# Patient Record
Sex: FEMALE | Race: WHITE | NOT HISPANIC OR LATINO | Employment: OTHER | URBAN - METROPOLITAN AREA
[De-identification: names, ages, dates, MRNs, and addresses within clinical notes are randomized per-mention and may not be internally consistent; named-entity substitution may affect disease eponyms.]

---

## 2017-06-02 ENCOUNTER — HOSPITAL ENCOUNTER (OUTPATIENT)
Dept: RADIOLOGY | Facility: MEDICAL CENTER | Age: 62
End: 2017-06-02
Attending: ORTHOPAEDIC SURGERY
Payer: COMMERCIAL

## 2017-06-02 DIAGNOSIS — S93.421A SPRAIN OF DELTOID LIGAMENT OF RIGHT ANKLE, INITIAL ENCOUNTER: ICD-10-CM

## 2017-06-02 PROCEDURE — 73721 MRI JNT OF LWR EXTRE W/O DYE: CPT | Mod: RT

## 2017-06-28 PROCEDURE — 80048 BASIC METABOLIC PNL TOTAL CA: CPT

## 2017-06-28 PROCEDURE — 83036 HEMOGLOBIN GLYCOSYLATED A1C: CPT

## 2017-06-28 PROCEDURE — 36415 COLL VENOUS BLD VENIPUNCTURE: CPT

## 2017-06-28 RX ORDER — MULTIVITAMIN WITH IRON
1 TABLET ORAL DAILY
COMMUNITY
End: 2019-11-26

## 2017-06-28 RX ORDER — LEVOTHYROXINE SODIUM 0.12 MG/1
125 TABLET ORAL
Status: ON HOLD | COMMUNITY
End: 2017-07-17

## 2017-06-28 NOTE — OR NURSING
preadmit appointment: Pt. Instructed to continue regularly prescribed medication through the day before surgery, instructed to take the following medications, the day of surgery, with a sip of water per anesthesia protocol :PRAVASTATIN,  Pt. To follow up with Dr. Shaw re to insulin pump and dosing. Pt states had a problem with Blood sugar when she had last surg

## 2017-07-17 ENCOUNTER — APPOINTMENT (OUTPATIENT)
Dept: RADIOLOGY | Facility: MEDICAL CENTER | Age: 62
End: 2017-07-17
Attending: ORTHOPAEDIC SURGERY
Payer: COMMERCIAL

## 2017-07-17 ENCOUNTER — HOSPITAL ENCOUNTER (OUTPATIENT)
Facility: MEDICAL CENTER | Age: 62
End: 2017-07-17
Attending: ORTHOPAEDIC SURGERY | Admitting: ORTHOPAEDIC SURGERY
Payer: COMMERCIAL

## 2017-07-17 VITALS
RESPIRATION RATE: 16 BRPM | TEMPERATURE: 97.4 F | HEIGHT: 67 IN | OXYGEN SATURATION: 96 % | HEART RATE: 85 BPM | SYSTOLIC BLOOD PRESSURE: 153 MMHG | WEIGHT: 145.72 LBS | DIASTOLIC BLOOD PRESSURE: 81 MMHG | BODY MASS INDEX: 22.87 KG/M2

## 2017-07-17 PROBLEM — M20.11 ACQUIRED HALLUX VALGUS OF RIGHT FOOT: Status: ACTIVE | Noted: 2017-07-17

## 2017-07-17 LAB
GLUCOSE BLD-MCNC: 122 MG/DL (ref 65–99)
GLUCOSE BLD-MCNC: 237 MG/DL (ref 65–99)

## 2017-07-17 PROCEDURE — C1769 GUIDE WIRE: HCPCS | Performed by: ORTHOPAEDIC SURGERY

## 2017-07-17 PROCEDURE — C1713 ANCHOR/SCREW BN/BN,TIS/BN: HCPCS | Performed by: ORTHOPAEDIC SURGERY

## 2017-07-17 PROCEDURE — 700101 HCHG RX REV CODE 250

## 2017-07-17 PROCEDURE — A9270 NON-COVERED ITEM OR SERVICE: HCPCS

## 2017-07-17 PROCEDURE — 160048 HCHG OR STATISTICAL LEVEL 1-5: Performed by: ORTHOPAEDIC SURGERY

## 2017-07-17 PROCEDURE — 500053 HCHG BANDAGE, ELASTIC 4: Performed by: ORTHOPAEDIC SURGERY

## 2017-07-17 PROCEDURE — 160022 HCHG BLOCK: Performed by: ORTHOPAEDIC SURGERY

## 2017-07-17 PROCEDURE — 500088 HCHG BLADE, SAGITTAL: Performed by: ORTHOPAEDIC SURGERY

## 2017-07-17 PROCEDURE — 160035 HCHG PACU - 1ST 60 MINS PHASE I: Performed by: ORTHOPAEDIC SURGERY

## 2017-07-17 PROCEDURE — 700102 HCHG RX REV CODE 250 W/ 637 OVERRIDE(OP)

## 2017-07-17 PROCEDURE — 501480 HCHG STOCKINETTE: Performed by: ORTHOPAEDIC SURGERY

## 2017-07-17 PROCEDURE — 160029 HCHG SURGERY MINUTES - 1ST 30 MINS LEVEL 4: Performed by: ORTHOPAEDIC SURGERY

## 2017-07-17 PROCEDURE — 160036 HCHG PACU - EA ADDL 30 MINS PHASE I: Performed by: ORTHOPAEDIC SURGERY

## 2017-07-17 PROCEDURE — 500562 HCHG FIBERWIRE: Performed by: ORTHOPAEDIC SURGERY

## 2017-07-17 PROCEDURE — 82962 GLUCOSE BLOOD TEST: CPT

## 2017-07-17 PROCEDURE — 500881 HCHG PACK, EXTREMITY: Performed by: ORTHOPAEDIC SURGERY

## 2017-07-17 PROCEDURE — 160009 HCHG ANES TIME/MIN: Performed by: ORTHOPAEDIC SURGERY

## 2017-07-17 PROCEDURE — 502240 HCHG MISC OR SUPPLY RC 0272: Performed by: ORTHOPAEDIC SURGERY

## 2017-07-17 PROCEDURE — 160046 HCHG PACU - 1ST 60 MINS PHASE II: Performed by: ORTHOPAEDIC SURGERY

## 2017-07-17 PROCEDURE — 503036 HCHG GUIDE PIN,OIC: Performed by: ORTHOPAEDIC SURGERY

## 2017-07-17 PROCEDURE — A6223 GAUZE >16<=48 NO W/SAL W/O B: HCPCS | Performed by: ORTHOPAEDIC SURGERY

## 2017-07-17 PROCEDURE — 700111 HCHG RX REV CODE 636 W/ 250 OVERRIDE (IP)

## 2017-07-17 PROCEDURE — A6454 SELF-ADHER BAND W>=3" <5"/YD: HCPCS | Performed by: ORTHOPAEDIC SURGERY

## 2017-07-17 PROCEDURE — 501838 HCHG SUTURE GENERAL: Performed by: ORTHOPAEDIC SURGERY

## 2017-07-17 PROCEDURE — 160002 HCHG RECOVERY MINUTES (STAT): Performed by: ORTHOPAEDIC SURGERY

## 2017-07-17 PROCEDURE — 160025 RECOVERY II MINUTES (STATS): Performed by: ORTHOPAEDIC SURGERY

## 2017-07-17 PROCEDURE — 73620 X-RAY EXAM OF FOOT: CPT | Mod: RT

## 2017-07-17 PROCEDURE — 160047 HCHG PACU  - EA ADDL 30 MINS PHASE II: Performed by: ORTHOPAEDIC SURGERY

## 2017-07-17 PROCEDURE — 500440 HCHG DRESSING, STERILE ROLL (KERLIX): Performed by: ORTHOPAEDIC SURGERY

## 2017-07-17 PROCEDURE — 160041 HCHG SURGERY MINUTES - EA ADDL 1 MIN LEVEL 4: Performed by: ORTHOPAEDIC SURGERY

## 2017-07-17 PROCEDURE — 500423 HCHG DRESSING, ABD COMBINE: Performed by: ORTHOPAEDIC SURGERY

## 2017-07-17 DEVICE — IMPLANTABLE DEVICE: Type: IMPLANTABLE DEVICE | Status: FUNCTIONAL

## 2017-07-17 RX ORDER — LEVOTHYROXINE SODIUM 0.12 MG/1
62.5-125 TABLET ORAL SEE ADMIN INSTRUCTIONS
COMMUNITY
End: 2022-04-14

## 2017-07-17 RX ORDER — LIDOCAINE HYDROCHLORIDE 10 MG/ML
INJECTION, SOLUTION INFILTRATION; PERINEURAL
Status: COMPLETED
Start: 2017-07-17 | End: 2017-07-17

## 2017-07-17 RX ORDER — OXYCODONE HCL 5 MG/5 ML
SOLUTION, ORAL ORAL
Status: COMPLETED
Start: 2017-07-17 | End: 2017-07-17

## 2017-07-17 RX ORDER — LIDOCAINE HYDROCHLORIDE 10 MG/ML
0.5 INJECTION, SOLUTION INFILTRATION; PERINEURAL
Status: COMPLETED | OUTPATIENT
Start: 2017-07-17 | End: 2017-07-17

## 2017-07-17 RX ORDER — SODIUM CHLORIDE 9 MG/ML
INJECTION, SOLUTION INTRAVENOUS CONTINUOUS
Status: DISCONTINUED | OUTPATIENT
Start: 2017-07-17 | End: 2017-07-17 | Stop reason: HOSPADM

## 2017-07-17 RX ORDER — LIDOCAINE AND PRILOCAINE 25; 25 MG/G; MG/G
1 CREAM TOPICAL
Status: COMPLETED | OUTPATIENT
Start: 2017-07-17 | End: 2017-07-17

## 2017-07-17 RX ADMIN — LIDOCAINE HYDROCHLORIDE 0.5 ML: 10 INJECTION, SOLUTION INFILTRATION; PERINEURAL at 06:05

## 2017-07-17 RX ADMIN — OXYCODONE HYDROCHLORIDE 10 MG: 5 SOLUTION ORAL at 09:49

## 2017-07-17 RX ADMIN — FENTANYL CITRATE 50 MCG: 50 INJECTION, SOLUTION INTRAMUSCULAR; INTRAVENOUS at 10:00

## 2017-07-17 RX ADMIN — SODIUM CHLORIDE: 9 INJECTION, SOLUTION INTRAVENOUS at 06:05

## 2017-07-17 RX ADMIN — FENTANYL CITRATE 50 MCG: 50 INJECTION, SOLUTION INTRAMUSCULAR; INTRAVENOUS at 09:49

## 2017-07-17 ASSESSMENT — PAIN SCALES - GENERAL
PAINLEVEL_OUTOF10: 0
PAINLEVEL_OUTOF10: 0
PAINLEVEL_OUTOF10: 2
PAINLEVEL_OUTOF10: 6
PAINLEVEL_OUTOF10: 2
PAINLEVEL_OUTOF10: 8

## 2017-07-17 NOTE — IP AVS SNAPSHOT
" Home Care Instructions                                                                                                                Name:Ines Long  Medical Record Number:[id number]  CSN: [id number]    YOB: 1955   Age: 62 y.o.  Sex: female  HT:1.702 m (5' 7\") WT: 66.1 kg (145 lb 11.6 oz)          Admit Date: 7/17/2017     Discharge Date:   Today's Date: 7/17/2017  Attending Doctor:  Adebayo Layton M.D.                  Allergies:  Ceclor and Aleve              Follow-up Information     1. Follow up with Adebayo Layton M.D. In 2 weeks.    Specialty:  Orthopaedics    Contact information    [address]  [address]  [facility] [address]  [phone]          Discharge Instructions         ACTIVITY: Rest and take it easy for the first 24 hours.  A responsible adult is recommended to remain with you during that time.  It is normal to feel sleepy.  We encourage you to not do anything that requires balance, judgment or coordination.    MILD FLU-LIKE SYMPTOMS ARE NORMAL. YOU MAY EXPERIENCE GENERALIZED MUSCLE ACHES, THROAT IRRITATION, HEADACHE AND/OR SOME NAUSEA.    FOR 24 HOURS DO NOT:  Drive, operate machinery or run household appliances.  Drink beer or alcoholic beverages.   Make important decisions or sign legal documents.    SPECIAL INSTRUCTIONS:     Elevate/ice  Take pain medications scheduled until block wears off.  Hold for sedation.  rewrap ace looser 6-8 hours post op    DIET: To avoid nausea, slowly advance diet as tolerated, avoiding spicy or greasy foods for the first day.  Add more substantial food to your diet according to your physician's instructions.  Babies can be fed formula or breast milk as soon as they are hungry.  INCREASE FLUIDS AND FIBER TO AVOID CONSTIPATION.        FOLLOW-UP APPOINTMENT:  A follow-up appointment should be arranged with your doctor  call to schedule.    You should CALL YOUR PHYSICIAN if you develop:  Fever greater than 101 degrees F.  Pain not relieved by medication, " or persistent nausea or vomiting.  Excessive bleeding (blood soaking through dressing) or unexpected drainage from the wound.  Extreme redness or swelling around the incision site, drainage of pus or foul smelling drainage.  Inability to urinate or empty your bladder within 8 hours.  Problems with breathing or chest pain.    You should call 911 if you develop problems with breathing or chest pain.  If you are unable to contact your doctor or surgical center, you should go to the nearest emergency room or urgent care center.  Physician's telephone #: [phone]    If any questions arise, call your doctor.  If your doctor is not available, please feel free to call the Surgical Center at [phone].  The Center is open Monday through Friday from 7AM to 7PM.  You can also call the [facility] HOTLINE open 24 hours/day, 7 days/week and speak to a nurse at [phone], or toll free at [phone].    A registered nurse may call you a few days after your surgery to see how you are doing after your procedure.    MEDICATIONS: Resume taking daily medication.  Take prescribed pain medication with food.  If no medication is prescribed, you may take non-aspirin pain medication if needed.  PAIN MEDICATION CAN BE VERY CONSTIPATING.  Take a stool softener or laxative such as senokot, pericolace, or milk of magnesia if needed.    Prescription given for pain  Last pain medication given at 0949    If your physician has prescribed pain medication that includes Acetaminophen (Tylenol), do not take additional Acetaminophen (Tylenol) while taking the prescribed medication.    Depression / Suicide Risk    As you are discharged from this [facility] facility, it is important to learn how to keep safe from harming yourself.    Recognize the warning signs:  · Abrupt changes in personality, positive or negative- including increase in energy   · Giving away possessions  · Change in eating patterns- significant weight changes-  positive or  negative  · Change in sleeping patterns- unable to sleep or sleeping all the time   · Unwillingness or inability to communicate  · Depression  · Unusual sadness, discouragement and loneliness  · Talk of wanting to die  · Neglect of personal appearance   · Rebelliousness- reckless behavior  · Withdrawal from people/activities they love  · Confusion- inability to concentrate     If you or a loved one observes any of these behaviors or has concerns about self-harm, here's what you can do:  · Talk about it- your feelings and reasons for harming yourself  · Remove any means that you might use to hurt yourself (examples: pills, rope, extension cords, firearm)  · Get professional help from the community (Mental Health, Substance Abuse, psychological counseling)  · Do not be alone:Call your Safe Contact- someone whom you trust who will be there for you.  · Call your local CRISIS HOTLINE 699-5449 or 104-851-9673  · Call your local Children's Mobile Crisis Response Team [place] (923) 870-0809 or www.[employer]  · Call the toll free National Suicide Prevention Hotlines   · National Suicide Prevention Lifeline 763-881-PUMC (8311)  · National Hope Line Network 800-SUICIDE (772-0095)       Medication List      CONTINUE taking these medications        Instructions    Morning Afternoon Evening Bedtime    estradiol 1 MG Tabs   Commonly known as:  ESTRACE        Take 1 mg by mouth every day.   Dose:  1 mg                        insulin infusion pump Rosibel        Inject  as instructed Continuous. Basal Rate: 0000 .5 units 0330 .85 units 1100 .5 units Bolus 1 unit for every 12 carbs Change tube yesterday (7/16/2017)   Indications: Novolog                        LEVOXYL 125 MCG Tabs   Generic drug:  levothyroxine        Take 125 mcg by mouth Every morning on an empty stomach.   Dose:  125 mcg                        Magnesium 250 MG Tabs        Take 1 Tab by mouth every day.   Dose:  1 Tab                        metoclopramide  10 MG Tabs   Commonly known as:  REGLAN        Take 10 mg by mouth 2 Times a Day.   Dose:  10 mg                        MULTIVITAMIN WOMEN PO        Take 1 Tab by mouth every day.   Dose:  1 Tab                        pravastatin 40 MG tablet   Commonly known as:  PRAVACHOL        Take 80 mg by mouth every evening. 1 1/2 by mouth nightly   Dose:  80 mg                        PRO-BIOTIC BLEND PO        Take 1 Cap by mouth every day.   Dose:  1 Cap                        VITAMIN D-3 PO        Take 5,000 mg by mouth every 48 hours.   Dose:  5000 mg                                Medication Information     Above and/or attached are the medications your physician expects you to take upon discharge. Review all of your home medications and newly ordered medications with your doctor and/or pharmacist. Follow medication instructions as directed by your doctor and/or pharmacist. Please keep your medication list with you and share with your physician. Update the information when medications are discontinued, doses are changed, or new medications (including over-the-counter products) are added; and carry medication information at all times in the event of emergency situations.        Resources     Quit Smoking / Tobacco Use:    I understand the use of any tobacco products increases my chance of suffering from future heart disease or stroke and could cause other illnesses which may shorten my life. Quitting the use of tobacco products is the single most important thing I can do to improve my health. For further information on smoking / tobacco cessation call a Toll Free Quit Line at 1-145.369.3452 (*National Cancer [place]) or 1-206.161.4250 (American Lung Association) or you can access the web based program at www.lungusa.org.    [place] Tobacco Users Help Line:  (123) 400-8649       Toll Free: 1-242.296.5640  Quit Tobacco Program [facility] (910)671-2699    Crisis Hotline:    National Crisis Hotline:   8-327-XJIDZLI or 1-378.243.3043    [place] Crisis Hotline:    1-729.349.2382 or 773-571-5899    Discharge Survey:   Thank you for choosing [facility]. We hope we did everything we could to make your hospital stay a pleasant one. You may be receiving a survey and we would appreciate your time and participation in answering the questions. Your input is very valuable to us in our efforts to improve our service to our patients and their families.            Signatures     My signature on this form indicates that:    1. I acknowledge receipt and understanding of these Home Care Instruction.  2. My questions regarding this information have been answered to my satisfaction.  3. I have formulated a plan with my discharge nurse to obtain my prescribed medications for home.    __________________________________      __________________________________                   Patient Signature                                 Guardian/Responsible Adult Signature      __________________________________                 __________       ________                       Nurse Signature                                               Date                 Time

## 2017-07-17 NOTE — DISCHARGE INSTRUCTIONS
ACTIVITY: Rest and take it easy for the first 24 hours.  A responsible adult is recommended to remain with you during that time.  It is normal to feel sleepy.  We encourage you to not do anything that requires balance, judgment or coordination.    MILD FLU-LIKE SYMPTOMS ARE NORMAL. YOU MAY EXPERIENCE GENERALIZED MUSCLE ACHES, THROAT IRRITATION, HEADACHE AND/OR SOME NAUSEA.    FOR 24 HOURS DO NOT:  Drive, operate machinery or run household appliances.  Drink beer or alcoholic beverages.   Make important decisions or sign legal documents.    SPECIAL INSTRUCTIONS:     Elevate/ice  Take pain medications scheduled until block wears off.  Hold for sedation.  rewrap ace looser 6-8 hours post op    DIET: To avoid nausea, slowly advance diet as tolerated, avoiding spicy or greasy foods for the first day.  Add more substantial food to your diet according to your physician's instructions.  Babies can be fed formula or breast milk as soon as they are hungry.  INCREASE FLUIDS AND FIBER TO AVOID CONSTIPATION.        FOLLOW-UP APPOINTMENT:  A follow-up appointment should be arranged with your doctor  call to schedule.    You should CALL YOUR PHYSICIAN if you develop:  Fever greater than 101 degrees F.  Pain not relieved by medication, or persistent nausea or vomiting.  Excessive bleeding (blood soaking through dressing) or unexpected drainage from the wound.  Extreme redness or swelling around the incision site, drainage of pus or foul smelling drainage.  Inability to urinate or empty your bladder within 8 hours.  Problems with breathing or chest pain.    You should call 911 if you develop problems with breathing or chest pain.  If you are unable to contact your doctor or surgical center, you should go to the nearest emergency room or urgent care center.  Physician's telephone #: [phone]    If any questions arise, call your doctor.  If your doctor is not available, please feel free to call the Surgical Center at [phone].   The Center is open Monday through Friday from 7AM to 7PM.  You can also call the HEALTH HOTLINE open 24 hours/day, 7 days/week and speak to a nurse at [phone], or toll free at [phone].    A registered nurse may call you a few days after your surgery to see how you are doing after your procedure.    MEDICATIONS: Resume taking daily medication.  Take prescribed pain medication with food.  If no medication is prescribed, you may take non-aspirin pain medication if needed.  PAIN MEDICATION CAN BE VERY CONSTIPATING.  Take a stool softener or laxative such as senokot, pericolace, or milk of magnesia if needed.    Prescription given for pain  Last pain medication given at 0949    If your physician has prescribed pain medication that includes Acetaminophen (Tylenol), do not take additional Acetaminophen (Tylenol) while taking the prescribed medication.    Depression / Suicide Risk    As you are discharged from this [facility] facility, it is important to learn how to keep safe from harming yourself.    Recognize the warning signs:  · Abrupt changes in personality, positive or negative- including increase in energy   · Giving away possessions  · Change in eating patterns- significant weight changes-  positive or negative  · Change in sleeping patterns- unable to sleep or sleeping all the time   · Unwillingness or inability to communicate  · Depression  · Unusual sadness, discouragement and loneliness  · Talk of wanting to die  · Neglect of personal appearance   · Rebelliousness- reckless behavior  · Withdrawal from people/activities they love  · Confusion- inability to concentrate     If you or a loved one observes any of these behaviors or has concerns about self-harm, here's what you can do:  · Talk about it- your feelings and reasons for harming yourself  · Remove any means that you might use to hurt yourself (examples: pills, rope, extension cords, firearm)  · Get professional help from the community  (Mental Health, Substance Abuse, psychological counseling)  · Do not be alone:Call your Safe Contact- someone whom you trust who will be there for you.  · Call your local CRISIS HOTLINE 118-3590 or 559-886-5938  · Call your local Children's Mobile Crisis Response Team [place] (849) 107-6719 or www.[employer]  · Call the toll free National Suicide Prevention Hotlines   · National Suicide Prevention Lifeline 872-573-RRVC (6480)  · National Hope Line Network 800-SUICIDE (448-5550)

## 2017-07-17 NOTE — IP AVS SNAPSHOT
7/17/2017    Ines Long  [address]  [place] [address]    Dear Ines:    [facility] wants to ensure your discharge home is safe and you or your loved ones have had all of your questions answered regarding your care after you leave the hospital.    Below is a list of resources and contact information should you have any questions regarding your hospital stay, follow-up instructions, or active medical symptoms.    Questions or Concerns Regarding… Contact   Medical Questions Related to Your Discharge  (7 days a week, 8am-5pm) Contact a Nurse Care Coordinator   [phone]   Medical Questions Not Related to Your Discharge  (24 hours a day / 7 days a week)  Contact the Nurse Health Line   [phone]    Medications or Discharge Instructions Refer to your discharge packet   or contact your [facility] Primary Care Provider   [phone]   Follow-up Appointment(s) Schedule your appointment via [employer]   or contact Scheduling [phone]   Billing Review your statement via [employer]  or contact Billing [phone]   Medical Records Review your records via [employer]   or contact Medical Records [phone]     You may receive a telephone call within two days of discharge. This call is to make certain you understand your discharge instructions and have the opportunity to have any questions answered. You can also easily access your medical information, test results and upcoming appointments via the [employer] free online health management tool. You can learn more and sign up at [employer]. For assistance setting up your [employer] account, please call [phone].    Once again, we want to ensure your discharge home is safe and that you have a clear understanding of any next steps in your care. If you have any questions or concerns, please do not hesitate to contact us, we are here for you. Thank you for choosing [facility] for your healthcare needs.    Sincerely,    Your [facility] Healthcare Team

## 2017-11-25 ENCOUNTER — HOSPITAL ENCOUNTER (OUTPATIENT)
Dept: LAB | Facility: MEDICAL CENTER | Age: 62
End: 2017-11-25
Attending: NURSE PRACTITIONER
Payer: COMMERCIAL

## 2017-11-25 LAB
ALBUMIN SERPL BCP-MCNC: 4 G/DL (ref 3.2–4.9)
ALBUMIN/GLOB SERPL: 1.4 G/DL
ALP SERPL-CCNC: 69 U/L (ref 30–99)
ALT SERPL-CCNC: 18 U/L (ref 2–50)
ANION GAP SERPL CALC-SCNC: 10 MMOL/L (ref 0–11.9)
AST SERPL-CCNC: 19 U/L (ref 12–45)
BILIRUB SERPL-MCNC: 0.6 MG/DL (ref 0.1–1.5)
BUN SERPL-MCNC: 10 MG/DL (ref 8–22)
CALCIUM SERPL-MCNC: 9.6 MG/DL (ref 8.5–10.5)
CHLORIDE SERPL-SCNC: 95 MMOL/L (ref 96–112)
CHOLEST SERPL-MCNC: 206 MG/DL (ref 100–199)
CO2 SERPL-SCNC: 25 MMOL/L (ref 20–33)
CREAT SERPL-MCNC: 0.75 MG/DL (ref 0.5–1.4)
EST. AVERAGE GLUCOSE BLD GHB EST-MCNC: 163 MG/DL
GFR SERPL CREATININE-BSD FRML MDRD: >60 ML/MIN/1.73 M 2
GLOBULIN SER CALC-MCNC: 2.9 G/DL (ref 1.9–3.5)
GLUCOSE SERPL-MCNC: 131 MG/DL (ref 65–99)
HBA1C MFR BLD: 7.3 % (ref 0–5.6)
HDLC SERPL-MCNC: 94 MG/DL
LDLC SERPL CALC-MCNC: 98 MG/DL
POTASSIUM SERPL-SCNC: 4.4 MMOL/L (ref 3.6–5.5)
PROT SERPL-MCNC: 6.9 G/DL (ref 6–8.2)
SODIUM SERPL-SCNC: 130 MMOL/L (ref 135–145)
T4 FREE SERPL-MCNC: 1.1 NG/DL (ref 0.53–1.43)
TRIGL SERPL-MCNC: 68 MG/DL (ref 0–149)
TSH SERPL DL<=0.005 MIU/L-ACNC: 1.58 UIU/ML (ref 0.3–3.7)

## 2017-11-25 PROCEDURE — 80061 LIPID PANEL: CPT

## 2017-11-25 PROCEDURE — 84439 ASSAY OF FREE THYROXINE: CPT

## 2017-11-25 PROCEDURE — 84443 ASSAY THYROID STIM HORMONE: CPT

## 2017-11-25 PROCEDURE — 80053 COMPREHEN METABOLIC PANEL: CPT

## 2017-11-25 PROCEDURE — 36415 COLL VENOUS BLD VENIPUNCTURE: CPT

## 2017-11-25 PROCEDURE — 83036 HEMOGLOBIN GLYCOSYLATED A1C: CPT

## 2018-02-28 ENCOUNTER — HOSPITAL ENCOUNTER (OUTPATIENT)
Dept: LAB | Facility: MEDICAL CENTER | Age: 63
End: 2018-02-28
Attending: INTERNAL MEDICINE
Payer: COMMERCIAL

## 2018-02-28 LAB
ALBUMIN SERPL BCP-MCNC: 4.1 G/DL (ref 3.2–4.9)
ALBUMIN/GLOB SERPL: 1.4 G/DL
ALP SERPL-CCNC: 82 U/L (ref 30–99)
ALT SERPL-CCNC: 18 U/L (ref 2–50)
ANION GAP SERPL CALC-SCNC: 8 MMOL/L (ref 0–11.9)
AST SERPL-CCNC: 18 U/L (ref 12–45)
BILIRUB SERPL-MCNC: 0.5 MG/DL (ref 0.1–1.5)
BUN SERPL-MCNC: 14 MG/DL (ref 8–22)
CALCIUM SERPL-MCNC: 9.4 MG/DL (ref 8.5–10.5)
CHLORIDE SERPL-SCNC: 99 MMOL/L (ref 96–112)
CHOLEST SERPL-MCNC: 231 MG/DL (ref 100–199)
CO2 SERPL-SCNC: 26 MMOL/L (ref 20–33)
CREAT SERPL-MCNC: 0.74 MG/DL (ref 0.5–1.4)
CREAT UR-MCNC: 131.6 MG/DL
EST. AVERAGE GLUCOSE BLD GHB EST-MCNC: 166 MG/DL
GLOBULIN SER CALC-MCNC: 2.9 G/DL (ref 1.9–3.5)
GLUCOSE SERPL-MCNC: 147 MG/DL (ref 65–99)
HBA1C MFR BLD: 7.4 % (ref 0–5.6)
HDLC SERPL-MCNC: 97 MG/DL
LDLC SERPL CALC-MCNC: 108 MG/DL
MICROALBUMIN UR-MCNC: <0.7 MG/DL
MICROALBUMIN/CREAT UR: NORMAL MG/G (ref 0–30)
POTASSIUM SERPL-SCNC: 4.2 MMOL/L (ref 3.6–5.5)
PROT SERPL-MCNC: 7 G/DL (ref 6–8.2)
SODIUM SERPL-SCNC: 133 MMOL/L (ref 135–145)
T4 FREE SERPL-MCNC: 0.71 NG/DL (ref 0.53–1.43)
TRIGL SERPL-MCNC: 128 MG/DL (ref 0–149)
TSH SERPL DL<=0.005 MIU/L-ACNC: 2.62 UIU/ML (ref 0.38–5.33)

## 2018-02-28 PROCEDURE — 82043 UR ALBUMIN QUANTITATIVE: CPT

## 2018-02-28 PROCEDURE — 84439 ASSAY OF FREE THYROXINE: CPT

## 2018-02-28 PROCEDURE — 83036 HEMOGLOBIN GLYCOSYLATED A1C: CPT

## 2018-02-28 PROCEDURE — 82570 ASSAY OF URINE CREATININE: CPT

## 2018-02-28 PROCEDURE — 80061 LIPID PANEL: CPT

## 2018-02-28 PROCEDURE — 80053 COMPREHEN METABOLIC PANEL: CPT

## 2018-02-28 PROCEDURE — 36415 COLL VENOUS BLD VENIPUNCTURE: CPT

## 2018-02-28 PROCEDURE — 84443 ASSAY THYROID STIM HORMONE: CPT

## 2018-03-03 ENCOUNTER — HOSPITAL ENCOUNTER (OUTPATIENT)
Dept: RADIOLOGY | Facility: MEDICAL CENTER | Age: 63
End: 2018-03-03
Attending: ORTHOPAEDIC SURGERY
Payer: COMMERCIAL

## 2018-03-03 DIAGNOSIS — S92.354A CLOSED NONDISPLACED FRACTURE OF FIFTH METATARSAL BONE OF RIGHT FOOT, INITIAL ENCOUNTER: ICD-10-CM

## 2018-03-03 DIAGNOSIS — M20.11 ACQUIRED HALLUX VALGUS OF RIGHT FOOT: ICD-10-CM

## 2018-03-03 PROCEDURE — 73700 CT LOWER EXTREMITY W/O DYE: CPT | Mod: RT

## 2018-03-29 ENCOUNTER — HOSPITAL ENCOUNTER (OUTPATIENT)
Dept: RADIOLOGY | Facility: MEDICAL CENTER | Age: 63
End: 2018-03-29
Attending: INTERNAL MEDICINE
Payer: COMMERCIAL

## 2018-03-29 DIAGNOSIS — R09.89 BRUIT: ICD-10-CM

## 2018-03-29 PROCEDURE — 93880 EXTRACRANIAL BILAT STUDY: CPT

## 2018-06-01 ENCOUNTER — HOSPITAL ENCOUNTER (OUTPATIENT)
Dept: LAB | Facility: MEDICAL CENTER | Age: 63
End: 2018-06-01
Attending: INTERNAL MEDICINE
Payer: COMMERCIAL

## 2018-06-01 LAB
ALBUMIN SERPL BCP-MCNC: 4.2 G/DL (ref 3.2–4.9)
ALBUMIN/GLOB SERPL: 1.4 G/DL
ALP SERPL-CCNC: 63 U/L (ref 30–99)
ALT SERPL-CCNC: 28 U/L (ref 2–50)
ANION GAP SERPL CALC-SCNC: 6 MMOL/L (ref 0–11.9)
AST SERPL-CCNC: 25 U/L (ref 12–45)
BILIRUB SERPL-MCNC: 0.4 MG/DL (ref 0.1–1.5)
BUN SERPL-MCNC: 14 MG/DL (ref 8–22)
CALCIUM SERPL-MCNC: 9.3 MG/DL (ref 8.5–10.5)
CHLORIDE SERPL-SCNC: 95 MMOL/L (ref 96–112)
CHOLEST SERPL-MCNC: 198 MG/DL (ref 100–199)
CO2 SERPL-SCNC: 25 MMOL/L (ref 20–33)
CREAT SERPL-MCNC: 0.77 MG/DL (ref 0.5–1.4)
EST. AVERAGE GLUCOSE BLD GHB EST-MCNC: 169 MG/DL
GLOBULIN SER CALC-MCNC: 2.9 G/DL (ref 1.9–3.5)
GLUCOSE SERPL-MCNC: 204 MG/DL (ref 65–99)
HBA1C MFR BLD: 7.5 % (ref 0–5.6)
HDLC SERPL-MCNC: 79 MG/DL
LDLC SERPL CALC-MCNC: 98 MG/DL
POTASSIUM SERPL-SCNC: 5.8 MMOL/L (ref 3.6–5.5)
PROT SERPL-MCNC: 7.1 G/DL (ref 6–8.2)
SODIUM SERPL-SCNC: 126 MMOL/L (ref 135–145)
TRIGL SERPL-MCNC: 104 MG/DL (ref 0–149)

## 2018-06-01 PROCEDURE — 80061 LIPID PANEL: CPT

## 2018-06-01 PROCEDURE — 83036 HEMOGLOBIN GLYCOSYLATED A1C: CPT

## 2018-06-01 PROCEDURE — 80053 COMPREHEN METABOLIC PANEL: CPT

## 2018-06-01 PROCEDURE — 36415 COLL VENOUS BLD VENIPUNCTURE: CPT

## 2018-06-15 ENCOUNTER — HOSPITAL ENCOUNTER (OUTPATIENT)
Dept: LAB | Facility: MEDICAL CENTER | Age: 63
End: 2018-06-15
Attending: INTERNAL MEDICINE
Payer: COMMERCIAL

## 2018-06-15 LAB
ANION GAP SERPL CALC-SCNC: 10 MMOL/L (ref 0–11.9)
BUN SERPL-MCNC: 10 MG/DL (ref 8–22)
CALCIUM SERPL-MCNC: 9.3 MG/DL (ref 8.5–10.5)
CHLORIDE SERPL-SCNC: 100 MMOL/L (ref 96–112)
CO2 SERPL-SCNC: 22 MMOL/L (ref 20–33)
CORTIS SERPL-MCNC: 13.3 UG/DL (ref 0–23)
CREAT SERPL-MCNC: 0.75 MG/DL (ref 0.5–1.4)
CREAT UR-MCNC: 123.5 MG/DL
GLUCOSE SERPL-MCNC: 86 MG/DL (ref 65–99)
OSMOLALITY SERPL: 273 MOSM/KG H2O (ref 278–298)
OSMOLALITY UR: 492 MOSM/KG H2O (ref 300–900)
POTASSIUM SERPL-SCNC: 4.2 MMOL/L (ref 3.6–5.5)
SODIUM SERPL-SCNC: 132 MMOL/L (ref 135–145)
SODIUM UR-SCNC: 73 MMOL/L

## 2018-06-15 PROCEDURE — 82533 TOTAL CORTISOL: CPT

## 2018-06-15 PROCEDURE — 84244 ASSAY OF RENIN: CPT

## 2018-06-15 PROCEDURE — 82088 ASSAY OF ALDOSTERONE: CPT

## 2018-06-15 PROCEDURE — 82024 ASSAY OF ACTH: CPT

## 2018-06-15 PROCEDURE — 80048 BASIC METABOLIC PNL TOTAL CA: CPT

## 2018-06-15 PROCEDURE — 36415 COLL VENOUS BLD VENIPUNCTURE: CPT

## 2018-06-15 PROCEDURE — 84588 ASSAY OF VASOPRESSIN: CPT

## 2018-06-15 PROCEDURE — 84300 ASSAY OF URINE SODIUM: CPT

## 2018-06-15 PROCEDURE — 82570 ASSAY OF URINE CREATININE: CPT

## 2018-06-15 PROCEDURE — 83930 ASSAY OF BLOOD OSMOLALITY: CPT

## 2018-06-15 PROCEDURE — 83935 ASSAY OF URINE OSMOLALITY: CPT

## 2018-06-16 LAB
ACTH PLAS-MCNC: 17 PG/ML (ref 6–58)
ALDOST SERPL-MCNC: <3 NG/DL
RENIN PLAS-CCNC: 8.1 NG/ML/HR

## 2018-06-22 LAB — MISCELLANEOUS LAB RESULT MISCLAB: NORMAL

## 2018-10-23 ENCOUNTER — OFFICE VISIT (OUTPATIENT)
Dept: MEDICAL GROUP | Facility: PHYSICIAN GROUP | Age: 63
End: 2018-10-23
Payer: COMMERCIAL

## 2018-10-23 VITALS
HEIGHT: 67 IN | TEMPERATURE: 98.7 F | DIASTOLIC BLOOD PRESSURE: 72 MMHG | SYSTOLIC BLOOD PRESSURE: 126 MMHG | RESPIRATION RATE: 14 BRPM | HEART RATE: 76 BPM | WEIGHT: 148 LBS | OXYGEN SATURATION: 96 % | BODY MASS INDEX: 23.23 KG/M2

## 2018-10-23 DIAGNOSIS — Z79.890 HORMONE REPLACEMENT THERAPY (HRT): ICD-10-CM

## 2018-10-23 DIAGNOSIS — Z23 NEED FOR VACCINATION: ICD-10-CM

## 2018-10-23 DIAGNOSIS — J02.9 ACUTE PHARYNGITIS, UNSPECIFIED ETIOLOGY: ICD-10-CM

## 2018-10-23 DIAGNOSIS — Z96.41 INSULIN PUMP IN PLACE: ICD-10-CM

## 2018-10-23 DIAGNOSIS — J02.9 SORE THROAT: ICD-10-CM

## 2018-10-23 DIAGNOSIS — E10.69 CONTROLLED TYPE 1 DIABETES MELLITUS WITH OTHER COMPLICATION (HCC): ICD-10-CM

## 2018-10-23 DIAGNOSIS — E03.9 ACQUIRED HYPOTHYROIDISM: ICD-10-CM

## 2018-10-23 PROBLEM — M20.11 ACQUIRED HALLUX VALGUS OF RIGHT FOOT: Status: RESOLVED | Noted: 2017-07-17 | Resolved: 2018-10-23

## 2018-10-23 PROCEDURE — 90732 PPSV23 VACC 2 YRS+ SUBQ/IM: CPT | Performed by: NURSE PRACTITIONER

## 2018-10-23 PROCEDURE — 90471 IMMUNIZATION ADMIN: CPT | Performed by: NURSE PRACTITIONER

## 2018-10-23 PROCEDURE — 99214 OFFICE O/P EST MOD 30 MIN: CPT | Mod: 25 | Performed by: NURSE PRACTITIONER

## 2018-10-23 RX ORDER — PRAVASTATIN SODIUM 80 MG/1
80 TABLET ORAL DAILY
COMMUNITY
Start: 2018-10-03 | End: 2019-10-18

## 2018-10-23 ASSESSMENT — PATIENT HEALTH QUESTIONNAIRE - PHQ9: CLINICAL INTERPRETATION OF PHQ2 SCORE: 0

## 2018-10-23 NOTE — LETTER
[facility]  HAYDEN CandelarioP.RMARY  [address]  [place] [address]  Fax: [phone]   Authorization for Release/Disclosure of   Protected Health Information   Name: INES LONG : 1955 SSN: [id number]   Address: [address] [address] Phone:    [phone] (home)    I authorize the entity listed below to release/disclose the PHI below to:   [facility]/SCOTT Candelario.P.R.RAMON. and SCOTT Candelario.P.RMARY   Provider or Entity Name:  [place] [facility]    Address   City, State, Zip   Phone:      Fax:     Reason for request: continuity of care   Information to be released:    [  ] LAST COLONOSCOPY,  including any PATH REPORT and follow-up  [  ] LAST FIT/COLOGUARD RESULT [  ] LAST DEXA  [ x ] LAST MAMMOGRAM  [  ] LAST PAP  [  ] LAST LABS [  ] RETINA EXAM REPORT  [  ] IMMUNIZATION RECORDS  [  ] Release all info      [  ] Check here and initial the line next to each item to release ALL health information INCLUDING  _____ Care and treatment for drug and / or alcohol abuse  _____ HIV testing, infection status, or AIDS  _____ Genetic Testing    DATES OF SERVICE OR TIME PERIOD TO BE DISCLOSED: _____________  I understand and acknowledge that:  * This Authorization may be revoked at any time by you in writing, except if your health information has already been used or disclosed.  * Your health information that will be used or disclosed as a result of you signing this authorization could be re-disclosed by the recipient. If this occurs, your re-disclosed health information may no longer be protected by State or Federal laws.  * You may refuse to sign this Authorization. Your refusal will not affect your ability to obtain treatment.  * This Authorization becomes effective upon signing and will  on (date) __________.      If no date is indicated, this Authorization will  one (1) year from the signature date.    Name: Ines Long    Signature:   Date:     10/23/2018       PLEASE FAX REQUESTED RECORDS BACK TO: [phone]

## 2018-10-23 NOTE — PROGRESS NOTES
"Chief Complaint   Patient presents with   • Pharyngitis     L side comes and goes; 1 week        HISTORY OF THE PRESENT ILLNESS: This is a 63 y.o. female new patient to our practice. This pleasant patient is here today discuss sore throat.    Sore throat  This is a new problem. Started about 1.5 weeks ago. States that it is only painful on the left side. States that it is sometimes difficult to swallow. States it is worse in the morning and at night. Feels very dry. Is having upper respiratory congestion. States that \"nothing will come out\". No SOB. No cough. States she has never been diagnosed with allergies. Has noticed itchy eyes. No known sick contacts. States that lozenges did not help.     Diabetes type 1, controlled  Currently on insulin pump.  Reports compliance with medications.  Patient has insulin pump and continuous glucose monitoring.  Denies hypoglycemia.  Follows with endocrinology.  Positive for gastroparesis.  Most recent A1c 7.5.  Patient is due for A1c and states she will complete this as requested by Dr. Shaw.      Hypothyroidism  Chronic in nature.  Stable.  Follows with endocrinology. Taking medicine as directed. Denies palpitations, skin changes, temperature intolerance, changes in bowel habits.    Hormone replacement therapy (HRT)  Chronic in nature.  Managed by Dr. Almodovar.  Patient has been noticing decreased sexual desire.  Patient states that she has discomfort with sexual intercourse.  States she has not found anything that makes it better or worse.      Past Medical History:   Diagnosis Date   • Anesthesia     Post op N/V   • Diabetes type 1, controlled (HCC) 11/1/2010   • Dyslipidemia 11/1/2010   • High cholesterol    • Hypothyroidism 11/1/2010   • Insulin pump in place 5/18/2011   • Routine health maintenance 5/18/2011       Past Surgical History:   Procedure Laterality Date   • ORTHOPEDIC OSTEOTOMY Left 7/17/2017    Procedure: ORTHOPEDIC OSTEOTOMY CALCANEAL, KIDNER, EXCISION " NAVICULAR, GASTROC SOLEUS RECESSION;  Surgeon: Adebayo Layton M.D.;  Location: SURGERY [facility];  Service:    • LIGAMENT REPAIR  2017    Procedure: LIGAMENT REPAIR SPRING;  Surgeon: Adebayo Layton M.D.;  Location: SURGERY [facility];  Service:    • TOE FUSION  2017    Procedure: TOE FUSION 1ST METATARSAL JOINT, 2ND TOE RECONSTRUCTION ;  Surgeon: Adebayo Layton M.D.;  Location: SURGERY [facility];  Service:    • FLEXOR TENDON REPAIR  2017    Procedure: FLEXOR TENDON REPAIR- RELEASE/POSSIBLE FLEXOR DIGITORIUM LONGUS;  Surgeon: Adebayo Layton M.D.;  Location: SURGERY [facility];  Service:    • BUNIONECTOMY     • SHOULDER ARTHROSCOPY Left     frozen shoulder   • ABDOMINAL HYSTERECTOMY TOTAL     • KNEE ARTHROTOMY Right     cartidge    • CHOLECYSTECTOMY         Family Status   Relation Status   • Mo Alive   • Fa    • Sis Alive   • MGMo    • MGFa    • PGMo    • PGFa      Family History   Problem Relation Age of Onset   • Cancer Mother         rectal cancer   • Stroke Father    • Heart Disease Father    • Hypertension Father    • Lung Disease Father         tb   • Diabetes Sister    • Psychiatry Sister         bipolar   • Heart Disease Maternal Grandfather    • Stroke Paternal Grandmother    • Heart Disease Paternal Grandfather        Social History   Substance Use Topics   • Smoking status: Former Smoker     Years: 2.00     Types: Cigarettes   • Smokeless tobacco: Never Used   • Alcohol use 1.0 oz/week     2 Glasses of wine per week      Comment: 3-4 WEEK       Allergies: Ceclor [cefaclor] and Aleve [ ]    Current Outpatient Prescriptions Ordered in [facility]   Medication Sig Dispense Refill   • insulin infusion pump Device Inject  as instructed Continuous. Basal Rate:  0000 .5 units  0330 .85 units  1100 .5 units  Bolus 1 unit for every 12 carbs  Change tube yesterday (2017)   Indications: Novolog     • levothyroxine  "(LEVOXYL) 125 MCG Tab Take 125 mcg by mouth Every morning on an empty stomach.     • Probiotic Product (PRO-BIOTIC BLEND PO) Take 1 Cap by mouth every day.     • Cholecalciferol (VITAMIN D-3 PO) Take 5,000 mg by mouth every 48 hours.     • Multiple Vitamins-Minerals (MULTIVITAMIN WOMEN PO) Take 1 Tab by mouth every day.     • metoclopramide (REGLAN) 10 MG TABS Take 10 mg by mouth 2 Times a Day.     • estradiol (ESTRACE) 1 MG TABS Take 1 mg by mouth every day.     • NOVOLOG 100 UNIT/ML Solution 100 Units by Other route.     • pravastatin (PRAVACHOL) 80 MG tablet Take 80 mg by mouth every day.     • Magnesium 250 MG Tab Take 1 Tab by mouth every day.       No current [facility]-ordered facility-administered medications on file.        Review of Systems   Constitutional:  Negative for fever, chills, weight loss and malaise/fatigue.   HENT:  Negative for ear pain, nosebleeds, congestion, positive sore throat and negative neck pain.    Eyes:  Negative for blurred vision.   Respiratory:  Negative for cough, sputum production, shortness of breath and wheezing.    Cardiovascular:  Negative for chest pain, palpitations, orthopnea and leg swelling.   Gastrointestinal:  Negative for heartburn, nausea, vomiting and abdominal pain.   Genitourinary:  Negative for dysuria, urgency and frequency.   Musculoskeletal:  Negative for myalgias, back pain and joint pain.   Skin:  Negative for rash and itching.   Neurological:  Negative for dizziness, tingling, tremors, sensory change, focal weakness and headaches.   Endo/Heme/Allergies:  Does not bruise/bleed easily.   Psychiatric/Behavioral:  Negative for depression, anxiety, or memory loss.     All other systems reviewed and are negative except as in HPI.    Exam: Blood pressure 126/72, pulse 76, temperature 37.1 °C (98.7 °F), temperature source Temporal, resp. rate 14, height 1.702 m (5' 7\"), weight 67.1 kg (148 lb), last menstrual period 01/01/1983, SpO2 96 %, not currently " breastfeeding.  General:  Normal appearing. No distress.  HEENT:  Normocephalic. Eyes conjunctiva clear lids without ptosis, pupils equal and reactive to light accommodation, ears normal shape and contour, canals are clear bilaterally, tympanic membranes are with bilateral effusion, nasal mucosa erythematous, oropharynx is with erythema, positive cobblestoning, noted postnasal drainage..   Neck:  Supple without JVD or bruit. Thyroid is not enlarged.  Pulmonary:  Clear to ausculation.  Normal effort. No rales, ronchi, or wheezing.  Cardiovascular:  Regular rate and rhythm without murmur. Carotid and radial pulses are intact and equal bilaterally.  Neurologic:  Grossly nonfocal  Lymph:  No cervical, supraclavicular or axillary lymph nodes are palpable  Skin:  Warm and dry.  No obvious lesions.  Musculoskeletal:  Normal gait. No extremity cyanosis, clubbing, or edema.  Psych:  Normal mood and affect. Alert and oriented x3. Judgment and insight is normal.    PLAN:    1. Insulin pump in place  Continue follow-up with endocrinology    2. Hormone replacement therapy (HRT)  Continue follow-up with gynecology    4. Need for vaccination  - PneumoVax PPV23 =>5yo    5. Acute pharyngitis, unspecified etiology  3. Sore throat  Allergic versus viral  No noted exudates  Counseled patient regarding conservative measures including over-the-counter decongestants, saline nasal spray, humidifier, warm salt water gargles patient will follow-up if symptoms worsen, do not improve or if she develops a fever    6. Controlled type 1 diabetes mellitus with other complication (HCC)  7. Acquired hypothyroidism  Continue follow-up with endocrinology      Follow-up in 6 months or sooner as needed. Patient is encouraged to be seen in the emergency room for chest pain, palpitations, shortness of breath, dizziness, severe abdominal pain or other concerning symptoms.      Please note that this dictation was created using voice recognition software. I  have made every reasonable attempt to correct obvious errors, but I expect that there are errors of grammar and possibly content that I did not discover before finalizing the note.      Assessment/Plan  1. Insulin pump in place     2. Hormone replacement therapy (HRT)     3. Sore throat     4. Need for vaccination  PneumoVax PPV23 =>5yo   5. Acute pharyngitis, unspecified etiology     6. Controlled type 1 diabetes mellitus with other complication (HCC)     7. Acquired hypothyroidism           I have placed the below orders and discussed them with an approved delegating provider. The MA is performing the below orders under the direction of Dr. Traore.

## 2018-10-23 NOTE — ASSESSMENT & PLAN NOTE
Chronic in nature.  Managed by Dr. Almodovar.  Patient has been noticing decreased sexual desire.  Patient states that she has discomfort with sexual intercourse.  States she has not found anything that makes it better or worse.

## 2018-10-23 NOTE — ASSESSMENT & PLAN NOTE
Currently on insulin pump.  Reports compliance with medications.  Patient has insulin pump and continuous glucose monitoring.  Denies hypoglycemia.  Follows with endocrinology.  Positive for gastroparesis.  Most recent A1c 7.5.  Patient is due for A1c and states she will complete this as requested by Dr. Shaw.

## 2018-10-23 NOTE — ASSESSMENT & PLAN NOTE
"This is a new problem. Started about 1.5 weeks ago. States that it is only painful on the left side. States that it is sometimes difficult to swallow. States it is worse in the morning and at night. Feels very dry. Is having upper respiratory congestion. States that \"nothing will come out\". No SOB. No cough. States she has never been diagnosed with allergies. Has noticed itchy eyes. No known sick contacts. States that lozenges did not help.   "

## 2018-10-23 NOTE — ASSESSMENT & PLAN NOTE
Chronic in nature.  Stable.  Follows with endocrinology. Taking medicine as directed. Denies palpitations, skin changes, temperature intolerance, changes in bowel habits.

## 2018-10-23 NOTE — LETTER
Critical access hospital  [facility]ario[facility]  [address]  [place] [address]  Fax: [phone]   Authorization for Release/Disclosure of   Protected Health Information   Name: INES LONG : 1955 SSN: [id number]   Address: [address] [address] Phone:    [phone] (home)    I authorize the entity listed below to release/disclose the PHI below to:   Critical access hospital/[facility] and [facility]   Provider or Entity Name:      Address   [facility]   Phone:  [phone]     Fax:     Reason for request: continuity of care   Information to be released:    [  ] LAST COLONOSCOPY,  including any PATH REPORT and follow-up  [  ] LAST FIT/COLOGUARD RESULT [  ] LAST DEXA  [  ] LAST MAMMOGRAM  [x  ] LAST PAP  [  ] LAST LABS [  ] RETINA EXAM REPORT  [  ] IMMUNIZATION RECORDS  [  ] Release all info      [  ] Check here and initial the line next to each item to release ALL health information INCLUDING  _____ Care and treatment for drug and / or alcohol abuse  _____ HIV testing, infection status, or AIDS  _____ Genetic Testing    DATES OF SERVICE OR TIME PERIOD TO BE DISCLOSED: _____________  I understand and acknowledge that:  * This Authorization may be revoked at any time by you in writing, except if your health information has already been used or disclosed.  * Your health information that will be used or disclosed as a result of you signing this authorization could be re-disclosed by the recipient. If this occurs, your re-disclosed health information may no longer be protected by State or Federal laws.  * You may refuse to sign this Authorization. Your refusal will not affect your ability to obtain treatment.  * This Authorization becomes effective upon signing and will  on (date) __________.      If no date is indicated, this Authorization will  one (1) year from the signature date.    Name: Ines Long    Signature:   Date:     10/23/2018       PLEASE FAX REQUESTED RECORDS BACK TO: [phone]

## 2018-11-09 ENCOUNTER — HOSPITAL ENCOUNTER (OUTPATIENT)
Dept: LAB | Facility: MEDICAL CENTER | Age: 63
End: 2018-11-09
Attending: INTERNAL MEDICINE
Payer: COMMERCIAL

## 2018-11-09 LAB
25(OH)D3 SERPL-MCNC: 26 NG/ML (ref 30–100)
ALBUMIN SERPL BCP-MCNC: 4.3 G/DL (ref 3.2–4.9)
ALBUMIN/GLOB SERPL: 1.4 G/DL
ALP SERPL-CCNC: 66 U/L (ref 30–99)
ALT SERPL-CCNC: 26 U/L (ref 2–50)
ANION GAP SERPL CALC-SCNC: 5 MMOL/L (ref 0–11.9)
AST SERPL-CCNC: 23 U/L (ref 12–45)
BILIRUB SERPL-MCNC: 0.5 MG/DL (ref 0.1–1.5)
BUN SERPL-MCNC: 15 MG/DL (ref 8–22)
CALCIUM SERPL-MCNC: 9.6 MG/DL (ref 8.5–10.5)
CHLORIDE SERPL-SCNC: 100 MMOL/L (ref 96–112)
CHOLEST SERPL-MCNC: 225 MG/DL (ref 100–199)
CO2 SERPL-SCNC: 27 MMOL/L (ref 20–33)
CREAT SERPL-MCNC: 0.77 MG/DL (ref 0.5–1.4)
EST. AVERAGE GLUCOSE BLD GHB EST-MCNC: 163 MG/DL
FASTING STATUS PATIENT QL REPORTED: NORMAL
GLOBULIN SER CALC-MCNC: 3 G/DL (ref 1.9–3.5)
GLUCOSE SERPL-MCNC: 84 MG/DL (ref 65–99)
HBA1C MFR BLD: 7.3 % (ref 0–5.6)
HDLC SERPL-MCNC: 97 MG/DL
LDLC SERPL CALC-MCNC: 110 MG/DL
POTASSIUM SERPL-SCNC: 4.6 MMOL/L (ref 3.6–5.5)
PROT SERPL-MCNC: 7.3 G/DL (ref 6–8.2)
SODIUM SERPL-SCNC: 132 MMOL/L (ref 135–145)
T4 FREE SERPL-MCNC: 1.03 NG/DL (ref 0.53–1.43)
TRIGL SERPL-MCNC: 91 MG/DL (ref 0–149)
TSH SERPL DL<=0.005 MIU/L-ACNC: 1.59 UIU/ML (ref 0.38–5.33)

## 2018-11-09 PROCEDURE — 83036 HEMOGLOBIN GLYCOSYLATED A1C: CPT

## 2018-11-09 PROCEDURE — 80061 LIPID PANEL: CPT

## 2018-11-09 PROCEDURE — 82306 VITAMIN D 25 HYDROXY: CPT

## 2018-11-09 PROCEDURE — 36415 COLL VENOUS BLD VENIPUNCTURE: CPT

## 2018-11-09 PROCEDURE — 84439 ASSAY OF FREE THYROXINE: CPT

## 2018-11-09 PROCEDURE — 84443 ASSAY THYROID STIM HORMONE: CPT

## 2018-11-09 PROCEDURE — 80053 COMPREHEN METABOLIC PANEL: CPT

## 2019-02-20 ENCOUNTER — HOSPITAL ENCOUNTER (OUTPATIENT)
Dept: LAB | Facility: MEDICAL CENTER | Age: 64
End: 2019-02-20
Attending: INTERNAL MEDICINE
Payer: COMMERCIAL

## 2019-02-20 LAB
25(OH)D3 SERPL-MCNC: 28 NG/ML (ref 30–100)
ALBUMIN SERPL BCP-MCNC: 4.1 G/DL (ref 3.2–4.9)
ALBUMIN/GLOB SERPL: 1.6 G/DL
ALP SERPL-CCNC: 55 U/L (ref 30–99)
ALT SERPL-CCNC: 21 U/L (ref 2–50)
ANION GAP SERPL CALC-SCNC: 5 MMOL/L (ref 0–11.9)
AST SERPL-CCNC: 21 U/L (ref 12–45)
BASOPHILS # BLD AUTO: 0.6 % (ref 0–1.8)
BASOPHILS # BLD: 0.03 K/UL (ref 0–0.12)
BILIRUB SERPL-MCNC: 0.5 MG/DL (ref 0.1–1.5)
BUN SERPL-MCNC: 19 MG/DL (ref 8–22)
CALCIUM SERPL-MCNC: 9.6 MG/DL (ref 8.5–10.5)
CHLORIDE SERPL-SCNC: 101 MMOL/L (ref 96–112)
CHOLEST SERPL-MCNC: 206 MG/DL (ref 100–199)
CO2 SERPL-SCNC: 26 MMOL/L (ref 20–33)
CREAT SERPL-MCNC: 0.84 MG/DL (ref 0.5–1.4)
CREAT UR-MCNC: 147.6 MG/DL
EOSINOPHIL # BLD AUTO: 0.18 K/UL (ref 0–0.51)
EOSINOPHIL NFR BLD: 3.6 % (ref 0–6.9)
ERYTHROCYTE [DISTWIDTH] IN BLOOD BY AUTOMATED COUNT: 45.6 FL (ref 35.9–50)
EST. AVERAGE GLUCOSE BLD GHB EST-MCNC: 163 MG/DL
FASTING STATUS PATIENT QL REPORTED: NORMAL
GLOBULIN SER CALC-MCNC: 2.6 G/DL (ref 1.9–3.5)
GLUCOSE SERPL-MCNC: 181 MG/DL (ref 65–99)
HBA1C MFR BLD: 7.3 % (ref 0–5.6)
HCT VFR BLD AUTO: 42.4 % (ref 37–47)
HDLC SERPL-MCNC: 83 MG/DL
HGB BLD-MCNC: 14.3 G/DL (ref 12–16)
IMM GRANULOCYTES # BLD AUTO: 0.01 K/UL (ref 0–0.11)
IMM GRANULOCYTES NFR BLD AUTO: 0.2 % (ref 0–0.9)
LDLC SERPL CALC-MCNC: 96 MG/DL
LYMPHOCYTES # BLD AUTO: 2.16 K/UL (ref 1–4.8)
LYMPHOCYTES NFR BLD: 43.5 % (ref 22–41)
MCH RBC QN AUTO: 32.2 PG (ref 27–33)
MCHC RBC AUTO-ENTMCNC: 33.7 G/DL (ref 33.6–35)
MCV RBC AUTO: 95.5 FL (ref 81.4–97.8)
MICROALBUMIN UR-MCNC: 0.8 MG/DL
MICROALBUMIN/CREAT UR: 5 MG/G (ref 0–30)
MONOCYTES # BLD AUTO: 0.4 K/UL (ref 0–0.85)
MONOCYTES NFR BLD AUTO: 8 % (ref 0–13.4)
NEUTROPHILS # BLD AUTO: 2.19 K/UL (ref 2–7.15)
NEUTROPHILS NFR BLD: 44.1 % (ref 44–72)
NRBC # BLD AUTO: 0 K/UL
NRBC BLD-RTO: 0 /100 WBC
PLATELET # BLD AUTO: 273 K/UL (ref 164–446)
PMV BLD AUTO: 11.3 FL (ref 9–12.9)
POTASSIUM SERPL-SCNC: 4.1 MMOL/L (ref 3.6–5.5)
PROT SERPL-MCNC: 6.7 G/DL (ref 6–8.2)
RBC # BLD AUTO: 4.44 M/UL (ref 4.2–5.4)
SODIUM SERPL-SCNC: 132 MMOL/L (ref 135–145)
TRIGL SERPL-MCNC: 134 MG/DL (ref 0–149)
WBC # BLD AUTO: 5 K/UL (ref 4.8–10.8)

## 2019-02-20 PROCEDURE — 85025 COMPLETE CBC W/AUTO DIFF WBC: CPT

## 2019-02-20 PROCEDURE — 82306 VITAMIN D 25 HYDROXY: CPT

## 2019-02-20 PROCEDURE — 83036 HEMOGLOBIN GLYCOSYLATED A1C: CPT

## 2019-02-20 PROCEDURE — 82043 UR ALBUMIN QUANTITATIVE: CPT

## 2019-02-20 PROCEDURE — 82570 ASSAY OF URINE CREATININE: CPT

## 2019-02-20 PROCEDURE — 80061 LIPID PANEL: CPT

## 2019-02-20 PROCEDURE — 80053 COMPREHEN METABOLIC PANEL: CPT

## 2019-02-20 PROCEDURE — 36415 COLL VENOUS BLD VENIPUNCTURE: CPT

## 2019-02-26 ENCOUNTER — OFFICE VISIT (OUTPATIENT)
Dept: MEDICAL GROUP | Facility: PHYSICIAN GROUP | Age: 64
End: 2019-02-26
Payer: COMMERCIAL

## 2019-02-26 VITALS
BODY MASS INDEX: 23.54 KG/M2 | WEIGHT: 150 LBS | HEIGHT: 67 IN | OXYGEN SATURATION: 95 % | DIASTOLIC BLOOD PRESSURE: 76 MMHG | SYSTOLIC BLOOD PRESSURE: 152 MMHG | TEMPERATURE: 98.4 F | HEART RATE: 82 BPM | RESPIRATION RATE: 16 BRPM

## 2019-02-26 DIAGNOSIS — J01.00 ACUTE NON-RECURRENT MAXILLARY SINUSITIS: ICD-10-CM

## 2019-02-26 DIAGNOSIS — E10.69 CONTROLLED TYPE 1 DIABETES MELLITUS WITH OTHER COMPLICATION (HCC): ICD-10-CM

## 2019-02-26 DIAGNOSIS — L98.9 NON-HEALING SKIN LESION: ICD-10-CM

## 2019-02-26 PROCEDURE — 11102 TANGNTL BX SKIN SINGLE LES: CPT | Performed by: NURSE PRACTITIONER

## 2019-02-26 PROCEDURE — 99214 OFFICE O/P EST MOD 30 MIN: CPT | Mod: 25 | Performed by: NURSE PRACTITIONER

## 2019-02-26 RX ORDER — AMOXICILLIN AND CLAVULANATE POTASSIUM 875; 125 MG/1; MG/1
1 TABLET, FILM COATED ORAL 2 TIMES DAILY
Qty: 14 TAB | Refills: 0 | Status: SHIPPED | OUTPATIENT
Start: 2019-02-26 | End: 2019-03-04 | Stop reason: SDUPTHER

## 2019-02-26 ASSESSMENT — PATIENT HEALTH QUESTIONNAIRE - PHQ9: CLINICAL INTERPRETATION OF PHQ2 SCORE: 0

## 2019-02-27 ENCOUNTER — HOSPITAL ENCOUNTER (OUTPATIENT)
Facility: MEDICAL CENTER | Age: 64
End: 2019-02-27
Attending: NURSE PRACTITIONER
Payer: COMMERCIAL

## 2019-02-27 LAB — PATHOLOGY CONSULT NOTE: NORMAL

## 2019-02-27 PROCEDURE — 88305 TISSUE EXAM BY PATHOLOGIST: CPT

## 2019-02-27 NOTE — PROGRESS NOTES
Chief Complaint   Patient presents with   • Pharyngitis     x 1 week x 3 day; since Dec    • Cough   • Results     Labs        HISTORY OF THE PRESENT ILLNESS: This is a 63 y.o. female established patient who presents today for follow up on pharyngitis symptoms.    Patient states she has been having symptoms since December 2018. States at that time, she was having sore throat, dry cough, and cold symptoms. Since then, she has been having intermittent symptoms that do not completely go away. She states there have been times that her symptoms were a little less severe, however, her symptoms returned and was worsened today. She is having dry cough and sore throat. She reports having ear pain yesterday. States she had blood work done last night which showed elevated lymphocytes. She has been taking Immune Renew supplements. No shortness of breath.    She mentions having some non-healing lesions on her left forearm that have been there for 3 months. No other associated symptoms to this.    Recent labs showed A1C steady at 7.3. Cholesterol level was improved today. Microalbumin creatinine ratio was normal. Vitamin D was mildly low but improved. Patient states she is taking 5000 vitamin D per day.     Blood pressure was noted to be elevated on exam today. Patient states it is typically elevated in clinic. No reports of chest pain or palpitations.    Diabetes is chronic in nature. Stable. She follows with endocrinology. States sugars are improving. No Hyper or hypoglycemia.      Past Medical History:   Diagnosis Date   • Anesthesia     Post op N/V   • Diabetes type 1, controlled (HCC) 11/1/2010   • Dyslipidemia 11/1/2010   • High cholesterol    • Hypothyroidism 11/1/2010   • Insulin pump in place 5/18/2011   • Routine health maintenance 5/18/2011       Past Surgical History:   Procedure Laterality Date   • ORTHOPEDIC OSTEOTOMY Left 7/17/2017    Procedure: ORTHOPEDIC OSTEOTOMY CALCANEAL, KIDNER, EXCISION NAVICULAR, GASTROC  SOLEUS RECESSION;  Surgeon: Adebayo Layton M.D.;  Location: SURGERY [facility];  Service:    • LIGAMENT REPAIR  2017    Procedure: LIGAMENT REPAIR SPRING;  Surgeon: Adebayo Layton M.D.;  Location: SURGERY [facility];  Service:    • TOE FUSION  2017    Procedure: TOE FUSION 1ST METATARSAL JOINT, 2ND TOE RECONSTRUCTION ;  Surgeon: Adebayo Layton M.D.;  Location: SURGERY [facility];  Service:    • FLEXOR TENDON REPAIR  2017    Procedure: FLEXOR TENDON REPAIR- RELEASE/POSSIBLE FLEXOR DIGITORIUM LONGUS;  Surgeon: Adebayo Layton M.D.;  Location: SURGERY [facility];  Service:    • BUNIONECTOMY     • SHOULDER ARTHROSCOPY Left     frozen shoulder   • ABDOMINAL HYSTERECTOMY TOTAL     • KNEE ARTHROTOMY Right     cartidge    • CHOLECYSTECTOMY         Family Status   Relation Status   • Mo Alive   • Fa    • Sis Alive   • MGMo    • MGFa    • PGMo    • PGFa      Family History   Problem Relation Age of Onset   • Cancer Mother         rectal cancer   • Stroke Father    • Heart Disease Father    • Hypertension Father    • Lung Disease Father         tb   • Diabetes Sister    • Psychiatry Sister         bipolar   • Heart Disease Maternal Grandfather    • Stroke Paternal Grandmother    • Heart Disease Paternal Grandfather        Social History   Substance Use Topics   • Smoking status: Former Smoker     Years: 2.00     Types: Cigarettes   • Smokeless tobacco: Never Used   • Alcohol use 1.0 oz/week     2 Glasses of wine per week      Comment: 3-4 WEEK       Allergies: Ceclor [cefaclor] and Aleve [ ]    Current Outpatient Prescriptions Ordered in [facility]   Medication Sig Dispense Refill   • amoxicillin-clavulanate (AUGMENTIN) 875-125 MG Tab Take 1 Tab by mouth 2 times a day for 7 days. 14 Tab 0   • NOVOLOG 100 UNIT/ML Solution 100 Units by Other route.     • pravastatin (PRAVACHOL) 80 MG tablet Take 80 mg by mouth every day.     • insulin  "infusion pump Device Inject  as instructed Continuous. Basal Rate:  0000 .5 units  0330 .85 units  1100 .5 units  Bolus 1 unit for every 12 carbs  Change tube yesterday (7/16/2017)   Indications: Novolog     • levothyroxine (LEVOXYL) 125 MCG Tab Take 125 mcg by mouth Every morning on an empty stomach.     • Probiotic Product (PRO-BIOTIC BLEND PO) Take 1 Cap by mouth every day.     • Magnesium 250 MG Tab Take 1 Tab by mouth every day.     • Cholecalciferol (VITAMIN D-3 PO) Take 5,000 mg by mouth every 48 hours.     • Multiple Vitamins-Minerals (MULTIVITAMIN WOMEN PO) Take 1 Tab by mouth every day.     • metoclopramide (REGLAN) 10 MG TABS Take 10 mg by mouth 2 Times a Day.     • estradiol (ESTRACE) 1 MG TABS Take 1 mg by mouth every day.       No current [facility]-ordered facility-administered medications on file.        Review of Systems   Constitutional: Negative for fever, chills, weight loss and malaise/fatigue.   HENT: Sore throat, ear pain. Negative for nosebleeds, and neck pain.    Respiratory: Dry cough. Negative for shortness of breath and wheezing.    Cardiovascular: Negative for chest pain, palpitations, orthopnea and leg swelling.   Skin: Non-healing lesions on left forearm. Negative for rash  All other systems reviewed and are negative except as in HPI.    Exam: Blood pressure 152/76, pulse 82, temperature 36.9 °C (98.4 °F), temperature source Temporal, resp. rate 16, height 1.702 m (5' 7\"), weight 68 kg (150 lb), last menstrual period 01/01/1983, SpO2 95 %, not currently breastfeeding.  General:  Normal appearing. No distress.  HEENT: Normocephalic. Eyes conjunctiva clear lids without ptosis, pupils equal and reactive to light accommodation, ears normal shape and contour, canals are clear bilaterally, cloudy effusion behind left TM, nasal mucosa benign, sinus pain to palpation, posterior oropharynx with erythema. No edema or exudates.   Pulmonary:  Clear to ausculation.  Normal effort. No rales, ronchi, or " wheezing.  Cardiovascular:  Regular rate and rhythm without murmur. Carotid and radial pulses are intact and equal bilaterally.  Neurologic:  Grossly nonfocal  Skin:  Warm and dry.  0.5 cm red raised non-healing lesion on left forearm.  Musculoskeletal:  Normal gait. No extremity cyanosis, clubbing, or edema.  Psych:  Normal mood and affect. Alert and oriented x3. Judgment and insight is normal.    SHAVE BIOPSY PROCEDURE NOTE:  Discussed with the patient the risks, benefits and alternatives to excision of the lesion. Informed consent was obtained. The area was alcohol swabbed and 1 cc of 1% lidocaine with epinephrine was administered. The area was then prepped and draped in the usual sterile fashion. A shave biopsy was used to excise the lesion. The excision was approximately .5 cm by .5 cm.  The specimen was placed in a pathology jar and sent to the lab.  Hemostasis was obtained with gentle pressure.  Antibiotic ointment and bandage was applied. The patient was given wound care instructions and follow-up precautions.    PLAN:    1. Acute non-recurrent maxillary sinusitis  - Prescribing Augmentin  - Supportive care instructions and return precautions given.  - amoxicillin-clavulanate (AUGMENTIN) 875-125 MG Tab; Take 1 Tab by mouth 2 times a day for 7 days.  Dispense: 14 Tab; Refill: 0    2. Non-healing skin lesion  - Patient reprots non-healing lesions to her left forearm noticed ~3 months ago. Performing shave biopsy   - Consent for Amb Surgery/Procedure     3. Controlled type 1 diabetes mellitus with other complication (HCC)  Continue specialist follow-up.    Follow-up in 6 months or sooner as needed.  Patient is encouraged to be seen in the emergency room for chest pain, palpitations, shortness of breath, dizziness, severe abdominal pain or other concerning symptoms.     Please note that this dictation was created using voice recognition software. I have made every reasonable attempt to correct obvious errors,  but I expect that there are errors of grammar and possibly content that I did not discover before finalizing the note.      Assessment/Plan  1. Acute non-recurrent maxillary sinusitis  amoxicillin-clavulanate (AUGMENTIN) 875-125 MG Tab   2. Non-healing skin lesion  amoxicillin-clavulanate (AUGMENTIN) 875-125 MG Tab    Consent for Amb Surgery/Procedure   3. Controlled type 1 diabetes mellitus with other complication (HCC)           I have placed the below orders and discussed them with an approved delegating provider. The MA is performing the below orders under the direction of Dr. Traore.       Ghassan MOONEY (Scribe), am scribing for, and in the presence of, JOSE Lee    Electronically signed by: Ghassan Ritchie (Stella), 2/26/2019    Roel MOONEY APRN personally performed the services described in this documentation, as scribed by Ghassan Ritchie in my presence, and it is both accurate and complete.

## 2019-03-01 ENCOUNTER — TELEPHONE (OUTPATIENT)
Dept: MEDICAL GROUP | Facility: PHYSICIAN GROUP | Age: 64
End: 2019-03-01

## 2019-03-01 NOTE — TELEPHONE ENCOUNTER
----- Message from SOBIA Candelario sent at 2/28/2019  5:59 PM PST -----  Please call pt and give lab results: Biopsy is negative, showed a benign skin lesion.

## 2019-03-01 NOTE — TELEPHONE ENCOUNTER
Phone Number Called: [phone] (home)       Message: Pt informed by phone.    Left Message for patient to call back: N\A

## 2019-03-04 ENCOUNTER — TELEPHONE (OUTPATIENT)
Dept: MEDICAL GROUP | Facility: PHYSICIAN GROUP | Age: 64
End: 2019-03-04

## 2019-03-04 DIAGNOSIS — L98.9 NON-HEALING SKIN LESION: ICD-10-CM

## 2019-03-04 DIAGNOSIS — J01.00 ACUTE NON-RECURRENT MAXILLARY SINUSITIS: ICD-10-CM

## 2019-03-04 RX ORDER — AMOXICILLIN AND CLAVULANATE POTASSIUM 875; 125 MG/1; MG/1
1 TABLET, FILM COATED ORAL 2 TIMES DAILY
Qty: 6 TAB | Refills: 0 | Status: SHIPPED | OUTPATIENT
Start: 2019-03-04 | End: 2019-03-07

## 2019-03-04 NOTE — TELEPHONE ENCOUNTER
VOICEMAIL  1. Caller Name: Ines Long                        Call Back Number: [phone] (home)       2. Message: Patient called and left a . Patient said she is not feeling any better. Patient was seen last week and was given antibiotics. Patient takes her last pill tomorrow. Patient was requesting more antibiotics sent to pharmacy. Please Advise. Lm     3. Patient approves office to leave a detailed voicemail/MyChart message: N\A

## 2019-03-07 ENCOUNTER — TELEPHONE (OUTPATIENT)
Dept: MEDICAL GROUP | Facility: PHYSICIAN GROUP | Age: 64
End: 2019-03-07

## 2019-03-07 NOTE — TELEPHONE ENCOUNTER
Phone Number Called: [phone] (home)       Message: Patient called and said she is having diarrhea. Patient said it started 03/05/2019 all day yesterday and a little today. Patient said she has been taking it with food and probiotics. Patient has not started second dose of antibiotics given to her yesterday. Patient is afraid it will start the diarrhea again.She also said having stomach aches. No fever. Please Advise.     Left Message for patient to call back: no

## 2019-03-07 NOTE — TELEPHONE ENCOUNTER
Phone Number Called: [phone] (home)       Message: Called and let patient know. Patient had no other questions at this time. LM     Left Message for patient to call back: no

## 2019-03-07 NOTE — TELEPHONE ENCOUNTER
Diarrhea is a common side effect of antibiotics, I would recommend BRAT (bananas, rice, applesauce, toast) diet, continuing probiotics, staying hydrated.  If diarrhea continues I would recommend coming in for an appointment.

## 2019-04-01 ENCOUNTER — OFFICE VISIT (OUTPATIENT)
Dept: MEDICAL GROUP | Facility: PHYSICIAN GROUP | Age: 64
End: 2019-04-01
Payer: COMMERCIAL

## 2019-04-01 VITALS
WEIGHT: 154 LBS | DIASTOLIC BLOOD PRESSURE: 70 MMHG | HEART RATE: 82 BPM | OXYGEN SATURATION: 94 % | RESPIRATION RATE: 16 BRPM | TEMPERATURE: 98.1 F | SYSTOLIC BLOOD PRESSURE: 140 MMHG | BODY MASS INDEX: 24.17 KG/M2 | HEIGHT: 67 IN

## 2019-04-01 DIAGNOSIS — N30.01 ACUTE CYSTITIS WITH HEMATURIA: ICD-10-CM

## 2019-04-01 LAB
APPEARANCE UR: NORMAL
BILIRUB UR STRIP-MCNC: NORMAL MG/DL
COLOR UR AUTO: YELLOW
GLUCOSE UR STRIP.AUTO-MCNC: NORMAL MG/DL
KETONES UR STRIP.AUTO-MCNC: NORMAL MG/DL
LEUKOCYTE ESTERASE UR QL STRIP.AUTO: NORMAL
NITRITE UR QL STRIP.AUTO: NORMAL
PH UR STRIP.AUTO: 6 [PH] (ref 5–8)
PROT UR QL STRIP: NORMAL MG/DL
RBC UR QL AUTO: NORMAL
SP GR UR STRIP.AUTO: 1.01
UROBILINOGEN UR STRIP-MCNC: 0.2 MG/DL

## 2019-04-01 PROCEDURE — 99214 OFFICE O/P EST MOD 30 MIN: CPT | Performed by: NURSE PRACTITIONER

## 2019-04-01 PROCEDURE — 81002 URINALYSIS NONAUTO W/O SCOPE: CPT | Performed by: NURSE PRACTITIONER

## 2019-04-01 RX ORDER — NITROFURANTOIN 25; 75 MG/1; MG/1
100 CAPSULE ORAL EVERY 12 HOURS
Qty: 10 CAP | Refills: 0 | Status: SHIPPED | OUTPATIENT
Start: 2019-04-01 | End: 2019-04-06

## 2019-04-02 NOTE — PROGRESS NOTES
Chief Complaint   Patient presents with   • UTI     x 3 days        HISTORY OF THE PRESENT ILLNESS: This is a 63 y.o. female established patient who presents today for UTI symptoms.    Patient has had UTI symptoms over the last 3 days. She reports associated dysuria, urinary frequency and urgency, and decreased urinary output. She also reports cloudy and malodorous urine. She has been taking over the counter Azo which alleviated the pain and improved the urinary urgency and frequency. States she started to have more pain again today due to stopping Azo.  She denies any flank pain, fevers, or chills. Patient notes she that has gotten sick with augmentin in the past.       Past Medical History:   Diagnosis Date   • Anesthesia     Post op N/V   • Diabetes type 1, controlled (HCC) 11/1/2010   • Dyslipidemia 11/1/2010   • High cholesterol    • Hypothyroidism 11/1/2010   • Insulin pump in place 5/18/2011   • Routine health maintenance 5/18/2011       Past Surgical History:   Procedure Laterality Date   • ORTHOPEDIC OSTEOTOMY Left 7/17/2017    Procedure: ORTHOPEDIC OSTEOTOMY CALCANEAL, KIDNER, EXCISION NAVICULAR, GASTROC SOLEUS RECESSION;  Surgeon: Adebayo Layton M.D.;  Location: [facility];  Service:    • LIGAMENT REPAIR  7/17/2017    Procedure: LIGAMENT REPAIR SPRING;  Surgeon: Adebayo Layton M.D.;  Location: [facility];  Service:    • TOE FUSION  7/17/2017    Procedure: TOE FUSION 1ST METATARSAL JOINT, 2ND TOE RECONSTRUCTION ;  Surgeon: Adebayo Layton M.D.;  Location: [facility];  Service:    • FLEXOR TENDON REPAIR  7/17/2017    Procedure: FLEXOR TENDON REPAIR- RELEASE/POSSIBLE FLEXOR DIGITORIUM LONGUS;  Surgeon: Adebayo Layton M.D.;  Location: [facility];  Service:    • BUNIONECTOMY  2014   • SHOULDER ARTHROSCOPY Left 1997    frozen shoulder   • ABDOMINAL HYSTERECTOMY TOTAL  1987   • KNEE ARTHROTOMY Right 1978    [place]    • CHOLECYSTECTOMY         Family  Status   Relation Status   • Mo Alive   • Fa    • Sis Alive   • MGMo    • MGFa    • PGMo    • PGFa      Family History   Problem Relation Age of Onset   • Cancer Mother         rectal cancer   • Stroke Father    • Heart Disease Father    • Hypertension Father    • Lung Disease Father         tb   • Diabetes Sister    • Psychiatry Sister         bipolar   • Heart Disease Maternal Grandfather    • Stroke Paternal Grandmother    • Heart Disease Paternal Grandfather        Social History   Substance Use Topics   • Smoking status: Former Smoker     Years: 2.00     Types: Cigarettes   • Smokeless tobacco: Never Used   • Alcohol use 1.0 oz/week     2 Glasses of wine per week      Comment: 3-4 WEEK       Allergies: Ceclor [cefaclor]; Aleve [ ]; and Augmentin    Current Outpatient Prescriptions Ordered in [facility]   Medication Sig Dispense Refill   • nitrofurantoin monohyd macro (MACROBID) 100 MG Cap Take 1 Cap by mouth every 12 hours for 5 days. 10 Cap 0   • NOVOLOG 100 UNIT/ML Solution 100 Units by Other route.     • pravastatin (PRAVACHOL) 80 MG tablet Take 80 mg by mouth every day.     • insulin infusion pump Device Inject  as instructed Continuous. Basal Rate:  0000 .5 units  0330 .85 units  1100 .5 units  Bolus 1 unit for every 12 carbs  Change tube yesterday (2017)   Indications: Novolog     • levothyroxine (LEVOXYL) 125 MCG Tab Take 125 mcg by mouth Every morning on an empty stomach.     • Probiotic Product (PRO-BIOTIC BLEND PO) Take 1 Cap by mouth every day.     • Magnesium 250 MG Tab Take 1 Tab by mouth every day.     • Cholecalciferol (VITAMIN D-3 PO) Take 5,000 mg by mouth every 48 hours.     • Multiple Vitamins-Minerals (MULTIVITAMIN WOMEN PO) Take 1 Tab by mouth every day.     • metoclopramide (REGLAN) 10 MG TABS Take 10 mg by mouth 2 Times a Day.     • estradiol (ESTRACE) 1 MG TABS Take 1 mg by mouth every day.       No current [facility]-ordered facility-administered  "medications on file.        Review of Systems   Constitutional: Negative for fever, chills, weight loss and malaise/fatigue.   Gastrointestinal: Negative for heartburn, nausea, vomiting and abdominal pain.   Genitourinary: Dysuria, urinary urgency and frequency, cloudy and malodorous urine. Negative for flank pain, suprapubic pain  All other systems reviewed and are negative except as in HPI.    Exam: Blood pressure 140/70, pulse 82, temperature 36.7 °C (98.1 °F), temperature source Temporal, resp. rate 16, height 1.702 m (5' 7\"), weight 69.9 kg (154 lb), last menstrual period 01/01/1983, SpO2 94 %, not currently breastfeeding.  General:  Normal appearing. No distress.  Pulmonary:  Clear to ausculation.  Normal effort. No rales, ronchi, or wheezing.  Cardiovascular:  Regular rate and rhythm without murmur. Carotid and radial pulses are intact and equal bilaterally.  Abdomen: Soft, nontender, no suprapubic tenderness, nondistended. Normal bowel sounds. Liver and spleen are not palpable  Back: No flank tenderness.  Neurologic:  Grossly nonfocal  Skin:  Warm and dry.  No obvious lesions.  Musculoskeletal:  Normal gait. No extremity cyanosis, clubbing, or edema.  Psych:  Normal mood and affect. Alert and oriented x3. Judgment and insight is normal.    PLAN:    1. Acute cystitis with hematuria  - Patient's urine test in clinic today showed evidence for UTI. Patient states she has had issues with augmentin in the past. Prescribing macrobid. Supportive care instructions and return precautions given.   - nitrofurantoin monohyd macro (MACROBID) 100 MG Cap; Take 1 Cap by mouth every 12 hours for 5 days.  Dispense: 10 Cap; Refill: 0  - POCT Urinalysis     Patient is encouraged to be seen in the emergency room for chest pain, palpitations, shortness of breath, dizziness, severe abdominal pain or other concerning symptoms.     Please note that this dictation was created using voice recognition software. I have made every " reasonable attempt to correct obvious errors, but I expect that there are errors of grammar and possibly content that I did not discover before finalizing the note.      Assessment/Plan  1. Acute cystitis with hematuria  nitrofurantoin monohyd macro (MACROBID) 100 MG Cap    POCT Urinalysis         I have placed the below orders and discussed them with an approved delegating provider. The MA is performing the below orders under the direction of Dr. Traore.       IGhassan (Scribe), am scribing for, and in the presence of, JOSE Lee    Electronically signed by: Ghassan Ritchie (Juiceibe), 4/1/2019    Roel MOONEY APRN personally performed the services described in this documentation, as scribed by Ghassan Ritchie in my presence, and it is both accurate and complete.

## 2019-05-20 ENCOUNTER — TELEPHONE (OUTPATIENT)
Dept: MEDICAL GROUP | Facility: PHYSICIAN GROUP | Age: 64
End: 2019-05-20

## 2019-05-20 DIAGNOSIS — R39.9 UTI SYMPTOMS: ICD-10-CM

## 2019-05-20 RX ORDER — NITROFURANTOIN 25; 75 MG/1; MG/1
100 CAPSULE ORAL 2 TIMES DAILY
Qty: 10 CAP | Refills: 0 | Status: SHIPPED | OUTPATIENT
Start: 2019-05-20 | End: 2019-05-25

## 2019-05-20 NOTE — TELEPHONE ENCOUNTER
VOICEMAIL  1. Caller Name: Ines                  Call Back Number: [phone] (home)     2. Message: Patient LVM stating she has another UTI and would like PCP to send in an antibiotic.     3. Patient approves office to leave a detailed voicemail/MyChart message: yes

## 2019-05-20 NOTE — TELEPHONE ENCOUNTER
Phone Number Called: [phone] (home)     Call outcome: spoke to patient regarding message below    Message: Spoke with patient and let them know Roel Burch MD's message.

## 2019-05-20 NOTE — TELEPHONE ENCOUNTER
Patient was last seen on 4/1/19 for a UTI. No urine culture to review. I will send in a prescription for Macrobid for her. If her symptoms do not improve in 48 hours, she should be seen in the office.  -Dr. Burch covering for SOBIA Candelario

## 2019-05-20 NOTE — TELEPHONE ENCOUNTER
Phone Number Called: [phone] (home)     Call outcome: spoke to patient regarding message below    Message: I spoke with patient and she could not be seen anytime today.  I offered several appointments with PCP this week however she declined stating she works until 430 and was seen by Roel in the past around 5:30 PM - aware we are no longer open those hours.  She declined urgent care stating she has diabetes and does not want to get sick from going to urgent care.  Requesting an antibiotic from PCP without appointment.  Routing to PCP to advise. Thank you.

## 2019-06-01 ENCOUNTER — HOSPITAL ENCOUNTER (OUTPATIENT)
Dept: RADIOLOGY | Facility: MEDICAL CENTER | Age: 64
End: 2019-06-01
Attending: INTERNAL MEDICINE
Payer: COMMERCIAL

## 2019-06-01 DIAGNOSIS — R09.89 CAROTID BRUIT, UNSPECIFIED LATERALITY: ICD-10-CM

## 2019-06-01 PROCEDURE — 93880 EXTRACRANIAL BILAT STUDY: CPT

## 2019-06-03 ENCOUNTER — HOSPITAL ENCOUNTER (OUTPATIENT)
Dept: LAB | Facility: MEDICAL CENTER | Age: 64
End: 2019-06-03
Attending: INTERNAL MEDICINE
Payer: COMMERCIAL

## 2019-06-03 LAB
ALBUMIN SERPL BCP-MCNC: 3.8 G/DL (ref 3.2–4.9)
ALBUMIN/GLOB SERPL: 1.4 G/DL
ALP SERPL-CCNC: 53 U/L (ref 30–99)
ALT SERPL-CCNC: 18 U/L (ref 2–50)
ANION GAP SERPL CALC-SCNC: 9 MMOL/L (ref 0–11.9)
AST SERPL-CCNC: 19 U/L (ref 12–45)
BILIRUB SERPL-MCNC: 0.5 MG/DL (ref 0.1–1.5)
BUN SERPL-MCNC: 14 MG/DL (ref 8–22)
CALCIUM SERPL-MCNC: 9.3 MG/DL (ref 8.5–10.5)
CHLORIDE SERPL-SCNC: 99 MMOL/L (ref 96–112)
CO2 SERPL-SCNC: 25 MMOL/L (ref 20–33)
CREAT SERPL-MCNC: 0.75 MG/DL (ref 0.5–1.4)
EST. AVERAGE GLUCOSE BLD GHB EST-MCNC: 151 MG/DL
FASTING STATUS PATIENT QL REPORTED: NORMAL
GLOBULIN SER CALC-MCNC: 2.7 G/DL (ref 1.9–3.5)
GLUCOSE SERPL-MCNC: 146 MG/DL (ref 65–99)
HBA1C MFR BLD: 6.9 % (ref 0–5.6)
POTASSIUM SERPL-SCNC: 4 MMOL/L (ref 3.6–5.5)
PROT SERPL-MCNC: 6.5 G/DL (ref 6–8.2)
SODIUM SERPL-SCNC: 133 MMOL/L (ref 135–145)

## 2019-06-03 PROCEDURE — 80053 COMPREHEN METABOLIC PANEL: CPT

## 2019-06-03 PROCEDURE — 83036 HEMOGLOBIN GLYCOSYLATED A1C: CPT

## 2019-06-03 PROCEDURE — 36415 COLL VENOUS BLD VENIPUNCTURE: CPT

## 2019-07-23 ENCOUNTER — HOSPITAL ENCOUNTER (OUTPATIENT)
Dept: LAB | Facility: MEDICAL CENTER | Age: 64
End: 2019-07-23
Attending: OBSTETRICS & GYNECOLOGY
Payer: COMMERCIAL

## 2019-07-23 PROCEDURE — 87086 URINE CULTURE/COLONY COUNT: CPT

## 2019-07-25 LAB
BACTERIA UR CULT: NORMAL
SIGNIFICANT IND 70042: NORMAL
SITE SITE: NORMAL
SOURCE SOURCE: NORMAL

## 2019-08-12 ENCOUNTER — HOSPITAL ENCOUNTER (OUTPATIENT)
Dept: LAB | Facility: MEDICAL CENTER | Age: 64
End: 2019-08-12
Attending: OBSTETRICS & GYNECOLOGY
Payer: COMMERCIAL

## 2019-08-12 PROCEDURE — 87186 SC STD MICRODIL/AGAR DIL: CPT

## 2019-08-12 PROCEDURE — 87077 CULTURE AEROBIC IDENTIFY: CPT

## 2019-08-12 PROCEDURE — 87086 URINE CULTURE/COLONY COUNT: CPT

## 2019-08-14 LAB
BACTERIA UR CULT: ABNORMAL
BACTERIA UR CULT: ABNORMAL
SIGNIFICANT IND 70042: ABNORMAL
SITE SITE: ABNORMAL
SOURCE SOURCE: ABNORMAL

## 2019-09-09 ENCOUNTER — HOSPITAL ENCOUNTER (OUTPATIENT)
Dept: LAB | Facility: MEDICAL CENTER | Age: 64
End: 2019-09-09
Attending: INTERNAL MEDICINE
Payer: COMMERCIAL

## 2019-09-09 LAB
ALBUMIN SERPL BCP-MCNC: 4 G/DL (ref 3.2–4.9)
ALBUMIN/GLOB SERPL: 1.5 G/DL
ALP SERPL-CCNC: 57 U/L (ref 30–99)
ALT SERPL-CCNC: 17 U/L (ref 2–50)
ANION GAP SERPL CALC-SCNC: 8 MMOL/L (ref 0–11.9)
AST SERPL-CCNC: 20 U/L (ref 12–45)
BILIRUB SERPL-MCNC: 0.4 MG/DL (ref 0.1–1.5)
BUN SERPL-MCNC: 11 MG/DL (ref 8–22)
CALCIUM SERPL-MCNC: 9.4 MG/DL (ref 8.5–10.5)
CHLORIDE SERPL-SCNC: 97 MMOL/L (ref 96–112)
CO2 SERPL-SCNC: 27 MMOL/L (ref 20–33)
CREAT SERPL-MCNC: 0.76 MG/DL (ref 0.5–1.4)
EST. AVERAGE GLUCOSE BLD GHB EST-MCNC: 148 MG/DL
FASTING STATUS PATIENT QL REPORTED: NORMAL
GLOBULIN SER CALC-MCNC: 2.7 G/DL (ref 1.9–3.5)
GLUCOSE SERPL-MCNC: 161 MG/DL (ref 65–99)
HBA1C MFR BLD: 6.8 % (ref 0–5.6)
POTASSIUM SERPL-SCNC: 5.5 MMOL/L (ref 3.6–5.5)
PROT SERPL-MCNC: 6.7 G/DL (ref 6–8.2)
SODIUM SERPL-SCNC: 132 MMOL/L (ref 135–145)

## 2019-09-09 PROCEDURE — 83036 HEMOGLOBIN GLYCOSYLATED A1C: CPT

## 2019-09-09 PROCEDURE — 36415 COLL VENOUS BLD VENIPUNCTURE: CPT

## 2019-09-09 PROCEDURE — 80053 COMPREHEN METABOLIC PANEL: CPT

## 2019-10-18 ENCOUNTER — OFFICE VISIT (OUTPATIENT)
Dept: CARDIOLOGY | Facility: MEDICAL CENTER | Age: 64
End: 2019-10-18
Payer: COMMERCIAL

## 2019-10-18 VITALS
WEIGHT: 152 LBS | DIASTOLIC BLOOD PRESSURE: 78 MMHG | BODY MASS INDEX: 23.86 KG/M2 | HEIGHT: 67 IN | OXYGEN SATURATION: 98 % | HEART RATE: 76 BPM | SYSTOLIC BLOOD PRESSURE: 128 MMHG

## 2019-10-18 DIAGNOSIS — I65.22 LEFT CAROTID ARTERY STENOSIS: Chronic | ICD-10-CM

## 2019-10-18 DIAGNOSIS — E78.5 DYSLIPIDEMIA: ICD-10-CM

## 2019-10-18 DIAGNOSIS — I70.90 BLOCKED ARTERY: ICD-10-CM

## 2019-10-18 LAB — EKG IMPRESSION: NORMAL

## 2019-10-18 PROCEDURE — 93000 ELECTROCARDIOGRAM COMPLETE: CPT | Performed by: INTERNAL MEDICINE

## 2019-10-18 PROCEDURE — 99204 OFFICE O/P NEW MOD 45 MIN: CPT | Performed by: INTERNAL MEDICINE

## 2019-10-18 RX ORDER — ROSUVASTATIN CALCIUM 40 MG/1
40 TABLET, COATED ORAL DAILY
Qty: 90 TAB | Refills: 3 | Status: ON HOLD | OUTPATIENT
Start: 2019-10-18 | End: 2019-12-05

## 2019-10-18 ASSESSMENT — ENCOUNTER SYMPTOMS
DIZZINESS: 0
FOCAL WEAKNESS: 0
BLURRED VISION: 0
SHORTNESS OF BREATH: 0
FALLS: 0
CHILLS: 0
WEAKNESS: 0
SORE THROAT: 0
NAUSEA: 0
BRUISES/BLEEDS EASILY: 0
CLAUDICATION: 0
ABDOMINAL PAIN: 0
PALPITATIONS: 0
FEVER: 0
COUGH: 0
PND: 0

## 2019-10-18 NOTE — PATIENT INSTRUCTIONS
Please look into the following diets and incorporate them into your diet    LOW SALT DIET   KEEP YOUR SODIUM EQUAL TO CALORIES AND NO MORE THAN DOUBLE THE CALORIES FOR A LOW SALT DIET    FOR TREATMENT OR PREVENTION OF CORONARY ARTERY DISEASE    [facility] - [facility]    Dr. [facility] for Reversing Heart Disease - Abram Rodney's Cardiologist    [facility]    Dr Bales - [place] [facility] (book and documentary)      FOR TREATMENT OF BLOOD PRESSURE  DASH DIET - American Heart Association for treatment of HYPERTENSION    FOR TREATMENT OF BAD CHOLESTEROL/FATS  REDUCE PROCESSED SUGAR AS MUCH AS POSSIBLE  INCREASE WHOLE GRAINS/VEGETABLES    Lowering total cholesterol and LDL (bad) cholesterol:  - Eat leaner cuts of meat, or eliminate altogether if possible red meat, and frequently substitute fish or chicken.  - Limit saturated fat to no more than 7-10% of total calories no more than 10 g per day is recommended. Some sources of saturated fat include butter, animal fats, hydrogenated vegetable fats and oils, many desserts, whole milk dairy products.  - Replaced saturated fats with polyunsaturated fats and monounsaturated fats. Foods high in monounsaturated fat include nuts, although well, canola oil, avocados, and olives.  - Limit trans fat (processed foods) and replaced with fresh fruits and vegetables  - Recommend nonfat dairy products  - Increase substantially the amount of soluble fiber intake (legumes such as beans, fruit, whole grains).  - Consider nutritional supplements: plant sterile spreads such as Benecol, fish oil,  flaxseed oil, omega-3 acids capsules 1000 mg twice a day, or viscous fiber such as Metamucil  - Attain ideal weight and regular exercise (at least 30 minutes per day of walking)    Lowering triglycerides:  - Reduce intake of simple sugar: Desserts, candy, pastries, honey, sodas, sugared cereals, yogurt, Gatorade, sports bars, canned  fruit, smoothies, fruit juice, coffee drinks  - Reduced intake of refined starches: Refined Pasta  - Reduce or abstain from alcohol  - Increase omega-3 fatty acids: [place], Trout, Mackerel, Herring, Albacore tuna and supplements  - Attain ideal weight and regular exercise (at least 30 minutes per day of walking)      Elevating HDL (good) cholesterol:  - Increase physical activity  - Seasoned foods with garlic and onions  - Increase omega-3 fatty acids and supplements as listed above  - Incorporating appropriate amounts of monounsaturated fats such as nuts, olive oil, canola oil, avocados, olives  - Stop smoking  - Attain ideal weight and regular exercise (at least 30 minutes per day of walking)

## 2019-10-18 NOTE — LETTER
[facility]   [address]   [address]  [place] [address]  Phone: [phone]  Fax: [phone]              Ines Long  1955    Encounter Date: 10/18/2019    Shant Card M.D.          PROGRESS NOTE:  Chief Complaint   Patient presents with   • New Patient     Blocked Carotid Artery       Subjective:   Ines Long is a 64 y.o. female who presents today for evaluation of her history of left carotid artery stenosis now severe by most recent echocardiogram she has type 1 diabetes and followed long-term with endocrine and primary care.  She is found to have a bruit and had a duplex which showed moderate disease repeat duplex year later shows severe disease of the left minimal on the right she had met with Dr. Stokes of vascular surgery who recommended carotid endarterectomy as apparently her lesion would be amenable to stenting.    She has been on proper statin therapy for some time with adequate control of her lipids until she is found to have peripheral arterial disease perhaps goal of the lower    She has had long-term issues with her foot after multiple surgeries so her mobility is reduced    Past Medical History:   Diagnosis Date   • Anesthesia     Post op N/V   • Diabetes type 1, controlled (HCC) 11/1/2010   • Dyslipidemia 11/1/2010   • High cholesterol    • Hypothyroidism 11/1/2010   • Insulin pump in place 5/18/2011   • Left carotid artery stenosis - severe 2019    • Routine health maintenance 5/18/2011     Past Surgical History:   Procedure Laterality Date   • ORTHOPEDIC OSTEOTOMY Left 7/17/2017    Procedure: ORTHOPEDIC OSTEOTOMY CALCANEAL, KIDNER, EXCISION NAVICULAR, GASTROC SOLEUS RECESSION;  Surgeon: Adebayo Layton M.D.;  Location: [facility];  Service:    • LIGAMENT REPAIR  7/17/2017    Procedure: LIGAMENT REPAIR SPRING;  Surgeon: Adebayo Layton M.D.;  Location: [facility];  Service:     • TOE FUSION  7/17/2017    Procedure: TOE FUSION 1ST METATARSAL JOINT, 2ND TOE RECONSTRUCTION ;  Surgeon: Adebayo Layton M.D.;  Location: SURGERY [facility];  Service:    • FLEXOR TENDON REPAIR  7/17/2017    Procedure: FLEXOR TENDON REPAIR- RELEASE/POSSIBLE FLEXOR DIGITORIUM LONGUS;  Surgeon: Adebayo Layton M.D.;  Location: SURGERY [facility];  Service:    • BUNIONECTOMY  2014   • SHOULDER ARTHROSCOPY Left 1997    frozen shoulder   • ABDOMINAL HYSTERECTOMY TOTAL  1987   • KNEE ARTHROTOMY Right 1978    [place]    • CHOLECYSTECTOMY       Family History   Problem Relation Age of Onset   • Cancer Mother         rectal cancer   • Stroke Father    • Heart Disease Father    • Hypertension Father    • Lung Disease Father         tb   • Diabetes Sister    • Psychiatric Illness Sister         bipolar   • Heart Disease Maternal Grandfather    • Stroke Paternal Grandmother    • Heart Disease Paternal Grandfather      Social History     Socioeconomic History   • Marital status:      Spouse name: Not on file   • Number of children: Not on file   • Years of education: Not on file   • Highest education level: Not on file   Occupational History   • Not on file   Social Needs   • Financial resource strain: Not on file   • Food insecurity:     Worry: Not on file     Inability: Not on file   • Transportation needs:     Medical: Not on file     Non-medical: Not on file   Tobacco Use   • Smoking status: Former Smoker     Years: 2.00     Types: Cigarettes   • Smokeless tobacco: Never Used   Substance and Sexual Activity   • Alcohol use: Yes     Alcohol/week: 1.0 oz     Types: 2 Glasses of wine per week     Comment: 3-4 WEEK   • Drug use: No   • Sexual activity: Yes     Partners: Male     Birth control/protection: Surgical   Lifestyle   • Physical activity:     Days per week: Not on file     Minutes per session: Not on file   • Stress: Not on file   Relationships   • Social connections:     Talks on phone: Not on  file     Gets together: Not on file     Attends Zoroastrian service: Not on file     Active member of club or organization: Not on file     Attends meetings of clubs or organizations: Not on file     Relationship status: Not on file   • Intimate partner violence:     Fear of current or ex partner: Not on file     Emotionally abused: Not on file     Physically abused: Not on file     Forced sexual activity: Not on file   Other Topics Concern   • Not on file   Social History Narrative   • Not on file     Allergies   Allergen Reactions   • Ceclor [Cefaclor] Hives and Swelling   • Aleve [ ] Swelling     Face Swells    • Augmentin Hives, Diarrhea and Vomiting     Fever blisters      Outpatient Encounter Medications as of 10/18/2019   Medication Sig Dispense Refill   • rosuvastatin (CRESTOR) 40 MG tablet Take 1 Tab by mouth every day. 90 Tab 3   • NOVOLOG 100 UNIT/ML Solution 100 Units by Other route.     • insulin infusion pump Device Inject  as instructed Continuous. Basal Rate:  0000 .5 units  0330 .85 units  1100 .5 units  Bolus 1 unit for every 12 carbs  Change tube yesterday (7/16/2017)   Indications: Novolog     • levothyroxine (LEVOXYL) 125 MCG Tab Take 125 mcg by mouth Every morning on an empty stomach.     • Probiotic Product (PRO-BIOTIC BLEND PO) Take 1 Cap by mouth every day.     • Magnesium 250 MG Tab Take 1 Tab by mouth every day.     • Cholecalciferol (VITAMIN D-3 PO) Take 5,000 mg by mouth every 48 hours.     • Multiple Vitamins-Minerals (MULTIVITAMIN WOMEN PO) Take 1 Tab by mouth every day.     • metoclopramide (REGLAN) 10 MG TABS Take 10 mg by mouth 2 Times a Day.     • estradiol (ESTRACE) 1 MG TABS Take 1 mg by mouth every day.     • [DISCONTINUED] pravastatin (PRAVACHOL) 80 MG tablet Take 80 mg by mouth every day.       No facility-administered encounter medications on file as of 10/18/2019.      Review of Systems   Constitutional: Negative for chills and fever.   HENT: Negative for sore throat.    Eyes:  "Negative for blurred vision.   Respiratory: Negative for cough and shortness of breath.    Cardiovascular: Negative for chest pain, palpitations, claudication, leg swelling and PND.   Gastrointestinal: Negative for abdominal pain and nausea.   Musculoskeletal: Negative for falls and joint pain.   Skin: Negative for rash.   Neurological: Negative for dizziness, focal weakness and weakness.   Endo/Heme/Allergies: Does not bruise/bleed easily.        Objective:   /78 (BP Location: Left arm, Patient Position: Sitting, BP Cuff Size: Adult)   Pulse 76   Ht 1.702 m (5' 7\")   Wt 68.9 kg (152 lb)   LMP 01/01/1983 (LMP Unknown)   SpO2 98%   BMI 23.81 kg/m²      Physical Exam   Constitutional: No distress.   HENT:   Mouth/Throat: Oropharynx is clear and moist. No oropharyngeal exudate.   Eyes: No scleral icterus.   Neck: No JVD present.   Cardiovascular: Normal rate and normal heart sounds. Exam reveals no gallop and no friction rub.   No murmur heard.  Left bruit   Pulmonary/Chest: No respiratory distress. She has no wheezes. She has no rales.   Abdominal: Soft. Bowel sounds are normal.   Musculoskeletal: She exhibits no edema.   Neurological: She is alert.   Skin: No rash noted. She is not diaphoretic.   Psychiatric: She has a normal mood and affect.     We reviewed in person the most recent labs  Recent Results (from the past 4032 hour(s))   HEMOGLOBIN A1C    Collection Time: 06/03/19  6:05 AM   Result Value Ref Range    Glycohemoglobin 6.9 (H) 0.0 - 5.6 %    Est Avg Glucose 151 mg/dL   ESTIMATED GFR    Collection Time: 06/03/19  6:05 AM   Result Value Ref Range    GFR If African American >60 >60 mL/min/1.73 m 2    GFR If Non African American >60 >60 mL/min/1.73 m 2   Comp Metabolic Panel    Collection Time: 06/03/19  6:05 AM   Result Value Ref Range    Sodium 133 (L) 135 - 145 mmol/L    Potassium 4.0 3.6 - 5.5 mmol/L    Chloride 99 96 - 112 mmol/L    Co2 25 20 - 33 mmol/L    Anion Gap 9.0 0.0 - 11.9    Glucose " 146 (H) 65 - 99 mg/dL    Bun 14 8 - 22 mg/dL    Creatinine 0.75 0.50 - 1.40 mg/dL    Calcium 9.3 8.5 - 10.5 mg/dL    AST(SGOT) 19 12 - 45 U/L    ALT(SGPT) 18 2 - 50 U/L    Alkaline Phosphatase 53 30 - 99 U/L    Total Bilirubin 0.5 0.1 - 1.5 mg/dL    Albumin 3.8 3.2 - 4.9 g/dL    Total Protein 6.5 6.0 - 8.2 g/dL    Globulin 2.7 1.9 - 3.5 g/dL    A-G Ratio 1.4 g/dL   FASTING STATUS    Collection Time: 06/03/19  6:05 AM   Result Value Ref Range    Fasting Status Fasting    URINE CULTURE(NEW)    Collection Time: 07/23/19  6:05 AM   Result Value Ref Range    Significant Indicator NEG     Source UR     Site Clean Catch     Culture Result No growth at 48 hours.    URINE CULTURE(NEW)    Collection Time: 08/12/19  8:32 AM   Result Value Ref Range    Significant Indicator POS (POS)     Source UR     Site Clean Catch     Culture Result - (A)     Culture Result Escherichia coli  10-50,000 cfu/mL   (A)        Susceptibility    Escherichia coli - TRUPTI     Ceftriaxone <=8 Sensitive mcg/mL     Ceftazidime <=1 Sensitive mcg/mL     Cephalothin 16 Intermediate mcg/mL     Cefotaxime <=2 Sensitive mcg/mL     Ciprofloxacin <=1 Sensitive mcg/mL     Cefepime <=8 Sensitive mcg/mL     Cefuroxime <=4 Sensitive mcg/mL     Ampicillin <=8 Sensitive mcg/mL     Cefotetan <=16 Sensitive mcg/mL     Tobramycin <=4 Sensitive mcg/mL     Nitrofurantoin <=32 Sensitive mcg/mL     Gentamicin <=4 Sensitive mcg/mL     Levofloxacin <=2 Sensitive mcg/mL     Pip/Tazobactam <=16 Sensitive mcg/mL     Piperacillin <=16 Sensitive mcg/mL     Trimeth/Sulfa <=2/38 Sensitive mcg/mL     Tigecycline <=2 Sensitive mcg/mL   HEMOGLOBIN A1C    Collection Time: 09/09/19  6:06 AM   Result Value Ref Range    Glycohemoglobin 6.8 (H) 0.0 - 5.6 %    Est Avg Glucose 148 mg/dL   ESTIMATED GFR    Collection Time: 09/09/19  6:06 AM   Result Value Ref Range    GFR If African American >60 >60 mL/min/1.73 m 2    GFR If Non African American >60 >60 mL/min/1.73 m 2   Comp Metabolic Panel     Collection Time: 19  6:06 AM   Result Value Ref Range    Sodium 132 (L) 135 - 145 mmol/L    Potassium 5.5 3.6 - 5.5 mmol/L    Chloride 97 96 - 112 mmol/L    Co2 27 20 - 33 mmol/L    Anion Gap 8.0 0.0 - 11.9    Glucose 161 (H) 65 - 99 mg/dL    Bun 11 8 - 22 mg/dL    Creatinine 0.76 0.50 - 1.40 mg/dL    Calcium 9.4 8.5 - 10.5 mg/dL    AST(SGOT) 20 12 - 45 U/L    ALT(SGPT) 17 2 - 50 U/L    Alkaline Phosphatase 57 30 - 99 U/L    Total Bilirubin 0.4 0.1 - 1.5 mg/dL    Albumin 4.0 3.2 - 4.9 g/dL    Total Protein 6.7 6.0 - 8.2 g/dL    Globulin 2.7 1.9 - 3.5 g/dL    A-G Ratio 1.5 g/dL   FASTING STATUS    Collection Time: 19  6:31 AM   Result Value Ref Range    Fasting Status Fasting    EKG    Collection Time: 10/18/19  1:40 PM   Result Value Ref Range    Report       [facility] Cardiology [facility]    Test Date:  2019-10-18  Pt Name:    MARKELL LONG                 Department: [facility]  MRN:        [id number]                      Room:  Gender:     Female                       Technician: RUDY  :        1955                   Requested By:BRIDGET GREENBERG  Order #:    [id number]                    Reading MD: Bridget Greenberg MD    Measurements  Intervals                                Axis  Rate:       76                           P:          70  FL:         176                          QRS:        113  QRSD:       84                           T:          34  QT:         372  QTc:        419    Interpretive Statements  SINUS RHYTHM  Compared to ECG 2017 15:34:12  NO SIGNIFICANT CHANGES    Electronically Signed On 10- 17:01:36 PDT by Bridget Greenberg MD       We personally reviewed the images of her carotid duplex    Assessment:     1. Blocked artery  EKG   2. Dyslipidemia     3. Left carotid artery stenosis - severe 2019  REFERRAL TO VASCULAR SURGERY       Medical Decision Making:  Today's Assessment / Status / Plan:     It was my pleasure to meet with Ms. Long.    I reinforced as to  the vascular surgery that given her young age severity of her carotid stenosis she may want to pursue carotid endarterectomy or stenting if available for prevention of stroke.    She should maximize her statin therapy would switch from the pravastatin to rosuvastatin 40 mg    No cardiovascular symptoms so does not require further cardiac testing she will monitor closely given the evidence of peripheral vascular disease    Ms. Long does not require regular cardiology follow up, I have advised her to call our office or e-mail using my MyChart if needed.    It is my pleasure to participate in the care of Ms. Long.  Please do not hesitate to contact me with questions or concerns.    Shant Card MD PhD [facility]  Cardiologist [facility]    Please note that this dictation was created using voice recognition software. I have worked with consultants from the vendor as well as technical experts from [facility] to optimize the interface. I have made every reasonable attempt to correct obvious errors, but I expect that there are errors of grammar and possibly content I did not discover before finalizing the note.        Kashif Shaw M.D.  [address] [place] [id number]  VIA Facsimile: [phone]

## 2019-10-19 NOTE — PROGRESS NOTES
Chief Complaint   Patient presents with   • New Patient     Blocked Carotid Artery       Subjective:   Ines Long is a 64 y.o. female who presents today for evaluation of her history of left carotid artery stenosis now severe by most recent echocardiogram she has type 1 diabetes and followed long-term with endocrine and primary care.  She is found to have a bruit and had a duplex which showed moderate disease repeat duplex year later shows severe disease of the left minimal on the right she had met with Dr. Stokes of vascular surgery who recommended carotid endarterectomy as apparently her lesion would be amenable to stenting.    She has been on proper statin therapy for some time with adequate control of her lipids until she is found to have peripheral arterial disease perhaps goal of the lower    She has had long-term issues with her foot after multiple surgeries so her mobility is reduced    Past Medical History:   Diagnosis Date   • Anesthesia     Post op N/V   • Diabetes type 1, controlled ([facility]) 11/1/2010   • Dyslipidemia 11/1/2010   • High cholesterol    • Hypothyroidism 11/1/2010   • Insulin pump in place 5/18/2011   • Left carotid artery stenosis - severe 2019    • Routine health maintenance 5/18/2011     Past Surgical History:   Procedure Laterality Date   • ORTHOPEDIC OSTEOTOMY Left 7/17/2017    Procedure: ORTHOPEDIC OSTEOTOMY CALCANEAL, KIDNER, EXCISION NAVICULAR, GASTROC SOLEUS RECESSION;  Surgeon: Adebayo Layton M.D.;  Location: [facility];  Service:    • LIGAMENT REPAIR  7/17/2017    Procedure: LIGAMENT REPAIR SPRING;  Surgeon: Adebayo Layton M.D.;  Location: [facility];  Service:    • TOE FUSION  7/17/2017    Procedure: TOE FUSION 1ST METATARSAL JOINT, 2ND TOE RECONSTRUCTION ;  Surgeon: Adebayo Layton M.D.;  Location: [facility];  Service:    • FLEXOR TENDON REPAIR  7/17/2017    Procedure: FLEXOR TENDON REPAIR- RELEASE/POSSIBLE FLEXOR DIGITORIUM  CARLINE;  Surgeon: Adebayo Layton M.D.;  Location: SURGERY [facility];  Service:    • BUNIONECTOMY  2014   • SHOULDER ARTHROSCOPY Left 1997    frozen shoulder   • ABDOMINAL HYSTERECTOMY TOTAL  1987   • KNEE ARTHROTOMY Right 1978    [facility]    • CHOLECYSTECTOMY       Family History   Problem Relation Age of Onset   • Cancer Mother         rectal cancer   • Stroke Father    • Heart Disease Father    • Hypertension Father    • Lung Disease Father         tb   • Diabetes Sister    • Psychiatric Illness Sister         bipolar   • Heart Disease Maternal Grandfather    • Stroke Paternal Grandmother    • Heart Disease Paternal Grandfather      Social History     Socioeconomic History   • Marital status:      Spouse name: Not on file   • Number of children: Not on file   • Years of education: Not on file   • Highest education level: Not on file   Occupational History   • Not on file   Social Needs   • Financial resource strain: Not on file   • Food insecurity:     Worry: Not on file     Inability: Not on file   • Transportation needs:     Medical: Not on file     Non-medical: Not on file   Tobacco Use   • Smoking status: Former Smoker     Years: 2.00     Types: Cigarettes   • Smokeless tobacco: Never Used   Substance and Sexual Activity   • Alcohol use: Yes     Alcohol/week: 1.0 oz     Types: 2 Glasses of wine per week     Comment: 3-4 WEEK   • Drug use: No   • Sexual activity: Yes     Partners: Male     Birth control/protection: Surgical   Lifestyle   • Physical activity:     Days per week: Not on file     Minutes per session: Not on file   • Stress: Not on file   Relationships   • Social connections:     Talks on phone: Not on file     Gets together: Not on file     Attends Anglican service: Not on file     Active member of club or organization: Not on file     Attends meetings of clubs or organizations: Not on file     Relationship status: Not on file   • Intimate partner violence:     Fear of current or ex  partner: Not on file     Emotionally abused: Not on file     Physically abused: Not on file     Forced sexual activity: Not on file   Other Topics Concern   • Not on file   Social History Narrative   • Not on file     Allergies   Allergen Reactions   • Ceclor [Cefaclor] Hives and Swelling   • Aleve [ ] Swelling     Face Swells    • Augmentin Hives, Diarrhea and Vomiting     Fever blisters      Outpatient Encounter Medications as of 10/18/2019   Medication Sig Dispense Refill   • rosuvastatin (CRESTOR) 40 MG tablet Take 1 Tab by mouth every day. 90 Tab 3   • NOVOLOG 100 UNIT/ML Solution 100 Units by Other route.     • insulin infusion pump Device Inject  as instructed Continuous. Basal Rate:  0000 .5 units  0330 .85 units  1100 .5 units  Bolus 1 unit for every 12 carbs  Change tube yesterday (7/16/2017)   Indications: Novolog     • levothyroxine (LEVOXYL) 125 MCG Tab Take 125 mcg by mouth Every morning on an empty stomach.     • Probiotic Product (PRO-BIOTIC BLEND PO) Take 1 Cap by mouth every day.     • Magnesium 250 MG Tab Take 1 Tab by mouth every day.     • Cholecalciferol (VITAMIN D-3 PO) Take 5,000 mg by mouth every 48 hours.     • Multiple Vitamins-Minerals (MULTIVITAMIN WOMEN PO) Take 1 Tab by mouth every day.     • metoclopramide (REGLAN) 10 MG TABS Take 10 mg by mouth 2 Times a Day.     • estradiol (ESTRACE) 1 MG TABS Take 1 mg by mouth every day.     • [DISCONTINUED] pravastatin (PRAVACHOL) 80 MG tablet Take 80 mg by mouth every day.       No facility-administered encounter medications on file as of 10/18/2019.      Review of Systems   Constitutional: Negative for chills and fever.   HENT: Negative for sore throat.    Eyes: Negative for blurred vision.   Respiratory: Negative for cough and shortness of breath.    Cardiovascular: Negative for chest pain, palpitations, claudication, leg swelling and PND.   Gastrointestinal: Negative for abdominal pain and nausea.   Musculoskeletal: Negative for falls and  "joint pain.   Skin: Negative for rash.   Neurological: Negative for dizziness, focal weakness and weakness.   Endo/Heme/Allergies: Does not bruise/bleed easily.        Objective:   /78 (BP Location: Left arm, Patient Position: Sitting, BP Cuff Size: Adult)   Pulse 76   Ht 1.702 m (5' 7\")   Wt 68.9 kg (152 lb)   LMP 01/01/1983 (LMP Unknown)   SpO2 98%   BMI 23.81 kg/m²     Physical Exam   Constitutional: No distress.   HENT:   Mouth/Throat: Oropharynx is clear and moist. No oropharyngeal exudate.   Eyes: No scleral icterus.   Neck: No JVD present.   Cardiovascular: Normal rate and normal heart sounds. Exam reveals no gallop and no friction rub.   No murmur heard.  Left bruit   Pulmonary/Chest: No respiratory distress. She has no wheezes. She has no rales.   Abdominal: Soft. Bowel sounds are normal.   Musculoskeletal: She exhibits no edema.   Neurological: She is alert.   Skin: No rash noted. She is not diaphoretic.   Psychiatric: She has a normal mood and affect.     We reviewed in person the most recent labs  Recent Results (from the past 4032 hour(s))   HEMOGLOBIN A1C    Collection Time: 06/03/19  6:05 AM   Result Value Ref Range    Glycohemoglobin 6.9 (H) 0.0 - 5.6 %    Est Avg Glucose 151 mg/dL   ESTIMATED GFR    Collection Time: 06/03/19  6:05 AM   Result Value Ref Range    GFR If African American >60 >60 mL/min/1.73 m 2    GFR If Non African American >60 >60 mL/min/1.73 m 2   Comp Metabolic Panel    Collection Time: 06/03/19  6:05 AM   Result Value Ref Range    Sodium 133 (L) 135 - 145 mmol/L    Potassium 4.0 3.6 - 5.5 mmol/L    Chloride 99 96 - 112 mmol/L    Co2 25 20 - 33 mmol/L    Anion Gap 9.0 0.0 - 11.9    Glucose 146 (H) 65 - 99 mg/dL    Bun 14 8 - 22 mg/dL    Creatinine 0.75 0.50 - 1.40 mg/dL    Calcium 9.3 8.5 - 10.5 mg/dL    AST(SGOT) 19 12 - 45 U/L    ALT(SGPT) 18 2 - 50 U/L    Alkaline Phosphatase 53 30 - 99 U/L    Total Bilirubin 0.5 0.1 - 1.5 mg/dL    Albumin 3.8 3.2 - 4.9 g/dL    Total " Protein 6.5 6.0 - 8.2 g/dL    Globulin 2.7 1.9 - 3.5 g/dL    A-G Ratio 1.4 g/dL   FASTING STATUS    Collection Time: 06/03/19  6:05 AM   Result Value Ref Range    Fasting Status Fasting    URINE CULTURE(NEW)    Collection Time: 07/23/19  6:05 AM   Result Value Ref Range    Significant Indicator NEG     Source UR     Site Clean Catch     Culture Result No growth at 48 hours.    URINE CULTURE(NEW)    Collection Time: 08/12/19  8:32 AM   Result Value Ref Range    Significant Indicator POS (POS)     Source UR     Site Clean Catch     Culture Result - (A)     Culture Result Escherichia coli  10-50,000 cfu/mL   (A)        Susceptibility    Escherichia coli - TRUPTI     Ceftriaxone <=8 Sensitive mcg/mL     Ceftazidime <=1 Sensitive mcg/mL     Cephalothin 16 Intermediate mcg/mL     Cefotaxime <=2 Sensitive mcg/mL     Ciprofloxacin <=1 Sensitive mcg/mL     Cefepime <=8 Sensitive mcg/mL     Cefuroxime <=4 Sensitive mcg/mL     Ampicillin <=8 Sensitive mcg/mL     Cefotetan <=16 Sensitive mcg/mL     Tobramycin <=4 Sensitive mcg/mL     Nitrofurantoin <=32 Sensitive mcg/mL     Gentamicin <=4 Sensitive mcg/mL     Levofloxacin <=2 Sensitive mcg/mL     Pip/Tazobactam <=16 Sensitive mcg/mL     Piperacillin <=16 Sensitive mcg/mL     Trimeth/Sulfa <=2/38 Sensitive mcg/mL     Tigecycline <=2 Sensitive mcg/mL   HEMOGLOBIN A1C    Collection Time: 09/09/19  6:06 AM   Result Value Ref Range    Glycohemoglobin 6.8 (H) 0.0 - 5.6 %    Est Avg Glucose 148 mg/dL   ESTIMATED GFR    Collection Time: 09/09/19  6:06 AM   Result Value Ref Range    GFR If African American >60 >60 mL/min/1.73 m 2    GFR If Non African American >60 >60 mL/min/1.73 m 2   Comp Metabolic Panel    Collection Time: 09/09/19  6:06 AM   Result Value Ref Range    Sodium 132 (L) 135 - 145 mmol/L    Potassium 5.5 3.6 - 5.5 mmol/L    Chloride 97 96 - 112 mmol/L    Co2 27 20 - 33 mmol/L    Anion Gap 8.0 0.0 - 11.9    Glucose 161 (H) 65 - 99 mg/dL    Bun 11 8 - 22 mg/dL    Creatinine  0.76 0.50 - 1.40 mg/dL    Calcium 9.4 8.5 - 10.5 mg/dL    AST(SGOT) 20 12 - 45 U/L    ALT(SGPT) 17 2 - 50 U/L    Alkaline Phosphatase 57 30 - 99 U/L    Total Bilirubin 0.4 0.1 - 1.5 mg/dL    Albumin 4.0 3.2 - 4.9 g/dL    Total Protein 6.7 6.0 - 8.2 g/dL    Globulin 2.7 1.9 - 3.5 g/dL    A-G Ratio 1.5 g/dL   FASTING STATUS    Collection Time: 19  6:31 AM   Result Value Ref Range    Fasting Status Fasting    EKG    Collection Time: 10/18/19  1:40 PM   Result Value Ref Range    Report       [facility] Cardiology [facility]    Test Date:  2019-10-18  Pt Name:    MARKELL LONG                 Department: [facility]  MRN:        [id number]                      Room:  Gender:     Female                       Technician: RUDY PHANB:        1955                   Requested By:BRIDGET CARD  Order #:    [id number]                    Reading MD: Bridget Card MD    Measurements  Intervals                                Axis  Rate:       76                           P:          70  RI:         176                          QRS:        113  QRSD:       84                           T:          34  QT:         372  QTc:        419    Interpretive Statements  SINUS RHYTHM  Compared to ECG 2017 15:34:12  NO SIGNIFICANT CHANGES    Electronically Signed On 10- 17:01:36 PDT by Bridget Card MD       We personally reviewed the images of her carotid duplex    Assessment:     1. Blocked artery  EKG   2. Dyslipidemia     3. Left carotid artery stenosis - severe   REFERRAL TO VASCULAR SURGERY       Medical Decision Making:  Today's Assessment / Status / Plan:     It was my pleasure to meet with Ms. Long.    I reinforced as to the vascular surgery that given her young age severity of her carotid stenosis she may want to pursue carotid endarterectomy or stenting if available for prevention of stroke.    She should maximize her statin therapy would switch from the pravastatin to rosuvastatin 40 mg    No  cardiovascular symptoms so does not require further cardiac testing she will monitor closely given the evidence of peripheral vascular disease    Ms. Long does not require regular cardiology follow up, I have advised her to call our office or e-mail using my MyChart if needed.    It is my pleasure to participate in the care of Ms. Long.  Please do not hesitate to contact me with questions or concerns.    Shant Card MD PhD [facility]  Cardiologist [facility]    Please note that this dictation was created using voice recognition software. I have worked with consultants from the vendor as well as technical experts from [facility] to optimize the interface. I have made every reasonable attempt to correct obvious errors, but I expect that there are errors of grammar and possibly content I did not discover before finalizing the note.

## 2019-10-24 ENCOUNTER — OFFICE VISIT (OUTPATIENT)
Dept: MEDICAL GROUP | Facility: PHYSICIAN GROUP | Age: 64
End: 2019-10-24
Payer: COMMERCIAL

## 2019-10-24 VITALS
TEMPERATURE: 98.4 F | HEART RATE: 80 BPM | DIASTOLIC BLOOD PRESSURE: 78 MMHG | HEIGHT: 67 IN | RESPIRATION RATE: 16 BRPM | BODY MASS INDEX: 23.54 KG/M2 | SYSTOLIC BLOOD PRESSURE: 134 MMHG | OXYGEN SATURATION: 97 % | WEIGHT: 150 LBS

## 2019-10-24 DIAGNOSIS — Z23 NEED FOR VACCINATION: ICD-10-CM

## 2019-10-24 DIAGNOSIS — E10.69 CONTROLLED TYPE 1 DIABETES MELLITUS WITH OTHER COMPLICATION (HCC): ICD-10-CM

## 2019-10-24 DIAGNOSIS — Z11.59 NEED FOR HEPATITIS C SCREENING TEST: ICD-10-CM

## 2019-10-24 DIAGNOSIS — Z96.41 INSULIN PUMP IN PLACE: ICD-10-CM

## 2019-10-24 DIAGNOSIS — Z00.00 WELLNESS EXAMINATION: ICD-10-CM

## 2019-10-24 DIAGNOSIS — Z12.39 SCREENING FOR BREAST CANCER: ICD-10-CM

## 2019-10-24 DIAGNOSIS — I65.22 LEFT CAROTID ARTERY STENOSIS: Chronic | ICD-10-CM

## 2019-10-24 PROCEDURE — 90746 HEPB VACCINE 3 DOSE ADULT IM: CPT | Performed by: NURSE PRACTITIONER

## 2019-10-24 PROCEDURE — 90471 IMMUNIZATION ADMIN: CPT | Performed by: NURSE PRACTITIONER

## 2019-10-24 PROCEDURE — 99396 PREV VISIT EST AGE 40-64: CPT | Mod: 25 | Performed by: NURSE PRACTITIONER

## 2019-10-24 SDOH — HEALTH STABILITY: MENTAL HEALTH: HOW OFTEN DO YOU HAVE 6 OR MORE DRINKS ON ONE OCCASION?: NOT ASKED

## 2019-10-24 NOTE — PROGRESS NOTES
Subjective:     CC:   Chief Complaint   Patient presents with   • Annual Exam     Hep B vaccine        HPI:   Ines Long is a 64 y.o. female who presents for annual exam. She is feeling well and denies any complaints.    Patient has GYN provider: Yes  Last pap: 2/27/19  Last mammo: 7/28/18  Last colonoscopy: 2/01/16  Last bone density test: N/A  Last Tdap: 10/27/15  Gardiasil: No  Hx. STD's: No  Birth control: N/A    Status post menopause  No significant bloating/fluid retention, pelvic pain, or dyspareunia. No vaginal discharge  No breast tenderness, mass, nipple discharge, changes in size or contour, or abnormal cyclic discomfort.  She does perform regular self breast exams.  Regular exercise: yes   Diet: Balanced, accomodates for diabetes.    She  has a past medical history of Anesthesia, Diabetes type 1, controlled (HCC) (11/1/2010), Dyslipidemia (11/1/2010), High cholesterol, Hypothyroidism (11/1/2010), Insulin pump in place (5/18/2011), Left carotid artery stenosis - severe 2019, and Routine health maintenance (5/18/2011).  She  has a past surgical history that includes cholecystectomy; abdominal hysterectomy total (1987); bunionectomy (2014); knee arthrotomy (Right, 1978); shoulder arthroscopy (Left, 1997); orthopedic osteotomy (Left, 7/17/2017); ligament repair (7/17/2017); toe fusion (7/17/2017); and flexor tendon repair (7/17/2017).    Family History   Problem Relation Age of Onset   • Cancer Mother         rectal cancer   • Stroke Father    • Heart Disease Father    • Hypertension Father    • Lung Disease Father         tb   • Diabetes Sister    • Psychiatric Illness Sister         bipolar   • Heart Disease Maternal Grandfather    • Stroke Paternal Grandmother    • Heart Disease Paternal Grandfather        Social History     Socioeconomic History   • Marital status:      Spouse name: Not on file   • Number of children: Not on file   • Years of education: Not on file   • Highest education  level: Not on file   Occupational History   • Not on file   Social Needs   • Financial resource strain: Not on file   • Food insecurity:     Worry: Not on file     Inability: Not on file   • Transportation needs:     Medical: Not on file     Non-medical: Not on file   Tobacco Use   • Smoking status: Former Smoker     Years: 2.00     Types: Cigarettes     Last attempt to quit: 1977     Years since quittin.8   • Smokeless tobacco: Never Used   Substance and Sexual Activity   • Alcohol use: Yes     Alcohol/week: 1.0 oz     Types: 2 Glasses of wine per week   • Drug use: Never   • Sexual activity: Yes     Partners: Male     Birth control/protection: Surgical   Lifestyle   • Physical activity:     Days per week: Not on file     Minutes per session: Not on file   • Stress: Not on file   Relationships   • Social connections:     Talks on phone: Not on file     Gets together: Not on file     Attends Restorationism service: Not on file     Active member of club or organization: Not on file     Attends meetings of clubs or organizations: Not on file     Relationship status: Not on file   • Intimate partner violence:     Fear of current or ex partner: Not on file     Emotionally abused: Not on file     Physically abused: Not on file     Forced sexual activity: Not on file   Other Topics Concern   • Not on file   Social History Narrative   • Not on file       Patient Active Problem List    Diagnosis Date Noted   • Hyperglycemia 2011     Priority: Medium   • Left carotid artery stenosis - severe 2019    • Hormone replacement therapy (HRT) 10/23/2018   • Sore throat 10/23/2018   • Pharyngitis, acute 10/23/2018   • Insulin pump in place 2011   • Routine health maintenance 2011   • Diabetes type 1, controlled (HCC) 2010   • Hypothyroidism 2010   • Dyslipidemia 2010         Current Outpatient Medications   Medication Sig Dispense Refill   • rosuvastatin (CRESTOR) 40 MG tablet Take 1 Tab by  "mouth every day. 90 Tab 3   • NOVOLOG 100 UNIT/ML Solution 100 Units by Other route.     • insulin infusion pump Device Inject  as instructed Continuous. Basal Rate:  0000 .5 units  0330 .85 units  1100 .5 units  Bolus 1 unit for every 12 carbs  Change tube yesterday (7/16/2017)   Indications: Novolog     • levothyroxine (LEVOXYL) 125 MCG Tab Take 125 mcg by mouth Every morning on an empty stomach.     • Probiotic Product (PRO-BIOTIC BLEND PO) Take 1 Cap by mouth every day.     • Cholecalciferol (VITAMIN D-3 PO) Take 5,000 mg by mouth every 48 hours.     • Multiple Vitamins-Minerals (MULTIVITAMIN WOMEN PO) Take 1 Tab by mouth every day.     • metoclopramide (REGLAN) 10 MG TABS Take 10 mg by mouth 2 Times a Day.     • estradiol (ESTRACE) 1 MG TABS Take 1 mg by mouth every day.     • Magnesium 250 MG Tab Take 1 Tab by mouth every day.       No current facility-administered medications for this visit.      Allergies   Allergen Reactions   • Ceclor [Cefaclor] Hives and Swelling   • Aleve [ ] Swelling     Face Swells    • Augmentin Hives, Diarrhea and Vomiting     Fever blisters        Review of Systems  Constitutional: Negative for fever, chills and malaise/fatigue.   HENT: Negative for congestion.    Eyes: Negative for pain.   Respiratory: Negative for cough and shortness of breath.    Cardiovascular: Negative for leg swelling.   Gastrointestinal: Negative for nausea, vomiting, abdominal pain and diarrhea.   Genitourinary: Negative for dysuria and hematuria.   Skin: Negative for rash.   Neurological: Negative for dizziness, focal weakness and headaches.   Endo/Heme/Allergies: Does not bruise/bleed easily.   Psychiatric/Behavioral: Negative for depression.  The patient is not nervous/anxious.      Objective:     /78 (BP Location: Right arm, Patient Position: Sitting, BP Cuff Size: Adult)   Pulse 80   Temp 36.9 °C (98.4 °F) (Temporal)   Resp 16   Ht 1.702 m (5' 7\")   Wt 68 kg (150 lb)   LMP 01/01/1983 (LMP " Unknown)   SpO2 97%   BMI 23.49 kg/m²   Body mass index is 23.49 kg/m².  Wt Readings from Last 4 Encounters:   10/24/19 68 kg (150 lb)   10/18/19 68.9 kg (152 lb)   04/01/19 69.9 kg (154 lb)   02/26/19 68 kg (150 lb)       Physical Exam:  Constitutional: Well-developed and well-nourished. Not diaphoretic. No distress.   Skin: Skin is warm and dry. No rash noted.  Head: Atraumatic without lesions.  Eyes: Conjunctivae and extraocular motions are normal. Pupils are equal, round, and reactive to light. No scleral icterus.   Ears:  External ears unremarkable. Tympanic membranes clear and intact.  Nose: Nares patent. Septum midline. Turbinates without erythema nor edema. No discharge.   Mouth/Throat: Dentition is good. Tongue normal. Oropharynx is clear and moist. Posterior pharynx without erythema or exudates.  Neck: Supple, trachea midline. Normal range of motion. No thyromegaly present. No lymphadenopathy--cervical or supraclavicular.  Cardiovascular: Regular rate and rhythm, S1 and S2 without murmur, rubs, or gallops.    Breast: Breasts examined seated and supine. No skin changes, peau d'orange or nipple retraction. No discharge. Breast move freely and equally without restriction. No axillary or supraclavicular adenopathy. No masses or nodularity palpable.   Abdomen: Soft, non tender, and without distention. Active bowel sounds in all four quadrants. No rebound, guarding, masses or HSM.  Extremities: No cyanosis, clubbing, erythema, nor edema. Distal pulses intact and symmetric.   Musculoskeletal: All major joints AROM full in all directions without pain.  Neurological: Alert and oriented x 3. DTRs 2+/3 and symmetric. No cranial nerve deficit. 5/5 myotomes. Sensation intact. Negative Rhomberg.  Psychiatric:  Behavior, mood, and affect are appropriate.    Assessment and Plan:     1. Insulin pump in place     2. Left carotid artery stenosis - severe 2019     3. Controlled type 1 diabetes mellitus with other  complication (HCC)     4. Acquired hypothyroidism     5. Dyslipidemia     6. Need for hepatitis C screening test  HEP C VIRUS ANTIBODY   7. Need for vaccination  Hepatitis B Vaccine Adult IM   8. Screening for breast cancer  MA-SCREENING MAMMO BILAT W/TOMOSYNTHESIS W/CAD     - Patient will continue to follow with her diabetes .   - Plan to add Hepatitis C screening to next lab draws.   - She will receive another round of her hepatitis B vaccination today  - She will follow up and complete her annual mammogram.      Plans  Labs per orders  Immunizations per orders  Patient counseled about skin care, diet, supplements, prenatal vitamins, safe sex and exercise.    Follow-up: No follow-ups on file.     Dwight MOONEY ([employer]), am scribing for, and in the presence of, JOSE Lee    Electronically signed by: Dwight Ann ([employer]), 10/24/2019    Roel MOONEY APRN personally performed the services described in this documentation, as scribed by Dwight Ann in my presence, and it is both accurate and complete.

## 2019-11-23 ENCOUNTER — HOSPITAL ENCOUNTER (OUTPATIENT)
Dept: LAB | Facility: MEDICAL CENTER | Age: 64
End: 2019-11-23
Attending: NURSE PRACTITIONER
Payer: COMMERCIAL

## 2019-11-23 ENCOUNTER — HOSPITAL ENCOUNTER (OUTPATIENT)
Dept: LAB | Facility: MEDICAL CENTER | Age: 64
End: 2019-11-23
Attending: INTERNAL MEDICINE
Payer: COMMERCIAL

## 2019-11-23 DIAGNOSIS — Z11.59 NEED FOR HEPATITIS C SCREENING TEST: ICD-10-CM

## 2019-11-23 LAB
ALBUMIN SERPL BCP-MCNC: 4.1 G/DL (ref 3.2–4.9)
ALBUMIN/GLOB SERPL: 1.5 G/DL
ALP SERPL-CCNC: 57 U/L (ref 30–99)
ALT SERPL-CCNC: 21 U/L (ref 2–50)
ANION GAP SERPL CALC-SCNC: 6 MMOL/L (ref 0–11.9)
AST SERPL-CCNC: 21 U/L (ref 12–45)
BILIRUB SERPL-MCNC: 0.6 MG/DL (ref 0.1–1.5)
BUN SERPL-MCNC: 14 MG/DL (ref 8–22)
CALCIUM SERPL-MCNC: 9.2 MG/DL (ref 8.5–10.5)
CHLORIDE SERPL-SCNC: 99 MMOL/L (ref 96–112)
CO2 SERPL-SCNC: 26 MMOL/L (ref 20–33)
CREAT SERPL-MCNC: 0.81 MG/DL (ref 0.5–1.4)
EST. AVERAGE GLUCOSE BLD GHB EST-MCNC: 166 MG/DL
GLOBULIN SER CALC-MCNC: 2.8 G/DL (ref 1.9–3.5)
GLUCOSE SERPL-MCNC: 210 MG/DL (ref 65–99)
HBA1C MFR BLD: 7.4 % (ref 0–5.6)
HCV AB SER QL: NEGATIVE
POTASSIUM SERPL-SCNC: 5.3 MMOL/L (ref 3.6–5.5)
PROT SERPL-MCNC: 6.9 G/DL (ref 6–8.2)
SODIUM SERPL-SCNC: 131 MMOL/L (ref 135–145)

## 2019-11-23 PROCEDURE — 86803 HEPATITIS C AB TEST: CPT

## 2019-11-23 PROCEDURE — 83036 HEMOGLOBIN GLYCOSYLATED A1C: CPT

## 2019-11-23 PROCEDURE — 80053 COMPREHEN METABOLIC PANEL: CPT

## 2019-11-23 PROCEDURE — 36415 COLL VENOUS BLD VENIPUNCTURE: CPT

## 2019-11-26 DIAGNOSIS — Z01.812 PRE-PROCEDURAL LABORATORY EXAMINATION: ICD-10-CM

## 2019-11-26 LAB
ABO GROUP BLD: NORMAL
BLD GP AB SCN SERPL QL: NORMAL
ERYTHROCYTE [DISTWIDTH] IN BLOOD BY AUTOMATED COUNT: 41.1 FL (ref 35.9–50)
HCT VFR BLD AUTO: 42.1 % (ref 37–47)
HGB BLD-MCNC: 14.9 G/DL (ref 12–16)
MCH RBC QN AUTO: 32.3 PG (ref 27–33)
MCHC RBC AUTO-ENTMCNC: 35.4 G/DL (ref 33.6–35)
MCV RBC AUTO: 91.3 FL (ref 81.4–97.8)
PLATELET # BLD AUTO: 298 K/UL (ref 164–446)
PMV BLD AUTO: 10.2 FL (ref 9–12.9)
RBC # BLD AUTO: 4.61 M/UL (ref 4.2–5.4)
RH BLD: NORMAL
WBC # BLD AUTO: 7.3 K/UL (ref 4.8–10.8)

## 2019-11-26 PROCEDURE — 85027 COMPLETE CBC AUTOMATED: CPT

## 2019-11-26 PROCEDURE — 36415 COLL VENOUS BLD VENIPUNCTURE: CPT

## 2019-11-26 PROCEDURE — 86900 BLOOD TYPING SEROLOGIC ABO: CPT

## 2019-11-26 PROCEDURE — 86850 RBC ANTIBODY SCREEN: CPT

## 2019-11-26 PROCEDURE — 86901 BLOOD TYPING SEROLOGIC RH(D): CPT

## 2019-11-26 RX ORDER — IBUPROFEN 600 MG/1
600 TABLET ORAL
Status: ON HOLD | COMMUNITY
End: 2022-05-16

## 2019-11-26 SDOH — HEALTH STABILITY: MENTAL HEALTH: HOW OFTEN DO YOU HAVE A DRINK CONTAINING ALCOHOL?: 2-3 TIMES A WEEK

## 2019-12-05 ENCOUNTER — HOSPITAL ENCOUNTER (INPATIENT)
Facility: MEDICAL CENTER | Age: 64
LOS: 1 days | DRG: 039 | End: 2019-12-06
Attending: SURGERY | Admitting: SURGERY
Payer: COMMERCIAL

## 2019-12-05 ENCOUNTER — ANESTHESIA EVENT (OUTPATIENT)
Dept: SURGERY | Facility: MEDICAL CENTER | Age: 64
DRG: 039 | End: 2019-12-05
Payer: COMMERCIAL

## 2019-12-05 ENCOUNTER — ANESTHESIA (OUTPATIENT)
Dept: SURGERY | Facility: MEDICAL CENTER | Age: 64
DRG: 039 | End: 2019-12-05
Payer: COMMERCIAL

## 2019-12-05 DIAGNOSIS — I65.22 LEFT CAROTID ARTERY STENOSIS: Primary | Chronic | ICD-10-CM

## 2019-12-05 DIAGNOSIS — G89.18 POSTOPERATIVE PAIN: ICD-10-CM

## 2019-12-05 LAB
ABO GROUP BLD: NORMAL
BLD GP AB SCN SERPL QL: NORMAL
GLUCOSE BLD-MCNC: 224 MG/DL (ref 65–99)
PATHOLOGY CONSULT NOTE: NORMAL
RH BLD: NORMAL

## 2019-12-05 PROCEDURE — 160009 HCHG ANES TIME/MIN: Performed by: SURGERY

## 2019-12-05 PROCEDURE — 03CL0ZZ EXTIRPATION OF MATTER FROM LEFT INTERNAL CAROTID ARTERY, OPEN APPROACH: ICD-10-PCS | Performed by: SURGERY

## 2019-12-05 PROCEDURE — 700102 HCHG RX REV CODE 250 W/ 637 OVERRIDE(OP): Performed by: SURGERY

## 2019-12-05 PROCEDURE — 88300 SURGICAL PATH GROSS: CPT

## 2019-12-05 PROCEDURE — 95940 IONM IN OPERATNG ROOM 15 MIN: CPT | Performed by: SURGERY

## 2019-12-05 PROCEDURE — A9270 NON-COVERED ITEM OR SERVICE: HCPCS | Performed by: ANESTHESIOLOGY

## 2019-12-05 PROCEDURE — 700105 HCHG RX REV CODE 258: Performed by: SURGERY

## 2019-12-05 PROCEDURE — 86901 BLOOD TYPING SEROLOGIC RH(D): CPT

## 2019-12-05 PROCEDURE — 700102 HCHG RX REV CODE 250 W/ 637 OVERRIDE(OP): Performed by: ANESTHESIOLOGY

## 2019-12-05 PROCEDURE — 110454 HCHG SHELL REV 250: Performed by: SURGERY

## 2019-12-05 PROCEDURE — 501445 HCHG STAPLER, SKIN DISP: Performed by: SURGERY

## 2019-12-05 PROCEDURE — 95955 EEG DURING SURGERY: CPT | Performed by: SURGERY

## 2019-12-05 PROCEDURE — 94760 N-INVAS EAR/PLS OXIMETRY 1: CPT

## 2019-12-05 PROCEDURE — 160048 HCHG OR STATISTICAL LEVEL 1-5: Performed by: SURGERY

## 2019-12-05 PROCEDURE — 82962 GLUCOSE BLOOD TEST: CPT

## 2019-12-05 PROCEDURE — 03UL0KZ SUPPLEMENT LEFT INTERNAL CAROTID ARTERY WITH NONAUTOLOGOUS TISSUE SUBSTITUTE, OPEN APPROACH: ICD-10-PCS | Performed by: SURGERY

## 2019-12-05 PROCEDURE — 700101 HCHG RX REV CODE 250: Performed by: ANESTHESIOLOGY

## 2019-12-05 PROCEDURE — 160041 HCHG SURGERY MINUTES - EA ADDL 1 MIN LEVEL 4: Performed by: SURGERY

## 2019-12-05 PROCEDURE — 86900 BLOOD TYPING SEROLOGIC ABO: CPT

## 2019-12-05 PROCEDURE — A6402 STERILE GAUZE <= 16 SQ IN: HCPCS | Performed by: SURGERY

## 2019-12-05 PROCEDURE — 501837 HCHG SUTURE CV: Performed by: SURGERY

## 2019-12-05 PROCEDURE — 770006 HCHG ROOM/CARE - MED/SURG/GYN SEMI*

## 2019-12-05 PROCEDURE — 160035 HCHG PACU - 1ST 60 MINS PHASE I: Performed by: SURGERY

## 2019-12-05 PROCEDURE — 160002 HCHG RECOVERY MINUTES (STAT): Performed by: SURGERY

## 2019-12-05 PROCEDURE — 700111 HCHG RX REV CODE 636 W/ 250 OVERRIDE (IP): Performed by: SURGERY

## 2019-12-05 PROCEDURE — 500257: Performed by: SURGERY

## 2019-12-05 PROCEDURE — 160029 HCHG SURGERY MINUTES - 1ST 30 MINS LEVEL 4: Performed by: SURGERY

## 2019-12-05 PROCEDURE — 86850 RBC ANTIBODY SCREEN: CPT

## 2019-12-05 PROCEDURE — 700101 HCHG RX REV CODE 250

## 2019-12-05 PROCEDURE — A9270 NON-COVERED ITEM OR SERVICE: HCPCS | Performed by: SURGERY

## 2019-12-05 PROCEDURE — 700111 HCHG RX REV CODE 636 W/ 250 OVERRIDE (IP): Performed by: ANESTHESIOLOGY

## 2019-12-05 PROCEDURE — 160036 HCHG PACU - EA ADDL 30 MINS PHASE I: Performed by: SURGERY

## 2019-12-05 PROCEDURE — 501838 HCHG SUTURE GENERAL: Performed by: SURGERY

## 2019-12-05 PROCEDURE — 700101 HCHG RX REV CODE 250: Performed by: SURGERY

## 2019-12-05 PROCEDURE — C1768 GRAFT, VASCULAR: HCPCS | Performed by: SURGERY

## 2019-12-05 PROCEDURE — 95938 SOMATOSENSORY TESTING: CPT | Performed by: SURGERY

## 2019-12-05 PROCEDURE — 4A10X4G MONITORING OF CENTRAL NERVOUS ELECTRICAL ACTIVITY, INTRAOPERATIVE, EXTERNAL APPROACH: ICD-10-PCS | Performed by: SURGERY

## 2019-12-05 DEVICE — PATCH .8X8CM XENOSURE BIOLOGIC VASCULAR---ORDER IN MULTIPLES OF 5---: Type: IMPLANTABLE DEVICE | Site: CAROTID | Status: FUNCTIONAL

## 2019-12-05 RX ORDER — PROMETHAZINE HYDROCHLORIDE 25 MG/1
25 SUPPOSITORY RECTAL ONCE
Status: DISCONTINUED | OUTPATIENT
Start: 2019-12-05 | End: 2019-12-05

## 2019-12-05 RX ORDER — LABETALOL HYDROCHLORIDE 5 MG/ML
INJECTION, SOLUTION INTRAVENOUS
Status: COMPLETED
Start: 2019-12-05 | End: 2019-12-05

## 2019-12-05 RX ORDER — DIPHENHYDRAMINE HYDROCHLORIDE 50 MG/ML
12.5 INJECTION INTRAMUSCULAR; INTRAVENOUS
Status: DISCONTINUED | OUTPATIENT
Start: 2019-12-05 | End: 2019-12-05 | Stop reason: HOSPADM

## 2019-12-05 RX ORDER — SCOLOPAMINE TRANSDERMAL SYSTEM 1 MG/1
1 PATCH, EXTENDED RELEASE TRANSDERMAL
Status: DISCONTINUED | OUTPATIENT
Start: 2019-12-05 | End: 2019-12-06 | Stop reason: HOSPADM

## 2019-12-05 RX ORDER — IPRATROPIUM BROMIDE AND ALBUTEROL SULFATE 2.5; .5 MG/3ML; MG/3ML
3 SOLUTION RESPIRATORY (INHALATION)
Status: DISCONTINUED | OUTPATIENT
Start: 2019-12-05 | End: 2019-12-06 | Stop reason: HOSPADM

## 2019-12-05 RX ORDER — PROMETHAZINE HYDROCHLORIDE 25 MG/1
25 SUPPOSITORY RECTAL
Status: COMPLETED | OUTPATIENT
Start: 2019-12-05 | End: 2019-12-05

## 2019-12-05 RX ORDER — TRAZODONE HYDROCHLORIDE 50 MG/1
50 TABLET ORAL NIGHTLY PRN
Status: DISCONTINUED | OUTPATIENT
Start: 2019-12-05 | End: 2019-12-06 | Stop reason: HOSPADM

## 2019-12-05 RX ORDER — LABETALOL HYDROCHLORIDE 5 MG/ML
10 INJECTION, SOLUTION INTRAVENOUS
Status: DISCONTINUED | OUTPATIENT
Start: 2019-12-05 | End: 2019-12-06 | Stop reason: HOSPADM

## 2019-12-05 RX ORDER — KETOROLAC TROMETHAMINE 30 MG/ML
30 INJECTION, SOLUTION INTRAMUSCULAR; INTRAVENOUS EVERY 6 HOURS
Status: DISCONTINUED | OUTPATIENT
Start: 2019-12-05 | End: 2019-12-06 | Stop reason: HOSPADM

## 2019-12-05 RX ORDER — ROSUVASTATIN CALCIUM 20 MG/1
40 TABLET, COATED ORAL
Status: DISCONTINUED | OUTPATIENT
Start: 2019-12-05 | End: 2019-12-06 | Stop reason: HOSPADM

## 2019-12-05 RX ORDER — ONDANSETRON 2 MG/ML
4 INJECTION INTRAMUSCULAR; INTRAVENOUS
Status: COMPLETED | OUTPATIENT
Start: 2019-12-05 | End: 2019-12-05

## 2019-12-05 RX ORDER — SODIUM CHLORIDE AND POTASSIUM CHLORIDE 150; 450 MG/100ML; MG/100ML
INJECTION, SOLUTION INTRAVENOUS CONTINUOUS
Status: DISCONTINUED | OUTPATIENT
Start: 2019-12-05 | End: 2019-12-06 | Stop reason: HOSPADM

## 2019-12-05 RX ORDER — OXYCODONE HYDROCHLORIDE 10 MG/1
10 TABLET ORAL
Status: DISCONTINUED | OUTPATIENT
Start: 2019-12-05 | End: 2019-12-06 | Stop reason: HOSPADM

## 2019-12-05 RX ORDER — ESTRADIOL 1 MG/1
1 TABLET ORAL
Status: DISCONTINUED | OUTPATIENT
Start: 2019-12-05 | End: 2019-12-06 | Stop reason: HOSPADM

## 2019-12-05 RX ORDER — DIPHENHYDRAMINE HYDROCHLORIDE 50 MG/ML
25 INJECTION INTRAMUSCULAR; INTRAVENOUS EVERY 6 HOURS PRN
Status: DISCONTINUED | OUTPATIENT
Start: 2019-12-05 | End: 2019-12-06 | Stop reason: HOSPADM

## 2019-12-05 RX ORDER — HEPARIN SODIUM,PORCINE 1000/ML
VIAL (ML) INJECTION PRN
Status: DISCONTINUED | OUTPATIENT
Start: 2019-12-05 | End: 2019-12-05 | Stop reason: SURG

## 2019-12-05 RX ORDER — LEVOTHYROXINE SODIUM 0.12 MG/1
62.5 TABLET ORAL
Status: DISCONTINUED | OUTPATIENT
Start: 2019-12-06 | End: 2019-12-06 | Stop reason: HOSPADM

## 2019-12-05 RX ORDER — LABETALOL HYDROCHLORIDE 5 MG/ML
5 INJECTION, SOLUTION INTRAVENOUS
Status: DISCONTINUED | OUTPATIENT
Start: 2019-12-05 | End: 2019-12-05 | Stop reason: HOSPADM

## 2019-12-05 RX ORDER — OXYCODONE HCL 5 MG/5 ML
5 SOLUTION, ORAL ORAL
Status: COMPLETED | OUTPATIENT
Start: 2019-12-05 | End: 2019-12-05

## 2019-12-05 RX ORDER — ONDANSETRON 2 MG/ML
4 INJECTION INTRAMUSCULAR; INTRAVENOUS EVERY 4 HOURS PRN
Status: DISCONTINUED | OUTPATIENT
Start: 2019-12-05 | End: 2019-12-06 | Stop reason: HOSPADM

## 2019-12-05 RX ORDER — HEPARIN SODIUM,PORCINE 1000/ML
VIAL (ML) INJECTION
Status: DISCONTINUED | OUTPATIENT
Start: 2019-12-05 | End: 2019-12-05 | Stop reason: HOSPADM

## 2019-12-05 RX ORDER — LIDOCAINE HYDROCHLORIDE 20 MG/ML
INJECTION, SOLUTION EPIDURAL; INFILTRATION; INTRACAUDAL; PERINEURAL PRN
Status: DISCONTINUED | OUTPATIENT
Start: 2019-12-05 | End: 2019-12-05 | Stop reason: SURG

## 2019-12-05 RX ORDER — CALCIUM CARBONATE 500 MG/1
500 TABLET, CHEWABLE ORAL
Status: DISCONTINUED | OUTPATIENT
Start: 2019-12-05 | End: 2019-12-06 | Stop reason: HOSPADM

## 2019-12-05 RX ORDER — LEVOTHYROXINE SODIUM 0.12 MG/1
62.5-125 TABLET ORAL SEE ADMIN INSTRUCTIONS
Status: DISCONTINUED | OUTPATIENT
Start: 2019-12-05 | End: 2019-12-05

## 2019-12-05 RX ORDER — MIDAZOLAM HYDROCHLORIDE 1 MG/ML
INJECTION INTRAMUSCULAR; INTRAVENOUS PRN
Status: DISCONTINUED | OUTPATIENT
Start: 2019-12-05 | End: 2019-12-05 | Stop reason: SURG

## 2019-12-05 RX ORDER — PROTAMINE SULFATE 10 MG/ML
INJECTION, SOLUTION INTRAVENOUS PRN
Status: DISCONTINUED | OUTPATIENT
Start: 2019-12-05 | End: 2019-12-05 | Stop reason: SURG

## 2019-12-05 RX ORDER — ROCURONIUM BROMIDE 10 MG/ML
INJECTION, SOLUTION INTRAVENOUS PRN
Status: DISCONTINUED | OUTPATIENT
Start: 2019-12-05 | End: 2019-12-05 | Stop reason: SURG

## 2019-12-05 RX ORDER — HYDRALAZINE HYDROCHLORIDE 20 MG/ML
5 INJECTION INTRAMUSCULAR; INTRAVENOUS
Status: DISCONTINUED | OUTPATIENT
Start: 2019-12-05 | End: 2019-12-05 | Stop reason: HOSPADM

## 2019-12-05 RX ORDER — HALOPERIDOL 5 MG/ML
1 INJECTION INTRAMUSCULAR EVERY 6 HOURS PRN
Status: DISCONTINUED | OUTPATIENT
Start: 2019-12-05 | End: 2019-12-06 | Stop reason: HOSPADM

## 2019-12-05 RX ORDER — CEFAZOLIN SODIUM 1 G/3ML
INJECTION, POWDER, FOR SOLUTION INTRAMUSCULAR; INTRAVENOUS PRN
Status: DISCONTINUED | OUTPATIENT
Start: 2019-12-05 | End: 2019-12-05 | Stop reason: SURG

## 2019-12-05 RX ORDER — LIDOCAINE HYDROCHLORIDE 40 MG/ML
SOLUTION TOPICAL PRN
Status: DISCONTINUED | OUTPATIENT
Start: 2019-12-05 | End: 2019-12-05 | Stop reason: HOSPADM

## 2019-12-05 RX ORDER — SODIUM CHLORIDE, SODIUM LACTATE, POTASSIUM CHLORIDE, CALCIUM CHLORIDE 600; 310; 30; 20 MG/100ML; MG/100ML; MG/100ML; MG/100ML
INJECTION, SOLUTION INTRAVENOUS CONTINUOUS
Status: ACTIVE | OUTPATIENT
Start: 2019-12-05 | End: 2019-12-05

## 2019-12-05 RX ORDER — HYDROMORPHONE HYDROCHLORIDE 1 MG/ML
0.1 INJECTION, SOLUTION INTRAMUSCULAR; INTRAVENOUS; SUBCUTANEOUS
Status: DISCONTINUED | OUTPATIENT
Start: 2019-12-05 | End: 2019-12-05 | Stop reason: HOSPADM

## 2019-12-05 RX ORDER — ROSUVASTATIN CALCIUM 40 MG/1
40 TABLET, COATED ORAL
COMMUNITY
End: 2020-10-13 | Stop reason: SDUPTHER

## 2019-12-05 RX ORDER — DEXAMETHASONE SODIUM PHOSPHATE 4 MG/ML
4 INJECTION, SOLUTION INTRA-ARTICULAR; INTRALESIONAL; INTRAMUSCULAR; INTRAVENOUS; SOFT TISSUE
Status: DISCONTINUED | OUTPATIENT
Start: 2019-12-05 | End: 2019-12-06 | Stop reason: HOSPADM

## 2019-12-05 RX ORDER — OXYCODONE HCL 5 MG/5 ML
10 SOLUTION, ORAL ORAL
Status: COMPLETED | OUTPATIENT
Start: 2019-12-05 | End: 2019-12-05

## 2019-12-05 RX ORDER — MEPERIDINE HYDROCHLORIDE 25 MG/ML
12.5 INJECTION INTRAMUSCULAR; INTRAVENOUS; SUBCUTANEOUS
Status: DISCONTINUED | OUTPATIENT
Start: 2019-12-05 | End: 2019-12-05 | Stop reason: HOSPADM

## 2019-12-05 RX ORDER — ACETAMINOPHEN 500 MG
1000 TABLET ORAL EVERY 6 HOURS
Status: DISCONTINUED | OUTPATIENT
Start: 2019-12-05 | End: 2019-12-06 | Stop reason: HOSPADM

## 2019-12-05 RX ORDER — LEVOTHYROXINE SODIUM 0.12 MG/1
125 TABLET ORAL
Status: DISCONTINUED | OUTPATIENT
Start: 2019-12-07 | End: 2019-12-06 | Stop reason: HOSPADM

## 2019-12-05 RX ORDER — SODIUM CHLORIDE, SODIUM LACTATE, POTASSIUM CHLORIDE, CALCIUM CHLORIDE 600; 310; 30; 20 MG/100ML; MG/100ML; MG/100ML; MG/100ML
INJECTION, SOLUTION INTRAVENOUS CONTINUOUS
Status: DISCONTINUED | OUTPATIENT
Start: 2019-12-05 | End: 2019-12-05 | Stop reason: HOSPADM

## 2019-12-05 RX ORDER — OXYCODONE HYDROCHLORIDE 5 MG/1
5 TABLET ORAL
Status: DISCONTINUED | OUTPATIENT
Start: 2019-12-05 | End: 2019-12-06 | Stop reason: HOSPADM

## 2019-12-05 RX ORDER — HYDROMORPHONE HYDROCHLORIDE 1 MG/ML
0.2 INJECTION, SOLUTION INTRAMUSCULAR; INTRAVENOUS; SUBCUTANEOUS
Status: DISCONTINUED | OUTPATIENT
Start: 2019-12-05 | End: 2019-12-05 | Stop reason: HOSPADM

## 2019-12-05 RX ORDER — HYDRALAZINE HYDROCHLORIDE 20 MG/ML
10 INJECTION INTRAMUSCULAR; INTRAVENOUS
Status: DISCONTINUED | OUTPATIENT
Start: 2019-12-05 | End: 2019-12-06 | Stop reason: HOSPADM

## 2019-12-05 RX ORDER — BUPIVACAINE HYDROCHLORIDE AND EPINEPHRINE 5; 5 MG/ML; UG/ML
INJECTION, SOLUTION EPIDURAL; INTRACAUDAL; PERINEURAL
Status: DISCONTINUED | OUTPATIENT
Start: 2019-12-05 | End: 2019-12-05 | Stop reason: HOSPADM

## 2019-12-05 RX ORDER — DIPHENHYDRAMINE HCL 25 MG
25 TABLET ORAL EVERY 6 HOURS PRN
Status: DISCONTINUED | OUTPATIENT
Start: 2019-12-05 | End: 2019-12-06 | Stop reason: HOSPADM

## 2019-12-05 RX ORDER — METOCLOPRAMIDE 10 MG/1
10 TABLET ORAL 2 TIMES DAILY
Status: DISCONTINUED | OUTPATIENT
Start: 2019-12-05 | End: 2019-12-06 | Stop reason: HOSPADM

## 2019-12-05 RX ORDER — HYDROMORPHONE HYDROCHLORIDE 1 MG/ML
0.4 INJECTION, SOLUTION INTRAMUSCULAR; INTRAVENOUS; SUBCUTANEOUS
Status: DISCONTINUED | OUTPATIENT
Start: 2019-12-05 | End: 2019-12-05 | Stop reason: HOSPADM

## 2019-12-05 RX ORDER — HALOPERIDOL 5 MG/ML
1 INJECTION INTRAMUSCULAR
Status: COMPLETED | OUTPATIENT
Start: 2019-12-05 | End: 2019-12-05

## 2019-12-05 RX ADMIN — SODIUM CHLORIDE, POTASSIUM CHLORIDE, SODIUM LACTATE AND CALCIUM CHLORIDE: 600; 310; 30; 20 INJECTION, SOLUTION INTRAVENOUS at 11:27

## 2019-12-05 RX ADMIN — HALOPERIDOL LACTATE 1 MG: 5 INJECTION, SOLUTION INTRAMUSCULAR at 16:01

## 2019-12-05 RX ADMIN — LABETALOL HYDROCHLORIDE 5 MG: 5 INJECTION, SOLUTION INTRAVENOUS at 15:52

## 2019-12-05 RX ADMIN — HEPARIN SODIUM 7000 UNITS: 1000 INJECTION, SOLUTION INTRAVENOUS; SUBCUTANEOUS at 14:35

## 2019-12-05 RX ADMIN — FENTANYL CITRATE 50 MCG: 50 INJECTION, SOLUTION INTRAMUSCULAR; INTRAVENOUS at 14:06

## 2019-12-05 RX ADMIN — ROCURONIUM BROMIDE 35 MG: 10 INJECTION, SOLUTION INTRAVENOUS at 13:45

## 2019-12-05 RX ADMIN — ASPIRIN 81 MG: 81 TABLET, COATED ORAL at 20:11

## 2019-12-05 RX ADMIN — LIDOCAINE HYDROCHLORIDE 30 MG: 20 INJECTION, SOLUTION EPIDURAL; INFILTRATION; INTRACAUDAL at 13:45

## 2019-12-05 RX ADMIN — CEFAZOLIN 2 G: 330 INJECTION, POWDER, FOR SOLUTION INTRAMUSCULAR; INTRAVENOUS at 13:52

## 2019-12-05 RX ADMIN — ESTRADIOL 1 MG: 1 TABLET ORAL at 20:52

## 2019-12-05 RX ADMIN — PROTAMINE SULFATE 40 MG: 10 INJECTION, SOLUTION INTRAVENOUS at 15:00

## 2019-12-05 RX ADMIN — FENTANYL CITRATE 100 MCG: 50 INJECTION, SOLUTION INTRAMUSCULAR; INTRAVENOUS at 13:45

## 2019-12-05 RX ADMIN — LABETALOL HYDROCHLORIDE 10 MG: 5 INJECTION INTRAVENOUS at 15:37

## 2019-12-05 RX ADMIN — DIPHENHYDRAMINE HYDROCHLORIDE 12.5 MG: 50 INJECTION INTRAMUSCULAR; INTRAVENOUS at 17:26

## 2019-12-05 RX ADMIN — ONDANSETRON 4 MG: 2 INJECTION INTRAMUSCULAR; INTRAVENOUS at 15:49

## 2019-12-05 RX ADMIN — LABETALOL HYDROCHLORIDE 5 MG: 5 INJECTION, SOLUTION INTRAVENOUS at 15:44

## 2019-12-05 RX ADMIN — HALOPERIDOL LACTATE 1 MG: 5 INJECTION, SOLUTION INTRAMUSCULAR at 16:53

## 2019-12-05 RX ADMIN — LIDOCAINE HYDROCHLORIDE 4 ML: 40 SOLUTION TOPICAL at 13:45

## 2019-12-05 RX ADMIN — MIDAZOLAM 2 MG: 1 INJECTION INTRAMUSCULAR; INTRAVENOUS at 13:39

## 2019-12-05 RX ADMIN — HYDRALAZINE HYDROCHLORIDE 5 MG: 20 INJECTION INTRAMUSCULAR; INTRAVENOUS at 15:41

## 2019-12-05 RX ADMIN — PROPOFOL 130 MG: 10 INJECTION, EMULSION INTRAVENOUS at 13:45

## 2019-12-05 RX ADMIN — LABETALOL HYDROCHLORIDE 10 MG: 5 INJECTION, SOLUTION INTRAVENOUS at 15:37

## 2019-12-05 RX ADMIN — PROMETHAZINE HYDROCHLORIDE 25 MG: 25 SUPPOSITORY RECTAL at 18:29

## 2019-12-05 RX ADMIN — FENTANYL CITRATE 25 MCG: 0.05 INJECTION, SOLUTION INTRAMUSCULAR; INTRAVENOUS at 16:26

## 2019-12-05 RX ADMIN — KETOROLAC TROMETHAMINE 30 MG: 30 INJECTION, SOLUTION INTRAMUSCULAR at 20:12

## 2019-12-05 RX ADMIN — ACETAMINOPHEN 1000 MG: 500 TABLET ORAL at 20:11

## 2019-12-05 RX ADMIN — SODIUM CHLORIDE, POTASSIUM CHLORIDE, SODIUM LACTATE AND CALCIUM CHLORIDE: 600; 310; 30; 20 INJECTION, SOLUTION INTRAVENOUS at 14:53

## 2019-12-05 RX ADMIN — FENTANYL CITRATE 50 MCG: 50 INJECTION, SOLUTION INTRAMUSCULAR; INTRAVENOUS at 14:28

## 2019-12-05 RX ADMIN — FENTANYL CITRATE 50 MCG: 50 INJECTION, SOLUTION INTRAMUSCULAR; INTRAVENOUS at 15:14

## 2019-12-05 RX ADMIN — METOCLOPRAMIDE HYDROCHLORIDE 10 MG: 10 TABLET ORAL at 20:12

## 2019-12-05 RX ADMIN — POTASSIUM CHLORIDE AND SODIUM CHLORIDE: 450; 150 INJECTION, SOLUTION INTRAVENOUS at 20:52

## 2019-12-05 RX ADMIN — HYDRALAZINE HYDROCHLORIDE 5 MG: 20 INJECTION INTRAMUSCULAR; INTRAVENOUS at 15:46

## 2019-12-05 RX ADMIN — ROSUVASTATIN CALCIUM 40 MG: 20 TABLET, FILM COATED ORAL at 20:52

## 2019-12-05 ASSESSMENT — COPD QUESTIONNAIRES
DURING THE PAST 4 WEEKS HOW MUCH DID YOU FEEL SHORT OF BREATH: NONE/LITTLE OF THE TIME
HAVE YOU SMOKED AT LEAST 100 CIGARETTES IN YOUR ENTIRE LIFE: NO/DON'T KNOW
COPD SCREENING SCORE: 2
DO YOU EVER COUGH UP ANY MUCUS OR PHLEGM?: NO/ONLY WITH OCCASIONAL COLDS OR INFECTIONS

## 2019-12-05 ASSESSMENT — LIFESTYLE VARIABLES: EVER_SMOKED: NEVER

## 2019-12-05 ASSESSMENT — PAIN SCALES - GENERAL: PAIN_LEVEL: 0

## 2019-12-05 NOTE — ANESTHESIA TIME REPORT
Anesthesia Start and Stop Event Times     Date Time Event    12/5/2019 1256 Ready for Procedure     1339 Anesthesia Start     1531 Anesthesia Stop        Responsible Staff  12/05/19    Name Role Begin End    Kashif Nobles M.D. Anesth 1339 1531        Preop Diagnosis (Free Text):  Pre-op Diagnosis     LEFT CAROTID ARTERY STENOSIS        Preop Diagnosis (Codes):    Post op Diagnosis  Left carotid artery stenosis      Premium Reason  A. 3PM - 7AM    Comments:

## 2019-12-05 NOTE — ANESTHESIA PROCEDURE NOTES
Airway  Date/Time: 12/5/2019 1:49 PM  Performed by: Kashif Nobles M.D.  Authorized by: Kashif Nobles M.D.     Location:  OR  Urgency:  Elective  Difficult Airway: No    Indications for Airway Management:  Anesthesia  Spontaneous Ventilation: absent    Sedation Level:  Deep  Preoxygenated: Yes    Patient Position:  Sniffing  Mask Difficulty Assessment:  1 - vent by mask  Final Airway Type:  Endotracheal airway  Final Endotracheal Airway:  ETT  Cuffed: Yes    Technique Used for Successful ETT Placement:  Direct laryngoscopy  Devices/Methods Used in Placement:  Cricoid pressure  Insertion Site:  Oral  Blade Type:  Lucio  Laryngoscope Blade/Videolaryngoscope Blade Size:  3  ETT Size (mm):  7.0  Measured from:  Teeth  ETT to Teeth (cm):  24  Placement Verified by: auscultation and capnometry    Cormack-Lehane Classification:  Grade IIa - partial view of glottis  Number of Attempts at Approach:  1

## 2019-12-05 NOTE — ANESTHESIA PROCEDURE NOTES
Arterial Line  Performed by: Kashif Nobles M.D.  Authorized by: Kashif Nobles M.D.     Start Time:  12/5/2019 1:55 PM  End Time:  12/5/2019 1:57 PM  Localization: surface landmarks    Patient Location:  OR  Indication: continuous blood pressure monitoring    Catheter Size:  20 G  Seldinger Technique?: Yes    Laterality:  Left  Site:  Radial artery  Line Secured:  Antimicrobial disc, tape and transparent dressing  Events: patient tolerated procedure well with no complications     Abel's Test < 7sec

## 2019-12-05 NOTE — PROGRESS NOTES
Med rec complete per pt at bedside  Interviewed pt with family at bedside with permission from pt  Allergies reviewed and updated.    Pt on Insulin Pump

## 2019-12-05 NOTE — ANESTHESIA QCDR
2019 [facility] Clinical Data Registry (for Quality Improvement)     Postoperative nausea/vomiting risk protocol (Adult = 18 yrs and Pediatric 3-17 yrs)- (430 and 463)  General inhalation anesthetic (NOT TIVA) with PONV risk factors: Yes  Provision of anti-emetic therapy with at least 2 different classes of agents: Yes   Patient DID NOT receive anti-emetic therapy and reason is documented in Medical Record:  N/A    Multimodal Pain Management- (AQI59)  Patient undergoing Elective Surgery (i.e. Outpatient, or ASC, or Prescheduled Surgery prior to Hospital Admission): Yes  Use of Multimodal Pain Management, two or more drugs and/or interventions, NOT including systemic opioids:    Exception: Documented allergy to multiple classes of analgesics:      PACU assessment of acute postoperative pain prior to Anesthesia Care End- Applies to Patients Age = 18- (ABG7)  Initial PACU pain score is which of the following: < 7/10  Patient unable to report pain score: N/A    Post-anesthetic transfer of care checklist/protocol to PACU/ICU- (426 and 427)  Upon conclusion of case, patient transferred to which of the following locations: PACU/Non-ICU  Use of transfer checklist/protocol: Yes  Exclusion: Service Performed in Patient Hospital Room (and thus did not require transfer): N/A    PACU Reintubation- (AQI31)  General anesthesia requiring endotracheal intubation (ETT) along with subsequent extubation in OR or PACU: Yes  Required reintubation in the PACU: No   Extubation was a planned trial documented in the medical record prior to removal of the original airway device:  N/A    Unplanned admission to ICU related to anesthesia service up through end of PACU care- (MD51)  Unplanned admission to ICU (not initially anticipated at anesthesia start time): No

## 2019-12-05 NOTE — ANESTHESIA PREPROCEDURE EVALUATION
Relevant Problems   CARDIAC   (+) Left carotid artery stenosis - severe 2019      ENDO   (+) Diabetes type 1, controlled (HCC)   (+) Hypothyroidism       Physical Exam    Airway   Mallampati: II  TM distance: >3 FB  Neck ROM: full       Cardiovascular - normal exam  Rhythm: regular  Rate: normal  (-) murmur     Dental - normal exam           Pulmonary - normal exam  Breath sounds clear to auscultation     Abdominal    Neurological - normal exam                 Anesthesia Plan    ASA 2       Plan - general       Airway plan will be ETT        Induction: intravenous    Postoperative Plan: Postoperative administration of opioids is intended.    Pertinent diagnostic labs and testing reviewed    Informed Consent:    Anesthetic plan and risks discussed with patient.    Use of blood products discussed with: patient whom consented to blood products.

## 2019-12-06 VITALS
WEIGHT: 148.59 LBS | HEART RATE: 83 BPM | BODY MASS INDEX: 23.32 KG/M2 | TEMPERATURE: 97.9 F | HEIGHT: 67 IN | DIASTOLIC BLOOD PRESSURE: 52 MMHG | OXYGEN SATURATION: 95 % | RESPIRATION RATE: 18 BRPM | SYSTOLIC BLOOD PRESSURE: 110 MMHG

## 2019-12-06 PROCEDURE — 700102 HCHG RX REV CODE 250 W/ 637 OVERRIDE(OP): Performed by: ANESTHESIOLOGY

## 2019-12-06 PROCEDURE — 700102 HCHG RX REV CODE 250 W/ 637 OVERRIDE(OP): Performed by: SURGERY

## 2019-12-06 PROCEDURE — A9270 NON-COVERED ITEM OR SERVICE: HCPCS | Performed by: ANESTHESIOLOGY

## 2019-12-06 PROCEDURE — A9270 NON-COVERED ITEM OR SERVICE: HCPCS | Performed by: SURGERY

## 2019-12-06 PROCEDURE — 700111 HCHG RX REV CODE 636 W/ 250 OVERRIDE (IP): Performed by: SURGERY

## 2019-12-06 RX ORDER — TRAMADOL HYDROCHLORIDE 50 MG/1
50-100 TABLET ORAL EVERY 6 HOURS PRN
Qty: 20 TAB | Refills: 0 | Status: SHIPPED | OUTPATIENT
Start: 2019-12-06 | End: 2019-12-09

## 2019-12-06 RX ADMIN — ACETAMINOPHEN 1000 MG: 500 TABLET ORAL at 05:31

## 2019-12-06 RX ADMIN — LEVOTHYROXINE SODIUM 62.5 MCG: 125 TABLET ORAL at 05:31

## 2019-12-06 RX ADMIN — ACETAMINOPHEN 1000 MG: 500 TABLET ORAL at 01:05

## 2019-12-06 RX ADMIN — KETOROLAC TROMETHAMINE 30 MG: 30 INJECTION, SOLUTION INTRAMUSCULAR at 05:31

## 2019-12-06 RX ADMIN — KETOROLAC TROMETHAMINE 30 MG: 30 INJECTION, SOLUTION INTRAMUSCULAR at 01:05

## 2019-12-06 RX ADMIN — METOCLOPRAMIDE HYDROCHLORIDE 10 MG: 10 TABLET ORAL at 05:31

## 2019-12-06 ASSESSMENT — COGNITIVE AND FUNCTIONAL STATUS - GENERAL
SUGGESTED CMS G CODE MODIFIER MOBILITY: CH
DAILY ACTIVITIY SCORE: 24
MOBILITY SCORE: 24
SUGGESTED CMS G CODE MODIFIER DAILY ACTIVITY: CH

## 2019-12-06 ASSESSMENT — LIFESTYLE VARIABLES
TOTAL SCORE: 0
HAVE YOU EVER FELT YOU SHOULD CUT DOWN ON YOUR DRINKING: NO
CONSUMPTION TOTAL: NEGATIVE
TOTAL SCORE: 0
HAVE PEOPLE ANNOYED YOU BY CRITICIZING YOUR DRINKING: NO
ALCOHOL_USE: YES
EVER HAD A DRINK FIRST THING IN THE MORNING TO STEADY YOUR NERVES TO GET RID OF A HANGOVER: NO
TOTAL SCORE: 0
HOW MANY TIMES IN THE PAST YEAR HAVE YOU HAD 5 OR MORE DRINKS IN A DAY: 0
EVER_SMOKED: NEVER
AVERAGE NUMBER OF DAYS PER WEEK YOU HAVE A DRINK CONTAINING ALCOHOL: 2
DOES PATIENT WANT TO STOP DRINKING: NO
ON A TYPICAL DAY WHEN YOU DRINK ALCOHOL HOW MANY DRINKS DO YOU HAVE: 2
EVER FELT BAD OR GUILTY ABOUT YOUR DRINKING: NO

## 2019-12-06 ASSESSMENT — PATIENT HEALTH QUESTIONNAIRE - PHQ9
1. LITTLE INTEREST OR PLEASURE IN DOING THINGS: NOT AT ALL
2. FEELING DOWN, DEPRESSED, IRRITABLE, OR HOPELESS: NOT AT ALL
SUM OF ALL RESPONSES TO PHQ9 QUESTIONS 1 AND 2: 0

## 2019-12-06 NOTE — PROGRESS NOTES
Surgery    Minimal pain  Tolerating PO  Ambulating  AFVSS  Incision CDI  Neuro intact  Drain out  Home today    Surya Garcia MD  General and Vascular Surgery  [facility]  Cell: [phone]

## 2019-12-06 NOTE — CARE PLAN
Problem: Communication  Goal: The ability to communicate needs accurately and effectively will improve  Outcome: PROGRESSING AS EXPECTED  Pt communicates needs effectively      Problem: Venous Thromboembolism (VTW)/Deep Vein Thrombosis (DVT) Prevention:  Goal: Patient will participate in Venous Thrombosis (VTE)/Deep Vein Thrombosis (DVT)Prevention Measures  Outcome: PROGRESSING AS EXPECTED   SCD's on

## 2019-12-06 NOTE — PROGRESS NOTES
Pt A&O x's 4, VSS, denies any pain at this time. DIP to L anterior neck with gauze, Tegaderm and CLOVER drain with scant serosanguinous OP. Pt is on RA, denies any SOB or CP. Pt is tolerating diet, denies any N/V/D, +UO. Pt monitoring BG with insulin pump. POC discussed with pt, all questions and concerns have been addressed.

## 2019-12-06 NOTE — DISCHARGE INSTRUCTIONS
Carotid Endarterectomy, Care After  Refer to this sheet in the next few weeks. These instructions provide you with information on caring for yourself after your procedure. Your health care provider may also give you more specific instructions. Your treatment has been planned according to current medical practices, but problems sometimes occur. Call your health care provider if you have any problems or questions after your procedure.   WHAT TO EXPECT AFTER THE PROCEDURE  You may have some pain or an ache in your neck for up to 2 weeks. This is normal. Recovery time varies depending on your age, condition, general health, and other factors. You will likely be able to return to a normal lifestyle within a few weeks.   HOME CARE INSTRUCTIONS   · Take showers if your health care provider approves. Do not take baths, swim, or use a hot tub until your health care provider approves.    · Take medicines only as directed by your health care provider. If a blood thinner (anticoagulant) is prescribed after surgery, take this medicine exactly as directed.  · Change bandages (dressings) as directed by your health care provider.    · Avoid heavy lifting or strenuous activity until your health care provider says it is okay. Resume your normal activities as directed.    · Stop smoking if you smoke. This is a risk factor for poor wound healing.    · Stop taking the pill (oral contraceptives) unless your health care provider recommends otherwise.    · Maintain good control of your blood pressure.    · Exercise regularly or as instructed by your health care provider.    · Eat a heart-healthy diet. Talk to your health care provider about how to lower blood lipids (cholesterol and triglycerides).    · Keep all follow-up visits as directed by your health care provider. Make an appointment for the removal of stitches (sutures) or staples.  SEEK MEDICAL CARE IF:   · You have increased bleeding from the incision site.    · You notice  redness, swelling, or increasing pain at the incision site.    · You notice swelling in your neck or have difficulty breathing or talking.    · You notice a bad smell or pus coming from the incision site or dressing.    · You have a fever.   · You develop a rash.    · You develop any reaction or side effects to medicine given.    SEEK IMMEDIATE MEDICAL CARE IF:   · Your initial symptoms are getting worse rather than better.    · You develop any abnormal bruising or bleeding.    · You have difficulty breathing.    · You develop chest pain, shortness of breath, or pain or swelling in your legs.    · You have a return of symptoms or problems that caused you to have this surgery.    · You develop a temporary loss of vision.    · You develop temporary numbness on one side.    · You develop a temporary inability to speak (aphasia).    · You develop temporary weakness.    MAKE SURE YOU:   · Understand these instructions.  · Will watch your condition.  · Will get help right away if you are not doing well or get worse.     This information is not intended to replace advice given to you by your health care provider. Make sure you discuss any questions you have with your health care provider.     Document Released: 07/07/2006 Document Revised: 01/08/2016 Document Reviewed: 05/21/2014  [employer] Interactive Patient Education ©2016 [employer]    Discharge Instructions    Discharged to home by car with relative. Discharged via wheelchair, hospital escort: Yes.  Special equipment needed: Not Applicable    Be sure to schedule a follow-up appointment with your primary care doctor or any specialists as instructed.     Discharge Plan:   Diet Plan: Discussed  Activity Level: Discussed  Confirmed Follow up Appointment: Patient to Call and Schedule Appointment  Confirmed Symptoms Management: Discussed  Medication Reconciliation Updated: Yes  Influenza Vaccine Indication: Not indicated: Previously immunized this influenza season and > 8  years of age    I understand that a diet low in cholesterol, fat, and sodium is recommended for good health. Unless I have been given specific instructions below for another diet, I accept this instruction as my diet prescription.   Other diet: As tolerated     Special Instructions: None    · Is patient discharged on Warfarin / Coumadin?   No     Depression / Suicide Risk    As you are discharged from this [facility] facility, it is important to learn how to keep safe from harming yourself.    Recognize the warning signs:  · Abrupt changes in personality, positive or negative- including increase in energy   · Giving away possessions  · Change in eating patterns- significant weight changes-  positive or negative  · Change in sleeping patterns- unable to sleep or sleeping all the time   · Unwillingness or inability to communicate  · Depression  · Unusual sadness, discouragement and loneliness  · Talk of wanting to die  · Neglect of personal appearance   · Rebelliousness- reckless behavior  · Withdrawal from people/activities they love  · Confusion- inability to concentrate     If you or a loved one observes any of these behaviors or has concerns about self-harm, here's what you can do:  · Talk about it- your feelings and reasons for harming yourself  · Remove any means that you might use to hurt yourself (examples: pills, rope, extension cords, firearm)  · Get professional help from the community (Mental Health, Substance Abuse, psychological counseling)  · Do not be alone:Call your Safe Contact- someone whom you trust who will be there for you.  · Call your local CRISIS HOTLINE 708-9410 or 792-026-8894  · Call your local Children's Mobile Crisis Response Team [place] (031) 376-4030 or www.[employer]  · Call the toll free National Suicide Prevention Hotlines   · National Suicide Prevention Lifeline 313-024-EBBB (3464)  · National Hope Line Network 800-SUICIDE (644-1201)

## 2019-12-06 NOTE — OP REPORT
Vascular Surgery Operative Note  --------------------------------------------    Date of Service: 12/5/2019    Patient Name:  Ines Long    Patient MRN:  [id number]    --------------------------------------------------------------------------------------------------    Preoperative Diagnosis:  1) Asymptomatic left carotid stenosis    Postoperative Diagnosis:  1) Asymptomatic left carotid stenosis    Procedure:  1) Left carotid endarterectomy with neuromonitoring    -------------------------------------------------------------------------------------------------    Surgeon:   Surya Garcia MD    Assistant:   Lorenzo GARCIA    Anesthesia:   GETA      EBL:    20 cc    Blood Products:  none    Heparin:   Systemically heparinized, 7000 units    Specimen:   None sent    Complications:  None     Disposition:   PACU in stable condition     -----------------------------------------------------------------------------------------------------    History:  Ines Long is a 64 y.o. female with high-grade left ICA stenosis.    I had a very detailed, thorough discussion with the patient regarding the severity of her carotid disease and that she is at high risk of stroke.  I explained the primary purpose of this operation is to prevent stroke from the plaque in the carotid artery.  I explained the details of the operation including neuro monitoring and possible use of a shunt.  I discussed the alternatives of optimal medical management or stenting, although carotid endarterectomy is preferable in the setting of such severe plaque and in someone who is acceptable risk for surgery.  I discussed the potential risks, including but not limited to bleeding, infection, injury to vessels or nerves, and risks of anesthesia. I explained the risk that the operation itself can cause a stroke, although the risk of stroke from the operation is less than the risk of stroke if the plaque is not treated, and thus carotid  endarterectomy is recommended.  All of their questions were answered. Patient understands and agrees to proceed.    Procedure Summary:  Following informed consent, patient was brought to the OR where general anesthesia was administered. Patient was positioned, neuromointoring was put in place and the left neck was prepped and draped in the usual sterile fashion.  Timeout was called to identify the correct patient, team and equipment.  Everyone was in agreement.  Procedure began with injection of local anesthesia along the left SCM and then a longitudinal incision was made and carried down through each layer using cautery to assure hemostasis.  The common facial vein was identified and ligated.  The common, external and internal carotid were identified and dissected circumferentially proximally and distally and encircled with loops.    Patient was systemically heparinized and then the artery was clamped, with the ICA being applied first.  The carotid was opened with a longitudinal arteriotomy and a soft irregular plaque was seen.  There were no changes on EEG monitoring. Endarterectomy of the plaque was performed and the distal ICA endpoint was tacked with prolene sutures.  Once complete, a bovine pericardial patch was sutured with a running 5-0 prolene suture.  The artery was flushed and copiously irrigated, the suture line was completed.  The clamps were removed, with the ICA being removed last, and flow was restored.  There was a continuous flow signal in the ICA at this point.  The heparin was reversed with protamine. Topical hemostatic was applied.  A 10 flat CLOVER drain was placed and secured with a nylon suture.     At this point we had good hemostasis.  The platysma was reapproximated with interrupted vicryl sutures.  The skin was reapproximated with a running subcuticular suture.  A sterile dressing was placed. Patient was extubated and returned to PACU in stable condition.  All counts were correct at the  conclusion of the case.         Surya Garcia MD  General and Vascular Surgery  [facility]  Cell: [phone]

## 2019-12-06 NOTE — PROGRESS NOTES
Patient is AO x 4, RASS 0, and had mild pain treated with conservative IV fentanyl.  Surgical site has scant drainage on dressing - otherwise CDI.  CLOVER patent.  Patient able to swallow without difficulty.  VSS on RA / 10L ERAS.  Patient has experienced persistent n/v despite routine PACU anti-emetics.  Phenergan suppository ordered by Dr. Nobles.  Arterial line dc'd after BPs stabilized.  Belongings with  [facility] - updated hourly / aware of room assignment.  POC reviewed, PIV verified, and safety protocols in place.  Patient is a high fall risk at this time.    Glucose per insulin pump steadily increasing in PACU.  Last check 200 mg/dl.  Dr. Nobles aware of BG and OK with monitoring via pump in PACU.

## 2019-12-06 NOTE — ANESTHESIA POSTPROCEDURE EVALUATION
Patient: Ines Long    Procedure Summary     Date:  12/05/19 Room / Location:  [facility] 05 / SURGERY [facility]    Anesthesia Start:  1339 Anesthesia Stop:  1531    Procedure:  ENDARTERECTOMY, CAROTID- WITH NEUROMONITORIING (Left Neck) Diagnosis:  (LEFT CAROTID ARTERY STENOSIS)    Surgeon:  Surya Garcia M.D. Responsible Provider:  Kashif Nobles M.D.    Anesthesia Type:  general ASA Status:  2          Final Anesthesia Type: general  Last vitals  BP   Blood Pressure: 128/55, Arterial BP: (!) 163/68    Temp   35.9 °C (96.7 °F)    Pulse   Pulse: 75   Resp   14    SpO2   100 %      Anesthesia Post Evaluation    Patient location during evaluation: PACU  Patient participation: complete - patient participated  Level of consciousness: awake and alert  Pain score: 0    Airway patency: patent  Anesthetic complications: no  Cardiovascular status: hemodynamically stable  Respiratory status: acceptable  Hydration status: euvolemic    PONV: controlled           Nurse Pain Score: 0 (NPRS)

## 2019-12-06 NOTE — DISCHARGE PLANNING
Care Transition Team Assessment      Anticipated Discharge Disposition: Home    Action: RN CM assessed pt at bedside and verified facesheet demographics.  Pt reports she lives in a single story home with her spouse in [place].  She is independent with ADLs and IADLs and reports using no DME. Pt is self employed, has prescription drug coverage, uses [facility], and reports no financial barriers at this time. Pt's PCP is JOSE Hoang. Pt anticipates discharging home with no needs when medically cleared.     Barriers to Discharge: Medical clearance for discharge home.    Plan: Await medical clearance for discharge home with no anticipated needs.     Information Source  Orientation : Oriented x 4  Information Given By: Patient  Informant's Name: n/a  Who is responsible for making decisions for patient? : Patient    Readmission Evaluation  Is this a readmission?: No    Elopement Risk  Legal Hold: No  Ambulatory or Self Mobile in Wheelchair: Yes  Disoriented: No  Psychiatric Symptoms: None  History of Wandering: No  Elopement this Admit: No  Vocalizing Wanting to Leave: No  Displays Behaviors, Body Language Wanting to Leave: No-Not at Risk for Elopement  Elopement Risk: Not at Risk for Elopement    Interdisciplinary Discharge Planning  Primary Care Physician: JOSE Hoang  Lives with - Patient's Self Care Capacity: Spouse  Patient or legal guardian wants to designate a caregiver (see row info): No  Support Systems: Spouse / Significant Other, Family Member(s)  Housing / Facility: 1 Story House  Do You Take your Prescribed Medications Regularly: Yes  Able to Return to Previous ADL's: Yes  Mobility Issues: No  Prior Services: None, Home-Independent  Patient Expects to be Discharged to:: Home  Assistance Needed: No  Durable Medical Equipment: Not Applicable    Discharge Preparedness  What is your plan after discharge?: Home with help  What are your discharge supports?:  Spouse  Prior Functional Level: Ambulatory, Drives Self, Independent with Activities of Daily Living, Independent with Medication Management  Difficulity with ADLs: None  Difficulity with IADLs: None    Functional Assesment  Prior Functional Level: Ambulatory, Drives Self, Independent with Activities of Daily Living, Independent with Medication Management    Finances  Financial Barriers to Discharge: No  Prescription Coverage: Yes    Vision / Hearing Impairment  Vision Impairment : No  Hearing Impairment : No         Advance Directive  Advance Directive?: None    Domestic Abuse  Have you ever been the victim of abuse or violence?: No  Physical Abuse or Sexual Abuse: No  Verbal Abuse or Emotional Abuse: No  Possible Abuse Reported to:: Not Applicable         Discharge Risks or Barriers  Discharge risks or barriers?: No    Anticipated Discharge Information  Anticipated discharge disposition: Home  Discharge Address: [address] [place] [address] [place]  Discharge Contact Phone Number: [phone]

## 2019-12-06 NOTE — PROGRESS NOTES
Discharge Instructions  Procedure: carotid endarterectomy    1. ACTIVITIES: No strenuous activities or lifting greater than 20 pounds for 2 weeks after surgery.    2. DRIVING: You may drive whenever you are off pain medications and are able to perform the activities needed to drive, i.e. turning, bending, twisting, etc.     3. WOUND: It is not unusual for patients to experience swelling and even bruising, as well as a small amount of drainage from the incision. This is normal and will resolve over the next few days.     4. ICE: you may place ice on the wound to decrease the swelling for the first 24 hours and then discontinue. Apply ice for 20 minutes and then remove for 20 minutes, alternating while awake.    5. BATHING: Remove your white bandage 1-2 days after discharge, no new bandage is required. OK to shower with the bandage on and after the bandage is removed the incision can get wet directly.    6. PAIN MEDICATION: You will be given a prescription for pain medication at discharge. Please take these as directed. It is important to remember not to take medications on an empty stomach as this may cause nausea.     7. BOWEL FUNCTION: After surgery, it is not uncommon for patients to experience constipation. This is due to decreasing activity levels as well as pain medications. You may wish to use a stool softener beginning immediately after surgery, and you may or may not need to use a laxative (Milk of Magnesia, Ex-lax; Senokot, etc.) as well.     8 .CALL IF YOU HAVE: (1) Fevers to more than 101.0 F, (2) Unusual or excessive pain, (3) Drainage or fluid from incision that may be foul smelling, increased tenderness or soreness at the wound or the wound edges are no longer together, redness or swelling at the incision site. Please do not hesitate to call with any other questions.     9. APPOINTMENT: Contact our office at [phone] for a follow-up appointment 2 weeks following your procedure.     If you have any  additional questions, please do not hesitate to call the office and speak to either myself or the physician on call.     Office address:   Surya Garcia MD, [facility]  [address] [place] [address] [address], [facility] [address]  [phone]

## 2020-02-28 ENCOUNTER — HOSPITAL ENCOUNTER (OUTPATIENT)
Dept: LAB | Facility: MEDICAL CENTER | Age: 65
End: 2020-02-28
Attending: OBSTETRICS & GYNECOLOGY
Payer: COMMERCIAL

## 2020-02-28 PROCEDURE — 87186 SC STD MICRODIL/AGAR DIL: CPT

## 2020-02-28 PROCEDURE — 87077 CULTURE AEROBIC IDENTIFY: CPT

## 2020-02-28 PROCEDURE — 87086 URINE CULTURE/COLONY COUNT: CPT

## 2020-07-25 ENCOUNTER — HOSPITAL ENCOUNTER (OUTPATIENT)
Dept: LAB | Facility: MEDICAL CENTER | Age: 65
End: 2020-07-25
Attending: INTERNAL MEDICINE
Payer: COMMERCIAL

## 2020-07-25 LAB
ALBUMIN SERPL BCP-MCNC: 4.4 G/DL (ref 3.2–4.9)
ALBUMIN/GLOB SERPL: 1.8 G/DL
ALP SERPL-CCNC: 62 U/L (ref 30–99)
ALT SERPL-CCNC: 21 U/L (ref 2–50)
ANION GAP SERPL CALC-SCNC: 14 MMOL/L (ref 7–16)
AST SERPL-CCNC: 23 U/L (ref 12–45)
BILIRUB SERPL-MCNC: 0.4 MG/DL (ref 0.1–1.5)
BUN SERPL-MCNC: 11 MG/DL (ref 8–22)
CALCIUM SERPL-MCNC: 9.5 MG/DL (ref 8.5–10.5)
CHLORIDE SERPL-SCNC: 99 MMOL/L (ref 96–112)
CHOLEST SERPL-MCNC: 178 MG/DL (ref 100–199)
CO2 SERPL-SCNC: 21 MMOL/L (ref 20–33)
CREAT SERPL-MCNC: 0.74 MG/DL (ref 0.5–1.4)
CREAT UR-MCNC: 91.58 MG/DL
EST. AVERAGE GLUCOSE BLD GHB EST-MCNC: 169 MG/DL
FASTING STATUS PATIENT QL REPORTED: NORMAL
GLOBULIN SER CALC-MCNC: 2.5 G/DL (ref 1.9–3.5)
GLUCOSE SERPL-MCNC: 125 MG/DL (ref 65–99)
HBA1C MFR BLD: 7.5 % (ref 0–5.6)
HDLC SERPL-MCNC: 85 MG/DL
LDLC SERPL CALC-MCNC: 77 MG/DL
MICROALBUMIN UR-MCNC: <1.2 MG/DL
MICROALBUMIN/CREAT UR: NORMAL MG/G (ref 0–30)
POTASSIUM SERPL-SCNC: 4.5 MMOL/L (ref 3.6–5.5)
PROT SERPL-MCNC: 6.9 G/DL (ref 6–8.2)
SODIUM SERPL-SCNC: 134 MMOL/L (ref 135–145)
T4 FREE SERPL-MCNC: 1.31 NG/DL (ref 0.93–1.7)
TRIGL SERPL-MCNC: 78 MG/DL (ref 0–149)
TSH SERPL DL<=0.005 MIU/L-ACNC: 1.81 UIU/ML (ref 0.38–5.33)

## 2020-07-25 PROCEDURE — 80061 LIPID PANEL: CPT

## 2020-07-25 PROCEDURE — 80053 COMPREHEN METABOLIC PANEL: CPT

## 2020-07-25 PROCEDURE — 36415 COLL VENOUS BLD VENIPUNCTURE: CPT

## 2020-07-25 PROCEDURE — 84443 ASSAY THYROID STIM HORMONE: CPT

## 2020-07-25 PROCEDURE — 83036 HEMOGLOBIN GLYCOSYLATED A1C: CPT

## 2020-07-25 PROCEDURE — 84439 ASSAY OF FREE THYROXINE: CPT

## 2020-07-25 PROCEDURE — 82570 ASSAY OF URINE CREATININE: CPT

## 2020-07-25 PROCEDURE — 82043 UR ALBUMIN QUANTITATIVE: CPT

## 2020-10-13 DIAGNOSIS — E78.5 DYSLIPIDEMIA: ICD-10-CM

## 2020-10-13 RX ORDER — ROSUVASTATIN CALCIUM 40 MG/1
40 TABLET, COATED ORAL
Qty: 30 TAB | Refills: 1 | Status: SHIPPED | OUTPATIENT
Start: 2020-10-13 | End: 2023-06-14 | Stop reason: SDUPTHER

## 2020-10-15 ENCOUNTER — HOSPITAL ENCOUNTER (OUTPATIENT)
Dept: LAB | Facility: MEDICAL CENTER | Age: 65
End: 2020-10-15
Attending: INTERNAL MEDICINE
Payer: COMMERCIAL

## 2020-10-15 LAB
ALBUMIN SERPL BCP-MCNC: 4.2 G/DL (ref 3.2–4.9)
ALBUMIN/GLOB SERPL: 1.7 G/DL
ALP SERPL-CCNC: 62 U/L (ref 30–99)
ALT SERPL-CCNC: 18 U/L (ref 2–50)
ANION GAP SERPL CALC-SCNC: 8 MMOL/L (ref 7–16)
AST SERPL-CCNC: 17 U/L (ref 12–45)
BILIRUB SERPL-MCNC: 0.3 MG/DL (ref 0.1–1.5)
BUN SERPL-MCNC: 12 MG/DL (ref 8–22)
CALCIUM SERPL-MCNC: 9.3 MG/DL (ref 8.5–10.5)
CHLORIDE SERPL-SCNC: 99 MMOL/L (ref 96–112)
CO2 SERPL-SCNC: 25 MMOL/L (ref 20–33)
CREAT SERPL-MCNC: 0.74 MG/DL (ref 0.5–1.4)
FASTING STATUS PATIENT QL REPORTED: NORMAL
GLOBULIN SER CALC-MCNC: 2.5 G/DL (ref 1.9–3.5)
GLUCOSE SERPL-MCNC: 153 MG/DL (ref 65–99)
POTASSIUM SERPL-SCNC: 4.6 MMOL/L (ref 3.6–5.5)
PROT SERPL-MCNC: 6.7 G/DL (ref 6–8.2)
SODIUM SERPL-SCNC: 132 MMOL/L (ref 135–145)

## 2020-10-15 PROCEDURE — 83036 HEMOGLOBIN GLYCOSYLATED A1C: CPT

## 2020-10-15 PROCEDURE — 80053 COMPREHEN METABOLIC PANEL: CPT

## 2020-10-15 PROCEDURE — 36415 COLL VENOUS BLD VENIPUNCTURE: CPT

## 2020-10-16 LAB
EST. AVERAGE GLUCOSE BLD GHB EST-MCNC: 160 MG/DL
HBA1C MFR BLD: 7.2 % (ref 0–5.6)

## 2020-12-10 ENCOUNTER — OFFICE VISIT (OUTPATIENT)
Dept: MEDICAL GROUP | Facility: PHYSICIAN GROUP | Age: 65
End: 2020-12-10
Payer: COMMERCIAL

## 2020-12-10 VITALS
RESPIRATION RATE: 16 BRPM | DIASTOLIC BLOOD PRESSURE: 62 MMHG | OXYGEN SATURATION: 97 % | SYSTOLIC BLOOD PRESSURE: 110 MMHG | WEIGHT: 152.6 LBS | HEART RATE: 85 BPM | HEIGHT: 67 IN | BODY MASS INDEX: 23.95 KG/M2 | TEMPERATURE: 97.3 F

## 2020-12-10 DIAGNOSIS — E10.69 CONTROLLED TYPE 1 DIABETES MELLITUS WITH OTHER COMPLICATION (HCC): ICD-10-CM

## 2020-12-10 DIAGNOSIS — Z79.890 HORMONE REPLACEMENT THERAPY (HRT): ICD-10-CM

## 2020-12-10 DIAGNOSIS — M54.2 NECK PAIN ON LEFT SIDE: ICD-10-CM

## 2020-12-10 DIAGNOSIS — E78.5 DYSLIPIDEMIA: ICD-10-CM

## 2020-12-10 DIAGNOSIS — Z23 NEED FOR VACCINATION: ICD-10-CM

## 2020-12-10 DIAGNOSIS — Z96.41 INSULIN PUMP IN PLACE: ICD-10-CM

## 2020-12-10 DIAGNOSIS — Z12.31 ENCOUNTER FOR SCREENING MAMMOGRAM FOR MALIGNANT NEOPLASM OF BREAST: ICD-10-CM

## 2020-12-10 DIAGNOSIS — E03.9 ACQUIRED HYPOTHYROIDISM: ICD-10-CM

## 2020-12-10 PROBLEM — J02.9 PHARYNGITIS, ACUTE: Status: RESOLVED | Noted: 2018-10-23 | Resolved: 2020-12-10

## 2020-12-10 PROBLEM — J02.9 SORE THROAT: Status: RESOLVED | Noted: 2018-10-23 | Resolved: 2020-12-10

## 2020-12-10 PROCEDURE — 90471 IMMUNIZATION ADMIN: CPT | Performed by: NURSE PRACTITIONER

## 2020-12-10 PROCEDURE — 99396 PREV VISIT EST AGE 40-64: CPT | Mod: 25 | Performed by: NURSE PRACTITIONER

## 2020-12-10 PROCEDURE — 90746 HEPB VACCINE 3 DOSE ADULT IM: CPT | Performed by: NURSE PRACTITIONER

## 2020-12-10 RX ORDER — PENICILLIN V POTASSIUM 500 MG/1
TABLET ORAL
COMMUNITY
Start: 2020-09-15 | End: 2020-12-10

## 2020-12-10 RX ORDER — OXYCODONE HYDROCHLORIDE AND ACETAMINOPHEN 5; 325 MG/1; MG/1
TABLET ORAL
COMMUNITY
Start: 2020-09-15 | End: 2020-12-10

## 2020-12-10 ASSESSMENT — PATIENT HEALTH QUESTIONNAIRE - PHQ9: CLINICAL INTERPRETATION OF PHQ2 SCORE: 0

## 2020-12-10 ASSESSMENT — FIBROSIS 4 INDEX: FIB4 SCORE: 0.85

## 2020-12-10 NOTE — PROGRESS NOTES
Subjective:     CC:   Chief Complaint   Patient presents with   • Annual Exam       HPI:   Ines Long is a 65 y.o. female who presents for annual exam    Patient has GYN provider: Yes  Last Pap Smear: last year she believes   H/O Abnormal Pap: No  Last Mammogram: last year  Last Bone Density Test: n/a  Last Colorectal Cancer Screening: up to date  Last Tdap:   Received HPV series: No    Exercise: moderate regular exercise, aerobic < 3 days a week  Diet: Healthy       Patient's last menstrual period was 1983 (lmp unknown).  She has not utilized hormone replacement therapy.  Denies any menopausal symptoms.  No significant bloating/fluid retention, pelvic pain, or dyspareunia. No abnormal vaginal discharge.   No breast tenderness, mass, nipple discharge or changes in size or contour.     Neck Pain  This is a new problem started 4-5 months ago. Ibuprofen 2x/day did not fix it. Worse through the day worst on left, takes Ibuprofen 1000 mg to sleep. Not a spasm. States it hurts. Some nerve pain down her arm.     OB History    Para Term  AB Living   2 2 0 0 0 0   SAB TAB Ectopic Molar Multiple Live Births   0 0 0 0 0 0      She  reports being sexually active and has had partner(s) who are Male. She reports using the following method of birth control/protection: Surgical.    She  has a past medical history of Anesthesia, Diabetes type 1, controlled (HCC) (2010), Dyslipidemia (2010), High cholesterol, Hypothyroidism (2010), Insulin pump in place (2011), Left carotid artery stenosis - severe 2019, PONV (postoperative nausea and vomiting), and Routine health maintenance (2011).  She  has a past surgical history that includes cholecystectomy; abdominal hysterectomy total (); bunionectomy (); knee arthrotomy (Right, ); shoulder arthroscopy (Left, ); orthopedic osteotomy (Left, 2017); ligament repair (2017); toe fusion (2017); flexor tendon  repair (2017); and carotid endarterectomy (Left, 2019).    Family History   Problem Relation Age of Onset   • Cancer Mother         rectal cancer   • Stroke Father    • Heart Disease Father    • Hypertension Father    • Lung Disease Father         tb   • Diabetes Sister    • Psychiatric Illness Sister         bipolar   • Heart Disease Maternal Grandfather    • Stroke Paternal Grandmother    • Heart Disease Paternal Grandfather      Social History     Tobacco Use   • Smoking status: Former Smoker     Packs/day: 0.00     Years: 2.00     Pack years: 0.00     Types: Cigarettes     Quit date: 1977     Years since quittin.9   • Smokeless tobacco: Never Used   Substance Use Topics   • Alcohol use: Yes     Alcohol/week: 1.0 oz     Types: 2 Glasses of wine per week     Frequency: 2-3 times a week   • Drug use: Never       Patient Active Problem List    Diagnosis Date Noted   • Hyperglycemia 2011     Priority: Medium   • Neck pain on left side 12/10/2020   • Left carotid artery stenosis - severe     • Hormone replacement therapy (HRT) 10/23/2018   • Insulin pump in place 2011   • Routine health maintenance 2011   • Diabetes type 1, controlled (HCC) 2010   • Hypothyroidism 2010   • Dyslipidemia 2010     Current Outpatient Medications   Medication Sig Dispense Refill   • FIASP 100 UNIT/ML Solution      • rosuvastatin (CRESTOR) 40 MG tablet Take 1 Tab by mouth every bedtime. 30 Tab 1   • aspirin 81 MG tablet Take 81 mg by mouth every evening.     • ibuprofen (MOTRIN) 600 MG Tab Take 600 mg by mouth every bedtime.     • insulin infusion pump Device Inject  as instructed Continuous. Basal Rate:  11:30  0.5 units  14:30 0.45 units  16:00 0.5 units   Early morning = unknown units  Bolus:1 unit for every 12 of carbs        Change tube yesterday 2019  19:30 pm  Indications: Novolog     • levothyroxine (LEVOXYL) 125 MCG Tab Take 62.5-125 mcg by mouth See Admin  "Instructions. Takes 1/2 tab Mon, Wed, Fri and a full tab the rest of the week.     • Cholecalciferol (VITAMIN D-3 PO) Take 5,000 mg by mouth every day.     • Multiple Vitamins-Minerals (MULTIVITAMIN WOMEN PO) Take 1 Tab by mouth every day.     • metoclopramide (REGLAN) 10 MG TABS Take 10 mg by mouth 2 Times a Day.     • estradiol (ESTRACE) 1 MG TABS Take 1 mg by mouth every bedtime.       No current facility-administered medications for this visit.      Allergies   Allergen Reactions   • Ceclor [Cefaclor] Hives and Swelling   • Augmentin Hives, Diarrhea and Vomiting     Fever blisters   Sickness lasts forever   • Naproxen      Face Swells    • Tape Rash     \"Certain band aids\"  PAPER TAPE OKAY       Review of Systems   Constitutional: Negative for fever, chills and malaise/fatigue.   HENT: Negative for congestion.    Eyes: Negative for pain.   Respiratory: Negative for cough and shortness of breath.    Cardiovascular: Negative for chest pain and leg swelling.   Gastrointestinal: Negative for nausea, vomiting, abdominal pain and diarrhea.   Genitourinary: Negative for dysuria and hematuria.   Skin: Negative for rash.   Neurological: Negative for dizziness, focal weakness and headaches.   Endo/Heme/Allergies: Does not bruise/bleed easily.   Psychiatric/Behavioral: Negative for depression.  The patient is not nervous/anxious.      Objective:   /62 (BP Location: Left arm, Patient Position: Sitting, BP Cuff Size: Adult)   Pulse 85   Temp 36.3 °C (97.3 °F) (Temporal)   Resp 16   Ht 1.702 m (5' 7\")   Wt 69.2 kg (152 lb 9.6 oz)   LMP 01/01/1983 (LMP Unknown)   SpO2 97%   BMI 23.90 kg/m²     Wt Readings from Last 4 Encounters:   12/10/20 69.2 kg (152 lb 9.6 oz)   12/05/19 67.4 kg (148 lb 9.4 oz)   10/24/19 68 kg (150 lb)   10/18/19 68.9 kg (152 lb)       A chaperone was offered to the patient during today's exam. Patient declined chaperone.    Physical Exam:  Constitutional: Well-developed and well-nourished. " Not diaphoretic. No distress.   Skin: Skin is warm and dry. No rash noted.  Head: Atraumatic without lesions.  Eyes: Conjunctivae and extraocular motions are normal. Pupils are equal, round, and reactive to light. No scleral icterus.   Ears:  External ears unremarkable. Tympanic membranes clear and intact.  Nose: Nares patent. Septum midline. Turbinates without erythema nor edema. No discharge.   Mouth/Throat: Tongue normal. Oropharynx is clear and moist. Posterior pharynx without erythema or exudates.  Neck: Supple, trachea midline. Normal range of motion. No thyromegaly present. No lymphadenopathy--cervical or supraclavicular.  Cardiovascular: Regular rate and rhythm, S1 and S2 without murmur, rubs, or gallops.    Abdomen: Soft, non tender, and without distention. Active bowel sounds in all four quadrants. No rebound, guarding, masses or HSM.  Extremities: No cyanosis, clubbing, erythema, nor edema. Distal pulses intact and symmetric.   Musculoskeletal: All major joints AROM full in all directions without pain.  Neurological: Alert and oriented x 3. Grossly non-focal. Strength and sensation grossly intact. DTRs 2+/3 and symmetric.   Psychiatric:  Behavior, mood, and affect are appropriate.    Monofilament testing with a 10 gram force: sensation intact: decreased bilaterally  Visual Inspection: Feet without maceration, ulcers, fissures.  Pedal pulses: intact bilaterally      Assessment and Plan:     1. Controlled type 1 diabetes mellitus with other complication (HCC)  Continue all of with endocrinology.  - Diabetic Monofilament Lower Extremity Exam    2. Acquired hypothyroidism  Continue follow-up with endocrinology.    3. Dyslipidemia  Continue current follow-up    4. Insulin pump in place  Continue follow-up with endocrinology    5. Hormone replacement therapy (HRT)  Continue follow-up with Dr. Winston who will complete her Pap smear.    6. Encounter for screening mammogram for malignant neoplasm of breast  -  "MA-SCREENING MAMMO BILAT W/TOMOSYNTHESIS W/CAD; Future    7. Need for vaccination  Complete vaccine series.  - Hep B Adult 20+    8. Neck pain on left side  This is a new issue and worsening patient states it has been present for approximately 4 months and has not improved.  Patient describes it as \"pain\".  States that she does have some zinging down the neck and into the shoulder some concern for pinched nerve.  She is agreeable to physical therapy at this time.  - REFERRAL TO PHYSICAL THERAPY      Health maintenance:     Labs per orders  Immunizations per orders  Patient counseled about skin care, diet, supplements, and exercise.  Discussed  breast self exam, mammography screening, use and side effects of HRT, menopause     Follow-up: Return in about 1 year (around 12/10/2021), or if symptoms worsen or fail to improve.  "

## 2020-12-15 ENCOUNTER — TELEPHONE (OUTPATIENT)
Dept: CARDIOLOGY | Facility: MEDICAL CENTER | Age: 65
End: 2020-12-15

## 2020-12-15 NOTE — TELEPHONE ENCOUNTER
CW    Patient has an appt 1/15 w/ . Pt needs their rosuvastatin (CRESTOR) 40 MG tablet. Pt is all out.     Thank you,  Jessica HINES

## 2020-12-15 NOTE — TELEPHONE ENCOUNTER
"Upon chart review, per MD recommendations, \"She should maximize her statin therapy would switch from the pravastatin to rosuvastatin 40 mg.  Ms. Long does not require regular cardiology follow up, I have advised her to call our office or e-mail using my [employer] if needed.\"    Returned pt call and reviewed findings.  She verbalizes understanding and states she will follow up with her Endocrinologist as he was the initial provider to place her on a statin.  Reassurance given if there is any problems with refills to return this call and we will provide a short script until her PCP refilled.  She is appreciative of information given and states no other concerns or questions at this time.  "

## 2021-01-08 ENCOUNTER — APPOINTMENT (OUTPATIENT)
Dept: PHYSICAL THERAPY | Facility: REHABILITATION | Age: 66
End: 2021-01-08
Attending: NURSE PRACTITIONER
Payer: COMMERCIAL

## 2021-01-18 ENCOUNTER — HOSPITAL ENCOUNTER (OUTPATIENT)
Dept: LAB | Facility: MEDICAL CENTER | Age: 66
End: 2021-01-18
Attending: INTERNAL MEDICINE
Payer: COMMERCIAL

## 2021-01-18 LAB
ALBUMIN SERPL BCP-MCNC: 4.2 G/DL (ref 3.2–4.9)
ALBUMIN/GLOB SERPL: 1.5 G/DL
ALP SERPL-CCNC: 71 U/L (ref 30–99)
ALT SERPL-CCNC: 16 U/L (ref 2–50)
ANION GAP SERPL CALC-SCNC: 12 MMOL/L (ref 7–16)
AST SERPL-CCNC: 16 U/L (ref 12–45)
BILIRUB SERPL-MCNC: 0.4 MG/DL (ref 0.1–1.5)
BUN SERPL-MCNC: 10 MG/DL (ref 8–22)
CALCIUM SERPL-MCNC: 9.3 MG/DL (ref 8.5–10.5)
CHLORIDE SERPL-SCNC: 97 MMOL/L (ref 96–112)
CO2 SERPL-SCNC: 22 MMOL/L (ref 20–33)
CREAT SERPL-MCNC: 0.71 MG/DL (ref 0.5–1.4)
EST. AVERAGE GLUCOSE BLD GHB EST-MCNC: 148 MG/DL
GLOBULIN SER CALC-MCNC: 2.8 G/DL (ref 1.9–3.5)
GLUCOSE SERPL-MCNC: 237 MG/DL (ref 65–99)
HBA1C MFR BLD: 6.8 % (ref 0–5.6)
POTASSIUM SERPL-SCNC: 4.2 MMOL/L (ref 3.6–5.5)
PROT SERPL-MCNC: 7 G/DL (ref 6–8.2)
SODIUM SERPL-SCNC: 131 MMOL/L (ref 135–145)

## 2021-01-18 PROCEDURE — 80053 COMPREHEN METABOLIC PANEL: CPT

## 2021-01-18 PROCEDURE — 83036 HEMOGLOBIN GLYCOSYLATED A1C: CPT

## 2021-01-18 PROCEDURE — 36415 COLL VENOUS BLD VENIPUNCTURE: CPT

## 2021-02-12 ENCOUNTER — OFFICE VISIT (OUTPATIENT)
Dept: MEDICAL GROUP | Facility: PHYSICIAN GROUP | Age: 66
End: 2021-02-12
Payer: COMMERCIAL

## 2021-02-12 ENCOUNTER — TELEPHONE (OUTPATIENT)
Dept: MEDICAL GROUP | Facility: PHYSICIAN GROUP | Age: 66
End: 2021-02-12

## 2021-02-12 VITALS
SYSTOLIC BLOOD PRESSURE: 150 MMHG | WEIGHT: 157.8 LBS | RESPIRATION RATE: 12 BRPM | OXYGEN SATURATION: 97 % | HEART RATE: 80 BPM | DIASTOLIC BLOOD PRESSURE: 74 MMHG | BODY MASS INDEX: 24.77 KG/M2 | TEMPERATURE: 98.4 F | HEIGHT: 67 IN

## 2021-02-12 DIAGNOSIS — G89.29 CHRONIC HIP PAIN, BILATERAL: ICD-10-CM

## 2021-02-12 DIAGNOSIS — M25.552 CHRONIC HIP PAIN, BILATERAL: ICD-10-CM

## 2021-02-12 DIAGNOSIS — M25.551 CHRONIC HIP PAIN, BILATERAL: ICD-10-CM

## 2021-02-12 DIAGNOSIS — T78.40XA ALLERGIC REACTION, INITIAL ENCOUNTER: ICD-10-CM

## 2021-02-12 PROCEDURE — 99214 OFFICE O/P EST MOD 30 MIN: CPT | Performed by: PHYSICIAN ASSISTANT

## 2021-02-12 RX ORDER — TRIAMCINOLONE ACETONIDE 1 MG/G
1 CREAM TOPICAL 2 TIMES DAILY
Qty: 15 G | Refills: 0 | Status: SHIPPED
Start: 2021-02-12 | End: 2021-03-23

## 2021-02-12 ASSESSMENT — FIBROSIS 4 INDEX: FIB4 SCORE: 0.85

## 2021-02-12 ASSESSMENT — PATIENT HEALTH QUESTIONNAIRE - PHQ9: CLINICAL INTERPRETATION OF PHQ2 SCORE: 0

## 2021-02-12 NOTE — TELEPHONE ENCOUNTER
VOICEMAIL  1. Caller Name: [facility]                 Call Back Number: [phone]    2. Message: [facility] lvm stating that they need to know where she will be applying triamcinolone cream so they can bill insurance.    3. Patient approves office to leave a detailed voicemail/MyChart message: yes

## 2021-02-12 NOTE — PROGRESS NOTES
Chief Complaint   Patient presents with   • Allergic Reaction     left wrist in September itching and swollen for last 2 months    • Hip Pain     bilateral - right hip worse - requesting xrays        HISTORY OF PRESENT ILLNESS: Ines Long is an established 65 y.o. female here to discuss the evaluation and management of:    Allergic reaction, initial encounter  Patient states in September 2020 she tattoo on her left anterior wrist.  She tells me 2-3 months ago where the red dye was used she developed small raised bumps underneath the tattoo skin and itchiness.  She went back to the tattoo parlor and was told that she was having a severe reaction from the red ink on that her body should absorb it within a year.  Continue over-the-counter lotions with no improvement of itchiness.  Patient is inquiring what treatment options.  She denies erythema/warmth/discharge.    Chronic hip pain, bilateral  Chronic but worsening problem.  Patient states for several years she has been experiencing bilateral hip pain but in the last couple weeks pain symptoms have become more severe.  States pain is an intermittent deep aching pain in both hips that is nonradiating.  Hips are nontender to palpation.  States due to pain since then since causing sleep deprivation.  She is using over-the-counter ibuprofen as needed and usually takes 600 mg but when she is experiencing severe symptoms she can take 600 mg up to 3 times a day.  Patient is a she has an antalgic gait.  States she does have gait abnormalities due to several foot surgeries on right lower foot.  Surgery was 2-3 years ago.  She mentions during today's appointment that her mom had to have both of her hips replaced.  Patient is inquiring about treatment options.  Denies muscle atrophy, muscle weakness, back pain.         Patient Active Problem List    Diagnosis Date Noted   • Hyperglycemia 08/16/2011   • Neck pain on left side 12/10/2020   • Left carotid artery stenosis  - severe 2019    • Hormone replacement therapy (HRT) 10/23/2018   • Insulin pump in place 2011   • Routine health maintenance 2011   • Diabetes type 1, controlled (HCC) 2010   • Hypothyroidism 2010   • Dyslipidemia 2010       Allergies:Ceclor [cefaclor], Augmentin, Naproxen, and Tape    Current Outpatient Medications   Medication Sig Dispense Refill   • triamcinolone acetonide (KENALOG) 0.1 % Cream Apply 1 Application topically 2 times a day. 15 g 0   • rosuvastatin (CRESTOR) 40 MG tablet Take 1 Tab by mouth every bedtime. 30 Tab 1   • aspirin 81 MG tablet Take 81 mg by mouth every evening.     • ibuprofen (MOTRIN) 600 MG Tab Take 600 mg by mouth every bedtime.     • insulin infusion pump Device Inject  under the skin continuous. FIASP INSULIN BRAND   Basal Rate:  11:30  0.5 units  14:30 0.45 units  16:00 0.5 units   Early morning = unknown units  Bolus:1 unit for every 12 of carbs        Change tube yesterday 2019  19:30 pm  Indications: Novolog     • levothyroxine (LEVOXYL) 125 MCG Tab Take 62.5-125 mcg by mouth See Admin Instructions. Takes 1/2 tab Mon, Wed, Fri and a full tab the rest of the week.     • Cholecalciferol (VITAMIN D-3 PO) Take 5,000 mg by mouth every day.     • Multiple Vitamins-Minerals (MULTIVITAMIN WOMEN PO) Take 1 Tab by mouth every day.     • metoclopramide (REGLAN) 10 MG TABS Take 10 mg by mouth 2 Times a Day.     • FIASP 100 UNIT/ML Solution        No current facility-administered medications for this visit.       Social History     Tobacco Use   • Smoking status: Former Smoker     Packs/day: 0.00     Years: 2.00     Pack years: 0.00     Types: Cigarettes     Quit date: 1977     Years since quittin.1   • Smokeless tobacco: Never Used   Substance Use Topics   • Alcohol use: Yes     Alcohol/week: 1.0 oz     Types: 2 Glasses of wine per week   • Drug use: Never       Family Status   Relation Name Status   • Mo  Alive   • Fa     • Sis  Alive  "  • MGMo     • MGFa     • PGMo     • PGFa       Family History   Problem Relation Age of Onset   • Cancer Mother         rectal cancer   • Stroke Father    • Heart Disease Father    • Hypertension Father    • Lung Disease Father         tb   • Diabetes Sister    • Psychiatric Illness Sister         bipolar   • Heart Disease Maternal Grandfather    • Stroke Paternal Grandmother    • Heart Disease Paternal Grandfather        ROS:  Review of Systems   Constitutional: Negative for fever, chills, weight loss and malaise/fatigue.   HENT: Negative for ear pain, nosebleeds, congestion, sore throat and neck pain.    Eyes: Negative for blurred vision.   Respiratory: Negative for cough, sputum production, shortness of breath and wheezing.    Cardiovascular: Negative for chest pain, palpitations, orthopnea and leg swelling.   Gastrointestinal: Negative for heartburn, nausea, vomiting and abdominal pain.   Genitourinary: Negative for dysuria, urgency and frequency.   Musculoskeletal: Negative for myalgias, back pain. + for joint pain.   Skin: + for rash and itching.   Neurological: Negative for dizziness, tingling, tremors, sensory change, focal weakness and headaches.   Endo/Heme/Allergies: Does not bruise/bleed easily.   Psychiatric/Behavioral: Negative for depression, suicidal ideas and memory loss.  The patient is not nervous/anxious and does not have insomnia.    All other systems reviewed and are negative except as in HPI.    Exam: /74 (BP Location: Right arm, Patient Position: Sitting, BP Cuff Size: Adult)   Pulse 80   Temp 36.9 °C (98.4 °F) (Temporal)   Resp 12   Ht 1.702 m (5' 7\")   Wt 71.6 kg (157 lb 12.8 oz)   SpO2 97%  Body mass index is 24.71 kg/m².  General: Normal appearing. No distress.  HEENT: Normocephalic. Eyes conjunctiva clear lids without ptosis, ears normal shape and contour.  Neck: Supple without JVD. Thyroid is not enlarged.  Pulmonary: Clear to ausculation.  " Normal effort. No rales, ronchi, or wheezing.  Cardiovascular: Regular rate and rhythm without murmur.   Abdomen: Nondistended.   Neurologic: Grossly nonfocal.  Cranial nerves are normal.  Lymph: No cervical or supraclavicular  lymph nodes are palpable  Skin: Warm and dry.  No suspicious skin lesions.  Positive for bumpy raised skin of the areas of tattoo that has red dye of left wrist.  Negative for erythema/warmth/discharge.  No signs of excoriation.  Musculoskeletal: No extremity cyanosis, clubbing, or edema.  Right hip positive for pain with internal rotation.  Left and right hip negative for pain with external rotation.  Left hip negative for pain with internal rotation.  Right posterior hip region is tender to deep palpation.  Patient does have an antalgic gait.  Psych: Normal mood and affect. Alert and oriented x3. Judgment and insight is normal.    Medical decision-making and discussion:  1. Allergic reaction, initial encounter  Patient has been prescribed Kenalog cream and advised to apply to affected area twice a day for 2 weeks.  Advised patient to not use Kenalog cream more than 2 weeks consecutively.  Patient she can restart medication after not using medication for a week.  Avoid scratching area of concern.    Follow-up for worsening symptoms,lack of expected recovery, or should new symptoms or problems arise.      - triamcinolone acetonide (KENALOG) 0.1 % Cream; Apply 1 Application topically 2 times a day.  Dispense: 15 g; Refill: 0    2. Chronic hip pain, bilateral  Bilateral with pelvis x-ray has been ordered.  Patient will be contacted with results.  Advised patient she can use up to 3000 mg of Tylenol in a 24-hour span.  Discussed importance of weightbearing exercises.  Suggested heating pad as needed.  Patient may benefit from therapy.  She will follow up with her PCP in 3 weeks for evaluation.    - DX-HIP-BILATERAL-WITH PELVIS-2 VIEWS; Future      Please note that this dictation was created  using voice recognition software. I have made every reasonable attempt to correct obvious errors, but I expect that there are errors of grammar and possibly content that I did not discover before finalizing the note.    Assessment/Plan:  1. Allergic reaction, initial encounter  triamcinolone acetonide (KENALOG) 0.1 % Cream   2. Chronic hip pain, bilateral  DX-HIP-BILATERAL-WITH PELVIS-2 VIEWS       Return in about 3 weeks (around 3/5/2021).

## 2021-02-15 ENCOUNTER — HOSPITAL ENCOUNTER (OUTPATIENT)
Dept: RADIOLOGY | Facility: MEDICAL CENTER | Age: 66
End: 2021-02-15
Attending: PHYSICIAN ASSISTANT
Payer: COMMERCIAL

## 2021-02-15 DIAGNOSIS — M25.551 CHRONIC HIP PAIN, BILATERAL: ICD-10-CM

## 2021-02-15 DIAGNOSIS — G89.29 CHRONIC HIP PAIN, BILATERAL: ICD-10-CM

## 2021-02-15 DIAGNOSIS — M25.552 CHRONIC HIP PAIN, BILATERAL: ICD-10-CM

## 2021-02-15 PROCEDURE — 73521 X-RAY EXAM HIPS BI 2 VIEWS: CPT

## 2021-02-16 ENCOUNTER — TELEPHONE (OUTPATIENT)
Dept: MEDICAL GROUP | Facility: PHYSICIAN GROUP | Age: 66
End: 2021-02-16

## 2021-02-16 NOTE — TELEPHONE ENCOUNTER
VOICEMAIL  1. Caller Name: Ines Long                        Call Back Number: There are no phone numbers on file.         2. Message: patient lvm stating she got her x ray done wondering if the results are in?     3. Patient approves office to leave a detailed voicemail/MyChart message: yes

## 2021-03-01 ENCOUNTER — PATIENT MESSAGE (OUTPATIENT)
Dept: MEDICAL GROUP | Facility: PHYSICIAN GROUP | Age: 66
End: 2021-03-01

## 2021-03-15 DIAGNOSIS — Z23 NEED FOR VACCINATION: ICD-10-CM

## 2021-03-23 ENCOUNTER — OFFICE VISIT (OUTPATIENT)
Dept: URGENT CARE | Facility: CLINIC | Age: 66
End: 2021-03-23
Payer: COMMERCIAL

## 2021-03-23 ENCOUNTER — HOSPITAL ENCOUNTER (OUTPATIENT)
Facility: MEDICAL CENTER | Age: 66
End: 2021-03-23
Attending: PHYSICIAN ASSISTANT
Payer: COMMERCIAL

## 2021-03-23 VITALS
RESPIRATION RATE: 16 BRPM | BODY MASS INDEX: 24.33 KG/M2 | HEIGHT: 67 IN | DIASTOLIC BLOOD PRESSURE: 82 MMHG | SYSTOLIC BLOOD PRESSURE: 130 MMHG | HEART RATE: 84 BPM | WEIGHT: 155 LBS | OXYGEN SATURATION: 98 % | TEMPERATURE: 97.1 F

## 2021-03-23 DIAGNOSIS — N30.01 ACUTE CYSTITIS WITH HEMATURIA: ICD-10-CM

## 2021-03-23 DIAGNOSIS — N30.01 ACUTE CYSTITIS WITH HEMATURIA: Primary | ICD-10-CM

## 2021-03-23 PROCEDURE — 87086 URINE CULTURE/COLONY COUNT: CPT

## 2021-03-23 PROCEDURE — 87077 CULTURE AEROBIC IDENTIFY: CPT

## 2021-03-23 PROCEDURE — 81002 URINALYSIS NONAUTO W/O SCOPE: CPT | Performed by: PHYSICIAN ASSISTANT

## 2021-03-23 PROCEDURE — 99214 OFFICE O/P EST MOD 30 MIN: CPT | Performed by: PHYSICIAN ASSISTANT

## 2021-03-23 PROCEDURE — 87186 SC STD MICRODIL/AGAR DIL: CPT

## 2021-03-23 RX ORDER — SULFAMETHOXAZOLE AND TRIMETHOPRIM 800; 160 MG/1; MG/1
1 TABLET ORAL 2 TIMES DAILY
Qty: 10 TABLET | Refills: 0 | Status: SHIPPED | OUTPATIENT
Start: 2021-03-23 | End: 2021-03-28

## 2021-03-23 ASSESSMENT — ENCOUNTER SYMPTOMS
COUGH: 0
CONSTIPATION: 0
DIARRHEA: 0
FLANK PAIN: 0
ABDOMINAL PAIN: 0
CHILLS: 0
VOMITING: 0
NAUSEA: 1
SHORTNESS OF BREATH: 0
FEVER: 0

## 2021-03-23 ASSESSMENT — FIBROSIS 4 INDEX: FIB4 SCORE: 0.85

## 2021-03-24 NOTE — PROGRESS NOTES
Subjective:   Ines Long is a 65 y.o. female who presents for UTI (1x day, urgency, frequent urination)        Dysuria   This is a new problem. Episode onset: 2 days  The problem has been unchanged. The quality of the pain is described as burning. There has been no fever. There is no history of pyelonephritis. Associated symptoms include frequency, hesitancy, nausea and urgency. Pertinent negatives include no chills, discharge, flank pain or vomiting. She has tried nothing for the symptoms. Her past medical history is significant for recurrent UTIs. There is no history of kidney stones.     Review of Systems   Constitutional: Negative for chills, fever and malaise/fatigue.   Respiratory: Negative for cough and shortness of breath.    Gastrointestinal: Positive for nausea. Negative for abdominal pain, constipation, diarrhea and vomiting.   Genitourinary: Positive for dysuria, frequency, hesitancy and urgency. Negative for flank pain.   All other systems reviewed and are negative.      PMH:  has a past medical history of Anesthesia, Diabetes type 1, controlled ([facility]) (11/1/2010), Dyslipidemia (11/1/2010), High cholesterol, Hypothyroidism (11/1/2010), Insulin pump in place (5/18/2011), Left carotid artery stenosis - severe 2019, PONV (postoperative nausea and vomiting), and Routine health maintenance (5/18/2011).  MEDS:   Current Outpatient Medications:   •  sulfamethoxazole-trimethoprim (BACTRIM DS) 800-160 MG tablet, Take 1 tablet by mouth 2 times a day for 5 days., Disp: 10 tablet, Rfl: 0  •  rosuvastatin (CRESTOR) 40 MG tablet, Take 1 Tab by mouth every bedtime., Disp: 30 Tab, Rfl: 1  •  aspirin 81 MG tablet, Take 81 mg by mouth every evening., Disp: , Rfl:   •  ibuprofen (MOTRIN) 600 MG Tab, Take 600 mg by mouth every bedtime., Disp: , Rfl:   •  insulin infusion pump Device, Inject  under the skin continuous. FIASP INSULIN BRAND  Basal Rate: 11:30  0.5 units 14:30 0.45 units 16:00 0.5 units  Early  "morning = unknown units Bolus:1 unit for every 12 of carbs    Change tube yesterday 12/05/2019  19:30 pm  Indications: Novolog, Disp: , Rfl:   •  levothyroxine (LEVOXYL) 125 MCG Tab, Take 62.5-125 mcg by mouth See Admin Instructions. Takes 1/2 tab Mon, Wed, Fri and a full tab the rest of the week., Disp: , Rfl:   •  Cholecalciferol (VITAMIN D-3 PO), Take 5,000 mg by mouth every day., Disp: , Rfl:   •  metoclopramide (REGLAN) 10 MG TABS, Take 10 mg by mouth 2 Times a Day., Disp: , Rfl:   •  FIASP 100 UNIT/ML Solution, , Disp: , Rfl:   •  Multiple Vitamins-Minerals (MULTIVITAMIN WOMEN PO), Take 1 Tab by mouth every day., Disp: , Rfl:   ALLERGIES:   Allergies   Allergen Reactions   • Ceclor [Cefaclor] Hives and Swelling   • Augmentin Hives, Diarrhea and Vomiting     Fever blisters   Sickness lasts forever   • Naproxen      Face Swells    • Tape Rash     \"Certain band aids\"  PAPER TAPE OKAY     SURGHX:   Past Surgical History:   Procedure Laterality Date   • CAROTID ENDARTERECTOMY Left 12/5/2019    Procedure: ENDARTERECTOMY, CAROTID- WITH NEUROMONITORIING;  Surgeon: Surya Garcia M.D.;  Location: [facility];  Service: Vascular   • ORTHOPEDIC OSTEOTOMY Left 7/17/2017    Procedure: ORTHOPEDIC OSTEOTOMY CALCANEAL, KIDNER, EXCISION NAVICULAR, GASTROC SOLEUS RECESSION;  Surgeon: Adebayo Layton M.D.;  Location: [facility];  Service:    • LIGAMENT REPAIR  7/17/2017    Procedure: LIGAMENT REPAIR SPRING;  Surgeon: Adebayo Layton M.D.;  Location: [facility];  Service:    • TOE FUSION  7/17/2017    Procedure: TOE FUSION 1ST METATARSAL JOINT, 2ND TOE RECONSTRUCTION ;  Surgeon: Adebayo Layton M.D.;  Location: [facility];  Service:    • FLEXOR TENDON REPAIR  7/17/2017    Procedure: FLEXOR TENDON REPAIR- RELEASE/POSSIBLE FLEXOR DIGITORIUM LONGUS;  Surgeon: Adebayo Layton M.D.;  Location: [facility];  Service:    • BUNIONECTOMY  2014   • SHOULDER ARTHROSCOPY " "Left 1997    frozen shoulder   • ABDOMINAL HYSTERECTOMY TOTAL  1987   • KNEE ARTHROTOMY Right 1978    [facility]    • CHOLECYSTECTOMY       SOCHX:  reports that she quit smoking about 44 years ago. Her smoking use included cigarettes. She smoked 0.00 packs per day for 2.00 years. She has never used smokeless tobacco. She reports current alcohol use of about 1.0 oz of alcohol per week. She reports that she does not use drugs.  Family History   Problem Relation Age of Onset   • Cancer Mother         rectal cancer   • Stroke Father    • Heart Disease Father    • Hypertension Father    • Lung Disease Father         tb   • Diabetes Sister    • Psychiatric Illness Sister         bipolar   • Heart Disease Maternal Grandfather    • Stroke Paternal Grandmother    • Heart Disease Paternal Grandfather         Objective:   /82 (BP Location: Left arm, Patient Position: Sitting, BP Cuff Size: Adult)   Pulse 84   Temp 36.2 °C (97.1 °F) (Temporal)   Resp 16   Ht 1.702 m (5' 7\")   Wt 70.3 kg (155 lb)   LMP 01/01/1983 (LMP Unknown)   SpO2 98%   BMI 24.28 kg/m²     Physical Exam  Vitals reviewed.   Constitutional:       General: She is not in acute distress.     Appearance: She is well-developed.   HENT:      Head: Normocephalic and atraumatic.      Right Ear: External ear normal.      Left Ear: External ear normal.      Nose: Nose normal.      Mouth/Throat:      Mouth: Mucous membranes are moist.   Eyes:      Conjunctiva/sclera: Conjunctivae normal.      Pupils: Pupils are equal, round, and reactive to light.   Neck:      Trachea: No tracheal deviation.   Cardiovascular:      Rate and Rhythm: Normal rate and regular rhythm.   Pulmonary:      Effort: Pulmonary effort is normal.      Breath sounds: Normal breath sounds.   Abdominal:      General: There is no distension.      Tenderness: There is no abdominal tenderness. There is no right CVA tenderness or left CVA tenderness.   Musculoskeletal:      Cervical back: Normal " range of motion and neck supple.   Skin:     General: Skin is warm and dry.      Capillary Refill: Capillary refill takes less than 2 seconds.   Neurological:      General: No focal deficit present.      Mental Status: She is alert and oriented to person, place, and time.   Psychiatric:         Mood and Affect: Mood normal.         Behavior: Behavior normal.           Assessment/Plan:     1. Acute cystitis with hematuria  POCT Urinalysis    Urine Culture    sulfamethoxazole-trimethoprim (BACTRIM DS) 800-160 MG tablet    REFERRAL TO FOLLOW-UP WITH PRIMARY CARE       Last urine culture 2/28/20 showed positive E.Coli growth, pansensitive.     Last GFR done on 1/18/21 wnl.     UA: blood, leuks     Supportive care reviewed. She will be notified if final urine culture results show resistance to bactrim prescribed today. UTI handout provided.     Follow-up with primary care provider, referral placed.  If symptoms worsen or persist patient can return to clinic for reevaluation. Side effects of medication discussed. Patient confirmed understanding of information.    Please note that this dictation was created using voice recognition software. I have made every reasonable attempt to correct obvious errors, but I expect that there are errors of grammar and possibly content that I did not discover before finalizing the note.

## 2021-04-12 ENCOUNTER — HOSPITAL ENCOUNTER (OUTPATIENT)
Facility: MEDICAL CENTER | Age: 66
End: 2021-04-12
Attending: FAMILY MEDICINE
Payer: COMMERCIAL

## 2021-04-12 ENCOUNTER — OFFICE VISIT (OUTPATIENT)
Dept: URGENT CARE | Facility: CLINIC | Age: 66
End: 2021-04-12
Payer: COMMERCIAL

## 2021-04-12 VITALS
RESPIRATION RATE: 14 BRPM | BODY MASS INDEX: 24.64 KG/M2 | HEART RATE: 86 BPM | SYSTOLIC BLOOD PRESSURE: 122 MMHG | HEIGHT: 67 IN | WEIGHT: 157 LBS | TEMPERATURE: 97.9 F | OXYGEN SATURATION: 97 % | DIASTOLIC BLOOD PRESSURE: 60 MMHG

## 2021-04-12 DIAGNOSIS — N39.0 RECURRENT UTI: ICD-10-CM

## 2021-04-12 DIAGNOSIS — N30.01 ACUTE CYSTITIS WITH HEMATURIA: ICD-10-CM

## 2021-04-12 LAB
APPEARANCE UR: NORMAL
BILIRUB UR STRIP-MCNC: NEGATIVE MG/DL
COLOR UR AUTO: NORMAL
GLUCOSE UR STRIP.AUTO-MCNC: NEGATIVE MG/DL
KETONES UR STRIP.AUTO-MCNC: NEGATIVE MG/DL
LEUKOCYTE ESTERASE UR QL STRIP.AUTO: NORMAL
NITRITE UR QL STRIP.AUTO: NEGATIVE
PH UR STRIP.AUTO: 6 [PH] (ref 5–8)
PROT UR QL STRIP: 300 MG/DL
RBC UR QL AUTO: NORMAL
SP GR UR STRIP.AUTO: 1.02
UROBILINOGEN UR STRIP-MCNC: 0.2 MG/DL

## 2021-04-12 PROCEDURE — 99214 OFFICE O/P EST MOD 30 MIN: CPT | Performed by: FAMILY MEDICINE

## 2021-04-12 PROCEDURE — 87086 URINE CULTURE/COLONY COUNT: CPT

## 2021-04-12 PROCEDURE — 81002 URINALYSIS NONAUTO W/O SCOPE: CPT | Performed by: FAMILY MEDICINE

## 2021-04-12 RX ORDER — CIPROFLOXACIN 500 MG/1
500 TABLET, FILM COATED ORAL 2 TIMES DAILY
Qty: 10 TABLET | Refills: 0 | Status: SHIPPED | OUTPATIENT
Start: 2021-04-12 | End: 2021-04-17

## 2021-04-12 ASSESSMENT — ENCOUNTER SYMPTOMS
FEVER: 0
FLANK PAIN: 0
CHILLS: 0
MYALGIAS: 0
VOMITING: 0
COUGH: 0
SORE THROAT: 0
NAUSEA: 0
DIZZINESS: 0
SHORTNESS OF BREATH: 0

## 2021-04-12 ASSESSMENT — FIBROSIS 4 INDEX: FIB4 SCORE: 0.85

## 2021-04-13 NOTE — PROGRESS NOTES
Subjective:   Ines Long is a 65 y.o. female who presents for UTI (x2days, pain and urgency, tint of blood)        UTI  This is a recurrent problem. Episode onset: 2 days. The problem occurs constantly. Associated symptoms include urinary symptoms. Pertinent negatives include no chills, coughing, fever, myalgias, nausea, rash, sore throat or vomiting. Exacerbated by: Similar symptoms 3 weeks prior. Treatments tried: Antibiotics of Bactrim for which the patient completed the prescribed course. The treatment provided moderate relief.     PMH:  has a past medical history of Anesthesia, Diabetes type 1, controlled ([facility]) (11/1/2010), Dyslipidemia (11/1/2010), High cholesterol, Hypothyroidism (11/1/2010), Insulin pump in place (5/18/2011), Left carotid artery stenosis - severe 2019, PONV (postoperative nausea and vomiting), and Routine health maintenance (5/18/2011).  MEDS:   Current Outpatient Medications:   •  ciprofloxacin (CIPRO) 500 MG Tab, Take 1 tablet by mouth 2 times a day for 5 days., Disp: 10 tablet, Rfl: 0  •  FIASP 100 UNIT/ML Solution, , Disp: , Rfl:   •  rosuvastatin (CRESTOR) 40 MG tablet, Take 1 Tab by mouth every bedtime., Disp: 30 Tab, Rfl: 1  •  aspirin 81 MG tablet, Take 81 mg by mouth every evening., Disp: , Rfl:   •  ibuprofen (MOTRIN) 600 MG Tab, Take 600 mg by mouth every bedtime., Disp: , Rfl:   •  insulin infusion pump Device, Inject  under the skin continuous. FIASP INSULIN BRAND  Basal Rate: 11:30  0.5 units 14:30 0.45 units 16:00 0.5 units  Early morning = unknown units Bolus:1 unit for every 12 of carbs    Change tube yesterday 12/05/2019  19:30 pm  Indications: Novolog, Disp: , Rfl:   •  levothyroxine (LEVOXYL) 125 MCG Tab, Take 62.5-125 mcg by mouth See Admin Instructions. Takes 1/2 tab Mon, Wed, Fri and a full tab the rest of the week., Disp: , Rfl:   •  Cholecalciferol (VITAMIN D-3 PO), Take 5,000 mg by mouth every day., Disp: , Rfl:   •  Multiple Vitamins-Minerals  "(MULTIVITAMIN WOMEN PO), Take 1 Tab by mouth every day., Disp: , Rfl:   •  metoclopramide (REGLAN) 10 MG TABS, Take 10 mg by mouth 2 Times a Day., Disp: , Rfl:   ALLERGIES:   Allergies   Allergen Reactions   • Ceclor [Cefaclor] Hives and Swelling   • Augmentin Hives, Diarrhea and Vomiting     Fever blisters   Sickness lasts forever   • Naproxen      Face Swells    • Tape Rash     \"Certain band aids\"  PAPER TAPE OKAY     SURGHX:   Past Surgical History:   Procedure Laterality Date   • CAROTID ENDARTERECTOMY Left 12/5/2019    Procedure: ENDARTERECTOMY, CAROTID- WITH NEUROMONITORIING;  Surgeon: Surya Garcia M.D.;  Location: [facility];  Service: Vascular   • ORTHOPEDIC OSTEOTOMY Left 7/17/2017    Procedure: ORTHOPEDIC OSTEOTOMY CALCANEAL, KIDNER, EXCISION NAVICULAR, GASTROC SOLEUS RECESSION;  Surgeon: Adebayo Layton M.D.;  Location: [facility];  Service:    • LIGAMENT REPAIR  7/17/2017    Procedure: LIGAMENT REPAIR SPRING;  Surgeon: Adebayo Layton M.D.;  Location: [facility];  Service:    • TOE FUSION  7/17/2017    Procedure: TOE FUSION 1ST METATARSAL JOINT, 2ND TOE RECONSTRUCTION ;  Surgeon: Adebayo Layton M.D.;  Location: [facility];  Service:    • FLEXOR TENDON REPAIR  7/17/2017    Procedure: FLEXOR TENDON REPAIR- RELEASE/POSSIBLE FLEXOR DIGITORIUM LONGUS;  Surgeon: Adebayo Layton M.D.;  Location: [facility];  Service:    • BUNIONECTOMY  2014   • SHOULDER ARTHROSCOPY Left 1997    frozen shoulder   • ABDOMINAL HYSTERECTOMY TOTAL  1987   • KNEE ARTHROTOMY Right 1978    [facility]    • CHOLECYSTECTOMY       SOCHX:  reports that she quit smoking about 44 years ago. Her smoking use included cigarettes. She smoked 0.00 packs per day for 2.00 years. She has never used smokeless tobacco. She reports current alcohol use of about 1.0 oz of alcohol per week. She reports that she does not use drugs.  FH:   Family History   Problem Relation Age of " "Onset   • Cancer Mother         rectal cancer   • Stroke Father    • Heart Disease Father    • Hypertension Father    • Lung Disease Father         tb   • Diabetes Sister    • Psychiatric Illness Sister         bipolar   • Heart Disease Maternal Grandfather    • Stroke Paternal Grandmother    • Heart Disease Paternal Grandfather      Review of Systems   Constitutional: Negative for chills and fever.   HENT: Negative for sore throat.    Respiratory: Negative for cough and shortness of breath.    Gastrointestinal: Negative for nausea and vomiting.   Genitourinary: Positive for frequency and hematuria. Negative for flank pain.   Musculoskeletal: Negative for myalgias.   Skin: Negative for rash.   Neurological: Negative for dizziness.   All other systems reviewed and are negative.       Objective:   /60 (BP Location: Left arm, Patient Position: Sitting, BP Cuff Size: Adult)   Pulse 86   Temp 36.6 °C (97.9 °F) (Temporal)   Resp 14   Ht 1.702 m (5' 7\")   Wt 71.2 kg (157 lb)   LMP 01/01/1983 (LMP Unknown)   SpO2 97%   BMI 24.59 kg/m²   Physical Exam  Vitals and nursing note reviewed.   Constitutional:       General: She is not in acute distress.     Appearance: She is well-developed.   HENT:      Head: Normocephalic and atraumatic.      Right Ear: External ear normal.      Left Ear: External ear normal.      Nose: Nose normal.      Mouth/Throat:      Mouth: Mucous membranes are moist.   Eyes:      Conjunctiva/sclera: Conjunctivae normal.   Cardiovascular:      Rate and Rhythm: Normal rate.   Pulmonary:      Effort: Pulmonary effort is normal. No respiratory distress.      Breath sounds: Normal breath sounds.   Abdominal:      General: There is no distension.      Tenderness: There is no abdominal tenderness. There is no right CVA tenderness or left CVA tenderness.   Musculoskeletal:         General: Normal range of motion.   Skin:     General: Skin is warm and dry.   Neurological:      General: No focal " deficit present.      Mental Status: She is alert and oriented to person, place, and time. Mental status is at baseline.      Gait: Gait (gait at baseline) normal.   Psychiatric:         Judgment: Judgment normal.       Contains abnormal data Urine Culture  Order: [id number]  Status:  Final result   Visible to patient:  Yes (Jaxson) Next appt:  04/22/2021 at 07:00 AM in Lab ([facility]) Dx:  Acute cystitis with hematuria  Specimen Information: Urine        Component 2 wk ago   Significant Indicator POSPositive (POS)    Source UR    Site -    Culture Result Abnormal   Growth noted after further incubation, see below for   organism identification.     Culture Result Abnormal   Escherichia coli   10-50,000 cfu/mL     Resulting Agency M   Susceptibility     Escherichia coli     TRUPTI     Ampicillin <=8 mcg/mL Sensitive     Ampicillin/sulbactam <=4/2 mcg/mL Sensitive     Cefazolin <=2 mcg/mL Sensitive     Cefepime <=2 mcg/mL Sensitive     Cefotaxime <=2 mcg/mL Sensitive     Ceftazidime <=1 mcg/mL Sensitive     Ceftriaxone <=1 mcg/mL Sensitive     Cefuroxime <=4 mcg/mL Sensitive     Ciprofloxacin <=0.25 mcg/mL Sensitive     Gentamicin <=2 mcg/mL Sensitive     Levofloxacin <=0.5 mcg/mL Sensitive     Nitrofurantoin <=32 mcg/mL Sensitive     Pip/Tazobactam <=8 mcg/mL Sensitive     Tigecycline <=2 mcg/mL Sensitive     Tobramycin <=2 mcg/mL Sensitive     Trimeth/Sulfa <=0.5/9.5 m... Sensitive              Narrative  Performed by: M  Indication for culture:->Outpatient   Release to patient->Immediate   Total urine volume (in mL)?->4   Total urine volume units?->ML   Indication for culture:->Outpatient      Specimen Collected: 03/23/21  5:32 PM Last Resulted: 03/26/21  8:53 AM                 Assessment/Plan:   1. Acute cystitis with hematuria  - ciprofloxacin (CIPRO) 500 MG Tab; Take 1 tablet by mouth 2 times a day for 5 days.  Dispense: 10 tablet; Refill: 0  - POCT Urinalysis  - Urine Culture; Future    2. Recurrent UTI  -  ciprofloxacin (CIPRO) 500 MG Tab; Take 1 tablet by mouth 2 times a day for 5 days.  Dispense: 10 tablet; Refill: 0  - POCT Urinalysis  - Urine Culture; Future        Medical Decision Making/Course:  In the course of preparing for this visit with review of the pertinent past medical history, recent and past clinic visits including review of recent urine culture and sensitivity on 3/23/2021 with noted E. coli and sensitivity to ciprofloxacin, current medications, and in the further course of obtaining the current history pertinent to the clinic visit today, performing an exam and evaluation, ordering and independently evaluating labs, tests, and/or procedures, prescribing any recommended new medications as noted above and in the context of the above evaluation of the patient's allergies to antibiotics as noted, providing any pertinent counseling and education and recommending further coordination of care including recommendations to follow-up with primary care provider for consideration of urology consultation, at least 25 minutes of total time were spent during this encounter.      Discussed close monitoring, return precautions, and supportive measures of maintaining adequate fluid hydration and caloric intake, relative rest and symptom management as needed for pain and/or fever.    Differential diagnosis, natural history, supportive care, and indications for immediate follow-up discussed.     Advised the patient to follow-up with the primary care physician for recheck, reevaluation, and consideration of further management.    Please note that this dictation was created using voice recognition software. I have worked with consultants from the vendor as well as technical experts from [facility] to optimize the interface. I have made every reasonable attempt to correct obvious errors, but I expect that there are errors of grammar and possibly content that I did not discover before finalizing the note.

## 2021-04-15 LAB
BACTERIA UR CULT: NORMAL
SIGNIFICANT IND 70042: NORMAL
SITE SITE: NORMAL
SOURCE SOURCE: NORMAL

## 2021-04-22 ENCOUNTER — HOSPITAL ENCOUNTER (OUTPATIENT)
Dept: LAB | Facility: MEDICAL CENTER | Age: 66
End: 2021-04-22
Attending: INTERNAL MEDICINE
Payer: COMMERCIAL

## 2021-04-22 LAB
ALBUMIN SERPL BCP-MCNC: 4.2 G/DL (ref 3.2–4.9)
ALBUMIN/GLOB SERPL: 1.6 G/DL
ALP SERPL-CCNC: 63 U/L (ref 30–99)
ALT SERPL-CCNC: 22 U/L (ref 2–50)
ANION GAP SERPL CALC-SCNC: 9 MMOL/L (ref 7–16)
AST SERPL-CCNC: 24 U/L (ref 12–45)
BILIRUB SERPL-MCNC: 0.3 MG/DL (ref 0.1–1.5)
BUN SERPL-MCNC: 12 MG/DL (ref 8–22)
CALCIUM SERPL-MCNC: 9.4 MG/DL (ref 8.5–10.5)
CHLORIDE SERPL-SCNC: 97 MMOL/L (ref 96–112)
CO2 SERPL-SCNC: 26 MMOL/L (ref 20–33)
CREAT SERPL-MCNC: 0.68 MG/DL (ref 0.5–1.4)
EST. AVERAGE GLUCOSE BLD GHB EST-MCNC: 163 MG/DL
GLOBULIN SER CALC-MCNC: 2.6 G/DL (ref 1.9–3.5)
GLUCOSE SERPL-MCNC: 144 MG/DL (ref 65–99)
HBA1C MFR BLD: 7.3 % (ref 4–5.6)
POTASSIUM SERPL-SCNC: 4.3 MMOL/L (ref 3.6–5.5)
PROT SERPL-MCNC: 6.8 G/DL (ref 6–8.2)
SODIUM SERPL-SCNC: 132 MMOL/L (ref 135–145)

## 2021-04-22 PROCEDURE — 80053 COMPREHEN METABOLIC PANEL: CPT

## 2021-04-22 PROCEDURE — 36415 COLL VENOUS BLD VENIPUNCTURE: CPT

## 2021-04-22 PROCEDURE — 83036 HEMOGLOBIN GLYCOSYLATED A1C: CPT

## 2021-05-10 ENCOUNTER — HOSPITAL ENCOUNTER (OUTPATIENT)
Dept: LAB | Facility: MEDICAL CENTER | Age: 66
End: 2021-05-10
Attending: ORTHOPAEDIC SURGERY
Payer: COMMERCIAL

## 2021-05-10 LAB
BASOPHILS # BLD AUTO: 0.5 % (ref 0–1.8)
BASOPHILS # BLD: 0.03 K/UL (ref 0–0.12)
EOSINOPHIL # BLD AUTO: 0.11 K/UL (ref 0–0.51)
EOSINOPHIL NFR BLD: 1.9 % (ref 0–6.9)
ERYTHROCYTE [DISTWIDTH] IN BLOOD BY AUTOMATED COUNT: 42 FL (ref 35.9–50)
HCT VFR BLD AUTO: 40 % (ref 37–47)
HGB BLD-MCNC: 13.8 G/DL (ref 12–16)
IMM GRANULOCYTES # BLD AUTO: 0.01 K/UL (ref 0–0.11)
IMM GRANULOCYTES NFR BLD AUTO: 0.2 % (ref 0–0.9)
INR PPP: 0.92 (ref 0.87–1.13)
LYMPHOCYTES # BLD AUTO: 2.12 K/UL (ref 1–4.8)
LYMPHOCYTES NFR BLD: 37.5 % (ref 22–41)
MCH RBC QN AUTO: 31.6 PG (ref 27–33)
MCHC RBC AUTO-ENTMCNC: 34.5 G/DL (ref 33.6–35)
MCV RBC AUTO: 91.5 FL (ref 81.4–97.8)
MONOCYTES # BLD AUTO: 0.5 K/UL (ref 0–0.85)
MONOCYTES NFR BLD AUTO: 8.8 % (ref 0–13.4)
NEUTROPHILS # BLD AUTO: 2.88 K/UL (ref 2–7.15)
NEUTROPHILS NFR BLD: 51.1 % (ref 44–72)
NRBC # BLD AUTO: 0 K/UL
NRBC BLD-RTO: 0 /100 WBC
PLATELET # BLD AUTO: 295 K/UL (ref 164–446)
PMV BLD AUTO: 11.1 FL (ref 9–12.9)
PROTHROMBIN TIME: 12.7 SEC (ref 12–14.6)
RBC # BLD AUTO: 4.37 M/UL (ref 4.2–5.4)
WBC # BLD AUTO: 5.7 K/UL (ref 4.8–10.8)

## 2021-05-10 PROCEDURE — 85610 PROTHROMBIN TIME: CPT

## 2021-05-10 PROCEDURE — 36415 COLL VENOUS BLD VENIPUNCTURE: CPT

## 2021-05-10 PROCEDURE — 85025 COMPLETE CBC W/AUTO DIFF WBC: CPT

## 2021-06-02 ENCOUNTER — OFFICE VISIT (OUTPATIENT)
Dept: MEDICAL GROUP | Facility: PHYSICIAN GROUP | Age: 66
End: 2021-06-02
Payer: COMMERCIAL

## 2021-06-02 VITALS
TEMPERATURE: 98.7 F | HEART RATE: 83 BPM | RESPIRATION RATE: 20 BRPM | OXYGEN SATURATION: 96 % | DIASTOLIC BLOOD PRESSURE: 70 MMHG | WEIGHT: 153 LBS | SYSTOLIC BLOOD PRESSURE: 156 MMHG | BODY MASS INDEX: 24.01 KG/M2 | HEIGHT: 67 IN

## 2021-06-02 DIAGNOSIS — R53.83 OTHER FATIGUE: ICD-10-CM

## 2021-06-02 DIAGNOSIS — R21 RASH OF FACE: ICD-10-CM

## 2021-06-02 DIAGNOSIS — L90.5 SCARRING: ICD-10-CM

## 2021-06-02 DIAGNOSIS — Z00.00 WELLNESS EXAMINATION: ICD-10-CM

## 2021-06-02 DIAGNOSIS — E03.9 ACQUIRED HYPOTHYROIDISM: ICD-10-CM

## 2021-06-02 DIAGNOSIS — E10.69 CONTROLLED TYPE 1 DIABETES MELLITUS WITH OTHER COMPLICATION (HCC): ICD-10-CM

## 2021-06-02 DIAGNOSIS — T78.40XD ALLERGIC REACTION, SUBSEQUENT ENCOUNTER: ICD-10-CM

## 2021-06-02 PROCEDURE — 99214 OFFICE O/P EST MOD 30 MIN: CPT | Performed by: NURSE PRACTITIONER

## 2021-06-02 ASSESSMENT — FIBROSIS 4 INDEX: FIB4 SCORE: 1.11

## 2021-06-03 NOTE — ASSESSMENT & PLAN NOTE
Worsening since January. Severe, states she is having difficulty staying awake. States things have been better since stress calmed down. States the fatigue started in January. States she was putting a lot into her kids. She feels like things are a lot harder.  States she is having difficulty sleeping, difficulty staying awake throughout the day.  She states she always feels absolutely exhausted.  Tearful in office. States she has had a lot of scary things with a biopsy in her hip. Poor sleep. Has used ambien through Dr. Shaw, states crazy dream with melatonin.

## 2021-06-03 NOTE — ASSESSMENT & PLAN NOTE
This is a new issue.  Patient has a rash across her bilateral cheeks and upper nose onto her forehead this is scaly, itchy.  States she tried over-the-counter hydrocortisone which was not helpful for this rash. She states that she has tried aveeno lotion. States that it is itchy intermittently. Denies pustules, or open areas.

## 2021-06-03 NOTE — ASSESSMENT & PLAN NOTE
Chronic in nature.  Stable.  Continues to follow with endocrinology.  States she has been taking medication as directed and does have follow-up lab work scheduled for August.  She is experiencing some severe increase in fatigue, as such I do recommended having her levels rechecked.

## 2021-06-03 NOTE — ASSESSMENT & PLAN NOTE
Chronic in nature.  Stable.  Patient follows with Dr. Shaw and her next appointment is in August.  Diabetes has been well controlled.

## 2021-06-03 NOTE — PROGRESS NOTES
Chief Complaint   Patient presents with   • Rash     x 1 yr upper face, and under chin   • Weakness     extremly lethargic    • Requesting Labs     check for auto immune disease        HISTORY OF PRESENT ILLNESS: Patient is a 66 y.o. female established patient who presents today to discuss fatigue and rash    Other fatigue  Worsening since January. Severe, states she is having difficulty staying awake. States things have been better since stress calmed down. States the fatigue started in January. States she was putting a lot into her kids. She feels like things are a lot harder.  States she is having difficulty sleeping, difficulty staying awake throughout the day.  She states she always feels absolutely exhausted.  Tearful in office. States she has had a lot of scary things with a biopsy in her hip. Poor sleep. Has used ambien through Dr. Shaw, states crazy dream with melatonin.     Diabetes type 1, controlled  Chronic in nature.  Stable.  Patient follows with Dr. Shaw and her next appointment is in August.  Diabetes has been well controlled.    Hypothyroidism  Chronic in nature.  Stable.  Continues to follow with endocrinology.  States she has been taking medication as directed and does have follow-up lab work scheduled for August.  She is experiencing some severe increase in fatigue, as such I do recommended having her levels rechecked.    Rash of face  This is a new issue.  Patient has a rash across her bilateral cheeks and upper nose onto her forehead this is scaly, itchy.  States she tried over-the-counter hydrocortisone which was not helpful for this rash. She states that she has tried aveeno lotion. States that it is itchy intermittently. Denies pustules, or open areas.       Patient Active Problem List    Diagnosis Date Noted   • Rash of face 06/02/2021   • Other fatigue 06/02/2021   • Neck pain on left side 12/10/2020   • Left carotid artery stenosis - severe 2019    • Hormone replacement  therapy (HRT) 10/23/2018   • Hyperglycemia 2011   • Insulin pump in place 2011   • Routine health maintenance 2011   • Diabetes type 1, controlled (HCC) 2010   • Hypothyroidism 2010   • Dyslipidemia 2010       Allergies:Ceclor [cefaclor], Augmentin, Naproxen, and Tape    Current Outpatient Medications   Medication Sig Dispense Refill   • FIASP 100 UNIT/ML Solution      • rosuvastatin (CRESTOR) 40 MG tablet Take 1 Tab by mouth every bedtime. 30 Tab 1   • aspirin 81 MG tablet Take 81 mg by mouth every evening.     • ibuprofen (MOTRIN) 600 MG Tab Take 600 mg by mouth every bedtime.     • insulin infusion pump Device Inject  under the skin continuous. FIASP INSULIN BRAND   Basal Rate:  11:30  0.5 units  14:30 0.45 units  16:00 0.5 units   Early morning = unknown units  Bolus:1 unit for every 12 of carbs        Change tube yesterday 2019  19:30 pm  Indications: Novolog     • levothyroxine (LEVOXYL) 125 MCG Tab Take 62.5-125 mcg by mouth See Admin Instructions. Takes 1/2 tab Mon, Wed, Fri and a full tab the rest of the week.     • Cholecalciferol (VITAMIN D-3 PO) Take 5,000 mg by mouth every day.     • Multiple Vitamins-Minerals (MULTIVITAMIN WOMEN PO) Take 1 Tab by mouth every day.     • metoclopramide (REGLAN) 10 MG TABS Take 10 mg by mouth 2 Times a Day.       No current facility-administered medications for this visit.       Social History     Tobacco Use   • Smoking status: Former Smoker     Packs/day: 0.00     Years: 2.00     Pack years: 0.00     Types: Cigarettes     Quit date: 1977     Years since quittin.4   • Smokeless tobacco: Never Used   Vaping Use   • Vaping Use: Never used   Substance Use Topics   • Alcohol use: Yes     Alcohol/week: 1.0 oz     Types: 2 Glasses of wine per week   • Drug use: Never       Family Status   Relation Name Status   • Mo  Alive   • Fa     • Sis  Alive   • MGMo     • MGFa     • PGMo     • PGFa   "     Family History   Problem Relation Age of Onset   • Cancer Mother         rectal cancer   • Stroke Father    • Heart Disease Father    • Hypertension Father    • Lung Disease Father         tb   • Diabetes Sister    • Psychiatric Illness Sister         bipolar   • Heart Disease Maternal Grandfather    • Stroke Paternal Grandmother    • Heart Disease Paternal Grandfather        ROS:   Patient denies headache, blurry vision, dizziness, chest pain, shortness of breath, abdominal pain, nausea, vomiting, change in level of consciousness.  All other systems reviewed and are negative except as in HPI.    Exam:  /70 (BP Location: Left arm, Patient Position: Sitting, BP Cuff Size: Adult)   Pulse 83   Temp 37.1 °C (98.7 °F) (Temporal)   Resp 20   Ht 1.702 m (5' 7\")   Wt 69.4 kg (153 lb)   SpO2 96%   General:  Normal appearing. No distress.  Neck:  Supple without JVD or bruit. Thyroid is not enlarged.  Pulmonary:  Clear to ausculation.  Normal effort. No rales, ronchi, or wheezing.  Cardiovascular:  Regular rate and rhythm without murmur. Carotid and radial pulses are intact and equal bilaterally.  Abdomen:  Soft, nontender, nondistended. Normal bowel sounds. Liver and spleen are not palpable  Neurologic:  Grossly nonfocal  Lymph:  No cervical, supraclavicular or axillary lymph nodes are palpable  Skin:  Warm and dry.  No obvious lesions.  Patient has purpleish raised chelated areas on the hearts and other red parts of the tattoo these are tender to touch there is no red bumpiness or scaling on the tattoo area.  Concerned that this is related to scarring.  Redness across the bridge of her nose extending onto her forehead this is scaly on her bilateral cheeks there are red raised areas consistent with possible rosacea versus butterfly rash of lupus.  Musculoskeletal:  Normal gait. No extremity cyanosis, clubbing, or edema.  Psych:  Normal mood and affect. Alert and oriented x3. Judgment and insight is " normal.      PLAN:    1. Rash of face  Some concern that this is consistent with a butterfly rash of lupus though it does appear to be more consistent with rosacea.  Patient does have autoimmune diabetes and as such is concerned about other autoimmune processes.  She is requesting labs.  - MASOUD TITER; Future  - CCP ANTIBODY; Future  - RHEUMATOID ARTHRITIS FACTOR; Future  - Sed Rate; Future  - CRP QUANTITIVE (NON-CARDIAC); Future  - REFERRAL TO DERMATOLOGY    2. Controlled type 1 diabetes mellitus with other complication (HCC)  To new follow-up with Dr. Shaw.    3. Other fatigue  On discussion with patient there does seem to be a component of anxiety or depression.  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  Update labs as ordered.  - VITAMIN D,25 HYDROXY; Future  - VITAMIN B12; Future    4. Wellness examination  - Comp Metabolic Panel; Future  - CBC WITH DIFFERENTIAL; Future  - Lipid Profile; Future    5. Allergic reaction, subsequent encounter    - REFERRAL TO DERMATOLOGY    6. Scarring    - REFERRAL TO DERMATOLOGY    7. Acquired hypothyroidism  Continue monitoring.      Return in about 1 month (around 7/2/2021), or if symptoms worsen or fail to improve.    Please note that this dictation was created using voice recognition software. I have made every reasonable attempt to correct obvious errors, but I expect that there are errors of grammar and possibly content that I did not discover before finalizing the note.

## 2021-06-17 ENCOUNTER — HOSPITAL ENCOUNTER (OUTPATIENT)
Dept: LAB | Facility: MEDICAL CENTER | Age: 66
End: 2021-06-17
Attending: NURSE PRACTITIONER
Payer: COMMERCIAL

## 2021-06-17 DIAGNOSIS — R53.83 OTHER FATIGUE: ICD-10-CM

## 2021-06-17 DIAGNOSIS — R21 RASH OF FACE: ICD-10-CM

## 2021-06-17 DIAGNOSIS — Z00.00 WELLNESS EXAMINATION: ICD-10-CM

## 2021-06-17 LAB
25(OH)D3 SERPL-MCNC: 58 NG/ML (ref 30–100)
ALBUMIN SERPL BCP-MCNC: 4.3 G/DL (ref 3.2–4.9)
ALBUMIN/GLOB SERPL: 1.3 G/DL
ALP SERPL-CCNC: 83 U/L (ref 30–99)
ALT SERPL-CCNC: 25 U/L (ref 2–50)
ANION GAP SERPL CALC-SCNC: 12 MMOL/L (ref 7–16)
AST SERPL-CCNC: 23 U/L (ref 12–45)
BASOPHILS # BLD AUTO: 0.4 % (ref 0–1.8)
BASOPHILS # BLD: 0.02 K/UL (ref 0–0.12)
BILIRUB SERPL-MCNC: 0.6 MG/DL (ref 0.1–1.5)
BUN SERPL-MCNC: 14 MG/DL (ref 8–22)
CALCIUM SERPL-MCNC: 9.8 MG/DL (ref 8.5–10.5)
CHLORIDE SERPL-SCNC: 98 MMOL/L (ref 96–112)
CHOLEST SERPL-MCNC: 208 MG/DL (ref 100–199)
CO2 SERPL-SCNC: 23 MMOL/L (ref 20–33)
CREAT SERPL-MCNC: 0.81 MG/DL (ref 0.5–1.4)
CRP SERPL HS-MCNC: <0.3 MG/DL (ref 0–0.75)
EOSINOPHIL # BLD AUTO: 0.12 K/UL (ref 0–0.51)
EOSINOPHIL NFR BLD: 2.5 % (ref 0–6.9)
ERYTHROCYTE [DISTWIDTH] IN BLOOD BY AUTOMATED COUNT: 43.5 FL (ref 35.9–50)
ERYTHROCYTE [SEDIMENTATION RATE] IN BLOOD BY WESTERGREN METHOD: 9 MM/HOUR (ref 0–25)
GLOBULIN SER CALC-MCNC: 3.2 G/DL (ref 1.9–3.5)
GLUCOSE SERPL-MCNC: 141 MG/DL (ref 65–99)
HCT VFR BLD AUTO: 44.2 % (ref 37–47)
HCV AB SER QL: NORMAL
HDLC SERPL-MCNC: 93 MG/DL
HGB BLD-MCNC: 15 G/DL (ref 12–16)
HIV 1+2 AB+HIV1 P24 AG SERPL QL IA: NORMAL
IMM GRANULOCYTES # BLD AUTO: 0.02 K/UL (ref 0–0.11)
IMM GRANULOCYTES NFR BLD AUTO: 0.4 % (ref 0–0.9)
LDLC SERPL CALC-MCNC: 98 MG/DL
LYMPHOCYTES # BLD AUTO: 1.71 K/UL (ref 1–4.8)
LYMPHOCYTES NFR BLD: 35.4 % (ref 22–41)
MCH RBC QN AUTO: 30.9 PG (ref 27–33)
MCHC RBC AUTO-ENTMCNC: 33.9 G/DL (ref 33.6–35)
MCV RBC AUTO: 91.1 FL (ref 81.4–97.8)
MONOCYTES # BLD AUTO: 0.47 K/UL (ref 0–0.85)
MONOCYTES NFR BLD AUTO: 9.7 % (ref 0–13.4)
NEUTROPHILS # BLD AUTO: 2.49 K/UL (ref 2–7.15)
NEUTROPHILS NFR BLD: 51.6 % (ref 44–72)
NRBC # BLD AUTO: 0 K/UL
NRBC BLD-RTO: 0 /100 WBC
PLATELET # BLD AUTO: 298 K/UL (ref 164–446)
PMV BLD AUTO: 10.7 FL (ref 9–12.9)
POTASSIUM SERPL-SCNC: 4.6 MMOL/L (ref 3.6–5.5)
PROT SERPL-MCNC: 7.5 G/DL (ref 6–8.2)
RBC # BLD AUTO: 4.85 M/UL (ref 4.2–5.4)
RHEUMATOID FACT SER IA-ACNC: 10 IU/ML (ref 0–14)
SODIUM SERPL-SCNC: 133 MMOL/L (ref 135–145)
TRIGL SERPL-MCNC: 85 MG/DL (ref 0–149)
VIT B12 SERPL-MCNC: 541 PG/ML (ref 211–911)
WBC # BLD AUTO: 4.8 K/UL (ref 4.8–10.8)

## 2021-06-17 PROCEDURE — 86431 RHEUMATOID FACTOR QUANT: CPT

## 2021-06-17 PROCEDURE — 85025 COMPLETE CBC W/AUTO DIFF WBC: CPT

## 2021-06-17 PROCEDURE — 86803 HEPATITIS C AB TEST: CPT

## 2021-06-17 PROCEDURE — 86038 ANTINUCLEAR ANTIBODIES: CPT

## 2021-06-17 PROCEDURE — 80053 COMPREHEN METABOLIC PANEL: CPT

## 2021-06-17 PROCEDURE — 36415 COLL VENOUS BLD VENIPUNCTURE: CPT

## 2021-06-17 PROCEDURE — 86200 CCP ANTIBODY: CPT

## 2021-06-17 PROCEDURE — 82607 VITAMIN B-12: CPT

## 2021-06-17 PROCEDURE — 82306 VITAMIN D 25 HYDROXY: CPT

## 2021-06-17 PROCEDURE — 80061 LIPID PANEL: CPT

## 2021-06-17 PROCEDURE — 87389 HIV-1 AG W/HIV-1&-2 AB AG IA: CPT

## 2021-06-17 PROCEDURE — 86140 C-REACTIVE PROTEIN: CPT

## 2021-06-17 PROCEDURE — 85652 RBC SED RATE AUTOMATED: CPT

## 2021-06-19 LAB
CCP IGG SERPL-ACNC: 4 UNITS (ref 0–19)
NUCLEAR IGG SER QL IA: NORMAL

## 2021-07-15 ENCOUNTER — OFFICE VISIT (OUTPATIENT)
Dept: MEDICAL GROUP | Facility: PHYSICIAN GROUP | Age: 66
End: 2021-07-15
Payer: COMMERCIAL

## 2021-07-15 VITALS
HEIGHT: 67 IN | DIASTOLIC BLOOD PRESSURE: 60 MMHG | WEIGHT: 156.8 LBS | HEART RATE: 84 BPM | OXYGEN SATURATION: 99 % | BODY MASS INDEX: 24.61 KG/M2 | TEMPERATURE: 98.3 F | SYSTOLIC BLOOD PRESSURE: 122 MMHG | RESPIRATION RATE: 16 BRPM

## 2021-07-15 DIAGNOSIS — M25.561 ACUTE PAIN OF RIGHT KNEE: ICD-10-CM

## 2021-07-15 DIAGNOSIS — L92.3 TATTOO REACTION: ICD-10-CM

## 2021-07-15 DIAGNOSIS — E10.69 CONTROLLED TYPE 1 DIABETES MELLITUS WITH OTHER COMPLICATION (HCC): ICD-10-CM

## 2021-07-15 DIAGNOSIS — R21 RASH OF FACE: ICD-10-CM

## 2021-07-15 DIAGNOSIS — E03.9 ACQUIRED HYPOTHYROIDISM: ICD-10-CM

## 2021-07-15 PROCEDURE — 99214 OFFICE O/P EST MOD 30 MIN: CPT | Performed by: NURSE PRACTITIONER

## 2021-07-15 RX ORDER — CLINDAMYCIN PHOSPHATE 10 UG/ML
LOTION TOPICAL 2 TIMES DAILY
COMMUNITY
End: 2022-04-14

## 2021-07-15 RX ORDER — CLOBETASOL PROPIONATE 0.5 MG/G
CREAM TOPICAL 2 TIMES DAILY
COMMUNITY
End: 2022-04-14

## 2021-07-15 ASSESSMENT — FIBROSIS 4 INDEX: FIB4 SCORE: 0.99

## 2021-07-15 NOTE — ASSESSMENT & PLAN NOTE
Chronic in nature. Stable. She is working with derm regarding this and may need to do steroid injections to alleviate the inflammation.

## 2021-07-15 NOTE — ASSESSMENT & PLAN NOTE
"This is a new issue.  No specific injury. States over the last 3 months she has been noticing pain in anterior medial knee just below the joint.  States that the pain has been becoming more severe over time.  Patient states she has not found anything specific that makes it better or worse.  Patient states she mostly notices when she is going from sitting to standing or trying to get up and initially thought it was maybe just an age-related pain.  Patient endorses \"weakness\".  She does also mention that she has pain out pressure on the joint specifically when moving around in bed. Difficulty getting up to stand related to pain. No popping or clicking, no instability.   "

## 2021-07-15 NOTE — ASSESSMENT & PLAN NOTE
Chronic in nature.  Patient saw dermatology and is using creams to treat this she states she just started creams yesterday but she does feel that they are healthy helping.  She states on further thought she thinks that some of the reaction may be related to a sensitivity to hand  she states that he noted the other day while she was putting on hand  that after putting on the hand  she reached up to adjust the bottom of her mask and states that this is where the rash started.  Discussed options for changing him  or looking for him sanitizers with less aggravating ingredients.  Patient will continue to follow-up with dermatology regarding this.

## 2021-07-15 NOTE — ASSESSMENT & PLAN NOTE
Chronic in nature.  Stable.  Patient continues to follow-up with endocrinology.  Patient states that she has been taking her medications as directed.  She has noticed some increases in fatigue, discussed contacting Dr. Shaw for further follow-up as lab work completed through our office is within normal limits.

## 2021-07-15 NOTE — PROGRESS NOTES
"Chief Complaint   Patient presents with   • Rash     getting a little better   • Lab Results       HISTORY OF PRESENT ILLNESS: Patient is a 66 y.o. female established patient who presents today to discuss rash, knee pain    Acute pain of right knee  This is a new issue.  No specific injury. States over the last 3 months she has been noticing pain in anterior medial knee just below the joint.  States that the pain has been becoming more severe over time.  Patient states she has not found anything specific that makes it better or worse.  Patient states she mostly notices when she is going from sitting to standing or trying to get up and initially thought it was maybe just an age-related pain.  Patient endorses \"weakness\".  She does also mention that she has pain out pressure on the joint specifically when moving around in bed. Difficulty getting up to stand related to pain. No popping or clicking, no instability.     Diabetes type 1, controlled  Chronic in nature.  Stable.  Continues following with Dr. Shaw regarding this issue.  Next appointment in August.  Diabetes has been well controlled.    Hypothyroidism  Chronic in nature.  Stable.  Patient continues to follow-up with endocrinology.  Patient states that she has been taking her medications as directed.  She has noticed some increases in fatigue, discussed contacting Dr. Shaw for further follow-up as lab work completed through our office is within normal limits.    Rash of face  Chronic in nature.  Patient saw dermatology and is using creams to treat this she states she just started creams yesterday but she does feel that they are healthy helping.  She states on further thought she thinks that some of the reaction may be related to a sensitivity to hand  she states that he noted the other day while she was putting on hand  that after putting on the hand  she reached up to adjust the bottom of her mask and states that this is " where the rash started.  Discussed options for changing him  or looking for him sanitizers with less aggravating ingredients.  Patient will continue to follow-up with dermatology regarding this.    Tattoo reaction  Chronic in nature. Stable. She is working with derm regarding this and may need to do steroid injections to alleviate the inflammation.      Patient Active Problem List    Diagnosis Date Noted   • Tattoo reaction 07/15/2021   • Acute pain of right knee 07/15/2021   • Rash of face 06/02/2021   • Other fatigue 06/02/2021   • Neck pain on left side 12/10/2020   • Left carotid artery stenosis - severe 2019    • Hormone replacement therapy (HRT) 10/23/2018   • Hyperglycemia 08/16/2011   • Insulin pump in place 05/18/2011   • Routine health maintenance 05/18/2011   • Diabetes type 1, controlled (HCC) 11/01/2010   • Hypothyroidism 11/01/2010   • Dyslipidemia 11/01/2010       Allergies:Ceclor [cefaclor], Augmentin, Naproxen, and Tape    Current Outpatient Medications   Medication Sig Dispense Refill   • CLINDAMYCIN PHOSPHATE,TOPICAL, 1 % Lotion Apply  topically 2 times a day.     • clobetasol (TEMOVATE) 0.05 % Cream Apply  topically 2 times a day.     • FIASP 100 UNIT/ML Solution      • rosuvastatin (CRESTOR) 40 MG tablet Take 1 Tab by mouth every bedtime. 30 Tab 1   • aspirin 81 MG tablet Take 81 mg by mouth every evening.     • ibuprofen (MOTRIN) 600 MG Tab Take 600 mg by mouth every bedtime.     • insulin infusion pump Device Inject  under the skin continuous. FIASP INSULIN BRAND   Basal Rate:  11:30  0.5 units  14:30 0.45 units  16:00 0.5 units   Early morning = unknown units  Bolus:1 unit for every 12 of carbs        Change tube yesterday 12/05/2019  19:30 pm  Indications: Novolog     • levothyroxine (LEVOXYL) 125 MCG Tab Take 62.5-125 mcg by mouth See Admin Instructions. Takes 1/2 tab Mon, Wed, Fri and a full tab the rest of the week.     • Cholecalciferol (VITAMIN D-3 PO) Take 5,000 mg by mouth  "every day.     • Multiple Vitamins-Minerals (MULTIVITAMIN WOMEN PO) Take 1 Tab by mouth every day.     • metoclopramide (REGLAN) 10 MG TABS Take 10 mg by mouth 2 Times a Day.       No current facility-administered medications for this visit.       Social History     Tobacco Use   • Smoking status: Former Smoker     Packs/day: 0.00     Years: 2.00     Pack years: 0.00     Types: Cigarettes     Quit date: 1977     Years since quittin.5   • Smokeless tobacco: Never Used   Vaping Use   • Vaping Use: Never used   Substance Use Topics   • Alcohol use: Yes     Alcohol/week: 1.0 oz     Types: 2 Glasses of wine per week   • Drug use: Never       Family Status   Relation Name Status   • Mo  Alive   • Fa     • Sis  Alive   • MGMo     • MGFa     • PGMo     • PGFa       Family History   Problem Relation Age of Onset   • Cancer Mother         rectal cancer   • Stroke Father    • Heart Disease Father    • Hypertension Father    • Lung Disease Father         tb   • Diabetes Sister    • Psychiatric Illness Sister         bipolar   • Heart Disease Maternal Grandfather    • Stroke Paternal Grandmother    • Heart Disease Paternal Grandfather        ROS:   Patient denies headache, blurry vision, dizziness, chest pain, shortness of breath, abdominal pain, nausea, vomiting, change in level of consciousness.  All other systems reviewed and are negative except as in HPI.    Exam:  /60 (BP Location: Right arm, Patient Position: Sitting, BP Cuff Size: Adult)   Pulse 84   Temp 36.8 °C (98.3 °F) (Temporal)   Resp 16   Ht 1.702 m (5' 7\")   Wt 71.1 kg (156 lb 12.8 oz)   SpO2 99%   Constitutional: Alert, no distress, well-groomed.  Skin: Warm, dry, good turgor, no rashes in visible areas.  Redness is still noted along patient's eyebrow area, raised red areas still noted at location of heart tattoos  Eye: Equal, round and reactive, conjunctiva clear, lids normal.  ENMT: Lips without " lesions, good dentition, moist mucous membranes.  Neck: Trachea midline, no masses, no thyromegaly.  Respiratory: Unlabored respiratory effort, no cough.  MSK: Normal gait, moves all extremities. Knees: No erythema/edema/ecchymosis/effusion. Tenderness to palpation not noted. Joint line tenderness negative. Patellar grind test negative. Lachman's negative. Billy's negative. ROM intact. 5/5 strength bilaterally throughout thigh, knee, feet. 2+   Neuro: Grossly non-focal.   Psych: Alert and oriented x3, normal affect and mood.      PLAN:    1. Rash of face  2. Tattoo reaction  We will continue to follow-up with dermatology.  Did discuss hand sanitizers, and available fragrance free, less allergenic/irritating options.  I did address her questions regarding side effects and reactions to steroids.    3. Controlled type 1 diabetes mellitus with other complication (HCC)  Continue follow-up with endocrinology.    4. Acquired hypothyroidism  New follow-up with endocrinology.    5. Acute pain of right knee  At this time is for patient to use conservative measures including over-the-counter Tylenol or ibuprofen as well as topical anti-inflammatories to improve pain.  There is no swelling or red flag signs so I will refer her to physical therapy for further evaluation and treatment.  - REFERRAL TO PHYSICAL THERAPY      Return in about 6 months (around 1/15/2022), or if symptoms worsen or fail to improve, for DM.    Please note that this dictation was created using voice recognition software. I have made every reasonable attempt to correct obvious errors, but I expect that there are errors of grammar and possibly content that I did not discover before finalizing the note.

## 2021-07-15 NOTE — ASSESSMENT & PLAN NOTE
Chronic in nature.  Stable.  Continues following with Dr. Shaw regarding this issue.  Next appointment in August.  Diabetes has been well controlled.

## 2021-07-29 ENCOUNTER — HOSPITAL ENCOUNTER (OUTPATIENT)
Dept: LAB | Facility: MEDICAL CENTER | Age: 66
End: 2021-07-29
Attending: INTERNAL MEDICINE
Payer: COMMERCIAL

## 2021-07-29 LAB
ALBUMIN SERPL BCP-MCNC: 4.5 G/DL (ref 3.2–4.9)
ALBUMIN/GLOB SERPL: 1.6 G/DL
ALP SERPL-CCNC: 72 U/L (ref 30–99)
ALT SERPL-CCNC: 33 U/L (ref 2–50)
ANION GAP SERPL CALC-SCNC: 13 MMOL/L (ref 7–16)
AST SERPL-CCNC: 32 U/L (ref 12–45)
BASOPHILS # BLD AUTO: 0.6 % (ref 0–1.8)
BASOPHILS # BLD: 0.03 K/UL (ref 0–0.12)
BILIRUB SERPL-MCNC: 0.5 MG/DL (ref 0.1–1.5)
BUN SERPL-MCNC: 15 MG/DL (ref 8–22)
CALCIUM SERPL-MCNC: 9.9 MG/DL (ref 8.5–10.5)
CHLORIDE SERPL-SCNC: 100 MMOL/L (ref 96–112)
CHOLEST SERPL-MCNC: 208 MG/DL (ref 100–199)
CO2 SERPL-SCNC: 23 MMOL/L (ref 20–33)
CREAT SERPL-MCNC: 0.78 MG/DL (ref 0.5–1.4)
CREAT UR-MCNC: 73.7 MG/DL
EOSINOPHIL # BLD AUTO: 0.18 K/UL (ref 0–0.51)
EOSINOPHIL NFR BLD: 3.5 % (ref 0–6.9)
ERYTHROCYTE [DISTWIDTH] IN BLOOD BY AUTOMATED COUNT: 45.2 FL (ref 35.9–50)
EST. AVERAGE GLUCOSE BLD GHB EST-MCNC: 160 MG/DL
FASTING STATUS PATIENT QL REPORTED: NORMAL
GLOBULIN SER CALC-MCNC: 2.8 G/DL (ref 1.9–3.5)
GLUCOSE SERPL-MCNC: 153 MG/DL (ref 65–99)
HBA1C MFR BLD: 7.2 % (ref 4–5.6)
HCT VFR BLD AUTO: 42.3 % (ref 37–47)
HDLC SERPL-MCNC: 79 MG/DL
HGB BLD-MCNC: 14.2 G/DL (ref 12–16)
IMM GRANULOCYTES # BLD AUTO: 0.01 K/UL (ref 0–0.11)
IMM GRANULOCYTES NFR BLD AUTO: 0.2 % (ref 0–0.9)
LDLC SERPL CALC-MCNC: 112 MG/DL
LYMPHOCYTES # BLD AUTO: 1.78 K/UL (ref 1–4.8)
LYMPHOCYTES NFR BLD: 34.7 % (ref 22–41)
MCH RBC QN AUTO: 31.2 PG (ref 27–33)
MCHC RBC AUTO-ENTMCNC: 33.6 G/DL (ref 33.6–35)
MCV RBC AUTO: 93 FL (ref 81.4–97.8)
MICROALBUMIN UR-MCNC: <1.2 MG/DL
MICROALBUMIN/CREAT UR: NORMAL MG/G (ref 0–30)
MONOCYTES # BLD AUTO: 0.46 K/UL (ref 0–0.85)
MONOCYTES NFR BLD AUTO: 9 % (ref 0–13.4)
NEUTROPHILS # BLD AUTO: 2.67 K/UL (ref 2–7.15)
NEUTROPHILS NFR BLD: 52 % (ref 44–72)
NRBC # BLD AUTO: 0 K/UL
NRBC BLD-RTO: 0 /100 WBC
PLATELET # BLD AUTO: 304 K/UL (ref 164–446)
PMV BLD AUTO: 11.1 FL (ref 9–12.9)
POTASSIUM SERPL-SCNC: 5.5 MMOL/L (ref 3.6–5.5)
PROT SERPL-MCNC: 7.3 G/DL (ref 6–8.2)
RBC # BLD AUTO: 4.55 M/UL (ref 4.2–5.4)
SODIUM SERPL-SCNC: 136 MMOL/L (ref 135–145)
T4 FREE SERPL-MCNC: 1.22 NG/DL (ref 0.93–1.7)
TRIGL SERPL-MCNC: 87 MG/DL (ref 0–149)
TSH SERPL DL<=0.005 MIU/L-ACNC: 0.64 UIU/ML (ref 0.38–5.33)
WBC # BLD AUTO: 5.1 K/UL (ref 4.8–10.8)

## 2021-07-29 PROCEDURE — 82043 UR ALBUMIN QUANTITATIVE: CPT

## 2021-07-29 PROCEDURE — 84443 ASSAY THYROID STIM HORMONE: CPT

## 2021-07-29 PROCEDURE — 85025 COMPLETE CBC W/AUTO DIFF WBC: CPT

## 2021-07-29 PROCEDURE — 36415 COLL VENOUS BLD VENIPUNCTURE: CPT

## 2021-07-29 PROCEDURE — 80061 LIPID PANEL: CPT

## 2021-07-29 PROCEDURE — 82570 ASSAY OF URINE CREATININE: CPT

## 2021-07-29 PROCEDURE — 80053 COMPREHEN METABOLIC PANEL: CPT

## 2021-07-29 PROCEDURE — 83036 HEMOGLOBIN GLYCOSYLATED A1C: CPT

## 2021-07-29 PROCEDURE — 84439 ASSAY OF FREE THYROXINE: CPT

## 2021-08-19 NOTE — OP THERAPY EVALUATION
Outpatient Physical Therapy  INITIAL EVALUATION    [facility] Physical Therapy [place] [address] [facility], [address]  [place] [address]  Phone:  [phone]    Date of Evaluation: 2021    Patient: Ines Long  YOB: 1955  MRN: [id number]     Referring Provider: SOBIA Candelario  [address]  [place] [address]   Referring Diagnosis Acute pain of right knee [M25.561]     Time Calculation  Start time: 930  Stop time:  Time Calculation (min): 45 minutes         Chief Complaint: Knee Problem    Visit Diagnoses     ICD-10-CM   1. Acute pain of right knee  M25.561       Date of onset of impairment: 7/15/2021    Subjective:   History of Present Illness:     Date of onset:  7/15/2021    Mechanism of injury:  The patient is a 66 year old male who reports chronic (R) knee pain over the last 4-5 years. She reports increased pain over this time period. She has hx of bunions to the (R) foot. She has hx of previous surgery to the (R) foot which may have affected her (R) LE. She has hx of arthritis to her (R) knee. She has pain while she lies on either side of her hip. She uses a cane occasionally with activity walking and weightbearing. She has difficulty sleeping at night on her sides. She takes 600 mg of Ibuprofen for her pain which takes the edge off the pain  Quality of life:  Good  Headaches:  no headaches  Ear problems: none  Sleep disturbance:  Interrupted sleep  # Times/Night awakened:  2  Pain:     Current pain ratin    At best pain rating:  3    At worst pain ratin    Quality:  Sharp and burning    Pain timing:  In the morning    Relieving factors:  Cane, cold/ice, heating pad and pain medication    Aggravating factors:  Prolonged standing, sleeping, bending and walking    Pain Comments::  Sleeping and rolling in bed   Social Support:     Lives in:  One-story house    Lives with:  Spouse  Hand dominance:  Right  Treatments:     Current treatment:   Ice  Patient Goals:     Patient goals for therapy:  Decreased pain    Other patient goals:  Decrease pain and increase mobility and learn what causes it      Past Medical History:   Diagnosis Date   • Anesthesia     Post op N/V   • Diabetes type 1, controlled (HCC) 2010   • Dyslipidemia 2010   • High cholesterol    • Hypothyroidism 2010   • Insulin pump in place 2011   • Left carotid artery stenosis - severe 2019    • PONV (postoperative nausea and vomiting)    • Routine health maintenance 2011     Past Surgical History:   Procedure Laterality Date   • CAROTID ENDARTERECTOMY Left 2019    Procedure: ENDARTERECTOMY, CAROTID- WITH NEUROMONITORIING;  Surgeon: Surya Garcia M.D.;  Location: [facility];  Service: Vascular   • ORTHOPEDIC OSTEOTOMY Left 2017    Procedure: ORTHOPEDIC OSTEOTOMY CALCANEAL, KIDNER, EXCISION NAVICULAR, GASTROC SOLEUS RECESSION;  Surgeon: Adebayo Layton M.D.;  Location: [facility];  Service:    • LIGAMENT REPAIR  2017    Procedure: LIGAMENT REPAIR SPRING;  Surgeon: Adebayo Layton M.D.;  Location: [facility];  Service:    • TOE FUSION  2017    Procedure: TOE FUSION 1ST METATARSAL JOINT, 2ND TOE RECONSTRUCTION ;  Surgeon: Adebayo Layton M.D.;  Location: [facility];  Service:    • FLEXOR TENDON REPAIR  2017    Procedure: FLEXOR TENDON REPAIR- RELEASE/POSSIBLE FLEXOR DIGITORIUM LONGUS;  Surgeon: Adebayo Layton M.D.;  Location: [facility];  Service:    • BUNIONECTOMY     • SHOULDER ARTHROSCOPY Left     frozen shoulder   • ABDOMINAL HYSTERECTOMY TOTAL     • KNEE ARTHROTOMY Right     cartidge    • CHOLECYSTECTOMY       Social History     Tobacco Use   • Smoking status: Former Smoker     Packs/day: 0.00     Years: 2.00     Pack years: 0.00     Types: Cigarettes     Quit date: 1977     Years since quittin.6   • Smokeless tobacco: Never Used   Substance  Use Topics   • Alcohol use: Yes     Alcohol/week: 1.0 oz     Types: 2 Glasses of wine per week     Family and Occupational History     Socioeconomic History   • Marital status:      Spouse name: Not on file   • Number of children: Not on file   • Years of education: Not on file   • Highest education level: Not on file   Occupational History   • Not on file       Objective     Postural Observations  Seated posture: fair  Standing posture: fair    Additional Postural Observation Details  (R) innominate bone rotated with anterior pelvic tilt low ASIS high PSIS compared to the (L) side     Active Range of Motion   Left Hip   Flexion: 115 degrees     Right Hip   Flexion: 110 (110) degrees   Left Knee   Flexion: WFL  Extension: WFL    Right Knee   Flexion: WFL  Extension: WFL  Left Ankle/Foot   Dorsiflexion (ke): 90 degrees   Plantar flexion: 60 degrees   Inversion: 35 degrees   Eversion: 30 degrees     Right Ankle/Foot   Dorsiflexion (ke): 90 degrees   Plantar flexion: 48 degrees   Inversion: 15 degrees   Eversion: 30 degrees     Strength:      Left Hip   Planes of Motion   Flexion: 5  Extension: 5  Abduction: 5  Adduction: 5  External rotation: 5  Internal rotation: 5    Right Hip   Planes of Motion   Flexion: 5  Extension: 5  Abduction: 5  Adduction: 5  External rotation: 5  Internal rotation: 5    Left Knee   Flexion: 5  Prone flexion: 5  Extension: 5    Right Knee   Flexion: 5  Prone flexion: 4+  Extension: 5    Left Ankle/Foot   Dorsiflexion: 5  Plantar flexion: 5  Inversion: 4+  Eversion: 5  Great toe flexion: 5  Great toe extension: 5    Right Ankle/Foot   Dorsiflexion: 4+  Plantar flexion: 5  Inversion: 4+  Eversion: 4+  Great toe flexion: 5  Great toe extension: 5  Ambulation     Observational Gait   Gait: antalgic   Increased left stance time. Decreased walking speed, stride length, right stance time, left swing time, right swing time, left step length and right step length. Stride width: narrow.    Left  foot contact pattern: heel to toe  Right foot contact pattern: foot flat  Left arm swing: decreased  Right arm swing: decreased    Functional Assessment   Squat   Pain and sitting toward left side.     Single Leg Squat   Left Leg  Right trunk side bending.     Right Leg  Left trunk side bending and right trunk side bending.     Single Leg Stance   Left: 5 seconds  Right: 6 seconds        Therapeutic Exercises (CPT 25218):     1. Nu step bike     2. Posterior pelvic tilts     3. Step ups     4. 3 wa hip     5. Heel raises     6. Bridges, 1 x10 reps      Time-based treatments/modalities:    Physical Therapy Timed Treatment Charges  Therapeutic exercise minutes (CPT 27151): 10 minutes      Assessment, Response and Plan:   Impairments: activity intolerance, impaired balance, impaired physical strength, lacks appropriate home exercise program and limited mobility    Assessment details:  The patient is a 66 year old female who reports having chronic (R) knee LE pain following surgery several years ago. She has difficulty sleeping and rolling on /off her sides due to hip pain. She is limited with weightbearing and walking distances. The patient would benefit from skilled physical therapy to address (R) LE pain working on PROM/AROM, manual therapy techniques, strength and conditioning, functional training, balance and gait training and activity tolerance.  Barriers to therapy:  None  Prognosis: good    Goals:   Short Term Goals:   1) Indep with HEP   2) 25% improvement with rolling in bed with less pain   3) Decrease hamstring connective tissue tension with increased 90/90 SLR by 5-10 deg  4) Increase (R) ankle foot dorsiflexion by 5-10 deg  Short term goal time span:  2-4 weeks      Long Term Goals:    1) Progression/regression advancing HEP   2) Increased weightbearing and 50% less LE pain   3) Able to retreive objects off the floor with less low back pain  4) Improved gait pattern with less deviation; increased heel toe  frequency during gait pattern by 25%  Long term goal time span:  4-6 weeks    Plan:   Therapy options:  Physical therapy treatment to continue  Planned therapy interventions:  Functional Training, Self Care (CPT 63840), Neuromuscular Re-education (CPT 54958), Therapeutic Activities (CPT 46173), Therapeutic Exercise (CPT 71693), Gait Training (CPT 84197) and Manual Therapy (CPT 48259)  Frequency:  2x week  Duration in weeks:  6  Duration in visits:  12  Discussed with:  Patient      Functional Assessment Used        Referring provider co-signature:  I have reviewed this plan of care and my co-signature certifies the need for services.    Certification Period: 08/20/2021 to  10/01/21    Physician Signature: ________________________________ Date: ______________

## 2021-08-20 ENCOUNTER — PHYSICAL THERAPY (OUTPATIENT)
Dept: PHYSICAL THERAPY | Facility: REHABILITATION | Age: 66
End: 2021-08-20
Attending: NURSE PRACTITIONER
Payer: COMMERCIAL

## 2021-08-20 DIAGNOSIS — M25.561 ACUTE PAIN OF RIGHT KNEE: ICD-10-CM

## 2021-08-20 PROCEDURE — 97110 THERAPEUTIC EXERCISES: CPT

## 2021-08-20 PROCEDURE — 97162 PT EVAL MOD COMPLEX 30 MIN: CPT

## 2021-08-20 SDOH — ECONOMIC STABILITY: GENERAL: QUALITY OF LIFE: GOOD

## 2021-08-20 ASSESSMENT — ENCOUNTER SYMPTOMS
ALLEVIATING FACTORS: PAIN MEDICATION
EXACERBATED BY: PROLONGED STANDING
PAIN SCALE AT LOWEST: 3
EXACERBATED BY: BENDING
PAIN SCALE: 4
ALLEVIATING FACTORS: COLD/ICE
ALLEVIATING FACTORS: HEATING PAD
PAIN TIMING: IN THE MORNING
EXACERBATED BY: WALKING
PAIN SCALE AT HIGHEST: 5
QUALITY: BURNING
AWAKENINGS PER NIGHT: 2
QUALITY: SHARP
EXACERBATED BY: SLEEPING

## 2021-08-20 ASSESSMENT — ACTIVITIES OF DAILY LIVING (ADL): POOR_BALANCE: 1

## 2021-08-27 ENCOUNTER — PHYSICAL THERAPY (OUTPATIENT)
Dept: PHYSICAL THERAPY | Facility: REHABILITATION | Age: 66
End: 2021-08-27
Attending: NURSE PRACTITIONER
Payer: COMMERCIAL

## 2021-08-27 DIAGNOSIS — M25.561 ACUTE PAIN OF RIGHT KNEE: ICD-10-CM

## 2021-08-27 PROCEDURE — 97140 MANUAL THERAPY 1/> REGIONS: CPT

## 2021-08-27 PROCEDURE — 97110 THERAPEUTIC EXERCISES: CPT

## 2021-08-27 PROCEDURE — 97014 ELECTRIC STIMULATION THERAPY: CPT

## 2021-08-27 NOTE — OP THERAPY DAILY TREATMENT
Outpatient Physical Therapy  DAILY TREATMENT     [facility] Outpatient Physical Therapy [address] [facility], [address]  [place] [address]  Phone:  [phone]    Date: 08/27/2021    Patient: Ines Long  YOB: 1955  MRN: [id number]     Time Calculation    Start time: 0930  Stop time: 1015 Time Calculation (min): 45 minutes         Chief Complaint: Knee Problem    Visit #: 2    SUBJECTIVE:  The patient reports having pain last week to her low back and weakness to her legs when she got up from a seated position.    OBJECTIVE:  Current objective measures:       Decrease lumbar hypotonicity to paraspinals L1-L5    Therapeutic Exercises (CPT 70026):     1. Nu step bike (seat 18) L2, 7 minutes     2. Posterior pelvic tilts , 2 x10 reps    3. Knees to chest, 3x 30 sec    4. 3 way hip     5. Heel raises     6. Bridges, 1 x10 reps    7. Leg extensions    8. 30 deg wedge calf stretch    9. Foam    Therapeutic Treatments and Modalities:     1. Manual Therapy (CPT 40530), lumbar , STM to paraspinals; PA lumbar joint mobilizations L1-L5    2. E Stim Unattended (CPT 25700), lumbar , IFC to the lumbar and (L) hip     Time-based treatments/modalities:    Physical Therapy Timed Treatment Charges  Manual therapy minutes (CPT 54290): 15 minutes  Therapeutic exercise minutes (CPT 87908): 30 minutes  ASSESSMENT:   Response to treatment:   STM to the lumbar paraspinals; tenderness at L3-L4-L5 with hypotonicity present; performed PA's at L3-4-5 grade 2; increased mobility following treatment. Gait pattern observed decreased heel toe pattern. Next visit: manual techniques bilateral ankle foot; calcaneal/subtalar/talocrural joint mobs    PLAN/RECOMMENDATIONS:   Plan for treatment: therapy treatment to continue next visit.  Planned interventions for next visit: continue with current treatment.

## 2021-09-03 ENCOUNTER — PHYSICAL THERAPY (OUTPATIENT)
Dept: PHYSICAL THERAPY | Facility: REHABILITATION | Age: 66
End: 2021-09-03
Attending: NURSE PRACTITIONER
Payer: COMMERCIAL

## 2021-09-03 DIAGNOSIS — M25.561 ACUTE PAIN OF RIGHT KNEE: ICD-10-CM

## 2021-09-03 PROCEDURE — 97014 ELECTRIC STIMULATION THERAPY: CPT

## 2021-09-03 PROCEDURE — 97140 MANUAL THERAPY 1/> REGIONS: CPT

## 2021-09-03 PROCEDURE — 97112 NEUROMUSCULAR REEDUCATION: CPT

## 2021-09-03 PROCEDURE — 97533 SENSORY INTEGRATION: CPT

## 2021-09-03 NOTE — OP THERAPY DAILY TREATMENT
"  Outpatient Physical Therapy  DAILY TREATMENT     [facility] Physical Therapy [address] [facility], [address]  [place] [address]  Phone:  [phone]    Date: 09/03/2021    Patient: Ines Long  YOB: 1955  MRN: [id number]     Time Calculation                   Chief Complaint: Knee Problem    Visit #: 3    SUBJECTIVE:  The patient reports (R) knee pain this morning; she used Lidocaine patches today. She feels that she can move more easily.     OBJECTIVE:  Current objective measures:       increase (R) hip flexion; (R) knee flexion and decrease pain to the (R) leg    Therapeutic Exercises (CPT 27233):     1. Nu step bike (seat 18) L2, 8 minutes     2. Posterior pelvic tilts , 2 x10 reps, added abdominal crunches     3. Knees to chest, 3x 30 sec    4. 3 way hip     5. Heel raises     6. Bridges, 1 x10 reps    7. Leg extensions    8. 30 deg wedge calf stretch, 3 x30 sec    9. Wall squats    10. Leg press shuttle , 4B at 1 x25 reps    11. Steps 4\", 1 x10 reps , (R) 1st phalanx fused; difficult to step onto and off 7\" step    Therapeutic Treatments and Modalities:     1. Manual Therapy (CPT 12279), lumbar , STM to paraspinals; PA lumbar joint mobilizations L1-L5    2. E Stim Unattended (CPT 17163), lumbar , IFC to the lumbar and (L) hip with moist heat pack 15 minutes     Time-based treatments/modalities:         ASSESSMENT:   Response to treatment:   STM to the lumbar paraspinals; hypotonicity to the QL's, paraspinals at L1-L5; patient experienced less tightness following treatment with soft tissue mobs. Patient demonstrated increased trunk flexion with more mobility and less pain reaching toward ground level. Recommended patient use electrical stimulation and moist heat to alleviate remaining pain to (L) hip and sacral-iliac joint.  Performed stepping drill on 4\" step; unabl eto step onto 7\" step due to 1st phalanx fusion.      PLAN/RECOMMENDATIONS:   Plan for treatment: therapy treatment " to continue next visit.  Planned interventions for next visit: continue with current treatment.

## 2021-09-09 NOTE — OP THERAPY DAILY TREATMENT
"  Outpatient Physical Therapy  DAILY TREATMENT     [facility] Outpatient Physical Therapy [address], [address]  [place] [address]  Phone:  [phone]    Date: 09/10/2021    Patient: Ines Long  YOB: 1955  MRN: [id number]     Time Calculation                   Chief Complaint: No chief complaint on file.    Visit #: 4    SUBJECTIVE:  The patient reports     OBJECTIVE:  Current objective measures:       Increase AROM in (R) hip and knee iflexion; decreased pain     Therapeutic Exercises (CPT 28821):     1. Nu step bike (seat 18) L2, 8 minutes     2. Posterior pelvic tilts , 2 x10 reps, added abdominal crunches     3. Knees to chest, 3x 30 sec    4. 3 way hip     5. Heel raises     6. Bridges, 1 x10 reps    7. Leg extensions    8. 30 deg wedge calf stretch, 3 x30 sec    9. Wall squats    10. Leg press shuttle , 4B at 1 x25 reps    11. Steps 4\", 1 x10 reps , (R) 1st phalanx fused; difficult to step onto and off 7\" step    Therapeutic Treatments and Modalities:     1. Manual Therapy (CPT 77976), lumbar , STM to paraspinals; PA lumbar joint mobilizations L1-L5    2. E Stim Unattended (CPT 42960), lumbar , IFC to the lumbar and (L) hip with moist heat pack 15 minutes     Time-based treatments/modalities:           ASSESSMENT:   Response to treatment:     PLAN/RECOMMENDATIONS:   Plan for treatment: therapy treatment to continue next visit.  Planned interventions for next visit: continue with current treatment.       "

## 2021-09-10 ENCOUNTER — APPOINTMENT (OUTPATIENT)
Dept: PHYSICAL THERAPY | Facility: REHABILITATION | Age: 66
End: 2021-09-10
Attending: NURSE PRACTITIONER
Payer: COMMERCIAL

## 2021-09-16 NOTE — OP THERAPY DAILY TREATMENT
"  Outpatient Physical Therapy  DAILY TREATMENT     [facility] Physical Therapy [address] [facility], [address]  [place] [address]  Phone:  [phone]    Date: 09/17/2021    Patient: Ines Long  YOB: 1955  MRN: [id number]     Time Calculation    Start time: 0930  Stop time: 1015 Time Calculation (min): 45 minutes         Chief Complaint: Knee Problem and Difficulty Walking    Visit #: 4    SUBJECTIVE:  The patient reports getting up with stiffness to her low back and (R) knee pain lately on and off. Scheduled to have an MRI next Thursday.    OBJECTIVE:  Current objective measures:       Increase (R) ankle foot dorsiflexion; increase hamstring length decrease connective tissue tension     Therapeutic Exercises (CPT 42432):     1. Nu step bike (seat 18) L2, 8 minutes     2. Posterior pelvic tilts , 2 x10 reps, added abdominal crunches     3. Knees to chest, 3x 30 sec    4. 3 way hip     5. Heel raises , 20x    6. Bridges, 1 x10 reps    7. Leg extensions    8. 30 deg wedge calf stretch, 3 x30 sec    9. Wall squats    10. Leg press shuttle , 4B at 1 x25 reps    11. Steps 4\", 1 x10 reps , (R) 1st phalanx fused; difficult to step onto and off 7\" step    12. Treadmill (1.4mph), 6 minutes at 0%    13. Child's pose, 2-3\"    Therapeutic Treatments and Modalities:     1. Manual Therapy (CPT 74062), lumbar , STM to paraspinals; PA lumbar joint mobilizations L1-L5    2. E Stim Unattended (CPT 47434), lumbar , IFC to the lumbar and (L) hip with moist heat pack 15 minutes     Time-based treatments/modalities:    Physical Therapy Timed Treatment Charges  Manual therapy minutes (CPT 25977): 10 minutes  Neuromusc re-ed, balance, coor, post minutes (CPT 34795): 10 minutes  Therapeutic exercise minutes (CPT 24877): 25 minutes     ASSESSMENT:   Response to treatment:   Patient walked on treadmill for 6 minutes at 1.0 - 1.4 mph with some deviation in step length and stance time over the (R) LE. Performed " manual techniques to (R) LE; talocrural joint mobs AP's grade 2 with increased hypomobility. Patient described tightness during inversion/eversion at talocrural joint with pain in inversion to medial joint line and inferior. Patient has increased hypomobility to sacral-lumbar region; increased lumbago; recommended electrical stimulation and MHP following treatment today.      PLAN/RECOMMENDATIONS:   Plan for treatment: therapy treatment to continue next visit.  Planned interventions for next visit: continue with current treatment.

## 2021-09-17 ENCOUNTER — PHYSICAL THERAPY (OUTPATIENT)
Dept: PHYSICAL THERAPY | Facility: REHABILITATION | Age: 66
End: 2021-09-17
Attending: NURSE PRACTITIONER
Payer: COMMERCIAL

## 2021-09-17 DIAGNOSIS — M25.561 ACUTE PAIN OF RIGHT KNEE: ICD-10-CM

## 2021-09-17 PROCEDURE — 97112 NEUROMUSCULAR REEDUCATION: CPT

## 2021-09-17 PROCEDURE — 97110 THERAPEUTIC EXERCISES: CPT

## 2021-09-17 PROCEDURE — 97014 ELECTRIC STIMULATION THERAPY: CPT

## 2021-09-23 ENCOUNTER — HOSPITAL ENCOUNTER (OUTPATIENT)
Dept: RADIOLOGY | Facility: MEDICAL CENTER | Age: 66
End: 2021-09-23
Attending: PHYSICIAN ASSISTANT
Payer: COMMERCIAL

## 2021-09-23 DIAGNOSIS — M25.561 ACUTE PAIN OF RIGHT KNEE: ICD-10-CM

## 2021-09-23 PROCEDURE — 73721 MRI JNT OF LWR EXTRE W/O DYE: CPT | Mod: RT

## 2021-09-24 ENCOUNTER — APPOINTMENT (OUTPATIENT)
Dept: PHYSICAL THERAPY | Facility: REHABILITATION | Age: 66
End: 2021-09-24
Attending: NURSE PRACTITIONER
Payer: COMMERCIAL

## 2021-10-20 ENCOUNTER — TELEPHONE (OUTPATIENT)
Dept: PHYSICAL THERAPY | Facility: REHABILITATION | Age: 66
End: 2021-10-20

## 2021-10-20 NOTE — OP THERAPY DISCHARGE SUMMARY
Outpatient Physical Therapy  DISCHARGE SUMMARY NOTE      [facility] Physical Therapy [address], [address]  [place] [address]  Phone:  [phone]    Date of Visit: 10/20/2021    Patient: Ines Long  YOB: 1955  MRN: [id number]     Referring Provider: No referring provider defined for this encounter.   Referring Diagnosis No admission diagnoses are documented for this encounter.         Functional Assessment Used        Your patient is being discharged from Physical Therapy with the following comments:   · Patient has failed to schedule or reschedule follow-up visits    Mrs Long was seen in physical therapy for evaluation and treatment of (R) knee pain.  Patient attended a total of 4 treatment sessions over 2 weeks time period. Treatment included Ther Ex, Ther Act, Manual Therapy, Neuro Re-ed and Modalities as needed. Following last treatment session patient did not schedule any additional follow up visits, and at this time, he/she has been discharged from skilled therapy services.       At time of last visit, patient was showing minimal progress towards established goals and minimal improvement in impairments seen at evaluation. Barriers to therapy, limiting progress, included:      1) deviated gait pattern due to (R) knee pain; decreased stance time; weightbearing and step length   2) (R) knee strength prone flexion 4+/5; (R) ankle dorsi-flexion and inversion /eversion 4+/5    Thank you for the opportunity to work with Mrs. Long and participate in their care. If I may be of any further assistance please feel free to contact me.     Lavon Valenzuela, PT    Date: 10/20/2021

## 2021-10-28 ENCOUNTER — HOSPITAL ENCOUNTER (OUTPATIENT)
Dept: LAB | Facility: MEDICAL CENTER | Age: 66
End: 2021-10-28
Attending: INTERNAL MEDICINE
Payer: COMMERCIAL

## 2021-10-28 LAB
EST. AVERAGE GLUCOSE BLD GHB EST-MCNC: 154 MG/DL
HBA1C MFR BLD: 7 % (ref 4–5.6)

## 2021-10-28 PROCEDURE — 36415 COLL VENOUS BLD VENIPUNCTURE: CPT

## 2021-10-28 PROCEDURE — 83036 HEMOGLOBIN GLYCOSYLATED A1C: CPT

## 2021-11-18 ENCOUNTER — HOSPITAL ENCOUNTER (OUTPATIENT)
Dept: RADIOLOGY | Facility: MEDICAL CENTER | Age: 66
End: 2021-11-18
Attending: SURGERY
Payer: COMMERCIAL

## 2021-11-18 DIAGNOSIS — I65.29 OCCLUSION AND STENOSIS OF UNSPECIFIED CAROTID ARTERY: ICD-10-CM

## 2021-11-18 PROCEDURE — 93880 EXTRACRANIAL BILAT STUDY: CPT

## 2022-01-07 ENCOUNTER — TELEPHONE (OUTPATIENT)
Dept: MEDICAL GROUP | Facility: PHYSICIAN GROUP | Age: 67
End: 2022-01-07

## 2022-01-07 NOTE — TELEPHONE ENCOUNTER
VOICEMAIL  1. Caller Name: Ines Long                      Call Back Number: [phone]    2. Message: Ines left  stating she was positive for COVID, she received infusion, and would like an Rx for COVID pill. Pt would like to have at [address]     3. Patient approves office to leave a detailed voicemail/MyChart message: N\A

## 2022-01-07 NOTE — TELEPHONE ENCOUNTER
Patient called me when she was unable to reach her primary care provider, Ms. Dawson.  She had not seen the [employer] message sent to her from our office wherein Dr. Sierra noted she was not a candidate for further covid drug treatment because she has already received monoclonal antibody treatment.      Patient's current symptoms include sneezing, cough, stuffy nose, a little diarrhea and she has to work harder to take a deep breath.  Her current oxygen saturation is 97%.  She is better now than she was previously.      She is covid +, symptoms started on 12/31/21 which would exclude her from taking Paxlovid (must be given w/in 5 days of symptom onset).      Her retired endocrinologist suggested she reach out to her PCP for received an additional treatment for covid.  She was trying to determine if she was indeed a candidate for same.      She received her antibody infusion at [facility] in [place] on 1/5/22.

## 2022-01-07 NOTE — TELEPHONE ENCOUNTER
She has already received the monoclonal antibody infusion, so there is no further treatment unless she ends up hospitalized.

## 2022-02-25 ENCOUNTER — APPOINTMENT (OUTPATIENT)
Dept: RADIOLOGY | Facility: MEDICAL CENTER | Age: 67
End: 2022-02-25
Attending: SURGERY
Payer: COMMERCIAL

## 2022-03-07 ENCOUNTER — HOSPITAL ENCOUNTER (OUTPATIENT)
Dept: LAB | Facility: MEDICAL CENTER | Age: 67
End: 2022-03-07
Attending: INTERNAL MEDICINE
Payer: COMMERCIAL

## 2022-03-07 ENCOUNTER — OFFICE VISIT (OUTPATIENT)
Dept: MEDICAL GROUP | Facility: PHYSICIAN GROUP | Age: 67
End: 2022-03-07
Payer: COMMERCIAL

## 2022-03-07 VITALS
BODY MASS INDEX: 24.99 KG/M2 | SYSTOLIC BLOOD PRESSURE: 128 MMHG | HEIGHT: 67 IN | HEART RATE: 73 BPM | RESPIRATION RATE: 16 BRPM | TEMPERATURE: 97.3 F | WEIGHT: 159.2 LBS | DIASTOLIC BLOOD PRESSURE: 68 MMHG | OXYGEN SATURATION: 99 %

## 2022-03-07 DIAGNOSIS — Z12.31 ENCOUNTER FOR SCREENING MAMMOGRAM FOR MALIGNANT NEOPLASM OF BREAST: ICD-10-CM

## 2022-03-07 DIAGNOSIS — E10.69 CONTROLLED TYPE 1 DIABETES MELLITUS WITH OTHER COMPLICATION (HCC): ICD-10-CM

## 2022-03-07 PROBLEM — I65.29 CAROTID ARTERY STENOSIS: Status: ACTIVE | Noted: 2021-12-14

## 2022-03-07 LAB
ALBUMIN SERPL BCP-MCNC: 4.8 G/DL (ref 3.2–4.9)
ALBUMIN/GLOB SERPL: 1.8 G/DL
ALP SERPL-CCNC: 85 U/L (ref 30–99)
ALT SERPL-CCNC: 24 U/L (ref 2–50)
ANION GAP SERPL CALC-SCNC: 13 MMOL/L (ref 7–16)
AST SERPL-CCNC: 19 U/L (ref 12–45)
BILIRUB SERPL-MCNC: 0.6 MG/DL (ref 0.1–1.5)
BUN SERPL-MCNC: 13 MG/DL (ref 8–22)
CALCIUM SERPL-MCNC: 9.9 MG/DL (ref 8.5–10.5)
CHLORIDE SERPL-SCNC: 93 MMOL/L (ref 96–112)
CHOLEST SERPL-MCNC: 185 MG/DL (ref 100–199)
CO2 SERPL-SCNC: 22 MMOL/L (ref 20–33)
CREAT SERPL-MCNC: 0.84 MG/DL (ref 0.5–1.4)
CREAT UR-MCNC: 183.05 MG/DL
EST. AVERAGE GLUCOSE BLD GHB EST-MCNC: 174 MG/DL
FASTING STATUS PATIENT QL REPORTED: NORMAL
GLOBULIN SER CALC-MCNC: 2.6 G/DL (ref 1.9–3.5)
GLUCOSE SERPL-MCNC: 126 MG/DL (ref 65–99)
HBA1C MFR BLD: 7.7 % (ref 4–5.6)
HDLC SERPL-MCNC: 80 MG/DL
LDLC SERPL CALC-MCNC: 86 MG/DL
MICROALBUMIN UR-MCNC: <1.2 MG/DL
MICROALBUMIN/CREAT UR: NORMAL MG/G (ref 0–30)
POTASSIUM SERPL-SCNC: 4.9 MMOL/L (ref 3.6–5.5)
PROT SERPL-MCNC: 7.4 G/DL (ref 6–8.2)
SODIUM SERPL-SCNC: 128 MMOL/L (ref 135–145)
TRIGL SERPL-MCNC: 96 MG/DL (ref 0–149)
TSH SERPL DL<=0.005 MIU/L-ACNC: 0.7 UIU/ML (ref 0.38–5.33)

## 2022-03-07 PROCEDURE — 80053 COMPREHEN METABOLIC PANEL: CPT

## 2022-03-07 PROCEDURE — 80061 LIPID PANEL: CPT

## 2022-03-07 PROCEDURE — 99396 PREV VISIT EST AGE 40-64: CPT | Performed by: NURSE PRACTITIONER

## 2022-03-07 PROCEDURE — 84443 ASSAY THYROID STIM HORMONE: CPT

## 2022-03-07 PROCEDURE — 36415 COLL VENOUS BLD VENIPUNCTURE: CPT

## 2022-03-07 PROCEDURE — 83036 HEMOGLOBIN GLYCOSYLATED A1C: CPT

## 2022-03-07 PROCEDURE — 82043 UR ALBUMIN QUANTITATIVE: CPT

## 2022-03-07 PROCEDURE — 82570 ASSAY OF URINE CREATININE: CPT

## 2022-03-07 RX ORDER — HYDROCODONE BITARTRATE AND ACETAMINOPHEN 5; 325 MG/1; MG/1
TABLET ORAL
COMMUNITY
Start: 2022-02-18 | End: 2022-04-14

## 2022-03-07 RX ORDER — SULFAMETHOXAZOLE AND TRIMETHOPRIM 800; 160 MG/1; MG/1
TABLET ORAL
COMMUNITY
End: 2022-03-24

## 2022-03-07 RX ORDER — PANTOPRAZOLE SODIUM 40 MG/1
40 TABLET, DELAYED RELEASE ORAL DAILY
COMMUNITY
Start: 2022-02-14

## 2022-03-07 RX ORDER — METHYLPREDNISOLONE 4 MG/1
TABLET ORAL
COMMUNITY
Start: 2022-02-18 | End: 2022-03-24

## 2022-03-07 ASSESSMENT — FIBROSIS 4 INDEX: FIB4 SCORE: 1.17

## 2022-03-07 ASSESSMENT — PATIENT HEALTH QUESTIONNAIRE - PHQ9: CLINICAL INTERPRETATION OF PHQ2 SCORE: 0

## 2022-03-07 NOTE — PROGRESS NOTES
Subjective:     CC:   Chief Complaint   Patient presents with   • Annual Exam       HPI:   Ines Long is a 66 y.o. female who presents for annual exam. She is feeling well and denies any complaints.    Following up with Dr. Carson regarding gait MRI spine/EMG/EEG upcoming, following with Dr. Díaz    Ob-Gyn/ History:    Patient has GYN provider: yes, retired  /Para:  2/2  Last Pap Smear:  Pap due in . no history of abnormal pap smears.  Gyn Surgery:  negative.  Last menstrual period:  Post-menopausal.  No significant bloating/fluid retention, pelvic pain, or dyspareunia. No vaginal discharge  Post-menopausal bleeding: none    Health Maintenance  Advanced directive: no    Osteoporosis Screen/ DEXA: utd   PT/vit D for falls prevention: is seeing vitamin D   Cholesterol Screening: utd, checked today   Diabetes Screening: utd, follows with Dr. Dewey   Diet: healthy diet overall  Exercise: states hasn't been exercising related to pain.    Substance Abuse: no concern   Safe in relationship.   Seat belts, bike helmet, gun safety discussed.  Sun protection used.    Cancer screening  Colorectal Cancer Screening: scheduled    Cervical Cancer Screening: consider tomorrow.    Breast Cancer Screening: ordered     Infectious disease screening/Immunizations  --STI Screening: no need   --Practices safe sex.  --HIV Screening: completed and negative   --Hepatitis C Screening: completed and negative   --Immunizations:    Influenza: declines    HPV:  utd    Tetanus:     Shingles: n/a    Pneumococcal : utd                Other immunizations: utd         She  has a past medical history of Anesthesia, Diabetes type 1, controlled (HCC) (2010), Dyslipidemia (2010), High cholesterol, Hypothyroidism (2010), Insulin pump in place (2011), Left carotid artery stenosis - severe 2019, PONV (postoperative nausea and vomiting), and Routine health maintenance (2011).  She  has a past surgical  history that includes cholecystectomy; abdominal hysterectomy total (); bunionectomy (); knee arthrotomy (Right, ); shoulder arthroscopy (Left, ); orthopedic osteotomy (Left, 2017); ligament repair (2017); toe fusion (2017); repair, tendon, lower extremity, using tendon graft (2017); and carotid endarterectomy (Left, 2019).    Family History   Problem Relation Age of Onset   • Cancer Mother         rectal cancer   • Stroke Father    • Heart Disease Father    • Hypertension Father    • Lung Disease Father         tb   • Diabetes Sister    • Psychiatric Illness Sister         bipolar   • Heart Disease Maternal Grandfather    • Stroke Paternal Grandmother    • Heart Disease Paternal Grandfather        Social History     Socioeconomic History   • Marital status:      Spouse name: Not on file   • Number of children: Not on file   • Years of education: Not on file   • Highest education level: Not on file   Occupational History   • Not on file   Tobacco Use   • Smoking status: Former Smoker     Packs/day: 0.00     Years: 2.00     Pack years: 0.00     Types: Cigarettes     Quit date: 1977     Years since quittin.2   • Smokeless tobacco: Never Used   Vaping Use   • Vaping Use: Never used   Substance and Sexual Activity   • Alcohol use: Yes     Alcohol/week: 1.0 oz     Types: 2 Glasses of wine per week   • Drug use: Never   • Sexual activity: Yes     Partners: Male     Birth control/protection: Surgical   Other Topics Concern   • Not on file   Social History Narrative   • Not on file     Social Determinants of Health     Financial Resource Strain: Not on file   Food Insecurity: Not on file   Transportation Needs: Not on file   Physical Activity: Not on file   Stress: Not on file   Social Connections: Not on file   Intimate Partner Violence: Not on file   Housing Stability: Not on file       Patient Active Problem List    Diagnosis Date Noted   • Tattoo reaction  07/15/2021   • Acute pain of right knee 07/15/2021   • Rash of face 06/02/2021   • Other fatigue 06/02/2021   • Neck pain on left side 12/10/2020   • Left carotid artery stenosis - severe 2019    • Hormone replacement therapy (HRT) 10/23/2018   • Hyperglycemia 08/16/2011   • Insulin pump in place 05/18/2011   • Routine health maintenance 05/18/2011   • Diabetes type 1, controlled (HCC) 11/01/2010   • Hypothyroidism 11/01/2010   • Dyslipidemia 11/01/2010         Current Outpatient Medications   Medication Sig Dispense Refill   • HYDROcodone-acetaminophen (NORCO) 5-325 MG Tab per tablet      • methylPREDNISolone (MEDROL DOSEPAK) 4 MG Tablet Therapy Pack      • pantoprazole (PROTONIX) 40 MG Tablet Delayed Response      • sulfamethoxazole-trimethoprim (BACTRIM DS) 800-160 MG tablet sulfamethoxazole 800 mg-trimethoprim 160 mg tablet     • CLINDAMYCIN PHOSPHATE,TOPICAL, 1 % Lotion Apply  topically 2 times a day.     • clobetasol (TEMOVATE) 0.05 % Cream Apply  topically 2 times a day.     • FIASP 100 UNIT/ML Solution      • rosuvastatin (CRESTOR) 40 MG tablet Take 1 Tab by mouth every bedtime. 30 Tab 1   • aspirin 81 MG tablet Take 81 mg by mouth every evening.     • ibuprofen (MOTRIN) 600 MG Tab Take 600 mg by mouth every bedtime.     • insulin infusion pump Device Inject  under the skin continuous. FIASP INSULIN BRAND   Basal Rate:  11:30  0.5 units  14:30 0.45 units  16:00 0.5 units   Early morning = unknown units  Bolus:1 unit for every 12 of carbs        Change tube yesterday 12/05/2019  19:30 pm  Indications: Novolog     • levothyroxine (SYNTHROID) 125 MCG Tab Take 62.5-125 mcg by mouth See Admin Instructions. Takes 1/2 tab Mon, Wed, Fri and a full tab the rest of the week.     • Cholecalciferol (VITAMIN D-3 PO) Take 5,000 mg by mouth every day.     • Multiple Vitamins-Minerals (MULTIVITAMIN WOMEN PO) Take 1 Tab by mouth every day.     • metoclopramide (REGLAN) 10 MG TABS Take 10 mg by mouth 2 Times a Day.       No  "current facility-administered medications for this visit.     Allergies   Allergen Reactions   • Ceclor [Cefaclor] Hives and Swelling   • Augmentin Hives, Diarrhea and Vomiting     Fever blisters   Sickness lasts forever   • Naproxen      Face Swells    • Tape Rash     \"Certain band aids\"  PAPER TAPE OKAY       Review of Systems   Constitutional: Negative for fever, chills and malaise/fatigue.   HENT: Negative for congestion.    Eyes: Negative for pain.   Respiratory: Negative for cough and shortness of breath.    Cardiovascular: Negative for leg swelling.   Gastrointestinal: Negative for nausea, vomiting, abdominal pain and diarrhea.   Genitourinary: Negative for dysuria and hematuria.   Skin: Negative for rash.   Neurological: Negative for dizziness, focal weakness and headaches.   Endo/Heme/Allergies: Does not bruise/bleed easily.   Psychiatric/Behavioral: Negative for depression.  The patient is not nervous/anxious.      Objective:     /68 (BP Location: Left arm, Patient Position: Sitting, BP Cuff Size: Adult)   Pulse 73   Temp 36.3 °C (97.3 °F) (Temporal)   Resp 16   Ht 1.702 m (5' 7\")   Wt 72.2 kg (159 lb 3.2 oz)   LMP 01/01/1983 (LMP Unknown)   SpO2 99%   BMI 24.93 kg/m²   Body mass index is 24.93 kg/m².  Wt Readings from Last 4 Encounters:   03/07/22 72.2 kg (159 lb 3.2 oz)   07/29/21 68 kg (150 lb)   07/15/21 71.1 kg (156 lb 12.8 oz)   06/02/21 69.4 kg (153 lb)       Physical Exam:  Constitutional: Well-developed and well-nourished. Not diaphoretic. No distress.   Skin: Skin is warm and dry. No rash noted.  Head: Atraumatic without lesions.  Eyes: Conjunctivae and extraocular motions are normal. Pupils are equal, round, and reactive to light. No scleral icterus.   Ears:  External ears unremarkable. Tympanic membranes clear and intact.  Nose: Nares patent. Septum midline. Turbinates without erythema nor edema. No discharge.   Mouth/Throat: Dentition is WNL. Tongue normal. Oropharynx is clear " and moist. Posterior pharynx without erythema or exudates.  Neck: Supple, trachea midline. Normal range of motion. No thyromegaly present. No lymphadenopathy--cervical or supraclavicular.  Cardiovascular: Regular rate and rhythm, S1 and S2 without murmur, rubs, or gallops.  Lungs: Normal inspiratory effort, CTA bilaterally, no wheezes/rhonchi/rales  Abdomen: Soft, non tender, and without distention. Active bowel sounds in all four quadrants. No rebound, guarding, masses or HSM.  Extremities: No cyanosis, clubbing, erythema, nor edema. Distal pulses intact and symmetric.   Musculoskeletal: All major joints AROM full in all directions without pain.  Neurological: Alert and oriented x 3.  No cranial nerve deficit. 5/5 myotomes. Sensation intact. Negative Rhomberg.  Psychiatric:  Behavior, mood, and affect are appropriate.    Monofilament testing with a 10 gram force: sensation intact: intact bilaterally  Visual Inspection: Feet without maceration, ulcers, fissures.  Pedal pulses: intact bilaterally      Assessment and Plan:     1. Controlled type 1 diabetes mellitus with other complication (HCC)  Diabetic Monofilament Lower Extremity Exam   2. Encounter for screening mammogram for malignant neoplasm of breast  MA-SCREENING MAMMO BILAT W/TOMOSYNTHESIS W/CAD       HCM:     Labs per orders  Immunizations per orders  Patient counseled about skin care, diet, supplements, prenatal vitamins, safe sex and exercise.    Follow-up: Return in about 6 months (around 9/7/2022), or if symptoms worsen or fail to improve.

## 2022-03-24 ENCOUNTER — NURSE TRIAGE (OUTPATIENT)
Dept: MEDICAL GROUP | Facility: PHYSICIAN GROUP | Age: 67
End: 2022-03-24

## 2022-03-24 ENCOUNTER — OFFICE VISIT (OUTPATIENT)
Dept: MEDICAL GROUP | Facility: PHYSICIAN GROUP | Age: 67
End: 2022-03-24
Payer: COMMERCIAL

## 2022-03-24 VITALS
BODY MASS INDEX: 24.67 KG/M2 | HEIGHT: 67 IN | OXYGEN SATURATION: 99 % | WEIGHT: 157.2 LBS | RESPIRATION RATE: 16 BRPM | SYSTOLIC BLOOD PRESSURE: 170 MMHG | DIASTOLIC BLOOD PRESSURE: 81 MMHG | HEART RATE: 91 BPM | TEMPERATURE: 97.9 F

## 2022-03-24 DIAGNOSIS — R03.0 ELEVATED BLOOD PRESSURE READING: ICD-10-CM

## 2022-03-24 DIAGNOSIS — R21 RASH OF FACE: ICD-10-CM

## 2022-03-24 PROCEDURE — 99213 OFFICE O/P EST LOW 20 MIN: CPT | Performed by: FAMILY MEDICINE

## 2022-03-24 RX ORDER — METHOCARBAMOL 500 MG/1
TABLET, FILM COATED ORAL
COMMUNITY
Start: 2022-03-19 | End: 2022-04-14

## 2022-03-24 RX ORDER — LOSARTAN POTASSIUM 25 MG/1
25 TABLET ORAL DAILY
Qty: 30 TABLET | Refills: 0 | Status: SHIPPED
Start: 2022-03-24 | End: 2022-04-06

## 2022-03-24 ASSESSMENT — ENCOUNTER SYMPTOMS
DIZZINESS: 0
SORE THROAT: 0
DOUBLE VISION: 0
SHORTNESS OF BREATH: 0
MYALGIAS: 0
HEADACHES: 0
FEVER: 0
COUGH: 0
CHILLS: 0
BLURRED VISION: 0
NAUSEA: 0
VOMITING: 0
PALPITATIONS: 0
NERVOUS/ANXIOUS: 1

## 2022-03-24 ASSESSMENT — FIBROSIS 4 INDEX: FIB4 SCORE: 0.82

## 2022-03-24 NOTE — ASSESSMENT & PLAN NOTE
New  Losartan 25mg sent for trial while bp elevated  Pt will monitor bid with logs  If bp >150/80 take 25mg  If bp <120/70 no bp med  Discussed red flags for ER eval if needed  rtc with logs 1 wk for review  Pt agrees

## 2022-03-24 NOTE — TELEPHONE ENCOUNTER
She called PCP office this morning because of concerns related to her elevated BP readings.  Is not having any symptoms associated with her elevated BP.  HTN not on her problem list and not medicated for same.      Urgent pain clinic on 3/19/22, in pain & could barely walk, lower back to foot, BP was 130/60 something, given shot of Toradol and antiinflammatory, lower legs swollen (L >R), swelling has gradually improved each day, last night before epidural BP was 160/102, underwent epidural, BP was 187/84 after she got home, and before bedtime BP was 160/74.  This morning BP was 174/76.  Pain was bad last night.  BP reading below is incorrect, it should be 174/76.    Epidural performed to try & relieve pain of pinched nerve in back.  Performed @ [facility].    Today, has pain in left buttock if she sits too long, pain level @ 2 to 3/10, can walk without pain & walker (could not do this before epidural). Pain level has improved since epidural.       Blood sugar is 334 @ 0840 this morning, this is high for her.  Gave herself more insulin using pump, going to call endocrinologist as soon as she gets off phone with me.         Reason for Disposition  • Systolic BP >= 180 OR Diastolic >= 110    Additional Information  • Negative: Sounds like a life-threatening emergency to the triager  • Negative: Pregnant > 20 weeks or postpartum (< 6 weeks after delivery) and new hand or face swelling  • Negative: Pregnant > 20 weeks and BP > 140/90  • Negative: Systolic BP >= 160 OR Diastolic >= 100, and any cardiac or neurologic symptoms (e.g., chest pain, difficulty breathing, unsteady gait, blurred vision)  • Negative: Patient sounds very sick or weak to the triager  • Negative: BP Systolic BP >= 140 OR Diastolic >= 90 and postpartum (from 0 to 6 weeks after delivery)  • Negative: Systolic BP >= 180 OR Diastolic >= 110, and missed most recent dose of blood pressure medication    Answer Assessment - Initial Assessment  "Questions  1. BLOOD PRESSURE: \"What is the blood pressure?\" \"Did you take at least two measurements 5 minutes apart?\"      184/78 @ 0630 3/24/22  2. ONSET: \"When did you take your blood pressure?\"      See above  3. HOW: \"How did you obtain the blood pressure?\" (e.g., visiting nurse, automatic home BP monitor)      Arm BP machine  4. HISTORY: \"Do you have a history of high blood pressure?\"      no  5. MEDICATIONS: \"Are you taking any medications for blood pressure?\" \"Have you missed any doses recently?\"      no  6. OTHER SYMPTOMS: \"Do you have any symptoms?\" (e.g., headache, chest pain, blurred vision, difficulty breathing, weakness)      no  7. PREGNANCY: \"Is there any chance you are pregnant?\" \"When was your last menstrual period?\"      NA    Protocols used: HIGH BLOOD PRESSURE-A-OH      "

## 2022-03-24 NOTE — PROGRESS NOTES
Subjective:     CC: elevated BP after epidural injection    HPI:   Ines presents today with her home BP cuff  (Bicep)  Had epidural yesterday 3/23  bp last night 160    bp ours 140/78  145/79 150/80  With three separate providers    Her cuff 170/81    Asymptomatic  Nervous    Type 1 dm, on insulin pump  Glucose 300s after inj.    Likely related to steroid?        Problem   Elevated Blood Pressure Reading   Rash of Face       Current Outpatient Medications Ordered in Epic   Medication Sig Dispense Refill   • methocarbamol (ROBAXIN) 500 MG Tab      • losartan (COZAAR) 25 MG Tab Take 1 Tablet by mouth every day. 30 Tablet 0   • HYDROcodone-acetaminophen (NORCO) 5-325 MG Tab per tablet      • pantoprazole (PROTONIX) 40 MG Tablet Delayed Response      • CLINDAMYCIN PHOSPHATE,TOPICAL, 1 % Lotion Apply  topically 2 times a day.     • clobetasol (TEMOVATE) 0.05 % Cream Apply  topically 2 times a day.     • FIASP 100 UNIT/ML Solution      • rosuvastatin (CRESTOR) 40 MG tablet Take 1 Tab by mouth every bedtime. 30 Tab 1   • aspirin 81 MG tablet Take 81 mg by mouth every evening.     • ibuprofen (MOTRIN) 600 MG Tab Take 600 mg by mouth every bedtime.     • insulin infusion pump Device Inject  under the skin continuous. FIASP INSULIN BRAND   Basal Rate:  11:30  0.5 units  14:30 0.45 units  16:00 0.5 units   Early morning = unknown units  Bolus:1 unit for every 12 of carbs        Change tube yesterday 12/05/2019  19:30 pm  Indications: Novolog     • levothyroxine (SYNTHROID) 125 MCG Tab Take 62.5-125 mcg by mouth See Admin Instructions. Takes 1/2 tab Mon, Wed, Fri and a full tab the rest of the week.     • Cholecalciferol (VITAMIN D-3 PO) Take 5,000 mg by mouth every day.     • Multiple Vitamins-Minerals (MULTIVITAMIN WOMEN PO) Take 1 Tab by mouth every day.     • metoclopramide (REGLAN) 10 MG TABS Take 10 mg by mouth 2 Times a Day.     • sulfamethoxazole-trimethoprim (BACTRIM DS) 800-160 MG tablet sulfamethoxazole 800  "mg-trimethoprim 160 mg tablet (Patient not taking: Reported on 3/24/2022)       No current [facility]-ordered facility-administered medications on file.     ROS:  Review of Systems   Constitutional: Negative for chills and fever.   HENT: Negative for ear pain and sore throat.    Eyes: Negative for blurred vision and double vision.   Respiratory: Negative for cough and shortness of breath.    Cardiovascular: Negative for chest pain and palpitations.   Gastrointestinal: Negative for nausea and vomiting.   Genitourinary: Negative for dysuria and hematuria.   Musculoskeletal: Negative for myalgias.   Neurological: Negative for dizziness and headaches.   Psychiatric/Behavioral: The patient is nervous/anxious.        Objective:     Exam:  BP (!) 170/81 (BP Location: Left arm, Patient Position: Sitting, BP Cuff Size: Adult) Comment: Home BP cuff  Pulse 91   Temp 36.6 °C (97.9 °F) (Temporal)   Resp 16   Ht 1.702 m (5' 7\")   Wt 71.3 kg (157 lb 3.2 oz)   LMP 01/01/1983 (LMP Unknown)   SpO2 99%   BMI 24.62 kg/m²  Body mass index is 24.62 kg/m².    Physical Exam  Constitutional:       General: She is not in acute distress.     Appearance: Normal appearance. She is not ill-appearing, toxic-appearing or diaphoretic.   Eyes:      General: No scleral icterus.        Right eye: No discharge.         Left eye: No discharge.      Extraocular Movements: Extraocular movements intact.      Conjunctiva/sclera: Conjunctivae normal.      Pupils: Pupils are equal, round, and reactive to light.   Cardiovascular:      Rate and Rhythm: Normal rate and regular rhythm.      Pulses: Normal pulses.      Heart sounds: Normal heart sounds. No murmur heard.  Pulmonary:      Effort: Pulmonary effort is normal. No respiratory distress.      Breath sounds: Normal breath sounds.   Skin:     Findings: Rash present.   Neurological:      Mental Status: She is alert.      Coordination: Coordination abnormal.      Gait: Gait abnormal.         Assessment & " Plan:     66 y.o. female with the following -     Problem List Items Addressed This Visit     Rash of face     Has appt with derm  F/u recommendations         Elevated blood pressure reading     New  Losartan 25mg sent for trial while bp elevated  Pt will monitor bid with logs  If bp >150/80 take 25mg  If bp <120/70 no bp med  Discussed red flags for ER eval if needed  rtc with logs 1 wk for review  Pt agrees         Relevant Medications    losartan (COZAAR) 25 MG Tab        Return in about 1 week (around 3/31/2022), or if symptoms worsen or fail to improve, for f/u bp.    Please note that this dictation was created using voice recognition software. I have made every reasonable attempt to correct obvious errors, but I expect that there are errors of grammar and possibly content that I did not discover before finalizing the note.

## 2022-03-31 ENCOUNTER — HOSPITAL ENCOUNTER (OUTPATIENT)
Dept: LAB | Facility: MEDICAL CENTER | Age: 67
End: 2022-03-31
Attending: PSYCHIATRY & NEUROLOGY
Payer: COMMERCIAL

## 2022-03-31 ENCOUNTER — TELEPHONE (OUTPATIENT)
Dept: MEDICAL GROUP | Facility: PHYSICIAN GROUP | Age: 67
End: 2022-03-31

## 2022-03-31 LAB
HCYS SERPL-SCNC: 13.23 UMOL/L
RHEUMATOID FACT SER IA-ACNC: 10 IU/ML (ref 0–14)
THYROPEROXIDASE AB SERPL-ACNC: <9 IU/ML (ref 0–9)
VIT B12 SERPL-MCNC: 464 PG/ML (ref 211–911)

## 2022-03-31 PROCEDURE — 86376 MICROSOMAL ANTIBODY EACH: CPT

## 2022-03-31 PROCEDURE — 82607 VITAMIN B-12: CPT

## 2022-03-31 PROCEDURE — 82595 ASSAY OF CRYOGLOBULIN: CPT

## 2022-03-31 PROCEDURE — 86235 NUCLEAR ANTIGEN ANTIBODY: CPT | Mod: 91

## 2022-03-31 PROCEDURE — 86800 THYROGLOBULIN ANTIBODY: CPT

## 2022-03-31 PROCEDURE — 83090 ASSAY OF HOMOCYSTEINE: CPT

## 2022-03-31 PROCEDURE — 36415 COLL VENOUS BLD VENIPUNCTURE: CPT

## 2022-03-31 PROCEDURE — 86038 ANTINUCLEAR ANTIBODIES: CPT

## 2022-03-31 PROCEDURE — 82525 ASSAY OF COPPER: CPT

## 2022-03-31 PROCEDURE — 86431 RHEUMATOID FACTOR QUANT: CPT

## 2022-03-31 PROCEDURE — 83921 ORGANIC ACID SINGLE QUANT: CPT

## 2022-03-31 PROCEDURE — 86341 ISLET CELL ANTIBODY: CPT

## 2022-04-02 LAB — NUCLEAR IGG SER QL IA: NORMAL

## 2022-04-03 LAB
COPPER SERPL-MCNC: 110.2 UG/DL (ref 80–155)
ENA SS-B IGG SER IA-ACNC: 0 AU/ML (ref 0–40)
SSA52 R0ENA AB IGG Q0420: 0 AU/ML (ref 0–40)
SSA60 R0ENA AB IGG Q0419: 1 AU/ML (ref 0–40)

## 2022-04-04 LAB — THYROGLOB AB SERPL-ACNC: <0.9 IU/ML (ref 0–4)

## 2022-04-05 LAB — CRYOGLOB SER QL 3D COLD INC: NORMAL

## 2022-04-05 NOTE — TELEPHONE ENCOUNTER
VOICEMAIL  1. Caller Name: Ines Long                      Call Back Number: [phone] (home)     2. Message: Pt called asking about what to do for HTN.  Saw Dr Espinosa a couple of weeks ago, and was told to keep a log of her BP's.  She brought the log in but has not heard anything from anyone.  Pt wants to know is it normal for BP to be higher after epidurals, or should she be doing something else.  Pt said her readings have been 140's -150's (systolic) 68's -80's (dyastolic).  Pt is asking for advice on what she should do.  Please advise, does Pt need another appt?

## 2022-04-06 ENCOUNTER — TELEPHONE (OUTPATIENT)
Dept: MEDICAL GROUP | Facility: PHYSICIAN GROUP | Age: 67
End: 2022-04-06
Payer: MEDICARE

## 2022-04-06 DIAGNOSIS — R03.0 ELEVATED BLOOD PRESSURE READING: ICD-10-CM

## 2022-04-06 LAB
GAD65 AB SER IA-ACNC: 7.7 IU/ML (ref 0–5)
METHYLMALONATE SERPL-SCNC: 0.21 UMOL/L (ref 0–0.4)

## 2022-04-06 RX ORDER — LOSARTAN POTASSIUM 50 MG/1
50 TABLET ORAL DAILY
Qty: 90 TABLET | Refills: 3 | Status: ON HOLD
Start: 2022-04-06 | End: 2022-05-16

## 2022-04-06 NOTE — TELEPHONE ENCOUNTER
BP logs reviewed, still elevated  Inc from 25g to 50mg losartan and drop off logs for review or RTC for OV  Discussed OV for highs or lows  Pt agrees

## 2022-04-06 NOTE — TELEPHONE ENCOUNTER
I never received a log. Did Dr. Espinosa see it? It is not scanned into her chart.     Dr. Espinosa do you have her BP log?

## 2022-04-13 ENCOUNTER — TELEPHONE (OUTPATIENT)
Dept: MEDICAL GROUP | Facility: PHYSICIAN GROUP | Age: 67
End: 2022-04-13
Payer: MEDICARE

## 2022-04-13 NOTE — TELEPHONE ENCOUNTER
Phone Number Called: [phone] (home)     Call outcome: Spoke to patient regarding message below.    Message: Informed the Pt she would need to be seen by the provider to follow up with him about this.  Scheduled the Pt for an appt with Dr Espinosa at 9:30 Am on 04/14/22.  No further questions at this time.

## 2022-04-13 NOTE — TELEPHONE ENCOUNTER
VOICEMAIL  1. Caller Name: Ines Long                      Call Back Number: [phone] (home)     2. Message: Pt called to report her BP systolic numbers have been >180, and dystolic numbers >80.  Was told by Dr Espinosa if her systolic was >170, and dystolic >80 to be reevaluated by him.  Pt asked if she could just come in for a BP check or would she need to be seen by the Provider.  Asked for a cb.

## 2022-04-14 ENCOUNTER — OFFICE VISIT (OUTPATIENT)
Dept: MEDICAL GROUP | Facility: PHYSICIAN GROUP | Age: 67
End: 2022-04-14
Payer: COMMERCIAL

## 2022-04-14 VITALS
DIASTOLIC BLOOD PRESSURE: 82 MMHG | SYSTOLIC BLOOD PRESSURE: 158 MMHG | OXYGEN SATURATION: 98 % | HEIGHT: 67 IN | TEMPERATURE: 98.6 F | HEART RATE: 84 BPM | WEIGHT: 155.2 LBS | RESPIRATION RATE: 16 BRPM | BODY MASS INDEX: 24.36 KG/M2

## 2022-04-14 DIAGNOSIS — I10 PRIMARY HYPERTENSION: ICD-10-CM

## 2022-04-14 DIAGNOSIS — M54.50 CHRONIC LOW BACK PAIN, UNSPECIFIED BACK PAIN LATERALITY, UNSPECIFIED WHETHER SCIATICA PRESENT: ICD-10-CM

## 2022-04-14 DIAGNOSIS — G89.29 CHRONIC LOW BACK PAIN, UNSPECIFIED BACK PAIN LATERALITY, UNSPECIFIED WHETHER SCIATICA PRESENT: ICD-10-CM

## 2022-04-14 DIAGNOSIS — E10.69 TYPE 1 DIABETES MELLITUS WITH OTHER SPECIFIED COMPLICATION (HCC): ICD-10-CM

## 2022-04-14 PROBLEM — R03.0 ELEVATED BLOOD PRESSURE READING: Status: RESOLVED | Noted: 2022-03-24 | Resolved: 2022-04-14

## 2022-04-14 PROCEDURE — 99213 OFFICE O/P EST LOW 20 MIN: CPT | Performed by: FAMILY MEDICINE

## 2022-04-14 RX ORDER — TRIAMCINOLONE ACETONIDE 0.25 MG/G
CREAM TOPICAL
Status: ON HOLD | COMMUNITY
Start: 2022-04-07 | End: 2022-05-16

## 2022-04-14 RX ORDER — ACETAMINOPHEN 500 MG
500-1000 TABLET ORAL EVERY 6 HOURS PRN
Status: ON HOLD | COMMUNITY
End: 2022-05-16

## 2022-04-14 RX ORDER — LEVOTHYROXINE SODIUM 100 UG/1
50-100 TABLET ORAL
COMMUNITY
Start: 2022-04-07

## 2022-04-14 ASSESSMENT — ENCOUNTER SYMPTOMS
BACK PAIN: 1
MYALGIAS: 0
DIZZINESS: 0
CHILLS: 0
PALPITATIONS: 0
NAUSEA: 0
HEADACHES: 0
FEVER: 0
SHORTNESS OF BREATH: 0
COUGH: 0
DOUBLE VISION: 0
SORE THROAT: 0
VOMITING: 0
BLURRED VISION: 0

## 2022-04-14 ASSESSMENT — FIBROSIS 4 INDEX: FIB4 SCORE: 0.82

## 2022-04-14 NOTE — PROGRESS NOTES
Subjective:     CC: BP check    HPI:   Ines presents today with bp logs  Still not at goal  Pt has increased 25mg to 50mg x1 wk  Need to inc to 100mg losartan  Reports x1 episode in AM of 190 with a headache  Improved to 150 with rest    Has appt for ortho/back    Preventatives, holding off until low back is improved    Problem   Primary Hypertension    BP logs not at goal, inc to 100mg losartan, message or rtc 1 wk for review  Will add 5mg amlodpine  Discussed red flags for eval    Need to monitor NA and K     Possibly secondary to steroid injection or back pain?     Low Back Pain    Chronic  Cont f/u with ortho     Type 1 Diabetes Mellitus (Hcc)    Dr Shaw manages Insulin pump     Elevated Blood Pressure Reading (Resolved)       Current Outpatient Medications Ordered in Epic   Medication Sig Dispense Refill   • LEVOXYL 100 MCG Tab      • triamcinolone acetonide (KENALOG) 0.025 % Cream      • acetaminophen (TYLENOL) 500 MG Tab Take 500-1,000 mg by mouth every 6 hours as needed.     • losartan (COZAAR) 50 MG Tab Take 1 Tablet by mouth every day. 90 Tablet 3   • methocarbamol (ROBAXIN-750) 750 MG Tab One tablet (750 mg) every 6-8 hours as needed for muscle spasms 90 Tablet 5   • pantoprazole (PROTONIX) 40 MG Tablet Delayed Response      • rosuvastatin (CRESTOR) 40 MG tablet Take 1 Tab by mouth every bedtime. 30 Tab 1   • aspirin 81 MG tablet Take 81 mg by mouth every evening.     • ibuprofen (MOTRIN) 600 MG Tab Take 600 mg by mouth every bedtime.     • Cholecalciferol (VITAMIN D-3 PO) Take 5,000 mg by mouth every day.     • Multiple Vitamins-Minerals (MULTIVITAMIN WOMEN PO) Take 1 Tab by mouth every day.     • metoclopramide (REGLAN) 10 MG TABS Take 10 mg by mouth 2 Times a Day.       No current [facility]-ordered facility-administered medications on file.     ROS:  Review of Systems   Constitutional: Negative for chills and fever.   HENT: Negative for ear pain and sore throat.    Eyes: Negative for blurred vision  "and double vision.   Respiratory: Negative for cough and shortness of breath.    Cardiovascular: Negative for chest pain and palpitations.   Gastrointestinal: Negative for nausea and vomiting.   Genitourinary: Negative for dysuria and hematuria.   Musculoskeletal: Positive for back pain and joint pain. Negative for myalgias.   Neurological: Negative for dizziness and headaches.       Objective:     Exam:  /82   Pulse 84   Temp 37 °C (98.6 °F) (Temporal)   Resp 16   Ht 1.702 m (5' 7\")   Wt 70.4 kg (155 lb 3.2 oz)   LMP 01/01/1983 (LMP Unknown)   SpO2 98%   BMI 24.31 kg/m²  Body mass index is 24.31 kg/m².    Physical Exam  Constitutional:       General: She is not in acute distress.     Appearance: Normal appearance. She is not ill-appearing, toxic-appearing or diaphoretic.   HENT:      Head: Normocephalic and atraumatic.   Eyes:      General: No scleral icterus.        Right eye: No discharge.         Left eye: No discharge.      Extraocular Movements: Extraocular movements intact.      Conjunctiva/sclera: Conjunctivae normal.      Pupils: Pupils are equal, round, and reactive to light.   Cardiovascular:      Pulses: Normal pulses.      Heart sounds: Normal heart sounds. No murmur heard.  Pulmonary:      Effort: Pulmonary effort is normal. No respiratory distress.      Breath sounds: Normal breath sounds.   Neurological:      Mental Status: She is alert.      Gait: Gait abnormal.         Assessment & Plan:     66 y.o. female with the following -     Problem List Items Addressed This Visit     Type 1 diabetes mellitus (HCC)     Chronic, stable  Cont f/u with Endo         Primary hypertension    Low back pain    Relevant Medications    acetaminophen (TYLENOL) 500 MG Tab        Return in about 4 weeks (around 5/12/2022), or if symptoms worsen or fail to improve, for f/u bp, med check.    Please note that this dictation was created using voice recognition software. I have made every reasonable attempt to " correct obvious errors, but I expect that there are errors of grammar and possibly content that I did not discover before finalizing the note.

## 2022-04-22 PROBLEM — M48.062 NEUROGENIC CLAUDICATION DUE TO LUMBAR SPINAL STENOSIS: Status: ACTIVE | Noted: 2022-04-22

## 2022-04-29 ENCOUNTER — PRE-ADMISSION TESTING (OUTPATIENT)
Dept: ADMISSIONS | Facility: MEDICAL CENTER | Age: 67
End: 2022-04-29
Attending: NEUROLOGICAL SURGERY
Payer: MEDICARE

## 2022-04-29 RX ORDER — CLINDAMYCIN PHOSPHATE 10 UG/ML
1 LOTION TOPICAL DAILY
COMMUNITY
End: 2023-02-07

## 2022-04-29 RX ORDER — CHLORAL HYDRATE 500 MG
1000 CAPSULE ORAL DAILY
Status: ON HOLD | COMMUNITY
End: 2022-05-16

## 2022-04-29 NOTE — OR NURSING
"During tele PAT appointment, patient reported she is \"At the tail end of a cold\" .  Symptoms started 4/26/22 -included exhaustion, stuffy nose, and cough.  Patient reports symptoms are \"better\" but pt. reports she still feels \"A little congested.\"  Patient was instructed to notify Dr. Díaz's office.  Patient stated verbal understanding.  Patient was scheduled for a walk-in for testing only for 5/6/22 at 2:30.  Patient was instructed to notify [facility] if she is still having any symptoms prior to this appointment.  Patient stated verbal understanding.  This RN called Dr. Díaz's surgery scheduler to notify of the aforementioned, spoke with Vickie.    "

## 2022-05-03 ENCOUNTER — TELEPHONE (OUTPATIENT)
Dept: MEDICAL GROUP | Facility: PHYSICIAN GROUP | Age: 67
End: 2022-05-03
Payer: MEDICARE

## 2022-05-03 NOTE — TELEPHONE ENCOUNTER
Pt. Called and left a voicemail last night. She states she dropped off a BP log for  and was wondering if he had received it yet. She also stated that she was wondering if she should cut in half her BP med's because on Sunday it had dropped 98/62 and she felt tired. She stated she did not take her BP med's the next day and it went up to 140/70. She would like advice on what to do next. Please advise....          Called patient  BP today wnl  No symptoms  Sunday bp with 100mg losartan was 98/62 (states I just didn't feel myself)  Next day did not take and bp went up to the 160s     Restarted med 1/2 tab 50mg and 140/70    Pt to use 1/2 tab or 50mg  If bp less than 140/80  Pt to use 1 tab if bp 160/90s  rtc if concerns  If bp less than 100/60 will discuss having 25mg tabs on hand for only a total of 75mg and not 100mg   Pt agrees

## 2022-05-05 ENCOUNTER — HOSPITAL ENCOUNTER (OUTPATIENT)
Dept: RADIOLOGY | Facility: MEDICAL CENTER | Age: 67
End: 2022-05-05
Attending: NEUROLOGICAL SURGERY
Payer: MEDICARE

## 2022-05-05 ENCOUNTER — PRE-ADMISSION TESTING (OUTPATIENT)
Dept: ADMISSIONS | Facility: MEDICAL CENTER | Age: 67
End: 2022-05-05
Attending: NEUROLOGICAL SURGERY
Payer: MEDICARE

## 2022-05-05 DIAGNOSIS — M48.062 SPINAL STENOSIS OF LUMBAR REGION WITH NEUROGENIC CLAUDICATION: ICD-10-CM

## 2022-05-05 LAB
25(OH)D3 SERPL-MCNC: 57 NG/ML (ref 30–100)
ANION GAP SERPL CALC-SCNC: 13 MMOL/L (ref 7–16)
APPEARANCE UR: CLEAR
APTT PPP: 27.7 SEC (ref 24.7–36)
BACTERIA #/AREA URNS HPF: NEGATIVE /HPF
BASOPHILS # BLD AUTO: 0.4 % (ref 0–1.8)
BASOPHILS # BLD: 0.03 K/UL (ref 0–0.12)
BILIRUB UR QL STRIP.AUTO: NEGATIVE
BUN SERPL-MCNC: 14 MG/DL (ref 8–22)
CALCIUM SERPL-MCNC: 9.8 MG/DL (ref 8.5–10.5)
CHLORIDE SERPL-SCNC: 96 MMOL/L (ref 96–112)
CO2 SERPL-SCNC: 23 MMOL/L (ref 20–33)
COLOR UR: YELLOW
CREAT SERPL-MCNC: 0.83 MG/DL (ref 0.5–1.4)
EOSINOPHIL # BLD AUTO: 0.13 K/UL (ref 0–0.51)
EOSINOPHIL NFR BLD: 1.9 % (ref 0–6.9)
EPI CELLS #/AREA URNS HPF: ABNORMAL /HPF
ERYTHROCYTE [DISTWIDTH] IN BLOOD BY AUTOMATED COUNT: 44.8 FL (ref 35.9–50)
EST. AVERAGE GLUCOSE BLD GHB EST-MCNC: 169 MG/DL
GFR SERPLBLD CREATININE-BSD FMLA CKD-EPI: 78 ML/MIN/1.73 M 2
GLUCOSE SERPL-MCNC: 180 MG/DL (ref 65–99)
GLUCOSE UR STRIP.AUTO-MCNC: NEGATIVE MG/DL
HBA1C MFR BLD: 7.5 % (ref 4–5.6)
HCT VFR BLD AUTO: 43 % (ref 37–47)
HGB BLD-MCNC: 14.4 G/DL (ref 12–16)
HYALINE CASTS #/AREA URNS LPF: ABNORMAL /LPF
IMM GRANULOCYTES # BLD AUTO: 0.03 K/UL (ref 0–0.11)
IMM GRANULOCYTES NFR BLD AUTO: 0.4 % (ref 0–0.9)
INR PPP: 0.93 (ref 0.87–1.13)
KETONES UR STRIP.AUTO-MCNC: NEGATIVE MG/DL
LEUKOCYTE ESTERASE UR QL STRIP.AUTO: ABNORMAL
LYMPHOCYTES # BLD AUTO: 1.7 K/UL (ref 1–4.8)
LYMPHOCYTES NFR BLD: 24.9 % (ref 22–41)
MCH RBC QN AUTO: 31.3 PG (ref 27–33)
MCHC RBC AUTO-ENTMCNC: 33.5 G/DL (ref 33.6–35)
MCV RBC AUTO: 93.5 FL (ref 81.4–97.8)
MICRO URNS: ABNORMAL
MONOCYTES # BLD AUTO: 0.52 K/UL (ref 0–0.85)
MONOCYTES NFR BLD AUTO: 7.6 % (ref 0–13.4)
NEUTROPHILS # BLD AUTO: 4.43 K/UL (ref 2–7.15)
NEUTROPHILS NFR BLD: 64.8 % (ref 44–72)
NITRITE UR QL STRIP.AUTO: NEGATIVE
NRBC # BLD AUTO: 0 K/UL
NRBC BLD-RTO: 0 /100 WBC
PH UR STRIP.AUTO: 5.5 [PH] (ref 5–8)
PLATELET # BLD AUTO: 381 K/UL (ref 164–446)
PMV BLD AUTO: 10.3 FL (ref 9–12.9)
POTASSIUM SERPL-SCNC: 4.7 MMOL/L (ref 3.6–5.5)
PROT UR QL STRIP: 30 MG/DL
PROTHROMBIN TIME: 12.2 SEC (ref 12–14.6)
RBC # BLD AUTO: 4.6 M/UL (ref 4.2–5.4)
RBC # URNS HPF: ABNORMAL /HPF
RBC UR QL AUTO: NEGATIVE
SODIUM SERPL-SCNC: 132 MMOL/L (ref 135–145)
SP GR UR STRIP.AUTO: 1.03
UROBILINOGEN UR STRIP.AUTO-MCNC: 0.2 MG/DL
WBC # BLD AUTO: 6.8 K/UL (ref 4.8–10.8)
WBC #/AREA URNS HPF: ABNORMAL /HPF

## 2022-05-05 PROCEDURE — 80048 BASIC METABOLIC PNL TOTAL CA: CPT

## 2022-05-05 PROCEDURE — 81001 URINALYSIS AUTO W/SCOPE: CPT

## 2022-05-05 PROCEDURE — 82306 VITAMIN D 25 HYDROXY: CPT

## 2022-05-05 PROCEDURE — 85730 THROMBOPLASTIN TIME PARTIAL: CPT

## 2022-05-05 PROCEDURE — 87086 URINE CULTURE/COLONY COUNT: CPT

## 2022-05-05 PROCEDURE — 71046 X-RAY EXAM CHEST 2 VIEWS: CPT

## 2022-05-05 PROCEDURE — 93005 ELECTROCARDIOGRAM TRACING: CPT

## 2022-05-05 PROCEDURE — 83036 HEMOGLOBIN GLYCOSYLATED A1C: CPT

## 2022-05-05 PROCEDURE — 85025 COMPLETE CBC W/AUTO DIFF WBC: CPT

## 2022-05-05 PROCEDURE — 36415 COLL VENOUS BLD VENIPUNCTURE: CPT

## 2022-05-05 PROCEDURE — 85610 PROTHROMBIN TIME: CPT

## 2022-05-06 LAB — EKG IMPRESSION: NORMAL

## 2022-05-06 PROCEDURE — 93010 ELECTROCARDIOGRAM REPORT: CPT | Performed by: INTERNAL MEDICINE

## 2022-05-08 LAB
BACTERIA UR CULT: NORMAL
SIGNIFICANT IND 70042: NORMAL
SITE SITE: NORMAL
SOURCE SOURCE: NORMAL

## 2022-05-10 ENCOUNTER — TELEPHONE (OUTPATIENT)
Dept: MEDICAL GROUP | Facility: PHYSICIAN GROUP | Age: 67
End: 2022-05-10
Payer: MEDICARE

## 2022-05-12 ENCOUNTER — OFFICE VISIT (OUTPATIENT)
Dept: MEDICAL GROUP | Facility: PHYSICIAN GROUP | Age: 67
End: 2022-05-12
Payer: MEDICARE

## 2022-05-12 VITALS
TEMPERATURE: 97.8 F | DIASTOLIC BLOOD PRESSURE: 72 MMHG | SYSTOLIC BLOOD PRESSURE: 132 MMHG | BODY MASS INDEX: 24.3 KG/M2 | HEART RATE: 97 BPM | HEIGHT: 67 IN | OXYGEN SATURATION: 98 % | RESPIRATION RATE: 16 BRPM | WEIGHT: 154.8 LBS

## 2022-05-12 DIAGNOSIS — M48.062 NEUROGENIC CLAUDICATION DUE TO LUMBAR SPINAL STENOSIS: ICD-10-CM

## 2022-05-12 DIAGNOSIS — I65.22 STENOSIS OF LEFT CAROTID ARTERY: ICD-10-CM

## 2022-05-12 DIAGNOSIS — I10 PRIMARY HYPERTENSION: ICD-10-CM

## 2022-05-12 DIAGNOSIS — G89.29 CHRONIC LOW BACK PAIN, UNSPECIFIED BACK PAIN LATERALITY, UNSPECIFIED WHETHER SCIATICA PRESENT: ICD-10-CM

## 2022-05-12 DIAGNOSIS — E03.9 ACQUIRED HYPOTHYROIDISM: ICD-10-CM

## 2022-05-12 DIAGNOSIS — E10.69 TYPE 1 DIABETES MELLITUS WITH OTHER SPECIFIED COMPLICATION (HCC): ICD-10-CM

## 2022-05-12 DIAGNOSIS — M54.50 CHRONIC LOW BACK PAIN, UNSPECIFIED BACK PAIN LATERALITY, UNSPECIFIED WHETHER SCIATICA PRESENT: ICD-10-CM

## 2022-05-12 DIAGNOSIS — Z01.818 PRE-OP EVALUATION: ICD-10-CM

## 2022-05-12 DIAGNOSIS — R21 RASH OF FACE: ICD-10-CM

## 2022-05-12 DIAGNOSIS — Z79.890 HORMONE REPLACEMENT THERAPY (HRT): ICD-10-CM

## 2022-05-12 PROCEDURE — 99213 OFFICE O/P EST LOW 20 MIN: CPT | Performed by: FAMILY MEDICINE

## 2022-05-12 ASSESSMENT — FIBROSIS 4 INDEX: FIB4 SCORE: 0.66

## 2022-05-12 NOTE — PROGRESS NOTES
REASON FOR VISIT: Pre-Op Consultation  Consultation Requested by: CHRISTOS Rolle  Procedure date and type: Spinal   Inherent cardiac risk of procedure:   Intermediate = carotid endarectomy, head & neck surgery, intraperitoneal & intrathoracic surgery, ortho, prostate surgery    History of condition for which surgery is planned:  Spinal stenosis      Current chronic conditions:     Type 1 DM with pump A1c 7.7 3/2022  Hypothyroidism  Hormone replacement  Carotid Artery Stenosis  S/p endarterectomy   HTN   Facial Rash  Spinal stenosis L4/L5 with L5/S1 disc protrusion [id number]    Past medical history:  has a past medical history of Acute nasopharyngitis (2022), Anesthesia, Blocked artery (Around ), Diabetes type 1, controlled ([facility]) (2010), Dyslipidemia (2010), High cholesterol, Hypertension, Hypothyroidism (2010), Indigestion, Insulin pump in place (2011), Left carotid artery stenosis - severe , PONV (postoperative nausea and vomiting), Routine health maintenance (2011), and Urinary incontinence..    Surgical and anesthetic history:  has a past surgical history that includes cholecystectomy; abdominal hysterectomy total (); bunionectomy (); knee arthrotomy (Right, ); shoulder arthroscopy (Left, ); orthopedic osteotomy (Left, 2017); ligament repair (2017); toe fusion (2017); repair, tendon, lower extremity, using tendon graft (2017); and carotid endarterectomy (Left, 2019).    Habits:   Social History     Tobacco Use   • Smoking status: Former Smoker     Packs/day: 0.00     Years: 2.00     Pack years: 0.00     Types: Cigarettes     Quit date: 1977     Years since quittin.3   • Smokeless tobacco: Never Used   Vaping Use   • Vaping Use: Never used   Substance Use Topics   • Alcohol use: Yes     Alcohol/week: 1.0 oz     Types: 2 Glasses of wine per week     Comment: about 3-4 drinks per week.    • Drug use: Never       Allergies: June  [cefaclor], Augmentin, Naproxen, Tape, and Cefaclor No known allergy to Anesthetic, or Latex.       Current medicines:   Current Outpatient Medications   Medication Sig Dispense Refill   • sodium chloride (SALT) 1 GM Tab Take 1 Tablet by mouth 3 times a day. 90 Tablet 3   • CLINDAMYCIN PHOSPHATE,TOPICAL, 1 % Lotion Apply  topically every day.     • CRANBERRY PO Take  by mouth every day.     • B Complex Vitamins (VITAMIN B COMPLEX PO) Take  by mouth every day.     • Omega-3 Fatty Acids (FISH OIL) 1000 MG Cap capsule Take 1,000 mg by mouth every day.     • Insulin Aspart, w/Niacinamide, (FIASP SC) Inject  under the skin. Per insulin pump     • LEVOXYL 100 MCG Tab every day.     • triamcinolone acetonide (KENALOG) 0.025 % Cream Every other day     • acetaminophen (TYLENOL) 500 MG Tab Take 500-1,000 mg by mouth every 6 hours as needed. Tylenol Arthritis and Tylenol PM     • losartan (COZAAR) 50 MG Tab Take 1 Tablet by mouth every day. (Patient taking differently: Take 100 mg by mouth every day.) 90 Tablet 3   • methocarbamol (ROBAXIN-750) 750 MG Tab One tablet (750 mg) every 6-8 hours as needed for muscle spasms 90 Tablet 5   • pantoprazole (PROTONIX) 40 MG Tablet Delayed Response      • rosuvastatin (CRESTOR) 40 MG tablet Take 1 Tab by mouth every bedtime. 30 Tab 1   • aspirin 81 MG tablet Take 81 mg by mouth every evening.     • ibuprofen (MOTRIN) 600 MG Tab Take 600 mg by mouth every bedtime.     • Cholecalciferol (VITAMIN D-3 PO) Take 5,000 mg by mouth every day.     • Multiple Vitamins-Minerals (MULTIVITAMIN WOMEN PO) Take 1 Tab by mouth every day.     • metoclopramide (REGLAN) 10 MG TABS Take 10 mg by mouth 2 Times a Day.       No current facility-administered medications for this visit.       Anticoagulant: ASA 81mg  Herbals: omega 3             Duke Activity Status Index: https://www.mdcalc.com/duke-activity-status-index-dasi  5-6.6 METs  METS:   4-10 = moderate functional capacity  ASA Class:   2-3?    ROS:  "negative for: CP, SOB, JONES, Orthopnea, wheezing, leg edema, polydipsia, polyuria, fevers, chills, sweats, cough, cold, congestion, abd pain, reflux, black or bloody stools, weight loss/gain.    PHYSICAL EXAMINATION:  VITAL SIGNS: /72 (BP Location: Left arm, Patient Position: Sitting, BP Cuff Size: Adult)   Pulse 97   Temp 36.6 °C (97.8 °F) (Temporal)   Resp 16   Ht 1.702 m (5' 7\")   Wt 70.2 kg (154 lb 12.8 oz)   SpO2 98%  Body mass index is 24.25 kg/m².  HEENT: EOMI, PERRL. Oropharynx pink, moist. Normal airway. Neck supple, no cervical lymphadenopathy.  LUNGS: CTAB good excursion.   HEART: RRR no murmur.  ABDOMEN: soft, nondistended, nontender normal BS. No HSM.  LOWER EXTREMITIES: warm and well perfused with no edema.    Labs: See attached/per surgeon  GFR 78  Sodium 132  Glucose 180    EKG  L ant. Fascicular  No symptoms    IMPRESSION:  Diagnoses of Rash of face, Chronic low back pain, unspecified back pain laterality, unspecified whether sciatica present, Neurogenic claudication due to lumbar spinal stenosis, Hormone replacement therapy (HRT), Stenosis of left carotid artery, Type 1 diabetes mellitus with other specified complication (HCC), Acquired hypothyroidism, Primary hypertension, and Pre-op evaluation were pertinent to this visit.   Planned surgery  5/16  For Spinal Stenosis    PLAN:  1. Chronic medical conditions: Stable and controlled. Continue current medicines.   2. Avoid drugs that potentiate bleeding as advised by surgeon  3. Discontinue all herbal supplements 2 weeks prior to surgery.  4. Need for SBE prophylaxis:  No    Medically optimized   Patient would like to proceed  "

## 2022-05-14 ENCOUNTER — TELEPHONE (OUTPATIENT)
Dept: NEUROSURGERY | Facility: MEDICAL CENTER | Age: 67
End: 2022-05-14
Payer: MEDICARE

## 2022-05-14 RX ORDER — NITROFURANTOIN 25; 75 MG/1; MG/1
100 CAPSULE ORAL 2 TIMES DAILY
Qty: 10 CAPSULE | Refills: 0 | Status: SHIPPED | OUTPATIENT
Start: 2022-05-14 | End: 2022-05-19

## 2022-05-16 ENCOUNTER — HOSPITAL ENCOUNTER (OUTPATIENT)
Facility: MEDICAL CENTER | Age: 67
End: 2022-05-16
Attending: NEUROLOGICAL SURGERY | Admitting: NEUROLOGICAL SURGERY
Payer: MEDICARE

## 2022-05-16 ENCOUNTER — APPOINTMENT (OUTPATIENT)
Dept: RADIOLOGY | Facility: MEDICAL CENTER | Age: 67
End: 2022-05-16
Attending: NEUROLOGICAL SURGERY
Payer: MEDICARE

## 2022-05-16 ENCOUNTER — ANESTHESIA (OUTPATIENT)
Dept: SURGERY | Facility: MEDICAL CENTER | Age: 67
End: 2022-05-16
Payer: MEDICARE

## 2022-05-16 ENCOUNTER — ANESTHESIA EVENT (OUTPATIENT)
Dept: SURGERY | Facility: MEDICAL CENTER | Age: 67
End: 2022-05-16
Payer: MEDICARE

## 2022-05-16 VITALS
SYSTOLIC BLOOD PRESSURE: 138 MMHG | TEMPERATURE: 97.2 F | HEIGHT: 67 IN | HEART RATE: 90 BPM | WEIGHT: 152.34 LBS | OXYGEN SATURATION: 96 % | RESPIRATION RATE: 16 BRPM | DIASTOLIC BLOOD PRESSURE: 68 MMHG | BODY MASS INDEX: 23.91 KG/M2

## 2022-05-16 DIAGNOSIS — M48.062 NEUROGENIC CLAUDICATION DUE TO LUMBAR SPINAL STENOSIS: ICD-10-CM

## 2022-05-16 DIAGNOSIS — G89.18 POSTOPERATIVE PAIN AFTER SPINAL SURGERY: ICD-10-CM

## 2022-05-16 LAB — GLUCOSE BLD STRIP.AUTO-MCNC: 235 MG/DL (ref 65–99)

## 2022-05-16 PROCEDURE — 160036 HCHG PACU - EA ADDL 30 MINS PHASE I: Performed by: NEUROLOGICAL SURGERY

## 2022-05-16 PROCEDURE — 160029 HCHG SURGERY MINUTES - 1ST 30 MINS LEVEL 4: Performed by: NEUROLOGICAL SURGERY

## 2022-05-16 PROCEDURE — 160025 RECOVERY II MINUTES (STATS): Performed by: NEUROLOGICAL SURGERY

## 2022-05-16 PROCEDURE — 160002 HCHG RECOVERY MINUTES (STAT): Performed by: NEUROLOGICAL SURGERY

## 2022-05-16 PROCEDURE — 160048 HCHG OR STATISTICAL LEVEL 1-5: Performed by: NEUROLOGICAL SURGERY

## 2022-05-16 PROCEDURE — 700106 HCHG RX REV CODE 271: Performed by: NEUROLOGICAL SURGERY

## 2022-05-16 PROCEDURE — 82962 GLUCOSE BLOOD TEST: CPT

## 2022-05-16 PROCEDURE — 500002 HCHG ADHESIVE, DERMABOND: Performed by: NEUROLOGICAL SURGERY

## 2022-05-16 PROCEDURE — 00630 ANES PX LUMBAR REGION NOS: CPT | Performed by: ANESTHESIOLOGY

## 2022-05-16 PROCEDURE — 63030 LAMOT DCMPRN NRV RT 1 LMBR: CPT | Mod: ASROC,50ROC | Performed by: PHYSICIAN ASSISTANT

## 2022-05-16 PROCEDURE — 160009 HCHG ANES TIME/MIN: Performed by: NEUROLOGICAL SURGERY

## 2022-05-16 PROCEDURE — 63030 LAMOT DCMPRN NRV RT 1 LMBR: CPT | Mod: 50ROC | Performed by: NEUROLOGICAL SURGERY

## 2022-05-16 PROCEDURE — 160046 HCHG PACU - 1ST 60 MINS PHASE II: Performed by: NEUROLOGICAL SURGERY

## 2022-05-16 PROCEDURE — 502708 HCHG CATHETER, ON-Q SILVER SKR: Performed by: NEUROLOGICAL SURGERY

## 2022-05-16 PROCEDURE — 700111 HCHG RX REV CODE 636 W/ 250 OVERRIDE (IP): Performed by: NEUROLOGICAL SURGERY

## 2022-05-16 PROCEDURE — 63035 LAMOT DCMPRN NRV RT EA ADDL: CPT | Mod: ASROC,50ROC | Performed by: PHYSICIAN ASSISTANT

## 2022-05-16 PROCEDURE — 700105 HCHG RX REV CODE 258: Performed by: ANESTHESIOLOGY

## 2022-05-16 PROCEDURE — 160035 HCHG PACU - 1ST 60 MINS PHASE I: Performed by: NEUROLOGICAL SURGERY

## 2022-05-16 PROCEDURE — 700111 HCHG RX REV CODE 636 W/ 250 OVERRIDE (IP): Performed by: ANESTHESIOLOGY

## 2022-05-16 PROCEDURE — 160047 HCHG PACU  - EA ADDL 30 MINS PHASE II: Performed by: NEUROLOGICAL SURGERY

## 2022-05-16 PROCEDURE — 700101 HCHG RX REV CODE 250: Performed by: ANESTHESIOLOGY

## 2022-05-16 PROCEDURE — 110454 HCHG SHELL REV 250: Performed by: NEUROLOGICAL SURGERY

## 2022-05-16 PROCEDURE — 501838 HCHG SUTURE GENERAL: Performed by: NEUROLOGICAL SURGERY

## 2022-05-16 PROCEDURE — 700105 HCHG RX REV CODE 258: Performed by: NEUROLOGICAL SURGERY

## 2022-05-16 PROCEDURE — 160041 HCHG SURGERY MINUTES - EA ADDL 1 MIN LEVEL 4: Performed by: NEUROLOGICAL SURGERY

## 2022-05-16 PROCEDURE — 72020 X-RAY EXAM OF SPINE 1 VIEW: CPT

## 2022-05-16 PROCEDURE — 700101 HCHG RX REV CODE 250: Performed by: NEUROLOGICAL SURGERY

## 2022-05-16 PROCEDURE — 63035 LAMOT DCMPRN NRV RT EA ADDL: CPT | Mod: 50ROC | Performed by: NEUROLOGICAL SURGERY

## 2022-05-16 RX ORDER — DIPHENHYDRAMINE HCL 25 MG
25 TABLET ORAL EVERY 6 HOURS PRN
Status: CANCELLED | OUTPATIENT
Start: 2022-05-16

## 2022-05-16 RX ORDER — LABETALOL HYDROCHLORIDE 5 MG/ML
5 INJECTION, SOLUTION INTRAVENOUS
Status: DISCONTINUED | OUTPATIENT
Start: 2022-05-16 | End: 2022-05-16 | Stop reason: HOSPADM

## 2022-05-16 RX ORDER — CLINDAMYCIN PHOSPHATE 900 MG/50ML
900 INJECTION, SOLUTION INTRAVENOUS ONCE
Status: DISCONTINUED | OUTPATIENT
Start: 2022-05-16 | End: 2022-05-16 | Stop reason: HOSPADM

## 2022-05-16 RX ORDER — HYDROMORPHONE HYDROCHLORIDE 1 MG/ML
0.4 INJECTION, SOLUTION INTRAMUSCULAR; INTRAVENOUS; SUBCUTANEOUS
Status: DISCONTINUED | OUTPATIENT
Start: 2022-05-16 | End: 2022-05-16 | Stop reason: HOSPADM

## 2022-05-16 RX ORDER — HYDRALAZINE HYDROCHLORIDE 20 MG/ML
5 INJECTION INTRAMUSCULAR; INTRAVENOUS
Status: DISCONTINUED | OUTPATIENT
Start: 2022-05-16 | End: 2022-05-16 | Stop reason: HOSPADM

## 2022-05-16 RX ORDER — LOSARTAN POTASSIUM 100 MG/1
100 TABLET ORAL EVERY EVENING
COMMUNITY
End: 2022-07-28

## 2022-05-16 RX ORDER — HYDRALAZINE HYDROCHLORIDE 20 MG/ML
20 INJECTION INTRAMUSCULAR; INTRAVENOUS EVERY 6 HOURS PRN
Status: CANCELLED | OUTPATIENT
Start: 2022-05-16

## 2022-05-16 RX ORDER — MAGNESIUM HYDROXIDE 1200 MG/15ML
LIQUID ORAL
Status: COMPLETED | OUTPATIENT
Start: 2022-05-16 | End: 2022-05-16

## 2022-05-16 RX ORDER — METHOCARBAMOL 750 MG/1
TABLET, FILM COATED ORAL
Qty: 90 TABLET | Refills: 5 | Status: SHIPPED
Start: 2022-05-16 | End: 2022-09-12

## 2022-05-16 RX ORDER — ROSUVASTATIN CALCIUM 20 MG/1
40 TABLET, COATED ORAL
Status: CANCELLED | OUTPATIENT
Start: 2022-05-16

## 2022-05-16 RX ORDER — CEFAZOLIN SODIUM 1 G/3ML
INJECTION, POWDER, FOR SOLUTION INTRAMUSCULAR; INTRAVENOUS
Status: DISCONTINUED | OUTPATIENT
Start: 2022-05-16 | End: 2022-05-16 | Stop reason: HOSPADM

## 2022-05-16 RX ORDER — BISACODYL 10 MG
10 SUPPOSITORY, RECTAL RECTAL
Status: CANCELLED | OUTPATIENT
Start: 2022-05-16

## 2022-05-16 RX ORDER — HALOPERIDOL 5 MG/ML
1 INJECTION INTRAMUSCULAR
Status: DISCONTINUED | OUTPATIENT
Start: 2022-05-16 | End: 2022-05-16 | Stop reason: HOSPADM

## 2022-05-16 RX ORDER — ENEMA 19; 7 G/133ML; G/133ML
1 ENEMA RECTAL
Status: CANCELLED | OUTPATIENT
Start: 2022-05-16

## 2022-05-16 RX ORDER — CLINDAMYCIN PHOSPHATE 150 MG/ML
INJECTION, SOLUTION INTRAVENOUS PRN
Status: DISCONTINUED | OUTPATIENT
Start: 2022-05-16 | End: 2022-05-16 | Stop reason: SURG

## 2022-05-16 RX ORDER — AMOXICILLIN 250 MG
1 CAPSULE ORAL
Status: CANCELLED | OUTPATIENT
Start: 2022-05-16

## 2022-05-16 RX ORDER — DIPHENHYDRAMINE HYDROCHLORIDE 50 MG/ML
25 INJECTION INTRAMUSCULAR; INTRAVENOUS EVERY 6 HOURS PRN
Status: CANCELLED | OUTPATIENT
Start: 2022-05-16

## 2022-05-16 RX ORDER — CEPHALEXIN 500 MG/1
500 CAPSULE ORAL 4 TIMES DAILY
Qty: 20 CAPSULE | Refills: 0 | Status: SHIPPED | OUTPATIENT
Start: 2022-05-16 | End: 2022-05-21

## 2022-05-16 RX ORDER — METOPROLOL TARTRATE 1 MG/ML
1 INJECTION, SOLUTION INTRAVENOUS
Status: DISCONTINUED | OUTPATIENT
Start: 2022-05-16 | End: 2022-05-16 | Stop reason: HOSPADM

## 2022-05-16 RX ORDER — DOCUSATE SODIUM 100 MG/1
100 CAPSULE, LIQUID FILLED ORAL 2 TIMES DAILY
Status: CANCELLED | OUTPATIENT
Start: 2022-05-16

## 2022-05-16 RX ORDER — SODIUM CHLORIDE AND POTASSIUM CHLORIDE 150; 900 MG/100ML; MG/100ML
INJECTION, SOLUTION INTRAVENOUS CONTINUOUS
Status: CANCELLED | OUTPATIENT
Start: 2022-05-16

## 2022-05-16 RX ORDER — SENNOSIDES 8.6 MG
1300 CAPSULE ORAL EVERY 6 HOURS PRN
Status: ON HOLD | COMMUNITY
End: 2022-05-16

## 2022-05-16 RX ORDER — ALPRAZOLAM 0.25 MG/1
0.25 TABLET ORAL 2 TIMES DAILY PRN
Status: CANCELLED | OUTPATIENT
Start: 2022-05-16

## 2022-05-16 RX ORDER — DIPHENHYDRAMINE HYDROCHLORIDE 50 MG/ML
12.5 INJECTION INTRAMUSCULAR; INTRAVENOUS
Status: DISCONTINUED | OUTPATIENT
Start: 2022-05-16 | End: 2022-05-16 | Stop reason: HOSPADM

## 2022-05-16 RX ORDER — POLYETHYLENE GLYCOL 3350 17 G/17G
1 POWDER, FOR SOLUTION ORAL 2 TIMES DAILY PRN
Status: CANCELLED | OUTPATIENT
Start: 2022-05-16

## 2022-05-16 RX ORDER — PHENYLEPHRINE HCL IN 0.9% NACL 0.5 MG/5ML
SYRINGE (ML) INTRAVENOUS PRN
Status: DISCONTINUED | OUTPATIENT
Start: 2022-05-16 | End: 2022-05-16 | Stop reason: SURG

## 2022-05-16 RX ORDER — HYDROMORPHONE HYDROCHLORIDE 1 MG/ML
0.2 INJECTION, SOLUTION INTRAMUSCULAR; INTRAVENOUS; SUBCUTANEOUS
Status: DISCONTINUED | OUTPATIENT
Start: 2022-05-16 | End: 2022-05-16 | Stop reason: HOSPADM

## 2022-05-16 RX ORDER — AMOXICILLIN 250 MG
1 CAPSULE ORAL NIGHTLY
Status: CANCELLED | OUTPATIENT
Start: 2022-05-16

## 2022-05-16 RX ORDER — PANTOPRAZOLE SODIUM 40 MG/1
40 TABLET, DELAYED RELEASE ORAL DAILY
Status: CANCELLED | OUTPATIENT
Start: 2022-05-16

## 2022-05-16 RX ORDER — HYDROMORPHONE HYDROCHLORIDE 1 MG/ML
0.1 INJECTION, SOLUTION INTRAMUSCULAR; INTRAVENOUS; SUBCUTANEOUS
Status: DISCONTINUED | OUTPATIENT
Start: 2022-05-16 | End: 2022-05-16 | Stop reason: HOSPADM

## 2022-05-16 RX ORDER — METHOCARBAMOL 750 MG/1
750 TABLET, FILM COATED ORAL EVERY 8 HOURS PRN
Status: DISCONTINUED | OUTPATIENT
Start: 2022-05-16 | End: 2022-05-16 | Stop reason: HOSPADM

## 2022-05-16 RX ORDER — BUPIVACAINE HYDROCHLORIDE AND EPINEPHRINE 5; 5 MG/ML; UG/ML
INJECTION, SOLUTION EPIDURAL; INTRACAUDAL; PERINEURAL
Status: DISCONTINUED | OUTPATIENT
Start: 2022-05-16 | End: 2022-05-16 | Stop reason: HOSPADM

## 2022-05-16 RX ORDER — SODIUM CHLORIDE, SODIUM LACTATE, POTASSIUM CHLORIDE, AND CALCIUM CHLORIDE .6; .31; .03; .02 G/100ML; G/100ML; G/100ML; G/100ML
IRRIGANT IRRIGATION
Status: DISCONTINUED | OUTPATIENT
Start: 2022-05-16 | End: 2022-05-16 | Stop reason: HOSPADM

## 2022-05-16 RX ORDER — LOSARTAN POTASSIUM 50 MG/1
100 TABLET ORAL EVERY EVENING
Status: CANCELLED | OUTPATIENT
Start: 2022-05-16

## 2022-05-16 RX ORDER — SODIUM CHLORIDE, SODIUM LACTATE, POTASSIUM CHLORIDE, CALCIUM CHLORIDE 600; 310; 30; 20 MG/100ML; MG/100ML; MG/100ML; MG/100ML
INJECTION, SOLUTION INTRAVENOUS CONTINUOUS
Status: ACTIVE | OUTPATIENT
Start: 2022-05-16 | End: 2022-05-16

## 2022-05-16 RX ORDER — MEPERIDINE HYDROCHLORIDE 25 MG/ML
6.25 INJECTION INTRAMUSCULAR; INTRAVENOUS; SUBCUTANEOUS
Status: DISCONTINUED | OUTPATIENT
Start: 2022-05-16 | End: 2022-05-16 | Stop reason: HOSPADM

## 2022-05-16 RX ORDER — ONDANSETRON 2 MG/ML
INJECTION INTRAMUSCULAR; INTRAVENOUS PRN
Status: DISCONTINUED | OUTPATIENT
Start: 2022-05-16 | End: 2022-05-16 | Stop reason: SURG

## 2022-05-16 RX ORDER — VANCOMYCIN HYDROCHLORIDE 1 G/20ML
INJECTION, POWDER, LYOPHILIZED, FOR SOLUTION INTRAVENOUS
Status: COMPLETED | OUTPATIENT
Start: 2022-05-16 | End: 2022-05-16

## 2022-05-16 RX ORDER — ACETAMINOPHEN 500 MG
1000 TABLET ORAL EVERY 6 HOURS PRN
Status: CANCELLED | OUTPATIENT
Start: 2022-05-21

## 2022-05-16 RX ORDER — DEXAMETHASONE SODIUM PHOSPHATE 4 MG/ML
INJECTION, SOLUTION INTRA-ARTICULAR; INTRALESIONAL; INTRAMUSCULAR; INTRAVENOUS; SOFT TISSUE PRN
Status: DISCONTINUED | OUTPATIENT
Start: 2022-05-16 | End: 2022-05-16 | Stop reason: SURG

## 2022-05-16 RX ORDER — ALBUTEROL SULFATE 2.5 MG/3ML
2.5 SOLUTION RESPIRATORY (INHALATION)
Status: DISCONTINUED | OUTPATIENT
Start: 2022-05-16 | End: 2022-05-16 | Stop reason: HOSPADM

## 2022-05-16 RX ORDER — CEFAZOLIN SODIUM 2 G/100ML
2 INJECTION, SOLUTION INTRAVENOUS EVERY 8 HOURS
Status: CANCELLED | OUTPATIENT
Start: 2022-05-16 | End: 2022-05-17

## 2022-05-16 RX ORDER — SODIUM CHLORIDE, SODIUM LACTATE, POTASSIUM CHLORIDE, CALCIUM CHLORIDE 600; 310; 30; 20 MG/100ML; MG/100ML; MG/100ML; MG/100ML
INJECTION, SOLUTION INTRAVENOUS CONTINUOUS
Status: DISCONTINUED | OUTPATIENT
Start: 2022-05-16 | End: 2022-05-16 | Stop reason: HOSPADM

## 2022-05-16 RX ORDER — ROCURONIUM BROMIDE 10 MG/ML
INJECTION, SOLUTION INTRAVENOUS PRN
Status: DISCONTINUED | OUTPATIENT
Start: 2022-05-16 | End: 2022-05-16 | Stop reason: HOSPADM

## 2022-05-16 RX ORDER — MIDAZOLAM HYDROCHLORIDE 1 MG/ML
INJECTION INTRAMUSCULAR; INTRAVENOUS PRN
Status: DISCONTINUED | OUTPATIENT
Start: 2022-05-16 | End: 2022-05-16 | Stop reason: SURG

## 2022-05-16 RX ORDER — METOCLOPRAMIDE 10 MG/1
10 TABLET ORAL 2 TIMES DAILY
Status: CANCELLED | OUTPATIENT
Start: 2022-05-16

## 2022-05-16 RX ORDER — SODIUM CHLORIDE 1 G/1
1 TABLET ORAL 3 TIMES DAILY
Status: CANCELLED | OUTPATIENT
Start: 2022-05-16

## 2022-05-16 RX ORDER — ACETAMINOPHEN 325 MG/1
650 TABLET ORAL EVERY 6 HOURS PRN
Status: CANCELLED | OUTPATIENT
Start: 2022-05-16 | End: 2022-05-21

## 2022-05-16 RX ORDER — OXYCODONE HYDROCHLORIDE AND ACETAMINOPHEN 5; 325 MG/1; MG/1
2 TABLET ORAL
Status: DISCONTINUED | OUTPATIENT
Start: 2022-05-16 | End: 2022-05-16 | Stop reason: HOSPADM

## 2022-05-16 RX ORDER — SODIUM CHLORIDE 9 MG/ML
INJECTION, SOLUTION INTRAVENOUS
Status: DISCONTINUED | OUTPATIENT
Start: 2022-05-16 | End: 2022-05-16 | Stop reason: HOSPADM

## 2022-05-16 RX ORDER — ONDANSETRON 4 MG/1
4 TABLET, ORALLY DISINTEGRATING ORAL EVERY 4 HOURS PRN
Status: CANCELLED | OUTPATIENT
Start: 2022-05-16

## 2022-05-16 RX ORDER — OXYCODONE HYDROCHLORIDE AND ACETAMINOPHEN 5; 325 MG/1; MG/1
1 TABLET ORAL
Status: DISCONTINUED | OUTPATIENT
Start: 2022-05-16 | End: 2022-05-16 | Stop reason: HOSPADM

## 2022-05-16 RX ORDER — ONDANSETRON 2 MG/ML
4 INJECTION INTRAMUSCULAR; INTRAVENOUS EVERY 4 HOURS PRN
Status: CANCELLED | OUTPATIENT
Start: 2022-05-16

## 2022-05-16 RX ORDER — SUCCINYLCHOLINE CHLORIDE 20 MG/ML
INJECTION INTRAMUSCULAR; INTRAVENOUS PRN
Status: DISCONTINUED | OUTPATIENT
Start: 2022-05-16 | End: 2022-05-16 | Stop reason: SURG

## 2022-05-16 RX ORDER — LEVOTHYROXINE SODIUM 0.1 MG/1
100 TABLET ORAL
Status: CANCELLED | OUTPATIENT
Start: 2022-05-16

## 2022-05-16 RX ORDER — HYDROCODONE BITARTRATE AND ACETAMINOPHEN 5; 325 MG/1; MG/1
1 TABLET ORAL EVERY 6 HOURS PRN
Qty: 28 TABLET | Refills: 0 | Status: SHIPPED | OUTPATIENT
Start: 2022-05-16 | End: 2022-05-23

## 2022-05-16 RX ORDER — NITROFURANTOIN 25; 75 MG/1; MG/1
100 CAPSULE ORAL 2 TIMES DAILY
Status: CANCELLED | OUTPATIENT
Start: 2022-05-16 | End: 2022-05-18

## 2022-05-16 RX ORDER — METOCLOPRAMIDE HYDROCHLORIDE 5 MG/ML
INJECTION INTRAMUSCULAR; INTRAVENOUS PRN
Status: DISCONTINUED | OUTPATIENT
Start: 2022-05-16 | End: 2022-05-16 | Stop reason: SURG

## 2022-05-16 RX ADMIN — HALOPERIDOL LACTATE 1 MG: 5 INJECTION, SOLUTION INTRAMUSCULAR at 11:04

## 2022-05-16 RX ADMIN — SUCCINYLCHOLINE CHLORIDE 120 MG: 20 INJECTION, SOLUTION INTRAMUSCULAR; INTRAVENOUS; PARENTERAL at 09:12

## 2022-05-16 RX ADMIN — FENTANYL CITRATE 25 MCG: 50 INJECTION, SOLUTION INTRAMUSCULAR; INTRAVENOUS at 10:00

## 2022-05-16 RX ADMIN — METOCLOPRAMIDE 10 MG: 5 INJECTION, SOLUTION INTRAMUSCULAR; INTRAVENOUS at 09:03

## 2022-05-16 RX ADMIN — ROCURONIUM BROMIDE 5 MG: 10 INJECTION, SOLUTION INTRAVENOUS at 09:12

## 2022-05-16 RX ADMIN — SUGAMMADEX 200 MG: 100 INJECTION, SOLUTION INTRAVENOUS at 10:09

## 2022-05-16 RX ADMIN — DEXAMETHASONE SODIUM PHOSPHATE 4 MG: 4 INJECTION, SOLUTION INTRA-ARTICULAR; INTRALESIONAL; INTRAMUSCULAR; INTRAVENOUS; SOFT TISSUE at 09:01

## 2022-05-16 RX ADMIN — ONDANSETRON 2 MG: 2 INJECTION INTRAMUSCULAR; INTRAVENOUS at 10:09

## 2022-05-16 RX ADMIN — Medication 100 MCG: at 09:46

## 2022-05-16 RX ADMIN — ROCURONIUM BROMIDE 45 MG: 10 INJECTION, SOLUTION INTRAVENOUS at 09:16

## 2022-05-16 RX ADMIN — FENTANYL CITRATE 25 MCG: 50 INJECTION, SOLUTION INTRAMUSCULAR; INTRAVENOUS at 09:55

## 2022-05-16 RX ADMIN — FENTANYL CITRATE 100 MCG: 50 INJECTION, SOLUTION INTRAMUSCULAR; INTRAVENOUS at 09:12

## 2022-05-16 RX ADMIN — MIDAZOLAM HYDROCHLORIDE 2 MG: 1 INJECTION, SOLUTION INTRAMUSCULAR; INTRAVENOUS at 09:06

## 2022-05-16 RX ADMIN — SODIUM CHLORIDE, POTASSIUM CHLORIDE, SODIUM LACTATE AND CALCIUM CHLORIDE: 600; 310; 30; 20 INJECTION, SOLUTION INTRAVENOUS at 09:11

## 2022-05-16 RX ADMIN — PROPOFOL 150 MG: 10 INJECTION, EMULSION INTRAVENOUS at 09:12

## 2022-05-16 RX ADMIN — DEXAMETHASONE SODIUM PHOSPHATE 4 MG: 4 INJECTION, SOLUTION INTRA-ARTICULAR; INTRALESIONAL; INTRAMUSCULAR; INTRAVENOUS; SOFT TISSUE at 09:23

## 2022-05-16 RX ADMIN — CLINDAMYCIN PHOSPHATE 900 MG: 150 INJECTION, SOLUTION INTRAMUSCULAR; INTRAVENOUS at 09:20

## 2022-05-16 RX ADMIN — ONDANSETRON 4 MG: 2 INJECTION INTRAMUSCULAR; INTRAVENOUS at 09:02

## 2022-05-16 RX ADMIN — SODIUM CHLORIDE: 9 INJECTION, SOLUTION INTRAVENOUS at 10:01

## 2022-05-16 ASSESSMENT — PAIN DESCRIPTION - PAIN TYPE
TYPE: SURGICAL PAIN
TYPE: ACUTE PAIN;SURGICAL PAIN
TYPE: SURGICAL PAIN
TYPE: ACUTE PAIN;SURGICAL PAIN
TYPE: SURGICAL PAIN
TYPE: ACUTE PAIN;SURGICAL PAIN
TYPE: SURGICAL PAIN
TYPE: ACUTE PAIN;SURGICAL PAIN
TYPE: SURGICAL PAIN

## 2022-05-16 ASSESSMENT — PAIN SCALES - GENERAL: PAIN_LEVEL: 0

## 2022-05-16 ASSESSMENT — FIBROSIS 4 INDEX: FIB4 SCORE: 0.66

## 2022-05-16 NOTE — ANESTHESIA POSTPROCEDURE EVALUATION
Patient: Ines Long    Procedure Summary     Date: 05/16/22 Room / Location: [facility] 07 / SURGERY [facility]    Anesthesia Start: 0906 Anesthesia Stop: 1030    Procedures:       L4-5 and L5-S1 decompressive laminectomy, bilateral foraminotomies and left L5-S1 (Spine Lumbar)      DECOMPRESSION, SPINE, LUMBAR (Spine Lumbar)      FORAMINOTOMY, SPINE (Spine Lumbar) Diagnosis:       Neurogenic claudication due to lumbar spinal stenosis      (Neurogenic claudication due to lumbar spinal stenosis )    Surgeons: Evon Díaz M.D. Responsible Provider: Brad Chopra D.O.    Anesthesia Type: general ASA Status: 2          Final Anesthesia Type: general  Last vitals  BP   Blood Pressure : (!) 141/64    Temp   36.2 °C (97.2 °F)    Pulse   (!) 102   Resp   12    SpO2   100 %      Anesthesia Post Evaluation    Patient location during evaluation: PACU  Patient participation: complete - patient participated  Level of consciousness: awake and alert  Pain score: 0    Airway patency: patent  Anesthetic complications: no  Cardiovascular status: hemodynamically stable  Respiratory status: acceptable  Hydration status: euvolemic    PONV: none          No complications documented.     Nurse Pain Score: 0 (NPRS)

## 2022-05-16 NOTE — OR SURGEON
Immediate Post OP Note    PreOp Diagnosis:     1.  Moderate L4-5 stenosis with a acute left L5-S1 disc protrusion causing severe left-sided lateral recess and neuroforaminal stenosis  2.  8 weeks of severe low back and left leg symptomatology  3.  Spastic gait   4.  Type I diabetic, previous cervical endarterectomy with left-sided anterior cervical wound        PostOp Diagnosis:     1.  Moderate L4-5 stenosis with a acute left L5-S1 disc protrusion causing severe left-sided lateral recess and neuroforaminal stenosis  2.  8 weeks of severe low back and left leg symptomatology  3.  Spastic gait   4.  Type I diabetic, previous cervical endarterectomy with left-sided anterior cervical wound        Procedure(s):  L4-5 and L5-S1 decompressive laminectomy, bilateral foraminotomies and left L5-S1 - Wound Class: Clean with Drain  DECOMPRESSION, SPINE, LUMBAR - Wound Class: Clean with Drain  FORAMINOTOMY, SPINE - Wound Class: Clean with Drain    Surgeon(s):  Evon Díaz M.D.    Anesthesiologist/Type of Anesthesia:  Anesthesiologist: Brad Chopra D.O./General    Surgical Staff:  Assistant: Brigido Richter P.A.-C.  Circulator: Lukas D. Gansert, R.N.  Relief Circulator: Marily Vilchis R.N.  Scrub Person: Ya Swan  Radiology Technologist: Bela Womack    Specimens removed if any:  * No specimens in log *    Assistants: Brigido Richter PA-C  ,  Specimen: nil    Estimated Blood Loss: 50 cc    Findings: n/a    Complications: nil      5/16/2022 10:49 AM Evon Díaz M.D.

## 2022-05-16 NOTE — OR NURSING
Patient recovered well in post-op. AAOx4. VSS, on RA. Surgical sites CDI. Per patient, pain 2/10 thoughout recovery, tolerable and declined offered interventions. Antiemetics used to manage nausea, patient then able to tolerable oral fluids and crackers. Spouse updated and discussed POC. No belongings in pacu. Clarified discharge orders with Brigido Richter PA-C. OK for discharge after meeting discharge criteria in order. Report called to MYNOR Solitario. Patient transported to phase II.

## 2022-05-16 NOTE — OR NURSING
1257: Patient arrived to unit. Patient is AOx4. Transferred to chair appropriately. Patient's pain is 4/10. Declines pain medication at this time. Patient's dressings to lower back are CDI. Tolerating liquids at this time.     1335: Dr. Díaz at bedside. Per brittney CARRASCO for patient to discharge at 1400 with current output. Nursing communication placed.     1400: Drainage from hemovac is 30cc. Patient's drain removed at this time. Discharge instructions discussed with the patient. Patient denies any further questions at this time.     1412: Patient discharged home to responsible adult.

## 2022-05-16 NOTE — PROGRESS NOTES
Reviewed postop.  Left leg pain is gone.  Full strength.  Minimal drainage.  She will go home at 2 PM.  She has been given discharge instructions.

## 2022-05-16 NOTE — PROGRESS NOTES
No change to my last H and P (that is labelled a progress note). The note was made by either myself, or my PAs Evelio Jones or Brigido Richter but is signed by me.If it was done by my physician assistant, I verify it is correct and has my co-signature.    Evon Díaz MD PhD CRISTIANO

## 2022-05-16 NOTE — PROGRESS NOTES
Med rec updated and complete, per pt   Allergies reviewed, per pt   Interviewed pt with family at bedside with permission from pt.  Will have pharmacists look at insulin pump

## 2022-05-16 NOTE — DISCHARGE INSTRUCTIONS
ACTIVITY: Rest and take it easy for the first 24 hours.  A responsible adult is recommended to remain with you during that time.  It is normal to feel sleepy.  We encourage you to not do anything that requires balance, judgment or coordination.    MILD FLU-LIKE SYMPTOMS ARE NORMAL. YOU MAY EXPERIENCE GENERALIZED MUSCLE ACHES, THROAT IRRITATION, HEADACHE AND/OR SOME NAUSEA.    FOR 24 HOURS DO NOT:  Drive, operate machinery or run household appliances.  Drink beer or alcoholic beverages.   Make important decisions or sign legal documents.    SPECIAL INSTRUCTIONS:     Laminectomy, Care After  This sheet gives you information about how to care for yourself after your procedure. Your health care provider may also give you more specific instructions. If you have problems or questions, contact your health care provider.  What can I expect after the procedure?  After the procedure, it is common to have:  Some pain around your incision area.  Muscle tightening (spasms) across the back.  Follow these instructions at home:  Incision care  Follow instructions from your health care provider about how to take care of your incision area. Make sure you:  Wash your hands with soap and water before and after you apply medicine to the area or change your bandage (dressing). If soap and water are not available, use hand .  Change your dressing as told by your health care provider.  Leave stitches (sutures), skin glue, or adhesive strips in place. These skin closures may need to stay in place for 2 weeks or longer. If adhesive strip edges start to loosen and curl up, you may trim the loose edges. Do not remove adhesive strips completely unless your health care provider tells you to do that.  Check your incision area every day for signs of infection. Check for:  More redness, swelling, or pain.  More fluid or blood.  Warmth.  Pus or a bad smell.  Medicines  Take over-the-counter and prescription medicines only as told by your  health care provider.  If you were prescribed an antibiotic medicine, use it as told by your health care provider. Do not stop using the antibiotic even if you start to feel better.  Bathing  Do not take baths, swim, or use a hot tub for 2 weeks, or until your incision has healed completely.  If your health care provider approves, you may take showers after your dressing has been removed.  Activity  Return to your normal activities as told by your health care provider. Ask your health care provider what activities are safe for you.  Avoid bending or twisting at your waist. Always bend at your knees.  Do not sit for more than 20-30 minutes at a time. Lie down or walk between periods of sitting.  Do not lift anything that is heavier than 10 lb (4.5 kg) or the limit that your health care provider tells you, until he or she says that it is safe.  Do not drive for 2 weeks after your procedure or for as long as your health care provider tells you.  Do not drive or use heavy machinery while taking prescription pain medicine.  General instructions  To prevent or treat constipation while you are taking prescription pain medicine, your health care provider may recommend that you:  Drink enough fluid to keep your urine clear or pale yellow.  Take over-the-counter or prescription medicines.  Eat foods that are high in fiber, such as fresh fruits and vegetables, whole grains, and beans.  Limit foods that are high in fat and processed sugars, such as fried and sweet foods.  Do breathing exercises as told.  Keep all follow-up visits as told by your health care provider. This is important.  Contact a health care provider if:  You have more redness, swelling, or pain around your incision area.  Your incision feels warm to the touch.  You are not able to return to activities or do exercises as told by your health care provider.  Get help right away if:  You have:  More fluid or blood coming from your incision area.  Pus or a bad  smell coming from your incision area.  Chills or a fever.  Episodes of dizziness or fainting while standing.  You develop a rash.  You develop shortness of breath or you have difficulty breathing.  You cannot control when you urinate or have a bowel movement.  You become weak.  You are not able to use your legs.  Summary  After the procedure, it is common to have some pain around your incision area. You may also have muscle tightening (spasms) across the back.  Follow instructions from your health care provider about how to care for your incision.  Do not lift anything that is heavier than 10 lb (4.5 kg) or the limit that your health care provider tells you, until he or she says that it is safe.  Contact your health care provider if you have more redness, swelling, or pain around your incision area or if your incision feels warm to the touch. These can be signs of infection.  This information is not intended to replace advice given to you by your health care provider. Make sure you discuss any questions you have with your health care provider.    DIET: To avoid nausea, slowly advance diet as tolerated, avoiding spicy or greasy foods for the first day.  Add more substantial food to your diet according to your physician's instructions.  Babies can be fed formula or breast milk as soon as they are hungry.  INCREASE FLUIDS AND FIBER TO AVOID CONSTIPATION.    SURGICAL DRESSING/BATHING: Leave dressing in place until follow up with MD. Sponge baths until follow up. Keep dressing clean and dry and intact.     FOLLOW-UP APPOINTMENT:  A follow-up appointment should be arranged with your doctor; call to schedule.    You should CALL YOUR PHYSICIAN if you develop:  Fever greater than 101 degrees F.  Pain not relieved by medication, or persistent nausea or vomiting.  Excessive bleeding (blood soaking through dressing) or unexpected drainage from the wound.  Extreme redness or swelling around the incision site, drainage of pus or  foul smelling drainage.  Inability to urinate or empty your bladder within 8 hours.  Problems with breathing or chest pain.    You should call 911 if you develop problems with breathing or chest pain.  If you are unable to contact your doctor or surgical center, you should go to the nearest emergency room or urgent care center.  Physician's telephone #: Dr. Díaz [phone].    If any questions arise, call your doctor.  If your doctor is not available, please feel free to call the Surgical Center at [phone].     A registered nurse may call you a few days after your surgery to see how you are doing after your procedure.    MEDICATIONS: Resume taking daily medication.  Take prescribed pain medication with food.  If no medication is prescribed, you may take non-aspirin pain medication if needed.  PAIN MEDICATION CAN BE VERY CONSTIPATING.  Take a stool softener or laxative such as senokot, pericolace, or milk of magnesia if needed.    Prescription given for keflex and norco. You may start this pain medication at any time.    If your physician has prescribed pain medication that includes Acetaminophen (Tylenol), do not take additional Acetaminophen (Tylenol) while taking the prescribed medication.    Depression / Suicide Risk    As you are discharged from this [facility] facility, it is important to learn how to keep safe from harming yourself.    Recognize the warning signs:  Abrupt changes in personality, positive or negative- including increase in energy   Giving away possessions  Change in eating patterns- significant weight changes-  positive or negative  Change in sleeping patterns- unable to sleep or sleeping all the time   Unwillingness or inability to communicate  Depression  Unusual sadness, discouragement and loneliness  Talk of wanting to die  Neglect of personal appearance   Rebelliousness- reckless behavior  Withdrawal from people/activities they love  Confusion- inability to  concentrate     If you or a loved one observes any of these behaviors or has concerns about self-harm, here's what you can do:  Talk about it- your feelings and reasons for harming yourself  Remove any means that you might use to hurt yourself (examples: pills, rope, extension cords, firearm)  Get professional help from the community (Mental Health, Substance Abuse, psychological counseling)  Do not be alone:Call your Safe Contact- someone whom you trust who will be there for you.  Call your local CRISIS HOTLINE 631-5460 or 985-950-3939  Call your local Children's Mobile Crisis Response Team [place] (252) 512-5481 or www.[employer]  Call the toll free National Suicide Prevention Hotlines   National Suicide Prevention Lifeline 990-341-UTLU (2173)  National Hope Line Network 800-SUICIDE (001-0249)

## 2022-05-16 NOTE — ANESTHESIA TIME REPORT
Anesthesia Start and Stop Event Times     Date Time Event    5/16/2022 0728 Ready for Procedure     0906 Anesthesia Start     1030 Anesthesia Stop        Responsible Staff  05/16/22    Name Role Begin End    Brad Chopra D.O. Anesth 0906 1030        Overtime Reason:  no overtime (within assigned shift)    Comments:

## 2022-05-16 NOTE — LETTER
April 25, 2022        Patient Name: Ines Long  Surgeon Name: Evon Díza M.D.  Surgery Facility: [facility], [place] ([address] [facility])  Surgery Date: 5/16/2022    The time of your surgery is not final and may change up to and until the day of your surgery. You will be contacted 1-2 business days prior to your surgery date with your check-in and surgery time.    BEFORE YOUR SURGERY    Pre Registration and/or Lab Work/Tests must be done within 7 to 28 days before your surgery date. These will be completed at [facility] with an appointment.    On April 27th - if you have not already heard from [facility] Pre Admission/Registration Department, please call them at [phone] option 2, then option 1, for an appointment.      Please inform Vickie the date of this appointment at, [phone] or by email, [email or web address].    Pre op Appointment:   Date: 5.4.22   Time: 2:30pm   Provider: Brigido Richter PA-C   Location: [address] [place] [address]    Bring a list of all medications you are currently taking including the dosing and frequency.    Please read the MEDICATION INSTRUCTIONS below completely.    DAY OF YOUR SURGERY    Nothing to eat or drink and refrain from smoking any substance after midnight the day of your surgery. Smoking may interfere with the anesthetic and frequently produces nausea during the recovery period.    Continue taking all lifesaving medications, including the morning of your surgery with small sip of water.    Please arrive at the hospital/surgery center at the check-in time provided.     You will need a responsible adult to drive you to and from your surgery.    AFTER YOUR SURGERY    2 Week Post op Appointment:   Date: 5.31.22   Time: 11:00am   Provider: Brigido Richter PA-C   Location: [address] [place], [place] [address]    6 Week Post op Appointment:   Date: 6.30.22   Time: 11:00am   Provider: Brigido Richter PA-C   Location: [address] [place], [place] [address]    MEDICATION  INSTRUCTIONS Do not take these medications prior to your procedure:  • Anti-inflammatories: stop 7 days prior, restart when advised. For fusions avoid for 12 weeks after surgery  o Naproxen (Naprosyn or Alleve)  o Motrin  o Ibuprofen  o Nabumetone (Relafen)  o Meloxicam (Mobic)  o Celebrex  o Salsalate  o Diclofenac (Arthrotec, Voltaren, Flector)  o Sulindac (Clinoril  o Etodolac (Lodine)  o Indomethacin (Indocin)  o Ketoprofen  o Ketorolac  o Oxaprozin (Daypro)  o Piroxicam (Feldene  o Blood thinners (stop after approval from the prescribing physician)  • Aspirin (any dosage): 10 days prior, restart 7 days after procedure  o Any medications that contain aspirin in combination (ie: Excedrin migraine, Fiorinal, and Norgesic)  • Warfarin (Coumadin): Stop 7 days prior, INR day of procedure, restart 2-3 weeks after procedure  • Antiplatelet: restart 7 days after procedure  o Ticlid (ticlopidine): Stop 14 days prior  o  Plavix (clopidogrel): Stop 7 days prior  o Aggrenox or Dipyridamole: Stop 14 days prior  o ReoPro (abciximab): Stop 14 days prior  o Integrilin (eptifibatide): Stop 14 days prior  • Aggrastat (tirofiban): Stop 14 days prior  • Lovenox (Enoxaparin): Stop 24hrs before and restart 24hrs after procedure  • Heparin: Stop 24hrs before   • Dalteparin (Fragmin): Stop 24hrs before  • Fondaparinux (Arixtra): Stop 24hrs before  • Xeralto, Dabigatran (Pradaxa) Stop 5 days prior  • Eliquis (apibaxan) Stop 7 days prior    Okay to take these medications as prescribed:  • Muscle relaxers  • Acetaminophen and pain medications that have it in addition to oxycodone and hydrocodone  • Blood pressure, cholesterol and diabetes medications are ok    TIME OFF WORK    FMLA or Disability forms can be faxed directly to [phone] or you may drop them off at any [facility]. Our office charges a $35.00 fee. Forms will be completed within 5-10 business days after payment is received. For the status of your forms you  may contact our disability office directly at [phone].    DENTAL PROCDURES/CLEANINGS Avoid 3 weeks before surgery and for 3 months after surgery.    QUESTIONS ABOUT COSTS  Contact our Patient Financial Services department at [phone] for more information.    If you have any questions, please contact our office.    [facility]  [address] [place] [address] [phone]      Vickie Alcantara   Surgery Scheduler  ? [phone]   Fax: [phone]  [address]  [address]  [place] [address] [phone]

## 2022-05-16 NOTE — ANESTHESIA PREPROCEDURE EVALUATION
Case: [id number] Date/Time: 05/16/22 0915    Procedures:       L4-5 and L5-S1 decompressive laminectomy, bilateral foraminotomies and left L5-S1      DECOMPRESSION, SPINE, LUMBAR      FORAMINOTOMY, SPINE    Diagnosis: Neurogenic claudication due to lumbar spinal stenosis [M48.062]    Pre-op diagnosis: Neurogenic claudication due to lumbar spinal stenosis [M48.062]    Location: [place] [address] / SURGERY [facility]    Surgeons: Evon Díaz M.D.          Relevant Problems   CARDIAC   (positive) Carotid artery stenosis   (positive) Primary hypertension      ENDO   (positive) Hypothyroidism   (positive) Type 1 diabetes mellitus (HCC)       Physical Exam    Airway   Mallampati: II  TM distance: >3 FB  Neck ROM: full       Cardiovascular - normal exam  Rhythm: regular  Rate: normal  (-) murmur     Dental - normal exam           Pulmonary - normal exam  Breath sounds clear to auscultation     Abdominal    Neurological - normal exam                 Anesthesia Plan    ASA 2       Plan - general       Airway plan will be ETT          Induction: intravenous    Postoperative Plan: Postoperative administration of opioids is intended.    Pertinent diagnostic labs and testing reviewed    Informed Consent:    Anesthetic plan and risks discussed with patient.    Use of blood products discussed with: patient whom consented to blood products.

## 2022-05-16 NOTE — ANESTHESIA PROCEDURE NOTES
Airway    Date/Time: 5/16/2022 9:13 AM  Performed by: Brad Chopra D.O.  Authorized by: Brad Chopra D.O.     Location:  OR  Urgency:  Elective  Indications for Airway Management:  Anesthesia      Spontaneous Ventilation: absent    Sedation Level:  Deep  Preoxygenated: Yes    Patient Position:  Sniffing  Mask Difficulty Assessment:  2 - vent by mask + OA or adjuvant +/- NMBA  Final Airway Type:  Endotracheal airway  Final Endotracheal Airway:  ETT  Cuffed: Yes    Technique Used for Successful ETT Placement:  Direct laryngoscopy    Insertion Site:  Oral  Blade Type:  Lucio  Laryngoscope Blade/Videolaryngoscope Blade Size:  3  ETT Size (mm):  7.0  Measured from:  Lips  ETT to Lips (cm):  21  Placement Verified by: auscultation and capnometry    Cormack-Lehane Classification:  Grade I - full view of glottis  Number of Attempts at Approach:  1

## 2022-05-16 NOTE — OP REPORT
1.  DATE OF SURGERY: 5/16/2022    2. SURGEON:  Evon Díaz MD, PhD, CRISTIANO, Neurosurgeon    3. ASSISTANT:  Brigido Richter PA-C  An assistant was crucial to have during the case as the assistant helped with positioning, keeping the field clear of blood and retraction. This case could not be done easily without a qualified assistant. It was medically necessary      4. TYPE OF ANESTHESIA:  General anesthesia with endotracheal intubation    5. PREOPERATIVE DIAGNOSIS:  Lumbar disk protrusion with failed conservative therapy.    1.  Moderate L4-5 stenosis with a acute left L5-S1 disc protrusion causing severe left-sided lateral recess and neuroforaminal stenosis  2.  8 weeks of severe low back and left leg symptomatology  3.  Spastic gait   4.  Type I diabetic, previous cervical endarterectomy with left-sided anterior cervical wound        6. POSTOPERATIVE DIAGNOSIS:  Lumbar disk protrusion with failed conservative therapy.    1.  Moderate L4-5 stenosis with a acute left L5-S1 disc protrusion causing severe left-sided lateral recess and neuroforaminal stenosis  2.  8 weeks of severe low back and left leg symptomatology  3.  Spastic gait   4.  Type I diabetic, previous cervical endarterectomy with left-sided anterior cervical wound        7. HISTORY: See formal admission H and P    4.5.22:  Ines Long is a 66 y.o. female seen today in a neurosurgery/spine consultation.  Her  was in attendance at today's visit.  She reports a somewhat complicated history.  She developed unsteady gait approximately 5 years ago that she attributes to a previous foot surgery.  She was recently seen by pain management for her worsening back symptomatology who felt she had up unsteady gait and did a thorough work-up for possible myelopathy.  She has recently started seeing neurology which did a nerve study and has ordered some labs as well as a thoracic MRI for her.        Today her biggest complaint is low back and left leg  symptomatology.  She had been suffering from this over the years which was generally self-limiting.  This is been significantly worse over the last 8 weeks.  She does get pain on her low back which extends down her left leg into her knee and all the way into her foot.  She presently rates her symptoms as 6-7/10.  She did get some trigger point injections, she did have what sounds like a selective nerve root block which did give her significant relief.  She has been using Voltaren, Tylenol and ibuprofen.  She has done physical therapy in the past none recently.        In essence, this is a 66-year-old female with 8 weeks of progressive low back and left leg symptomatology.  She does have associated symptoms of intermittent neck pain without radicular complaints.  As well as a unsteady, spastic type gait.            8. PREOPERATIVE PHYSICAL EXAMINATION: See formal admission H and P     Mild reduced range of motion of the cervical and lumbar spine.  Strength, sensation and reflexes were intact and within normal limits.  She does demonstrate a spastic gait upon ambulation.  She does have positive straight leg raising sign on the left at 80 degrees while seated.  Negative Brooke's.  Fine motor dexterity is intact.  There is a surgical incision anteriorly on the left-hand side.  Is well-healed.              Imaging Result(s):   I independently reviewed and assessed the imaging. I also reviewed all imaging reports.      She had multiple imaging to review at [facility].  Brain MRI shows age-related microvascular changes.  Cervical MRI shows advanced degenerative changes which result in mild stenosis at C4-5, mild to moderate at C5-6.  There are varying degrees of neural foraminal stenosis.  Lumbar MRI shows varying degrees of degenerative changes.  She does have mild to moderate central stenosis at L4-5.  There is a left disc protrusion at L5-S1 that does cause severe left-sided lateral recess and neural  foraminal stenosis.  2 view lumbar x-rays were reviewed these show good alignment of lumbar spine with degenerative changes noted.        She had an EMG from Dr. Chavarria this was essentially normal of the upper extremities.  She had an MRI scan at [facility].  This was on 4/18/2022 of the thoracic spine and it was normal.           9.  TITLE OF THE OPERATION:  • L4 decompressive laminectomy bilateral foraminotomies  • L5 decompressive laminectomy bilateral foraminotomies  • S1 decompressive laminectomy bilateral foraminotomies  • Right  L4-5 discectomy  • Left L5-S1 discectomy  • Microscopic microdissection.    10. OPERATIVE FINDINGS:     The patient had a combination of pathologies.  There was spinal stenosis L4-5 and L5-S1.  There is an acute disc cannulation on the left at L5-S1 which is within a piecemeal fashion.  There is disc osteophyte on the right at L4-5.  All the levels were decompressed.  11. OPERATED SIDE/LEVEL: L5-S1 L4-5        12. IMPLANTS USED: Nil    13. COMPLICATIONS:  Nil.    14. ESTIMATED BLOOD LOSS:     20   mL        17. OPERATIVE DETAILS:  After fully informed consent, the patient was brought to the operating room.  General anesthesia was administered.  The patient was given intravenous antibiotic.  The patient was carefully turned prone on the OSI operating table on the Kyaw frame.  All pressure points were padded.      The posterior lumbar region was prepped and draped in a standard fashion.  After fluoroscopic localization, a 3 cm skin incision was marked over the appropriate disk space level.  The wound was prepped and draped in a standard fashion.  A midline incision was affected.  A bilateral subperiosteal exposure was affected at the L4-5 level..  The self retaining  retractors were placed.  The microscope was then brought in the field of view.      Under the microscope using an AM-8 drill bit, a laminectomy was affected involving the inferior two thirds of L4, all of L5  and half of S1.  I used a Midas Bob AM 8 drill bit after using Leksell rongeurs.  I used the drill to thin down the lamina and medial facets.  I used 4, 3 and 2 mm medial Kerrison punches to remove the remaining bone, disc, osteophyte and ligament.  This exposed the common thecal sac as well as the traversing nerve root on either side.  I was also able to palpate the foramen on either side. The nerve root was completely identified.      Under the microscopic with gentle medial retraction of left S1 nerve root, the disk bulge was identified.  It was incised and removed in a piecemeal fashion.  Free fragments were removed.  The disk space was explored to ensure that there were no loose fragments.  Hemostasis was obtained.        I then turned to the right L4-5 level.  Again, under the microscope, with gentle retraction of the right L5 nerve root the disc bulge was  Identified, incised and removed in a  piecemeal fashion.  This was more of a  contained fragment.      Using  2 mm Kerrison punches, a foraminotomy was affected bilaterally at L4-5 and L5-S1.  Using a blunt hook, I palpated under  the nerve root and out the foramen to ensure there were no other loose fragments.      Once I was happy with the discectomy and the decompression, the wound was thoroughly irrigated with saline solution.  Vancomycin powder was placed into the wound. Local anesthesia was placed throughout the paraspinal musculature prior to closing. Closure was then affected over a suction subfascial Hemovac.  The wound was closed with interrupted 0 Vicryl sutures for the fascia, 2-0 Vicryl for the subdermal tissues and staples for the skin. All counts were correct and all instruments accounted for.      15. PROGNOSIS:  The surgery went well.  The patient has been decompressed.  At the end of the case, the patient awoke moving his/her upper and lower extremities well. I would anticipate the patient will be discharged later today..  When the patient  goes home, he/she will be discharged home on narcotic analgesia,  a muscle relaxant and oral antibiotic, usually Keflex.  The plan will be to follow up in 2 weeks in the office.  We will call the patient next week to ensure there are not any problems.  He/she can shower in 72 hours but is instructed to keep the wound dry.  The patient has also been instructed to abstain from smoking or any anti-inflammatories.     Evon Díaz MD, PhD, CRISTIANO

## 2022-05-18 ENCOUNTER — TELEPHONE (OUTPATIENT)
Dept: NEUROSURGERY | Facility: MEDICAL CENTER | Age: 67
End: 2022-05-18
Payer: MEDICARE

## 2022-05-18 RX ORDER — ONDANSETRON 8 MG/1
8 TABLET, ORALLY DISINTEGRATING ORAL EVERY 8 HOURS PRN
Qty: 30 TABLET | Refills: 0 | Status: SHIPPED
Start: 2022-05-18 | End: 2022-08-19

## 2022-05-30 ENCOUNTER — HOSPITAL ENCOUNTER (OUTPATIENT)
Facility: MEDICAL CENTER | Age: 67
End: 2022-05-30
Attending: PHYSICIAN ASSISTANT
Payer: MEDICARE

## 2022-05-30 ENCOUNTER — OFFICE VISIT (OUTPATIENT)
Dept: URGENT CARE | Facility: CLINIC | Age: 67
End: 2022-05-30
Payer: MEDICARE

## 2022-05-30 VITALS
OXYGEN SATURATION: 98 % | TEMPERATURE: 98.5 F | RESPIRATION RATE: 16 BRPM | SYSTOLIC BLOOD PRESSURE: 122 MMHG | WEIGHT: 154 LBS | HEIGHT: 67 IN | BODY MASS INDEX: 24.17 KG/M2 | DIASTOLIC BLOOD PRESSURE: 72 MMHG | HEART RATE: 88 BPM

## 2022-05-30 DIAGNOSIS — R39.9 LOWER URINARY TRACT SYMPTOMS: ICD-10-CM

## 2022-05-30 DIAGNOSIS — E10.9 TYPE 1 DIABETES MELLITUS WITHOUT COMPLICATION (HCC): ICD-10-CM

## 2022-05-30 LAB
APPEARANCE UR: CLEAR
BILIRUB UR STRIP-MCNC: NEGATIVE MG/DL
COLOR UR AUTO: NORMAL
GLUCOSE UR STRIP.AUTO-MCNC: 100 MG/DL
KETONES UR STRIP.AUTO-MCNC: NEGATIVE MG/DL
LEUKOCYTE ESTERASE UR QL STRIP.AUTO: NEGATIVE
NITRITE UR QL STRIP.AUTO: NEGATIVE
PH UR STRIP.AUTO: 6.5 [PH] (ref 5–8)
PROT UR QL STRIP: NEGATIVE MG/DL
RBC UR QL AUTO: NORMAL
SP GR UR STRIP.AUTO: 1.01
UROBILINOGEN UR STRIP-MCNC: 0.2 MG/DL

## 2022-05-30 PROCEDURE — 81002 URINALYSIS NONAUTO W/O SCOPE: CPT | Performed by: PHYSICIAN ASSISTANT

## 2022-05-30 PROCEDURE — 99214 OFFICE O/P EST MOD 30 MIN: CPT | Performed by: PHYSICIAN ASSISTANT

## 2022-05-30 PROCEDURE — 87086 URINE CULTURE/COLONY COUNT: CPT

## 2022-05-30 RX ORDER — CLOBETASOL PROPIONATE 0.5 MG/G
1 CREAM TOPICAL PRN
COMMUNITY
Start: 2022-05-25 | End: 2022-08-19

## 2022-05-30 ASSESSMENT — ENCOUNTER SYMPTOMS
FEVER: 0
CHILLS: 0
MYALGIAS: 1
NAUSEA: 0
VOMITING: 0
BACK PAIN: 1

## 2022-05-30 ASSESSMENT — FIBROSIS 4 INDEX: FIB4 SCORE: 0.66

## 2022-05-30 NOTE — PROGRESS NOTES
Subjective     Ines Long is a 67 y.o. female who presents with UTI (Had back surgery 2 weeks ago, could not take it the day of surgery on 5/15. The patient started the Macrobid on 5/14. Has pain with urinating, achy feeling, no low pelvic pain, no fever. Onset 4-5 days. Takes a daily cranberry. )    HPI:  Ines Long is a 67 y.o. female who presents today for evaluation of possible urinary tract infection. Patient was seen for preop labs on 5/5/2022.  At that time her urine sample showed some bacteria in the urine but she was not having any symptoms.  On 5/14/2022, 2 days prior to her L4/L5 laminectomy, she started to develop symptoms of burning with urination.  She was started on Macrobid but notes that she had to miss a dose the day of the surgery.  Since then she has had intermittent urinary tract symptoms.  She says that 1 day she will have some burning when she urinates and lower abdominal pressure but the next day she may have no symptoms at all.  She has not had any fever/chills, nausea/vomiting, or flank pain.  She is no longer on antibiotics but does take daily cranberry pills.  Of note, she is type I diabetic and notes that her sugars have been running a little bit higher than normal as she cannot do her long daily walks that she normally does secondary to recovering from back surgery.    Review of Systems   Constitutional: Negative for chills, fever and malaise/fatigue.   Gastrointestinal: Negative for nausea and vomiting.   Genitourinary: Positive for dysuria. Negative for hematuria.   Musculoskeletal: Positive for back pain (Status post 2 L4/L5 laminectomy) and myalgias.           PMH:  has a past medical history of Acute nasopharyngitis (04/29/2022), Anesthesia, Blocked artery (Around 2019), Diabetes type 1, controlled (HCC) (11/1/2010), Dyslipidemia (11/1/2010), High cholesterol, Hypertension, Hypothyroidism (11/1/2010), Indigestion, Insulin pump in place (5/18/2011), Left  carotid artery stenosis - severe 2019, PONV (postoperative nausea and vomiting), Routine health maintenance (2011), and Urinary incontinence.  MEDS:   Current Outpatient Medications:   •  clobetasol (TEMOVATE) 0.05 % Cream, Apply 1 Each topically as needed., Disp: , Rfl:   •  FIASP 100 UNIT/ML Solution, 4-50 units with pump daily, Disp: , Rfl:   •  ondansetron (ZOFRAN ODT) 8 MG TABLET DISPERSIBLE, Take 1 Tablet by mouth every 8 hours as needed for Nausea., Disp: 30 Tablet, Rfl: 0  •  insulin infusion pump Device, Inject  under the skin continuous. Patient's own SQ insulin pump  Type of Insulin: Fiasp Last change of tubin/15/2022 - Change tubing and site every 72 hours  Dosing: Basal rate:  0000 - 0329 = 0.5 units/hr  0330 - 1129 = 0.85 units/hr  1130 - 1359 = 0.5 units/hr             1400 - 1759 = 0.45 units/hr             1800 - 2359 = 0.5 units/hr  Bolus ratio:  1 unit : 12 g carbohydrate at  (breakfast, lunch, dinner, snacks)    Correction ratio:   1 units for every 50 over 150 mg/dL  Disconnect pump if patient becomes hypoglycemic and altered., Disp: , Rfl:   •  losartan (COZAAR) 100 MG Tab, Take 100 mg by mouth every evening., Disp: , Rfl:   •  methocarbamol (ROBAXIN-750) 750 MG Tab, One tablet (750 mg) every 6-8 hours as needed for muscle spasms, Disp: 90 Tablet, Rfl: 5  •  CLINDAMYCIN PHOSPHATE,TOPICAL, 1 % Lotion, Apply 1 Application topically every day. Apply's on face, Disp: , Rfl:   •  CRANBERRY PO, Take 1 Capsule by mouth every evening., Disp: , Rfl:   •  B Complex Vitamins (VITAMIN B COMPLEX PO), Take 1 Tablet by mouth every evening., Disp: , Rfl:   •  LEVOXYL 100 MCG Tab, Take 100 mcg by mouth every morning on an empty stomach., Disp: , Rfl:   •  pantoprazole (PROTONIX) 40 MG Tablet Delayed Response, Take 40 mg by mouth every day., Disp: , Rfl:   •  rosuvastatin (CRESTOR) 40 MG tablet, Take 1 Tab by mouth every bedtime., Disp: 30 Tab, Rfl: 1  •  Cholecalciferol (VITAMIN D-3 PO), Take 5,000 mg  "by mouth every evening., Disp: , Rfl:   •  Multiple Vitamins-Minerals (MULTIVITAMIN WOMEN PO), Take 1 Tab by mouth every day., Disp: , Rfl:   •  metoclopramide (REGLAN) 10 MG TABS, Take 10 mg by mouth 2 Times a Day., Disp: , Rfl:   •  sodium chloride (SALT) 1 GM Tab, Take 1 Tablet by mouth 3 times a day. (Patient not taking: Reported on 5/30/2022), Disp: 90 Tablet, Rfl: 3  ALLERGIES:   Allergies   Allergen Reactions   • Ceclor [Cefaclor] Hives and Swelling   • Augmentin Hives, Diarrhea and Vomiting     Fever blisters   Sickness lasts forever   • Naproxen      Face Swells    • Tape Rash     \"Certain band aids\"  PAPER TAPE OKAY   • Cefaclor Hives and Swelling     SURGHX:   Past Surgical History:   Procedure Laterality Date   • LUMBAR LAMINECTOMY DISKECTOMY  5/16/2022    Procedure: L4-5 and L5-S1 decompressive laminectomy, bilateral foraminotomies and left L5-S1;  Surgeon: Evon Díaz M.D.;  Location: [facility];  Service: Orthopedics   • LUMBAR DECOMPRESSION  5/16/2022    Procedure: DECOMPRESSION, SPINE, LUMBAR;  Surgeon: Evon Díaz M.D.;  Location: [facility];  Service: Orthopedics   • FORAMINOTOMY  5/16/2022    Procedure: FORAMINOTOMY, SPINE;  Surgeon: Evon Díaz M.D.;  Location: [facility];  Service: Orthopedics   • CAROTID ENDARTERECTOMY Left 12/5/2019    Procedure: ENDARTERECTOMY, CAROTID- WITH NEUROMONITORIING;  Surgeon: Surya Garcia M.D.;  Location: [facility];  Service: Vascular   • ORTHOPEDIC OSTEOTOMY Left 7/17/2017    Procedure: ORTHOPEDIC OSTEOTOMY CALCANEAL, KIDNER, EXCISION NAVICULAR, GASTROC SOLEUS RECESSION;  Surgeon: Adebayo Layton M.D.;  Location: [facility];  Service:    • LIGAMENT REPAIR  7/17/2017    Procedure: LIGAMENT REPAIR SPRING;  Surgeon: Adebayo Layton M.D.;  Location: [facility];  Service:    • TOE FUSION  7/17/2017    Procedure: TOE FUSION 1ST METATARSAL JOINT, 2ND TOE RECONSTRUCTION ;  Surgeon: " "Adebayo Layton M.D.;  Location: [facility];  Service:    • REPAIR, TENDON, LOWER EXTREMITY, USING TENDON GRAFT  7/17/2017    Procedure: FLEXOR TENDON REPAIR- RELEASE/POSSIBLE FLEXOR DIGITORIUM LONGUS;  Surgeon: Adebayo Layton M.D.;  Location: SURGERY [facility];  Service:    • BUNIONECTOMY  2014   • SHOULDER ARTHROSCOPY Left 1997    frozen shoulder   • ABDOMINAL HYSTERECTOMY TOTAL  1987   • KNEE ARTHROTOMY Right 1978    [place]    • CHOLECYSTECTOMY       SOCHX:  reports that she quit smoking about 45 years ago. Her smoking use included cigarettes. She smoked 0.00 packs per day for 2.00 years. She has never used smokeless tobacco. She reports current alcohol use of about 1.0 oz of alcohol per week. She reports that she does not use drugs.  FH: Family history was reviewed, no pertinent findings to report        Objective     /72 (BP Location: Left arm, Patient Position: Sitting, BP Cuff Size: Adult)   Pulse 88   Temp 36.9 °C (98.5 °F) (Temporal)   Resp 16   Ht 1.702 m (5' 7\")   Wt 69.9 kg (154 lb)   LMP 01/01/1983 (LMP Unknown)   SpO2 98%   BMI 24.12 kg/m²      Physical Exam  Constitutional:       General: She is not in acute distress.     Appearance: She is not diaphoretic.   HENT:      Head: Normocephalic and atraumatic.      Right Ear: External ear normal.      Left Ear: External ear normal.   Eyes:      Conjunctiva/sclera: Conjunctivae normal.      Pupils: Pupils are equal, round, and reactive to light.   Pulmonary:      Effort: Pulmonary effort is normal. No respiratory distress.   Musculoskeletal:      Cervical back: Normal range of motion.   Skin:     Findings: No rash.   Neurological:      Mental Status: She is alert and oriented to person, place, and time.   Psychiatric:         Mood and Affect: Mood and affect normal.         Cognition and Memory: Memory normal.         Judgment: Judgment normal.             POCT Urinalysis  Lab Results   Component Value Date/Time    " POCCOLOR light yellow 05/30/2022 11:32 AM    POCAPPEAR clear 05/30/2022 11:32 AM    POCLEUKEST negative 05/30/2022 11:32 AM    POCNITRITE negative 05/30/2022 11:32 AM    POCUROBILIGE 0.2 05/30/2022 11:32 AM    POCPROTEIN negative 05/30/2022 11:32 AM    POCURPH 6.5 05/30/2022 11:32 AM    POCBLOOD trace 05/30/2022 11:32 AM    POCSPGRV 1.015 05/30/2022 11:32 AM    POCKETONES negative 05/30/2022 11:32 AM    POCBILIRUBIN negative 05/30/2022 11:32 AM    POCGLUCUA 100 05/30/2022 11:32 AM        Assessment & Plan     1. Lower urinary tract symptoms  - POCT Urinalysis  - URINE CULTURE(NEW); Future    2. Type 1 diabetes mellitus without complication (HCC)      No evidence of urinary tract infection based on urinalysis and even her symptoms have been intermittent.  Discussed that she could be having lower urinary tract symptoms secondary to spilling some sugar in her urine from her diabetes.  We will obtain a urine culture to further evaluate but will hold off on treating with any antibiotics pending positive culture result.          Differential Diagnosis, natural history, and supportive care discussed. Return to the Urgent Care or follow up with your PCP if symptoms fail to resolve, or for any new or worsening symptoms. Emergency room precautions discussed. Patient and/or family appears understanding of information.

## 2022-06-02 LAB
BACTERIA UR CULT: NORMAL
SIGNIFICANT IND 70042: NORMAL
SITE SITE: NORMAL
SOURCE SOURCE: NORMAL

## 2022-07-07 ENCOUNTER — HOSPITAL ENCOUNTER (OUTPATIENT)
Dept: LAB | Facility: MEDICAL CENTER | Age: 67
End: 2022-07-07
Attending: INTERNAL MEDICINE
Payer: MEDICARE

## 2022-07-07 LAB
EST. AVERAGE GLUCOSE BLD GHB EST-MCNC: 154 MG/DL
HBA1C MFR BLD: 7 % (ref 4–5.6)
T4 FREE SERPL-MCNC: 1.67 NG/DL (ref 0.93–1.7)
TSH SERPL DL<=0.005 MIU/L-ACNC: 0.12 UIU/ML (ref 0.38–5.33)

## 2022-07-07 PROCEDURE — 83036 HEMOGLOBIN GLYCOSYLATED A1C: CPT | Mod: GA

## 2022-07-07 PROCEDURE — 84443 ASSAY THYROID STIM HORMONE: CPT

## 2022-07-07 PROCEDURE — 36415 COLL VENOUS BLD VENIPUNCTURE: CPT

## 2022-07-07 PROCEDURE — 84439 ASSAY OF FREE THYROXINE: CPT

## 2022-07-12 ENCOUNTER — OFFICE VISIT (OUTPATIENT)
Dept: MEDICAL GROUP | Facility: PHYSICIAN GROUP | Age: 67
End: 2022-07-12
Payer: MEDICARE

## 2022-07-12 ENCOUNTER — HOSPITAL ENCOUNTER (OUTPATIENT)
Dept: LAB | Facility: MEDICAL CENTER | Age: 67
End: 2022-07-12
Attending: FAMILY MEDICINE
Payer: MEDICARE

## 2022-07-12 VITALS
WEIGHT: 160 LBS | BODY MASS INDEX: 25.11 KG/M2 | HEIGHT: 67 IN | HEART RATE: 81 BPM | TEMPERATURE: 97.1 F | DIASTOLIC BLOOD PRESSURE: 64 MMHG | OXYGEN SATURATION: 98 % | SYSTOLIC BLOOD PRESSURE: 140 MMHG

## 2022-07-12 DIAGNOSIS — E87.1 HYPONATREMIA: ICD-10-CM

## 2022-07-12 DIAGNOSIS — R60.9 EDEMA, UNSPECIFIED TYPE: ICD-10-CM

## 2022-07-12 LAB
ANION GAP SERPL CALC-SCNC: 11 MMOL/L (ref 7–16)
BUN SERPL-MCNC: 17 MG/DL (ref 8–22)
CALCIUM SERPL-MCNC: 9.4 MG/DL (ref 8.5–10.5)
CHLORIDE SERPL-SCNC: 98 MMOL/L (ref 96–112)
CO2 SERPL-SCNC: 22 MMOL/L (ref 20–33)
CREAT SERPL-MCNC: 0.69 MG/DL (ref 0.5–1.4)
GFR SERPLBLD CREATININE-BSD FMLA CKD-EPI: 96 ML/MIN/1.73 M 2
GLUCOSE SERPL-MCNC: 193 MG/DL (ref 65–99)
POTASSIUM SERPL-SCNC: 5.1 MMOL/L (ref 3.6–5.5)
SODIUM SERPL-SCNC: 131 MMOL/L (ref 135–145)

## 2022-07-12 PROCEDURE — 36415 COLL VENOUS BLD VENIPUNCTURE: CPT

## 2022-07-12 PROCEDURE — 80048 BASIC METABOLIC PNL TOTAL CA: CPT

## 2022-07-12 PROCEDURE — 99213 OFFICE O/P EST LOW 20 MIN: CPT | Performed by: FAMILY MEDICINE

## 2022-07-12 RX ORDER — FUROSEMIDE 20 MG/1
20 TABLET ORAL 2 TIMES DAILY
Qty: 2 TABLET | Refills: 0 | Status: SHIPPED
Start: 2022-07-12 | End: 2022-07-28

## 2022-07-12 RX ORDER — ALPHA LIPOIC ACID
600 POWDER (GRAM) MISCELLANEOUS DAILY
COMMUNITY
End: 2022-08-19

## 2022-07-12 ASSESSMENT — ENCOUNTER SYMPTOMS
SHORTNESS OF BREATH: 0
PALPITATIONS: 0
DIZZINESS: 0
BLURRED VISION: 0
NAUSEA: 0
HEADACHES: 0
VOMITING: 0
CHILLS: 0
DOUBLE VISION: 0
MYALGIAS: 0
FEVER: 0
SORE THROAT: 0
COUGH: 0

## 2022-07-12 ASSESSMENT — FIBROSIS 4 INDEX: FIB4 SCORE: 0.66

## 2022-07-12 NOTE — PROGRESS NOTES
Subjective:     CC: bilat LE swelling    HPI:   Ines presents today with     1) 1 wk LE swelling  No CAD/CHF  No SOB  Not using more pillows    S/p surgery R hip   Has been less mobile and trouble elevating legs due to Pain r hip  Gravity dependent  Not using stockings or elevating  No pain or redness/swelling    BP  Using 50mg  And 100mg if >140/90      No problems updated.    Current Outpatient Medications Ordered in Epic   Medication Sig Dispense Refill   • lipoic acid Powder Take 600 mg by mouth every day.     • furosemide (LASIX) 20 MG Tab Take 1 Tablet by mouth 2 times a day. 2 Tablet 0   • tizanidine (ZANAFLEX) 2 MG tablet Take 1-2 Tablets by mouth 3 times a day as needed (spasm). 60 Tablet 1   • clobetasol (TEMOVATE) 0.05 % Cream Apply 1 Each topically as needed.     • FIASP 100 UNIT/ML Solution 4-50 units with pump daily     • ondansetron (ZOFRAN ODT) 8 MG TABLET DISPERSIBLE Take 1 Tablet by mouth every 8 hours as needed for Nausea. 30 Tablet 0   • insulin infusion pump Device Inject  under the skin continuous. Patient's own SQ insulin pump    Type of Insulin: Fiasp  Last change of tubin/15/2022 - Change tubing and site every 72 hours    Dosing:  Basal rate:   0000 - 0329 = 0.5 units/hr   0330 - 1129 = 0.85 units/hr   1130 - 1359 = 0.5 units/hr              1400 - 1759 = 0.45 units/hr              1800 - 2359 = 0.5 units/hr    Bolus ratio:   1 unit : 12 g carbohydrate at  (breakfast, lunch, dinner, snacks)      Correction ratio:    1 units for every 50 over 150 mg/dL    Disconnect pump if patient becomes hypoglycemic and altered.     • losartan (COZAAR) 100 MG Tab Take 100 mg by mouth every evening.     • methocarbamol (ROBAXIN-750) 750 MG Tab One tablet (750 mg) every 6-8 hours as needed for muscle spasms 90 Tablet 5   • CLINDAMYCIN PHOSPHATE,TOPICAL, 1 % Lotion Apply 1 Application topically every day. Apply's on face     • CRANBERRY PO Take 1 Capsule by mouth every evening.     • B Complex Vitamins  "(VITAMIN B COMPLEX PO) Take 1 Tablet by mouth every evening.     • LEVOXYL 100 MCG Tab Take 100 mcg by mouth every morning on an empty stomach.     • pantoprazole (PROTONIX) 40 MG Tablet Delayed Response Take 40 mg by mouth every day.     • rosuvastatin (CRESTOR) 40 MG tablet Take 1 Tab by mouth every bedtime. 30 Tab 1   • Cholecalciferol (VITAMIN D-3 PO) Take 5,000 mg by mouth every evening.     • Multiple Vitamins-Minerals (MULTIVITAMIN WOMEN PO) Take 1 Tab by mouth every day.     • metoclopramide (REGLAN) 10 MG TABS Take 10 mg by mouth 2 Times a Day.     • sodium chloride (SALT) 1 GM Tab Take 1 Tablet by mouth 3 times a day. 90 Tablet 3     No current Epic-ordered facility-administered medications on file.     ROS:  Review of Systems   Constitutional: Negative for chills and fever.   HENT: Negative for ear pain and sore throat.    Eyes: Negative for blurred vision and double vision.   Respiratory: Negative for cough and shortness of breath.    Cardiovascular: Positive for leg swelling. Negative for chest pain and palpitations.   Gastrointestinal: Negative for nausea and vomiting.   Genitourinary: Negative for dysuria and hematuria.   Musculoskeletal: Negative for myalgias.   Neurological: Negative for dizziness and headaches.       Objective:     Exam:  BP (!) 140/64 (BP Location: Left arm, Patient Position: Sitting, BP Cuff Size: Adult)   Pulse 81   Temp 36.2 °C (97.1 °F) (Temporal)   Ht 1.702 m (5' 7\")   Wt 72.6 kg (160 lb)   LMP 01/01/1983 (LMP Unknown)   SpO2 98%   BMI 25.06 kg/m²  Body mass index is 25.06 kg/m².    Physical Exam  Constitutional:       Appearance: Normal appearance.   Eyes:      General: No scleral icterus.        Right eye: No discharge.         Left eye: No discharge.      Extraocular Movements: Extraocular movements intact.      Conjunctiva/sclera: Conjunctivae normal.      Pupils: Pupils are equal, round, and reactive to light.   Cardiovascular:      Rate and Rhythm: Normal rate " and regular rhythm.      Pulses: Normal pulses.      Heart sounds: Normal heart sounds. No murmur heard.  Pulmonary:      Effort: Pulmonary effort is normal. No respiratory distress.      Breath sounds: Normal breath sounds. No wheezing or rales.   Abdominal:      General: There is no distension.   Musculoskeletal:      Right lower leg: Edema present.      Left lower leg: Edema present.   Neurological:      Mental Status: She is alert. Mental status is at baseline.      Coordination: Coordination abnormal.      Gait: Gait abnormal.     trace to mid calf    Assessment & Plan:     67 y.o. female with the following -     Problem List Items Addressed This Visit    None     Visit Diagnoses     Hyponatremia        Relevant Orders    Basic Metabolic Panel    Edema, unspecified type        Relevant Medications    furosemide (LASIX) 20 MG Tab        No follow-ups on file.    Please note that this dictation was created using voice recognition software. I have made every reasonable attempt to correct obvious errors, but I expect that there are errors of grammar and possibly content that I did not discover before finalizing the note.

## 2022-07-12 NOTE — ASSESSMENT & PLAN NOTE
Likely gravity dependent  2 days lasix  Stockings  Elevated  Write ABC with feet  rtc if not resolved  F/u with ortho

## 2022-07-19 ENCOUNTER — HOSPITAL ENCOUNTER (OUTPATIENT)
Dept: LAB | Facility: MEDICAL CENTER | Age: 67
End: 2022-07-19
Attending: PHYSICIAN ASSISTANT
Payer: MEDICARE

## 2022-07-19 LAB
25(OH)D3 SERPL-MCNC: 51 NG/ML (ref 30–100)
ALBUMIN SERPL BCP-MCNC: 4.6 G/DL (ref 3.2–4.9)
ALBUMIN/GLOB SERPL: 2 G/DL
ALP SERPL-CCNC: 90 U/L (ref 30–99)
ALT SERPL-CCNC: 23 U/L (ref 2–50)
ANION GAP SERPL CALC-SCNC: 9 MMOL/L (ref 7–16)
AST SERPL-CCNC: 21 U/L (ref 12–45)
BILIRUB SERPL-MCNC: 0.4 MG/DL (ref 0.1–1.5)
BUN SERPL-MCNC: 15 MG/DL (ref 8–22)
CALCIUM SERPL-MCNC: 9.5 MG/DL (ref 8.5–10.5)
CHLORIDE SERPL-SCNC: 95 MMOL/L (ref 96–112)
CHOLEST SERPL-MCNC: 175 MG/DL (ref 100–199)
CO2 SERPL-SCNC: 23 MMOL/L (ref 20–33)
CREAT SERPL-MCNC: 0.81 MG/DL (ref 0.5–1.4)
CREAT UR-MCNC: 73.42 MG/DL
FASTING STATUS PATIENT QL REPORTED: NORMAL
GFR SERPLBLD CREATININE-BSD FMLA CKD-EPI: 80 ML/MIN/1.73 M 2
GLOBULIN SER CALC-MCNC: 2.3 G/DL (ref 1.9–3.5)
GLUCOSE SERPL-MCNC: 131 MG/DL (ref 65–99)
HDLC SERPL-MCNC: 70 MG/DL
LDLC SERPL CALC-MCNC: 89 MG/DL
MICROALBUMIN UR-MCNC: <1.2 MG/DL
MICROALBUMIN/CREAT UR: NORMAL MG/G (ref 0–30)
POTASSIUM SERPL-SCNC: 4.9 MMOL/L (ref 3.6–5.5)
PROT SERPL-MCNC: 6.9 G/DL (ref 6–8.2)
SODIUM SERPL-SCNC: 127 MMOL/L (ref 135–145)
TRIGL SERPL-MCNC: 80 MG/DL (ref 0–149)

## 2022-07-19 PROCEDURE — 80061 LIPID PANEL: CPT

## 2022-07-19 PROCEDURE — 82306 VITAMIN D 25 HYDROXY: CPT

## 2022-07-19 PROCEDURE — 84681 ASSAY OF C-PEPTIDE: CPT

## 2022-07-19 PROCEDURE — 82043 UR ALBUMIN QUANTITATIVE: CPT

## 2022-07-19 PROCEDURE — 82570 ASSAY OF URINE CREATININE: CPT

## 2022-07-19 PROCEDURE — 36415 COLL VENOUS BLD VENIPUNCTURE: CPT

## 2022-07-19 PROCEDURE — 80053 COMPREHEN METABOLIC PANEL: CPT

## 2022-07-21 LAB — C PEPTIDE SERPL-MCNC: <0.1 NG/ML (ref 0.5–3.3)

## 2022-07-26 ENCOUNTER — TELEPHONE (OUTPATIENT)
Dept: MEDICAL GROUP | Facility: PHYSICIAN GROUP | Age: 67
End: 2022-07-26
Payer: MEDICARE

## 2022-07-26 DIAGNOSIS — E87.1 HYPONATREMIA: ICD-10-CM

## 2022-07-26 NOTE — TELEPHONE ENCOUNTER
Spoke with pt and pt is scheduled for:    Future Appointments         Provider Department Center    7/28/2022 10:40 AM (Arrive by 10:25 AM) Raf Espinosa M.D. [facility] - [address]

## 2022-07-26 NOTE — TELEPHONE ENCOUNTER
Caller Name:Ines Long     Call Back Number: [phone] (home) [phone] (work)      How would the patient prefer to be contacted with a response: Phone call OK to leave a detailed message     Pt called stated her Sodium is 127.Taking Sodium chloride TID 10 mg. Pt would like to know if that's ok per Dr Boyd so she can get sodium up?

## 2022-07-28 ENCOUNTER — OFFICE VISIT (OUTPATIENT)
Dept: MEDICAL GROUP | Facility: PHYSICIAN GROUP | Age: 67
End: 2022-07-28
Payer: MEDICARE

## 2022-07-28 ENCOUNTER — HOSPITAL ENCOUNTER (OUTPATIENT)
Dept: LAB | Facility: MEDICAL CENTER | Age: 67
End: 2022-07-28
Attending: FAMILY MEDICINE
Payer: MEDICARE

## 2022-07-28 VITALS
TEMPERATURE: 97.2 F | RESPIRATION RATE: 16 BRPM | WEIGHT: 154 LBS | OXYGEN SATURATION: 97 % | SYSTOLIC BLOOD PRESSURE: 130 MMHG | HEIGHT: 67 IN | DIASTOLIC BLOOD PRESSURE: 68 MMHG | HEART RATE: 92 BPM | BODY MASS INDEX: 24.17 KG/M2

## 2022-07-28 DIAGNOSIS — E87.1 HYPONATREMIA: ICD-10-CM

## 2022-07-28 DIAGNOSIS — I10 PRIMARY HYPERTENSION: ICD-10-CM

## 2022-07-28 LAB — SODIUM UR-SCNC: 113 MMOL/L

## 2022-07-28 PROCEDURE — 36415 COLL VENOUS BLD VENIPUNCTURE: CPT

## 2022-07-28 PROCEDURE — 83935 ASSAY OF URINE OSMOLALITY: CPT

## 2022-07-28 PROCEDURE — 99213 OFFICE O/P EST LOW 20 MIN: CPT | Performed by: FAMILY MEDICINE

## 2022-07-28 PROCEDURE — 84300 ASSAY OF URINE SODIUM: CPT

## 2022-07-28 PROCEDURE — 83930 ASSAY OF BLOOD OSMOLALITY: CPT

## 2022-07-28 RX ORDER — LOSARTAN POTASSIUM 50 MG/1
50 TABLET ORAL DAILY
COMMUNITY
Start: 2022-07-25 | End: 2023-05-03 | Stop reason: SDUPTHER

## 2022-07-28 ASSESSMENT — ENCOUNTER SYMPTOMS
SHORTNESS OF BREATH: 0
NAUSEA: 0
VOMITING: 0
HEADACHES: 0
DIZZINESS: 0
SORE THROAT: 0
COUGH: 0
CHILLS: 0
FEVER: 0
MYALGIAS: 0
BLURRED VISION: 0
DOUBLE VISION: 0
PALPITATIONS: 0

## 2022-07-28 ASSESSMENT — FIBROSIS 4 INDEX: FIB4 SCORE: 0.75

## 2022-07-28 NOTE — PROGRESS NOTES
Subjective:     CC: lab discussion    HPI:   Ines presents today with     Hyponatremia   Reports legs spasm more when low possibly  No dizziness  Has ref to nephro from her endo    Did not take lasix, as she was able to move legs and exercise a bit    Pt is taking two sodium tabs    Reports that her levothyroxine was dec to 88mcg    Losartan is a recent med, had hyponatremia prior to starting      No problems updated.    Current Outpatient Medications Ordered in Epic   Medication Sig Dispense Refill   • losartan (COZAAR) 50 MG Tab Take 50 mg by mouth every day.     • lipoic acid Powder Take 600 mg by mouth every day.     • furosemide (LASIX) 20 MG Tab Take 1 Tablet by mouth 2 times a day. 2 Tablet 0   • tizanidine (ZANAFLEX) 2 MG tablet Take 1-2 Tablets by mouth 3 times a day as needed (spasm). 60 Tablet 1   • clobetasol (TEMOVATE) 0.05 % Cream Apply 1 Each topically as needed.     • FIASP 100 UNIT/ML Solution 4-50 units with pump daily     • ondansetron (ZOFRAN ODT) 8 MG TABLET DISPERSIBLE Take 1 Tablet by mouth every 8 hours as needed for Nausea. 30 Tablet 0   • insulin infusion pump Device Inject  under the skin continuous. Patient's own SQ insulin pump    Type of Insulin: Fiasp  Last change of tubin/15/2022 - Change tubing and site every 72 hours    Dosing:  Basal rate:   0000 - 0329 = 0.5 units/hr   0330 - 1129 = 0.85 units/hr   1130 - 1359 = 0.5 units/hr              1400 - 1759 = 0.45 units/hr              1800 - 2359 = 0.5 units/hr    Bolus ratio:   1 unit : 12 g carbohydrate at  (breakfast, lunch, dinner, snacks)      Correction ratio:    1 units for every 50 over 150 mg/dL    Disconnect pump if patient becomes hypoglycemic and altered.     • losartan (COZAAR) 100 MG Tab Take 100 mg by mouth every evening.     • methocarbamol (ROBAXIN-750) 750 MG Tab One tablet (750 mg) every 6-8 hours as needed for muscle spasms 90 Tablet 5   • CLINDAMYCIN PHOSPHATE,TOPICAL, 1 % Lotion Apply 1 Application topically  "every day. Apply's on face     • CRANBERRY PO Take 1 Capsule by mouth every evening.     • B Complex Vitamins (VITAMIN B COMPLEX PO) Take 1 Tablet by mouth every evening.     • LEVOXYL 100 MCG Tab Take 100 mcg by mouth every morning on an empty stomach.     • pantoprazole (PROTONIX) 40 MG Tablet Delayed Response Take 40 mg by mouth every day.     • rosuvastatin (CRESTOR) 40 MG tablet Take 1 Tab by mouth every bedtime. 30 Tab 1   • Cholecalciferol (VITAMIN D-3 PO) Take 5,000 mg by mouth every evening.     • Multiple Vitamins-Minerals (MULTIVITAMIN WOMEN PO) Take 1 Tab by mouth every day.     • metoclopramide (REGLAN) 10 MG TABS Take 10 mg by mouth 2 Times a Day.       No current [facility]-ordered facility-administered medications on file.     ROS:  Review of Systems   Constitutional: Negative for chills and fever.   HENT: Negative for ear pain and sore throat.    Eyes: Negative for blurred vision and double vision.   Respiratory: Negative for cough and shortness of breath.    Cardiovascular: Negative for chest pain and palpitations.   Gastrointestinal: Negative for nausea and vomiting.   Genitourinary: Negative for dysuria and hematuria.   Musculoskeletal: Negative for myalgias.   Neurological: Negative for dizziness and headaches.       Objective:     Exam:  /68 (BP Location: Left arm, Patient Position: Sitting, BP Cuff Size: Adult)   Pulse 92   Temp 36.2 °C (97.2 °F) (Temporal)   Resp 16   Ht 1.702 m (5' 7\")   Wt 69.9 kg (154 lb)   LMP 01/01/1983 (LMP Unknown)   SpO2 97%   BMI 24.12 kg/m²  Body mass index is 24.12 kg/m².    Physical Exam  Constitutional:       General: She is not in acute distress.     Appearance: Normal appearance. She is not ill-appearing, toxic-appearing or diaphoretic.   Eyes:      General: No scleral icterus.        Right eye: No discharge.         Left eye: No discharge.      Extraocular Movements: Extraocular movements intact.      Conjunctiva/sclera: Conjunctivae normal.      " Pupils: Pupils are equal, round, and reactive to light.   Pulmonary:      Effort: No respiratory distress.   Abdominal:      General: There is no distension.   Neurological:      General: No focal deficit present.      Mental Status: She is alert and oriented to person, place, and time. Mental status is at baseline.      Coordination: Coordination normal.      Gait: Gait abnormal.         Assessment & Plan:     67 y.o. female with the following -     Problem List Items Addressed This Visit     Hyponatremia    Relevant Orders    OSMOLALITY SERUM    OSMOLALITY URINE    URINE SODIUM RANDOM        No follow-ups on file.    Please note that this dictation was created using voice recognition software. I have made every reasonable attempt to correct obvious errors, but I expect that there are errors of grammar and possibly content that I did not discover before finalizing the note.

## 2022-07-29 LAB
OSMOLALITY SERPL: 285 MOSM/KG H2O (ref 278–298)
OSMOLALITY UR: 525 MOSM/KG H2O (ref 300–900)

## 2022-08-02 ENCOUNTER — TELEPHONE (OUTPATIENT)
Dept: MEDICAL GROUP | Facility: PHYSICIAN GROUP | Age: 67
End: 2022-08-02
Payer: MEDICARE

## 2022-08-02 DIAGNOSIS — E87.1 HYPONATREMIA: ICD-10-CM

## 2022-08-02 NOTE — TELEPHONE ENCOUNTER
Caller Name: Ines Long    Call Back Number: [phone] (home) [phone] (work)      How would the patient prefer to be contacted with a response: Phone call OK to leave a detailed message    Pt would like to re check sodium, Can you place lab order

## 2022-08-04 ENCOUNTER — HOSPITAL ENCOUNTER (OUTPATIENT)
Dept: LAB | Facility: MEDICAL CENTER | Age: 67
End: 2022-08-04
Attending: FAMILY MEDICINE
Payer: MEDICARE

## 2022-08-04 DIAGNOSIS — E87.1 HYPONATREMIA: ICD-10-CM

## 2022-08-04 LAB
ALBUMIN SERPL BCP-MCNC: 4.7 G/DL (ref 3.2–4.9)
ALBUMIN/GLOB SERPL: 2 G/DL
ALP SERPL-CCNC: 84 U/L (ref 30–99)
ALT SERPL-CCNC: 23 U/L (ref 2–50)
ANION GAP SERPL CALC-SCNC: 11 MMOL/L (ref 7–16)
AST SERPL-CCNC: 22 U/L (ref 12–45)
BILIRUB SERPL-MCNC: 0.5 MG/DL (ref 0.1–1.5)
BUN SERPL-MCNC: 12 MG/DL (ref 8–22)
CALCIUM SERPL-MCNC: 9.7 MG/DL (ref 8.5–10.5)
CHLORIDE SERPL-SCNC: 97 MMOL/L (ref 96–112)
CO2 SERPL-SCNC: 23 MMOL/L (ref 20–33)
CREAT SERPL-MCNC: 0.68 MG/DL (ref 0.5–1.4)
GFR SERPLBLD CREATININE-BSD FMLA CKD-EPI: 96 ML/MIN/1.73 M 2
GLOBULIN SER CALC-MCNC: 2.3 G/DL (ref 1.9–3.5)
GLUCOSE SERPL-MCNC: 174 MG/DL (ref 65–99)
POTASSIUM SERPL-SCNC: 5.1 MMOL/L (ref 3.6–5.5)
PROT SERPL-MCNC: 7 G/DL (ref 6–8.2)
SODIUM SERPL-SCNC: 131 MMOL/L (ref 135–145)

## 2022-08-04 PROCEDURE — 36415 COLL VENOUS BLD VENIPUNCTURE: CPT

## 2022-08-04 PROCEDURE — 80053 COMPREHEN METABOLIC PANEL: CPT

## 2022-08-19 ENCOUNTER — PRE-ADMISSION TESTING (OUTPATIENT)
Dept: ADMISSIONS | Facility: MEDICAL CENTER | Age: 67
End: 2022-08-19
Attending: ORTHOPAEDIC SURGERY
Payer: MEDICARE

## 2022-08-19 ASSESSMENT — FIBROSIS 4 INDEX: FIB4 SCORE: 0.78

## 2022-08-19 NOTE — PREPROCEDURE INSTRUCTIONS
Pre admit apt: Pt. Instructed to continue regularly prescribed medications through day before surgery.  Instructed to take the following medications, the day of surgery, with a sip of water per anesthesia protocol: levoxyl, reglan, protonix    METS-4  Pt states has PONV with surgery, otherwise no issues with anesthesia  Pt has insulin pump, was given instructions by Dr. Gillis for fasting.  Instructed pt to call Dr. Gillis's office if she develops any sxs of covid/illness.

## 2022-08-22 ENCOUNTER — ANESTHESIA (OUTPATIENT)
Dept: SURGERY | Facility: MEDICAL CENTER | Age: 67
End: 2022-08-22
Payer: MEDICARE

## 2022-08-22 ENCOUNTER — HOSPITAL ENCOUNTER (OUTPATIENT)
Facility: MEDICAL CENTER | Age: 67
End: 2022-08-22
Attending: ORTHOPAEDIC SURGERY | Admitting: ORTHOPAEDIC SURGERY
Payer: MEDICARE

## 2022-08-22 ENCOUNTER — ANESTHESIA EVENT (OUTPATIENT)
Dept: SURGERY | Facility: MEDICAL CENTER | Age: 67
End: 2022-08-22
Payer: MEDICARE

## 2022-08-22 VITALS
WEIGHT: 150.35 LBS | DIASTOLIC BLOOD PRESSURE: 98 MMHG | HEART RATE: 82 BPM | BODY MASS INDEX: 23.6 KG/M2 | OXYGEN SATURATION: 98 % | RESPIRATION RATE: 16 BRPM | SYSTOLIC BLOOD PRESSURE: 177 MMHG | TEMPERATURE: 98.2 F | HEIGHT: 67 IN

## 2022-08-22 LAB — GLUCOSE BLD STRIP.AUTO-MCNC: 81 MG/DL (ref 65–99)

## 2022-08-22 PROCEDURE — 700101 HCHG RX REV CODE 250: Performed by: ANESTHESIOLOGY

## 2022-08-22 PROCEDURE — 160009 HCHG ANES TIME/MIN: Performed by: ORTHOPAEDIC SURGERY

## 2022-08-22 PROCEDURE — 160002 HCHG RECOVERY MINUTES (STAT): Performed by: ORTHOPAEDIC SURGERY

## 2022-08-22 PROCEDURE — 160029 HCHG SURGERY MINUTES - 1ST 30 MINS LEVEL 4: Performed by: ORTHOPAEDIC SURGERY

## 2022-08-22 PROCEDURE — 01400 ANES OPN/ARTHRS KNEE JT NOS: CPT | Performed by: ANESTHESIOLOGY

## 2022-08-22 PROCEDURE — 160035 HCHG PACU - 1ST 60 MINS PHASE I: Performed by: ORTHOPAEDIC SURGERY

## 2022-08-22 PROCEDURE — 160025 RECOVERY II MINUTES (STATS): Performed by: ORTHOPAEDIC SURGERY

## 2022-08-22 PROCEDURE — 82962 GLUCOSE BLOOD TEST: CPT

## 2022-08-22 PROCEDURE — 700111 HCHG RX REV CODE 636 W/ 250 OVERRIDE (IP): Performed by: ANESTHESIOLOGY

## 2022-08-22 PROCEDURE — 160048 HCHG OR STATISTICAL LEVEL 1-5: Performed by: ORTHOPAEDIC SURGERY

## 2022-08-22 PROCEDURE — 700105 HCHG RX REV CODE 258: Performed by: ANESTHESIOLOGY

## 2022-08-22 PROCEDURE — 160046 HCHG PACU - 1ST 60 MINS PHASE II: Performed by: ORTHOPAEDIC SURGERY

## 2022-08-22 PROCEDURE — 160041 HCHG SURGERY MINUTES - EA ADDL 1 MIN LEVEL 4: Performed by: ORTHOPAEDIC SURGERY

## 2022-08-22 PROCEDURE — 700111 HCHG RX REV CODE 636 W/ 250 OVERRIDE (IP): Performed by: ORTHOPAEDIC SURGERY

## 2022-08-22 RX ORDER — ONDANSETRON 2 MG/ML
4 INJECTION INTRAMUSCULAR; INTRAVENOUS
Status: DISCONTINUED | OUTPATIENT
Start: 2022-08-22 | End: 2022-08-25 | Stop reason: HOSPADM

## 2022-08-22 RX ORDER — ROPIVACAINE HYDROCHLORIDE 5 MG/ML
INJECTION, SOLUTION EPIDURAL; INFILTRATION; PERINEURAL
Status: DISCONTINUED | OUTPATIENT
Start: 2022-08-22 | End: 2022-08-22 | Stop reason: HOSPADM

## 2022-08-22 RX ORDER — OXYCODONE HYDROCHLORIDE AND ACETAMINOPHEN 5; 325 MG/1; MG/1
2 TABLET ORAL
Status: DISCONTINUED | OUTPATIENT
Start: 2022-08-22 | End: 2022-08-25 | Stop reason: HOSPADM

## 2022-08-22 RX ORDER — HALOPERIDOL 5 MG/ML
1 INJECTION INTRAMUSCULAR
Status: DISCONTINUED | OUTPATIENT
Start: 2022-08-22 | End: 2022-08-25 | Stop reason: HOSPADM

## 2022-08-22 RX ORDER — DEXMEDETOMIDINE HYDROCHLORIDE 100 UG/ML
INJECTION, SOLUTION INTRAVENOUS PRN
Status: DISCONTINUED | OUTPATIENT
Start: 2022-08-22 | End: 2022-08-22 | Stop reason: SURG

## 2022-08-22 RX ORDER — OXYCODONE HYDROCHLORIDE AND ACETAMINOPHEN 5; 325 MG/1; MG/1
1 TABLET ORAL
Status: DISCONTINUED | OUTPATIENT
Start: 2022-08-22 | End: 2022-08-25 | Stop reason: HOSPADM

## 2022-08-22 RX ORDER — DIPHENHYDRAMINE HYDROCHLORIDE 50 MG/ML
12.5 INJECTION INTRAMUSCULAR; INTRAVENOUS
Status: DISCONTINUED | OUTPATIENT
Start: 2022-08-22 | End: 2022-08-25 | Stop reason: HOSPADM

## 2022-08-22 RX ORDER — CEFAZOLIN SODIUM 1 G/3ML
INJECTION, POWDER, FOR SOLUTION INTRAMUSCULAR; INTRAVENOUS PRN
Status: DISCONTINUED | OUTPATIENT
Start: 2022-08-22 | End: 2022-08-22 | Stop reason: SURG

## 2022-08-22 RX ORDER — SODIUM CHLORIDE, SODIUM LACTATE, POTASSIUM CHLORIDE, CALCIUM CHLORIDE 600; 310; 30; 20 MG/100ML; MG/100ML; MG/100ML; MG/100ML
INJECTION, SOLUTION INTRAVENOUS
Status: DISCONTINUED | OUTPATIENT
Start: 2022-08-22 | End: 2022-08-22 | Stop reason: SURG

## 2022-08-22 RX ORDER — ONDANSETRON 2 MG/ML
INJECTION INTRAMUSCULAR; INTRAVENOUS PRN
Status: DISCONTINUED | OUTPATIENT
Start: 2022-08-22 | End: 2022-08-22 | Stop reason: SURG

## 2022-08-22 RX ORDER — SODIUM CHLORIDE, SODIUM LACTATE, POTASSIUM CHLORIDE, CALCIUM CHLORIDE 600; 310; 30; 20 MG/100ML; MG/100ML; MG/100ML; MG/100ML
INJECTION, SOLUTION INTRAVENOUS CONTINUOUS
Status: DISCONTINUED | OUTPATIENT
Start: 2022-08-22 | End: 2022-08-25 | Stop reason: HOSPADM

## 2022-08-22 RX ORDER — MIDAZOLAM HYDROCHLORIDE 1 MG/ML
INJECTION INTRAMUSCULAR; INTRAVENOUS PRN
Status: DISCONTINUED | OUTPATIENT
Start: 2022-08-22 | End: 2022-08-22 | Stop reason: SURG

## 2022-08-22 RX ORDER — SODIUM CHLORIDE, SODIUM LACTATE, POTASSIUM CHLORIDE, CALCIUM CHLORIDE 600; 310; 30; 20 MG/100ML; MG/100ML; MG/100ML; MG/100ML
INJECTION, SOLUTION INTRAVENOUS CONTINUOUS
Status: ACTIVE | OUTPATIENT
Start: 2022-08-22 | End: 2022-08-22

## 2022-08-22 RX ADMIN — DEXMEDETOMIDINE 15 MCG: 200 INJECTION, SOLUTION INTRAVENOUS at 19:57

## 2022-08-22 RX ADMIN — ONDANSETRON 4 MG: 2 INJECTION INTRAMUSCULAR; INTRAVENOUS at 20:22

## 2022-08-22 RX ADMIN — PROPOFOL 100 MG: 10 INJECTION, EMULSION INTRAVENOUS at 19:50

## 2022-08-22 RX ADMIN — FENTANYL CITRATE 100 MCG: 50 INJECTION, SOLUTION INTRAMUSCULAR; INTRAVENOUS at 19:43

## 2022-08-22 RX ADMIN — PROPOFOL 200 MG: 10 INJECTION, EMULSION INTRAVENOUS at 19:43

## 2022-08-22 RX ADMIN — MIDAZOLAM HYDROCHLORIDE 2 MG: 1 INJECTION, SOLUTION INTRAMUSCULAR; INTRAVENOUS at 19:43

## 2022-08-22 RX ADMIN — SODIUM CHLORIDE, POTASSIUM CHLORIDE, SODIUM LACTATE AND CALCIUM CHLORIDE: 600; 310; 30; 20 INJECTION, SOLUTION INTRAVENOUS at 19:40

## 2022-08-22 RX ADMIN — CEFAZOLIN 2 G: 330 INJECTION, POWDER, FOR SOLUTION INTRAMUSCULAR; INTRAVENOUS at 19:43

## 2022-08-22 ASSESSMENT — PAIN DESCRIPTION - PAIN TYPE
TYPE: SURGICAL PAIN
TYPE: SURGICAL PAIN

## 2022-08-22 ASSESSMENT — PAIN SCALES - GENERAL: PAIN_LEVEL: 2

## 2022-08-22 ASSESSMENT — FIBROSIS 4 INDEX: FIB4 SCORE: 0.78

## 2022-08-23 NOTE — ANESTHESIA TIME REPORT
Anesthesia Start and Stop Event Times     Date Time Event    8/22/2022 1932 Ready for Procedure     1940 Anesthesia Start     2033 Anesthesia Stop        Responsible Staff  08/22/22    Name Role Begin End    Aristides Apodaca M.D. Anesth 1940 2033        Overtime Reason:  no overtime (within assigned shift)    Comments:

## 2022-08-23 NOTE — OR NURSING
2030: Patient arrived to PACU from OR via rney. Report received from anesthesia and RN. Respirations are spontaneous and unlabored. OPA present. Dressing is CDI. Right leg elevated and cold pack applied. VSS on 6L.     2045: OPA removed.     2100: Pt more awake. No c/o nausea or pain. Blood sugar is 109 per pt's insulin pump.  updated.     2115: Pt meets criteria for phase II.

## 2022-08-23 NOTE — DISCHARGE INSTRUCTIONS
If any questions arise, call your provider.  If your provider is not available, please feel free to call the [facility] at [phone].    MEDICATIONS: Resume taking daily medication.  Take prescribed pain medication with food.  If no medication is prescribed, you may take non-aspirin pain medication if needed.  PAIN MEDICATION CAN BE VERY CONSTIPATING.  Take a stool softener or laxative such as senokot, pericolace, or milk of magnesia if needed.    No pain medications were given in recovery.     What to Expect Post Anesthesia    Rest and take it easy for the first 24 hours.  A responsible adult is recommended to remain with you during that time.  It is normal to feel sleepy.  We encourage you to not do anything that requires balance, judgment or coordination.    FOR 24 HOURS DO NOT:  Drive, operate machinery or run household appliances.  Drink beer or alcoholic beverages.  Make important decisions or sign legal documents.    To avoid nausea, slowly advance diet as tolerated, avoiding spicy or greasy foods for the first day.  Add more substantial food to your diet according to your provider's instructions.  Babies can be fed formula or breast milk as soon as they are hungry.  INCREASE FLUIDS AND FIBER TO AVOID CONSTIPATION.    MILD FLU-LIKE SYMPTOMS ARE NORMAL.  YOU MAY EXPERIENCE GENERALIZED MUSCLE ACHES, THROAT IRRITATION, HEADACHE AND/OR SOME NAUSEA.    Surgeon Specific Instructions    -Outpatient physical therapy to begin in 2-4 days.   -Follow up [facility] 10-14 days   -Call for Fevers, drainage, redness, shortness of breath, or increasing extremity swelling particularly in the calf.   -Start Aspirin 325mg per day. Use for thirty days.   -Remove bandage in 5 days.  Replace sooner for drainage and notify office.   -Wear CARLITOS (compressive stocking) for 2 weeks on both lower extremities.   -Full Weightbearing on surgical leg with crutches prn     Meniscus Tear Surgery, Care After  This sheet gives you  information about how to care for yourself after your procedure. Your health care provider may also give you more specific instructions. If you have problems or questions, contact your health care provider.  What can I expect after the procedure?  After your procedure, it is common to have:  Pain.  Swelling.  Stiffness.  Follow these instructions at home:  Medicines  Take over-the-counter and prescription medicines only as told by your health care provider.  Ask your health care provider if the medicine prescribed to you:  Requires you to avoid driving or using heavy machinery.  Can cause constipation. You may need to take actions to prevent or treat constipation, such as:  Drink enough fluid to keep your urine pale yellow.  Take over-the-counter or prescription medicines.  Eat foods that are high in fiber, such as beans, whole grains, and fresh fruits and vegetables.  Limit foods that are high in fat and processed sugars, such as fried or sweet foods.  If you were prescribed an antibiotic medicine, take it as told by your health care provider. Do not stop taking the antibiotic even if you start to feel better.  If you have a brace:  Wear the brace as told by your health care provider. Remove it only as told by your health care provider.  Loosen the brace if your toes tingle, become numb, or turn cold and blue.  Keep the brace clean.  If the brace is not waterproof:  Do not let it get wet.  Cover it with a watertight covering when you take a bath or a shower.  Incision care    Follow instructions from your health care provider about how to take care of your incisions. Make sure you:  Wash your hands with soap and water before and after you change your bandage (dressing). If soap and water are not available, use hand .  Change your dressing as told by your health care provider.  Leave stitches (sutures), skin glue, or adhesive strips in place. These skin closures may need to stay in place for 2 weeks or  longer. If adhesive strip edges start to loosen and curl up, you may trim the loose edges. Do not remove adhesive strips completely unless your health care provider tells you to do that.  Check your incision areas every day for signs of infection. Check for:  More redness, swelling, or pain.  Fluid or blood.  Warmth.  Pus or a bad smell.  Do not take baths, swim, or use a hot tub until your health care provider approves. Ask your health care provider if you may take showers. You may only be allowed to take sponge baths.  Managing pain, stiffness, and swelling    If directed, put ice on the affected area.  If you have a removable brace, remove it as told by your health care provider.  Put ice in a plastic bag.  Place a towel between your skin and the bag.  Leave the ice on for 20 minutes, 2-3 times a day.  Move your foot and toes often to reduce stiffness and swelling.  Raise (elevate) your knee above the level of your heart while you are sitting or lying down.  Driving  Do not drive for 24 hours if you were given a sedative during your procedure.  Ask your health care provider when it is safe to drive if you have a brace.  Activity  Return to your normal activities as told by your health care provider. Ask your health care provider what activities are safe for you.  Do exercises as told by your health care provider.  Safety  Do not use the injured limb to support your body weight until your health care provider says that you can. Use crutches or a walker as told by your health care provider. This is important.  Do not lift anything that is heavier than 10 lb (4.5 kg), or the limit that you are told, until your health care provider says that it is safe.  General instructions  Do not use any products that contain nicotine or tobacco, such as cigarettes, e-cigarettes, and chewing tobacco. These can delay healing after surgery. If you need help quitting, ask your health care provider.  Keep all follow-up visits as told  by your health care provider. This is important.  Contact a health care provider if:  You have chills or a fever.  You have more redness, swelling, or pain around your incision.  You have fluid or blood coming from your incision.  Your incision feels warm to the touch.  You have pus or a bad smell coming from your incision.  Get help right away if:  You have severe pain that is not relieved with medicine.  Summary  After your procedure, it is common to have pain, swelling, and stiffness.  Take over-the-counter and prescription medicines, including antibiotic medicine, only as told by your health care provider.  Do not use the injured limb to support your body weight until your health care provider says that you can. Use crutches or a walker as told by your health care provider. This is important.  Contact a health care provider if you have chills or a fever, or you have more redness, swelling, or pain around your incision.  Get help right away if you have severe pain that is not relieved with medicine.  This information is not intended to replace advice given to you by your health care provider. Make sure you discuss any questions you have with your health care provider.  Document Released: 07/19/2006 Document Revised: 04/10/2020 Document Reviewed: 09/16/2019  Elsevier Patient Education © 2020 [employer]

## 2022-08-23 NOTE — OR NURSING
2115:RN report, Pt is post R knee arthroscopy/meniscectomy, Pain tolerable/no nausea, Dressing intact to RLE, Pt will be WBAT  2125: Pt dressed,  in PACU, Pt has prescriptions at home, Elis has updated  already, Pt has cane and walker at home if needed, DC instructions completed with pt and , Verbalized understanding, Pt's insulin pump states blood sugar 130 currently  2145: IV removed, Pain tolerable/drinking water  2149: Pt discharged via WC/RN to private vehicle

## 2022-08-23 NOTE — ANESTHESIA PROCEDURE NOTES
Airway    Date/Time: 8/22/2022 7:45 PM  Performed by: Aristides Apodaca M.D.  Authorized by: Aristides Apodaca M.D.     Location:  OR  Urgency:  Elective  Indications for Airway Management:  Anesthesia      Spontaneous Ventilation: absent    Sedation Level:  Deep  Preoxygenated: Yes    Final Airway Type:  Supraglottic airway  Final Supraglottic Airway:  Standard LMA    SGA Size:  3  Number of Attempts at Approach:  1

## 2022-08-23 NOTE — ANESTHESIA POSTPROCEDURE EVALUATION
Patient: Ines Long    Procedure Summary     Date: 08/22/22 Room / Location: [place] [address] / SURGERY [facility]    Anesthesia Start: 1940 Anesthesia Stop: 2033    Procedures:       RIGHT KNEE ARTHROSCOPY, PARTIAL MEDIAL MENISCECTOMY (Right: Knee)      SYNOVECTOMY (Right: Knee)      CHONDROPLASTY (Right: Knee) Diagnosis: (OTHER TEAR OF MEDIAL MENISCUS; CURRENT INJURY; RIGHT KNEE; INITIAL ENCOUNTER)    Surgeons: Ravi Gillis M.D. Responsible Provider: Aristides Apodaca M.D.    Anesthesia Type: general ASA Status: 2          Final Anesthesia Type: general  Last vitals  BP   Blood Pressure : 137/68    Temp   37 °C (98.6 °F)    Pulse   89   Resp   16    SpO2   97 %      Anesthesia Post Evaluation    Patient location during evaluation: PACU  Patient participation: complete - patient participated  Level of consciousness: awake and alert  Pain score: 2    Airway patency: patent  Anesthetic complications: no  Cardiovascular status: hemodynamically stable  Respiratory status: acceptable  Hydration status: euvolemic    PONV: none          No notable events documented.     Nurse Pain Score: 2 (NPRS)

## 2022-08-23 NOTE — ANESTHESIA PREPROCEDURE EVALUATION
Case: [id number] Date/Time: 08/22/22 0934    Procedures:       RIGHT KNEE ARTHROSCOPY, PARTIAL MEDIAL MENISCECTOMY, LOOSE BODY EXCISION AND REPAIRS AS INDICATED (Right: Knee)      REMOVAL, HARDWARE (Right: Knee)    Anesthesia type: General    Pre-op diagnosis: OTHER TEAR OF MEDIAL MENISCUS; CURRENT INJURY; RIGHT KNEE; INITIAL ENCOUNTER    Location: [place] [address] / SURGERY [facility]    Surgeons: Ravi Gillis M.D.          Relevant Problems   CARDIAC   (positive) Carotid artery stenosis   (positive) Primary hypertension      ENDO   (positive) Hypothyroidism   (positive) Type 1 diabetes mellitus (HCC)       Physical Exam    Airway   Mallampati: II  TM distance: >3 FB  Neck ROM: full       Cardiovascular - normal exam  Rhythm: regular  Rate: normal  (-) murmur     Dental - normal exam           Pulmonary - normal exam  Breath sounds clear to auscultation     Abdominal    Neurological - normal exam                 Anesthesia Plan    ASA 2       Plan - general       Airway plan will be LMA        Plan Factors:   Patient was previously instructed to abstain from smoking on day of procedure.  Patient did not smoke on day of procedure.      Induction: intravenous    Postoperative Plan: Postoperative administration of opioids is intended.    Pertinent diagnostic labs and testing reviewed    Informed Consent:    Anesthetic plan and risks discussed with patient.    Use of blood products discussed with: patient whom consented to blood products.

## 2022-08-23 NOTE — OP REPORT
DATE OF SERVICE: 8/22/2022    PREOPERATIVE DIAGNOSIS:  Right knee anterior loose body versus locked meniscus    POSTOPERATIVE DIAGNOSES:  1.  Right knee medial meniscus tear.  2.  Right knee significant anterior synovitis  3.  Right knee lateral tibial plateau grade 3 chondromalacic changes    PROCEDURES:  1.  Right knee arthroscopy, partial medial meniscectomy.  2.  Right knee arthroscopic lateral tibial plateau chondroplasty  3.  Extensive anterior synovitic debridement and decompression    SURGEON: Ravi Gillis MD  ASSISTANT: Prabhu guerra MS 4     ANESTHESIOLOGIST: Dr. Aristides Perez  COMPLICATIONS:  None.     FINDINGS:  1.  Stable partial medial meniscectomy.  2.  Stable lateral tibial plateau chondroplasty  3.  Stable anterior extensive synovitic decompression  PROCEDURE IN DETAIL:  The patient was taken to the operating room where he   underwent general anesthetic in supine position.  All bony prominences padded.  Left leg placed into an Abel stirrup, left leg placed into a thigh cuff.    The Hibiclens, alcohol, and ChloraPrep were undertaken after general   anesthesia.  Following this, the right knee was addressed after an appropriate   time out and prep and drape.  The surgery was undertaken with a standard   3-portal arthroscopy.  Diagnostic arthroscopy showed intact ACL, PCL, MCL and   LCL.  Lateral meniscus was intact.  Medial and lateral gutter and posteromedial and posterolateral joint were assessed through the notch.  No evidence of loose bodies was encountered.  The partial medial meniscectomy had been completed using punches and a motorized shaver.  The diagnostic arthroscopy completed with thorough irrigation of the joint.    No evidence of loose bodies were identified posteriorly or in the gutters or suprapatellar pouch.  Extensive synovitic changes were identified in the anterior aspect of the knee and decompressed using the motorized shaver and the ArthroCare wand for  stabilization.    Then removal of the instruments was undertaken. Periarticular anesthetization with ropivacaine and closure of the incisions with nylon.  Sterile bandage were then applied.  The patient awoken from his anesthetic and taken to the recovery room, tolerated the procedure very well with no signs of complications.

## 2022-09-06 ENCOUNTER — HOSPITAL ENCOUNTER (OUTPATIENT)
Dept: LAB | Facility: MEDICAL CENTER | Age: 67
End: 2022-09-06
Attending: PHYSICIAN ASSISTANT
Payer: MEDICARE

## 2022-09-06 LAB
T4 FREE SERPL-MCNC: 1.58 NG/DL (ref 0.93–1.7)
TSH SERPL DL<=0.005 MIU/L-ACNC: 0.37 UIU/ML (ref 0.38–5.33)

## 2022-09-06 PROCEDURE — 84443 ASSAY THYROID STIM HORMONE: CPT

## 2022-09-06 PROCEDURE — 84439 ASSAY OF FREE THYROXINE: CPT

## 2022-09-06 PROCEDURE — 36415 COLL VENOUS BLD VENIPUNCTURE: CPT

## 2022-09-07 RX ORDER — METOCLOPRAMIDE 10 MG/1
10 TABLET ORAL 2 TIMES DAILY
Qty: 120 TABLET | Refills: 0 | OUTPATIENT
Start: 2022-09-07

## 2022-09-12 ENCOUNTER — OFFICE VISIT (OUTPATIENT)
Dept: MEDICAL GROUP | Facility: PHYSICIAN GROUP | Age: 67
End: 2022-09-12
Payer: MEDICARE

## 2022-09-12 VITALS
HEART RATE: 86 BPM | DIASTOLIC BLOOD PRESSURE: 58 MMHG | BODY MASS INDEX: 24.01 KG/M2 | WEIGHT: 153 LBS | HEIGHT: 67 IN | TEMPERATURE: 97.9 F | OXYGEN SATURATION: 95 % | SYSTOLIC BLOOD PRESSURE: 108 MMHG

## 2022-09-12 DIAGNOSIS — M25.561 ACUTE PAIN OF RIGHT KNEE: ICD-10-CM

## 2022-09-12 DIAGNOSIS — Z23 NEED FOR VACCINATION: ICD-10-CM

## 2022-09-12 DIAGNOSIS — Z12.31 ENCOUNTER FOR SCREENING MAMMOGRAM FOR MALIGNANT NEOPLASM OF BREAST: ICD-10-CM

## 2022-09-12 DIAGNOSIS — E11.43 GASTROPARESIS DUE TO DM (HCC): ICD-10-CM

## 2022-09-12 DIAGNOSIS — K31.84 GASTROPARESIS DUE TO DM (HCC): ICD-10-CM

## 2022-09-12 DIAGNOSIS — E10.69 TYPE 1 DIABETES MELLITUS WITH OTHER SPECIFIED COMPLICATION (HCC): ICD-10-CM

## 2022-09-12 DIAGNOSIS — E13.43 GASTROPARESIS DUE TO SECONDARY DIABETES (HCC): ICD-10-CM

## 2022-09-12 DIAGNOSIS — I10 PRIMARY HYPERTENSION: ICD-10-CM

## 2022-09-12 PROBLEM — Z01.818 PRE-OP EVALUATION: Status: RESOLVED | Noted: 2022-05-12 | Resolved: 2022-09-12

## 2022-09-12 PROBLEM — L92.3 TATTOO REACTION: Status: RESOLVED | Noted: 2021-07-15 | Resolved: 2022-09-12

## 2022-09-12 PROCEDURE — 99214 OFFICE O/P EST MOD 30 MIN: CPT | Mod: 25 | Performed by: NURSE PRACTITIONER

## 2022-09-12 PROCEDURE — G0010 ADMIN HEPATITIS B VACCINE: HCPCS | Performed by: NURSE PRACTITIONER

## 2022-09-12 PROCEDURE — 90746 HEPB VACCINE 3 DOSE ADULT IM: CPT | Performed by: NURSE PRACTITIONER

## 2022-09-12 RX ORDER — METOCLOPRAMIDE 10 MG/1
10 TABLET ORAL 2 TIMES DAILY
Qty: 120 TABLET | Refills: 0 | Status: SHIPPED | OUTPATIENT
Start: 2022-09-12 | End: 2023-04-25

## 2022-09-12 RX ORDER — CYCLOBENZAPRINE HCL 5 MG
5-10 TABLET ORAL 3 TIMES DAILY PRN
Qty: 90 TABLET | Refills: 0 | Status: SHIPPED | OUTPATIENT
Start: 2022-09-12 | End: 2022-10-20

## 2022-09-12 ASSESSMENT — ENCOUNTER SYMPTOMS
DEPRESSION: 0
HEARTBURN: 0
SHORTNESS OF BREATH: 0
HEADACHES: 0
PALPITATIONS: 0
NAUSEA: 0
LOSS OF CONSCIOUSNESS: 0
COUGH: 0
FEVER: 0
WHEEZING: 0
CHILLS: 0
DIZZINESS: 0

## 2022-09-12 ASSESSMENT — FIBROSIS 4 INDEX: FIB4 SCORE: 0.78

## 2022-09-12 NOTE — PROGRESS NOTES
Subjective:     Chief Complaint   Patient presents with    Follow-Up     6 month f/up    Pt had right knee surgery for two tears-one on each side (8/22/22)  Had procedure to clean out arthritis and a couple of bulges were removed : L4, L5, S1 (May 16, 2022)    Sleep Problem     Pt states she is in so much pain    Medication Refill       HPI:   Ines presents today with     Problem   Primary Hypertension    Chronic in nature.  BP looks good, 108/58. Currently taking losartan 50 mg.       Acute Pain of Right Knee    Patient recently had arthroscopy and laminectomy in her back.  This is a worsening issue states has severe pain over the last 2 weeks after doing squats with PT. that she felt like her knee was injured when she was working with the physical therapist.  States that the pain has been getting significantly worse that she continues to do activities of daily living.  States that she saw the PA through the orthopedics office and states that they did not address her pain concern.  States it has worsened significantly. States she is having severe muscle spasms every hour starting in the knee pulling into the back. States difficulty walking, states poor sleep. States takes 15 minutes to get up. State is still taking magnesium. States has tried CBD gummies which have not helped. Is taking hydrocodone every 6 hours, but it isn't improving symptoms much.  States the Robaxin is ineffective.     Type 1 Diabetes Mellitus (Hcc)    This is chronic in nature.  Patient has overall been well managed.  Was seeing Dr. Shaw.  States that she has been frustrated with Kaley Sotomayor, working on switching to Dr. Evans. States she has been taking Reglan for gastroparesis. States currently taking 2 times per day.  Dates that multiple years ago previous to starting the medication she was having states vomiting with every time she eats.  When she does not take the medication she gets significant stomach pain. states  non-digested food.  Recent A1c was 7.0.     Pre-Op Evaluation (Resolved)    Chronic conditions stable  No longer using HRT  Patient would like to proceed due to x3m low back pain  Affecting daily life     Tattoo Reaction (Resolved)   Routine Health Maintenance (Resolved)    Pap smear and mammogram with Dr. Winston         Current Outpatient Medications Ordered in Epic   Medication Sig Dispense Refill    Multiple Vitamin (MULTIVITAMIN PO) Take  by mouth every day.      ELDERBERRY PO Take  by mouth every day.      metoclopramide (REGLAN) 10 MG Tab Take 1 Tablet by mouth 2 times a day. 120 Tablet 0    cyclobenzaprine (FLEXERIL) 5 mg tablet Take 1-2 Tablets by mouth 3 times a day as needed for Muscle Spasms. 90 Tablet 0    diclofenac sodium (VOLTAREN) 1 % Gel Apply 4 g topically 4 times a day as needed (knee pain). 50 g 0    losartan (COZAAR) 50 MG Tab Take 50 mg by mouth every day.      FIASP 100 UNIT/ML Solution 4-50 units with pump daily      insulin infusion pump Device Inject  under the skin continuous. Patient's own SQ insulin pump    Type of Insulin: Fiasp  Last change of tubin/15/2022 - Change tubing and site every 72 hours    Dosing:  Basal rate:   0000 - 0329 = 0.5 units/hr   0330 - 1129 = 0.85 units/hr   1130 - 1359 = 0.5 units/hr              1400 - 1759 = 0.45 units/hr              1800 - 2359 = 0.5 units/hr    Bolus ratio:   1 unit : 12 g carbohydrate at  (breakfast, lunch, dinner, snacks)      Correction ratio:    1 units for every 50 over 150 mg/dL    Disconnect pump if patient becomes hypoglycemic and altered.      CLINDAMYCIN PHOSPHATE,TOPICAL, 1 % Lotion Apply 1 Application topically every day. Apply's on face      CRANBERRY PO Take 500 mg by mouth every evening.      B Complex Vitamins (VITAMIN B COMPLEX PO) Take 1 Tablet by mouth every evening.      LEVOXYL 100 MCG Tab Take 100 mcg by mouth every morning on an empty stomach.      pantoprazole (PROTONIX) 40 MG Tablet Delayed Response Take 40 mg  "by mouth every day.      rosuvastatin (CRESTOR) 40 MG tablet Take 1 Tab by mouth every bedtime. 30 Tab 1    Cholecalciferol (VITAMIN D-3 PO) Take 5,000 mg by mouth every evening.       No current Epic-ordered facility-administered medications on file.       Health Maintenance: We will continue to work on it, patient is concerned regarding colonoscopy considering she is having difficulty with walking.    Review of Systems   Constitutional:  Negative for chills, fever and malaise/fatigue.   Respiratory:  Negative for cough, shortness of breath and wheezing.    Cardiovascular:  Negative for chest pain, palpitations and leg swelling.   Gastrointestinal:  Negative for heartburn and nausea.   Genitourinary:  Negative for dysuria and urgency.   Neurological:  Negative for dizziness, loss of consciousness and headaches.   Psychiatric/Behavioral:  Negative for depression and suicidal ideas.        Objective:     Exam:  /58 (BP Location: Left arm, Patient Position: Sitting, BP Cuff Size: Adult)   Pulse 86   Temp 36.6 °C (97.9 °F) (Temporal)   Ht 1.702 m (5' 7\") Comment: pt reported  Wt 69.4 kg (153 lb)   LMP 01/01/1983 (LMP Unknown)   SpO2 95%   BMI 23.96 kg/m²  Body mass index is 23.96 kg/m².    Physical Exam  Constitutional:       Appearance: Normal appearance.   HENT:      Head: Normocephalic and atraumatic.   Eyes:      Pupils: Pupils are equal, round, and reactive to light.   Cardiovascular:      Rate and Rhythm: Normal rate and regular rhythm.   Pulmonary:      Effort: Pulmonary effort is normal.      Breath sounds: Normal breath sounds.   Musculoskeletal:      Right knee: Swelling present. No deformity, effusion or erythema. Normal range of motion. Tenderness present.   Skin:     General: Skin is warm and dry.      Capillary Refill: Capillary refill takes less than 2 seconds.   Neurological:      General: No focal deficit present.      Mental Status: She is alert and oriented to person, place, and time. "     Assessment & Plan:     67 y.o. female with the following -     Problem List Items Addressed This Visit       Acute pain of right knee     - Recommendations given for topical anti-inflammatories  -Continue ice and rest  -Continue gentle stretching  -Try Flexeril 5 to 10 mg up to 3 times a day as needed for the muscle spasms.         Relevant Medications    cyclobenzaprine (FLEXERIL) 5 mg tablet    diclofenac sodium (VOLTAREN) 1 % Gel    Primary hypertension     -continue Losartan 50 mg PO         Type 1 diabetes mellitus (HCC)     - Continue follow-up with endocrinology.         Relevant Medications    metoclopramide (REGLAN) 10 MG Tab     Other Visit Diagnoses       Gastroparesis due to secondary diabetes (HCC)        Gastroparesis due to DM (HCC)        Relevant Medications    metoclopramide (REGLAN) 10 MG Tab    cyclobenzaprine (FLEXERIL) 5 mg tablet    Need for vaccination        Relevant Orders    Hep B Adult 20+ (Completed)    Encounter for screening mammogram for malignant neoplasm of breast        Relevant Orders    MA-SCREENING MAMMO BILAT W/TOMOSYNTHESIS W/CAD            Return in about 1 month (around 10/12/2022), or if symptoms worsen or fail to improve.    I have placed the below orders and discussed them with an approved delegating provider. The MA is performing the below orders under the direction of Dr. Sierra.     Please note that this dictation was created using voice recognition software. I have made every reasonable attempt to correct obvious errors, but I expect that there are errors of grammar and possibly content that I did not discover before finalizing the note.

## 2022-09-12 NOTE — ASSESSMENT & PLAN NOTE
- Recommendations given for topical anti-inflammatories  -Continue ice and rest  -Continue gentle stretching  -Try Flexeril 5 to 10 mg up to 3 times a day as needed for the muscle spasms.

## 2022-09-14 ENCOUNTER — PATIENT MESSAGE (OUTPATIENT)
Dept: MEDICAL GROUP | Facility: PHYSICIAN GROUP | Age: 67
End: 2022-09-14
Payer: MEDICARE

## 2022-09-15 ENCOUNTER — PATIENT MESSAGE (OUTPATIENT)
Dept: MEDICAL GROUP | Facility: PHYSICIAN GROUP | Age: 67
End: 2022-09-15
Payer: MEDICARE

## 2022-09-19 ENCOUNTER — PATIENT MESSAGE (OUTPATIENT)
Dept: MEDICAL GROUP | Facility: PHYSICIAN GROUP | Age: 67
End: 2022-09-19
Payer: MEDICARE

## 2022-09-19 DIAGNOSIS — M25.561 ACUTE PAIN OF RIGHT KNEE: ICD-10-CM

## 2022-10-05 ENCOUNTER — HOSPITAL ENCOUNTER (OUTPATIENT)
Dept: LAB | Facility: MEDICAL CENTER | Age: 67
End: 2022-10-05
Attending: PHYSICIAN ASSISTANT
Payer: MEDICARE

## 2022-10-05 LAB
ALBUMIN SERPL BCP-MCNC: 4.2 G/DL (ref 3.2–4.9)
APPEARANCE UR: CLEAR
BASOPHILS # BLD AUTO: 0.4 % (ref 0–1.8)
BASOPHILS # BLD: 0.02 K/UL (ref 0–0.12)
BILIRUB UR QL STRIP.AUTO: NEGATIVE
BUN SERPL-MCNC: 14 MG/DL (ref 8–22)
CALCIUM SERPL-MCNC: 10.2 MG/DL (ref 8.5–10.5)
CHLORIDE SERPL-SCNC: 93 MMOL/L (ref 96–112)
CO2 SERPL-SCNC: 24 MMOL/L (ref 20–33)
COLOR UR: YELLOW
CREAT SERPL-MCNC: 0.72 MG/DL (ref 0.5–1.4)
CREAT UR-MCNC: 47.25 MG/DL
CREAT UR-MCNC: 47.45 MG/DL
EOSINOPHIL # BLD AUTO: 0.11 K/UL (ref 0–0.51)
EOSINOPHIL NFR BLD: 2 % (ref 0–6.9)
ERYTHROCYTE [DISTWIDTH] IN BLOOD BY AUTOMATED COUNT: 43.6 FL (ref 35.9–50)
EST. AVERAGE GLUCOSE BLD GHB EST-MCNC: 157 MG/DL
GFR SERPLBLD CREATININE-BSD FMLA CKD-EPI: 92 ML/MIN/1.73 M 2
GLUCOSE SERPL-MCNC: 168 MG/DL (ref 65–99)
GLUCOSE UR STRIP.AUTO-MCNC: NEGATIVE MG/DL
HBA1C MFR BLD: 7.1 % (ref 4–5.6)
HCT VFR BLD AUTO: 41.1 % (ref 37–47)
HGB BLD-MCNC: 13.9 G/DL (ref 12–16)
IMM GRANULOCYTES # BLD AUTO: 0.01 K/UL (ref 0–0.11)
IMM GRANULOCYTES NFR BLD AUTO: 0.2 % (ref 0–0.9)
KETONES UR STRIP.AUTO-MCNC: NEGATIVE MG/DL
LEUKOCYTE ESTERASE UR QL STRIP.AUTO: NEGATIVE
LYMPHOCYTES # BLD AUTO: 1.44 K/UL (ref 1–4.8)
LYMPHOCYTES NFR BLD: 26 % (ref 22–41)
MCH RBC QN AUTO: 31.1 PG (ref 27–33)
MCHC RBC AUTO-ENTMCNC: 33.8 G/DL (ref 33.6–35)
MCV RBC AUTO: 91.9 FL (ref 81.4–97.8)
MICRO URNS: NORMAL
MICROALBUMIN UR-MCNC: <1.2 MG/DL
MICROALBUMIN/CREAT UR: NORMAL MG/G (ref 0–30)
MONOCYTES # BLD AUTO: 0.36 K/UL (ref 0–0.85)
MONOCYTES NFR BLD AUTO: 6.5 % (ref 0–13.4)
NEUTROPHILS # BLD AUTO: 3.59 K/UL (ref 2–7.15)
NEUTROPHILS NFR BLD: 64.9 % (ref 44–72)
NITRITE UR QL STRIP.AUTO: NEGATIVE
NRBC # BLD AUTO: 0 K/UL
NRBC BLD-RTO: 0 /100 WBC
OSMOLALITY SERPL: 275 MOSM/KG H2O (ref 278–298)
OSMOLALITY UR: 412 MOSM/KG H2O (ref 300–900)
PH UR STRIP.AUTO: 7.5 [PH] (ref 5–8)
PHOSPHATE SERPL-MCNC: 3.4 MG/DL (ref 2.5–4.5)
PLATELET # BLD AUTO: 340 K/UL (ref 164–446)
PMV BLD AUTO: 10.1 FL (ref 9–12.9)
POTASSIUM SERPL-SCNC: 4.8 MMOL/L (ref 3.6–5.5)
PROT UR QL STRIP: NEGATIVE MG/DL
PROT UR-MCNC: 5 MG/DL (ref 0–15)
PROT/CREAT UR: 106 MG/G (ref 10–107)
RBC # BLD AUTO: 4.47 M/UL (ref 4.2–5.4)
RBC UR QL AUTO: NEGATIVE
SODIUM SERPL-SCNC: 128 MMOL/L (ref 135–145)
SODIUM UR-SCNC: 98 MMOL/L
SP GR UR STRIP.AUTO: 1.01
T4 FREE SERPL-MCNC: 1.36 NG/DL (ref 0.93–1.7)
TSH SERPL DL<=0.005 MIU/L-ACNC: 1.65 UIU/ML (ref 0.38–5.33)
UROBILINOGEN UR STRIP.AUTO-MCNC: 0.2 MG/DL
WBC # BLD AUTO: 5.5 K/UL (ref 4.8–10.8)

## 2022-10-05 PROCEDURE — 36415 COLL VENOUS BLD VENIPUNCTURE: CPT | Mod: GA

## 2022-10-05 PROCEDURE — 81003 URINALYSIS AUTO W/O SCOPE: CPT

## 2022-10-05 PROCEDURE — 83036 HEMOGLOBIN GLYCOSYLATED A1C: CPT | Mod: GA

## 2022-10-05 PROCEDURE — 80069 RENAL FUNCTION PANEL: CPT

## 2022-10-05 PROCEDURE — 84439 ASSAY OF FREE THYROXINE: CPT

## 2022-10-05 PROCEDURE — 84443 ASSAY THYROID STIM HORMONE: CPT

## 2022-10-05 PROCEDURE — 85025 COMPLETE CBC W/AUTO DIFF WBC: CPT

## 2022-10-05 PROCEDURE — 83930 ASSAY OF BLOOD OSMOLALITY: CPT

## 2022-10-05 PROCEDURE — 83935 ASSAY OF URINE OSMOLALITY: CPT

## 2022-10-05 PROCEDURE — 84300 ASSAY OF URINE SODIUM: CPT

## 2022-10-05 PROCEDURE — 82043 UR ALBUMIN QUANTITATIVE: CPT

## 2022-10-05 PROCEDURE — 82570 ASSAY OF URINE CREATININE: CPT

## 2022-10-05 PROCEDURE — 84156 ASSAY OF PROTEIN URINE: CPT

## 2022-10-20 ENCOUNTER — OFFICE VISIT (OUTPATIENT)
Dept: MEDICAL GROUP | Facility: PHYSICIAN GROUP | Age: 67
End: 2022-10-20
Payer: MEDICARE

## 2022-10-20 VITALS
BODY MASS INDEX: 24.64 KG/M2 | TEMPERATURE: 98.7 F | HEART RATE: 83 BPM | SYSTOLIC BLOOD PRESSURE: 138 MMHG | WEIGHT: 157 LBS | OXYGEN SATURATION: 99 % | DIASTOLIC BLOOD PRESSURE: 66 MMHG | HEIGHT: 67 IN

## 2022-10-20 DIAGNOSIS — Z12.11 SCREENING FOR COLON CANCER: ICD-10-CM

## 2022-10-20 DIAGNOSIS — Z23 NEED FOR VACCINATION: ICD-10-CM

## 2022-10-20 DIAGNOSIS — E10.69 TYPE 1 DIABETES MELLITUS WITH OTHER SPECIFIED COMPLICATION (HCC): ICD-10-CM

## 2022-10-20 DIAGNOSIS — M25.561 ACUTE PAIN OF RIGHT KNEE: ICD-10-CM

## 2022-10-20 DIAGNOSIS — E03.9 ACQUIRED HYPOTHYROIDISM: ICD-10-CM

## 2022-10-20 PROBLEM — R21 RASH OF FACE: Status: RESOLVED | Noted: 2021-06-02 | Resolved: 2022-10-20

## 2022-10-20 PROBLEM — R53.83 OTHER FATIGUE: Status: RESOLVED | Noted: 2021-06-02 | Resolved: 2022-10-20

## 2022-10-20 PROCEDURE — 90662 IIV NO PRSV INCREASED AG IM: CPT | Performed by: NURSE PRACTITIONER

## 2022-10-20 PROCEDURE — 99214 OFFICE O/P EST MOD 30 MIN: CPT | Mod: 25 | Performed by: NURSE PRACTITIONER

## 2022-10-20 PROCEDURE — G0008 ADMIN INFLUENZA VIRUS VAC: HCPCS | Performed by: NURSE PRACTITIONER

## 2022-10-20 RX ORDER — INSULIN ASPART 100 [IU]/ML
INJECTION, SOLUTION INTRAVENOUS; SUBCUTANEOUS
Status: ON HOLD | COMMUNITY
Start: 2022-10-14 | End: 2023-09-27

## 2022-10-20 RX ORDER — CYCLOBENZAPRINE HCL 5 MG
5-10 TABLET ORAL 3 TIMES DAILY PRN
Qty: 90 TABLET | Refills: 0 | Status: SHIPPED | OUTPATIENT
Start: 2022-10-20 | End: 2022-12-01

## 2022-10-20 ASSESSMENT — ENCOUNTER SYMPTOMS
FEVER: 0
PALPITATIONS: 0
HEADACHES: 0
DIZZINESS: 0
CHILLS: 0
SHORTNESS OF BREATH: 0
COUGH: 0

## 2022-10-20 ASSESSMENT — FIBROSIS 4 INDEX: FIB4 SCORE: 0.88

## 2022-10-21 NOTE — PROGRESS NOTES
"Subjective:     Chief Complaint   Patient presents with    Follow-Up     1 month f/up    Lab Results    Medication Follow-up     Pt states the medication prescribed at visit \"sometimes works and other times it's like taking a sugar-pill\"       HPI:   Ines presents today with     Problem   Acute Pain of Right Knee    Patient recently had arthroscopy and laminectomy in her back.  The pain has overall improved but she continues to have severe muscle spasms a couple of times a week.  Patient states that she has not reached out to Dr. Gillis to discuss this with him but does have an appointment coming up with Dr. Díaz to discuss this in regards to her back pain.  Patient states that Flexeril has worked okay for the pain states that if she takes it a couple of times a day she has less spasming.  She is requesting a refill we did discuss that this medication is not recommended to be taken consistently and I do recommend her following up with a specialist regarding the pain.  I did discuss that we could potentially send her a referral to another PT or to a pain management specialist if this is not improving.       Type 1 Diabetes Mellitus (Hcc)    This is chronic in nature.  Patient has overall been well managed.  A1c was 7.1. States she has been taking Reglan for gastroparesis. States currently taking 2 times per day.  States she is doing well on Reglan.     Hypothyroidism    Chronic in nature.  Stable.  Patient is on daily symptoms currently.     Rash of Face (Resolved)   Other Fatigue (Resolved)       Current Outpatient Medications Ordered in Epic   Medication Sig Dispense Refill    NOVOLOG FLEXPEN 100 UNIT/ML solution for injection       cyclobenzaprine (FLEXERIL) 5 mg tablet Take 1-2 Tablets by mouth 3 times a day as needed for Muscle Spasms. 90 Tablet 0    Multiple Vitamin (MULTIVITAMIN PO) Take  by mouth every day.      ELDERBERRY PO Take  by mouth every day.      metoclopramide (REGLAN) 10 MG Tab Take 1 Tablet " "by mouth 2 times a day. 120 Tablet 0    losartan (COZAAR) 50 MG Tab Take 50 mg by mouth every day.      FIASP 100 UNIT/ML Solution 4-50 units with pump daily      insulin infusion pump Device Inject  under the skin continuous. Patient's own SQ insulin pump    Type of Insulin: Fiasp  Last change of tubin/15/2022 - Change tubing and site every 72 hours    Dosing:  Basal rate:   0000 - 0329 = 0.5 units/hr   0330 - 1129 = 0.85 units/hr   1130 - 1359 = 0.5 units/hr              1400 - 1759 = 0.45 units/hr              1800 - 2359 = 0.5 units/hr    Bolus ratio:   1 unit : 12 g carbohydrate at  (breakfast, lunch, dinner, snacks)      Correction ratio:    1 units for every 50 over 150 mg/dL    Disconnect pump if patient becomes hypoglycemic and altered.      CLINDAMYCIN PHOSPHATE,TOPICAL, 1 % Lotion Apply 1 Application topically every day. Apply's on face      CRANBERRY PO Take 500 mg by mouth every evening.      B Complex Vitamins (VITAMIN B COMPLEX PO) Take 1 Tablet by mouth every evening.      LEVOXYL 100 MCG Tab Take 100 mcg by mouth every morning on an empty stomach.      pantoprazole (PROTONIX) 40 MG Tablet Delayed Response Take 40 mg by mouth every day.      rosuvastatin (CRESTOR) 40 MG tablet Take 1 Tab by mouth every bedtime. 30 Tab 1    Cholecalciferol (VITAMIN D-3 PO) Take 5,000 mg by mouth every evening.       No current Epic-ordered facility-administered medications on file.       Health Maintenance: discuss at follow-up    Review of Systems   Constitutional:  Negative for chills, fever and malaise/fatigue.   Respiratory:  Negative for cough and shortness of breath.    Cardiovascular:  Negative for chest pain, palpitations and leg swelling.   Neurological:  Negative for dizziness and headaches.       Objective:     Exam:  /66 (BP Location: Left arm, Patient Position: Sitting, BP Cuff Size: Adult)   Pulse 83   Temp 37.1 °C (98.7 °F) (Temporal)   Ht 1.702 m (5' 7\") Comment: pt reported  Wt 71.2 kg " (157 lb)   LMP 01/01/1983 (LMP Unknown)   SpO2 99%   BMI 24.59 kg/m²  Body mass index is 24.59 kg/m².    Physical Exam  Constitutional:       Appearance: Normal appearance.   Eyes:      Pupils: Pupils are equal, round, and reactive to light.   Cardiovascular:      Rate and Rhythm: Normal rate and regular rhythm.      Pulses: Normal pulses.      Heart sounds: Normal heart sounds.   Pulmonary:      Effort: Pulmonary effort is normal.      Breath sounds: Normal breath sounds.   Musculoskeletal:      Right knee: No swelling, deformity, effusion, erythema, ecchymosis or bony tenderness. Normal range of motion. No tenderness.      Left knee: Normal.        Legs:       Comments: Muscle pain and tenderness here   Skin:     General: Skin is warm and dry.      Capillary Refill: Capillary refill takes less than 2 seconds.   Neurological:      General: No focal deficit present.      Mental Status: She is alert and oriented to person, place, and time.     Assessment & Plan:     67 y.o. female with the following -     Problem List Items Addressed This Visit       Acute pain of right knee     - Continue Flexeril 5 to 10 mg as needed discussed that I do not want her taking this daily          Relevant Medications    cyclobenzaprine (FLEXERIL) 5 mg tablet    Hypothyroidism     - Levoxyl 100 mcg by mouth daily except she is taking 50 mcg on Sunday.         Type 1 diabetes mellitus (HCC)     - Continue endocrinology follow-up.         Relevant Medications    NOVOLOG FLEXPEN 100 UNIT/ML solution for injection     Other Visit Diagnoses       Need for vaccination        Relevant Orders    INFLUENZA VACCINE, HIGH DOSE (65+ ONLY) (Completed)    Screening for colon cancer        Relevant Orders    Referral to GI for Colonoscopy            Return in about 1 month (around 11/20/2022), or if symptoms worsen or fail to improve, for knee pain.    I have placed the below orders and discussed them with an approved delegating provider. The MA is  performing the below orders under the direction of Dr. Sierra.     Please note that this dictation was created using voice recognition software. I have made every reasonable attempt to correct obvious errors, but I expect that there are errors of grammar and possibly content that I did not discover before finalizing the note.

## 2022-10-31 ENCOUNTER — TELEPHONE (OUTPATIENT)
Dept: MEDICAL GROUP | Facility: PHYSICIAN GROUP | Age: 67
End: 2022-10-31

## 2022-10-31 NOTE — TELEPHONE ENCOUNTER
Please scan results into chart.  Please let patient know that if she would like to discuss this it would require an appointment since I did not order it.

## 2022-11-01 NOTE — TELEPHONE ENCOUNTER
Roel stated Patty BARR scanned the results in- called patient and informed her she needs an appointment to go over results. Pt has appointment 12/1/2022

## 2022-11-17 ENCOUNTER — OFFICE VISIT (OUTPATIENT)
Dept: CARDIOLOGY | Facility: MEDICAL CENTER | Age: 67
End: 2022-11-17
Payer: MEDICARE

## 2022-11-17 ENCOUNTER — NON-PROVIDER VISIT (OUTPATIENT)
Dept: CARDIOLOGY | Facility: MEDICAL CENTER | Age: 67
End: 2022-11-17
Attending: INTERNAL MEDICINE
Payer: MEDICARE

## 2022-11-17 VITALS
OXYGEN SATURATION: 99 % | BODY MASS INDEX: 24.48 KG/M2 | HEIGHT: 67 IN | SYSTOLIC BLOOD PRESSURE: 122 MMHG | HEART RATE: 89 BPM | DIASTOLIC BLOOD PRESSURE: 64 MMHG | RESPIRATION RATE: 14 BRPM | WEIGHT: 156 LBS

## 2022-11-17 DIAGNOSIS — I49.3 PVC'S (PREMATURE VENTRICULAR CONTRACTIONS): ICD-10-CM

## 2022-11-17 DIAGNOSIS — I10 PRIMARY HYPERTENSION: ICD-10-CM

## 2022-11-17 DIAGNOSIS — E78.9 DISORDER OF LIPID METABOLISM: ICD-10-CM

## 2022-11-17 DIAGNOSIS — R94.31 ABNORMAL ELECTROCARDIOGRAM (ECG) (EKG): ICD-10-CM

## 2022-11-17 DIAGNOSIS — R00.2 PALPITATIONS: ICD-10-CM

## 2022-11-17 DIAGNOSIS — I65.22 STENOSIS OF LEFT CAROTID ARTERY: ICD-10-CM

## 2022-11-17 DIAGNOSIS — I47.10 PSVT (PAROXYSMAL SUPRAVENTRICULAR TACHYCARDIA) (HCC): ICD-10-CM

## 2022-11-17 PROCEDURE — 99204 OFFICE O/P NEW MOD 45 MIN: CPT | Performed by: INTERNAL MEDICINE

## 2022-11-17 RX ORDER — SODIUM CHLORIDE 1 G/1
2 TABLET ORAL DAILY
COMMUNITY

## 2022-11-17 ASSESSMENT — FIBROSIS 4 INDEX: FIB4 SCORE: 0.88

## 2022-11-17 NOTE — PROGRESS NOTES
"Chief Complaint   Patient presents with    Follow-Up     F/v dx: Blocked artery       Subjective     Ines Long is a 67 y.o. female who presents today to reestablish care for her history of type 1 diabetes with a history of peripheral arterial disease status post carotid endarterectomy    She has been doing well she had a surgery in was noted to have a left anterior fascicular block based on automated read this is similar to EKG from 2017 and 19    She does describe some palpitation    Past Medical History:   Diagnosis Date    Acute nasopharyngitis 04/29/2022    \"I'm at the tailend of a cold\" .  Symptoms started 4/26/22 included exhaustion, and stuffy nose, cough.  Symptoms are better but pt. reports she still feels \"A little congested\"    Anesthesia     Post op N/V    Blocked artery Around 2019    Carotid artery- had surgery.  Pt. follows up with Dr. Surya Garcia every year.    Diabetes type 1, controlled (HCC) 11/1/2010    Insulin pump in place.  Endocrinologist is Dr. Dewey.     Dyslipidemia 11/1/2010    High cholesterol     Hypertension     Hypothyroidism 11/1/2010    Indigestion     Insulin pump in place 5/18/2011    Left carotid artery stenosis - severe 2019     PONV (postoperative nausea and vomiting)     Routine health maintenance 5/18/2011    Urinary incontinence     \"If I sneeze, I leak\"     Past Surgical History:   Procedure Laterality Date    MENISCECTOMY, KNEE, ARTHROSCOPIC Right 8/22/2022    Procedure: RIGHT KNEE ARTHROSCOPY, PARTIAL MEDIAL MENISCECTOMY;  Surgeon: Ravi Gillis M.D.;  Location: [facility];  Service: Orthopedics    SYNOVECTOMY Right 8/22/2022    Procedure: SYNOVECTOMY;  Surgeon: Ravi Gillis M.D.;  Location: [facility];  Service: Orthopedics    CHONDROPLASTY Right 8/22/2022    Procedure: CHONDROPLASTY;  Surgeon: Ravi Gillis M.D.;  Location: [facility];  Service: Orthopedics    LUMBAR LAMINECTOMY DISKECTOMY  5/16/2022    " Procedure: L4-5 and L5-S1 decompressive laminectomy, bilateral foraminotomies and left L5-S1;  Surgeon: Evon Díaz M.D.;  Location: [facility];  Service: Orthopedics    LUMBAR DECOMPRESSION  5/16/2022    Procedure: DECOMPRESSION, SPINE, LUMBAR;  Surgeon: Evon Díaz M.D.;  Location: [facility];  Service: Orthopedics    FORAMINOTOMY  5/16/2022    Procedure: FORAMINOTOMY, SPINE;  Surgeon: Evon Díaz M.D.;  Location: [facility];  Service: Orthopedics    CAROTID ENDARTERECTOMY Left 12/5/2019    Procedure: ENDARTERECTOMY, CAROTID- WITH NEUROMONITORIING;  Surgeon: Surya Garcia M.D.;  Location: [facility];  Service: Vascular    ORTHOPEDIC OSTEOTOMY Left 7/17/2017    Procedure: ORTHOPEDIC OSTEOTOMY CALCANEAL, KIDNER, EXCISION NAVICULAR, GASTROC SOLEUS RECESSION;  Surgeon: Adebayo Layton M.D.;  Location: [facility];  Service:     LIGAMENT REPAIR  7/17/2017    Procedure: LIGAMENT REPAIR SPRING;  Surgeon: Adebayo Layton M.D.;  Location: [facility];  Service:     TOE FUSION  7/17/2017    Procedure: TOE FUSION 1ST METATARSAL JOINT, 2ND TOE RECONSTRUCTION ;  Surgeon: Adebayo Layton M.D.;  Location: [facility];  Service:     REPAIR, TENDON, LOWER EXTREMITY, USING TENDON GRAFT  7/17/2017    Procedure: FLEXOR TENDON REPAIR- RELEASE/POSSIBLE FLEXOR DIGITORIUM LONGUS;  Surgeon: Adebayo Layton M.D.;  Location: [facility];  Service:     BUNIONECTOMY  2014    SHOULDER ARTHROSCOPY Left 1997    frozen shoulder    ABDOMINAL HYSTERECTOMY TOTAL  1987    KNEE ARTHROTOMY Right 1978    [place]     CHOLECYSTECTOMY       Family History   Problem Relation Age of Onset    Cancer Mother         rectal cancer    Stroke Father     Heart Disease Father     Hypertension Father     Lung Disease Father         tb    Diabetes Sister     Psychiatric Illness Sister         bipolar    Heart Disease Maternal Grandfather     Stroke  "Paternal Grandmother     Heart Disease Paternal Grandfather      Social History     Socioeconomic History    Marital status:      Spouse name: Not on file    Number of children: Not on file    Years of education: Not on file    Highest education level: Not on file   Occupational History    Not on file   Tobacco Use    Smoking status: Former     Packs/day: 0.00     Years: 2.00     Pack years: 0.00     Types: Cigarettes     Quit date: 1977     Years since quittin.9    Smokeless tobacco: Never   Vaping Use    Vaping Use: Never used   Substance and Sexual Activity    Alcohol use: Yes     Alcohol/week: 2.4 oz     Types: 4 Glasses of wine per week     Comment: about 3-4 drinks per week.     Drug use: Never    Sexual activity: Yes     Partners: Male     Birth control/protection: Surgical     Comment: Pt states had a hysterectomy   Other Topics Concern    Not on file   Social History Narrative    Not on file     Social Determinants of Health     Financial Resource Strain: Not on file   Food Insecurity: Not on file   Transportation Needs: Not on file   Physical Activity: Not on file   Stress: Not on file   Social Connections: Not on file   Intimate Partner Violence: Not on file   Housing Stability: Not on file     Allergies   Allergen Reactions    Ceclor [Cefaclor] Hives and Swelling    Augmentin Hives, Diarrhea and Vomiting     Fever blisters   Sickness lasts forever    Naproxen      Face Swells     Tape Rash     \"Certain band aids\"  PAPER TAPE OKAY    Cefaclor Hives and Swelling     Outpatient Encounter Medications as of 2022   Medication Sig Dispense Refill    sodium chloride (SALT) 1 GM Tab Take 1 Tablet by mouth 2 times a day.      Calcium Carbonate (CALCIUM 600 PO) Take 600 mg by mouth every day.      NOVOLOG FLEXPEN 100 UNIT/ML solution for injection       cyclobenzaprine (FLEXERIL) 5 mg tablet Take 1-2 Tablets by mouth 3 times a day as needed for Muscle Spasms. 90 Tablet 0    Multiple Vitamin " "(MULTIVITAMIN PO) Take  by mouth every day.      ELDERBERRY PO Take  by mouth every day.      metoclopramide (REGLAN) 10 MG Tab Take 1 Tablet by mouth 2 times a day. 120 Tablet 0    losartan (COZAAR) 50 MG Tab Take 1 Tablet by mouth every day.      FIASP 100 UNIT/ML Solution 4-50 units with pump daily      insulin infusion pump Device Inject  under the skin continuous. Patient's own SQ insulin pump    Type of Insulin: Fiasp  Last change of tubin/15/2022 - Change tubing and site every 72 hours    Dosing:  Basal rate:   0000 - 0329 = 0.5 units/hr   0330 - 1129 = 0.85 units/hr   1130 - 1359 = 0.5 units/hr              1400 - 1759 = 0.45 units/hr              1800 - 2359 = 0.5 units/hr    Bolus ratio:   1 unit : 12 g carbohydrate at  (breakfast, lunch, dinner, snacks)      Correction ratio:    1 units for every 50 over 150 mg/dL    Disconnect pump if patient becomes hypoglycemic and altered.      CLINDAMYCIN PHOSPHATE,TOPICAL, 1 % Lotion Apply 1 Application topically every day. Apply's on face      CRANBERRY PO Take 500 mg by mouth every evening.      B Complex Vitamins (VITAMIN B COMPLEX PO) Take 1 Tablet by mouth every evening.      LEVOXYL 100 MCG Tab Take 1 Tablet by mouth every morning on an empty stomach.      pantoprazole (PROTONIX) 40 MG Tablet Delayed Response Take 1 Tablet by mouth every day.      rosuvastatin (CRESTOR) 40 MG tablet Take 1 Tab by mouth every bedtime. 30 Tab 1    Cholecalciferol (VITAMIN D-3 PO) Take 5,000 mg by mouth every evening.       No facility-administered encounter medications on file as of 2022.     ROS           Objective     /64 (BP Location: Left arm, Patient Position: Sitting, BP Cuff Size: Adult)   Pulse 89   Resp 14   Ht 1.702 m (5' 7\")   Wt 70.8 kg (156 lb)   LMP 1983 (LMP Unknown)   SpO2 99%   BMI 24.43 kg/m²     Physical Exam  Constitutional:       General: She is not in acute distress.     Appearance: She is not diaphoretic.   HENT:      " Mouth/Throat:      Comments: Wearing a mask for COVID protocol  Eyes:      General: No scleral icterus.  Neck:      Vascular: No JVD.   Cardiovascular:      Rate and Rhythm: Normal rate.      Heart sounds: Normal heart sounds. No murmur heard.    No friction rub. No gallop.   Pulmonary:      Effort: No respiratory distress.      Breath sounds: No wheezing or rales.   Abdominal:      General: Bowel sounds are normal.      Palpations: Abdomen is soft.   Musculoskeletal:      Right lower leg: No edema.      Left lower leg: No edema.   Skin:     Findings: No rash.   Neurological:      Mental Status: She is alert. Mental status is at baseline.   Psychiatric:         Mood and Affect: Mood normal.                  Assessment & Plan     1. Stenosis of left carotid artery        2. Primary hypertension        3. Disorder of lipid metabolism        4. Palpitations  Cardiac Event Monitor      5. Abnormal electrocardiogram (ECG) (EKG)  CT-CARDIAC SCORING    EC-ECHOCARDIOGRAM COMPLETE W/O CONT          Medical Decision Making: Today's Assessment/Status/Plan:        It was my pleasure to meet with Ms. Long.  We addressed the management of hypertension at today's visit. Blood pressure is well controlled.  We specifically assessed the labs on hypertension treatment    We addressed the management of dyslipidemia and atherosclerosis at today's visit. She is on appropriate statin.  May need PCSK9 would like to see her LDL below 70    For palpitations we will do a Zio and for her EKG we will do a echo and a coronary calcium score.  If this were excessively high we can do a pharmacologic stress test she ambulates with a cane.    We will follow up with Ms. Long on the results of the testing over the phone. We will determine further follow-up from there.    I will see Ms. Long back in 1 year time and encouraged her to follow up with us over the phone or electronically using my MyChart as issues arise.    It is my pleasure to  participate in the care of Ms. Long.  Please do not hesitate to contact me with questions or concerns.    Shant Card MD PhD [facility]  Cardiologist [facility]    Please note that this dictation was created using voice recognition software. There may be errors I did not discover before finalizing the note.

## 2022-11-17 NOTE — PROGRESS NOTES
Patient enrolled in the 14 day ePatch Holter monitoring programs, per Shant Card M.D.  >In clinic hook up, monitor S/N [id number].  >Pending EOS.

## 2022-11-17 NOTE — PATIENT INSTRUCTIONS
Look into Repatha or Praulent which are injections for cholesterol (PCSK9 inhibitors) that are administered every other week or monthly  Inclisiran (Leqvio) is an injection of siRNA which is taken every 6 months after the initial doses.  Nexlizet (bempedoic acid and ezetimibe) is a pill that is not a statin to reduce the LDL.    Work on at least 1.5 - 5 hours a week of moderate exercise (typical brisk walking or similar activity)    If you have had a heart attack, stent, bypass or reduced heart strength (EF <35%): cardiac rehab may reduce your risk of dying by 13-24% and need to go to the hospital by 30% within the first year (1)    Please look into the following diets and incorporate them into your diet    LOW SALT DIET   KEEP YOUR SODIUM EQUAL TO CALORIES AND NO MORE THAN DOUBLE THE CALORIES FOR A LOW SALT DIET    Cardiosmart.org - great resource for American College of Cardiology on heart disease prevention and treatment    FOR TREATMENT OR PREVENTION OF CORONARY ARTERY DISEASE  These three programs are approved by Medicare/Insurers for those with heart disease  [facility]  Dr. Friedman's Program for Reversing Heart Disease - Abram Moraless Cardiologist vegetarian-based  [facility] - [place]-based mind-body Program    This is a commonly referenced Program  Dr Bales - [place] over [employer] (book and documentary) - vegetarian-based    FOR TREATMENT OF BLOOD PRESSURE  DASH DIET - American Heart Association for treatment of HYPERTENSION    FOR TREATMENT OF BAD CHOLESTEROL/FATS  REDUCE PROCESSED SUGAR AS MUCH AS POSSIBLE  INCREASE WHOLE GRAINS/VEGETABLES  INCREASE FIBER    Lowering total cholesterol and LDL (bad) cholesterol:  - Eat leaner cuts of meat, or eliminate altogether if possible red meat, and frequently substitute fish or chicken.  - Limit saturated fat to no more than 7-10% of total calories no more than 10 g per day is recommended. Some  sources of saturated fat include butter, animal fats, hydrogenated vegetable fats and oils, many desserts, whole milk dairy products.  - Replaced saturated fats with polyunsaturated fats and monounsaturated fats. Foods high in monounsaturated fat include nuts, canola oil, avocados, and olives.  - Limit trans fat (processed foods) and replaced with fresh fruits and vegetables  - Recommend nonfat dairy products  - Increase substantially the amount of soluble fiber intake (legumes such as beans, fruit, whole grains).  - Consider nutritional supplements: plant sterile spreads such as Benecol, fish oil,  flaxseed oil, omega-3 acids capsules 1000 mg twice a day, or viscous fiber such as Metamucil  - Attain ideal weight and regular exercise (at least 30 minutes per day of moderate exercise)  ASK ABOUT STATIN OR NON STATIN MEDICATION TO REDUCE YOUR LDL AND HEART RISK    Lowering triglycerides:  - Reduce intake of simple sugar: Desserts, candy, pastries, honey, sodas, sugared cereals, yogurt, Gatorade, sports bars, canned fruit, smoothies, fruit juice, coffee drinks  - Reduced intake of refined starches: Refined Pasta, most bread  - Reduce or abstain from alcohol  - Increase omega-3 fatty acids: [place], Trout, Mackerel, [place]ring, [place] tuna and supplements  - Attain ideal weight and regular exercise (at least 30 minutes per day of moderate exercise)  ASK ABOUT PURIFIED OMEGA 3 EPA or FISH OIL TO REDUCE YOUR TG AND HEART RISK    Elevating HDL (good) cholesterol:  - Increase physical activity  - Increase omega-3 fatty acids and supplements as listed above  - Incorporating appropriate amounts of monounsaturated fats such as nuts, olive oil, canola oil, avocados, olives  - Stop smoking  - Attain ideal weight and regular exercise (at least 30 minutes per day of moderate exercise)

## 2022-11-21 ENCOUNTER — PATIENT MESSAGE (OUTPATIENT)
Dept: CARDIOLOGY | Facility: MEDICAL CENTER | Age: 67
End: 2022-11-21
Payer: MEDICARE

## 2022-11-21 DIAGNOSIS — I25.10 CORONARY ARTERY DISEASE DUE TO CALCIFIED CORONARY LESION: ICD-10-CM

## 2022-11-21 DIAGNOSIS — I25.10 CORONARY ARTERY CALCIFICATION SEEN ON CAT SCAN: ICD-10-CM

## 2022-11-21 DIAGNOSIS — R94.31 ABNORMAL ELECTROCARDIOGRAM (ECG) (EKG): ICD-10-CM

## 2022-11-21 DIAGNOSIS — I25.84 CORONARY ARTERY DISEASE DUE TO CALCIFIED CORONARY LESION: ICD-10-CM

## 2022-11-21 DIAGNOSIS — E78.9 DISORDER OF LIPID METABOLISM: ICD-10-CM

## 2022-11-21 DIAGNOSIS — I65.22 STENOSIS OF LEFT CAROTID ARTERY: ICD-10-CM

## 2022-11-26 ENCOUNTER — HOSPITAL ENCOUNTER (OUTPATIENT)
Dept: RADIOLOGY | Facility: MEDICAL CENTER | Age: 67
End: 2022-11-26
Attending: INTERNAL MEDICINE
Payer: COMMERCIAL

## 2022-11-26 DIAGNOSIS — R94.31 ABNORMAL ELECTROCARDIOGRAM (ECG) (EKG): ICD-10-CM

## 2022-11-26 PROCEDURE — 4410556 CT-CARDIAC SCORING (SELF PAY ONLY)

## 2022-11-28 ENCOUNTER — TELEPHONE (OUTPATIENT)
Dept: CARDIOLOGY | Facility: MEDICAL CENTER | Age: 67
End: 2022-11-28
Payer: MEDICARE

## 2022-11-28 NOTE — TELEPHONE ENCOUNTER
MARLENE    Caller: Dre Chacon    Topic/issue:  Ines would like the results from her CT Scoring Test    Callback Number: [phone]    Thank you,   Elizabeth NAVARRO

## 2022-11-29 ENCOUNTER — PATIENT MESSAGE (OUTPATIENT)
Dept: CARDIOLOGY | Facility: MEDICAL CENTER | Age: 67
End: 2022-11-29
Payer: MEDICARE

## 2022-11-29 NOTE — TELEPHONE ENCOUNTER
----- Message from Shant Card M.D. sent at 11/28/2022  7:50 PM PST -----  The calcium score is high enough that we should do a pharmacologic stress test, please let her know and order indication is coronary artery disease due to calcified plaque

## 2022-12-01 DIAGNOSIS — M25.561 ACUTE PAIN OF RIGHT KNEE: ICD-10-CM

## 2022-12-01 RX ORDER — CYCLOBENZAPRINE HCL 5 MG
TABLET ORAL
Qty: 90 TABLET | Refills: 0 | Status: SHIPPED | OUTPATIENT
Start: 2022-12-01 | End: 2023-01-09

## 2022-12-02 ENCOUNTER — PATIENT MESSAGE (OUTPATIENT)
Dept: CARDIOLOGY | Facility: MEDICAL CENTER | Age: 67
End: 2022-12-02

## 2022-12-02 ENCOUNTER — HOSPITAL ENCOUNTER (OUTPATIENT)
Dept: RADIOLOGY | Facility: MEDICAL CENTER | Age: 67
End: 2022-12-02
Attending: INTERNAL MEDICINE
Payer: MEDICARE

## 2022-12-02 DIAGNOSIS — I25.84 CORONARY ARTERY DISEASE DUE TO CALCIFIED CORONARY LESION: ICD-10-CM

## 2022-12-02 DIAGNOSIS — R94.31 ABNORMAL ELECTROCARDIOGRAM (ECG) (EKG): ICD-10-CM

## 2022-12-02 DIAGNOSIS — I25.10 CORONARY ARTERY DISEASE DUE TO CALCIFIED CORONARY LESION: ICD-10-CM

## 2022-12-02 PROCEDURE — 93018 CV STRESS TEST I&R ONLY: CPT | Performed by: INTERNAL MEDICINE

## 2022-12-02 PROCEDURE — 78431 MYOCRD IMG PET RST&STRS CT: CPT

## 2022-12-02 PROCEDURE — 78431 MYOCRD IMG PET RST&STRS CT: CPT | Mod: 26 | Performed by: INTERNAL MEDICINE

## 2022-12-02 PROCEDURE — 78434 AQMBF PET REST & RX STRESS: CPT | Mod: 26 | Performed by: INTERNAL MEDICINE

## 2022-12-13 ENCOUNTER — TELEPHONE (OUTPATIENT)
Dept: CARDIOLOGY | Facility: MEDICAL CENTER | Age: 67
End: 2022-12-13
Payer: MEDICARE

## 2022-12-14 PROCEDURE — 93228 REMOTE 30 DAY ECG REV/REPORT: CPT | Performed by: INTERNAL MEDICINE

## 2022-12-20 ENCOUNTER — HOSPITAL ENCOUNTER (OUTPATIENT)
Dept: RADIOLOGY | Facility: MEDICAL CENTER | Age: 67
End: 2022-12-20
Attending: NEUROLOGICAL SURGERY
Payer: MEDICARE

## 2022-12-20 ENCOUNTER — OFFICE VISIT (OUTPATIENT)
Dept: MEDICAL GROUP | Facility: PHYSICIAN GROUP | Age: 67
End: 2022-12-20
Payer: MEDICARE

## 2022-12-20 VITALS
DIASTOLIC BLOOD PRESSURE: 60 MMHG | TEMPERATURE: 96.6 F | OXYGEN SATURATION: 98 % | WEIGHT: 158 LBS | BODY MASS INDEX: 24.8 KG/M2 | HEART RATE: 85 BPM | HEIGHT: 67 IN | SYSTOLIC BLOOD PRESSURE: 120 MMHG

## 2022-12-20 DIAGNOSIS — M25.561 ACUTE PAIN OF RIGHT KNEE: ICD-10-CM

## 2022-12-20 DIAGNOSIS — Z12.31 ENCOUNTER FOR SCREENING MAMMOGRAM FOR MALIGNANT NEOPLASM OF BREAST: ICD-10-CM

## 2022-12-20 DIAGNOSIS — E10.69 TYPE 1 DIABETES MELLITUS WITH OTHER SPECIFIED COMPLICATION (HCC): ICD-10-CM

## 2022-12-20 DIAGNOSIS — I10 PRIMARY HYPERTENSION: ICD-10-CM

## 2022-12-20 DIAGNOSIS — I77.9 BILATERAL CAROTID ARTERY DISEASE, UNSPECIFIED TYPE (HCC): ICD-10-CM

## 2022-12-20 PROCEDURE — 93880 EXTRACRANIAL BILAT STUDY: CPT

## 2022-12-20 PROCEDURE — 99214 OFFICE O/P EST MOD 30 MIN: CPT | Performed by: NURSE PRACTITIONER

## 2022-12-20 RX ORDER — INSULIN ASPART INJECTION 100 [IU]/ML
INJECTION, SOLUTION SUBCUTANEOUS
Status: ON HOLD | COMMUNITY
Start: 2022-12-02 | End: 2023-09-27

## 2022-12-20 ASSESSMENT — ENCOUNTER SYMPTOMS
HEADACHES: 0
WHEEZING: 0
HEARTBURN: 0
CHILLS: 0
NECK PAIN: 0
DIZZINESS: 0
NAUSEA: 0
FEVER: 0
PALPITATIONS: 0
DEPRESSION: 0
COUGH: 0
MYALGIAS: 0
SHORTNESS OF BREATH: 0

## 2022-12-20 ASSESSMENT — FIBROSIS 4 INDEX: FIB4 SCORE: 0.88

## 2022-12-20 NOTE — ASSESSMENT & PLAN NOTE
- Continue follow-up with endocrinology and nephrology.  -Patient will continue insulin  -Continue Reglan 10 mg twice daily as needed

## 2022-12-20 NOTE — PROGRESS NOTES
Subjective:     Chief Complaint   Patient presents with    Knee Pain    Results     Cardiac imaging    Lab Results       HPI:   Ines presents today with     Problem   Primary Hypertension    Chronic in nature.  Pressure is 120/60 today. Currently taking losartan 50 mg.  Recently had cardiac CT which indicated elevated cardiac calcium score, we discussed that this does represent heart disease, risk for heart attack or stroke [place] the stress testing that she did indicated that there is no change in perfusion to the heart muscle at this time.     Acute Pain of Right Knee    She states that she has not talked to Dr. Gillis to discuss the knee pain but that it continues to be present.  States she continues to have to take muscle relaxers prior to bed and in the middle of the night because the spasming of her leg muscles will wake her up.  Patient states that she has not found anything specific that makes it better or worse.  Does have an appointment coming up to talk with Dr. Díaz who does want to do another surgery but did not say specifically that he thought the neck issues were causing the knee pain.  Did recommend that she follow-up with the knee surgeon.  She did try physical therapy which was not helpful.     Type 1 Diabetes Mellitus (Hcc)    This is chronic in nature.  Patient has overall been well managed.  Continues to be 7.1.  States she has been taking Reglan for gastroparesis. States currently taking 2 times per day.  States she is doing well on Reglan.  Patient continues to follow closely with endocrinology and nephrology.         Current Outpatient Medications Ordered in Epic   Medication Sig Dispense Refill    FIASP 100 UNIT/ML Solution       OMEGA-3 FATTY ACIDS PO Take  by mouth.      cyclobenzaprine (FLEXERIL) 5 mg tablet TAKE ONE TO TWO TABLET BY MOUTH THREE TIMES A DAY AS NEEDED 90 Tablet 0    sodium chloride (SALT) 1 GM Tab Take 1 Tablet by mouth 2 times a day.      Calcium Carbonate (CALCIUM  600 PO) Take 600 mg by mouth every day.      NOVOLOG FLEXPEN 100 UNIT/ML solution for injection       Multiple Vitamin (MULTIVITAMIN PO) Take  by mouth every day.      ELDERBERRY PO Take  by mouth every day.      metoclopramide (REGLAN) 10 MG Tab Take 1 Tablet by mouth 2 times a day. 120 Tablet 0    losartan (COZAAR) 50 MG Tab Take 1 Tablet by mouth every day.      FIASP 100 UNIT/ML Solution 4-50 units with pump daily      insulin infusion pump Device Inject  under the skin continuous. Patient's own SQ insulin pump    Type of Insulin: Fiasp  Last change of tubin/15/2022 - Change tubing and site every 72 hours    Dosing:  Basal rate:   0000 - 0329 = 0.5 units/hr   0330 - 1129 = 0.85 units/hr   1130 - 1359 = 0.5 units/hr              1400 - 1759 = 0.45 units/hr              1800 - 2359 = 0.5 units/hr    Bolus ratio:   1 unit : 12 g carbohydrate at  (breakfast, lunch, dinner, snacks)      Correction ratio:    1 units for every 50 over 150 mg/dL    Disconnect pump if patient becomes hypoglycemic and altered.      CLINDAMYCIN PHOSPHATE,TOPICAL, 1 % Lotion Apply 1 Application topically every day. Apply's on face      CRANBERRY PO Take 500 mg by mouth every evening.      B Complex Vitamins (VITAMIN B COMPLEX PO) Take 1 Tablet by mouth every evening.      LEVOXYL 100 MCG Tab Take 1 Tablet by mouth every morning on an empty stomach.      pantoprazole (PROTONIX) 40 MG Tablet Delayed Response Take 1 Tablet by mouth every day.      rosuvastatin (CRESTOR) 40 MG tablet Take 1 Tab by mouth every bedtime. 30 Tab 1    Cholecalciferol (VITAMIN D-3 PO) Take 5,000 mg by mouth every evening.       No current Epic-ordered facility-administered medications on file.       Health Maintenance: Patient states she has an appointment for her retinal screening on Friday    Review of Systems   Constitutional:  Negative for chills, fever and malaise/fatigue.   Respiratory:  Negative for cough, shortness of breath and wheezing.   "  Cardiovascular:  Negative for chest pain, palpitations and leg swelling.   Gastrointestinal:  Negative for heartburn and nausea.   Musculoskeletal:  Negative for myalgias and neck pain.   Neurological:  Negative for dizziness and headaches.   Psychiatric/Behavioral:  Negative for depression and suicidal ideas.        Objective:     Exam:  /60 (BP Location: Left arm, Patient Position: Sitting, BP Cuff Size: Small adult)   Pulse 85   Temp 35.9 °C (96.6 °F) (Temporal)   Ht 1.702 m (5' 7\")   Wt 71.7 kg (158 lb)   LMP 01/01/1983 (LMP Unknown)   SpO2 98%   BMI 24.75 kg/m²  Body mass index is 24.75 kg/m².    Physical Exam  Constitutional:       Appearance: Normal appearance.   HENT:      Head: Normocephalic and atraumatic.   Eyes:      Pupils: Pupils are equal, round, and reactive to light.   Cardiovascular:      Rate and Rhythm: Normal rate and regular rhythm.      Pulses: Normal pulses.      Heart sounds: Normal heart sounds.   Pulmonary:      Effort: Pulmonary effort is normal.      Breath sounds: Normal breath sounds.   Skin:     General: Skin is warm and dry.      Capillary Refill: Capillary refill takes less than 2 seconds.   Neurological:      General: No focal deficit present.      Mental Status: She is alert and oriented to person, place, and time.     Assessment & Plan:     67 y.o. female with the following -     Problem List Items Addressed This Visit       Acute pain of right knee     - recommend follow-up with knee surgeon, Dr. Glilis         Primary hypertension     - Continue losartan 50 mg daily         Type 1 diabetes mellitus (HCC)     - Continue follow-up with endocrinology and nephrology.  -Patient will continue insulin  -Continue Reglan 10 mg twice daily as needed         Relevant Medications    FIASP 100 UNIT/ML Solution     Other Visit Diagnoses       Encounter for screening mammogram for malignant neoplasm of breast        Relevant Orders    MA-SCREENING MAMMO BILAT W/TOMOSYNTHESIS " W/CAD            Return in about 6 months (around 6/20/2023), or if symptoms worsen or fail to improve.    I have placed the below orders and discussed them with an approved delegating provider. The MA is performing the below orders under the direction of Dr. Sierra.     Please note that this dictation was created using voice recognition software. I have made every reasonable attempt to correct obvious errors, but I expect that there are errors of grammar and possibly content that I did not discover before finalizing the note.

## 2022-12-20 NOTE — LETTER
December 20, 2022        Ines Long   [address]  [facility] [address]        Dear Ines:    ***    If you have any questions or concerns, please don't hesitate to call.        Sincerely,        MAHIN Candelario.    Electronically Signed

## 2023-01-03 ENCOUNTER — OFFICE VISIT (OUTPATIENT)
Dept: MEDICAL GROUP | Facility: PHYSICIAN GROUP | Age: 68
End: 2023-01-03
Payer: MEDICARE

## 2023-01-03 VITALS
SYSTOLIC BLOOD PRESSURE: 120 MMHG | OXYGEN SATURATION: 98 % | BODY MASS INDEX: 24.96 KG/M2 | HEIGHT: 67 IN | WEIGHT: 159 LBS | DIASTOLIC BLOOD PRESSURE: 70 MMHG | HEART RATE: 82 BPM | TEMPERATURE: 96.2 F

## 2023-01-03 DIAGNOSIS — J06.9 VIRAL URI WITH COUGH: ICD-10-CM

## 2023-01-03 LAB
EXTERNAL QUALITY CONTROL: NORMAL
FLUAV+FLUBV AG SPEC QL IA: NORMAL
INT CON NEG: NEGATIVE
INT CON POS: POSITIVE
S PYO AG THROAT QL: NORMAL
SARS-COV+SARS-COV-2 AG RESP QL IA.RAPID: NEGATIVE

## 2023-01-03 PROCEDURE — 99214 OFFICE O/P EST MOD 30 MIN: CPT | Mod: CS | Performed by: STUDENT IN AN ORGANIZED HEALTH CARE EDUCATION/TRAINING PROGRAM

## 2023-01-03 PROCEDURE — 87426 SARSCOV CORONAVIRUS AG IA: CPT | Performed by: STUDENT IN AN ORGANIZED HEALTH CARE EDUCATION/TRAINING PROGRAM

## 2023-01-03 PROCEDURE — 87804 INFLUENZA ASSAY W/OPTIC: CPT | Performed by: STUDENT IN AN ORGANIZED HEALTH CARE EDUCATION/TRAINING PROGRAM

## 2023-01-03 PROCEDURE — 87880 STREP A ASSAY W/OPTIC: CPT | Performed by: STUDENT IN AN ORGANIZED HEALTH CARE EDUCATION/TRAINING PROGRAM

## 2023-01-03 RX ORDER — BENZONATATE 100 MG/1
100 CAPSULE ORAL 3 TIMES DAILY PRN
Qty: 60 CAPSULE | Refills: 0 | Status: SHIPPED
Start: 2023-01-03 | End: 2023-02-07

## 2023-01-03 ASSESSMENT — PATIENT HEALTH QUESTIONNAIRE - PHQ9: CLINICAL INTERPRETATION OF PHQ2 SCORE: 0

## 2023-01-03 ASSESSMENT — FIBROSIS 4 INDEX: FIB4 SCORE: 0.88

## 2023-01-03 NOTE — PROGRESS NOTES
Subjective:     CC:  The encounter diagnosis was Viral URI with cough.    HISTORY OF THE PRESENT ILLNESS: Patient is a 67 y.o. female.  This is a patient of Roel GARCIA.This pleasant patient is here today to discuss:    1. Viral URI with cough  Onset illness about 10 days ago.    Patient endorses sick contact with her  who had a viral URI and a grandchild positive for strep.  She has symptoms of sore throat with difficulty on swallowing, nausea.  Some myalgias.  Cough although this is a minor portion of her symptoms.    She denies vomiting or diarrhea, rash loss of sense of taste or smell, nasal or ear congestion.    She has attempted no treatment    Active Diagnosis:    Patient Active Problem List   Diagnosis    Type 1 diabetes mellitus (HCC)    Hypothyroidism    Disorder of lipid metabolism    Insulin pump in place    Hyperglycemia    Hormone replacement therapy (HRT)    Carotid artery stenosis    Neck pain on left side    Acute pain of right knee    Primary hypertension    Low back pain    Neurogenic claudication due to lumbar spinal stenosis    Lumbar stenosis with neurogenic claudication    Edema    Hyponatremia      Current Outpatient Medications Ordered in Epic   Medication Sig Dispense Refill    benzonatate (TESSALON) 100 MG Cap Take 1 Capsule by mouth 3 times a day as needed for Cough. 60 Capsule 0    FIASP 100 UNIT/ML Solution       OMEGA-3 FATTY ACIDS PO Take  by mouth.      cyclobenzaprine (FLEXERIL) 5 mg tablet TAKE ONE TO TWO TABLET BY MOUTH THREE TIMES A DAY AS NEEDED 90 Tablet 0    sodium chloride (SALT) 1 GM Tab Take 1 Tablet by mouth 2 times a day.      Calcium Carbonate (CALCIUM 600 PO) Take 600 mg by mouth every day.      NOVOLOG FLEXPEN 100 UNIT/ML solution for injection       Multiple Vitamin (MULTIVITAMIN PO) Take  by mouth every day.      ELDERBERRY PO Take  by mouth every day.      metoclopramide (REGLAN) 10 MG Tab Take 1 Tablet by mouth 2 times a day. 120 Tablet 0     "losartan (COZAAR) 50 MG Tab Take 1 Tablet by mouth every day.      CLINDAMYCIN PHOSPHATE,TOPICAL, 1 % Lotion Apply 1 Application topically every day. Apply's on face      CRANBERRY PO Take 500 mg by mouth every evening.      B Complex Vitamins (VITAMIN B COMPLEX PO) Take 1 Tablet by mouth every evening.      LEVOXYL 100 MCG Tab Take 1 Tablet by mouth every morning on an empty stomach.      pantoprazole (PROTONIX) 40 MG Tablet Delayed Response Take 1 Tablet by mouth every day.      rosuvastatin (CRESTOR) 40 MG tablet Take 1 Tab by mouth every bedtime. 30 Tab 1    Cholecalciferol (VITAMIN D-3 PO) Take 5,000 mg by mouth every evening.      FIASP 100 UNIT/ML Solution 4-50 units with pump daily      insulin infusion pump Device Inject  under the skin continuous. Patient's own SQ insulin pump    Type of Insulin: Fiasp  Last change of tubin/15/2022 - Change tubing and site every 72 hours    Dosing:  Basal rate:   0000 - 0329 = 0.5 units/hr   0330 - 1129 = 0.85 units/hr   1130 - 1359 = 0.5 units/hr              1400 - 1759 = 0.45 units/hr              1800 - 2359 = 0.5 units/hr    Bolus ratio:   1 unit : 12 g carbohydrate at  (breakfast, lunch, dinner, snacks)      Correction ratio:    1 units for every 50 over 150 mg/dL    Disconnect pump if patient becomes hypoglycemic and altered.       No current [facility]-ordered facility-administered medications on file.     ROS:   See HPI.    Objective:     Exam: /70 (BP Location: Left arm, Patient Position: Sitting, BP Cuff Size: Adult)   Pulse 82   Temp (!) 35.7 °C (96.2 °F) (Temporal)   Ht 1.702 m (5' 7\")   Wt 72.1 kg (159 lb)   SpO2 98%  Body mass index is 24.9 kg/m².    General: Normal appearing. No distress.  HEENT: Normocephalic. Eyes conjunctiva clear lids without ptosis. Pupils equal and reactive to light accommodation. Ears normal shape and contour. Canals are clear bilaterally, tympanic membranes are benign. Nasal mucosa benign, oropharynx is without erythema, " edema or exudates.   Neck: Supple without JVD. Thyroid is not enlarged.  Pulmonary: Clear to ausculation.  Normal effort. No rales, ronchi, or wheezing.  Lymph: No cervical or supraclavicular lymph nodes are palpable  Skin: Warm and dry.  No obvious lesions.    A chaperone was offered to the patient during today's exam. Patient declined chaperone.    Labs:   1/3/2023:  -POCT rapid strep A, negative.  -POCT COVID-19, negative.  -POCT influenza A/B, negative.    10/5/2022:  -CMP, no hepatorenal dysfunction.    Assessment & Plan:   67 y.o. female with the following -    1. Viral URI with cough  - Acute uncomplicated illness.  -Benzonatate 100 mg 3 times daily as needed cough.  Dispense 60.  Refill 0.  -Ibuprofen 600 mg 3 times daily as needed for myalgias, fever, pharyngitis.  OTC.  - POCT Rapid Strep A  - POCT Influenza A/B  - POCT SARS-COV Antigen GEENA (Symptomatic only)    No follow-ups on file.    Please note that this dictation was created using voice recognition software. I have made every reasonable attempt to correct obvious errors, but I expect that there are errors of grammar and possibly content that I did not discover before finalizing the note.      George Sparks PA-C 1/3/2023

## 2023-01-06 DIAGNOSIS — M25.561 ACUTE PAIN OF RIGHT KNEE: ICD-10-CM

## 2023-01-09 RX ORDER — CYCLOBENZAPRINE HCL 5 MG
TABLET ORAL
Qty: 90 TABLET | Refills: 0 | Status: SHIPPED | OUTPATIENT
Start: 2023-01-09 | End: 2023-02-09

## 2023-01-13 ENCOUNTER — OFFICE VISIT (OUTPATIENT)
Dept: VASCULAR LAB | Facility: MEDICAL CENTER | Age: 68
End: 2023-01-13
Attending: FAMILY MEDICINE
Payer: MEDICARE

## 2023-01-13 VITALS
WEIGHT: 158 LBS | BODY MASS INDEX: 24.8 KG/M2 | HEIGHT: 67 IN | RESPIRATION RATE: 16 BRPM | SYSTOLIC BLOOD PRESSURE: 132 MMHG | DIASTOLIC BLOOD PRESSURE: 71 MMHG | HEART RATE: 80 BPM

## 2023-01-13 DIAGNOSIS — I10 PRIMARY HYPERTENSION: ICD-10-CM

## 2023-01-13 DIAGNOSIS — E78.49 OTHER HYPERLIPIDEMIA: ICD-10-CM

## 2023-01-13 DIAGNOSIS — Z79.02 LONG TERM (CURRENT) USE OF ANTITHROMBOTICS/ANTIPLATELETS: ICD-10-CM

## 2023-01-13 DIAGNOSIS — I67.9 SMALL VESSEL DISEASE, CEREBROVASCULAR: Chronic | ICD-10-CM

## 2023-01-13 DIAGNOSIS — Z82.3 FAMILY HISTORY OF STROKE: ICD-10-CM

## 2023-01-13 DIAGNOSIS — E03.9 ACQUIRED HYPOTHYROIDISM: ICD-10-CM

## 2023-01-13 DIAGNOSIS — I44.4 LAFB (LEFT ANTERIOR FASCICULAR BLOCK): ICD-10-CM

## 2023-01-13 DIAGNOSIS — R93.1 AGATSTON CORONARY ARTERY CALCIUM SCORE BETWEEN 100 AND 199: ICD-10-CM

## 2023-01-13 DIAGNOSIS — Z96.41 INSULIN PUMP IN PLACE: ICD-10-CM

## 2023-01-13 DIAGNOSIS — I65.22 STENOSIS OF LEFT CAROTID ARTERY: ICD-10-CM

## 2023-01-13 DIAGNOSIS — Z98.890 HISTORY OF LEFT-SIDED CAROTID ENDARTERECTOMY: ICD-10-CM

## 2023-01-13 DIAGNOSIS — E10.69 TYPE 1 DIABETES MELLITUS WITH OTHER SPECIFIED COMPLICATION (HCC): ICD-10-CM

## 2023-01-13 PROCEDURE — 99212 OFFICE O/P EST SF 10 MIN: CPT

## 2023-01-13 PROCEDURE — 99204 OFFICE O/P NEW MOD 45 MIN: CPT | Performed by: FAMILY MEDICINE

## 2023-01-13 RX ORDER — ASPIRIN 81 MG/1
81 TABLET, CHEWABLE ORAL DAILY
Status: ON HOLD | COMMUNITY
End: 2023-03-01

## 2023-01-13 RX ORDER — EZETIMIBE 10 MG/1
10 TABLET ORAL DAILY
Qty: 90 TABLET | Refills: 3 | Status: SHIPPED | OUTPATIENT
Start: 2023-01-13 | End: 2023-11-16

## 2023-01-13 RX ORDER — SODIUM, POTASSIUM,MAG SULFATES 17.5-3.13G
SOLUTION, RECONSTITUTED, ORAL ORAL
COMMUNITY
Start: 2023-01-07 | End: 2023-02-07

## 2023-01-13 ASSESSMENT — FIBROSIS 4 INDEX: FIB4 SCORE: 0.88

## 2023-01-13 ASSESSMENT — ENCOUNTER SYMPTOMS
PND: 0
WHEEZING: 0
PALPITATIONS: 0
ORTHOPNEA: 0
COUGH: 0
SPUTUM PRODUCTION: 0
CLAUDICATION: 0
CHILLS: 0
HEMOPTYSIS: 0
SHORTNESS OF BREATH: 0
FEVER: 0

## 2023-01-13 NOTE — PROGRESS NOTES
[facility] - Initial Visit  Date of Service: 01/13/23     Ines Long has been referred for evaluation and management of dyslipidemia    Referral Source: Dr. Card (cardiology)     Subjective   Current/interval symptoms or concerns:  concern regarding ongoing elevated LDL despite max statin.  No other new sx.  Had CACS   Change in weight: Stable  BMI Readings from Last 5 Encounters:   01/13/23 24.75 kg/m²   01/03/23 24.90 kg/m²   12/20/22 24.75 kg/m²   11/17/22 24.43 kg/m²   10/20/22 24.59 kg/m²     Exercise habits:  limited   Dietary patterns: low fat  Etoh: No  Reported barriers to care: limited walking due to imbalance and ongoing eval with neurology   History of ASCVD: Documented clinical evidence of ASCVD: and Carotid plaque, >50% stenosis,     # s/p L CEA 2020 with Dr. Garcia.  No prior CVA/TIA.  Feeling well     # Cerebral small vessel dz -per MR, no prior CVA/TIA.      # elevated CACS = 180.  No hx of ascvd, no sx.    Secondary causes of dyslipidemia:  Endocrine/Hypothyroidism:  stable hypothyroidism   Liver disease: none reported   Renal disease/nephrotic syndrome:  none reported  Medications: None  Other Established (non-atherosclerotic) Vascular Disease, if Present: None  Age at Initial Diagnosis of Dyslipidemia: >5yrs     Current Prescription Lipid Lowering Medications - including dose:   Statin: rosuva 40mg  Non-Statin: None  Current Lipid Lowering and Related Supplements: omega 3 FAs   Any Current Side Effects Potentially Related to Lipid Lowering therapy? No  Current Adherence to Lipid Lowering Therapies: Complete  Previously Attempted Interventions for Lipids - including outcome  Statin: None   Outcome: N/A  Non-Statin: None  Outcome: N/A  Any Previous History of Statin Intolerance? No  Baseline Lipids (no off-tx lipid panel available in our system):   Latest Reference Range & Units 02/23/15 07:48   Cholesterol,Tot 100 - 199 mg/dL 245 (H)   Triglycerides 0 - 149 mg/dL 78   HDL  >=40 mg/dL 99   LDL <100 mg/dL 130 (H)   (H): Data is abnormally high    Anticoagulation/antiplats: Yes, Details: ASA 81mg , no bleeding noted     HTN:  Started after MAYNOR last year   Home BP los/70s   Adherence to current HTN meds: compliant all of the time      T2D: No     PAST MEDICAL HISTORY:  has a past medical history of Acute nasopharyngitis (2022), Anesthesia, Blocked artery (Around ), Diabetes type 1, controlled (HCC) (2010), Dyslipidemia (2010), High cholesterol, Hypertension, Hypothyroidism (2010), Indigestion, Insulin pump in place (2011), Left carotid artery stenosis - severe , PONV (postoperative nausea and vomiting), Routine health maintenance (2011), and Urinary incontinence.    PAST SURGICAL HISTORY:  has a past surgical history that includes cholecystectomy; abdominal hysterectomy total (); bunionectomy (); knee arthrotomy (Right, ); shoulder arthroscopy (Left, ); orthopedic osteotomy (Left, 2017); ligament repair (2017); toe fusion (2017); repair, tendon, lower extremity, using tendon graft (2017); carotid endarterectomy (Left, 2019); lumbar laminectomy diskectomy (2022); lumbar decompression (2022); foraminotomy (2022); meniscectomy, knee, arthroscopic (Right, 2022); synovectomy (Right, 2022); and chondroplasty (Right, 2022).      Current Outpatient Medications:     Suprep Bowel Prep Kit, , Taking    aspirin, 81 mg, Oral, DAILY, Taking    ezetimibe, 10 mg, Oral, DAILY    cyclobenzaprine, TAKE 1 TO 2 TABLETS BY MOUTH THREE TIMES A DAY AS NEEDED, Taking    benzonatate, 100 mg, Oral, TID PRN, PRN    Fiasp, , Taking    OMEGA-3 FATTY ACIDS PO, Take  by mouth., Taking    sodium chloride, 1 g, Oral, BID, Taking    Calcium Carbonate (CALCIUM 600 PO), 600 mg, Oral, DAILY, Taking    NovoLOG FlexPen, , Taking    Multiple Vitamin (MULTIVITAMIN PO), Take  by mouth every day., Taking    ELDERBERRY  PO, Take  by mouth every day., Taking    metoclopramide, 10 mg, Oral, BID, Taking    losartan, 50 mg, Oral, DAILY, Taking    Fiasp, 4-50 units with pump daily, Taking    insulin infusion pump, Inject  under the skin continuous. Patient's own SQ insulin pump  Type of Insulin: Fiasp Last change of tubin/15/2022 - Change tubing and site every 72 hours  Dosing: Basal rate:  0000 - 0329 = 0.5 units/hr  0330 - 1129 = 0.85 units/hr  1130 - 1359 = 0.5 units/hr             1400 - 1759 = 0.45 units/hr             1800 - 2359 = 0.5 units/hr  Bolus ratio:  1 unit : 12 g carbohydrate at  (breakfast, lunch, dinner, snacks)    Correction ratio:   1 units for every 50 over 150 mg/dL  Disconnect pump if patient becomes hypoglycemic and altered., Taking    CLINDAMYCIN PHOSPHATE(TOPICAL), 1 Application, Topical, DAILY, PRN    CRANBERRY PO, 500 mg, Oral, Q EVENING, Taking    B Complex Vitamins (VITAMIN B COMPLEX PO), 1 Tablet, Oral, Q EVENING, Taking    Levoxyl, 100 mcg, Oral, AM ES, Taking    pantoprazole, 40 mg, Oral, DAILY, Taking    rosuvastatin, 40 mg, Oral, QHS, Taking    Cholecalciferol (VITAMIN D-3 PO), 5,000 mg, Oral, Q EVENING, Taking    ALLERGIES: Ceclor [cefaclor], Augmentin, Naproxen, Tape, and Cefaclor    Family History   Problem Relation Age of Onset    Cancer Mother         rectal cancer    Stroke Father          59    Heart Disease Father     Hypertension Father     Lung Disease Father         tb    Hyperlipidemia Father     Diabetes Sister     Psychiatric Illness Sister         bipolar    Heart Disease Maternal Grandfather     Stroke Paternal Grandmother     Heart Disease Paternal Grandfather        Social History     Tobacco Use    Smoking status: Former     Packs/day: 0.00     Years: 2.00     Pack years: 0.00     Types: Cigarettes     Quit date: 1977     Years since quittin.0    Smokeless tobacco: Never   Vaping Use    Vaping Use: Never used   Substance Use Topics    Alcohol use: Yes      "Alcohol/week: 2.4 oz     Types: 4 Glasses of wine per week     Comment: about 3-4 drinks per week.     Drug use: Never       Review of Systems   Constitutional:  Negative for chills, fever and malaise/fatigue.   Respiratory:  Negative for cough, hemoptysis, sputum production, shortness of breath and wheezing.    Cardiovascular:  Negative for chest pain, palpitations, orthopnea, claudication, leg swelling and PND.       Objective    Vitals:    01/13/23 1036 01/13/23 1040   BP: (!) 160/74 132/71   BP Location: Right arm Right arm   Patient Position: Sitting Sitting   BP Cuff Size: Adult Adult   Pulse: 89 80   Resp: 16    Weight: 71.7 kg (158 lb)    Height: 1.702 m (5' 7\")       BP Readings from Last 5 Encounters:   01/13/23 132/71   01/03/23 120/70   12/20/22 120/60   11/17/22 122/64   10/20/22 138/66     Physical Exam  Constitutional:       General: She is not in acute distress.     Appearance: Normal appearance. She is well-developed. She is not diaphoretic.   HENT:      Head: Normocephalic and atraumatic.   Cardiovascular:      Rate and Rhythm: Normal rate and regular rhythm.      Pulses: Normal pulses.      Heart sounds: Normal heart sounds. No murmur heard.    No friction rub. No gallop.   Pulmonary:      Effort: Pulmonary effort is normal.      Breath sounds: Normal breath sounds.   Musculoskeletal:         General: No tenderness. Normal range of motion.      Cervical back: Normal range of motion and neck supple.   Skin:     General: Skin is warm and dry.   Neurological:      Mental Status: She is alert and oriented to person, place, and time.   Psychiatric:         Mood and Affect: Mood and affect normal.         Speech: Speech normal.       DATA REVIEW:  Most Recent Lipid Panel:   Lab Results   Component Value Date    CHOLSTRLTOT 175 07/19/2022    TRIGLYCERIDE 80 07/19/2022    HDL 70 07/19/2022    LDL 89 07/19/2022     No results found for: LIPOPROTA   No results found for: APOB      Other Pertinent Blood " Work:   Lab Results   Component Value Date    SODIUM 128 (L) 10/05/2022    POTASSIUM 4.8 10/05/2022    CHLORIDE 93 (L) 10/05/2022    CO2 24 10/05/2022    ANION 11.0 2022    GLUCOSE 168 (H) 10/05/2022    BUN 14 10/05/2022    CREATININE 0.72 10/05/2022    CALCIUM 10.2 10/05/2022    ASTSGOT 22 2022    ALTSGPT 23 2022    ALKPHOSPHAT 84 2022    TBILIRUBIN 0.5 2022    ALBUMIN 4.2 10/05/2022    AGRATIO 2.0 2022    CREACTPROT <0.30 2021    TSHULTRASEN 1.650 10/05/2022     VASCULAR IMAGING:     Results for orders placed or performed in visit on 22   EKG   Result Value Ref Range    Report       [facility]    Test Date:  2022  Pt Name:    MARKELL KRAMER                 Department: [facility]  MRN:        [id number]                      Room:  Gender:     Female                       Technician: [facility]  :        [id number]                   Requested By:YAW PASCAL  Order #:    [id number]                    Reading MD: Paulie Walters MD    Measurements  Intervals                                Axis  Rate:       84                           P:          67  OH:         168                          QRS:        250  QRSD:       78                           T:          36  QT:         356  QTc:        421    Interpretive Statements  SINUS RHYTHM  LEFT ANTERIOR FASCICULAR BLOCK  Compared to ECG 10/18/2019 13:40:22  Left anterior fascicular block now present  Electronically Signed On 2022 14:56:43 PDT by Paulie Walters MD       MR brain        CACS 2022   Coronary calcification:  LMA - 0.0  LCX - 0.0  LAD - 180.1  RCA - 0.0  PDA - 0.0   Total Calcium Score: 180.1   Percentile: Calcium score is above the 75th percentile for the patient's age and sex.   Other findings:  Heart: Normal size.  Lungs: Clear.  Mediastinum: Normal.  Upper abdomen: Normal.    Carotid 2022   Normal right carotid exam.    Post left internal carotid endarterectomy.    Mild stenosis  of the left internal carotid artery (<50%).           ASSESSMENT AND PLAN  1. Other hyperlipidemia  ezetimibe (ZETIA) 10 MG Tab    Lipid Profile    Lipoprotein (a)      2. Stenosis of left carotid artery        3. History of left-sided carotid endarterectomy        4. Primary hypertension        5. Acquired hypothyroidism        6. Type 1 diabetes mellitus with other specified complication (HCC)        7. Insulin pump in place        8. LAFB (left anterior fascicular block)        9. Small vessel disease, cerebrovascular      per MR brain        10. Long term (current) use of antithrombotics/antiplatelets        11. Agatston coronary artery calcium score between 100 and 199        12. Family history of stroke          Patient Type, check all that apply: Secondary Prevention, T1D, polyvascular     Established Atherosclerotic Cardiovascular Disease (ASCVD): yes    1) L carotid stenosis, s/p L CEA  (Dr. Garcia)- stable  No prior CVA/TIA   - most recent duplex stable 2022   - repeat duplex 3-5yrs     2) coronary ave per CT, CACS = 180 (88%ile for age/gender)  - indicates higher short/longer-term ASCVD risk, so aggressive tx goals   - continue secondary med mgmt   - defer functional studies to cardiology decision or if new s/s develop     3) small vessel dz, cerebral per MR - stable  No dementia or CVA/TIA  - continue strict BP and lipid mgmt     Other Established (non-atherosclerotic) Vascular Disease, if Present: None    Evidence of Heterozygous Familial Hypercholesterolemia (FH): No   Though father  age 59 from CVA and apparently had major HLD disorder   FH genotyping:  not recommended    ACC/AHA Indication for Statin Therapy, bryan all that apply:  Secondary Prevention - Very high risk ASCVD - 2 major events or 1 event with other high-risk conditions    Calculated Risk for ASCVD, if applicable    The ASCVD Risk score (Alma Delia MORATAYA, et al., 2019) failed to calculate., N/A    Other Significant Risk Markers,  if any, bryan all that apply   elevated coronary calcium score    Risk-enhancers: N/A    Goal LDL-C and nonHDL-C based on Clinic Protocol  LDL-C <70 (consider non-HDL-C <100, apoB <80), though <55 reasonable due to polyvasc  At goal? No, 7/2022     LIFESTYLE INTERVENTIONS:    SMOKING:    reports that she quit smoking about 46 years ago. Her smoking use included cigarettes. She has never used smokeless tobacco.   - continued complete avoidance of all tobacco products     PHYSICAL ACTIVITY: continue healthy activity to improve CV fitness.  In general, targeting >150min/week of moderate-level activity or as much as tolerated in light of functional status and co-morbidities     WEIGHT MANAGEMENT AND NUTRITION: Mediterranean style dietary approach  and Provide specific dietary approach handouts      LIPID LOWERING MEDICATION MANAGEMENT:     Statin Therapy Recommendations from Today’s Visit:   - continue rosuva 40mg daily     Non-Statin Medications Recommendations from Today’s Visit:   - start zetia 10mg daily - update labs in 2mo   - consider nexletol if <20% to target LDL-C   - consider PCSK9i strategy if >20% from target     Indication for PCSK9 Inhibitor, if applicable: Not currently indicated    Supplements Recommended at this visit: None     RECOMMENDATIONS FOR OTHER CV RISK FACTORS:    1) BLOOD PRESSURE MANAGEMENT:  ACC/AHA (2017) goal <130/80  Home BP at goal:  yes  Office BP at goal:  yes  24h ABPM: not ordered to date  Plan:   Monitoring:   - start/continue home BP monitoring, reviewed correct technique, provide BP log and instructions  - order 24h ABPM:  NO  - monitor lytes/gfr routinely   - contact office if BP consistently >140/>90 for discussion of tx adjustments   Medications:  - continue losartan 50mg daily     2) Glycemic Status: Diabetic, T1  Goal A1c < 7.5 reasonable but defer to endocrine MD   Lab Results   Component Value Date    HBA1C 7.1 (H) 10/05/2022      Lab Results   Component Value Date/Time     MALBCRT see below 10/05/2022 09:28 AM    MICROALBUR <1.2 10/05/2022 09:28 AM     Plan:  - continue current medication plan   - recommmend for routine care with PCP (or endocrine) to include regular A1c monitoring, annual albumin/creatinine ratio (ACR), annual diabetic retinopathy screening, foot exams, annual flu vaccine, and updates to pneumonia vaccines as appropriate      ANTITHROMBOTIC THERAPY continue ASA 81mg daily     OTHER ISSUES:    # gait instability - ongoing care with PM&R and neurology for further assessment     # hypothyroidism, stable  Tolerating meds, TSH normal   - continue current treatment plan, defer mgmt to PCP      Studies Ordered at Todays Visit: carotid duplex 2026-28   Blood Work Ordered At Today’s visit: as noted above   Follow-Up: 2 months    Ghassan Gray M.D.  RONDA, Board-certified clinical lipidologist   [facility]   [facility]   [phone]

## 2023-01-16 NOTE — TELEPHONE ENCOUNTER
CERTIFICATE OF RETURN TO WORK        Re: Celeste Amato        This is to certify that Celeste Amato has been under my care for injury/illness.      RESTRICTIONS:  Seen today. Off work until reevaluated in the next 10-14 days.       REMARKS:   Return sooner if worse or no improvement.         SIGNATURE:___________________________________________,   1/16/2023                                               MACHELLE Alaniz.         [facility]  [address]  [place] [address]  Phone [phone]       Phone Number Called: [phone] (home)       Message: Called and spoke to patient. Let her know rx has been sent. LM     Left Message for patient to call back: no

## 2023-01-17 ENCOUNTER — HOSPITAL ENCOUNTER (OUTPATIENT)
Dept: LAB | Facility: MEDICAL CENTER | Age: 68
End: 2023-01-17
Attending: STUDENT IN AN ORGANIZED HEALTH CARE EDUCATION/TRAINING PROGRAM
Payer: MEDICARE

## 2023-01-17 LAB
APPEARANCE UR: CLEAR
BACTERIA #/AREA URNS HPF: NEGATIVE /HPF
BILIRUB UR QL STRIP.AUTO: NEGATIVE
COLOR UR: YELLOW
CREAT UR-MCNC: 29.49 MG/DL
CREAT UR-MCNC: 30.38 MG/DL
EPI CELLS #/AREA URNS HPF: NEGATIVE /HPF
GLUCOSE UR STRIP.AUTO-MCNC: NEGATIVE MG/DL
HYALINE CASTS #/AREA URNS LPF: NORMAL /LPF
KETONES UR STRIP.AUTO-MCNC: NEGATIVE MG/DL
LEUKOCYTE ESTERASE UR QL STRIP.AUTO: ABNORMAL
MICRO URNS: ABNORMAL
MICROALBUMIN UR-MCNC: <1.2 MG/DL
MICROALBUMIN/CREAT UR: NORMAL MG/G (ref 0–30)
NITRITE UR QL STRIP.AUTO: NEGATIVE
OSMOLALITY UR: 283 MOSM/KG H2O (ref 300–900)
PH UR STRIP.AUTO: 7 [PH] (ref 5–8)
PROT UR QL STRIP: NEGATIVE MG/DL
PROT UR-MCNC: <4 MG/DL (ref 0–15)
PROT/CREAT UR: NORMAL MG/G (ref 10–107)
RBC # URNS HPF: NORMAL /HPF
RBC UR QL AUTO: NEGATIVE
SODIUM UR-SCNC: 78 MMOL/L
SP GR UR STRIP.AUTO: 1.01
UROBILINOGEN UR STRIP.AUTO-MCNC: 0.2 MG/DL
WBC #/AREA URNS HPF: NORMAL /HPF

## 2023-01-17 PROCEDURE — 80069 RENAL FUNCTION PANEL: CPT

## 2023-01-17 PROCEDURE — 36415 COLL VENOUS BLD VENIPUNCTURE: CPT

## 2023-01-17 PROCEDURE — 84156 ASSAY OF PROTEIN URINE: CPT

## 2023-01-17 PROCEDURE — 83935 ASSAY OF URINE OSMOLALITY: CPT

## 2023-01-17 PROCEDURE — 84300 ASSAY OF URINE SODIUM: CPT

## 2023-01-17 PROCEDURE — 82043 UR ALBUMIN QUANTITATIVE: CPT

## 2023-01-17 PROCEDURE — 82570 ASSAY OF URINE CREATININE: CPT | Mod: 91

## 2023-01-17 PROCEDURE — 81001 URINALYSIS AUTO W/SCOPE: CPT

## 2023-01-18 LAB
ALBUMIN SERPL BCP-MCNC: 4.9 G/DL (ref 3.2–4.9)
BUN SERPL-MCNC: 14 MG/DL (ref 8–22)
CALCIUM ALBUM COR SERPL-MCNC: 9.4 MG/DL (ref 8.5–10.5)
CALCIUM SERPL-MCNC: 10.1 MG/DL (ref 8.5–10.5)
CHLORIDE SERPL-SCNC: 91 MMOL/L (ref 96–112)
CO2 SERPL-SCNC: 27 MMOL/L (ref 20–33)
CREAT SERPL-MCNC: 0.71 MG/DL (ref 0.5–1.4)
GFR SERPLBLD CREATININE-BSD FMLA CKD-EPI: 94 ML/MIN/1.73 M 2
GLUCOSE SERPL-MCNC: 124 MG/DL (ref 65–99)
PHOSPHATE SERPL-MCNC: 3.6 MG/DL (ref 2.5–4.5)
POTASSIUM SERPL-SCNC: 6.1 MMOL/L (ref 3.6–5.5)
SODIUM SERPL-SCNC: 127 MMOL/L (ref 135–145)

## 2023-01-20 PROBLEM — G95.9 CERVICAL MYELOPATHY (HCC): Status: ACTIVE | Noted: 2023-01-20

## 2023-01-20 PROBLEM — M48.02 CERVICAL STENOSIS OF SPINE: Status: ACTIVE | Noted: 2023-01-20

## 2023-02-07 ENCOUNTER — HOSPITAL ENCOUNTER (OUTPATIENT)
Dept: RADIOLOGY | Facility: MEDICAL CENTER | Age: 68
DRG: 516 | End: 2023-02-07
Attending: NEUROLOGICAL SURGERY | Admitting: NEUROLOGICAL SURGERY
Payer: MEDICARE

## 2023-02-07 ENCOUNTER — PRE-ADMISSION TESTING (OUTPATIENT)
Dept: ADMISSIONS | Facility: MEDICAL CENTER | Age: 68
DRG: 516 | End: 2023-02-07
Attending: NEUROLOGICAL SURGERY
Payer: MEDICARE

## 2023-02-07 DIAGNOSIS — G99.2 STENOSIS OF CERVICAL SPINE WITH MYELOPATHY (HCC): ICD-10-CM

## 2023-02-07 DIAGNOSIS — M48.02 STENOSIS OF CERVICAL SPINE WITH MYELOPATHY (HCC): ICD-10-CM

## 2023-02-07 DIAGNOSIS — M48.02 CERVICAL STENOSIS OF SPINE: ICD-10-CM

## 2023-02-07 LAB
25(OH)D3 SERPL-MCNC: 59 NG/ML (ref 30–100)
ABO GROUP BLD: NORMAL
ANION GAP SERPL CALC-SCNC: 14 MMOL/L (ref 7–16)
APPEARANCE UR: CLEAR
APTT PPP: 25.7 SEC (ref 24.7–36)
BACTERIA #/AREA URNS HPF: NEGATIVE /HPF
BASOPHILS # BLD AUTO: 0.5 % (ref 0–1.8)
BASOPHILS # BLD: 0.03 K/UL (ref 0–0.12)
BILIRUB UR QL STRIP.AUTO: NEGATIVE
BLD GP AB SCN SERPL QL: NORMAL
BUN SERPL-MCNC: 12 MG/DL (ref 8–22)
CALCIUM SERPL-MCNC: 10.2 MG/DL (ref 8.5–10.5)
CHLORIDE SERPL-SCNC: 100 MMOL/L (ref 96–112)
CO2 SERPL-SCNC: 24 MMOL/L (ref 20–33)
COLOR UR: YELLOW
CREAT SERPL-MCNC: 0.76 MG/DL (ref 0.5–1.4)
EOSINOPHIL # BLD AUTO: 0.16 K/UL (ref 0–0.51)
EOSINOPHIL NFR BLD: 2.7 % (ref 0–6.9)
EPI CELLS #/AREA URNS HPF: NEGATIVE /HPF
ERYTHROCYTE [DISTWIDTH] IN BLOOD BY AUTOMATED COUNT: 44.1 FL (ref 35.9–50)
EST. AVERAGE GLUCOSE BLD GHB EST-MCNC: 154 MG/DL
GFR SERPLBLD CREATININE-BSD FMLA CKD-EPI: 86 ML/MIN/1.73 M 2
GLUCOSE SERPL-MCNC: 156 MG/DL (ref 65–99)
GLUCOSE UR STRIP.AUTO-MCNC: NEGATIVE MG/DL
HBA1C MFR BLD: 7 % (ref 4–5.6)
HCT VFR BLD AUTO: 39.8 % (ref 37–47)
HGB BLD-MCNC: 13.6 G/DL (ref 12–16)
HYALINE CASTS #/AREA URNS LPF: NORMAL /LPF
IMM GRANULOCYTES # BLD AUTO: 0.02 K/UL (ref 0–0.11)
IMM GRANULOCYTES NFR BLD AUTO: 0.3 % (ref 0–0.9)
INR PPP: 1.02 (ref 0.87–1.13)
KETONES UR STRIP.AUTO-MCNC: ABNORMAL MG/DL
LEUKOCYTE ESTERASE UR QL STRIP.AUTO: ABNORMAL
LYMPHOCYTES # BLD AUTO: 1.76 K/UL (ref 1–4.8)
LYMPHOCYTES NFR BLD: 29.2 % (ref 22–41)
MCH RBC QN AUTO: 31.6 PG (ref 27–33)
MCHC RBC AUTO-ENTMCNC: 34.2 G/DL (ref 33.6–35)
MCV RBC AUTO: 92.6 FL (ref 81.4–97.8)
MICRO URNS: ABNORMAL
MONOCYTES # BLD AUTO: 0.49 K/UL (ref 0–0.85)
MONOCYTES NFR BLD AUTO: 8.1 % (ref 0–13.4)
NEUTROPHILS # BLD AUTO: 3.57 K/UL (ref 2–7.15)
NEUTROPHILS NFR BLD: 59.2 % (ref 44–72)
NITRITE UR QL STRIP.AUTO: NEGATIVE
NRBC # BLD AUTO: 0 K/UL
NRBC BLD-RTO: 0 /100 WBC
PH UR STRIP.AUTO: 6.5 [PH] (ref 5–8)
PLATELET # BLD AUTO: 330 K/UL (ref 164–446)
PMV BLD AUTO: 10.5 FL (ref 9–12.9)
POTASSIUM SERPL-SCNC: 4.8 MMOL/L (ref 3.6–5.5)
PROT UR QL STRIP: NEGATIVE MG/DL
PROTHROMBIN TIME: 13.3 SEC (ref 12–14.6)
RBC # BLD AUTO: 4.3 M/UL (ref 4.2–5.4)
RBC # URNS HPF: NORMAL /HPF
RBC UR QL AUTO: NEGATIVE
RH BLD: NORMAL
SODIUM SERPL-SCNC: 138 MMOL/L (ref 135–145)
SP GR UR STRIP.AUTO: 1.02
UROBILINOGEN UR STRIP.AUTO-MCNC: 0.2 MG/DL
WBC # BLD AUTO: 6 K/UL (ref 4.8–10.8)
WBC #/AREA URNS HPF: NORMAL /HPF

## 2023-02-07 PROCEDURE — 81001 URINALYSIS AUTO W/SCOPE: CPT

## 2023-02-07 PROCEDURE — 36415 COLL VENOUS BLD VENIPUNCTURE: CPT

## 2023-02-07 PROCEDURE — 83036 HEMOGLOBIN GLYCOSYLATED A1C: CPT

## 2023-02-07 PROCEDURE — 85025 COMPLETE CBC W/AUTO DIFF WBC: CPT

## 2023-02-07 PROCEDURE — 80048 BASIC METABOLIC PNL TOTAL CA: CPT

## 2023-02-07 PROCEDURE — 71046 X-RAY EXAM CHEST 2 VIEWS: CPT

## 2023-02-07 PROCEDURE — 86900 BLOOD TYPING SEROLOGIC ABO: CPT

## 2023-02-07 PROCEDURE — 86901 BLOOD TYPING SEROLOGIC RH(D): CPT

## 2023-02-07 PROCEDURE — 86850 RBC ANTIBODY SCREEN: CPT

## 2023-02-07 PROCEDURE — 85610 PROTHROMBIN TIME: CPT

## 2023-02-07 PROCEDURE — 93005 ELECTROCARDIOGRAM TRACING: CPT

## 2023-02-07 PROCEDURE — 82306 VITAMIN D 25 HYDROXY: CPT

## 2023-02-07 PROCEDURE — 85730 THROMBOPLASTIN TIME PARTIAL: CPT

## 2023-02-07 ASSESSMENT — FIBROSIS 4 INDEX: FIB4 SCORE: 0.88

## 2023-02-08 LAB — EKG IMPRESSION: NORMAL

## 2023-02-08 PROCEDURE — 93010 ELECTROCARDIOGRAM REPORT: CPT | Performed by: INTERNAL MEDICINE

## 2023-02-09 DIAGNOSIS — M25.561 ACUTE PAIN OF RIGHT KNEE: ICD-10-CM

## 2023-02-09 RX ORDER — CYCLOBENZAPRINE HCL 5 MG
TABLET ORAL
Qty: 90 TABLET | Refills: 0 | Status: ON HOLD
Start: 2023-02-09 | End: 2023-03-22

## 2023-02-10 ENCOUNTER — HOSPITAL ENCOUNTER (OUTPATIENT)
Dept: LAB | Facility: MEDICAL CENTER | Age: 68
End: 2023-02-10
Attending: STUDENT IN AN ORGANIZED HEALTH CARE EDUCATION/TRAINING PROGRAM
Payer: MEDICARE

## 2023-02-10 LAB
ALBUMIN SERPL BCP-MCNC: 4.6 G/DL (ref 3.2–4.9)
APPEARANCE UR: ABNORMAL
BASOPHILS # BLD AUTO: 0.4 % (ref 0–1.8)
BASOPHILS # BLD: 0.04 K/UL (ref 0–0.12)
BILIRUB UR QL STRIP.AUTO: NEGATIVE
BUN SERPL-MCNC: 10 MG/DL (ref 8–22)
CALCIUM ALBUM COR SERPL-MCNC: 9.5 MG/DL (ref 8.5–10.5)
CALCIUM SERPL-MCNC: 10 MG/DL (ref 8.5–10.5)
CHLORIDE SERPL-SCNC: 99 MMOL/L (ref 96–112)
CO2 SERPL-SCNC: 25 MMOL/L (ref 20–33)
COLOR UR: YELLOW
CREAT SERPL-MCNC: 0.72 MG/DL (ref 0.5–1.4)
CREAT UR-MCNC: 142.02 MG/DL
EOSINOPHIL # BLD AUTO: 0.16 K/UL (ref 0–0.51)
EOSINOPHIL NFR BLD: 1.7 % (ref 0–6.9)
EPI CELLS #/AREA URNS HPF: NORMAL /HPF
ERYTHROCYTE [DISTWIDTH] IN BLOOD BY AUTOMATED COUNT: 46.8 FL (ref 35.9–50)
GFR SERPLBLD CREATININE-BSD FMLA CKD-EPI: 92 ML/MIN/1.73 M 2
GLUCOSE SERPL-MCNC: 245 MG/DL (ref 65–99)
GLUCOSE UR STRIP.AUTO-MCNC: NEGATIVE MG/DL
HCT VFR BLD AUTO: 41.3 % (ref 37–47)
HGB BLD-MCNC: 13.6 G/DL (ref 12–16)
HYALINE CASTS #/AREA URNS LPF: NORMAL /LPF
IMM GRANULOCYTES # BLD AUTO: 0.03 K/UL (ref 0–0.11)
IMM GRANULOCYTES NFR BLD AUTO: 0.3 % (ref 0–0.9)
KETONES UR STRIP.AUTO-MCNC: NEGATIVE MG/DL
LEUKOCYTE ESTERASE UR QL STRIP.AUTO: ABNORMAL
LYMPHOCYTES # BLD AUTO: 1.56 K/UL (ref 1–4.8)
LYMPHOCYTES NFR BLD: 16.4 % (ref 22–41)
MCH RBC QN AUTO: 31.1 PG (ref 27–33)
MCHC RBC AUTO-ENTMCNC: 32.9 G/DL (ref 33.6–35)
MCV RBC AUTO: 94.5 FL (ref 81.4–97.8)
MICRO URNS: ABNORMAL
MICROALBUMIN UR-MCNC: <1.2 MG/DL
MICROALBUMIN/CREAT UR: NORMAL MG/G (ref 0–30)
MONOCYTES # BLD AUTO: 0.57 K/UL (ref 0–0.85)
MONOCYTES NFR BLD AUTO: 6 % (ref 0–13.4)
NEUTROPHILS # BLD AUTO: 7.17 K/UL (ref 2–7.15)
NEUTROPHILS NFR BLD: 75.2 % (ref 44–72)
NITRITE UR QL STRIP.AUTO: NEGATIVE
NRBC # BLD AUTO: 0 K/UL
NRBC BLD-RTO: 0 /100 WBC
PH UR STRIP.AUTO: 7 [PH] (ref 5–8)
PHOSPHATE SERPL-MCNC: 3.3 MG/DL (ref 2.5–4.5)
PLATELET # BLD AUTO: 341 K/UL (ref 164–446)
PMV BLD AUTO: 10.8 FL (ref 9–12.9)
POTASSIUM SERPL-SCNC: 5.7 MMOL/L (ref 3.6–5.5)
PROT UR QL STRIP: NEGATIVE MG/DL
RBC # BLD AUTO: 4.37 M/UL (ref 4.2–5.4)
RBC UR QL AUTO: NEGATIVE
SODIUM SERPL-SCNC: 134 MMOL/L (ref 135–145)
SP GR UR STRIP.AUTO: 1.02
UROBILINOGEN UR STRIP.AUTO-MCNC: 0.2 MG/DL
WBC # BLD AUTO: 9.5 K/UL (ref 4.8–10.8)
WBC #/AREA URNS HPF: NORMAL /HPF

## 2023-02-10 PROCEDURE — 85025 COMPLETE CBC W/AUTO DIFF WBC: CPT

## 2023-02-10 PROCEDURE — 82570 ASSAY OF URINE CREATININE: CPT | Mod: 91

## 2023-02-10 PROCEDURE — 80069 RENAL FUNCTION PANEL: CPT

## 2023-02-10 PROCEDURE — 84156 ASSAY OF PROTEIN URINE: CPT

## 2023-02-10 PROCEDURE — 36415 COLL VENOUS BLD VENIPUNCTURE: CPT

## 2023-02-10 PROCEDURE — 82043 UR ALBUMIN QUANTITATIVE: CPT

## 2023-02-10 PROCEDURE — 81001 URINALYSIS AUTO W/SCOPE: CPT

## 2023-02-11 LAB
CREAT UR-MCNC: 141.53 MG/DL
PROT UR-MCNC: 17 MG/DL (ref 0–15)
PROT/CREAT UR: 120 MG/G (ref 10–107)

## 2023-02-23 ENCOUNTER — HOSPITAL ENCOUNTER (OUTPATIENT)
Dept: LAB | Facility: MEDICAL CENTER | Age: 68
End: 2023-02-23
Attending: PHYSICIAN ASSISTANT
Payer: MEDICARE

## 2023-02-23 ENCOUNTER — HOSPITAL ENCOUNTER (OUTPATIENT)
Dept: LAB | Facility: MEDICAL CENTER | Age: 68
End: 2023-02-23
Attending: STUDENT IN AN ORGANIZED HEALTH CARE EDUCATION/TRAINING PROGRAM
Payer: MEDICARE

## 2023-02-23 LAB — CA-I SERPL-SCNC: 1.2 MMOL/L (ref 1.1–1.3)

## 2023-02-23 PROCEDURE — 36415 COLL VENOUS BLD VENIPUNCTURE: CPT

## 2023-02-23 PROCEDURE — 80069 RENAL FUNCTION PANEL: CPT

## 2023-02-23 PROCEDURE — 80053 COMPREHEN METABOLIC PANEL: CPT

## 2023-02-23 PROCEDURE — 82306 VITAMIN D 25 HYDROXY: CPT

## 2023-02-23 PROCEDURE — 82330 ASSAY OF CALCIUM: CPT

## 2023-02-23 PROCEDURE — 84443 ASSAY THYROID STIM HORMONE: CPT

## 2023-02-23 PROCEDURE — 84439 ASSAY OF FREE THYROXINE: CPT

## 2023-02-23 PROCEDURE — 83970 ASSAY OF PARATHORMONE: CPT

## 2023-02-24 LAB
25(OH)D3 SERPL-MCNC: 63 NG/ML (ref 30–100)
ALBUMIN SERPL BCP-MCNC: 4.7 G/DL (ref 3.2–4.9)
ALBUMIN SERPL BCP-MCNC: 4.9 G/DL (ref 3.2–4.9)
ALBUMIN/GLOB SERPL: 1.8 G/DL
ALP SERPL-CCNC: 96 U/L (ref 30–99)
ALT SERPL-CCNC: 42 U/L (ref 2–50)
ANION GAP SERPL CALC-SCNC: 13 MMOL/L (ref 7–16)
AST SERPL-CCNC: 38 U/L (ref 12–45)
BILIRUB SERPL-MCNC: 0.4 MG/DL (ref 0.1–1.5)
BUN SERPL-MCNC: 18 MG/DL (ref 8–22)
BUN SERPL-MCNC: 18 MG/DL (ref 8–22)
CALCIUM ALBUM COR SERPL-MCNC: 9 MG/DL (ref 8.5–10.5)
CALCIUM ALBUM COR SERPL-MCNC: 9.2 MG/DL (ref 8.5–10.5)
CALCIUM SERPL-MCNC: 9.7 MG/DL (ref 8.5–10.5)
CALCIUM SERPL-MCNC: 9.8 MG/DL (ref 8.5–10.5)
CHLORIDE SERPL-SCNC: 99 MMOL/L (ref 96–112)
CHLORIDE SERPL-SCNC: 99 MMOL/L (ref 96–112)
CO2 SERPL-SCNC: 24 MMOL/L (ref 20–33)
CO2 SERPL-SCNC: 25 MMOL/L (ref 20–33)
CREAT SERPL-MCNC: 0.81 MG/DL (ref 0.5–1.4)
CREAT SERPL-MCNC: 0.91 MG/DL (ref 0.5–1.4)
GFR SERPLBLD CREATININE-BSD FMLA CKD-EPI: 70 ML/MIN/1.73 M 2
GFR SERPLBLD CREATININE-BSD FMLA CKD-EPI: 80 ML/MIN/1.73 M 2
GLOBULIN SER CALC-MCNC: 2.8 G/DL (ref 1.9–3.5)
GLUCOSE SERPL-MCNC: 171 MG/DL (ref 65–99)
GLUCOSE SERPL-MCNC: 172 MG/DL (ref 65–99)
PHOSPHATE SERPL-MCNC: 3.5 MG/DL (ref 2.5–4.5)
POTASSIUM SERPL-SCNC: 4.3 MMOL/L (ref 3.6–5.5)
POTASSIUM SERPL-SCNC: 4.3 MMOL/L (ref 3.6–5.5)
PROT SERPL-MCNC: 7.7 G/DL (ref 6–8.2)
PTH-INTACT SERPL-MCNC: 50.3 PG/ML (ref 14–72)
SODIUM SERPL-SCNC: 136 MMOL/L (ref 135–145)
SODIUM SERPL-SCNC: 137 MMOL/L (ref 135–145)
T4 FREE SERPL-MCNC: 1.42 NG/DL (ref 0.93–1.7)
TSH SERPL DL<=0.005 MIU/L-ACNC: 3.14 UIU/ML (ref 0.38–5.33)

## 2023-02-27 ENCOUNTER — TELEPHONE (OUTPATIENT)
Dept: CARDIOLOGY | Facility: MEDICAL CENTER | Age: 68
End: 2023-02-27
Payer: MEDICARE

## 2023-02-27 ENCOUNTER — DOCUMENTATION (OUTPATIENT)
Dept: VASCULAR LAB | Facility: MEDICAL CENTER | Age: 68
End: 2023-02-27
Payer: MEDICARE

## 2023-02-27 NOTE — PROGRESS NOTES
Left a message for Patty at the [facility].  ([phone])    MOOKIE Gray directed that the patient needs to get surgery clearance from the pt's PCP. (We only manage the patient's cholesterol)

## 2023-02-27 NOTE — TELEPHONE ENCOUNTER
MARLENE    Caller: Patty SHER    Office Name, phone number, fax number: [facility]. [phone] / FAX: [phone]    Fax clearance to [phone] in chart as of 02-    Procedure Name: Cervical myelopathy with cervical radiculopathy     Procedure Scheduled Date: 03-    Callback Number: [phone]

## 2023-02-28 ENCOUNTER — ANESTHESIA EVENT (OUTPATIENT)
Dept: SURGERY | Facility: MEDICAL CENTER | Age: 68
DRG: 516 | End: 2023-02-28
Payer: MEDICARE

## 2023-02-28 NOTE — TELEPHONE ENCOUNTER
To CW, Please advise on upcoming orthopedic procedure, thank you!    Last OV: 11/17/2022  Antiplatelet: ASA listed on MAR, but per MAR pt not taking  No Hx stent or heart valve replacement    =================    Located request via letters tab.

## 2023-02-28 NOTE — TELEPHONE ENCOUNTER
She can proceed with the proposed procedure or surgery from a cardiac standpoint, no modifiable cardiovascular risk, no further cardiac testing required, hold antiplatelet as necessary.    It is my pleasure to participate in the care of Ms. Long.  Please do not hesitate to contact me with questions or concerns. [facility] is available 24/7 for consultative services at [phone] in the perioperative period.    Electronically Signed    Shant Card MD PhD [facility]  Cardiologist [facility]    Please note that this dictation was created using voice recognition software. There may be errors I did not discover before finalizing the note.

## 2023-03-01 ENCOUNTER — ANESTHESIA (OUTPATIENT)
Dept: SURGERY | Facility: MEDICAL CENTER | Age: 68
DRG: 516 | End: 2023-03-01
Payer: MEDICARE

## 2023-03-01 ENCOUNTER — APPOINTMENT (OUTPATIENT)
Dept: RADIOLOGY | Facility: MEDICAL CENTER | Age: 68
DRG: 516 | End: 2023-03-01
Attending: NEUROLOGICAL SURGERY
Payer: MEDICARE

## 2023-03-01 ENCOUNTER — APPOINTMENT (OUTPATIENT)
Dept: CARDIOLOGY | Facility: MEDICAL CENTER | Age: 68
DRG: 516 | End: 2023-03-01
Attending: INTERNAL MEDICINE
Payer: MEDICARE

## 2023-03-01 ENCOUNTER — HOSPITAL ENCOUNTER (INPATIENT)
Facility: MEDICAL CENTER | Age: 68
LOS: 1 days | DRG: 516 | End: 2023-03-02
Attending: NEUROLOGICAL SURGERY | Admitting: INTERNAL MEDICINE
Payer: MEDICARE

## 2023-03-01 DIAGNOSIS — M48.02 CERVICAL STENOSIS OF SPINE: ICD-10-CM

## 2023-03-01 DIAGNOSIS — G95.9 CERVICAL MYELOPATHY (HCC): ICD-10-CM

## 2023-03-01 DIAGNOSIS — G89.18 POSTOPERATIVE PAIN AFTER SPINAL SURGERY: ICD-10-CM

## 2023-03-01 PROBLEM — I95.9 HYPOTENSION: Status: ACTIVE | Noted: 2023-03-01

## 2023-03-01 LAB
ABO GROUP BLD: NORMAL
ANION GAP SERPL CALC-SCNC: 13 MMOL/L (ref 7–16)
BASOPHILS # BLD AUTO: 0.1 % (ref 0–1.8)
BASOPHILS # BLD: 0.01 K/UL (ref 0–0.12)
BLD GP AB SCN SERPL QL: NORMAL
BUN SERPL-MCNC: 13 MG/DL (ref 8–22)
CALCIUM SERPL-MCNC: 8.9 MG/DL (ref 8.5–10.5)
CHLORIDE SERPL-SCNC: 99 MMOL/L (ref 96–112)
CO2 SERPL-SCNC: 23 MMOL/L (ref 20–33)
CORTIS SERPL-MCNC: 19.1 UG/DL (ref 0–23)
CREAT SERPL-MCNC: 0.68 MG/DL (ref 0.5–1.4)
EOSINOPHIL # BLD AUTO: 0.01 K/UL (ref 0–0.51)
EOSINOPHIL NFR BLD: 0.1 % (ref 0–6.9)
ERYTHROCYTE [DISTWIDTH] IN BLOOD BY AUTOMATED COUNT: 41.9 FL (ref 35.9–50)
GFR SERPLBLD CREATININE-BSD FMLA CKD-EPI: 96 ML/MIN/1.73 M 2
GLUCOSE BLD STRIP.AUTO-MCNC: 118 MG/DL (ref 65–99)
GLUCOSE BLD STRIP.AUTO-MCNC: 139 MG/DL (ref 65–99)
GLUCOSE BLD STRIP.AUTO-MCNC: 201 MG/DL (ref 65–99)
GLUCOSE BLD STRIP.AUTO-MCNC: 213 MG/DL (ref 65–99)
GLUCOSE SERPL-MCNC: 233 MG/DL (ref 65–99)
HCT VFR BLD AUTO: 34.2 % (ref 37–47)
HGB BLD-MCNC: 12.1 G/DL (ref 12–16)
IMM GRANULOCYTES # BLD AUTO: 0.02 K/UL (ref 0–0.11)
IMM GRANULOCYTES NFR BLD AUTO: 0.2 % (ref 0–0.9)
LV EJECT FRACT  99904: 65
LYMPHOCYTES # BLD AUTO: 0.76 K/UL (ref 1–4.8)
LYMPHOCYTES NFR BLD: 9.4 % (ref 22–41)
MAGNESIUM SERPL-MCNC: 1.4 MG/DL (ref 1.5–2.5)
MCH RBC QN AUTO: 31.9 PG (ref 27–33)
MCHC RBC AUTO-ENTMCNC: 35.4 G/DL (ref 33.6–35)
MCV RBC AUTO: 90.2 FL (ref 81.4–97.8)
MONOCYTES # BLD AUTO: 0.48 K/UL (ref 0–0.85)
MONOCYTES NFR BLD AUTO: 5.9 % (ref 0–13.4)
NEUTROPHILS # BLD AUTO: 6.79 K/UL (ref 2–7.15)
NEUTROPHILS NFR BLD: 84.3 % (ref 44–72)
NRBC # BLD AUTO: 0 K/UL
NRBC BLD-RTO: 0 /100 WBC
PLATELET # BLD AUTO: 234 K/UL (ref 164–446)
PMV BLD AUTO: 10.6 FL (ref 9–12.9)
POTASSIUM SERPL-SCNC: 3.6 MMOL/L (ref 3.6–5.5)
RBC # BLD AUTO: 3.79 M/UL (ref 4.2–5.4)
RH BLD: NORMAL
SODIUM SERPL-SCNC: 135 MMOL/L (ref 135–145)
TROPONIN T SERPL-MCNC: 19 NG/L (ref 6–19)
TROPONIN T SERPL-MCNC: 19 NG/L (ref 6–19)
TROPONIN T SERPL-MCNC: 49 NG/L (ref 6–19)
WBC # BLD AUTO: 8.1 K/UL (ref 4.8–10.8)

## 2023-03-01 PROCEDURE — 700101 HCHG RX REV CODE 250: Performed by: NEUROLOGICAL SURGERY

## 2023-03-01 PROCEDURE — 700111 HCHG RX REV CODE 636 W/ 250 OVERRIDE (IP): Performed by: HOSPITALIST

## 2023-03-01 PROCEDURE — 0RJ10ZZ INSPECTION OF CERVICAL VERTEBRAL JOINT, OPEN APPROACH: ICD-10-PCS | Performed by: NEUROLOGICAL SURGERY

## 2023-03-01 PROCEDURE — 95937 NEUROMUSCULAR JUNCTION TEST: CPT | Performed by: NEUROLOGICAL SURGERY

## 2023-03-01 PROCEDURE — 700102 HCHG RX REV CODE 250 W/ 637 OVERRIDE(OP): Performed by: HOSPITALIST

## 2023-03-01 PROCEDURE — 99222 1ST HOSP IP/OBS MODERATE 55: CPT | Mod: AI | Performed by: HOSPITALIST

## 2023-03-01 PROCEDURE — 83735 ASSAY OF MAGNESIUM: CPT

## 2023-03-01 PROCEDURE — 86900 BLOOD TYPING SEROLOGIC ABO: CPT

## 2023-03-01 PROCEDURE — 700101 HCHG RX REV CODE 250: Performed by: PHYSICIAN ASSISTANT

## 2023-03-01 PROCEDURE — 700111 HCHG RX REV CODE 636 W/ 250 OVERRIDE (IP)

## 2023-03-01 PROCEDURE — 160035 HCHG PACU - 1ST 60 MINS PHASE I: Performed by: NEUROLOGICAL SURGERY

## 2023-03-01 PROCEDURE — 110371 HCHG SHELL REV 272: Performed by: NEUROLOGICAL SURGERY

## 2023-03-01 PROCEDURE — 85025 COMPLETE CBC W/AUTO DIFF WBC: CPT

## 2023-03-01 PROCEDURE — A9270 NON-COVERED ITEM OR SERVICE: HCPCS | Performed by: HOSPITALIST

## 2023-03-01 PROCEDURE — 82533 TOTAL CORTISOL: CPT

## 2023-03-01 PROCEDURE — 700101 HCHG RX REV CODE 250: Performed by: ANESTHESIOLOGY

## 2023-03-01 PROCEDURE — 700111 HCHG RX REV CODE 636 W/ 250 OVERRIDE (IP): Performed by: ANESTHESIOLOGY

## 2023-03-01 PROCEDURE — 86901 BLOOD TYPING SEROLOGIC RH(D): CPT

## 2023-03-01 PROCEDURE — 700102 HCHG RX REV CODE 250 W/ 637 OVERRIDE(OP)

## 2023-03-01 PROCEDURE — 700102 HCHG RX REV CODE 250 W/ 637 OVERRIDE(OP): Performed by: ANESTHESIOLOGY

## 2023-03-01 PROCEDURE — 93306 TTE W/DOPPLER COMPLETE: CPT | Mod: 26 | Performed by: INTERNAL MEDICINE

## 2023-03-01 PROCEDURE — 95861 NEEDLE EMG 2 EXTREMITIES: CPT | Performed by: NEUROLOGICAL SURGERY

## 2023-03-01 PROCEDURE — 160009 HCHG ANES TIME/MIN: Performed by: NEUROLOGICAL SURGERY

## 2023-03-01 PROCEDURE — 95940 IONM IN OPERATNG ROOM 15 MIN: CPT | Performed by: NEUROLOGICAL SURGERY

## 2023-03-01 PROCEDURE — 84484 ASSAY OF TROPONIN QUANT: CPT

## 2023-03-01 PROCEDURE — 95938 SOMATOSENSORY TESTING: CPT | Performed by: NEUROLOGICAL SURGERY

## 2023-03-01 PROCEDURE — 72040 X-RAY EXAM NECK SPINE 2-3 VW: CPT

## 2023-03-01 PROCEDURE — 160036 HCHG PACU - EA ADDL 30 MINS PHASE I: Performed by: NEUROLOGICAL SURGERY

## 2023-03-01 PROCEDURE — 63045 LAM FACETEC & FORAMOT CRV: CPT | Mod: ASROC,53 | Performed by: PHYSICIAN ASSISTANT

## 2023-03-01 PROCEDURE — 63045 LAM FACETEC & FORAMOT CRV: CPT | Mod: 53 | Performed by: NEUROLOGICAL SURGERY

## 2023-03-01 PROCEDURE — 700102 HCHG RX REV CODE 250 W/ 637 OVERRIDE(OP): Performed by: PHYSICIAN ASSISTANT

## 2023-03-01 PROCEDURE — A9270 NON-COVERED ITEM OR SERVICE: HCPCS | Performed by: ANESTHESIOLOGY

## 2023-03-01 PROCEDURE — 160031 HCHG SURGERY MINUTES - 1ST 30 MINS LEVEL 5: Performed by: NEUROLOGICAL SURGERY

## 2023-03-01 PROCEDURE — 36620 INSERTION CATHETER ARTERY: CPT | Performed by: ANESTHESIOLOGY

## 2023-03-01 PROCEDURE — 82962 GLUCOSE BLOOD TEST: CPT

## 2023-03-01 PROCEDURE — 00670 ANES XTNSV SP&SPI CORD PX: CPT | Performed by: ANESTHESIOLOGY

## 2023-03-01 PROCEDURE — 99024 POSTOP FOLLOW-UP VISIT: CPT | Performed by: PHYSICIAN ASSISTANT

## 2023-03-01 PROCEDURE — 160002 HCHG RECOVERY MINUTES (STAT): Performed by: NEUROLOGICAL SURGERY

## 2023-03-01 PROCEDURE — 110454 HCHG SHELL REV 250: Performed by: NEUROLOGICAL SURGERY

## 2023-03-01 PROCEDURE — 160042 HCHG SURGERY MINUTES - EA ADDL 1 MIN LEVEL 5: Performed by: NEUROLOGICAL SURGERY

## 2023-03-01 PROCEDURE — 700105 HCHG RX REV CODE 258: Performed by: ANESTHESIOLOGY

## 2023-03-01 PROCEDURE — 93306 TTE W/DOPPLER COMPLETE: CPT

## 2023-03-01 PROCEDURE — 700105 HCHG RX REV CODE 258: Performed by: HOSPITALIST

## 2023-03-01 PROCEDURE — A9270 NON-COVERED ITEM OR SERVICE: HCPCS | Performed by: PHYSICIAN ASSISTANT

## 2023-03-01 PROCEDURE — 700102 HCHG RX REV CODE 250 W/ 637 OVERRIDE(OP): Performed by: INTERNAL MEDICINE

## 2023-03-01 PROCEDURE — 95939 C MOTOR EVOKED UPR&LWR LIMBS: CPT | Performed by: NEUROLOGICAL SURGERY

## 2023-03-01 PROCEDURE — 86850 RBC ANTIBODY SCREEN: CPT

## 2023-03-01 PROCEDURE — 36415 COLL VENOUS BLD VENIPUNCTURE: CPT

## 2023-03-01 PROCEDURE — A9270 NON-COVERED ITEM OR SERVICE: HCPCS | Performed by: INTERNAL MEDICINE

## 2023-03-01 PROCEDURE — 700106 HCHG RX REV CODE 271: Performed by: NEUROLOGICAL SURGERY

## 2023-03-01 PROCEDURE — 80048 BASIC METABOLIC PNL TOTAL CA: CPT

## 2023-03-01 PROCEDURE — 160048 HCHG OR STATISTICAL LEVEL 1-5: Performed by: NEUROLOGICAL SURGERY

## 2023-03-01 PROCEDURE — 99222 1ST HOSP IP/OBS MODERATE 55: CPT | Performed by: INTERNAL MEDICINE

## 2023-03-01 PROCEDURE — 700105 HCHG RX REV CODE 258: Performed by: NEUROLOGICAL SURGERY

## 2023-03-01 PROCEDURE — 770020 HCHG ROOM/CARE - TELE (206)

## 2023-03-01 PROCEDURE — A4306 DRUG DELIVERY SYSTEM <=50 ML: HCPCS | Performed by: NEUROLOGICAL SURGERY

## 2023-03-01 PROCEDURE — 700111 HCHG RX REV CODE 636 W/ 250 OVERRIDE (IP): Performed by: NEUROLOGICAL SURGERY

## 2023-03-01 RX ORDER — OXYCODONE HCL 10 MG/1
10 TABLET, FILM COATED, EXTENDED RELEASE ORAL ONCE
Status: COMPLETED | OUTPATIENT
Start: 2023-03-01 | End: 2023-03-01

## 2023-03-01 RX ORDER — AMOXICILLIN 250 MG
1 CAPSULE ORAL
Status: DISCONTINUED | OUTPATIENT
Start: 2023-03-01 | End: 2023-03-01

## 2023-03-01 RX ORDER — HYDRALAZINE HYDROCHLORIDE 20 MG/ML
5 INJECTION INTRAMUSCULAR; INTRAVENOUS
Status: DISCONTINUED | OUTPATIENT
Start: 2023-03-01 | End: 2023-03-01 | Stop reason: HOSPADM

## 2023-03-01 RX ORDER — ONDANSETRON 2 MG/ML
INJECTION INTRAMUSCULAR; INTRAVENOUS PRN
Status: DISCONTINUED | OUTPATIENT
Start: 2023-03-01 | End: 2023-03-01 | Stop reason: SURG

## 2023-03-01 RX ORDER — INSULIN LISPRO 100 [IU]/ML
2-9 INJECTION, SOLUTION INTRAVENOUS; SUBCUTANEOUS
Status: DISCONTINUED | OUTPATIENT
Start: 2023-03-01 | End: 2023-03-01

## 2023-03-01 RX ORDER — AMOXICILLIN 250 MG
2 CAPSULE ORAL 2 TIMES DAILY
Status: DISCONTINUED | OUTPATIENT
Start: 2023-03-01 | End: 2023-03-02 | Stop reason: HOSPADM

## 2023-03-01 RX ORDER — MORPHINE SULFATE 4 MG/ML
2 INJECTION INTRAVENOUS ONCE
Status: DISCONTINUED | OUTPATIENT
Start: 2023-03-01 | End: 2023-03-02

## 2023-03-01 RX ORDER — LIDOCAINE HYDROCHLORIDE 40 MG/ML
SOLUTION TOPICAL PRN
Status: DISCONTINUED | OUTPATIENT
Start: 2023-03-01 | End: 2023-03-01 | Stop reason: SURG

## 2023-03-01 RX ORDER — POLYETHYLENE GLYCOL 3350 17 G/17G
1 POWDER, FOR SOLUTION ORAL 2 TIMES DAILY PRN
Status: DISCONTINUED | OUTPATIENT
Start: 2023-03-01 | End: 2023-03-01

## 2023-03-01 RX ORDER — HALOPERIDOL 5 MG/ML
1 INJECTION INTRAMUSCULAR
Status: DISCONTINUED | OUTPATIENT
Start: 2023-03-01 | End: 2023-03-01 | Stop reason: HOSPADM

## 2023-03-01 RX ORDER — ONDANSETRON 2 MG/ML
4 INJECTION INTRAMUSCULAR; INTRAVENOUS EVERY 4 HOURS PRN
Status: DISCONTINUED | OUTPATIENT
Start: 2023-03-01 | End: 2023-03-01

## 2023-03-01 RX ORDER — METOCLOPRAMIDE 10 MG/1
10 TABLET ORAL 2 TIMES DAILY
Status: DISCONTINUED | OUTPATIENT
Start: 2023-03-01 | End: 2023-03-02 | Stop reason: HOSPADM

## 2023-03-01 RX ORDER — METHOCARBAMOL 500 MG/1
750 TABLET, FILM COATED ORAL EVERY 8 HOURS PRN
Status: DISCONTINUED | OUTPATIENT
Start: 2023-03-01 | End: 2023-03-02 | Stop reason: HOSPADM

## 2023-03-01 RX ORDER — ESMOLOL HYDROCHLORIDE 10 MG/ML
INJECTION INTRAVENOUS PRN
Status: DISCONTINUED | OUTPATIENT
Start: 2023-03-01 | End: 2023-03-01 | Stop reason: SURG

## 2023-03-01 RX ORDER — ROSUVASTATIN CALCIUM 20 MG/1
40 TABLET, COATED ORAL
Status: DISCONTINUED | OUTPATIENT
Start: 2023-03-01 | End: 2023-03-02 | Stop reason: HOSPADM

## 2023-03-01 RX ORDER — HYDROMORPHONE HYDROCHLORIDE 1 MG/ML
0.2 INJECTION, SOLUTION INTRAMUSCULAR; INTRAVENOUS; SUBCUTANEOUS
Status: DISCONTINUED | OUTPATIENT
Start: 2023-03-01 | End: 2023-03-01 | Stop reason: HOSPADM

## 2023-03-01 RX ORDER — ROCURONIUM BROMIDE 10 MG/ML
INJECTION, SOLUTION INTRAVENOUS PRN
Status: DISCONTINUED | OUTPATIENT
Start: 2023-03-01 | End: 2023-03-01 | Stop reason: SURG

## 2023-03-01 RX ORDER — ENEMA 19; 7 G/133ML; G/133ML
1 ENEMA RECTAL
Status: DISCONTINUED | OUTPATIENT
Start: 2023-03-01 | End: 2023-03-02 | Stop reason: HOSPADM

## 2023-03-01 RX ORDER — ACETAMINOPHEN 500 MG
1000 TABLET ORAL EVERY 6 HOURS
Status: DISCONTINUED | OUTPATIENT
Start: 2023-03-01 | End: 2023-03-02

## 2023-03-01 RX ORDER — OXYCODONE HYDROCHLORIDE 5 MG/1
10 TABLET ORAL
Status: DISCONTINUED | OUTPATIENT
Start: 2023-03-01 | End: 2023-03-02 | Stop reason: HOSPADM

## 2023-03-01 RX ORDER — CLINDAMYCIN PHOSPHATE 900 MG/50ML
900 INJECTION, SOLUTION INTRAVENOUS ONCE
Status: COMPLETED | OUTPATIENT
Start: 2023-03-01 | End: 2023-03-01

## 2023-03-01 RX ORDER — ACETAMINOPHEN 500 MG
1000 TABLET ORAL EVERY 6 HOURS PRN
Status: DISCONTINUED | OUTPATIENT
Start: 2023-03-06 | End: 2023-03-01

## 2023-03-01 RX ORDER — ONDANSETRON 4 MG/1
4 TABLET, ORALLY DISINTEGRATING ORAL EVERY 4 HOURS PRN
Status: DISCONTINUED | OUTPATIENT
Start: 2023-03-01 | End: 2023-03-01

## 2023-03-01 RX ORDER — OXYCODONE HYDROCHLORIDE 5 MG/1
5 TABLET ORAL
Status: DISCONTINUED | OUTPATIENT
Start: 2023-03-01 | End: 2023-03-02 | Stop reason: HOSPADM

## 2023-03-01 RX ORDER — HYDROMORPHONE HYDROCHLORIDE 1 MG/ML
0.5 INJECTION, SOLUTION INTRAMUSCULAR; INTRAVENOUS; SUBCUTANEOUS
Status: DISCONTINUED | OUTPATIENT
Start: 2023-03-01 | End: 2023-03-02 | Stop reason: HOSPADM

## 2023-03-01 RX ORDER — SODIUM CHLORIDE, SODIUM LACTATE, POTASSIUM CHLORIDE, AND CALCIUM CHLORIDE .6; .31; .03; .02 G/100ML; G/100ML; G/100ML; G/100ML
500 INJECTION, SOLUTION INTRAVENOUS ONCE
Status: COMPLETED | OUTPATIENT
Start: 2023-03-01 | End: 2023-03-01

## 2023-03-01 RX ORDER — ACETAMINOPHEN 500 MG
1000 TABLET ORAL ONCE
Status: COMPLETED | OUTPATIENT
Start: 2023-03-01 | End: 2023-03-01

## 2023-03-01 RX ORDER — SODIUM CHLORIDE, SODIUM LACTATE, POTASSIUM CHLORIDE, CALCIUM CHLORIDE 600; 310; 30; 20 MG/100ML; MG/100ML; MG/100ML; MG/100ML
INJECTION, SOLUTION INTRAVENOUS CONTINUOUS
Status: DISCONTINUED | OUTPATIENT
Start: 2023-03-01 | End: 2023-03-01

## 2023-03-01 RX ORDER — BISACODYL 10 MG
10 SUPPOSITORY, RECTAL RECTAL
Status: DISCONTINUED | OUTPATIENT
Start: 2023-03-01 | End: 2023-03-01

## 2023-03-01 RX ORDER — MEPERIDINE HYDROCHLORIDE 25 MG/ML
6.25 INJECTION INTRAMUSCULAR; INTRAVENOUS; SUBCUTANEOUS
Status: DISCONTINUED | OUTPATIENT
Start: 2023-03-01 | End: 2023-03-01 | Stop reason: HOSPADM

## 2023-03-01 RX ORDER — ACETAMINOPHEN 500 MG
1000 TABLET ORAL EVERY 6 HOURS PRN
Status: DISCONTINUED | OUTPATIENT
Start: 2023-03-06 | End: 2023-03-02

## 2023-03-01 RX ORDER — SODIUM CHLORIDE 1 G/1
1 TABLET ORAL 2 TIMES DAILY
Status: DISCONTINUED | OUTPATIENT
Start: 2023-03-01 | End: 2023-03-02

## 2023-03-01 RX ORDER — DIPHENHYDRAMINE HYDROCHLORIDE 50 MG/ML
12.5 INJECTION INTRAMUSCULAR; INTRAVENOUS
Status: DISCONTINUED | OUTPATIENT
Start: 2023-03-01 | End: 2023-03-01 | Stop reason: HOSPADM

## 2023-03-01 RX ORDER — SODIUM CHLORIDE AND POTASSIUM CHLORIDE 150; 900 MG/100ML; MG/100ML
INJECTION, SOLUTION INTRAVENOUS CONTINUOUS
Status: DISCONTINUED | OUTPATIENT
Start: 2023-03-01 | End: 2023-03-02

## 2023-03-01 RX ORDER — LIDOCAINE HYDROCHLORIDE 20 MG/ML
INJECTION, SOLUTION EPIDURAL; INFILTRATION; INTRACAUDAL; PERINEURAL PRN
Status: DISCONTINUED | OUTPATIENT
Start: 2023-03-01 | End: 2023-03-01 | Stop reason: SURG

## 2023-03-01 RX ORDER — LABETALOL HYDROCHLORIDE 5 MG/ML
10 INJECTION, SOLUTION INTRAVENOUS
Status: DISCONTINUED | OUTPATIENT
Start: 2023-03-01 | End: 2023-03-02 | Stop reason: HOSPADM

## 2023-03-01 RX ORDER — TRANEXAMIC ACID 100 MG/ML
INJECTION, SOLUTION INTRAVENOUS PRN
Status: DISCONTINUED | OUTPATIENT
Start: 2023-03-01 | End: 2023-03-01 | Stop reason: SURG

## 2023-03-01 RX ORDER — PANTOPRAZOLE SODIUM 40 MG/1
40 TABLET, DELAYED RELEASE ORAL DAILY
Status: DISCONTINUED | OUTPATIENT
Start: 2023-03-01 | End: 2023-03-01

## 2023-03-01 RX ORDER — OXYCODONE HCL 5 MG/5 ML
10 SOLUTION, ORAL ORAL
Status: COMPLETED | OUTPATIENT
Start: 2023-03-01 | End: 2023-03-01

## 2023-03-01 RX ORDER — POLYETHYLENE GLYCOL 3350 17 G/17G
1 POWDER, FOR SOLUTION ORAL
Status: DISCONTINUED | OUTPATIENT
Start: 2023-03-01 | End: 2023-03-02 | Stop reason: HOSPADM

## 2023-03-01 RX ORDER — ALPRAZOLAM 0.25 MG/1
0.25 TABLET ORAL 2 TIMES DAILY PRN
Status: DISCONTINUED | OUTPATIENT
Start: 2023-03-01 | End: 2023-03-02 | Stop reason: HOSPADM

## 2023-03-01 RX ORDER — EZETIMIBE 10 MG/1
10 TABLET ORAL DAILY
Status: DISCONTINUED | OUTPATIENT
Start: 2023-03-01 | End: 2023-03-02 | Stop reason: HOSPADM

## 2023-03-01 RX ORDER — LOSARTAN POTASSIUM 50 MG/1
50 TABLET ORAL DAILY
Status: CANCELLED | OUTPATIENT
Start: 2023-03-01

## 2023-03-01 RX ORDER — PHENYLEPHRINE HCL IN 0.9% NACL 0.5 MG/5ML
SYRINGE (ML) INTRAVENOUS PRN
Status: DISCONTINUED | OUTPATIENT
Start: 2023-03-01 | End: 2023-03-01 | Stop reason: SURG

## 2023-03-01 RX ORDER — SODIUM CHLORIDE, SODIUM LACTATE, POTASSIUM CHLORIDE, CALCIUM CHLORIDE 600; 310; 30; 20 MG/100ML; MG/100ML; MG/100ML; MG/100ML
INJECTION, SOLUTION INTRAVENOUS CONTINUOUS
Status: ACTIVE | OUTPATIENT
Start: 2023-03-01 | End: 2023-03-01

## 2023-03-01 RX ORDER — LABETALOL HYDROCHLORIDE 5 MG/ML
5 INJECTION, SOLUTION INTRAVENOUS
Status: DISCONTINUED | OUTPATIENT
Start: 2023-03-01 | End: 2023-03-01 | Stop reason: HOSPADM

## 2023-03-01 RX ORDER — EPHEDRINE SULFATE 50 MG/ML
INJECTION, SOLUTION INTRAVENOUS PRN
Status: DISCONTINUED | OUTPATIENT
Start: 2023-03-01 | End: 2023-03-01 | Stop reason: SURG

## 2023-03-01 RX ORDER — ACETAMINOPHEN 325 MG/1
650 TABLET ORAL EVERY 6 HOURS PRN
Status: DISCONTINUED | OUTPATIENT
Start: 2023-03-01 | End: 2023-03-01

## 2023-03-01 RX ORDER — MIDAZOLAM HYDROCHLORIDE 1 MG/ML
INJECTION INTRAMUSCULAR; INTRAVENOUS PRN
Status: DISCONTINUED | OUTPATIENT
Start: 2023-03-01 | End: 2023-03-01 | Stop reason: SURG

## 2023-03-01 RX ORDER — ONDANSETRON 2 MG/ML
4 INJECTION INTRAMUSCULAR; INTRAVENOUS EVERY 4 HOURS PRN
Status: DISCONTINUED | OUTPATIENT
Start: 2023-03-01 | End: 2023-03-02 | Stop reason: HOSPADM

## 2023-03-01 RX ORDER — DOCUSATE SODIUM 100 MG/1
100 CAPSULE, LIQUID FILLED ORAL 2 TIMES DAILY
Status: DISCONTINUED | OUTPATIENT
Start: 2023-03-01 | End: 2023-03-02 | Stop reason: HOSPADM

## 2023-03-01 RX ORDER — INSULIN LISPRO 100 [IU]/ML
0.2 INJECTION, SOLUTION INTRAVENOUS; SUBCUTANEOUS
Status: DISCONTINUED | OUTPATIENT
Start: 2023-03-01 | End: 2023-03-01

## 2023-03-01 RX ORDER — DIPHENHYDRAMINE HCL 25 MG
25 TABLET ORAL EVERY 6 HOURS PRN
Status: DISCONTINUED | OUTPATIENT
Start: 2023-03-01 | End: 2023-03-02 | Stop reason: HOSPADM

## 2023-03-01 RX ORDER — CEFAZOLIN SODIUM 1 G/3ML
INJECTION, POWDER, FOR SOLUTION INTRAMUSCULAR; INTRAVENOUS
Status: DISCONTINUED | OUTPATIENT
Start: 2023-03-01 | End: 2023-03-01 | Stop reason: HOSPADM

## 2023-03-01 RX ORDER — VANCOMYCIN HYDROCHLORIDE 1 G/20ML
INJECTION, POWDER, LYOPHILIZED, FOR SOLUTION INTRAVENOUS
Status: COMPLETED | OUTPATIENT
Start: 2023-03-01 | End: 2023-03-01

## 2023-03-01 RX ORDER — EPINEPHRINE 1 MG/ML(1)
AMPUL (ML) INJECTION PRN
Status: DISCONTINUED | OUTPATIENT
Start: 2023-03-01 | End: 2023-03-01 | Stop reason: SURG

## 2023-03-01 RX ORDER — OXYCODONE HCL 5 MG/5 ML
5 SOLUTION, ORAL ORAL
Status: COMPLETED | OUTPATIENT
Start: 2023-03-01 | End: 2023-03-01

## 2023-03-01 RX ORDER — MAGNESIUM SULFATE HEPTAHYDRATE 40 MG/ML
2 INJECTION, SOLUTION INTRAVENOUS ONCE
Status: COMPLETED | OUTPATIENT
Start: 2023-03-01 | End: 2023-03-01

## 2023-03-01 RX ORDER — ACETAMINOPHEN 500 MG
1000 TABLET ORAL EVERY 6 HOURS
Status: DISCONTINUED | OUTPATIENT
Start: 2023-03-01 | End: 2023-03-01

## 2023-03-01 RX ORDER — ONDANSETRON 4 MG/1
4 TABLET, ORALLY DISINTEGRATING ORAL EVERY 4 HOURS PRN
Status: DISCONTINUED | OUTPATIENT
Start: 2023-03-01 | End: 2023-03-02 | Stop reason: HOSPADM

## 2023-03-01 RX ORDER — ONDANSETRON 2 MG/ML
4 INJECTION INTRAMUSCULAR; INTRAVENOUS
Status: DISCONTINUED | OUTPATIENT
Start: 2023-03-01 | End: 2023-03-01 | Stop reason: HOSPADM

## 2023-03-01 RX ORDER — HYDROMORPHONE HYDROCHLORIDE 1 MG/ML
0.4 INJECTION, SOLUTION INTRAMUSCULAR; INTRAVENOUS; SUBCUTANEOUS
Status: DISCONTINUED | OUTPATIENT
Start: 2023-03-01 | End: 2023-03-01 | Stop reason: HOSPADM

## 2023-03-01 RX ORDER — HYDROMORPHONE HYDROCHLORIDE 1 MG/ML
0.1 INJECTION, SOLUTION INTRAMUSCULAR; INTRAVENOUS; SUBCUTANEOUS
Status: DISCONTINUED | OUTPATIENT
Start: 2023-03-01 | End: 2023-03-01 | Stop reason: HOSPADM

## 2023-03-01 RX ORDER — BISACODYL 10 MG
10 SUPPOSITORY, RECTAL RECTAL
Status: DISCONTINUED | OUTPATIENT
Start: 2023-03-01 | End: 2023-03-02 | Stop reason: HOSPADM

## 2023-03-01 RX ORDER — REMIFENTANIL HYDROCHLORIDE 1 MG/ML
INJECTION, POWDER, LYOPHILIZED, FOR SOLUTION INTRAVENOUS
Status: DISCONTINUED | OUTPATIENT
Start: 2023-03-01 | End: 2023-03-01 | Stop reason: SURG

## 2023-03-01 RX ORDER — SODIUM CHLORIDE, SODIUM LACTATE, POTASSIUM CHLORIDE, AND CALCIUM CHLORIDE .6; .31; .03; .02 G/100ML; G/100ML; G/100ML; G/100ML
IRRIGANT IRRIGATION
Status: DISCONTINUED | OUTPATIENT
Start: 2023-03-01 | End: 2023-03-01 | Stop reason: HOSPADM

## 2023-03-01 RX ORDER — AMOXICILLIN 250 MG
1 CAPSULE ORAL NIGHTLY
Status: DISCONTINUED | OUTPATIENT
Start: 2023-03-01 | End: 2023-03-01

## 2023-03-01 RX ORDER — DIPHENHYDRAMINE HYDROCHLORIDE 50 MG/ML
25 INJECTION INTRAMUSCULAR; INTRAVENOUS EVERY 6 HOURS PRN
Status: DISCONTINUED | OUTPATIENT
Start: 2023-03-01 | End: 2023-03-02 | Stop reason: HOSPADM

## 2023-03-01 RX ORDER — ONDANSETRON 2 MG/ML
4 INJECTION INTRAMUSCULAR; INTRAVENOUS
Status: COMPLETED | OUTPATIENT
Start: 2023-03-01 | End: 2023-03-01

## 2023-03-01 RX ORDER — OMEPRAZOLE 20 MG/1
20 CAPSULE, DELAYED RELEASE ORAL DAILY
Status: DISCONTINUED | OUTPATIENT
Start: 2023-03-01 | End: 2023-03-02 | Stop reason: HOSPADM

## 2023-03-01 RX ORDER — MAGNESIUM HYDROXIDE 1200 MG/15ML
LIQUID ORAL
Status: COMPLETED | OUTPATIENT
Start: 2023-03-01 | End: 2023-03-01

## 2023-03-01 RX ORDER — MORPHINE SULFATE 4 MG/ML
2 INJECTION INTRAVENOUS
Status: DISCONTINUED | OUTPATIENT
Start: 2023-03-01 | End: 2023-03-01

## 2023-03-01 RX ORDER — METOCLOPRAMIDE HYDROCHLORIDE 5 MG/ML
INJECTION INTRAMUSCULAR; INTRAVENOUS PRN
Status: DISCONTINUED | OUTPATIENT
Start: 2023-03-01 | End: 2023-03-01 | Stop reason: SURG

## 2023-03-01 RX ORDER — BUPIVACAINE HYDROCHLORIDE AND EPINEPHRINE 5; 5 MG/ML; UG/ML
INJECTION, SOLUTION EPIDURAL; INTRACAUDAL; PERINEURAL
Status: DISCONTINUED | OUTPATIENT
Start: 2023-03-01 | End: 2023-03-01 | Stop reason: HOSPADM

## 2023-03-01 RX ORDER — LEVOTHYROXINE SODIUM 0.1 MG/1
100 TABLET ORAL
Status: DISCONTINUED | OUTPATIENT
Start: 2023-03-01 | End: 2023-03-02 | Stop reason: HOSPADM

## 2023-03-01 RX ADMIN — INSULIN HUMAN 5 UNITS: 100 INJECTION, SOLUTION PARENTERAL at 07:51

## 2023-03-01 RX ADMIN — Medication 200 MCG: at 07:50

## 2023-03-01 RX ADMIN — SODIUM CHLORIDE, POTASSIUM CHLORIDE, SODIUM LACTATE AND CALCIUM CHLORIDE: 600; 310; 30; 20 INJECTION, SOLUTION INTRAVENOUS at 09:47

## 2023-03-01 RX ADMIN — LEVOTHYROXINE SODIUM 100 MCG: 100 TABLET ORAL at 16:17

## 2023-03-01 RX ADMIN — ACETAMINOPHEN 1000 MG: 500 TABLET, FILM COATED ORAL at 18:19

## 2023-03-01 RX ADMIN — ONDANSETRON 4 MG: 2 INJECTION INTRAMUSCULAR; INTRAVENOUS at 10:50

## 2023-03-01 RX ADMIN — FENTANYL CITRATE 50 MCG: 50 INJECTION, SOLUTION INTRAMUSCULAR; INTRAVENOUS at 07:17

## 2023-03-01 RX ADMIN — ESMOLOL HYDROCHLORIDE 20 MG: 100 INJECTION, SOLUTION INTRAVENOUS at 08:32

## 2023-03-01 RX ADMIN — VANCOMYCIN HYDROCHLORIDE 1000 MG: 500 INJECTION, POWDER, LYOPHILIZED, FOR SOLUTION INTRAVENOUS at 21:04

## 2023-03-01 RX ADMIN — EZETIMIBE 10 MG: 10 TABLET ORAL at 16:17

## 2023-03-01 RX ADMIN — EPINEPHRINE 50 MCG: 1 INJECTION INTRAMUSCULAR; INTRAVENOUS; SUBCUTANEOUS at 07:56

## 2023-03-01 RX ADMIN — INSULIN HUMAN 3 UNITS: 100 INJECTION, SOLUTION PARENTERAL at 12:57

## 2023-03-01 RX ADMIN — SENNOSIDES AND DOCUSATE SODIUM 2 TABLET: 50; 8.6 TABLET ORAL at 18:19

## 2023-03-01 RX ADMIN — ROSUVASTATIN CALCIUM 40 MG: 20 TABLET, FILM COATED ORAL at 20:58

## 2023-03-01 RX ADMIN — ONDANSETRON 4 MG: 2 INJECTION INTRAMUSCULAR; INTRAVENOUS at 08:01

## 2023-03-01 RX ADMIN — SODIUM CHLORIDE, POTASSIUM CHLORIDE, SODIUM LACTATE AND CALCIUM CHLORIDE: 600; 310; 30; 20 INJECTION, SOLUTION INTRAVENOUS at 07:04

## 2023-03-01 RX ADMIN — OXYCODONE HYDROCHLORIDE 10 MG: 10 TABLET, FILM COATED, EXTENDED RELEASE ORAL at 06:25

## 2023-03-01 RX ADMIN — Medication 200 MCG: at 07:40

## 2023-03-01 RX ADMIN — CLINDAMYCIN PHOSPHATE 900 MG: 900 INJECTION, SOLUTION INTRAVENOUS at 07:24

## 2023-03-01 RX ADMIN — Medication 100 MCG: at 07:33

## 2023-03-01 RX ADMIN — Medication 200 MCG: at 07:48

## 2023-03-01 RX ADMIN — EPINEPHRINE 100 MCG: 1 INJECTION INTRAMUSCULAR; INTRAVENOUS; SUBCUTANEOUS at 07:58

## 2023-03-01 RX ADMIN — FENTANYL CITRATE 25 MCG: 50 INJECTION, SOLUTION INTRAMUSCULAR; INTRAVENOUS at 10:02

## 2023-03-01 RX ADMIN — OMEPRAZOLE 20 MG: 20 CAPSULE, DELAYED RELEASE ORAL at 16:17

## 2023-03-01 RX ADMIN — OXYCODONE 5 MG: 5 TABLET ORAL at 16:28

## 2023-03-01 RX ADMIN — Medication 200 MCG: at 07:38

## 2023-03-01 RX ADMIN — EPINEPHRINE 20 MCG: 1 INJECTION INTRAMUSCULAR; INTRAVENOUS; SUBCUTANEOUS at 08:10

## 2023-03-01 RX ADMIN — TRANEXAMIC ACID 1000 MG: 100 INJECTION, SOLUTION INTRAVENOUS at 07:12

## 2023-03-01 RX ADMIN — ROCURONIUM BROMIDE 30 MG: 10 INJECTION, SOLUTION INTRAVENOUS at 07:08

## 2023-03-01 RX ADMIN — REMIFENTANIL HYDROCHLORIDE 0.2 MCG/KG/MIN: 1 INJECTION, POWDER, LYOPHILIZED, FOR SOLUTION INTRAVENOUS at 07:32

## 2023-03-01 RX ADMIN — FENTANYL CITRATE 50 MCG: 50 INJECTION, SOLUTION INTRAMUSCULAR; INTRAVENOUS at 07:04

## 2023-03-01 RX ADMIN — OXYCODONE HYDROCHLORIDE 10 MG: 5 SOLUTION ORAL at 09:38

## 2023-03-01 RX ADMIN — MIDAZOLAM HYDROCHLORIDE 2 MG: 1 INJECTION, SOLUTION INTRAMUSCULAR; INTRAVENOUS at 07:04

## 2023-03-01 RX ADMIN — OXYCODONE 5 MG: 5 TABLET ORAL at 20:58

## 2023-03-01 RX ADMIN — FENTANYL CITRATE 25 MCG: 50 INJECTION, SOLUTION INTRAMUSCULAR; INTRAVENOUS at 09:37

## 2023-03-01 RX ADMIN — ACETAMINOPHEN 1000 MG: 500 TABLET ORAL at 06:25

## 2023-03-01 RX ADMIN — LABETALOL HYDROCHLORIDE 5 MG: 5 INJECTION, SOLUTION INTRAVENOUS at 13:17

## 2023-03-01 RX ADMIN — Medication 200 MCG: at 07:41

## 2023-03-01 RX ADMIN — EPINEPHRINE 10 MCG: 1 INJECTION INTRAMUSCULAR; INTRAVENOUS; SUBCUTANEOUS at 07:48

## 2023-03-01 RX ADMIN — EPINEPHRINE 20 MCG: 1 INJECTION INTRAMUSCULAR; INTRAVENOUS; SUBCUTANEOUS at 07:50

## 2023-03-01 RX ADMIN — POTASSIUM CHLORIDE AND SODIUM CHLORIDE: 900; 150 INJECTION, SOLUTION INTRAVENOUS at 16:16

## 2023-03-01 RX ADMIN — Medication 200 MCG: at 07:43

## 2023-03-01 RX ADMIN — ACETAMINOPHEN 1000 MG: 500 TABLET, FILM COATED ORAL at 23:21

## 2023-03-01 RX ADMIN — Medication 200 MCG: at 07:34

## 2023-03-01 RX ADMIN — MAGNESIUM SULFATE HEPTAHYDRATE 2 G: 40 INJECTION, SOLUTION INTRAVENOUS at 21:10

## 2023-03-01 RX ADMIN — LIDOCAINE HYDROCHLORIDE 4 ML: 40 SOLUTION TOPICAL at 07:10

## 2023-03-01 RX ADMIN — PROPOFOL 100 MCG/KG/MIN: 10 INJECTION, EMULSION INTRAVENOUS at 07:32

## 2023-03-01 RX ADMIN — EPINEPHRINE 30 MCG: 1 INJECTION INTRAMUSCULAR; INTRAVENOUS; SUBCUTANEOUS at 08:13

## 2023-03-01 RX ADMIN — LIDOCAINE HYDROCHLORIDE 40 MG: 20 INJECTION, SOLUTION EPIDURAL; INFILTRATION; INTRACAUDAL at 07:08

## 2023-03-01 RX ADMIN — EPINEPHRINE 20 MCG: 1 INJECTION INTRAMUSCULAR; INTRAVENOUS; SUBCUTANEOUS at 08:09

## 2023-03-01 RX ADMIN — FENTANYL CITRATE 50 MCG: 50 INJECTION, SOLUTION INTRAMUSCULAR; INTRAVENOUS at 09:46

## 2023-03-01 RX ADMIN — METOCLOPRAMIDE 10 MG: 5 INJECTION, SOLUTION INTRAMUSCULAR; INTRAVENOUS at 07:15

## 2023-03-01 RX ADMIN — EPHEDRINE SULFATE 10 MG: 50 INJECTION, SOLUTION INTRAVENOUS at 07:40

## 2023-03-01 RX ADMIN — METOCLOPRAMIDE 10 MG: 10 TABLET ORAL at 18:19

## 2023-03-01 RX ADMIN — EPINEPHRINE 20 MCG: 1 INJECTION INTRAMUSCULAR; INTRAVENOUS; SUBCUTANEOUS at 07:52

## 2023-03-01 RX ADMIN — SODIUM CHLORIDE, POTASSIUM CHLORIDE, SODIUM LACTATE AND CALCIUM CHLORIDE 500 ML: 600; 310; 30; 20 INJECTION, SOLUTION INTRAVENOUS at 08:50

## 2023-03-01 RX ADMIN — EPINEPHRINE 30 MCG: 1 INJECTION INTRAMUSCULAR; INTRAVENOUS; SUBCUTANEOUS at 08:08

## 2023-03-01 ASSESSMENT — PAIN DESCRIPTION - PAIN TYPE
TYPE: SURGICAL PAIN
TYPE: ACUTE PAIN;SURGICAL PAIN
TYPE: SURGICAL PAIN

## 2023-03-01 ASSESSMENT — COGNITIVE AND FUNCTIONAL STATUS - GENERAL
MOBILITY SCORE: 19
SUGGESTED CMS G CODE MODIFIER MOBILITY: CK
CLIMB 3 TO 5 STEPS WITH RAILING: A LITTLE
DAILY ACTIVITIY SCORE: 24
SUGGESTED CMS G CODE MODIFIER DAILY ACTIVITY: CH
TURNING FROM BACK TO SIDE WHILE IN FLAT BAD: A LITTLE
STANDING UP FROM CHAIR USING ARMS: A LITTLE
WALKING IN HOSPITAL ROOM: A LITTLE
MOVING FROM LYING ON BACK TO SITTING ON SIDE OF FLAT BED: A LITTLE

## 2023-03-01 ASSESSMENT — PATIENT HEALTH QUESTIONNAIRE - PHQ9
2. FEELING DOWN, DEPRESSED, IRRITABLE, OR HOPELESS: NOT AT ALL
SUM OF ALL RESPONSES TO PHQ9 QUESTIONS 1 AND 2: 0
1. LITTLE INTEREST OR PLEASURE IN DOING THINGS: NOT AT ALL

## 2023-03-01 ASSESSMENT — LIFESTYLE VARIABLES
EVER HAD A DRINK FIRST THING IN THE MORNING TO STEADY YOUR NERVES TO GET RID OF A HANGOVER: NO
TOTAL SCORE: 0
EVER FELT BAD OR GUILTY ABOUT YOUR DRINKING: NO
ALCOHOL_USE: YES
ON A TYPICAL DAY WHEN YOU DRINK ALCOHOL HOW MANY DRINKS DO YOU HAVE: 1
HOW MANY TIMES IN THE PAST YEAR HAVE YOU HAD 5 OR MORE DRINKS IN A DAY: 0
TOTAL SCORE: 0
CONSUMPTION TOTAL: NEGATIVE
HAVE YOU EVER FELT YOU SHOULD CUT DOWN ON YOUR DRINKING: NO
DOES PATIENT WANT TO STOP DRINKING: NO
TOTAL SCORE: 0
AVERAGE NUMBER OF DAYS PER WEEK YOU HAVE A DRINK CONTAINING ALCOHOL: 3
HAVE PEOPLE ANNOYED YOU BY CRITICIZING YOUR DRINKING: NO

## 2023-03-01 ASSESSMENT — ENCOUNTER SYMPTOMS
HEARTBURN: 0
BLURRED VISION: 0
COUGH: 0
HEADACHES: 0
SHORTNESS OF BREATH: 0
LOSS OF CONSCIOUSNESS: 0
CHILLS: 0
SPUTUM PRODUCTION: 0
DOUBLE VISION: 0
DIZZINESS: 0
ABDOMINAL PAIN: 0
FEVER: 0
FALLS: 0
NAUSEA: 0
SORE THROAT: 0
PALPITATIONS: 0
BACK PAIN: 0

## 2023-03-01 ASSESSMENT — FIBROSIS 4 INDEX: FIB4 SCORE: 1.12

## 2023-03-01 NOTE — ANESTHESIA POSTPROCEDURE EVALUATION
Patient: Ines Long    Procedure Summary     Date: 03/01/23 Room / Location: [place] [address] / SURGERY [facility]    Anesthesia Start: 0704 Anesthesia Stop: 0846    Procedures:       O5-1-1-6-7-T1 posterior decompressive laminectomy and S5-0-5-6-7-T1 posterior instrumented fusion (Spine Cervical)      LAMINECTOMY, SPINE, CERVICAL, POSTERIOR APPROACH (Spine Cervical) Diagnosis:       Cervical stenosis of spine      Cervical myelopathy (HCC)      (Cervical stenosis of spine.Cervical myelopathy (HCC) )    Surgeons: Evon Díaz M.D. Responsible Provider: Marie Mabry M.D.    Anesthesia Type: general ASA Status: 2          Final Anesthesia Type: general  Last vitals  BP   Blood Pressure : (!) 154/70, Arterial BP: 135/47    Temp   36.7 °C (98 °F)    Pulse   100   Resp   16    SpO2   100 %      Anesthesia Post Evaluation    Patient location during evaluation: PACU  Patient participation: complete - patient participated  Level of consciousness: awake and alert    Airway patency: patent  Anesthetic complications: no  Cardiovascular status: hemodynamically stable  Respiratory status: acceptable  Hydration status: euvolemic    PONV: none          Encounter Notable Events   Notable Event Outcome Phase Comment   Hypotension  Intraprocedure Profound hypotension when placed in prone position requiring repeated doses of epi.  ST depression noted. Resolved once supine. Case cancelled and cardiology consulted.        Nurse Pain Score: 2 (NPRS)

## 2023-03-01 NOTE — ASSESSMENT & PLAN NOTE
Procedure abandoned due to intraoperative hypotension  She will be rescheduled as an outpatient for cervical laminectomy and fusion

## 2023-03-01 NOTE — LETTER
January 20, 2023    Patient Name: Ines Long  Surgeon Name: Evon Díaz M.D.  Surgery Facility: [facility] ([address] [facility])  Surgery Date: 3/1/2023    The time of your surgery is not final and may change up to and until the day of your surgery. You will be contacted 1-2 business days prior to your surgery date with your check-in and surgery time.    BEFORE YOUR SURGERY - WITH THE FACILITY  Pre Registration and/or Lab Work/Tests must be done within 28 days of your surgery date. These will be done at [facility], with an appointment. This appointment should be completed before your pre op appointment in our office.    On 1.26.23 - if you have not already heard from [facility] Pre Admission/Registration Department, please call them at [phone] option 2, then option 1, for an appointment.      BEFORE YOUR SURGERY - WITH YOUR SURGEONS OFFICE  Pre op Appointment:   Date: 2.15.23   Time: 11:30am   Provider: Brigido Richter PA-C   Location: [address] [place], [place] [address]    Bring a list of all medications you are currently taking including the dosing and frequency.    Please read the MEDICATION INSTRUCTIONS below completely.    DAY OF YOUR SURGERY  Nothing to eat or drink and refrain from smoking any substance after midnight the day of your surgery. Smoking may interfere with the anesthetic and frequently produces nausea during the recovery period.    Continue taking all lifesaving medications, including the morning of your surgery with small sip of water.    You will need a responsible adult to drive you to and from your surgery.    AFTER YOUR SURGERY  2 Week Post op Appointment:   Date: 3.15.23   Time: 11:30am  With: Brigido Richter PA-C  Location: [facility]    6 Week Post op Appointment:   Date: 4.18.23   Time: 11:30am   With: Brigido iRchter PA-C  Location: [facility]    MEDICATION INSTRUCTIONS   Do not take these medications prior to your  procedure:  • Anti-inflammatories: stop 7 days prior, restart when advised. For fusions avoid for 12 weeks after surgery  • Naproxen (Naprosyn or Alleve)  • Motrin  • Ibuprofen  • Nabumetone (Relafen)  • Meloxicam (Mobic)  • Celebrex  • Salsalate  • Diclofenac (Arthrotec, Voltaren, Flector)  • Sulindac (Clinoril  • Etodolac (Lodine)  • Indomethacin (Indocin)  • Ketoprofen  • Ketorolac  • Oxaprozin (Daypro)  • Piroxicam (Feldene)  • Blood thinners (stop after approval from the prescribing physician)  • Aspirin (any dosage): 10 days prior, restart 7 days after procedure  • Any medications that contain aspirin in combination (ie: Excedrin migraine, Fiorinal, and Norgesic)  • Warfarin (Coumadin): Stop 7 days prior, INR day of procedure, restart 2-3 weeks after procedure  • Antiplatelet: restart 7 days after procedure  • Ticlid (ticlopidine): Stop 14 days prior  • Plavix (clopidogrel): Stop 7 days prior  • Aggrenox or Dipyridamole: Stop 14 days prior  • ReoPro (abciximab): Stop 14 days prior  • Integrilin (eptifibatide): Stop 14 days prior  • Aggrastat (tirofiban): Stop 14 days prior  • Lovenox (Enoxaparin): Stop 24hrs before and restart 24hrs after procedure  • Heparin: Stop 24hrs before   • Dalteparin (Fragmin): Stop 24hrs before  • Fondaparinux (Arixtra): Stop 24hrs before  • Xeralto, Dabigatran (Pradaxa) Stop 5 days prior  • Eliquis (apibaxan) Stop 7 days prior    Okay to take these medications as prescribed:  • Muscle relaxers  • Acetaminophen and pain medications that have it in addition to oxycodone and hydrocodone  • Blood pressure, cholesterol and diabetes medications are ok    TIME OFF WORK  FMLA or Disability forms can be faxed directly to [phone] or you may drop them off at any [facility]. Our office charges a $35.00 fee. Forms will be completed within 5-10 business days after payment is received. For the status of your forms you may contact our disability office directly at [phone].    DENTAL PROCDURES/CLEANINGS Avoid 3 weeks before surgery and for 3 months after surgery.    QUESTIONS ABOUT COSTS  Contact our Patient Financial Services department at [phone] for more information.    If you have any questions, please contact our office.    Vickie    Surgery Scheduler  [email or web address]  ? [phone]   Fax: [phone]  [address]  [address]  [place] [address]  [phone]

## 2023-03-01 NOTE — ANESTHESIA PREPROCEDURE EVALUATION
Case: [id number] Date/Time: 03/01/23 0645    Procedures:       S5-4-8-6-7-T1 posterior decompressive laminectomy and V2-9-7-6-7-T1 posterior instrumented fusion      LAMINECTOMY, SPINE, CERVICAL, POSTERIOR APPROACH    Diagnosis:       Cervical stenosis of spine [M48.02]      Cervical myelopathy (HCC) [G95.9]    Pre-op diagnosis:       Cervical stenosis of spine [M48.02]      Cervical myelopathy (HCC) [G95.9]    Location: [place] [address] / SURGERY [facility]    Surgeons: Evon Díaz M.D.        68 yo F with history of DM1 on insulin pump, HTN, hypothyroidism and carotid artery stenosis s/p CEA. Reports PONV in the past but no other problems with anesthesia in the past.  No current CP/SOB/N/V symptoms.    NPO  Relevant Problems   CARDIAC   (positive) Agatston coronary artery calcium score between 100 and 199   (positive) Carotid artery stenosis   (positive) LAFB (left anterior fascicular block)   (positive) Primary hypertension      ENDO   (positive) Hypothyroidism   (positive) Type 1 diabetes mellitus (HCC)      Other   (positive) Cervical myelopathy (HCC)   (positive) Cervical stenosis of spine   (positive) History of left-sided carotid endarterectomy   (positive) Insulin pump in place   (positive) Small vessel disease, cerebrovascular       Physical Exam    Airway   Mallampati: I  TM distance: >3 FB  Neck ROM: full       Cardiovascular - normal exam  Rhythm: regular  Rate: normal  (-) murmur     Dental - normal exam           Pulmonary - normal exam  Breath sounds clear to auscultation     Abdominal    Neurological - normal exam               Anesthesia Plan    ASA 2       Plan - general       Airway plan will be ETT    (Glidescope.  Neuromonitoring. Prone positioning.  )      Induction: intravenous    Postoperative Plan: Postoperative administration of opioids is intended.    Pertinent diagnostic labs and testing reviewed    Informed Consent:    Anesthetic plan and risks discussed with patient.    Use of blood  products discussed with: patient whom consented to blood products.

## 2023-03-01 NOTE — OR NURSING
Recheck .  No coverage needed.  Awaiting intensivist to see patient to determine placement.  VSS. Meds for pain as ordered.  Patient A+Ox4. No distress.

## 2023-03-01 NOTE — OP REPORT
1.DATE OF SURGERY:3/1/2023    2. SURGEON:  Evon Díaz MD, PhD, CRISTIANO, Neurosurgeon    3. ASSISTANT:  Evelio Jones PA-C/Brigido Richter PA-C    An assistant was crucial to have during the case as the assistant helped with positioning, keeping the field clear of blood and retraction. This case could not be done easily without a qualified assistant. It was medically necessary    4. TYPE OF ANESTHESIA:  General anesthesia with endotracheal intubation    5. PREOPERATIVE DIAGNOSIS:      post left-sided L5-S1 discectomy  2022 with Doctor Díaz, gross resolution of her preoperative back and leg symptomatology  Intermittent neck pain and stiffness, subjective weakness in her hands with unsteady/spastic gait present for 5 years  Mild stenosis C3-4, moderate to severe at C4-5 and C5-6 with effacement of the cord, no clear myelomalacia  Underlying right knee meniscal tear with surgery proposed next week  Labile blood sugars with implanted insulin pump  6.  History of coronary artery disease    6. POSTOPERATIVE DIAGNOSIS:      post left-sided L5-S1 discectomy  2022 with Doctor Díaz, gross resolution of her preoperative back and leg symptomatology  Intermittent neck pain and stiffness, subjective weakness in her hands with unsteady/spastic gait present for 5 years  Mild stenosis C3-4, moderate to severe at C4-5 and C5-6 with effacement of the cord, no clear myelomalacia  Underlying right knee meniscal tear with surgery proposed next week  Labile blood sugars with implanted insulin pump  6.  History of coronary artery disease    7. HISTORY: See formal admission H and P    8/19/2022  Ines Long is a 67 y.o. female seen today in a neurosurgery/spine consultation.  Her  was in attendance today.  She was seen in conjunction with Doctor Díaz today.  When we originally saw her back in April she was having low back and left leg symptomatology with a spastic gait.  She reports her gait has been like that for  approximately fear 5 years after a foot surgery.  She is found to have a L5-S1 left-sided disc protrusion and subsequently underwent a discectomy surgery.  Left leg symptoms have largely settled.  She continues with unsteady gait as well as intermittent neck pain and stiffness this is usually every 3 to 4 weeks.  She denies any pain, numbness or tingling into her arms.  She does feel some weakness in her bilateral .  She does have upcoming right knee surgery next week.      In essence, this is a 67-year-old female with off-and-on neck pain and stiffness without any clear radicular complaints with subjective weakness in her hands and a spastic gait.      Pertinent past medical history is remarkable for diabetes, she does have insulin pump, her blood sugars have been quite labile in the 500s.  She has had a difficult time getting a follow up with her endocrinologist who manages this.  Previous left carotid surgery for carotid artery stenosis.        12/2/2022  Ines comes back.  She still has unsteady gait.  On questioning she does have clumsiness in the hands.  Neck pain is a little bit of an issue.  She had hip surgery did well with it.  She had unsteady regular and tandem gait today.  MRI scan shows moderate stenosis.  I have suggested we get her carotids looked at again.  She is seeing Dr. Shant Card for heart.  We will regroup back in January.Surgery on the neck is on the table.  She is seeing Dr. Gao on December 16.  He is trying exclude a brain cause for her unsteadiness.  If nothing else turns up.  It is worthwhile fixing the neck to try to stop the symptoms progressing.        1/20/2023  Ines returns with her daughter.  She feels she is getting worse in terms of arm and leg function.  She seen her neurology team they feel it cervical.  She had a carotid ultrasound and nuclear.  She wants to proceed with surgery.  I went over the surgery and the recovery again using sheets and models.  She  received an email form from me with extensive paperwork.  We will get a cardiology clearance.       8. PREOPERATIVE PHYSICAL EXAMINATION: See formal admission H and P    post left-sided L5-S1 discectomy  2022 with Doctor Arjun, gross resolution of her preoperative back and leg symptomatology  Intermittent neck pain and stiffness, subjective weakness in her hands with unsteady/spastic gait present for 5 years  Mild stenosis C3-4, moderate to severe at C4-5 and C5-6 with effacement of the cord, no clear myelomalacia  Underlying right knee meniscal tear with surgery proposed next week  Labile blood sugars with implanted insulin pump  6.  History of coronary artery disease    9. TITLE OF PROCEDURE:    V4-C9-U9-C6-C7-T1 posterior decompressive laminectomy and instrumented fusion- case abandoned due to intraoperative  hypotension that could not be corrected      10. OPERATIVE FINDINGS: Case could not be continued once exposure was performed due to intraoperative hypotension.  Consequently the wound was closed.  No decompression or instrumentation was effected.    11. OPERATED LEVELS:    12. IMPLANTS USED:    13. COMPLICATIONS:  see bel    14. ESTIMATED BLOOD LOSS:        mL    15. OPERATIVE DETAILS:    After fully informed consent, the patient was brought to the operating room.  General anesthesia was administered.  The patient was given intravenous antibiotic.  A Dunlap catheter was placed.  The head was held in 3-pin Ramos fixation.  For the case, we used AP and lateral fluoroscopy as well as neurophysiological monitoring.  The patient was carefully turned prone.  We used used the OSI with hip/chest/thigh padding.  The arms were by the sides and these were tucked in with the sheet acting as a sling.  Ulnar pads were in place.  The head was kept in a relatively neutral position and fixed to the table using the Ramos adapter.  All pressure points were padded and the posterior cervical region and lower occipital  region were shaved in preparation for surgery.      I took a lateral x-ray to ensure satisfactory position.  The face and eyes were checked.  The Ramos was tightened up.  The shoulders were taped down to allow lateral visualization.  The posterior cervical region was prepped and draped in standard fashion.    After fluoroscopic localization, a midline incision was affected over the spinous processes from C3 to T1.  A bilateral subperiosteal exposure was affected.        At this point anesthesia encountered profound hypotension that the anesthesiologist was unable to correct pharmacologically.  Neuro monitoring was stable.  She developed some ST depression.  At the advice of anesthesia we did not continue the case.  Her wound washout was affected.      Hemostasis obtained.  Two suction subfascial Hemovacs were placed and stitched into place with 2-0 vicryl suture.  Two paraspinal On-Q pain catheters were also placed.  T    The wound had vancomycin powder placed.  Thorough irrigation was effected prior to this.  The wound was then closed in multiple interrupted layers using 1-0 Vicryl sutures for the fascia, 2-0 Vicryl for the subdermis, and then staples for skin.      All counts were correct and instrumented accounted for ,      At the end of the case, a dressing was applied. Dermabond had been placed over the On-Q pain catheters, which were placed in the paraspinal musculature under direct visualization.  All counts were correct and all instruments were accounted for.  The patient was carefully turned supine.  The Ramos head clamp was taken off.        16. PROGNOSIS:        Surgery was at abandoned due to intraoperative hypotension that anesthesia could not correct.  This did not appear to be allergic or volume related.  She has a history of coronary artery disease.  I spoken to the  and updated him.  We will get a cardiology consult and go from there.    Evon Díaz MD, PhD, CRISTIANO

## 2023-03-01 NOTE — ANESTHESIA PROCEDURE NOTES
Airway    Date/Time: 3/1/2023 7:10 AM  Performed by: Marie Mabry M.D.  Authorized by: Marie Mabry M.D.     Location:  OR  Urgency:  Elective  Difficult Airway: No    Indications for Airway Management:  Anesthesia      Spontaneous Ventilation: absent    Sedation Level:  Deep  Preoxygenated: Yes    Patient Position:  Sniffing  Mask Difficulty Assessment:  1 - vent by mask  Final Airway Type:  Endotracheal airway  Final Endotracheal Airway:  ETT  Cuffed: Yes    Technique Used for Successful ETT Placement:  Direct laryngoscopy  Devices/Methods Used in Placement:  Intubating stylet    Insertion Site:  Oral  Blade Type:  Lucio  Laryngoscope Blade/Videolaryngoscope Blade Size:  3  ETT Size (mm):  7.0  Measured from:  Teeth  ETT to Teeth (cm):  21  Placement Verified by: auscultation and capnometry    Cormack-Lehane Classification:  Grade IIa - partial view of glottis  Number of Attempts at Approach:  1

## 2023-03-01 NOTE — ASSESSMENT & PLAN NOTE
Intraoperative.  DDx includes anesthetic/drug effect versus acute coronary syndrome vs PE vs neurologic.  Doubt infection vs adrenal crisis.  Cardiology consulted  EKG, echo, troponin  Continue IV fluids with norepinephrine drip to maintain mean arterial pressure greater than 65 if needed  Monitor urine output, lactic acid, and vital signs closely for adequacy of resuscitation

## 2023-03-01 NOTE — ASSESSMENT & PLAN NOTE
Insulin sliding scale until able to resume insulin pump  Diabetic diet while monitoring glucose closely

## 2023-03-01 NOTE — ANESTHESIA PROCEDURE NOTES
Arterial Line  Performed by: Marie Mabry M.D.  Authorized by: Marie Mabry M.D.     Start Time:  3/1/2023 7:21 AM  End Time:  3/1/2023 7:23 AM  Localization: surface landmarks    Patient Location:  OR  Indication: continuous blood pressure monitoring        Catheter Size:  20 G  Seldinger Technique?: Yes    Laterality:  Right  Site:  Radial artery  Line Secured:  Antimicrobial disc, tape and transparent dressing  Events: patient tolerated procedure well with no complications

## 2023-03-01 NOTE — ASSESSMENT & PLAN NOTE
With associated chronic neck pain/stiffness and weakness s/p attempted C3-T1 posterior decompressive laminectomy aborted due to hypotension  As needed analgesics  Neurosurgery following  PT/OT  Reevaluation for operative clearance

## 2023-03-01 NOTE — OR SURGEON
Immediate Post OP Note    PreOp Diagnosis:       post left-sided L5-S1 discectomy  2022 with Doctor Arjun, gross resolution of her preoperative back and leg symptomatology  Intermittent neck pain and stiffness, subjective weakness in her hands with unsteady/spastic gait present for 5 years  Mild stenosis C3-4, moderate to severe at C4-5 and C5-6 with effacement of the cord, no clear myelomalacia  Underlying right knee meniscal tear with surgery proposed next week  Labile blood sugars with implanted insulin pump  6.  History of coronary artery disease      PostOp Diagnosis:     post left-sided L5-S1 discectomy  2022 with Doctor Arjun, gross resolution of her preoperative back and leg symptomatology  Intermittent neck pain and stiffness, subjective weakness in her hands with unsteady/spastic gait present for 5 years  Mild stenosis C3-4, moderate to severe at C4-5 and C5-6 with effacement of the cord, no clear myelomalacia  Underlying right knee meniscal tear with surgery proposed next week  Labile blood sugars with implanted insulin pump  6.  History of coronary artery disease      Procedure(s):  H1-5-7-6-7-T1 posterior decompressive laminectomy and O1-1-4-6-7-T1 posterior instrumented fusion - Wound Class: Clean with Drain--abandoned due to intraoperative hypotension  LAMINECTOMY, SPINE, CERVICAL, POSTERIOR APPROACH - Wound Class: Clean with Drain    Surgeon(s):  Evon Díaz M.D.    Anesthesiologist/Type of Anesthesia:  Anesthesiologist: Marie Mabry M.D./General    Surgical Staff:  Assistant: Brigido Richter P.A.-C.  Circulator: Lukas D. Gansert, R.N.  Scrub Person: Ya Swan  Radiology Technologist: Tisha Webster    Specimens removed if any:  * No specimens in log *      Assistants: Brigido Richter PA-C  ,  Specimen: nil    Estimated Blood Loss: 10 cc    Findings: n/a    Complications: Case abandoned due to intraoperative hypotension        3/1/2023 8:38 AM Evon Díaz M.D.

## 2023-03-01 NOTE — ANESTHESIA TIME REPORT
Anesthesia Start and Stop Event Times     Date Time Event    3/1/2023 0651 Ready for Procedure     0704 Anesthesia Start     0846 Anesthesia Stop        Responsible Staff  03/01/23    Name Role Begin End    Marie Mabry M.D. Anesth 0704 0846        Overtime Reason:  no overtime (within assigned shift)    Comments:

## 2023-03-01 NOTE — PROGRESS NOTES
No change to my last H and P (it may be labelled a progress note or a H and P in EPIC). The note was made by either myself, or my PAs Evelio Jones or Brigido Richter but is signed by me.If it was done by my physician assistant, I verify it is correct and has my co-signature.    Evon Díaz MD PhD CRISTIANO

## 2023-03-01 NOTE — ASSESSMENT & PLAN NOTE
As needed labetalol  Hold losartan given intraoperative hypotension, will restart if blood pressure trends up

## 2023-03-01 NOTE — OR NURSING
Patient to recovery with Dr Mabry at bedside.  VSS CHAVARRIA well bilat. Incision to posterior neck with hemovac x2. Scant bloody drianage. Procedure aborted due to patient hypotension.  Stat EKG done and Dr Mabry assessed at bedside.  Patient given 500ml iv bolus of LR.as ordered.  .  Dr Díaz at bedside to discuss patient plan and answer patient questions.  Plan to admit.  Dr Womack intensivist to see patient in PACU.

## 2023-03-01 NOTE — H&P
Hospital Medicine History & Physical Note    Date of Service  3/1/2023    Primary Care Physician  MAHIN Candelario.    Consultants  neurosurgery    Specialist Names: Dr. Díaz    Code Status  Full Code    Chief Complaint  No chief complaint on file.      History of Presenting Illness  Ines Long is a 67 y.o. female with past medical history of diabetes mellitus, CAD who presented 3/1/2023 for elective cervical spinal decompressive laminectomy and posterior fusion however procedure was abandoned due to intraoperative hypotension.  During my evaluation patient is resting comfortably and states her pain is currently well controlled.  She was told by neurosurgery that she will be rescheduled as an outpatient in several weeks.  She denies any lightheadedness, dizziness, shortness of breath, chest pain.    I discussed the plan of care with patient and bedside RN.    Review of Systems  Review of Systems   Constitutional:  Negative for chills and fever.   HENT:  Negative for congestion, hearing loss and sore throat.    Eyes:  Negative for blurred vision and double vision.   Respiratory:  Negative for cough, sputum production and shortness of breath.    Cardiovascular:  Negative for chest pain and palpitations.   Gastrointestinal:  Negative for abdominal pain, heartburn and nausea.   Genitourinary:  Negative for dysuria and urgency.   Musculoskeletal:  Negative for back pain and falls.   Skin:  Negative for itching and rash.   Neurological:  Negative for dizziness, loss of consciousness and headaches.   All other systems reviewed and are negative.    Past Medical History   has a past medical history of Acute nasopharyngitis (04/29/2022), Anesthesia, Blocked artery (Around 2019), Diabetes type 1, controlled (HCC) (11/01/2010), Dyslipidemia (11/01/2010), High cholesterol, Hypertension, Hypothyroidism (11/01/2010), Indigestion, Insulin pump in place (05/18/2011), Left carotid artery stenosis -  "severe 2019, PONV (postoperative nausea and vomiting), Routine health maintenance (05/18/2011), and Urinary incontinence.    Surgical History   has a past surgical history that includes cholecystectomy; abdominal hysterectomy total (01/01/1987); bunionectomy (01/01/2014); knee arthrotomy (Right, 01/01/1978); shoulder arthroscopy (Left, 01/01/1997); orthopedic osteotomy (Left, 07/17/2017); ligament repair (07/17/2017); toe fusion (07/17/2017); repair, tendon, lower extremity, using tendon graft (07/17/2017); carotid endarterectomy (Left, 12/05/2019); lumbar laminectomy diskectomy (05/16/2022); lumbar decompression (05/16/2022); foraminotomy (05/16/2022); meniscectomy, knee, arthroscopic (Right, 08/22/2022); synovectomy (Right, 08/22/2022); chondroplasty (Right, 08/22/2022); shoulder surgery; orif, knee; and foot surgery (2016 and 2018).     Family History  family history includes Cancer in her mother; Diabetes in her sister; Heart Disease in her father, maternal grandfather, and paternal grandfather; Hyperlipidemia in her father; Hypertension in her father; Lung Disease in her father; Psychiatric Illness in her sister; Stroke in her father and paternal grandmother.   Family history reviewed with patient. There is no family history that is pertinent to the chief complaint.     Social History   reports that she quit smoking about 46 years ago. Her smoking use included cigarettes. She has never used smokeless tobacco. She reports current alcohol use of about 1.2 oz per week. She reports that she does not use drugs.    Allergies  Allergies   Allergen Reactions    Ceclor [Cefaclor] Hives and Swelling    Augmentin Hives, Diarrhea and Vomiting     Fever blisters   Sickness lasts forever    Naproxen      Face Swells     Tape Rash     \"Certain band aids\"  PAPER TAPE OKAY/ Tegaderm ok    Cefaclor Hives and Swelling       Medications  Prior to Admission Medications   Prescriptions Last Dose Informant Patient Reported? Taking? "   B Complex Vitamins (VITAMIN B COMPLEX PO) 2023 Patient Yes No   Sig: Take 1 Tablet by mouth every evening.   CRANBERRY PO 2023 Patient Yes No   Sig: Take 500 mg by mouth every evening.   Calcium Carbonate (CALCIUM 600 PO) 2023 Patient Yes No   Sig: Take 600 mg by mouth every day.   Cholecalciferol (VITAMIN D-3 PO) 2023 Patient Yes No   Sig: Take 5,000 mg by mouth every evening.   ELDERBERRY PO 2023 Patient Yes No   Sig: Take  by mouth every day.   FIASP 100 UNIT/ML Solution 3/1/2023 Patient Yes No   Si-50 units with pump daily   FIASP 100 UNIT/ML Solution 3/1/2023 Patient Yes No   LEVOXYL 100 MCG Tab 2023 at 0800 Patient Yes No   Sig: Take 1 Tablet by mouth every morning on an empty stomach.   Multiple Vitamin (MULTIVITAMIN PO) 2023 Patient Yes No   Sig: Take  by mouth every day.   NOVOLOG FLEXPEN 100 UNIT/ML solution for injection not taking Patient Yes No   OMEGA-3 FATTY ACIDS PO 2023 Patient Yes No   Sig: Take  by mouth.   cyclobenzaprine (FLEXERIL) 5 mg tablet 2023 at 2100  No No   Sig: TAKE ONE-THREE TABLET BY MOUTH THREE TIMES A DAY AS NEEDED   ezetimibe (ZETIA) 10 MG Tab 2023 at 2100 Patient No No   Sig: Take 1 Tablet by mouth every day.   insulin infusion pump Device 3/1/2023 Patient Yes No   Sig: Inject  under the skin continuous. Patient's own SQ insulin pump    Type of Insulin: Fiasp  Last change of tubin/15/2022 - Change tubing and site every 72 hours    Dosing:  Basal rate:   0000 - 0329 = 0.5 units/hr   0330 - 1129 = 0.85 units/hr   1130 - 1359 = 0.5 units/hr              1400 - 1759 = 0.45 units/hr              1800 - 2359 = 0.5 units/hr    Bolus ratio:   1 unit : 12 g carbohydrate at  (breakfast, lunch, dinner, snacks)      Correction ratio:    1 units for every 50 over 150 mg/dL    Disconnect pump if patient becomes hypoglycemic and altered.   losartan (COZAAR) 50 MG Tab 2023 at 2100 Patient Yes No   Sig: Take 1 Tablet by mouth every  day.   metoclopramide (REGLAN) 10 MG Tab 2/28/2023 at 2100 Patient No No   Sig: Take 1 Tablet by mouth 2 times a day.   pantoprazole (PROTONIX) 40 MG Tablet Delayed Response 2/28/2023 at 2100 Patient Yes No   Sig: Take 1 Tablet by mouth every day.   rosuvastatin (CRESTOR) 40 MG tablet 2/28/2023 at 2100 Patient No No   Sig: Take 1 Tab by mouth every bedtime.   sodium chloride (SALT) 1 GM Tab 2/28/2023 Patient Yes No   Sig: Take 1 Tablet by mouth 2 times a day.      Facility-Administered Medications: None       Physical Exam  Temp:  [35.9 °C (96.6 °F)-36.8 °C (98.2 °F)] 36.8 °C (98.2 °F)  Pulse:  [] 96  Resp:  [13-20] 13  BP: ()/(38-80) 138/63  SpO2:  [96 %-100 %] 100 %  Blood Pressure : (!) 158/71   Temperature: 36.7 °C (98.1 °F)   Pulse: 100   Respiration: 15   Pulse Oximetry: 100 %       Physical Exam  Vitals and nursing note reviewed.   Constitutional:       Appearance: Normal appearance.   HENT:      Head: Normocephalic and atraumatic.      Right Ear: External ear normal.      Left Ear: External ear normal.      Nose: Nose normal.      Mouth/Throat:      Mouth: Mucous membranes are moist.      Pharynx: Oropharynx is clear.   Eyes:      Extraocular Movements: Extraocular movements intact.      Pupils: Pupils are equal, round, and reactive to light.   Cardiovascular:      Rate and Rhythm: Normal rate and regular rhythm.   Pulmonary:      Effort: Pulmonary effort is normal.      Breath sounds: Normal breath sounds.   Abdominal:      General: Abdomen is flat. Bowel sounds are normal. There is no distension.      Palpations: Abdomen is soft.      Tenderness: There is no abdominal tenderness.   Musculoskeletal:      Cervical back: Normal range of motion and neck supple.      Right lower leg: No edema.      Left lower leg: No edema.   Skin:     General: Skin is warm and dry.   Neurological:      General: No focal deficit present.      Mental Status: She is alert and oriented to person, place, and time.    Psychiatric:         Mood and Affect: Mood normal.         Behavior: Behavior normal.       Laboratory:  Recent Labs     03/01/23  1159   WBC 8.1   RBC 3.79*   HEMOGLOBIN 12.1   HEMATOCRIT 34.2*   MCV 90.2   MCH 31.9   MCHC 35.4*   RDW 41.9   PLATELETCT 234   MPV 10.6     Recent Labs     03/01/23  1159   SODIUM 135   POTASSIUM 3.6   CHLORIDE 99   CO2 23   GLUCOSE 233*   BUN 13   CREATININE 0.68   CALCIUM 8.9     Recent Labs     03/01/23  1159   GLUCOSE 233*         No results for input(s): NTPROBNP in the last 72 hours.      Recent Labs     03/01/23  1159   TROPONINT 49*       Imaging:  DX-PORTABLE FLUORO > 1 HOUR   Final Result      Portable fluoroscopy utilized for 2 seconds.         INTERPRETING LOCATION: [address] [place], [place] [address], [id number]      DX-CERVICAL SPINE-2 OR 3 VIEWS   Final Result      1.  Intraoperative localization of C2-3 disc space and T1 vertebral body.      EC-ECHOCARDIOGRAM COMPLETE W/O CONT    (Results Pending)       no X-Ray or EKG requiring interpretation    Assessment/Plan:  Justification for Admission Status  I anticipate this patient will require at least two midnights for appropriate medical management, necessitating inpatient admission because of intraoperative hypotension requiring close monitoring of blood pressure    Patient will need a Telemetry bed on CARDIOLOGY service .  The need is secondary to intraoperative hypotension.    * Cervical stenosis of spine  Assessment & Plan  Procedure abandoned due to intraoperative hypotension  She will be rescheduled as an outpatient for cervical laminectomy and fusion    Hypotension- (present on admission)  Assessment & Plan      Profound hypotension when placed in prone position requiring repeated doses of epi.  ST depression noted. Resolved once supine. Case cancelled     Primary hypertension- (present on admission)  Assessment & Plan  As needed labetalol  Hold losartan given intraoperative hypotension, will restart if blood pressure trends  up    Hypothyroidism- (present on admission)  Assessment & Plan  Continue Synthroid    Type 1 diabetes mellitus (HCC)- (present on admission)  Assessment & Plan  Continue ISS, diabetic diet, hypoglycemic protocol        VTE prophylaxis: SCDs/TEDs

## 2023-03-02 ENCOUNTER — PHARMACY VISIT (OUTPATIENT)
Dept: PHARMACY | Facility: MEDICAL CENTER | Age: 68
End: 2023-03-02
Payer: COMMERCIAL

## 2023-03-02 VITALS
DIASTOLIC BLOOD PRESSURE: 81 MMHG | TEMPERATURE: 97.9 F | HEIGHT: 67 IN | BODY MASS INDEX: 24.6 KG/M2 | WEIGHT: 156.75 LBS | HEART RATE: 88 BPM | SYSTOLIC BLOOD PRESSURE: 160 MMHG | OXYGEN SATURATION: 96 % | RESPIRATION RATE: 16 BRPM

## 2023-03-02 LAB
ANION GAP SERPL CALC-SCNC: 9 MMOL/L (ref 7–16)
APTT PPP: 23.1 SEC (ref 24.7–36)
BASOPHILS # BLD AUTO: 0.4 % (ref 0–1.8)
BASOPHILS # BLD: 0.03 K/UL (ref 0–0.12)
BUN SERPL-MCNC: 8 MG/DL (ref 8–22)
CALCIUM SERPL-MCNC: 9 MG/DL (ref 8.5–10.5)
CHLORIDE SERPL-SCNC: 104 MMOL/L (ref 96–112)
CO2 SERPL-SCNC: 24 MMOL/L (ref 20–33)
CORTIS SERPL-MCNC: 8.2 UG/DL (ref 0–23)
CREAT SERPL-MCNC: 0.67 MG/DL (ref 0.5–1.4)
EKG IMPRESSION: NORMAL
EOSINOPHIL # BLD AUTO: 0.09 K/UL (ref 0–0.51)
EOSINOPHIL NFR BLD: 1.2 % (ref 0–6.9)
ERYTHROCYTE [DISTWIDTH] IN BLOOD BY AUTOMATED COUNT: 43.2 FL (ref 35.9–50)
GFR SERPLBLD CREATININE-BSD FMLA CKD-EPI: 96 ML/MIN/1.73 M 2
GLUCOSE BLD STRIP.AUTO-MCNC: 152 MG/DL (ref 65–99)
GLUCOSE SERPL-MCNC: 129 MG/DL (ref 65–99)
HCT VFR BLD AUTO: 34.8 % (ref 37–47)
HGB BLD-MCNC: 12 G/DL (ref 12–16)
IMM GRANULOCYTES # BLD AUTO: 0.03 K/UL (ref 0–0.11)
IMM GRANULOCYTES NFR BLD AUTO: 0.4 % (ref 0–0.9)
INR PPP: 0.99 (ref 0.87–1.13)
LYMPHOCYTES # BLD AUTO: 1.59 K/UL (ref 1–4.8)
LYMPHOCYTES NFR BLD: 21.4 % (ref 22–41)
MAGNESIUM SERPL-MCNC: 2 MG/DL (ref 1.5–2.5)
MCH RBC QN AUTO: 31.7 PG (ref 27–33)
MCHC RBC AUTO-ENTMCNC: 34.5 G/DL (ref 33.6–35)
MCV RBC AUTO: 91.8 FL (ref 81.4–97.8)
MONOCYTES # BLD AUTO: 0.58 K/UL (ref 0–0.85)
MONOCYTES NFR BLD AUTO: 7.8 % (ref 0–13.4)
NEUTROPHILS # BLD AUTO: 5.12 K/UL (ref 2–7.15)
NEUTROPHILS NFR BLD: 68.8 % (ref 44–72)
NRBC # BLD AUTO: 0 K/UL
NRBC BLD-RTO: 0 /100 WBC
PLATELET # BLD AUTO: 256 K/UL (ref 164–446)
PMV BLD AUTO: 10.3 FL (ref 9–12.9)
POTASSIUM SERPL-SCNC: 3.8 MMOL/L (ref 3.6–5.5)
PROTHROMBIN TIME: 13 SEC (ref 12–14.6)
RBC # BLD AUTO: 3.79 M/UL (ref 4.2–5.4)
SODIUM SERPL-SCNC: 137 MMOL/L (ref 135–145)
WBC # BLD AUTO: 7.4 K/UL (ref 4.8–10.8)

## 2023-03-02 PROCEDURE — 700102 HCHG RX REV CODE 250 W/ 637 OVERRIDE(OP): Performed by: PHYSICIAN ASSISTANT

## 2023-03-02 PROCEDURE — A9270 NON-COVERED ITEM OR SERVICE: HCPCS | Performed by: INTERNAL MEDICINE

## 2023-03-02 PROCEDURE — 97162 PT EVAL MOD COMPLEX 30 MIN: CPT

## 2023-03-02 PROCEDURE — 85610 PROTHROMBIN TIME: CPT

## 2023-03-02 PROCEDURE — 700102 HCHG RX REV CODE 250 W/ 637 OVERRIDE(OP): Performed by: HOSPITALIST

## 2023-03-02 PROCEDURE — 82962 GLUCOSE BLOOD TEST: CPT

## 2023-03-02 PROCEDURE — 93010 ELECTROCARDIOGRAM REPORT: CPT | Performed by: INTERNAL MEDICINE

## 2023-03-02 PROCEDURE — 700102 HCHG RX REV CODE 250 W/ 637 OVERRIDE(OP): Performed by: INTERNAL MEDICINE

## 2023-03-02 PROCEDURE — 36415 COLL VENOUS BLD VENIPUNCTURE: CPT

## 2023-03-02 PROCEDURE — 83735 ASSAY OF MAGNESIUM: CPT

## 2023-03-02 PROCEDURE — 700101 HCHG RX REV CODE 250: Performed by: PHYSICIAN ASSISTANT

## 2023-03-02 PROCEDURE — 93005 ELECTROCARDIOGRAM TRACING: CPT | Performed by: STUDENT IN AN ORGANIZED HEALTH CARE EDUCATION/TRAINING PROGRAM

## 2023-03-02 PROCEDURE — 700102 HCHG RX REV CODE 250 W/ 637 OVERRIDE(OP): Performed by: STUDENT IN AN ORGANIZED HEALTH CARE EDUCATION/TRAINING PROGRAM

## 2023-03-02 PROCEDURE — A9270 NON-COVERED ITEM OR SERVICE: HCPCS | Performed by: STUDENT IN AN ORGANIZED HEALTH CARE EDUCATION/TRAINING PROGRAM

## 2023-03-02 PROCEDURE — 99232 SBSQ HOSP IP/OBS MODERATE 35: CPT | Performed by: INTERNAL MEDICINE

## 2023-03-02 PROCEDURE — RXMED WILLOW AMBULATORY MEDICATION CHARGE: Performed by: PHYSICIAN ASSISTANT

## 2023-03-02 PROCEDURE — 97166 OT EVAL MOD COMPLEX 45 MIN: CPT

## 2023-03-02 PROCEDURE — 99024 POSTOP FOLLOW-UP VISIT: CPT | Performed by: PHYSICIAN ASSISTANT

## 2023-03-02 PROCEDURE — 82533 TOTAL CORTISOL: CPT

## 2023-03-02 PROCEDURE — A9270 NON-COVERED ITEM OR SERVICE: HCPCS | Performed by: PHYSICIAN ASSISTANT

## 2023-03-02 PROCEDURE — A9270 NON-COVERED ITEM OR SERVICE: HCPCS | Performed by: HOSPITALIST

## 2023-03-02 PROCEDURE — 80048 BASIC METABOLIC PNL TOTAL CA: CPT

## 2023-03-02 PROCEDURE — 85025 COMPLETE CBC W/AUTO DIFF WBC: CPT

## 2023-03-02 PROCEDURE — 85730 THROMBOPLASTIN TIME PARTIAL: CPT

## 2023-03-02 RX ORDER — LOSARTAN POTASSIUM 50 MG/1
50 TABLET ORAL DAILY
Status: DISCONTINUED | OUTPATIENT
Start: 2023-03-02 | End: 2023-03-02 | Stop reason: HOSPADM

## 2023-03-02 RX ORDER — METHOCARBAMOL 750 MG/1
750 TABLET, FILM COATED ORAL EVERY 8 HOURS PRN
Qty: 120 TABLET | Refills: 1 | Status: ON HOLD | OUTPATIENT
Start: 2023-03-02 | End: 2023-03-22 | Stop reason: SDUPTHER

## 2023-03-02 RX ORDER — ACETAMINOPHEN 500 MG
1000 TABLET ORAL EVERY 6 HOURS
Qty: 100 TABLET | Refills: 0 | Status: ON HOLD | OUTPATIENT
Start: 2023-03-02 | End: 2023-03-22

## 2023-03-02 RX ORDER — DOXYCYCLINE HYCLATE 100 MG
100 TABLET ORAL 2 TIMES DAILY
Qty: 10 TABLET | Refills: 0 | Status: ACTIVE | OUTPATIENT
Start: 2023-03-02 | End: 2023-03-07

## 2023-03-02 RX ORDER — SODIUM CHLORIDE 1 G/1
1 TABLET ORAL 2 TIMES DAILY
Status: DISCONTINUED | OUTPATIENT
Start: 2023-03-02 | End: 2023-03-02 | Stop reason: HOSPADM

## 2023-03-02 RX ORDER — OXYCODONE HYDROCHLORIDE 5 MG/1
5 TABLET ORAL EVERY 6 HOURS PRN
Qty: 28 TABLET | Refills: 0 | Status: SHIPPED | OUTPATIENT
Start: 2023-03-02 | End: 2023-03-09

## 2023-03-02 RX ADMIN — EZETIMIBE 10 MG: 10 TABLET ORAL at 05:31

## 2023-03-02 RX ADMIN — METHOCARBAMOL 750 MG: 500 TABLET ORAL at 08:23

## 2023-03-02 RX ADMIN — OXYCODONE 5 MG: 5 TABLET ORAL at 09:56

## 2023-03-02 RX ADMIN — LEVOTHYROXINE SODIUM 100 MCG: 100 TABLET ORAL at 05:32

## 2023-03-02 RX ADMIN — POTASSIUM CHLORIDE AND SODIUM CHLORIDE: 900; 150 INJECTION, SOLUTION INTRAVENOUS at 02:32

## 2023-03-02 RX ADMIN — METOCLOPRAMIDE 10 MG: 10 TABLET ORAL at 05:32

## 2023-03-02 RX ADMIN — LOSARTAN POTASSIUM 50 MG: 50 TABLET, FILM COATED ORAL at 08:23

## 2023-03-02 RX ADMIN — OMEPRAZOLE 20 MG: 20 CAPSULE, DELAYED RELEASE ORAL at 05:32

## 2023-03-02 RX ADMIN — SODIUM CHLORIDE 1 G: 1 TABLET ORAL at 05:32

## 2023-03-02 RX ADMIN — DOCUSATE SODIUM 100 MG: 100 CAPSULE, LIQUID FILLED ORAL at 05:32

## 2023-03-02 RX ADMIN — SENNOSIDES AND DOCUSATE SODIUM 2 TABLET: 50; 8.6 TABLET ORAL at 05:32

## 2023-03-02 RX ADMIN — ACETAMINOPHEN 1000 MG: 500 TABLET, FILM COATED ORAL at 05:32

## 2023-03-02 ASSESSMENT — COGNITIVE AND FUNCTIONAL STATUS - GENERAL
DRESSING REGULAR LOWER BODY CLOTHING: A LITTLE
MOBILITY SCORE: 18
DAILY ACTIVITIY SCORE: 20
MOVING FROM LYING ON BACK TO SITTING ON SIDE OF FLAT BED: A LITTLE
CLIMB 3 TO 5 STEPS WITH RAILING: A LITTLE
WALKING IN HOSPITAL ROOM: A LITTLE
STANDING UP FROM CHAIR USING ARMS: A LITTLE
SUGGESTED CMS G CODE MODIFIER MOBILITY: CK
TURNING FROM BACK TO SIDE WHILE IN FLAT BAD: A LITTLE
DRESSING REGULAR UPPER BODY CLOTHING: A LITTLE
SUGGESTED CMS G CODE MODIFIER DAILY ACTIVITY: CJ
HELP NEEDED FOR BATHING: A LITTLE
TOILETING: A LITTLE
MOVING TO AND FROM BED TO CHAIR: A LITTLE

## 2023-03-02 ASSESSMENT — ENCOUNTER SYMPTOMS
CONSTITUTIONAL NEGATIVE: 1
EYES NEGATIVE: 1
NUMBNESS: 0
DIZZINESS: 0
GASTROINTESTINAL NEGATIVE: 1
SHORTNESS OF BREATH: 0
ARTHRALGIAS: 0
PSYCHIATRIC NEGATIVE: 1
NAUSEA: 0
HEMATOLOGIC/LYMPHATIC NEGATIVE: 1
LIGHT-HEADEDNESS: 0
ENDOCRINE NEGATIVE: 1
ABDOMINAL DISTENTION: 0
NEUROLOGICAL NEGATIVE: 1
RESPIRATORY NEGATIVE: 1
ALLERGIC/IMMUNOLOGIC NEGATIVE: 1
PALPITATIONS: 0
CARDIOVASCULAR NEGATIVE: 1

## 2023-03-02 ASSESSMENT — GAIT ASSESSMENTS
GAIT LEVEL OF ASSIST: STANDBY ASSIST
ASSISTIVE DEVICE: 4 WHEEL WALKER
DEVIATION: ATAXIC;DECREASED BASE OF SUPPORT;SHUFFLED GAIT
DISTANCE (FEET): 40

## 2023-03-02 ASSESSMENT — PAIN DESCRIPTION - PAIN TYPE
TYPE: ACUTE PAIN;CHRONIC PAIN
TYPE: ACUTE PAIN;SURGICAL PAIN
TYPE: ACUTE PAIN;SURGICAL PAIN

## 2023-03-02 ASSESSMENT — ACTIVITIES OF DAILY LIVING (ADL): TOILETING: REQUIRES ASSIST

## 2023-03-02 NOTE — PROGRESS NOTES
POD#0. Neck pain. Cards consult appreciated. Wound is dry. Strength is preserved.  Will appreciate cardiac assessment and plan.

## 2023-03-02 NOTE — PROGRESS NOTES
4 Eyes Skin Assessment Completed by MYNOR Lorenzo and MYNOR Reynolds.    Head WDL  Ears WDL  Nose WDL  Mouth WDL  Neck Incision with drains and On-Q pump  Breast/Chest WDL  Shoulder Blades WDL  Spine WDL  (R) Arm/Elbow/Hand WDL  (L) Arm/Elbow/Hand WDL  Abdomen WDL  Groin WDL  Scrotum/Coccyx/Buttocks WDL  (R) Leg WDL  (L) Leg WDL  (R) Heel/Foot/Toe WDL  (L) Heel/Foot/Toe WDL          Devices In Places Tele Box, Blood Pressure Cuff, Pulse Ox, and SCD's      Interventions In Place N/A    Possible Skin Injury No    Pictures Uploaded Into Epic N/A  Wound Consult Placed N/A  RN Wound Prevention Protocol Ordered No

## 2023-03-02 NOTE — PROGRESS NOTES
"Neurosurgery  POD# 1  Seen with Dr. Díaz  Ambulating  Voiding  Tolerating oral medications  Denies nausea, vomiting  Pain controlled on current medication regimen  Cardiology consult appreciated. Troponins trending downward. ECHO with EF 65. Serum cortisol levels are WNL. Not considered high risk for further surgical interventions. Cardiac recommendations for further prone surgery are again appreciated.   Patient reports that she was on fluid restrictions (30mL/day) from her nephrologist, may have been fluid depleted contributing to her hypotensive episode      Objective:  BP (!) 171/64   Pulse 97   Temp 36.8 °C (98.2 °F) (Temporal)   Resp 18   Ht 1.702 m (5' 7\")   Wt 71.1 kg (156 lb 12 oz)   SpO2 94%     Intake/Output Summary (Last 24 hours) at 3/2/2023 0751  Last data filed at 3/1/2023 0845  Gross per 24 hour   Intake 810 ml   Output 150 ml   Net 660 ml       Recent Labs     03/01/23  1159 03/02/23  0301   WBC 8.1 7.4   RBC 3.79* 3.79*   HEMOGLOBIN 12.1 12.0   HEMATOCRIT 34.2* 34.8*   MCV 90.2 91.8   MCH 31.9 31.7   MCHC 35.4* 34.5   RDW 41.9 43.2   PLATELETCT 234 256   MPV 10.6 10.3     Recent Labs     03/01/23  1159 03/02/23  0301   SODIUM 135 137   POTASSIUM 3.6 3.8   CHLORIDE 99 104   CO2 23 24   GLUCOSE 233* 129*   BUN 13 8     Recent Labs     03/02/23  0301   APTT 23.1*   INR 0.99     Some hypertension, otherwise LS  VSS  Drains: Not measured  Surgical incision clean, dry, intact, no evidence of infection  Strength:  Upper extremities are 5/5 grossly  Otherwise neurologically intact    Assessment:  Active Hospital Problems    Diagnosis     Hypotension [I95.9]     Cervical stenosis of spine [M48.02]      Added automatically from request for surgery [id number]      Cervical myelopathy (HCC) [G95.9]      Added automatically from request for surgery [id number]      Primary hypertension [I10]      Chronic in nature.  Pressure is 120/60 today. Currently taking losartan 50 mg.  Recently had cardiac CT which " indicated elevated cardiac calcium score, we discussed that this does represent heart disease, risk for heart attack or stroke [place] the stress testing that she did indicated that there is no change in perfusion to the heart muscle at this time.      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  Stable.  Patient is on daily symptoms currently.      Type 1 diabetes mellitus (HCC) [E10.9]      This is chronic in nature.  Patient has overall been well managed.  Continues to be 7.1.  States she has been taking Reglan for gastroparesis. States currently taking 2 times per day.  States she is doing well on Reglan.  Patient continues to follow closely with endocrinology and nephrology.       POD#1 S/p Posterior cervical surgery that was aborted d/t profound hypotension and ST depression changes on ECG  Chemoprophylaxis: Yes    Plan:  1. Ambulate with PT/OT as tolerated  2. Advance diet as tolerated  3. Home when medically cleared. We will follow up as outpatient, anticipate re attempting surgery in a few weeks with cardiology recommendations and clearance  4.  I will set up discharge medications and d/c summary, cleared from our perspective when medicine and cardiology agree  5.  Pending drain outputs, will determine when to remove hemovacs and on-q catheters

## 2023-03-02 NOTE — PROGRESS NOTES
Assumed care of patient, received bedside report from Bj LOWE. Patient is A&O X 4. Pain 5/10, medicated per MAR. On RA. On tele monitor, SR 88. POC discussed with patient. Patient verbalized understanding. All questions answered. Call light within reach and fall precautions in place. Bed alarm on, bed locked and in lowest position.

## 2023-03-02 NOTE — CONSULTS
Cardiology Consult Note:    Shant Her M.D.  Date & Time note created:    3/1/2023   4:50 PM     Referring MD:  Dr. Payne    Patient ID:   Name:             Ines Long   YOB: 1955  Age:                 67 y.o.  female   MRN:               [id number]                                                             Reason for Consult:      hypotension    History of Present Illness:    67-year-old female was being taken to the OR today for a supine posterior neurosurgery.  He received standard induction including midazolam fentanyl lidocaine propofol and rocuronium.  The patient was then prone and propofol and remifentanil infusions were started.  She was noted to have profound hypotension with blood pressures into the 60s not responding to IV fluids or to pressors.  Her surgery was aborted.  She was transferred back to the PACU where she was in normal sinus rhythm with a normal blood pressure.  Preoperatively she had no risk factors had a normal nuclear stress test and a moderate risk calcium score.    Review of Systems:      Constitutional: Denies fevers, Denies weight changes  Eyes: Denies changes in vision, no eye pain  Ears/Nose/Throat/Mouth: Denies nasal congestion or sore throat   Cardiovascular: no chest pain, no palpitations   Respiratory: no shortness of breath , Denies cough  Gastrointestinal/Hepatic: Denies abdominal pain, nausea, vomiting, diarrhea, constipation or GI bleeding   Genitourinary: Denies dysuria or frequency  Musculoskeletal/Rheum: Denies  joint pain and swelling, no edema  Skin: Denies rash  Neurological: Denies headache, confusion, memory loss or focal weakness/parasthesias  Psychiatric: denies mood disorder   Endocrine: Erika thyroid problems  Heme/Oncology/Lymph Nodes: Denies enlarged lymph nodes, denies brusing or known bleeding disorder  All other systems were reviewed and are negative (AMA/CMS criteria)                Past Medical History:   Past Medical  "History:   Diagnosis Date    Acute nasopharyngitis 04/29/2022    \"I'm at the tailend of a cold\" .  Symptoms started 4/26/22 included exhaustion, and stuffy nose, cough.  Symptoms are better but pt. reports she still feels \"A little congested\"    Anesthesia     Post op N/V    Blocked artery Around 2019    Carotid artery- had surgery.  Pt. follows up with Dr. Surya Garcia every year.    Diabetes type 1, controlled (HCC) 11/01/2010    Insulin pump in place.  Endocrinologist is Dr. Dewey.     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]     Hypothyroidism [E03.9]     Type 1 diabetes mellitus (HCC) [E10.9]        Past Surgical History:  Past Surgical History:   Procedure Laterality Date    MENISCECTOMY, KNEE, ARTHROSCOPIC Right 08/22/2022    Procedure: RIGHT KNEE ARTHROSCOPY, PARTIAL MEDIAL MENISCECTOMY;  Surgeon: Ravi Gillis M.D.;  Location: [facility];  Service: Orthopedics    SYNOVECTOMY Right 08/22/2022    Procedure: SYNOVECTOMY;  Surgeon: Ravi Gillis M.D.;  Location: [facility];  Service: Orthopedics    CHONDROPLASTY Right 08/22/2022    Procedure: CHONDROPLASTY;  Surgeon: Ravi Gillis M.D.;  Location: [facility];  Service: Orthopedics    LUMBAR LAMINECTOMY DISKECTOMY  05/16/2022    Procedure: L4-5 and L5-S1 decompressive laminectomy, bilateral foraminotomies and left L5-S1;  Surgeon: Evon Díaz M.D.;  " Location: [facility];  Service: Orthopedics    LUMBAR DECOMPRESSION  05/16/2022    Procedure: DECOMPRESSION, SPINE, LUMBAR;  Surgeon: Evon Díaz M.D.;  Location: [facility];  Service: Orthopedics    FORAMINOTOMY  05/16/2022    Procedure: FORAMINOTOMY, SPINE;  Surgeon: Evon Díaz M.D.;  Location: SUR[facility]ERY [facility];  Service: Orthopedics    CAROTID ENDARTERECTOMY Left 12/05/2019    Procedure: ENDARTERECTOMY, CAROTID- WITH NEUROMONITORIING;  Surgeon: Surya Garcia M.D.;  Location: [facility];  Service: Vascular    ORTHOPEDIC OSTEOTOMY Left 07/17/2017    Procedure: ORTHOPEDIC OSTEOTOMY CALCANEAL, KIDNER, EXCISION NAVICULAR, GASTROC SOLEUS RECESSION;  Surgeon: Adebayo Layton M.D.;  Location: [facility];  Service:     LIGAMENT REPAIR  07/17/2017    Procedure: LIGAMENT REPAIR SPRING;  Surgeon: Adebayo Layton M.D.;  Location: SURGERY [facility];  Service:     TOE FUSION  07/17/2017    Procedure: TOE FUSION 1ST METATARSAL JOINT, 2ND TOE RECONSTRUCTION ;  Surgeon: Adebayo Layton M.D.;  Location: SURGERY [facility];  Service:     REPAIR, TENDON, LOWER EXTREMITY, USING TENDON GRAFT  07/17/2017    Procedure: FLEXOR TENDON REPAIR- RELEASE/POSSIBLE FLEXOR DIGITORIUM LONGUS;  Surgeon: Adebayo Layton M.D.;  Location: [facility];  Service:     BUNIONECTOMY  01/01/2014    SHOULDER ARTHROSCOPY Left 01/01/1997    frozen shoulder    ABDOMINAL HYSTERECTOMY TOTAL  01/01/1987    [facility]     CHOLECYSTECTOMY      FOOT SURGERY  2016 and 2018    ORIF, KNEE      [facility]    SHOULDER SURGERY      [facility] shoulder       [facility] Medications:  Current Facility-Administered Medications   Medication Dose    ezetimibe (ZETIA) tablet 10 mg  10 mg    levothyroxine (SYNTHROID) tablet 100 mcg  100 mcg    metoclopramide (REGLAN) tablet 10 mg  10 mg    rosuvastatin (CRESTOR) tablet 40 mg  40 mg    0.9 % NaCl with KCl 20  mEq infusion      Pharmacy Consult Request ...Pain Management Review 1 Each  1 Each    morphine 4 MG/ML injection 2 mg  2 mg    MD ALERT...DO NOT ADMINISTER NSAIDS or ASPIRIN unless ORDERED By Neurosurgery 1 Each  1 Each    diphenhydrAMINE (BENADRYL) tablet/capsule 25 mg  25 mg    Or    diphenhydrAMINE (BENADRYL) injection 25 mg  25 mg    ondansetron (ZOFRAN) syringe/vial injection 4 mg  4 mg    ondansetron (ZOFRAN ODT) dispertab 4 mg  4 mg    ALPRAZolam (XANAX) tablet 0.25 mg  0.25 mg    labetalol (NORMODYNE/TRANDATE) injection 10 mg  10 mg    benzocaine-menthol (Cepacol) lozenge 1 Lozenge  1 Lozenge    docusate sodium (COLACE) capsule 100 mg  100 mg    sodium phosphate (Fleet) enema 133 mL  1 Each    dextrose 10 % BOLUS 25 g  25 g    acetaminophen (TYLENOL) tablet 1,000 mg  1,000 mg    Followed by    [START ON 3/6/2023] acetaminophen (TYLENOL) tablet 1,000 mg  1,000 mg    MD Alert...Vancomycin per Pharmacy      methocarbamol (ROBAXIN) tablet 750 mg  750 mg    senna-docusate (PERICOLACE or SENOKOT S) 8.6-50 MG per tablet 2 Tablet  2 Tablet    And    polyethylene glycol/lytes (MIRALAX) PACKET 1 Packet  1 Packet    And    magnesium hydroxide (MILK OF MAGNESIA) suspension 30 mL  30 mL    And    bisacodyl (DULCOLAX) suppository 10 mg  10 mg    sodium chloride (SALT) tablet 1 g  1 g    oxyCODONE immediate-release (ROXICODONE) tablet 5 mg  5 mg    Or    oxyCODONE immediate-release (ROXICODONE) tablet 10 mg  10 mg    Or    HYDROmorphone (Dilaudid) injection 0.5 mg  0.5 mg    omeprazole (PRILOSEC) capsule 20 mg  20 mg    vancomycin (VANCOCIN) 1,000 mg in  mL IVPB  1,000 mg    insulin infusion pump (patient's own)      ropivacaine 0.2 % (NAROPIN) 2 mg/mL 270 mL in On-Q Pump infusion           Current Outpatient Medications:  Medications Prior to Admission   Medication Sig Dispense Refill Last Dose    cyclobenzaprine (FLEXERIL) 5 mg tablet TAKE ONE-THREE TABLET BY MOUTH THREE TIMES A DAY AS NEEDED 90 Tablet 0 2/28/2023  at     ezetimibe (ZETIA) 10 MG Tab Take 1 Tablet by mouth every day. 90 Tablet 3 2023 at     FIASP 100 UNIT/ML Solution    3/1/2023    OMEGA-3 FATTY ACIDS PO Take  by mouth.   2023    sodium chloride (SALT) 1 GM Tab Take 1 Tablet by mouth 2 times a day.   2023    Calcium Carbonate (CALCIUM 600 PO) Take 600 mg by mouth every day.   2023    NOVOLOG FLEXPEN 100 UNIT/ML solution for injection    not taking    Multiple Vitamin (MULTIVITAMIN PO) Take  by mouth every day.   2023    ELDERBERRY PO Take  by mouth every day.   2023    metoclopramide (REGLAN) 10 MG Tab Take 1 Tablet by mouth 2 times a day. 120 Tablet 0 2023 at     losartan (COZAAR) 50 MG Tab Take 1 Tablet by mouth every day.   2023 at     FIASP 100 UNIT/ML Solution 4-50 units with pump daily   3/1/2023    insulin infusion pump Device Inject  under the skin continuous. Patient's own SQ insulin pump    Type of Insulin: Fiasp  Last change of tubin2023 - Change tubing and site every 72 hours    Dosing:  Basal rate:   0000 - 0329 = 0.5 units/hr   0330 - 1129 = 0.85 units/hr   1130 - 1359 = 0.5 units/hr              1400 - 1759 = 0.45 units/hr              1800 - 2359 = 0.5 units/hr    Bolus ratio:   1 unit : 12 g carbohydrate at  (breakfast, lunch, dinner, snacks)      Correction ratio:    1 units for every 50 over 150 mg/dL    Disconnect pump if patient becomes hypoglycemic and altered.   3/1/2023    CRANBERRY PO Take 500 mg by mouth every evening.   2023    B Complex Vitamins (VITAMIN B COMPLEX PO) Take 1 Tablet by mouth every evening.   2023    LEVOXYL 100 MCG Tab Take 1 Tablet by mouth every morning on an empty stomach.   2023 at 0800    pantoprazole (PROTONIX) 40 MG Tablet Delayed Response Take 1 Tablet by mouth every day.   2023 at 2100    rosuvastatin (CRESTOR) 40 MG tablet Take 1 Tab by mouth every bedtime. 30 Tab 1 2023 at 2100    Cholecalciferol (VITAMIN D-3 PO) Take  "5,000 mg by mouth every evening.   2023       Medication Allergy:  Allergies   Allergen Reactions    Ceclor [Cefaclor] Hives and Swelling    Augmentin Hives, Diarrhea and Vomiting     Fever blisters   Sickness lasts forever    Naproxen      Face Swells     Tape Rash     \"Certain band aids\"  PAPER TAPE OKAY/ Tegaderm ok    Cefaclor Hives and Swelling       Family History:  Family History   Problem Relation Age of Onset    Cancer Mother         rectal cancer    Stroke Father          59    Heart Disease Father     Hypertension Father     Lung Disease Father         tb    Hyperlipidemia Father     Diabetes Sister     Psychiatric Illness Sister         bipolar    Heart Disease Maternal Grandfather     Stroke Paternal Grandmother     Heart Disease Paternal Grandfather        Social History:  Social History     Socioeconomic History    Marital status:      Spouse name: Not on file    Number of children: Not on file    Years of education: Not on file    Highest education level: Not on file   Occupational History    Not on file   Tobacco Use    Smoking status: Former     Packs/day: 0.00     Years: 2.00     Pack years: 0.00     Types: Cigarettes     Quit date: 1977     Years since quittin.1    Smokeless tobacco: Never   Vaping Use    Vaping Use: Never used   Substance and Sexual Activity    Alcohol use: Yes     Alcohol/week: 1.2 oz     Types: 2 Glasses of wine per week     Comment: about 3-4 drinks per week.     Drug use: Never    Sexual activity: Yes     Partners: Male     Birth control/protection: Surgical     Comment: Pt states had a hysterectomy   Other Topics Concern    Not on file   Social History Narrative    Not on file     Social Determinants of Health     Financial Resource Strain: Not on file   Food Insecurity: Not on file   Transportation Needs: Not on file   Physical Activity: Not on file   Stress: Not on file   Social Connections: Not on file   Intimate Partner Violence: Not on file " "  Housing Stability: Not on file         Physical Exam:  Vitals/ General Appearance:   Weight/BMI: Body mass index is 24.55 kg/m².  BP (!) 145/79   Pulse 100   Temp 36.5 °C (97.7 °F) (Temporal)   Resp 16   Ht 1.702 m (5' 7\")   Wt 71.1 kg (156 lb 12 oz)   SpO2 96%   Vitals:    03/01/23 1300 03/01/23 1330 03/01/23 1400 03/01/23 1553   BP: (!) 151/60 139/64 138/63 (!) 145/79   Pulse: (!) 104 93 96 100   Resp: 20 20 13 16   Temp: 36.8 °C (98.2 °F)   36.5 °C (97.7 °F)   TempSrc: Temporal   Temporal   SpO2: 100% 99% 100% 96%   Weight:       Height:         Oxygen Therapy:  Pulse Oximetry: 96 %, O2 (LPM): 0, O2 Delivery Device: None - Room Air    Constitutional:   Well developed, Well nourished, No acute distress  HENMT:  Normocephalic, Atraumatic, Oropharynx moist mucous membranes, No oral exudates, Nose normal.  No thyromegaly.  Eyes:  EOMI, Conjunctiva normal, No discharge.  Neck:  Normal range of motion, No cervical tenderness,  no JVD.  Cardiovascular:  Normal heart rate, Normal rhythm, No murmurs, No rubs, No gallops.   Extremitites with intact distal pulses, no cyanosis, or edema.  Lungs:  Normal breath sounds, breath sounds clear to auscultation bilaterally,  no crackles, no wheezing.   Abdomen: Bowel sounds normal, Soft, No tenderness, No guarding, No rebound, No masses, No hepatosplenomegaly.  Skin: Warm, Dry, No erythema, No rash, no induration.  Neurologic: Alert & oriented x 3, No focal deficits noted, cranial nerves II through X are grossly intact.  Psychiatric: Affect normal, Judgment normal, Mood normal.      MDM (Data Review):     Records reviewed and summarized in current documentation    Lab Data Review:  Recent Results (from the past 24 hour(s))   COD (Adult)    Collection Time: 03/01/23  6:19 AM   Result Value Ref Range    ABO Grouping Only O     Rh Grouping Only POS     Antibody Screen-Cod NEG    POCT glucose device results    Collection Time: 03/01/23  6:22 AM   Result Value Ref Range    POC " Glucose, Blood 201 (H) 65 - 99 mg/dL   POCT glucose device results    Collection Time: 03/01/23  8:42 AM   Result Value Ref Range    POC Glucose, Blood 139 (H) 65 - 99 mg/dL   POCT glucose device results    Collection Time: 03/01/23  9:26 AM   Result Value Ref Range    POC Glucose, Blood 118 (H) 65 - 99 mg/dL   CBC with Differential    Collection Time: 03/01/23 11:59 AM   Result Value Ref Range    WBC 8.1 4.8 - 10.8 K/uL    RBC 3.79 (L) 4.20 - 5.40 M/uL    Hemoglobin 12.1 12.0 - 16.0 g/dL    Hematocrit 34.2 (L) 37.0 - 47.0 %    MCV 90.2 81.4 - 97.8 fL    MCH 31.9 27.0 - 33.0 pg    MCHC 35.4 (H) 33.6 - 35.0 g/dL    RDW 41.9 35.9 - 50.0 fL    Platelet Count 234 164 - 446 K/uL    MPV 10.6 9.0 - 12.9 fL    Neutrophils-Polys 84.30 (H) 44.00 - 72.00 %    Lymphocytes 9.40 (L) 22.00 - 41.00 %    Monocytes 5.90 0.00 - 13.40 %    Eosinophils 0.10 0.00 - 6.90 %    Basophils 0.10 0.00 - 1.80 %    Immature Granulocytes 0.20 0.00 - 0.90 %    Nucleated RBC 0.00 /100 WBC    Neutrophils (Absolute) 6.79 2.00 - 7.15 K/uL    Lymphs (Absolute) 0.76 (L) 1.00 - 4.80 K/uL    Monos (Absolute) 0.48 0.00 - 0.85 K/uL    Eos (Absolute) 0.01 0.00 - 0.51 K/uL    Baso (Absolute) 0.01 0.00 - 0.12 K/uL    Immature Granulocytes (abs) 0.02 0.00 - 0.11 K/uL    NRBC (Absolute) 0.00 K/uL   Basic Metabolic Panel (BMP)    Collection Time: 03/01/23 11:59 AM   Result Value Ref Range    Sodium 135 135 - 145 mmol/L    Potassium 3.6 3.6 - 5.5 mmol/L    Chloride 99 96 - 112 mmol/L    Co2 23 20 - 33 mmol/L    Glucose 233 (H) 65 - 99 mg/dL    Bun 13 8 - 22 mg/dL    Creatinine 0.68 0.50 - 1.40 mg/dL    Calcium 8.9 8.5 - 10.5 mg/dL    Anion Gap 13.0 7.0 - 16.0   Magnesium    Collection Time: 03/01/23 11:59 AM   Result Value Ref Range    Magnesium 1.4 (L) 1.5 - 2.5 mg/dL   TROPONIN    Collection Time: 03/01/23 11:59 AM   Result Value Ref Range    Troponin T 49 (H) 6 - 19 ng/L   ESTIMATED GFR    Collection Time: 03/01/23 11:59 AM   Result Value Ref Range    GFR  (CKD-EPI) 96 >60 mL/min/1.73 m 2   POCT glucose device results    Collection Time: 03/01/23 12:46 PM   Result Value Ref Range    POC Glucose, Blood 213 (H) 65 - 99 mg/dL   CORTISOL    Collection Time: 03/01/23  3:45 PM   Result Value Ref Range    Cortisol 19.1 0.0 - 23.0 ug/dL       Imaging/Procedures Review:    Chest Xray:  Reviewed    EKG:   Dated 3/1/2023 personally viewed inter myself showing normal sinus rhythm otherwise normal.    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  Given her lack of cardiac findings this is unlikely to be cardiac in nature.  This is likely due to multiple factors including prerenal azotemia due to being n.p.o. after midnight, propofol which drops his peripheral vascular systems cardiac output as well as the fentanyl derivative which can cause decreased cardiac output and hypotension.  Furthermore placing in the prone position would decrease return to the heart via the IVC which can also precipitate worsening hypotension.  She is not high risk surgery for repeat attempt at this.  I would make sure that she is fluid resuscitated and avoid likely propofol and fentanyl derivatives.  Having phenylephrine on standby for peripheral vascular disease issues would be recommended.  I would not recommend a PA catheter placed prior to surgery.

## 2023-03-02 NOTE — PROGRESS NOTES
Patient is being discharged home. Patient is A&Ox4. Ivs removed by MYNOR Lorenzo and hemovacs and onq catheter removed by Jenny Arauz RN from [facility]. Discharge instructions provided to patient and reviewed. Patient verbalized understanding and all questions and concerns were addressed. Meds to beds delivered. Patient reports she has DME at home. All belongings were packed and taken with. Patient escorted off unit by nursing staff with family members.

## 2023-03-02 NOTE — DISCHARGE PLANNING
Case Management Discharge Planning    Admission Date: 3/1/2023  GMLOS: 2.8  ALOS: 1    6-Clicks ADL Score: 20  6-Clicks Mobility Score: 19      Anticipated Discharge Dispo:  Home no needs..    DME Needed: No    Action(s) Taken: Updated Provider/Nurse on Discharge Plan    Escalations Completed: None    Medically Clear: Yes    Next Steps: Hometoday , no needs .     Barriers to Discharge: None    Is the patient up for discharge today .

## 2023-03-02 NOTE — THERAPY
"Occupational Therapy   Initial Evaluation     Patient Name: Ines Long  Age:  67 y.o., Sex:  female  Medical Record #: [id number]  Today's Date: 3/2/2023    Precautions: Fall Risk, Spinal / Back Precautions   Comments: 2 hemovacs, 1 pain ball, CGM to lateral right upper extremity, insulin pump    Assessment    Patient is 67 y.o. female admitted for elective cervical surgery but surgery was aborted following initial incision d/t profound intraoperative hypotension. Pt presents to OT eval limited by posterior neck pain but is motivated to DC home with assist from her spouse. Reviewed cervical spine precautions for pain mgmt and provided education on compensatory strategies for ADLs. Post-op packet provided for reference of education. Pt demonstrated LB dress, bed mobility and household ambulation. Pt spouse present for eval and confirms ability to assist spouse at her current level.     Plan    Occupational Therapy Initial Treatment Plan   Duration: Evaluation only    DC Equipment Recommendations: None  Discharge Recommendations: Recommend home health for continued occupational therapy services     Subjective    \"My  helps me if I need it.\"     Objective     03/02/23 0909   Prior Living Situation   Prior Services Intermittent Physical Support for ADL Per Family   Housing / Facility 1 Story House   Steps Into Home 2   Steps In Home 1   Bathroom Set up Walk In Shower;Shower Chair   Equipment Owned Front-Wheel Walker;Single Point Cane;Tub / Shower Seat   Lives with - Patient's Self Care Capacity Spouse   Comments pt reports plans to install grab bars   Prior Level of ADL Function   Self Feeding Independent   Grooming / Hygiene Independent   Bathing Independent   Dressing Requires Assist   Toileting Requires Assist   Comments  assists/supervises ADLs as needed   Prior Level of IADL Function   Comments  manages as needed   Precautions   Precautions Fall Risk;Spinal / Back Precautions  "   Comments 2 hemovacs, 1 pain ball to posterior neck, CGM to lateral right upper extremity, insulin pump   Cognition    Level of Consciousness Alert   Comments pleasant, motivated to DC home   Strength Upper Body   Comments pt reports decreased strength bilaterally   Upper Body Muscle Tone   Upper Body Muscle Tone  WDL   Coordination Upper Body   Comments pt reports decreased coordination bilaterally   Balance Assessment   Sitting Balance (Static) Fair   Sitting Balance (Dynamic) Fair -   Standing Balance (Static) Fair -   Standing Balance (Dynamic) Fair -   Weight Shift Sitting Fair   Weight Shift Standing Fair   Comments FWW   Bed Mobility    Supine to Sit Minimal Assist   Scooting Contact Guard Assist   Rolling Contact Guard Assist   ADL Assessment   Eating Supervision   Grooming Supervision;Seated   Upper Body Dressing Minimal Assist   Lower Body Dressing Minimal Assist   Toileting Minimal Assist   How much help from another person does the patient currently need...   Putting on and taking off regular lower body clothing? 3   Bathing (including washing, rinsing, and drying)? 3   Toileting, which includes using a toilet, bedpan, or urinal? 3   Putting on and taking off regular upper body clothing? 3   Taking care of personal grooming such as brushing teeth? 4   Eating meals? 4   6 Clicks Daily Activity Score 20   Functional Mobility   Sit to Stand Contact Guard Assist   Bed, Chair, Wheelchair Transfer Supervised   Transfer Method Stand Step   Mobility w/FWW   Activity Tolerance   Sitting in Chair functional   Sitting Edge of Bed functional   Standing functional   Comments functional for ADLs with Ortiz from    Education Group   Education Provided Role of Occupational Therapist;Activities of Daily Living;Home Safety;Weight Bearing Precautions;Spinal Precautions   Role of Occupational Therapist Patient Response Patient;Acceptance;Explanation;Verbal Demonstration;Significant Other   Spinal Precautions Patient  Response Patient;Acceptance;Explanation;Verbal Demonstration;Significant Other   Home Safety Patient Response Patient;Acceptance;Explanation;Verbal Demonstration;Significant Other   ADL Patient Response Patient;Acceptance;Explanation;Verbal Demonstration;Significant Other   Weight Bearing Precautions Patient Response Patient;Acceptance;Explanation;Verbal Demonstration;Significant Other   Additional Comments  present throughout   Occupational Therapy Initial Treatment Plan    Duration Evaluation only   Problem List   Problem List None   Anticipated Discharge Equipment and Recommendations   DC Equipment Recommendations None   Discharge Recommendations Recommend home health for continued occupational therapy services

## 2023-03-02 NOTE — THERAPY
"Physical Therapy   Initial Evaluation     Patient Name: Ines Long  Age:  67 y.o., Sex:  female  Medical Record #: [id number]  Today's Date: 3/2/2023     Precautions  Precautions: Fall Risk;Spinal / Back Precautions   Comments: 2 hemovacs, 1 pain ball to posterior neck, CGM to lateral right upper extremity, insulin pump    Assessment  Patient is 67 y.o. female with a diagnosis of cervical stenosis. Pt was brought in via outpatient for cervical fusion/laminectomy however had unresolved hypotension during surgery and surgery was discontinued.PMH includes: spastic gait, Lumbar spine surgery, DMT2. Pt reports \"clumsiness\" at baseline in BUE and BLE, PMH significant for spastic gait.     Pt tolerated session well, she ambulated 40' within room with FWW and stand by assistance. She reported mild fatigue and requested to sit down. She demonstrates BLE and truncal ataxia during gait however reports this is her baseline gait pattern. She was a functional household ambulator at home prior to hospitalization and required physical assistance for stairs from her . Her  is present during session and appears healthy and strong.     Pt does not require acute PT services at this time as she appears at her functional baseline. Stairs were not performed during session d/t decreased endurance however pt reports this is her baseline and that she will not have a problem performing stairs at home. No additional services recommended.     Plan         DC Equipment Recommendations: None  Discharge Recommendations: Anticipate that the patient will have no further physical therapy needs after discharge from the hospital       Subjective    Pt is pleasant and cooperative during session.      Objective     03/02/23 0910   Precautions   Precautions Fall Risk;Spinal / Back Precautions    Vitals   Pulse 94   Patient BP Position Supine   Blood Pressure  (!) 168/77   Pulse Oximetry 92 %   O2 Delivery Device None - Room Air "   Pain 0 - 10 Group   Location Incision;Neck   Location Orientation Posterior   Pain Rating Scale (NPRS) 4   Active ROM Lower Body    Comments Impaired however not affecting mobility   Strength Lower Body   Lower Body Strength  X   Rt Hip Flexion Strength 3+ (F+)   Rt Knee Extension Strength 3+ (F+)   Rt Ankle Dorsiflexion Strength 3+ (F+)   Lt Hip Flexion Strength 3+ (F+)   Lt Knee Extension Strength 3+ (F+)   Lt Ankle Dorsiflexion Strength 3+ (F+)   Neurological Concerns   Comments LE and mild truncal ataxia during gait   Balance   Sitting Balance (Static) Fair   Sitting Balance (Dynamic) Fair -   Standing Balance (Static) Fair -   Standing Balance (Dynamic) Fair -   Weight Shift Sitting Fair   Weight Shift Standing Fair   Bed Mobility    Supine to Sit Minimal Assist   Scooting Contact Guard Assist   Rolling Contact Guard Assist   Gait Analysis   Gait Level Of Assist Standby Assist   Assistive Device 4 Wheel Walker   Distance (Feet) 40   # of Times Distance was Traveled 1   Deviation Ataxic;Decreased Base Of Support;Shuffled Gait   Comments Pt ambulated 40' with FWW and SBA. She demonstrates truncal and BLE ataxia however reports this is her baseline mechanics.   Functional Mobility   Sit to Stand Contact Guard Assist   Bed, Chair, Wheelchair Transfer Supervised   How much difficulty does the patient currently have...   Turning over in bed (including adjusting bedclothes, sheets and blankets)? 3   Sitting down on and standing up from a chair with arms (e.g., wheelchair, bedside commode, etc.) 3   Moving from lying on back to sitting on the side of the bed? 3   How much help from another person does the patient currently need...   Moving to and from a bed to a chair (including a wheelchair)? 3   Need to walk in a hospital room? 3   Climbing 3-5 steps with a railing? 3   6 clicks Mobility Score 18   Anticipated Discharge Equipment and Recommendations   DC Equipment Recommendations None   Discharge Recommendations  Anticipate that the patient will have no further physical therapy needs after discharge from the hospital   Interdisciplinary Plan of Care Collaboration   IDT Collaboration with  Nursing;Occupational Therapist   Patient Position at End of Therapy Seated;Family / Friend in Room;Phone within Reach;Tray Table within Reach;Call Light within Reach   Session Information   Date / Session Number  3/2 -1 (D/C Needs)

## 2023-03-02 NOTE — DISCHARGE SUMMARY
Discharge Summary    CHIEF COMPLAINT ON ADMISSION  No chief complaint on file.      Reason for Admission  Cervical stenosis of spine     Admission Date  3/1/2023    CODE STATUS  Full Code    HPI & HOSPITAL COURSE  8/19/2022  Ines Long is a 67 y.o. female seen today in a neurosurgery/spine consultation.  Her  was in attendance today.  She was seen in conjunction with Doctor Arjun today.  When we originally saw her back in April she was having low back and left leg symptomatology with a spastic gait.  She reports her gait has been like that for approximately fear 5 years after a foot surgery.  She is found to have a L5-S1 left-sided disc protrusion and subsequently underwent a discectomy surgery.  Left leg symptoms have largely settled.  She continues with unsteady gait as well as intermittent neck pain and stiffness this is usually every 3 to 4 weeks.  She denies any pain, numbness or tingling into her arms.  She does feel some weakness in her bilateral .  She does have upcoming right knee surgery next week.      In essence, this is a 67-year-old female with off-and-on neck pain and stiffness without any clear radicular complaints with subjective weakness in her hands and a spastic gait.      Pertinent past medical history is remarkable for diabetes, she does have insulin pump, her blood sugars have been quite labile in the 500s.  She has had a difficult time getting a follow up with her endocrinologist who manages this.  Previous left carotid surgery for carotid artery stenosis.        12/2/2022  Ines comes back.  She still has unsteady gait.  On questioning she does have clumsiness in the hands.  Neck pain is a little bit of an issue.  She had hip surgery did well with it.  She had unsteady regular and tandem gait today.  MRI scan shows moderate stenosis.  I have suggested we get her carotids looked at again.  She is seeing Dr. Shant Card for heart.  We will regroup back in  January.Surgery on the neck is on the table.  She is seeing Dr. Gao on December 16.  He is trying exclude a brain cause for her unsteadiness.  If nothing else turns up.  It is worthwhile fixing the neck to try to stop the symptoms progressing.        1/20/2023  Ines returns with her daughter.  She feels she is getting worse in terms of arm and leg function.  She seen her neurology team they feel it cervical.  She had a carotid ultrasound and nuclear.  She wants to proceed with surgery.  I went over the surgery and the recovery again using sheets and models.  She received an email form from me with extensive paperwork.  We will get a cardiology clearance.    3/2/2023  On postoperative day 1 she is doing quite well.  She reports mild incisional site discomfort which is well controlled on oral analgesics at this time.  She has been evaluated by cardiology who felt her intraoperative hypotension was related to fluid depletion, medications and surgical positioning.  Troponins have trended in correct position.  Echo showed left ventricular ejection fraction of 65.  Serum cortisol levels have been within normal limits.  Her EKG appears to be stable at this time.  Doctor Arjun had a long discussion with her about her options at this time.  If cardiology and internal medicine agrees, they are recommending home at this time with close outpatient follow-up.  She will need to coordinate with her nephrologist and cardiology.  We will reattempt surgery in 2 to 3 weeks when she is optimized.  From cardiac standpoint she is not at high risk for further surgeries.          No notes on file    Therefore, she is discharged in fair and stable condition to home with close outpatient follow-up.    The patient recovered much more quickly than anticipated on admission.    Discharge Date  3/2/2023     FOLLOW UP ITEMS POST DISCHARGE  Follow up with our office in 2, 6, and 12 weeks.  Report to ER with any complications.  Call our office  with any questions or concerns.  No anti-inflammatories for 4 months, no blood thinners for 2 weeks.   Clear to shower Saturday, pat incision dry, no special creams or ointments.   Avoid bending, lifting and twisting.   Walk 4-6 times per day as tolerated to help prevent blood clots.     DISCHARGE DIAGNOSES  Principal Problem:    Cervical stenosis of spine POA: Unknown      Overview: Added automatically from request for surgery [id number]  Active Problems:    Type 1 diabetes mellitus (HCC) POA: Yes      Overview: This is chronic in nature.  Patient has overall been well managed.        Continues to be 7.1.  States she has been taking Reglan for gastroparesis.       States currently taking 2 times per day.  States she is doing well on       Reglan.  Patient continues to follow closely with endocrinology and       nephrology.    Hypothyroidism POA: Yes      Overview: Chronic in nature.  Stable.  Patient is on daily symptoms currently.    Disorder of lipid metabolism POA: Yes    Carotid artery stenosis POA: Yes    Primary hypertension POA: Yes      Overview: Chronic in nature.  Pressure is 120/60 today. Currently taking losartan 50       mg.  Recently had cardiac CT which indicated elevated cardiac calcium       score, we discussed that this does represent heart disease, risk for heart       attack or stroke [place] the stress testing that she did indicated that       there is no change in perfusion to the heart muscle at this time.    Cervical myelopathy (HCC) POA: Unknown      Overview: Added automatically from request for surgery [id number]    Hypotension POA: Yes  Resolved Problems:    * No resolved hospital problems. *      FOLLOW UP  Future Appointments   Date Time Provider Department Center   3/10/2023  4:30 PM FRANK Edwards [facility] [address] [facility]   3/17/2023  9:40 AM Ghassan Gray M.D. [facility] None   3/22/2023 11:00 AM FRANK Edwards [facility]   3/23/2023  2:15 PM [facility] EXAM 12 ECHO [facility] [address]   2023  9:40 AM SOBIA Candelario [facility]     No follow-up provider specified.    MEDICATIONS ON DISCHARGE     Medication List        START taking these medications        Instructions   acetaminophen 500 MG Tabs  Commonly known as: TYLENOL   Take 2 Tablets by mouth every 6 hours.  Dose: 1,000 mg     doxycycline 100 MG Tabs  Commonly known as: VIBRAMYCIN   Take 1 Tablet by mouth 2 times a day for 5 days.  Dose: 100 mg     methocarbamol 750 MG Tabs  Commonly known as: ROBAXIN   Take 1 Tablet by mouth every 8 hours as needed (muscle spasm).  Dose: 750 mg     oxyCODONE immediate-release 5 MG Tabs  Commonly known as: ROXICODONE   Take 1 Tablet by mouth every 6 hours as needed for Severe Pain for up to 7 days.  Dose: 5 mg            CONTINUE taking these medications        Instructions   CALCIUM 600 PO   Take 600 mg by mouth every day.  Dose: 600 mg     CRANBERRY PO   Take 500 mg by mouth every evening.  Dose: 500 mg     cyclobenzaprine 5 mg tablet  Commonly known as: Flexeril   TAKE ONE-THREE TABLET BY MOUTH THREE TIMES A DAY AS NEEDED     ELDERBERRY PO   Take  by mouth every day.     ezetimibe 10 MG Tabs  Commonly known as: ZETIA   Take 1 Tablet by mouth every day.  Dose: 10 mg     * Fiasp 100 UNIT/ML Soln  Generic drug: Insulin Aspart (w/Niacinamide)   4-50 units with pump daily     * Fiasp 100 UNIT/ML Soln  Generic drug: Insulin Aspart (w/Niacinamide)      insulin infusion pump Rosibel   Inject  under the skin continuous. Patient's own SQ insulin pump    Type of Insulin: Fiasp  Last change of tubin2023 - Change tubing and site every 72 hours    Dosing:  Basal rate:   0000 - 0329 = 0.5 units/hr   0330 - 1129 = 0.85 units/hr   1130 - 1359 = 0.5 units/hr              1400 - 1759 = 0.45 units/hr              1800 - 2359 = 0.5 units/hr    Bolus ratio:   1 unit : 12 g carbohydrate at  (breakfast, lunch, dinner, snacks)      Correction ratio:    1 units for every 50 over 150  "mg/dL    Disconnect pump if patient becomes hypoglycemic and altered.     Levoxyl 100 MCG Tabs  Generic drug: levothyroxine   Take 1 Tablet by mouth every morning on an empty stomach.  Dose: 100 mcg     losartan 50 MG Tabs  Commonly known as: COZAAR   Take 1 Tablet by mouth every day.  Dose: 50 mg     metoclopramide 10 MG Tabs  Commonly known as: REGLAN   Take 1 Tablet by mouth 2 times a day.  Dose: 10 mg     MULTIVITAMIN PO   Take  by mouth every day.     NovoLOG FlexPen 100 UNIT/ML injection PEN  Generic drug: insulin aspart      OMEGA-3 FATTY ACIDS PO   Take  by mouth.     pantoprazole 40 MG Tbec  Commonly known as: PROTONIX   Take 1 Tablet by mouth every day.  Dose: 40 mg     rosuvastatin 40 MG tablet  Commonly known as: Crestor   Doctor's comments: Please contact PCP for further medication refills. Thank you  Take 1 Tab by mouth every bedtime.  Dose: 40 mg     sodium chloride 1 GM Tabs  Commonly known as: SALT   Take 1 Tablet by mouth 2 times a day.  Dose: 1 g     VITAMIN B COMPLEX PO   Take 1 Tablet by mouth every evening.  Dose: 1 Tablet     VITAMIN D-3 PO   Take 5,000 mg by mouth every evening.  Dose: 5,000 mg           * This list has 2 medication(s) that are the same as other medications prescribed for you. Read the directions carefully, and ask your doctor or other care provider to review them with you.                  Allergies  Allergies   Allergen Reactions    Ceclor [Cefaclor] Hives and Swelling    Augmentin Hives, Diarrhea and Vomiting     Fever blisters   Sickness lasts forever    Naproxen      Face Swells     Tape Rash     \"Certain band aids\"  PAPER TAPE OKAY/ Tegaderm ok    Cefaclor Hives and Swelling       DIET  Orders Placed This Encounter   Procedures    Diet Order Diet: Regular     Standing Status:   Standing     Number of Occurrences:   1     Order Specific Question:   Diet:     Answer:   Regular [1]       ACTIVITY  As tolerated.  Weight bearing as tolerated    CONSULTATIONS  Cardiology and " Internal medicine    PROCEDURES  TITLE OF PROCEDURE:    C9-U2-D9-C6-C7-T1 posterior decompressive laminectomy and instrumented fusion- case abandoned due to intraoperative  hypotension that could not be corrected    LABORATORY  Lab Results   Component Value Date    SODIUM 137 03/02/2023    POTASSIUM 3.8 03/02/2023    CHLORIDE 104 03/02/2023    CO2 24 03/02/2023    GLUCOSE 129 (H) 03/02/2023    BUN 8 03/02/2023    CREATININE 0.67 03/02/2023        Lab Results   Component Value Date    WBC 7.4 03/02/2023    HEMOGLOBIN 12.0 03/02/2023    HEMATOCRIT 34.8 (L) 03/02/2023    PLATELETCT 256 03/02/2023        Total time of the discharge process exceeds 30 minutes.

## 2023-03-02 NOTE — DISCHARGE INSTRUCTIONS
Take medications as directed   Discuss with your surgeon surgical plan moving forward and what you can do preoperatively to help limit low blood pressures

## 2023-03-02 NOTE — CARE PLAN
The patient is Stable - Low risk of patient condition declining or worsening    Shift Goals  Clinical Goals: monitor drains and BP, pain control  Patient Goals: pain control, rest    Progress made toward(s) clinical / shift goals:    Problem: Fall Risk  Goal: Patient will remain free from falls  Outcome: Progressing  Note: Patient uses call light appropriately and does not try to get out of bed without staff.      Problem: Hemodynamics  Goal: Patient's hemodynamics, fluid balance and neurologic status will be stable or improve  Outcome: Progressing  Note: Patient has not been hypotensive.        Patient is not progressing towards the following goals:

## 2023-03-02 NOTE — PROGRESS NOTES
Cardiology Follow Up Progress Note    Date of Service  3/2/2023    Attending Physician  Madhavi Osei M.D.    Chief Complaint   hypotension    HPI  Ines Long is a 67 y.o. female admitted 3/1/2023 with hypotension    Interim Events  BP stable overnight  Mild back pain  Echo completed yesterday, normal  No arrythmias    Review of Systems  Review of Systems   Constitutional: Negative.    HENT: Negative.     Eyes: Negative.    Respiratory: Negative.  Negative for shortness of breath.    Cardiovascular: Negative.  Negative for chest pain, palpitations and leg swelling.   Gastrointestinal: Negative.  Negative for abdominal distention and nausea.   Endocrine: Negative.    Genitourinary: Negative.    Musculoskeletal:  Negative for arthralgias.   Skin: Negative.    Allergic/Immunologic: Negative.    Neurological: Negative.  Negative for dizziness, light-headedness and numbness.   Hematological: Negative.    Psychiatric/Behavioral: Negative.       Vital signs in last 24 hours  Temp:  [36.3 °C (97.4 °F)-36.8 °C (98.2 °F)] 36.8 °C (98.2 °F)  Pulse:  [] 97  Resp:  [13-20] 18  BP: (130-171)/(60-79) 171/64  SpO2:  [91 %-100 %] 94 %    Physical Exam  Physical Exam  Vitals and nursing note reviewed.   Constitutional:       General: She is not in acute distress.     Appearance: Normal appearance. She is normal weight. She is not ill-appearing, toxic-appearing or diaphoretic.   HENT:      Head: Normocephalic and atraumatic.      Right Ear: Ear canal and external ear normal.      Left Ear: Ear canal and external ear normal.      Nose: Nose normal. No congestion or rhinorrhea.      Mouth/Throat:      Mouth: Mucous membranes are moist.      Pharynx: Oropharynx is clear. No oropharyngeal exudate or posterior oropharyngeal erythema.   Eyes:      General: No scleral icterus.        Right eye: No discharge.         Left eye: No discharge.      Extraocular Movements: Extraocular movements intact.      Conjunctiva/sclera:  Conjunctivae normal.      Pupils: Pupils are equal, round, and reactive to light.   Neck:      Vascular: No carotid bruit.   Cardiovascular:      Rate and Rhythm: Normal rate and regular rhythm.      Pulses: Normal pulses.      Heart sounds: Normal heart sounds. No murmur heard.    No gallop.   Pulmonary:      Effort: Pulmonary effort is normal. No respiratory distress.      Breath sounds: Normal breath sounds. No stridor. No wheezing, rhonchi or rales.   Abdominal:      General: Abdomen is flat. Bowel sounds are normal. There is no distension.      Palpations: Abdomen is soft. There is no mass.      Tenderness: There is no abdominal tenderness. There is no guarding or rebound.      Hernia: No hernia is present.   Musculoskeletal:         General: No swelling or tenderness. Normal range of motion.      Cervical back: Normal range of motion and neck supple. No rigidity. No muscular tenderness.      Right lower leg: No edema.      Left lower leg: No edema.   Lymphadenopathy:      Cervical: No cervical adenopathy.   Skin:     General: Skin is warm and dry.      Capillary Refill: Capillary refill takes 2 to 3 seconds.      Coloration: Skin is not jaundiced or pale.      Findings: No bruising or lesion.   Neurological:      General: No focal deficit present.      Mental Status: She is alert and oriented to person, place, and time. Mental status is at baseline.      Cranial Nerves: No cranial nerve deficit.      Motor: No weakness.   Psychiatric:         Mood and Affect: Mood normal.         Behavior: Behavior normal.         Thought Content: Thought content normal.         Judgment: Judgment normal.       Lab Review  Lab Results   Component Value Date/Time    WBC 7.4 03/02/2023 03:01 AM    RBC 3.79 (L) 03/02/2023 03:01 AM    HEMOGLOBIN 12.0 03/02/2023 03:01 AM    HEMATOCRIT 34.8 (L) 03/02/2023 03:01 AM    MCV 91.8 03/02/2023 03:01 AM    MCH 31.7 03/02/2023 03:01 AM    MCHC 34.5 03/02/2023 03:01 AM    MPV 10.3 03/02/2023  03:01 AM      Lab Results   Component Value Date/Time    SODIUM 137 03/02/2023 03:01 AM    POTASSIUM 3.8 03/02/2023 03:01 AM    CHLORIDE 104 03/02/2023 03:01 AM    CO2 24 03/02/2023 03:01 AM    GLUCOSE 129 (H) 03/02/2023 03:01 AM    BUN 8 03/02/2023 03:01 AM    CREATININE 0.67 03/02/2023 03:01 AM      Lab Results   Component Value Date/Time    ASTSGOT 38 02/23/2023 09:27 AM    ALTSGPT 42 02/23/2023 09:27 AM     Lab Results   Component Value Date/Time    CHOLSTRLTOT 175 07/19/2022 09:04 AM    LDL 89 07/19/2022 09:04 AM    HDL 70 07/19/2022 09:04 AM    TRIGLYCERIDE 80 07/19/2022 09:04 AM    TROPONINT 19 03/01/2023 09:43 PM       No results for input(s): NTPROBNP in the last 72 hours.    Cardiac Imaging and Procedures Review  EKG:  My personal interpretation of the EKG dated 3/1/2023 is normal sinus rhythm otherwise normal    Echocardiogram: Dated 3/1/2023 personally viewed and inter myself showing normal LV systolic function no valvular heart disease.      Assessment/Plan  No new Assessment & Plan notes have been filed under this hospital service since the last note was generated.  Service: Cardiology  67-year-old female on a fluid restriction for hyponatremia with hypotension during induction and post induction for surgery.  This does not represent a cardiac complication.  She is no higher risk for her further surgery should this need to happen.  She is good to go home and follow-up as an outpatient.  I would not recommend any further cardiac testing.  We will see her in the cardiology office within the next few months.    Thank you for allowing me to participate in the care of this patient.  Cardiology will sign off on this patient    Please contact me with any questions.    Shant Her M.D.   Cardiologist, [facility]  [phone]

## 2023-03-08 ENCOUNTER — PRE-ADMISSION TESTING (OUTPATIENT)
Dept: ADMISSIONS | Facility: MEDICAL CENTER | Age: 68
DRG: 472 | End: 2023-03-08
Attending: FAMILY MEDICINE
Payer: MEDICARE

## 2023-03-08 ENCOUNTER — HOSPITAL ENCOUNTER (OUTPATIENT)
Dept: LAB | Facility: MEDICAL CENTER | Age: 68
End: 2023-03-08
Attending: NEUROLOGICAL SURGERY
Payer: MEDICARE

## 2023-03-08 ENCOUNTER — HOSPITAL ENCOUNTER (OUTPATIENT)
Dept: LAB | Facility: MEDICAL CENTER | Age: 68
DRG: 472 | End: 2023-03-08
Attending: FAMILY MEDICINE
Payer: MEDICARE

## 2023-03-08 DIAGNOSIS — M48.02 CERVICAL STENOSIS OF SPINE: ICD-10-CM

## 2023-03-08 DIAGNOSIS — E78.49 OTHER HYPERLIPIDEMIA: ICD-10-CM

## 2023-03-08 LAB
ABO GROUP BLD: NORMAL
ANION GAP SERPL CALC-SCNC: 13 MMOL/L (ref 7–16)
APTT PPP: 26 SEC (ref 24.7–36)
BASOPHILS # BLD AUTO: 0.3 % (ref 0–1.8)
BASOPHILS # BLD: 0.02 K/UL (ref 0–0.12)
BLD GP AB SCN SERPL QL: NORMAL
BUN SERPL-MCNC: 8 MG/DL (ref 8–22)
CALCIUM SERPL-MCNC: 9.4 MG/DL (ref 8.5–10.5)
CHLORIDE SERPL-SCNC: 97 MMOL/L (ref 96–112)
CHOLEST SERPL-MCNC: 141 MG/DL (ref 100–199)
CO2 SERPL-SCNC: 23 MMOL/L (ref 20–33)
CREAT SERPL-MCNC: 0.64 MG/DL (ref 0.5–1.4)
EOSINOPHIL # BLD AUTO: 0.11 K/UL (ref 0–0.51)
EOSINOPHIL NFR BLD: 1.9 % (ref 0–6.9)
ERYTHROCYTE [DISTWIDTH] IN BLOOD BY AUTOMATED COUNT: 45.2 FL (ref 35.9–50)
EST. AVERAGE GLUCOSE BLD GHB EST-MCNC: 151 MG/DL
FASTING STATUS PATIENT QL REPORTED: NORMAL
FASTING STATUS PATIENT QL REPORTED: NORMAL
GFR SERPLBLD CREATININE-BSD FMLA CKD-EPI: 97 ML/MIN/1.73 M 2
GLUCOSE SERPL-MCNC: 219 MG/DL (ref 65–99)
HBA1C MFR BLD: 6.9 % (ref 4–5.6)
HCT VFR BLD AUTO: 39.3 % (ref 37–47)
HDLC SERPL-MCNC: 43 MG/DL
HGB BLD-MCNC: 12.9 G/DL (ref 12–16)
IMM GRANULOCYTES # BLD AUTO: 0.02 K/UL (ref 0–0.11)
IMM GRANULOCYTES NFR BLD AUTO: 0.3 % (ref 0–0.9)
INR PPP: 0.95 (ref 0.87–1.13)
LDLC SERPL CALC-MCNC: 70 MG/DL
LYMPHOCYTES # BLD AUTO: 1.34 K/UL (ref 1–4.8)
LYMPHOCYTES NFR BLD: 23.2 % (ref 22–41)
MCH RBC QN AUTO: 31.2 PG (ref 27–33)
MCHC RBC AUTO-ENTMCNC: 32.8 G/DL (ref 33.6–35)
MCV RBC AUTO: 94.9 FL (ref 81.4–97.8)
MONOCYTES # BLD AUTO: 0.42 K/UL (ref 0–0.85)
MONOCYTES NFR BLD AUTO: 7.3 % (ref 0–13.4)
NEUTROPHILS # BLD AUTO: 3.86 K/UL (ref 2–7.15)
NEUTROPHILS NFR BLD: 67 % (ref 44–72)
NRBC # BLD AUTO: 0 K/UL
NRBC BLD-RTO: 0 /100 WBC
PLATELET # BLD AUTO: 365 K/UL (ref 164–446)
PMV BLD AUTO: 10.6 FL (ref 9–12.9)
POTASSIUM SERPL-SCNC: 3.9 MMOL/L (ref 3.6–5.5)
PROTHROMBIN TIME: 12.6 SEC (ref 12–14.6)
RBC # BLD AUTO: 4.14 M/UL (ref 4.2–5.4)
RH BLD: NORMAL
SODIUM SERPL-SCNC: 133 MMOL/L (ref 135–145)
TRIGL SERPL-MCNC: 141 MG/DL (ref 0–149)
WBC # BLD AUTO: 5.8 K/UL (ref 4.8–10.8)

## 2023-03-08 PROCEDURE — 80061 LIPID PANEL: CPT

## 2023-03-08 PROCEDURE — 83695 ASSAY OF LIPOPROTEIN(A): CPT

## 2023-03-08 PROCEDURE — 83036 HEMOGLOBIN GLYCOSYLATED A1C: CPT | Mod: GA

## 2023-03-08 PROCEDURE — 80048 BASIC METABOLIC PNL TOTAL CA: CPT

## 2023-03-08 PROCEDURE — 85025 COMPLETE CBC W/AUTO DIFF WBC: CPT

## 2023-03-08 PROCEDURE — 85610 PROTHROMBIN TIME: CPT | Mod: GA

## 2023-03-08 PROCEDURE — 86850 RBC ANTIBODY SCREEN: CPT

## 2023-03-08 PROCEDURE — 36415 COLL VENOUS BLD VENIPUNCTURE: CPT

## 2023-03-08 PROCEDURE — 85730 THROMBOPLASTIN TIME PARTIAL: CPT | Mod: GA

## 2023-03-08 PROCEDURE — 86901 BLOOD TYPING SEROLOGIC RH(D): CPT

## 2023-03-08 PROCEDURE — 86900 BLOOD TYPING SEROLOGIC ABO: CPT

## 2023-03-08 RX ORDER — PATIROMER 8.4 G/1
POWDER, FOR SUSPENSION ORAL
COMMUNITY
Start: 2023-02-15 | End: 2023-05-05

## 2023-03-09 LAB — LPA SERPL-MCNC: 286 MG/DL

## 2023-03-10 PROBLEM — E78.41 ELEVATED LIPOPROTEIN(A): Chronic | Status: ACTIVE | Noted: 2023-03-10

## 2023-03-13 NOTE — OR NURSING
No results noted in chart for outstanding UA that patient anticipated completing 3/11. Contacted patient by phone. She stated she was unable to get to the lab 3/11, however, she plans to complete UA on Wednesday 3/15 at lab on [address].

## 2023-03-16 NOTE — PREPROCEDURE INSTRUCTIONS
Patient called back regarding UA lab test.  Per patient, UA was not collected at time of lab draw.  Patient was just at MD's office (3/16) and per patient, STEWART Ayala told her not to worry about going back for UA collection.

## 2023-03-16 NOTE — OR NURSING
No results for UA.  Called patient to inquire.  No answer.  Message left 3/16/2023 @ 0581 asking patient to call back to discuss.

## 2023-03-20 ENCOUNTER — ANESTHESIA (OUTPATIENT)
Dept: SURGERY | Facility: MEDICAL CENTER | Age: 68
DRG: 472 | End: 2023-03-20
Payer: MEDICARE

## 2023-03-20 ENCOUNTER — ANESTHESIA EVENT (OUTPATIENT)
Dept: SURGERY | Facility: MEDICAL CENTER | Age: 68
DRG: 472 | End: 2023-03-20
Payer: MEDICARE

## 2023-03-20 ENCOUNTER — HOSPITAL ENCOUNTER (INPATIENT)
Facility: MEDICAL CENTER | Age: 68
LOS: 2 days | DRG: 472 | End: 2023-03-22
Attending: NEUROLOGICAL SURGERY | Admitting: NEUROLOGICAL SURGERY
Payer: MEDICARE

## 2023-03-20 ENCOUNTER — APPOINTMENT (OUTPATIENT)
Dept: RADIOLOGY | Facility: MEDICAL CENTER | Age: 68
DRG: 472 | End: 2023-03-20
Attending: NEUROLOGICAL SURGERY
Payer: MEDICARE

## 2023-03-20 DIAGNOSIS — G89.18 POSTOPERATIVE PAIN AFTER SPINAL SURGERY: ICD-10-CM

## 2023-03-20 LAB
ABO GROUP BLD: NORMAL
BLD GP AB SCN SERPL QL: NORMAL
GLUCOSE BLD STRIP.AUTO-MCNC: 155 MG/DL (ref 65–99)
GLUCOSE BLD STRIP.AUTO-MCNC: 216 MG/DL (ref 65–99)
GLUCOSE BLD STRIP.AUTO-MCNC: 262 MG/DL (ref 65–99)
RH BLD: NORMAL

## 2023-03-20 PROCEDURE — 0RG2071 FUSION OF 2 OR MORE CERVICAL VERTEBRAL JOINTS WITH AUTOLOGOUS TISSUE SUBSTITUTE, POSTERIOR APPROACH, POSTERIOR COLUMN, OPEN APPROACH: ICD-10-PCS | Performed by: NEUROLOGICAL SURGERY

## 2023-03-20 PROCEDURE — 700102 HCHG RX REV CODE 250 W/ 637 OVERRIDE(OP): Performed by: NEUROLOGICAL SURGERY

## 2023-03-20 PROCEDURE — 95864 NEEDLE EMG 4 EXTREMITIES: CPT | Performed by: NEUROLOGICAL SURGERY

## 2023-03-20 PROCEDURE — 63048 LAM FACETEC &FORAMOT EA ADDL: CPT | Mod: 58 | Performed by: NEUROLOGICAL SURGERY

## 2023-03-20 PROCEDURE — 700102 HCHG RX REV CODE 250 W/ 637 OVERRIDE(OP): Performed by: PHYSICIAN ASSISTANT

## 2023-03-20 PROCEDURE — 160009 HCHG ANES TIME/MIN: Performed by: NEUROLOGICAL SURGERY

## 2023-03-20 PROCEDURE — 22842 INSERT SPINE FIXATION DEVICE: CPT | Mod: 58 | Performed by: NEUROLOGICAL SURGERY

## 2023-03-20 PROCEDURE — 22600 ARTHRD PST TQ 1NTRSPC CRV: CPT | Mod: ASROC,58 | Performed by: PHYSICIAN ASSISTANT

## 2023-03-20 PROCEDURE — 700111 HCHG RX REV CODE 636 W/ 250 OVERRIDE (IP): Performed by: ANESTHESIOLOGY

## 2023-03-20 PROCEDURE — 00NW0ZZ RELEASE CERVICAL SPINAL CORD, OPEN APPROACH: ICD-10-PCS | Performed by: NEUROLOGICAL SURGERY

## 2023-03-20 PROCEDURE — A9270 NON-COVERED ITEM OR SERVICE: HCPCS | Performed by: PHYSICIAN ASSISTANT

## 2023-03-20 PROCEDURE — 110371 HCHG SHELL REV 272: Performed by: NEUROLOGICAL SURGERY

## 2023-03-20 PROCEDURE — 700105 HCHG RX REV CODE 258: Performed by: NEUROLOGICAL SURGERY

## 2023-03-20 PROCEDURE — 01N10ZZ RELEASE CERVICAL NERVE, OPEN APPROACH: ICD-10-PCS | Performed by: NEUROLOGICAL SURGERY

## 2023-03-20 PROCEDURE — 700101 HCHG RX REV CODE 250: Performed by: NEUROLOGICAL SURGERY

## 2023-03-20 PROCEDURE — 36620 INSERTION CATHETER ARTERY: CPT | Performed by: ANESTHESIOLOGY

## 2023-03-20 PROCEDURE — 95939 C MOTOR EVOKED UPR&LWR LIMBS: CPT | Performed by: NEUROLOGICAL SURGERY

## 2023-03-20 PROCEDURE — 700111 HCHG RX REV CODE 636 W/ 250 OVERRIDE (IP): Performed by: NEUROLOGICAL SURGERY

## 2023-03-20 PROCEDURE — 86850 RBC ANTIBODY SCREEN: CPT

## 2023-03-20 PROCEDURE — 82962 GLUCOSE BLOOD TEST: CPT

## 2023-03-20 PROCEDURE — 700101 HCHG RX REV CODE 250: Performed by: PHYSICIAN ASSISTANT

## 2023-03-20 PROCEDURE — 700106 HCHG RX REV CODE 271: Performed by: NEUROLOGICAL SURGERY

## 2023-03-20 PROCEDURE — 20936 SP BONE AGRFT LOCAL ADD-ON: CPT | Mod: 58 | Performed by: NEUROLOGICAL SURGERY

## 2023-03-20 PROCEDURE — 160031 HCHG SURGERY MINUTES - 1ST 30 MINS LEVEL 5: Performed by: NEUROLOGICAL SURGERY

## 2023-03-20 PROCEDURE — 8968 PR NO CHARGE - PROCEDURE: Mod: ASROC,58 | Performed by: PHYSICIAN ASSISTANT

## 2023-03-20 PROCEDURE — 63045 LAM FACETEC & FORAMOT CRV: CPT | Mod: ASROC,58 | Performed by: PHYSICIAN ASSISTANT

## 2023-03-20 PROCEDURE — 63045 LAM FACETEC & FORAMOT CRV: CPT | Mod: 58 | Performed by: NEUROLOGICAL SURGERY

## 2023-03-20 PROCEDURE — 22600 ARTHRD PST TQ 1NTRSPC CRV: CPT | Mod: 58 | Performed by: NEUROLOGICAL SURGERY

## 2023-03-20 PROCEDURE — A9270 NON-COVERED ITEM OR SERVICE: HCPCS | Performed by: NEUROLOGICAL SURGERY

## 2023-03-20 PROCEDURE — 86901 BLOOD TYPING SEROLOGIC RH(D): CPT

## 2023-03-20 PROCEDURE — 160048 HCHG OR STATISTICAL LEVEL 1-5: Performed by: NEUROLOGICAL SURGERY

## 2023-03-20 PROCEDURE — 95870 NDL EMG LMTD STD MUSC 1 XTR: CPT | Performed by: NEUROLOGICAL SURGERY

## 2023-03-20 PROCEDURE — 700102 HCHG RX REV CODE 250 W/ 637 OVERRIDE(OP): Performed by: ANESTHESIOLOGY

## 2023-03-20 PROCEDURE — A9270 NON-COVERED ITEM OR SERVICE: HCPCS | Performed by: ANESTHESIOLOGY

## 2023-03-20 PROCEDURE — 36415 COLL VENOUS BLD VENIPUNCTURE: CPT

## 2023-03-20 PROCEDURE — 95937 NEUROMUSCULAR JUNCTION TEST: CPT | Performed by: NEUROLOGICAL SURGERY

## 2023-03-20 PROCEDURE — 00670 ANES XTNSV SP&SPI CORD PX: CPT | Performed by: ANESTHESIOLOGY

## 2023-03-20 PROCEDURE — 22842 INSERT SPINE FIXATION DEVICE: CPT | Mod: ASROC,58 | Performed by: PHYSICIAN ASSISTANT

## 2023-03-20 PROCEDURE — 22614 ARTHRD PST TQ 1NTRSPC EA ADD: CPT | Mod: ASROC,58 | Performed by: PHYSICIAN ASSISTANT

## 2023-03-20 PROCEDURE — 160042 HCHG SURGERY MINUTES - EA ADDL 1 MIN LEVEL 5: Performed by: NEUROLOGICAL SURGERY

## 2023-03-20 PROCEDURE — 95940 IONM IN OPERATNG ROOM 15 MIN: CPT | Performed by: NEUROLOGICAL SURGERY

## 2023-03-20 PROCEDURE — 63048 LAM FACETEC &FORAMOT EA ADDL: CPT | Mod: ASROC,58 | Performed by: PHYSICIAN ASSISTANT

## 2023-03-20 PROCEDURE — 160036 HCHG PACU - EA ADDL 30 MINS PHASE I: Performed by: NEUROLOGICAL SURGERY

## 2023-03-20 PROCEDURE — A4306 DRUG DELIVERY SYSTEM <=50 ML: HCPCS | Performed by: NEUROLOGICAL SURGERY

## 2023-03-20 PROCEDURE — C1713 ANCHOR/SCREW BN/BN,TIS/BN: HCPCS | Performed by: NEUROLOGICAL SURGERY

## 2023-03-20 PROCEDURE — 22614 ARTHRD PST TQ 1NTRSPC EA ADD: CPT | Mod: 58 | Performed by: NEUROLOGICAL SURGERY

## 2023-03-20 PROCEDURE — 700105 HCHG RX REV CODE 258: Performed by: ANESTHESIOLOGY

## 2023-03-20 PROCEDURE — 160035 HCHG PACU - 1ST 60 MINS PHASE I: Performed by: NEUROLOGICAL SURGERY

## 2023-03-20 PROCEDURE — 72040 X-RAY EXAM NECK SPINE 2-3 VW: CPT

## 2023-03-20 PROCEDURE — 86900 BLOOD TYPING SEROLOGIC ABO: CPT

## 2023-03-20 PROCEDURE — 0RG4071 FUSION OF CERVICOTHORACIC VERTEBRAL JOINT WITH AUTOLOGOUS TISSUE SUBSTITUTE, POSTERIOR APPROACH, POSTERIOR COLUMN, OPEN APPROACH: ICD-10-PCS | Performed by: NEUROLOGICAL SURGERY

## 2023-03-20 PROCEDURE — 502000 HCHG MISC OR IMPLANTS RC 0278: Performed by: NEUROLOGICAL SURGERY

## 2023-03-20 PROCEDURE — 8E0WXBZ COMPUTER ASSISTED PROCEDURE OF TRUNK REGION: ICD-10-PCS | Performed by: NEUROLOGICAL SURGERY

## 2023-03-20 PROCEDURE — 00NX0ZZ RELEASE THORACIC SPINAL CORD, OPEN APPROACH: ICD-10-PCS | Performed by: NEUROLOGICAL SURGERY

## 2023-03-20 PROCEDURE — 700101 HCHG RX REV CODE 250: Performed by: ANESTHESIOLOGY

## 2023-03-20 PROCEDURE — 700111 HCHG RX REV CODE 636 W/ 250 OVERRIDE (IP): Performed by: PHYSICIAN ASSISTANT

## 2023-03-20 PROCEDURE — 01N80ZZ RELEASE THORACIC NERVE, OPEN APPROACH: ICD-10-PCS | Performed by: NEUROLOGICAL SURGERY

## 2023-03-20 PROCEDURE — 770001 HCHG ROOM/CARE - MED/SURG/GYN PRIV*

## 2023-03-20 PROCEDURE — 160002 HCHG RECOVERY MINUTES (STAT): Performed by: NEUROLOGICAL SURGERY

## 2023-03-20 PROCEDURE — 110454 HCHG SHELL REV 250: Performed by: NEUROLOGICAL SURGERY

## 2023-03-20 PROCEDURE — 95938 SOMATOSENSORY TESTING: CPT | Performed by: NEUROLOGICAL SURGERY

## 2023-03-20 DEVICE — GRAFT ACTIFUSE ABX PUTTY 2.5ML: Type: IMPLANTABLE DEVICE | Status: FUNCTIONAL

## 2023-03-20 DEVICE — SCREW BONE INFINITY 3.5 X 14MM (1EA): Type: IMPLANTABLE DEVICE | Status: FUNCTIONAL

## 2023-03-20 DEVICE — GRAFT ACTIFUSE ABX PUTTY 5ML: Type: IMPLANTABLE DEVICE | Status: FUNCTIONAL

## 2023-03-20 DEVICE — SCREW SET INFINITY (1EA): Type: IMPLANTABLE DEVICE | Status: FUNCTIONAL

## 2023-03-20 RX ORDER — HYDROMORPHONE HYDROCHLORIDE 2 MG/ML
INJECTION, SOLUTION INTRAMUSCULAR; INTRAVENOUS; SUBCUTANEOUS PRN
Status: DISCONTINUED | OUTPATIENT
Start: 2023-03-20 | End: 2023-03-20 | Stop reason: SURG

## 2023-03-20 RX ORDER — ALPRAZOLAM 0.25 MG
0.25 TABLET ORAL 2 TIMES DAILY PRN
Status: DISCONTINUED | OUTPATIENT
Start: 2023-03-20 | End: 2023-03-22 | Stop reason: HOSPADM

## 2023-03-20 RX ORDER — HYDRALAZINE HYDROCHLORIDE 20 MG/ML
5 INJECTION INTRAMUSCULAR; INTRAVENOUS
Status: DISCONTINUED | OUTPATIENT
Start: 2023-03-20 | End: 2023-03-20 | Stop reason: HOSPADM

## 2023-03-20 RX ORDER — POLYETHYLENE GLYCOL 3350 17 G/17G
1 POWDER, FOR SOLUTION ORAL 2 TIMES DAILY PRN
Status: DISCONTINUED | OUTPATIENT
Start: 2023-03-20 | End: 2023-03-22 | Stop reason: HOSPADM

## 2023-03-20 RX ORDER — TRANEXAMIC ACID 100 MG/ML
1000 INJECTION, SOLUTION INTRAVENOUS ONCE
Status: ACTIVE | OUTPATIENT
Start: 2023-03-20 | End: 2023-03-21

## 2023-03-20 RX ORDER — MAGNESIUM HYDROXIDE 1200 MG/15ML
LIQUID ORAL
Status: COMPLETED | OUTPATIENT
Start: 2023-03-20 | End: 2023-03-20

## 2023-03-20 RX ORDER — PHENYLEPHRINE HYDROCHLORIDE 10 MG/ML
INJECTION, SOLUTION INTRAMUSCULAR; INTRAVENOUS; SUBCUTANEOUS PRN
Status: DISCONTINUED | OUTPATIENT
Start: 2023-03-20 | End: 2023-03-20 | Stop reason: SURG

## 2023-03-20 RX ORDER — MORPHINE SULFATE 4 MG/ML
2 INJECTION INTRAVENOUS ONCE
Status: COMPLETED | OUTPATIENT
Start: 2023-03-20 | End: 2023-03-20

## 2023-03-20 RX ORDER — ACETAMINOPHEN 500 MG
1000 TABLET ORAL EVERY 6 HOURS PRN
Status: DISCONTINUED | OUTPATIENT
Start: 2023-03-25 | End: 2023-03-22 | Stop reason: HOSPADM

## 2023-03-20 RX ORDER — PANTOPRAZOLE SODIUM 40 MG/1
40 TABLET, DELAYED RELEASE ORAL DAILY
Status: DISCONTINUED | OUTPATIENT
Start: 2023-03-20 | End: 2023-03-20

## 2023-03-20 RX ORDER — DEXAMETHASONE SODIUM PHOSPHATE 4 MG/ML
INJECTION, SOLUTION INTRA-ARTICULAR; INTRALESIONAL; INTRAMUSCULAR; INTRAVENOUS; SOFT TISSUE PRN
Status: DISCONTINUED | OUTPATIENT
Start: 2023-03-20 | End: 2023-03-20 | Stop reason: SURG

## 2023-03-20 RX ORDER — HYDROMORPHONE HYDROCHLORIDE 1 MG/ML
0.2 INJECTION, SOLUTION INTRAMUSCULAR; INTRAVENOUS; SUBCUTANEOUS
Status: DISCONTINUED | OUTPATIENT
Start: 2023-03-20 | End: 2023-03-20 | Stop reason: HOSPADM

## 2023-03-20 RX ORDER — BUPIVACAINE HYDROCHLORIDE AND EPINEPHRINE 5; 5 MG/ML; UG/ML
INJECTION, SOLUTION EPIDURAL; INTRACAUDAL; PERINEURAL
Status: DISCONTINUED | OUTPATIENT
Start: 2023-03-20 | End: 2023-03-20 | Stop reason: HOSPADM

## 2023-03-20 RX ORDER — REMIFENTANIL HYDROCHLORIDE 1 MG/ML
INJECTION, POWDER, LYOPHILIZED, FOR SOLUTION INTRAVENOUS
Status: DISCONTINUED | OUTPATIENT
Start: 2023-03-20 | End: 2023-03-20 | Stop reason: SURG

## 2023-03-20 RX ORDER — DIPHENHYDRAMINE HCL 25 MG
25 TABLET ORAL EVERY 6 HOURS PRN
Status: DISCONTINUED | OUTPATIENT
Start: 2023-03-20 | End: 2023-03-22 | Stop reason: HOSPADM

## 2023-03-20 RX ORDER — CLINDAMYCIN PHOSPHATE 900 MG/50ML
900 INJECTION, SOLUTION INTRAVENOUS EVERY 8 HOURS
Status: COMPLETED | OUTPATIENT
Start: 2023-03-20 | End: 2023-03-20

## 2023-03-20 RX ORDER — VANCOMYCIN HYDROCHLORIDE 1 G/20ML
INJECTION, POWDER, LYOPHILIZED, FOR SOLUTION INTRAVENOUS
Status: COMPLETED | OUTPATIENT
Start: 2023-03-20 | End: 2023-03-20

## 2023-03-20 RX ORDER — ONDANSETRON 2 MG/ML
4 INJECTION INTRAMUSCULAR; INTRAVENOUS EVERY 4 HOURS PRN
Status: DISCONTINUED | OUTPATIENT
Start: 2023-03-20 | End: 2023-03-22 | Stop reason: HOSPADM

## 2023-03-20 RX ORDER — OXYCODONE HCL 5 MG/5 ML
5 SOLUTION, ORAL ORAL
Status: COMPLETED | OUTPATIENT
Start: 2023-03-20 | End: 2023-03-20

## 2023-03-20 RX ORDER — TRANEXAMIC ACID 100 MG/ML
INJECTION, SOLUTION INTRAVENOUS PRN
Status: DISCONTINUED | OUTPATIENT
Start: 2023-03-20 | End: 2023-03-20 | Stop reason: SURG

## 2023-03-20 RX ORDER — CEFAZOLIN SODIUM 1 G/3ML
INJECTION, POWDER, FOR SOLUTION INTRAMUSCULAR; INTRAVENOUS
Status: DISCONTINUED | OUTPATIENT
Start: 2023-03-20 | End: 2023-03-20 | Stop reason: HOSPADM

## 2023-03-20 RX ORDER — ACETAMINOPHEN 500 MG
1000 TABLET ORAL EVERY 6 HOURS
Status: DISCONTINUED | OUTPATIENT
Start: 2023-03-20 | End: 2023-03-22 | Stop reason: HOSPADM

## 2023-03-20 RX ORDER — ONDANSETRON 2 MG/ML
4 INJECTION INTRAMUSCULAR; INTRAVENOUS
Status: DISCONTINUED | OUTPATIENT
Start: 2023-03-20 | End: 2023-03-20 | Stop reason: HOSPADM

## 2023-03-20 RX ORDER — BISACODYL 10 MG
10 SUPPOSITORY, RECTAL RECTAL
Status: DISCONTINUED | OUTPATIENT
Start: 2023-03-20 | End: 2023-03-22 | Stop reason: HOSPADM

## 2023-03-20 RX ORDER — ALBUTEROL SULFATE 5 MG/ML
2.5 SOLUTION RESPIRATORY (INHALATION)
Status: DISCONTINUED | OUTPATIENT
Start: 2023-03-20 | End: 2023-03-20 | Stop reason: HOSPADM

## 2023-03-20 RX ORDER — OXYCODONE HCL 5 MG/5 ML
10 SOLUTION, ORAL ORAL
Status: COMPLETED | OUTPATIENT
Start: 2023-03-20 | End: 2023-03-20

## 2023-03-20 RX ORDER — EPHEDRINE SULFATE 50 MG/ML
5 INJECTION, SOLUTION INTRAVENOUS
Status: DISCONTINUED | OUTPATIENT
Start: 2023-03-20 | End: 2023-03-20 | Stop reason: HOSPADM

## 2023-03-20 RX ORDER — HALOPERIDOL 5 MG/ML
1 INJECTION INTRAMUSCULAR
Status: DISCONTINUED | OUTPATIENT
Start: 2023-03-20 | End: 2023-03-20 | Stop reason: HOSPADM

## 2023-03-20 RX ORDER — LABETALOL HYDROCHLORIDE 5 MG/ML
5 INJECTION, SOLUTION INTRAVENOUS
Status: DISCONTINUED | OUTPATIENT
Start: 2023-03-20 | End: 2023-03-20 | Stop reason: HOSPADM

## 2023-03-20 RX ORDER — HYDROMORPHONE HYDROCHLORIDE 1 MG/ML
0.1 INJECTION, SOLUTION INTRAMUSCULAR; INTRAVENOUS; SUBCUTANEOUS
Status: DISCONTINUED | OUTPATIENT
Start: 2023-03-20 | End: 2023-03-20 | Stop reason: HOSPADM

## 2023-03-20 RX ORDER — METOCLOPRAMIDE 10 MG/1
10 TABLET ORAL 2 TIMES DAILY
Status: DISCONTINUED | OUTPATIENT
Start: 2023-03-20 | End: 2023-03-22 | Stop reason: HOSPADM

## 2023-03-20 RX ORDER — CEFAZOLIN SODIUM 1 G/3ML
INJECTION, POWDER, FOR SOLUTION INTRAMUSCULAR; INTRAVENOUS PRN
Status: DISCONTINUED | OUTPATIENT
Start: 2023-03-20 | End: 2023-03-20 | Stop reason: SURG

## 2023-03-20 RX ORDER — EZETIMIBE 10 MG/1
10 TABLET ORAL DAILY
Status: DISCONTINUED | OUTPATIENT
Start: 2023-03-20 | End: 2023-03-22 | Stop reason: HOSPADM

## 2023-03-20 RX ORDER — AMOXICILLIN 250 MG
1 CAPSULE ORAL
Status: DISCONTINUED | OUTPATIENT
Start: 2023-03-20 | End: 2023-03-22 | Stop reason: HOSPADM

## 2023-03-20 RX ORDER — LOSARTAN POTASSIUM 50 MG/1
50 TABLET ORAL DAILY
Status: DISCONTINUED | OUTPATIENT
Start: 2023-03-20 | End: 2023-03-22 | Stop reason: HOSPADM

## 2023-03-20 RX ORDER — ROSUVASTATIN CALCIUM 20 MG/1
40 TABLET, COATED ORAL
Status: DISCONTINUED | OUTPATIENT
Start: 2023-03-20 | End: 2023-03-22 | Stop reason: HOSPADM

## 2023-03-20 RX ORDER — LIDOCAINE HYDROCHLORIDE 20 MG/ML
INJECTION, SOLUTION EPIDURAL; INFILTRATION; INTRACAUDAL; PERINEURAL PRN
Status: DISCONTINUED | OUTPATIENT
Start: 2023-03-20 | End: 2023-03-20 | Stop reason: SURG

## 2023-03-20 RX ORDER — SODIUM CHLORIDE, SODIUM LACTATE, POTASSIUM CHLORIDE, CALCIUM CHLORIDE 600; 310; 30; 20 MG/100ML; MG/100ML; MG/100ML; MG/100ML
INJECTION, SOLUTION INTRAVENOUS
Status: DISCONTINUED | OUTPATIENT
Start: 2023-03-20 | End: 2023-03-20 | Stop reason: SURG

## 2023-03-20 RX ORDER — LEVOTHYROXINE SODIUM 100 UG/1
100 TABLET ORAL
Status: DISCONTINUED | OUTPATIENT
Start: 2023-03-21 | End: 2023-03-22 | Stop reason: HOSPADM

## 2023-03-20 RX ORDER — DIPHENHYDRAMINE HYDROCHLORIDE 50 MG/ML
12.5 INJECTION INTRAMUSCULAR; INTRAVENOUS
Status: DISCONTINUED | OUTPATIENT
Start: 2023-03-20 | End: 2023-03-20 | Stop reason: HOSPADM

## 2023-03-20 RX ORDER — ONDANSETRON 2 MG/ML
INJECTION INTRAMUSCULAR; INTRAVENOUS PRN
Status: DISCONTINUED | OUTPATIENT
Start: 2023-03-20 | End: 2023-03-20 | Stop reason: SURG

## 2023-03-20 RX ORDER — LABETALOL HYDROCHLORIDE 5 MG/ML
10 INJECTION, SOLUTION INTRAVENOUS
Status: DISCONTINUED | OUTPATIENT
Start: 2023-03-20 | End: 2023-03-22 | Stop reason: HOSPADM

## 2023-03-20 RX ORDER — SODIUM CHLORIDE 1 G/1
1 TABLET ORAL 2 TIMES DAILY
Status: DISCONTINUED | OUTPATIENT
Start: 2023-03-20 | End: 2023-03-22 | Stop reason: HOSPADM

## 2023-03-20 RX ORDER — METHOCARBAMOL 750 MG/1
750 TABLET, FILM COATED ORAL EVERY 8 HOURS PRN
Status: DISCONTINUED | OUTPATIENT
Start: 2023-03-20 | End: 2023-03-22 | Stop reason: HOSPADM

## 2023-03-20 RX ORDER — SODIUM CHLORIDE AND POTASSIUM CHLORIDE 150; 900 MG/100ML; MG/100ML
INJECTION, SOLUTION INTRAVENOUS CONTINUOUS
Status: DISPENSED | OUTPATIENT
Start: 2023-03-20 | End: 2023-03-21

## 2023-03-20 RX ORDER — ACETAMINOPHEN 500 MG
1000 TABLET ORAL ONCE
Status: COMPLETED | OUTPATIENT
Start: 2023-03-20 | End: 2023-03-20

## 2023-03-20 RX ORDER — MORPHINE SULFATE 4 MG/ML
2 INJECTION INTRAVENOUS
Status: DISCONTINUED | OUTPATIENT
Start: 2023-03-20 | End: 2023-03-22 | Stop reason: HOSPADM

## 2023-03-20 RX ORDER — TRANEXAMIC ACID 100 MG/ML
1000 INJECTION, SOLUTION INTRAVENOUS ONCE
Status: DISCONTINUED | OUTPATIENT
Start: 2023-03-20 | End: 2023-03-20 | Stop reason: HOSPADM

## 2023-03-20 RX ORDER — DIPHENHYDRAMINE HYDROCHLORIDE 50 MG/ML
25 INJECTION INTRAMUSCULAR; INTRAVENOUS EVERY 6 HOURS PRN
Status: DISCONTINUED | OUTPATIENT
Start: 2023-03-20 | End: 2023-03-22 | Stop reason: HOSPADM

## 2023-03-20 RX ORDER — SODIUM CHLORIDE, SODIUM LACTATE, POTASSIUM CHLORIDE, AND CALCIUM CHLORIDE .6; .31; .03; .02 G/100ML; G/100ML; G/100ML; G/100ML
IRRIGANT IRRIGATION
Status: DISCONTINUED | OUTPATIENT
Start: 2023-03-20 | End: 2023-03-20 | Stop reason: HOSPADM

## 2023-03-20 RX ORDER — DOCUSATE SODIUM 100 MG/1
100 CAPSULE, LIQUID FILLED ORAL 2 TIMES DAILY
Status: DISCONTINUED | OUTPATIENT
Start: 2023-03-20 | End: 2023-03-22 | Stop reason: HOSPADM

## 2023-03-20 RX ORDER — METHYLPREDNISOLONE ACETATE 40 MG/ML
INJECTION, SUSPENSION INTRA-ARTICULAR; INTRALESIONAL; INTRAMUSCULAR; SOFT TISSUE
Status: DISCONTINUED | OUTPATIENT
Start: 2023-03-20 | End: 2023-03-20 | Stop reason: HOSPADM

## 2023-03-20 RX ORDER — ROCURONIUM BROMIDE 10 MG/ML
INJECTION, SOLUTION INTRAVENOUS PRN
Status: DISCONTINUED | OUTPATIENT
Start: 2023-03-20 | End: 2023-03-20 | Stop reason: SURG

## 2023-03-20 RX ORDER — AMOXICILLIN 250 MG
1 CAPSULE ORAL NIGHTLY
Status: DISCONTINUED | OUTPATIENT
Start: 2023-03-20 | End: 2023-03-22 | Stop reason: HOSPADM

## 2023-03-20 RX ORDER — BACITRACIN ZINC 500 [USP'U]/G
OINTMENT TOPICAL
Status: DISCONTINUED | OUTPATIENT
Start: 2023-03-20 | End: 2023-03-20 | Stop reason: HOSPADM

## 2023-03-20 RX ORDER — ONDANSETRON 4 MG/1
4 TABLET, ORALLY DISINTEGRATING ORAL EVERY 4 HOURS PRN
Status: DISCONTINUED | OUTPATIENT
Start: 2023-03-20 | End: 2023-03-22 | Stop reason: HOSPADM

## 2023-03-20 RX ORDER — LIDOCAINE HYDROCHLORIDE 40 MG/ML
SOLUTION TOPICAL PRN
Status: DISCONTINUED | OUTPATIENT
Start: 2023-03-20 | End: 2023-03-20 | Stop reason: SURG

## 2023-03-20 RX ORDER — HYDROMORPHONE HYDROCHLORIDE 1 MG/ML
0.4 INJECTION, SOLUTION INTRAMUSCULAR; INTRAVENOUS; SUBCUTANEOUS
Status: DISCONTINUED | OUTPATIENT
Start: 2023-03-20 | End: 2023-03-20 | Stop reason: HOSPADM

## 2023-03-20 RX ORDER — MORPHINE SULFATE 1 MG/ML
INJECTION, SOLUTION EPIDURAL; INTRATHECAL; INTRAVENOUS
Status: DISCONTINUED | OUTPATIENT
Start: 2023-03-20 | End: 2023-03-20 | Stop reason: HOSPADM

## 2023-03-20 RX ADMIN — FENTANYL CITRATE 100 MCG: 50 INJECTION, SOLUTION INTRAMUSCULAR; INTRAVENOUS at 07:04

## 2023-03-20 RX ADMIN — OXYCODONE HYDROCHLORIDE 10 MG: 5 SOLUTION ORAL at 10:06

## 2023-03-20 RX ADMIN — PHENYLEPHRINE HYDROCHLORIDE 200 MCG: 10 INJECTION INTRAVENOUS at 07:40

## 2023-03-20 RX ADMIN — CLINDAMYCIN PHOSPHATE 900 MG: 900 INJECTION, SOLUTION INTRAVENOUS at 13:14

## 2023-03-20 RX ADMIN — PHENYLEPHRINE HYDROCHLORIDE 200 MCG: 10 INJECTION INTRAVENOUS at 07:55

## 2023-03-20 RX ADMIN — LIDOCAINE HYDROCHLORIDE 60 MG: 20 INJECTION, SOLUTION EPIDURAL; INFILTRATION; INTRACAUDAL at 07:04

## 2023-03-20 RX ADMIN — DOCUSATE SODIUM 50 MG AND SENNOSIDES 8.6 MG 1 TABLET: 8.6; 5 TABLET, FILM COATED ORAL at 20:39

## 2023-03-20 RX ADMIN — REMIFENTANIL HYDROCHLORIDE 0.1 MCG/KG/MIN: 1 INJECTION, POWDER, LYOPHILIZED, FOR SOLUTION INTRAVENOUS at 07:27

## 2023-03-20 RX ADMIN — POTASSIUM CHLORIDE AND SODIUM CHLORIDE: 900; 150 INJECTION, SOLUTION INTRAVENOUS at 13:12

## 2023-03-20 RX ADMIN — ONDANSETRON 4 MG: 2 INJECTION INTRAMUSCULAR; INTRAVENOUS at 08:56

## 2023-03-20 RX ADMIN — ROSUVASTATIN CALCIUM 40 MG: 20 TABLET, FILM COATED ORAL at 20:39

## 2023-03-20 RX ADMIN — ACETAMINOPHEN 1000 MG: 500 TABLET, FILM COATED ORAL at 17:07

## 2023-03-20 RX ADMIN — DOCUSATE SODIUM 100 MG: 100 CAPSULE, LIQUID FILLED ORAL at 13:06

## 2023-03-20 RX ADMIN — ROCURONIUM BROMIDE 30 MG: 10 INJECTION, SOLUTION INTRAVENOUS at 07:04

## 2023-03-20 RX ADMIN — EZETIMIBE 10 MG: 10 TABLET ORAL at 20:40

## 2023-03-20 RX ADMIN — LIDOCAINE HYDROCHLORIDE 4 ML: 40 SOLUTION TOPICAL at 07:04

## 2023-03-20 RX ADMIN — PHENYLEPHRINE HYDROCHLORIDE 100 MCG: 10 INJECTION INTRAVENOUS at 07:33

## 2023-03-20 RX ADMIN — METOCLOPRAMIDE 10 MG: 10 TABLET ORAL at 20:39

## 2023-03-20 RX ADMIN — METHOCARBAMOL 750 MG: 750 TABLET ORAL at 20:39

## 2023-03-20 RX ADMIN — PROPOFOL 50 MG: 10 INJECTION, EMULSION INTRAVENOUS at 07:15

## 2023-03-20 RX ADMIN — SODIUM CHLORIDE, POTASSIUM CHLORIDE, SODIUM LACTATE AND CALCIUM CHLORIDE: 600; 310; 30; 20 INJECTION, SOLUTION INTRAVENOUS at 07:00

## 2023-03-20 RX ADMIN — PROPOFOL 150 MG: 10 INJECTION, EMULSION INTRAVENOUS at 07:04

## 2023-03-20 RX ADMIN — LOSARTAN POTASSIUM 50 MG: 50 TABLET, FILM COATED ORAL at 20:40

## 2023-03-20 RX ADMIN — DEXAMETHASONE SODIUM PHOSPHATE 10 MG: 4 INJECTION, SOLUTION INTRA-ARTICULAR; INTRALESIONAL; INTRAMUSCULAR; INTRAVENOUS; SOFT TISSUE at 07:35

## 2023-03-20 RX ADMIN — MORPHINE SULFATE 2 MG: 4 INJECTION INTRAVENOUS at 13:27

## 2023-03-20 RX ADMIN — PHENYLEPHRINE HYDROCHLORIDE 200 MCG: 10 INJECTION INTRAVENOUS at 07:42

## 2023-03-20 RX ADMIN — PHENYLEPHRINE HYDROCHLORIDE 0.5 MCG/KG/MIN: 10 INJECTION INTRAVENOUS at 07:42

## 2023-03-20 RX ADMIN — ACETAMINOPHEN 1000 MG: 500 TABLET, FILM COATED ORAL at 06:47

## 2023-03-20 RX ADMIN — PHENYLEPHRINE HYDROCHLORIDE 100 MCG: 10 INJECTION INTRAVENOUS at 07:28

## 2023-03-20 RX ADMIN — CLINDAMYCIN PHOSPHATE 900 MG: 900 INJECTION, SOLUTION INTRAVENOUS at 20:42

## 2023-03-20 RX ADMIN — HYDROMORPHONE HYDROCHLORIDE 0.5 MG: 2 INJECTION INTRAMUSCULAR; INTRAVENOUS; SUBCUTANEOUS at 09:12

## 2023-03-20 RX ADMIN — PHENYLEPHRINE HYDROCHLORIDE 200 MCG: 10 INJECTION INTRAVENOUS at 07:37

## 2023-03-20 RX ADMIN — Medication: at 09:45

## 2023-03-20 RX ADMIN — PROPOFOL 50 MG: 10 INJECTION, EMULSION INTRAVENOUS at 07:23

## 2023-03-20 RX ADMIN — ONDANSETRON HYDROCHLORIDE 4 MG: 2 SOLUTION INTRAMUSCULAR; INTRAVENOUS at 16:20

## 2023-03-20 RX ADMIN — CEFAZOLIN 2 G: 330 INJECTION, POWDER, FOR SOLUTION INTRAMUSCULAR; INTRAVENOUS at 07:24

## 2023-03-20 RX ADMIN — FENTANYL CITRATE 50 MCG: 50 INJECTION, SOLUTION INTRAMUSCULAR; INTRAVENOUS at 09:44

## 2023-03-20 RX ADMIN — DOCUSATE SODIUM 100 MG: 100 CAPSULE, LIQUID FILLED ORAL at 17:07

## 2023-03-20 RX ADMIN — METHOCARBAMOL 1000 MG: 100 INJECTION INTRAMUSCULAR; INTRAVENOUS at 10:29

## 2023-03-20 RX ADMIN — ACETAMINOPHEN 1000 MG: 500 TABLET, FILM COATED ORAL at 13:06

## 2023-03-20 RX ADMIN — PROPOFOL 50 MG: 10 INJECTION, EMULSION INTRAVENOUS at 07:09

## 2023-03-20 RX ADMIN — PROPOFOL 50 MG: 10 INJECTION, EMULSION INTRAVENOUS at 07:19

## 2023-03-20 RX ADMIN — MORPHINE SULFATE: 50 INJECTION, SOLUTION, CONCENTRATE INTRAVENOUS at 13:28

## 2023-03-20 RX ADMIN — REMIFENTANIL HYDROCHLORIDE 0.1 MCG/KG/MIN: 1 INJECTION, POWDER, LYOPHILIZED, FOR SOLUTION INTRAVENOUS at 07:22

## 2023-03-20 RX ADMIN — TRANEXAMIC ACID 1000 MG: 100 INJECTION, SOLUTION INTRAVENOUS at 09:05

## 2023-03-20 ASSESSMENT — COGNITIVE AND FUNCTIONAL STATUS - GENERAL
MOBILITY SCORE: 16
TOILETING: A LITTLE
TURNING FROM BACK TO SIDE WHILE IN FLAT BAD: A LITTLE
CLIMB 3 TO 5 STEPS WITH RAILING: A LOT
HELP NEEDED FOR BATHING: A LITTLE
MOVING TO AND FROM BED TO CHAIR: A LITTLE
DRESSING REGULAR UPPER BODY CLOTHING: A LITTLE
SUGGESTED CMS G CODE MODIFIER MOBILITY: CK
DAILY ACTIVITIY SCORE: 17
WALKING IN HOSPITAL ROOM: A LOT
DRESSING REGULAR LOWER BODY CLOTHING: A LOT
STANDING UP FROM CHAIR USING ARMS: A LITTLE
EATING MEALS: A LITTLE
SUGGESTED CMS G CODE MODIFIER DAILY ACTIVITY: CK
PERSONAL GROOMING: A LITTLE
MOVING FROM LYING ON BACK TO SITTING ON SIDE OF FLAT BED: A LITTLE

## 2023-03-20 ASSESSMENT — LIFESTYLE VARIABLES
TOTAL SCORE: 0
AVERAGE NUMBER OF DAYS PER WEEK YOU HAVE A DRINK CONTAINING ALCOHOL: 3
DOES PATIENT WANT TO STOP DRINKING: NO
CONSUMPTION TOTAL: NEGATIVE
EVER HAD A DRINK FIRST THING IN THE MORNING TO STEADY YOUR NERVES TO GET RID OF A HANGOVER: NO
ALCOHOL_USE: NO
HOW MANY TIMES IN THE PAST YEAR HAVE YOU HAD 5 OR MORE DRINKS IN A DAY: 0
TOTAL SCORE: 0
ON A TYPICAL DAY WHEN YOU DRINK ALCOHOL HOW MANY DRINKS DO YOU HAVE: 2
HAVE YOU EVER FELT YOU SHOULD CUT DOWN ON YOUR DRINKING: NO
TOTAL SCORE: 0
EVER FELT BAD OR GUILTY ABOUT YOUR DRINKING: NO
HAVE PEOPLE ANNOYED YOU BY CRITICIZING YOUR DRINKING: NO

## 2023-03-20 ASSESSMENT — PAIN DESCRIPTION - PAIN TYPE
TYPE: SURGICAL PAIN

## 2023-03-20 ASSESSMENT — PATIENT HEALTH QUESTIONNAIRE - PHQ9
SUM OF ALL RESPONSES TO PHQ9 QUESTIONS 1 AND 2: 0
1. LITTLE INTEREST OR PLEASURE IN DOING THINGS: NOT AT ALL
2. FEELING DOWN, DEPRESSED, IRRITABLE, OR HOPELESS: NOT AT ALL

## 2023-03-20 ASSESSMENT — FIBROSIS 4 INDEX: FIB4 SCORE: 1.04

## 2023-03-20 ASSESSMENT — PAIN SCALES - GENERAL: PAIN_LEVEL: 6

## 2023-03-20 NOTE — ANESTHESIA TIME REPORT
Anesthesia Start and Stop Event Times     Date Time Event    3/20/2023 0653 Ready for Procedure     0700 Anesthesia Start     0942 Anesthesia Stop        Responsible Staff  03/20/23    Name Role Begin End    Fátima Tam M.D. Anesth 0700 0942        Overtime Reason:  no overtime (within assigned shift)    Comments:

## 2023-03-20 NOTE — ANESTHESIA PREPROCEDURE EVALUATION
Case: [id number] Date/Time: 03/20/23 0645    Procedures:       C8-6-4-6-7-T1 posterior decompressive laminectomy and G2-3-5-6-7-T1 posterior instrumented fusion      LAMINECTOMY, SPINE, CERVICAL, POSTERIOR APPROACH    Diagnosis:       Cervical stenosis of spine [M48.02]      Cervical myelopathy (HCC) [G95.9]    Pre-op diagnosis:       Cervical stenosis of spine [M48.02]           Cervical myelopathy (HCC) [G95.9]    Location: [place] [address] / SURGERY [facility]    Surgeons: Evon Díaz M.D.          Relevant Problems   CARDIAC   (positive) Agatston coronary artery calcium score between 100 and 199   (positive) Carotid artery stenosis   (positive) LAFB (left anterior fascicular block)   (positive) Primary hypertension      ENDO   (positive) Hypothyroidism   (positive) Type 1 diabetes mellitus (HCC)       Physical Exam    Airway   Mallampati: I  TM distance: >3 FB  Neck ROM: full       Cardiovascular - normal exam     Dental - normal exam           Pulmonary   Breath sounds clear to auscultation     Abdominal    Neurological - normal exam                 Anesthesia Plan    ASA 2       Plan - general       Airway plan will be ETT          Induction: intravenous      Pertinent diagnostic labs and testing reviewed    Informed Consent:    Anesthetic plan and risks discussed with patient.

## 2023-03-20 NOTE — OP REPORT
1.DATE OF SURGERY:3/20/2023    2. SURGEON:  Evon Díaz MD, PhD, CRISTIANO, Neurosurgeon    3. ASSISTANT:  Brigido Richter PA-C    An assistant was crucial to have during the case as the assistant helped with positioning, keeping the field clear of blood and retraction. This case could not be done easily without a qualified assistant. It was medically necessary    4. TYPE OF ANESTHESIA:  General anesthesia with endotracheal intubation    5. PREOPERATIVE DIAGNOSIS:  Cervical stenosis/myelopathy    post left-sided L5-S1 discectomy  2022 with Doctor Arjun, gross resolution of her preoperative back and leg symptomatology  Intermittent neck pain and stiffness, subjective weakness in her hands with unsteady/spastic gait present for 5 years  Mild stenosis C3-4, moderate to severe at C4-5 and C5-6 with effacement of the cord, no clear myelomalacia  Underlying right knee meniscal tear with surgery proposed next week  Labile blood sugars with implanted insulin pump  6.  History of coronary artery disease  7.  Cervical stenosis C3-4, C4-5 C5-6 C6-7 with subluxation C4-5 interlaminar clinical evidence of possible cervical myelopathy although brain cause is being excluded.  8.  Hypotension during last posterior cervical surgical attempt causing surgery to be stopped    6. POSTOPERATIVE DIAGNOSIS:  Cervical stenosis/myelopathy    post left-sided L5-S1 discectomy  2022 with Doctor Arjun, gross resolution of her preoperative back and leg symptomatology  Intermittent neck pain and stiffness, subjective weakness in her hands with unsteady/spastic gait present for 5 years  Mild stenosis C3-4, moderate to severe at C4-5 and C5-6 with effacement of the cord, no clear myelomalacia  Underlying right knee meniscal tear with surgery proposed next week  Labile blood sugars with implanted insulin pump  6.  History of coronary artery disease  7.  Cervical stenosis C3-4, C4-5 C5-6 C6-7 with subluxation C4-5 interlaminar clinical evidence of  possible cervical myelopathy although brain cause is being excluded.  8.  Hypotension during last posterior cervical surgical attempt causing surgery to be stopped    7. HISTORY: See formal admission H and P    8/19/2022  Ines Long is a 67 y.o. female seen today in a neurosurgery/spine consultation.  Her  was in attendance today.  She was seen in conjunction with Doctor Arjun today.  When we originally saw her back in April she was having low back and left leg symptomatology with a spastic gait.  She reports her gait has been like that for approximately fear 5 years after a foot surgery.  She is found to have a L5-S1 left-sided disc protrusion and subsequently underwent a discectomy surgery.  Left leg symptoms have largely settled.  She continues with unsteady gait as well as intermittent neck pain and stiffness this is usually every 3 to 4 weeks.  She denies any pain, numbness or tingling into her arms.  She does feel some weakness in her bilateral .  She does have upcoming right knee surgery next week.      In essence, this is a 67-year-old female with off-and-on neck pain and stiffness without any clear radicular complaints with subjective weakness in her hands and a spastic gait.      Pertinent past medical history is remarkable for diabetes, she does have insulin pump, her blood sugars have been quite labile in the 500s.  She has had a difficult time getting a follow up with her endocrinologist who manages this.  Previous left carotid surgery for carotid artery stenosis.        12/2/2022  Ines comes back.  She still has unsteady gait.  On questioning she does have clumsiness in the hands.  Neck pain is a little bit of an issue.  She had hip surgery did well with it.  She had unsteady regular and tandem gait today.  MRI scan shows moderate stenosis.  I have suggested we get her carotids looked at again.  She is seeing Dr. Shant Card for heart.  We will regroup back in  January.Surgery on the neck is on the table.  She is seeing Dr. Gao on December 16.  He is trying exclude a brain cause for her unsteadiness.  If nothing else turns up.  It is worthwhile fixing the neck to try to stop the symptoms progressing.        1/20/2023  Ines returns with her daughter.  She feels she is getting worse in terms of arm and leg function.  She seen her neurology team they feel it cervical.  She had a carotid ultrasound and nuclear.  She wants to proceed with surgery.  I went over the surgery and the recovery again using sheets and models.  She received an email form from me with extensive paperwork.  We will get a cardiology clearance.     2/17/2023  Returns for preoperative review.  Risks, benefits and alternatives of surgery were discussed with the patient.  She has hyperkalemia, this is being managed by her nephrologist.  Cardiac clearance is pending.  She does again request to proceed with surgery at today's visit.     3/16/2023  Returns for preoperative and postoperative review.  She was seen in conjunction with Doctor Díaz today.  Her  was in attendance.  Her neck pain has settled to a degree.  She continues with arm and leg function which is impaired.  Cardiology has reviewed her and felt no additional testing is indicated as she was hypotensive during her last surgery.  She was fluid restricted from her nephrologist.  They have increased her fluid restriction to 40 mL/day.  She is going to increase this over the weekend.  She is going to have a sugar-free Gatorade the morning of surgery 4 hours in advance.  Risk, benefits and alternatives were again discussed.  She does again wish to proceed with surgery today.    8. PREOPERATIVE PHYSICAL EXAMINATION: See formal admission H and P    Mildly reduced range of motion of the cervical spine.  Negative Lhermitte's phenomenon.  Negative Brooke's bilaterally.  Reflexes are brisk and symmetric throughout.  Strength is preserved in the  bilateral upper and lower extremities.  I did not test her gait today as she has difficulties due to underlying meniscal tear in her right knee.  Lumbar incisions well-healed.  There is a left sided anterior neck incision which is well-healed, she indicates this is from previous left carotid surgery     12/2/2022  Very unsteady gait continues.  Poor tandem and regular gait.  Poor fine finger movements in the hands.  Reflexes were not brisk.  Reduced range of motion of the cervical spine.        Imaging Result(s):   I independently reviewed and assessed the imaging. I also reviewed all imaging reports.      She had previous MRI of the cervical, thoracic and lumbar spine from [facility].  She had x-rays from outside facility as well.  She has an anterolisthesis C4-5.  This does not move significantly between flexion and extension maneuvers.  Otherwise diffuse degenerative changes throughout the cervical region.  Mild stenosis C3-4, moderate to severe C4-5, moderate to severe C5-6 with some effacement of the cord at this level.  There is no clear myelomalacia of the spinal cord.     Updated MRI scan and plain x-rays of the cervical spine from an orthopedic center on August 28, 2022 show severe degenerative changes with a C4-5 subluxation.  There is moderate stenosis from C3-4 down to C6-7 with some cord indentation.  This is worst at C4-5 and C5-6.  C7-T1 looks pretty good.     Carotid ultrasound at [facility] was okay.    9. TITLE OF PROCEDURE:    C3 posterior decompressive laminectomy and bilaterally foraminotomies  C4 posterior decompressive laminectomy and bilaterally foraminotomies  C5 posterior decompressive laminectomy and bilaterally foraminotomies  C6 posterior decompressive laminectomy and bilaterally foraminotomies  C7 posterior decompressive laminectomy and bilaterally foraminotomies  T1 posterior decompressive laminectomy and bilaterally foraminotomies  X5-J7-F4-C6-C7-T1 posterolateral fusion  (3) slightly limited using local morcellized autograft and bone graft substitute.  V3-N1-D4-C6-C7-T1 segmental fixation using posterior cervical lateral mass and pedicle screws.  Microscopic microdissection.  Fluoroscopic guidance for placement of screws.  O- Arm navigation for screw placement   i/o On-Q Pain catheters in paraspinal musculature    10. OPERATIVE FINDINGS:  Stenosis as outlined above.    11. OPERATED LEVELS: asa panfilo    12. IMPLANTS USED:  Medtronic infinity posterior cervical screws and rods  Actifuse bone graft substitute    13. COMPLICATIONS:  Nil.    14. ESTIMATED BLOOD LOSS:    100    mL    15. OPERATIVE DETAILS:    After fully informed consent, the patient was brought to the operating room.  General anesthesia was administered.  The patient was given intravenous antibiotic.  A Dunlap catheter was placed.  The head was held in 3-pin Ramos fixation.  For the case, we used AP and lateral fluoroscopy as well as neurophysiological monitoring.  The patient was carefully turned prone.  We used used the OSI with hip/chest/thigh padding.  The arms were by the sides and these were tucked in with the sheet acting as a sling.  Ulnar pads were in place.  The head was kept in a relatively neutral position and fixed to the table using the Ramos adapter.  All pressure points were padded and the posterior cervical region and lower occipital region were shaved in preparation for surgery.      I took a lateral x-ray to ensure satisfactory position.  The face and eyes were checked.  The Ramos was tightened up.  The shoulders were taped down to allow lateral visualization.  The posterior cervical region was prepped and draped in standard fashion.    After fluoroscopic localization, a midline incision was affected over the spinous processes from C3 to T1.  A bilateral subperiosteal exposure was affected.  Great care was taken not to violate the C2-C3 or T1-T2 facet joints.     The O- arm was then brought in. for the T1 pedicle  screws,  A clamp was placed on T1 spinous process and an O- arm spin was effected. The O arm was used for cannulating, tapping, palpating and placing 4 x 26 mm screws.  The screws were stimulated electrophysiologically to ensure there was not breach I then removed the clamp.     The lateral masses were initially cannulated using a 30/30 trajectory.  In each case, the lateral masses were cannulated, palpated, tapped.  Screws were placed at a later time.  The facet joints were decorticated with a 3-angle curette and a drill.       The microscope was brought into the field of view before the decompression.   Under the microscope, I then performed a laminectomy.  This was affected at the junction of the lamina and the lateral masses from C3 down to T1.  I undercut T1 and also took out the ligament at C2-C3.  Drilling at a 45 degree angle, I thinned down the lamina and then using a 1 mm Kerrison punch all the remaining bone and ligament attachments were released on either side.  The lamina were then lifted off en bloc from C3-C7.  T1 was undercut.  Hemostasis obtained.      That bone graft was morcellized for later grafting of the posterolateral gutters.  It was mixed with Actifuse.  Hemostasis was obtained.  Foraminotomies had effectively been done, but these were completed with 1 mm Kerrison punch as needed as I had been drilling at the junction of the lamina at the facet joints.  The facet joints were then decorticated.       I then turned to placement of the lateral mass screws.  Standard 3.5 x 14 mm screws were placed.  A single william was then measured to the appropriate size. The construct was put into place from C3-T1.  I used a single offset connecter. Locking caps were applied on either side and all the instrumentation was tightened.  Over the decorticated bone from the C3 down to T1, I placed the morcellized autograft mixed with bone graft substitute.  The microscope was taken out of the field of view.   Meticulous hemostasis obtained.      Final AP and lateral x-ray was taken for confirmation that the fluoroscopic placement of the screws was adequate.  Fluoroscopic guidance was used for the screws as deemed appropriate.  Hemostasis obtained.  Two suction subfascial Hemovacs were placed and stitched into place with 2-0 vicryl suture.  Two paraspinal On-Q pain catheters were also placed.  T    The wound had vancomycin powder placed.  Thorough irrigation was effected prior to this.  The wound was then closed in multiple interrupted layers using 1-0 Vicryl sutures for the fascia, 2-0 Vicryl for the subdermis, and then staples for skin.      All counts were correct and instrumented accounted for ,      At the end of the case, a dressing was applied. Dermabond had been placed over the On-Q pain catheters, which were placed in the paraspinal musculature under direct visualization.  All counts were correct and all instruments were accounted for.  The patient was carefully turned supine.  The Ramos head clamp was taken off.  Neurophysiological monitoring has been stable throughout and a Miami-J collar was applied.  At the end of the case, the patient was moving her upper and lower extremities well.    16. PROGNOSIS:    The surgery went well.  The patient has been decompressed.  At the end of the case, the patient awoke moving his/her upper and lower extremities well.    The plan will be to observe the patient very carefully for the next 24 hours in case there is any bleeding and I would anticipate the patient will be discharged in 48-72 hours if they do not need rehab.  When the patient goes home, he/she will be discharged home on narcotic analgesia,  a muscle relaxant and oral antibiotic, usually Keflex.  The plan will be to follow up in 2 weeks in the office.  We will call the patient next week to ensure there are not any problems.  He/she can shower in 72 hours but is instructed to keep the wound dry.  The patient has  also been instructed to abstain from smoking or any anti-inflammatories and wear a cervical Miami J collar except when eating or showering.       Evon Díaz MD, PhD, CRISTIANO    CC:   [address]

## 2023-03-20 NOTE — ANESTHESIA PROCEDURE NOTES
Arterial Line  Performed by: Fátima Tam M.D.  Authorized by: Fátima Tam M.D.     Start Time:  3/20/2023 7:21 AM  Localization: ultrasound guidance and surface landmarks    Patient Location:  OR  Indication: continuous blood pressure monitoring        Catheter Size:  20 G  Seldinger Technique?: Yes    Laterality:  Right  Site:  Radial artery  Line Secured:  Antimicrobial disc, tape and transparent dressing  Events: patient tolerated procedure well with no complications

## 2023-03-20 NOTE — PROGRESS NOTES
Pt arrives to T311 from PACU with E5-2-0-6-7-T1 posterior decompressive laminectomy and Y1-7-4-6-7-T1 posterior instrumented fusion with O-arm imaging guidance (Spine Cervical). Pt is alert and oriented x4, VSS on 2L nc, no numbness/tingling. On-Q pump, 2 hemovacs, prevena drain, pt's insulin pump, and a de la cruz present.     4 Eyes Skin Assessment Completed by MYNOR Mcdonald and MYNOR Kelley.    Head WDL  Ears WDL  Nose WDL  Mouth WDL  Neck Incision with On-q pump, 2 hemovacs, and a prevena  Breast/Chest WDL  Shoulder Blades WDL  Spine WDL  (R) Arm/Elbow/Hand WDL  (L) Arm/Elbow/Hand WDL  Abdomen WDL with insulin pump  Groin WDL  Scrotum/Coccyx/Buttocks WDL  (R) Leg WDL  (L) Leg WDL  (R) Heel/Foot/Toe WDL  (L) Heel/Foot/Toe WDL          Devices In Places Pulse Ox, De La Cruz, SCD's, Nasal Cannula, and Cervical Collar      Interventions In Place Gray Ear Foams    Possible Skin Injury No    Pictures Uploaded Into Epic N/A  Wound Consult Placed N/A  RN Wound Prevention Protocol Ordered Yes

## 2023-03-20 NOTE — OR NURSING
Patient AAOx4, calm, CHAVARRIA equally, sensation intact. C/o incisional upper back pain and discomfort, medicated per MAR, pain down to 3/10 and tolerable per patient. Blood sugar 155, patient states she will reattach her insulin pump once in room, anesthesia aware (labeled in patient belonging bag on bed). VSS, afebrile, 2L nasal cannula 99% breathing even and unlabored. Surgical dressing with prevena CDI, hemovac drains x2 CDI with minimal sanguinous output, OnQ pump verified and infusing. Dunlap draining clear yellow urine.   One patient belonging bag and one cane on bed. Patients spouse updated on status and plan of care.

## 2023-03-20 NOTE — ANESTHESIA POSTPROCEDURE EVALUATION
Patient: Ines Long    Procedure Summary     Date: 03/20/23 Room / Location: [place] [id number] / SURGERY [facility]    Anesthesia Start: 0700 Anesthesia Stop: 0942    Procedure: B6-4-6-6-7-T1 posterior decompressive laminectomy and U9-7-5-6-7-T1 posterior instrumented fusion with O-arm imaging guidance (Spine Cervical) Diagnosis:       Cervical stenosis of spine      Cervical myelopathy (HCC)      (Cervical stenosis of spine [M48.02]     Cervical myelopathy (HCC) [G95.9])    Surgeons: Evon Díaz M.D. Responsible Provider: Fátima Tam M.D.    Anesthesia Type: general ASA Status: 2          Final Anesthesia Type: general  Last vitals  BP   Blood Pressure : (!) 143/60    Temp   36.4 °C (97.6 °F)    Pulse   (!) 102   Resp   12    SpO2   99 %      Anesthesia Post Evaluation    Patient location during evaluation: PACU  Patient participation: complete - patient participated  Level of consciousness: awake and alert  Pain score: 6    Airway patency: patent  Anesthetic complications: no  Cardiovascular status: adequate  Respiratory status: acceptable  Hydration status: acceptable    PONV: none          No notable events documented.     Nurse Pain Score: 7 (NPRS)

## 2023-03-20 NOTE — ANESTHESIA PROCEDURE NOTES
Airway    Date/Time: 3/20/2023 7:05 AM  Performed by: Fátima Tam M.D.  Authorized by: Fátima Tam M.D.     Location:  OR  Urgency:  Elective  Indications for Airway Management:  Anesthesia      Spontaneous Ventilation: absent    Sedation Level:  Deep  Preoxygenated: Yes    Patient Position:  Sniffing  Mask Difficulty Assessment:  1 - vent by mask  Final Airway Type:  Endotracheal airway  Final Endotracheal Airway:  ETT  Cuffed: Yes    Technique Used for Successful ETT Placement:  Direct laryngoscopy  Devices/Methods Used in Placement:  Intubating stylet    Insertion Site:  Oral  Blade Type:  Lucio  Laryngoscope Blade/Videolaryngoscope Blade Size:  3  ETT Size (mm):  7.0  Measured from:  Teeth  ETT to Teeth (cm):  21  Placement Verified by: auscultation and capnometry    Cormack-Lehane Classification:  Grade IIb - view of arytenoids or posterior of glottis only  Number of Attempts at Approach:  1

## 2023-03-20 NOTE — OR SURGEON
Immediate Post OP Note    PreOp Diagnosis:     post left-sided L5-S1 discectomy  2022 with Doctor Arjun, gross resolution of her preoperative back and leg symptomatology  Intermittent neck pain and stiffness, subjective weakness in her hands with unsteady/spastic gait present for 5 years  Mild stenosis C3-4, moderate to severe at C4-5 and C5-6 with effacement of the cord, no clear myelomalacia  Underlying right knee meniscal tear with surgery proposed next week  Labile blood sugars with implanted insulin pump  6.  History of coronary artery disease  7.  Cervical stenosis C3-4, C4-5 C5-6 C6-7 with subluxation C4-5 interlaminar clinical evidence of possible cervical myelopathy although brain cause is being excluded.  8.  Hypotension during last posterior cervical surgical attempt causing surgery to be stopped      PostOp Diagnosis:    post left-sided L5-S1 discectomy  2022 with Doctor Arjun, gross resolution of her preoperative back and leg symptomatology  Intermittent neck pain and stiffness, subjective weakness in her hands with unsteady/spastic gait present for 5 years  Mild stenosis C3-4, moderate to severe at C4-5 and C5-6 with effacement of the cord, no clear myelomalacia  Underlying right knee meniscal tear with surgery proposed next week  Labile blood sugars with implanted insulin pump  6.  History of coronary artery disease  7.  Cervical stenosis C3-4, C4-5 C5-6 C6-7 with subluxation C4-5 interlaminar clinical evidence of possible cervical myelopathy although brain cause is being excluded.  8.  Hypotension during last posterior cervical surgical attempt causing surgery to be stopped    Procedure(s):  Q8-6-5-6-7-T1 posterior decompressive laminectomy and H2-6-3-6-7-T1 posterior instrumented fusion with O-arm imaging guidance - Wound Class: Clean with Drain    Surgeon(s):  Evon Díaz M.D.    Anesthesiologist/Type of Anesthesia:  Anesthesiologist: Fátima Tam M.D./General    Surgical  Staff:  Assistant: Brigido Richter P.A.-C.  Circulator: Naomi hKalil R.N.  Relief Circulator: Fatmata Campoverde R.N.  Scrub Person: Ya Swan  Radiology Technologist: Bela Womack    Specimens removed if any:  * No specimens in log *      Assistants: Brigido Richter PA-C  ,  Specimen: nil    Estimated Blood Loss: 100 cc    Findings: n/a    Complications: nil         3/20/2023 9:52 AM Evon Díaz M.D.

## 2023-03-20 NOTE — PROGRESS NOTES
Postop check.  Moving upper extremities well.  Drain is working.  No weakness.  Wound is flat.  She looks good.

## 2023-03-21 LAB
ANION GAP SERPL CALC-SCNC: 11 MMOL/L (ref 7–16)
APTT PPP: 26 SEC (ref 24.7–36)
BUN SERPL-MCNC: 9 MG/DL (ref 8–22)
CALCIUM SERPL-MCNC: 9 MG/DL (ref 8.5–10.5)
CHLORIDE SERPL-SCNC: 101 MMOL/L (ref 96–112)
CO2 SERPL-SCNC: 22 MMOL/L (ref 20–33)
CREAT SERPL-MCNC: 0.7 MG/DL (ref 0.5–1.4)
ERYTHROCYTE [DISTWIDTH] IN BLOOD BY AUTOMATED COUNT: 44.6 FL (ref 35.9–50)
GFR SERPLBLD CREATININE-BSD FMLA CKD-EPI: 95 ML/MIN/1.73 M 2
GLUCOSE SERPL-MCNC: 203 MG/DL (ref 65–99)
HCT VFR BLD AUTO: 32.1 % (ref 37–47)
HGB BLD-MCNC: 11 G/DL (ref 12–16)
INR PPP: 0.99 (ref 0.87–1.13)
MCH RBC QN AUTO: 31.1 PG (ref 27–33)
MCHC RBC AUTO-ENTMCNC: 34.3 G/DL (ref 33.6–35)
MCV RBC AUTO: 90.7 FL (ref 81.4–97.8)
PLATELET # BLD AUTO: 437 K/UL (ref 164–446)
PMV BLD AUTO: 9.7 FL (ref 9–12.9)
POTASSIUM SERPL-SCNC: 4.7 MMOL/L (ref 3.6–5.5)
PROTHROMBIN TIME: 13 SEC (ref 12–14.6)
RBC # BLD AUTO: 3.54 M/UL (ref 4.2–5.4)
SODIUM SERPL-SCNC: 134 MMOL/L (ref 135–145)
WBC # BLD AUTO: 9.2 K/UL (ref 4.8–10.8)

## 2023-03-21 PROCEDURE — 700101 HCHG RX REV CODE 250: Performed by: PHYSICIAN ASSISTANT

## 2023-03-21 PROCEDURE — 97535 SELF CARE MNGMENT TRAINING: CPT

## 2023-03-21 PROCEDURE — 97166 OT EVAL MOD COMPLEX 45 MIN: CPT

## 2023-03-21 PROCEDURE — 85027 COMPLETE CBC AUTOMATED: CPT

## 2023-03-21 PROCEDURE — 85730 THROMBOPLASTIN TIME PARTIAL: CPT

## 2023-03-21 PROCEDURE — 770001 HCHG ROOM/CARE - MED/SURG/GYN PRIV*

## 2023-03-21 PROCEDURE — 99024 POSTOP FOLLOW-UP VISIT: CPT | Performed by: PHYSICIAN ASSISTANT

## 2023-03-21 PROCEDURE — 700102 HCHG RX REV CODE 250 W/ 637 OVERRIDE(OP): Performed by: PHYSICIAN ASSISTANT

## 2023-03-21 PROCEDURE — 85610 PROTHROMBIN TIME: CPT

## 2023-03-21 PROCEDURE — 97162 PT EVAL MOD COMPLEX 30 MIN: CPT

## 2023-03-21 PROCEDURE — A9270 NON-COVERED ITEM OR SERVICE: HCPCS | Performed by: PHYSICIAN ASSISTANT

## 2023-03-21 PROCEDURE — 80048 BASIC METABOLIC PNL TOTAL CA: CPT

## 2023-03-21 RX ORDER — OXYCODONE HYDROCHLORIDE 10 MG/1
10 TABLET ORAL EVERY 4 HOURS PRN
Status: DISCONTINUED | OUTPATIENT
Start: 2023-03-21 | End: 2023-03-22 | Stop reason: HOSPADM

## 2023-03-21 RX ORDER — OXYCODONE HYDROCHLORIDE 5 MG/1
5 TABLET ORAL EVERY 4 HOURS PRN
Status: DISCONTINUED | OUTPATIENT
Start: 2023-03-21 | End: 2023-03-22 | Stop reason: HOSPADM

## 2023-03-21 RX ADMIN — LEVOTHYROXINE SODIUM 100 MCG: 0.1 TABLET ORAL at 05:42

## 2023-03-21 RX ADMIN — OXYCODONE HYDROCHLORIDE 10 MG: 10 TABLET ORAL at 21:48

## 2023-03-21 RX ADMIN — DOCUSATE SODIUM 50 MG AND SENNOSIDES 8.6 MG 1 TABLET: 8.6; 5 TABLET, FILM COATED ORAL at 20:32

## 2023-03-21 RX ADMIN — POTASSIUM CHLORIDE AND SODIUM CHLORIDE: 900; 150 INJECTION, SOLUTION INTRAVENOUS at 15:24

## 2023-03-21 RX ADMIN — ACETAMINOPHEN 1000 MG: 500 TABLET, FILM COATED ORAL at 23:25

## 2023-03-21 RX ADMIN — ROSUVASTATIN CALCIUM 40 MG: 20 TABLET, FILM COATED ORAL at 20:32

## 2023-03-21 RX ADMIN — METHOCARBAMOL 750 MG: 750 TABLET ORAL at 20:32

## 2023-03-21 RX ADMIN — POTASSIUM CHLORIDE AND SODIUM CHLORIDE: 900; 150 INJECTION, SOLUTION INTRAVENOUS at 00:06

## 2023-03-21 RX ADMIN — ACETAMINOPHEN 1000 MG: 500 TABLET, FILM COATED ORAL at 17:13

## 2023-03-21 RX ADMIN — ACETAMINOPHEN 1000 MG: 500 TABLET, FILM COATED ORAL at 05:51

## 2023-03-21 RX ADMIN — METOCLOPRAMIDE 10 MG: 10 TABLET ORAL at 17:13

## 2023-03-21 RX ADMIN — POTASSIUM CHLORIDE AND SODIUM CHLORIDE: 900; 150 INJECTION, SOLUTION INTRAVENOUS at 07:12

## 2023-03-21 RX ADMIN — OXYCODONE HYDROCHLORIDE 10 MG: 10 TABLET ORAL at 11:42

## 2023-03-21 RX ADMIN — EZETIMIBE 10 MG: 10 TABLET ORAL at 05:42

## 2023-03-21 RX ADMIN — ACETAMINOPHEN 1000 MG: 500 TABLET, FILM COATED ORAL at 13:08

## 2023-03-21 RX ADMIN — METOCLOPRAMIDE 10 MG: 10 TABLET ORAL at 05:42

## 2023-03-21 RX ADMIN — SODIUM CHLORIDE 1 G: 1 TABLET ORAL at 05:42

## 2023-03-21 RX ADMIN — ACETAMINOPHEN 1000 MG: 500 TABLET, FILM COATED ORAL at 00:00

## 2023-03-21 RX ADMIN — LOSARTAN POTASSIUM 50 MG: 50 TABLET, FILM COATED ORAL at 05:42

## 2023-03-21 RX ADMIN — OMEPRAZOLE 40 MG: 20 CAPSULE, DELAYED RELEASE ORAL at 05:42

## 2023-03-21 RX ADMIN — METHOCARBAMOL 750 MG: 750 TABLET ORAL at 07:44

## 2023-03-21 RX ADMIN — DOCUSATE SODIUM 100 MG: 100 CAPSULE, LIQUID FILLED ORAL at 05:42

## 2023-03-21 ASSESSMENT — COGNITIVE AND FUNCTIONAL STATUS - GENERAL
MOVING FROM LYING ON BACK TO SITTING ON SIDE OF FLAT BED: A LITTLE
DRESSING REGULAR LOWER BODY CLOTHING: A LITTLE
CLIMB 3 TO 5 STEPS WITH RAILING: A LITTLE
MOVING TO AND FROM BED TO CHAIR: A LITTLE
STANDING UP FROM CHAIR USING ARMS: A LITTLE
SUGGESTED CMS G CODE MODIFIER MOBILITY: CK
HELP NEEDED FOR BATHING: A LITTLE
WALKING IN HOSPITAL ROOM: A LITTLE
DRESSING REGULAR UPPER BODY CLOTHING: A LITTLE
DAILY ACTIVITIY SCORE: 21
MOBILITY SCORE: 18
SUGGESTED CMS G CODE MODIFIER DAILY ACTIVITY: CJ
TURNING FROM BACK TO SIDE WHILE IN FLAT BAD: A LITTLE

## 2023-03-21 ASSESSMENT — GAIT ASSESSMENTS
DISTANCE (FEET): 100
GAIT LEVEL OF ASSIST: STANDBY ASSIST
DEVIATION: OTHER (COMMENT)
ASSISTIVE DEVICE: FRONT WHEEL WALKER

## 2023-03-21 ASSESSMENT — PAIN DESCRIPTION - PAIN TYPE
TYPE: SURGICAL PAIN

## 2023-03-21 ASSESSMENT — ACTIVITIES OF DAILY LIVING (ADL): TOILETING: INDEPENDENT

## 2023-03-21 NOTE — DISCHARGE PLANNING
Care Transition Team Assessment    Met with patient at bedside, discussed PLOF and confirmed demographics. Seen by PT/OT with no needs. Drain in place with possible removal tomorrow.  to drive and provide all care needed after discharge. Patient requesting HH however may not qualify as cleared by therapy. CM will continue to monitor.     Information Source  Orientation Level: Oriented X4  Information Given By: Patient  Who is responsible for making decisions for patient? : Patient    Readmission Evaluation  Is this a readmission?: No    Elopement Risk  Legal Hold: No  Ambulatory or Self Mobile in Wheelchair: No-Not an Elopement Risk  Elopement Risk: Not at Risk for Elopement    Interdisciplinary Discharge Planning  Lives with - Patient's Self Care Capacity: Spouse  Patient or legal guardian wants to designate a caregiver: No  Support Systems: Spouse / Significant Other  Housing / Facility: 1 Story House, 1 step outside.   Able to Return to Previous ADL's: Yes  Mobility Issues: No  Prior Services: None  Durable Medical Equipment: Not Applicable    Discharge Preparedness  What is your plan after discharge?: Home with help  What are your discharge supports?: Spouse  Prior Functional Level: Independent with Activities of Daily Living  Difficulity with ADLs: None  Difficulity with IADLs: None    Functional Assesment  Prior Functional Level: Independent with Activities of Daily Living    Vision / Hearing Impairment  Vision Impairment : No  Right Eye Vision: Wears Glasses  Left Eye Vision: Wears Glasses  Hearing Impairment : No    Advance Directive  Advance Directive?: None    Domestic Abuse  Have you ever been the victim of abuse or violence?: No  Physical Abuse or Sexual Abuse: No  Verbal Abuse or Emotional Abuse: No  Possible Abuse/Neglect Reported to:: Not Applicable    Discharge Risks or Barriers  Discharge risks or barriers?: No    Anticipated Discharge Information  Discharge Disposition: Discharged to  home/self care (01)  Discharge Address: [address] [id number]

## 2023-03-21 NOTE — PROGRESS NOTES
"Neurosurgery  POD# 1  Seen with Dr. Arjun Dunlap in  Tolerating oral medications  Denies nausea, vomiting  Pain controlled on current medication regimen  Arm symptoms improved  Blood sugars trending downward.  Some right shoulder pain, otherwise pain is minimal this morning    Objective:  BP (!) 140/66   Pulse 98   Temp 37.1 °C (98.8 °F) (Temporal)   Resp 15   Ht 1.702 m (5' 7\")   Wt 71.2 kg (156 lb 15.5 oz)   SpO2 95%     Intake/Output Summary (Last 24 hours) at 3/21/2023 0718  Last data filed at 3/21/2023 0400  Gross per 24 hour   Intake 2050 ml   Output 1755 ml   Net 295 ml       Recent Labs     03/21/23  0300   WBC 9.2   RBC 3.54*   HEMOGLOBIN 11.0*   HEMATOCRIT 32.1*   MCV 90.7   MCH 31.1   MCHC 34.3   RDW 44.6   PLATELETCT 437   MPV 9.7     Recent Labs     03/21/23  0300   SODIUM 134*   POTASSIUM 4.7   CHLORIDE 101   CO2 22   GLUCOSE 203*   BUN 9     Recent Labs     03/21/23  0300   APTT 26.0   INR 0.99     VSS  LS, glucose 203, hgb 11.0, Na 134, will continue to watch  Surgical incision clean, dry, intact, no evidence of infection  Strength:  Upper extremities are 5/5 grossly  Otherwise neurologically intact    Assessment:  Active Hospital Problems    Diagnosis     Cervical stenosis of spine [M48.02]      Added automatically from request for surgery [id number]      Cervical myelopathy (HCC) [G95.9]      Added automatically from request for surgery [id number]       POD#1 S/p C3-T1 posterior cervical decompression and fusion  Hemovac: 25 and 10cc/8hr am    Plan:  1. Ambulate with PT/OT as tolerated- collar off for meals  2. Advance diet as tolerated  3. D/c PCA, D/c Dunlap  4. Advance orals, continue hemovac to suction and on-q open.  5. Aim for home Wednesday if stable. Disposition TBD, pending PT/OT assessment  "

## 2023-03-21 NOTE — CARE PLAN
The patient is Stable - Low risk of patient condition declining or worsening    Shift Goals  Clinical Goals: pain control, safety  Patient Goals: pain control, rest  Family Goals: pain control and mobility    Progress made toward(s) clinical / shift goals:    Problem: Pain - Standard  Goal: Alleviation of pain or a reduction in pain to the patient’s comfort goal  Outcome: Progressing  Note: Pt states pain is relieved with current pain medication regimen. Pt medicated per MAR via PCA pump, pt provided ice packs and pillows for comfort.      Problem: Fall Risk  Goal: Patient will remain free from falls  Outcome: Progressing  Note: Pt educated on importance of use of call light when needing assistance. Bed locked in lowest position, call light within reach, no DME at bedside, hourly rounding in place.

## 2023-03-21 NOTE — THERAPY
Physical Therapy   Initial Evaluation     Patient Name: Ines Long  Age:  67 y.o., Sex:  female  Medical Record #: [id number]  Today's Date: 3/21/2023     Precautions  Precautions: Fall Risk;Cervical Collar  ;Spinal / Back Precautions ;Other (See Comments) (wound vac, hemovac drain)  Comments: Ty [place] VALLE at all times, except meals/showers    Assessment  67-year-old female with off-and-on neck pain and stiffness without any clear radicular complaints with subjective weakness in her hands and a spastic gait.  Hx of a L5-S1 left-sided disc protrusion and subsequently underwent a discectomy surgery.  Pt now s/p C3-T1 posterior decompressive laminectomy and C3-T1 posterior instrumented fusion (3/20). Upon evaluation, pt demonstrates slight RUE weakness; however, demonstrates good compliance with spinal precautions. Pt with mild gait deficits (reportedly baseline), and no LOB observed with FWW. Recommend pt maintain mobility under supervision of nsg, and recommend f/u OP PT once medically appropriate.     Plan         DC Equipment Recommendations: None  Discharge Recommendations: Recommend outpatient physical therapy services to address higher level deficits       Subjective    Pt reported R muscle/shoulder pain. Pt reported walking at baseline. Pt denied anticipating difficulties upon returning home.      Objective     03/21/23 1107   Precautions   Precautions Fall Risk;Cervical Collar  ;Spinal / Back Precautions ;Other (See Comments)  (wound vac, hemovac drain)   Comments Ty [place]LE at all times, except meals/showers   Pain   Pain Scales 0 to 10 Scale    Pain 0 - 10 Group   Location Shoulder;Neck   Location Orientation Right   Pain Rating Scale (NPRS) 5   Prior Living Situation   Prior Services Home-Independent   Housing / Facility 1 Story House   Steps Into Home 2  (with HR)   Equipment Owned Front-Wheel Walker;Single Point Cane;Tub / Shower Seat   Lives with - Patient's Self Care Capacity Spouse   Comments IND with  ADLs prior to admission   Prior Level of Functional Mobility   Bed Mobility Independent   Transfer Status Independent   Ambulation Independent   Ambulation Distance   (community)   Assistive Devices Used Single Point Cane  (for community ambulation only, no AD for household ambulation)   Stairs Independent   History of Falls   History of Falls No   Cognition    Comments alert and appropriate   Strength Upper Body   Upper Body Strength  X   Comments BUE grossly 4+/5, except 4-/5 R shoulder flexion and 4/5 R  strength   Sensation Upper Body   Upper Extremity Sensation  WDL   Upper Body Muscle Tone   Upper Body Muscle Tone  WDL   Active ROM Lower Body    Active ROM Lower Body  WDL   Strength Lower Body   Lower Body Strength  WDL   Sensation Lower Body   Lower Extremity Sensation   WDL   Balance Assessment   Sitting Balance (Static) Good   Standing Balance (Static) Fair   Bed Mobility    Supine to Sit Standby Assist  (cues for log rolling)   Sit to Supine Standby Assist   Gait Analysis   Gait Level Of Assist Standby Assist   Assistive Device Front Wheel Walker   Distance (Feet) 100   # of Times Distance was Traveled 1   Deviation Other (Comment)  (Decreased ange, increased L genu recurvatum during loading phase)   # of Stairs Climbed   (pt denied any concerns and need for stair training)   Functional Mobility   Sit to Stand Standby Assist   Toilet Transfers Standby Assist  (2 attempts to complete, requiring grab bar and FWW)   How much difficulty does the patient currently have...   Turning over in bed (including adjusting bedclothes, sheets and blankets)? 3   Sitting down on and standing up from a chair with arms (e.g., wheelchair, bedside commode, etc.) 3   Moving from lying on back to sitting on the side of the bed? 3   How much help from another person does the patient currently need...   Moving to and from a bed to a chair (including a wheelchair)? 3   Need to walk in a hospital room? 3   Climbing 3-5 steps  with a railing? 3   6 clicks Mobility Score 18   Education Group   Education Provided Role of Physical Therapist;Brace Wear and Care;Spine Precautions;Cervical Precautions   Spine Precautions Patient Response Verbal Demonstration   Cervical Precautions Patient Response Verbal Demonstration   Role of Physical Therapist Patient Response Verbal Demonstration   Brace Wear & Care Patient Response Verbal Demonstration   Additional Comments Educated pt on spinal precautions, and provided/reviewed post cervical surgery handout. Discussed repetition of activity and expectations with recovery process. Discussed use of FWW, and transition to OP PT once medically clear   Problem List    Problems Impaired Ambulation;Impaired Balance;Pain   Anticipated Discharge Equipment and Recommendations   DC Equipment Recommendations None   Discharge Recommendations Recommend outpatient physical therapy services to address higher level deficits   Interdisciplinary Plan of Care Collaboration   IDT Collaboration with  Nursing   Patient Position at End of Therapy In Bed;Call Light within Reach;Tray Table within Reach;Family / Friend in Room   Session Information   Date / Session Number  3/21- eval only

## 2023-03-22 ENCOUNTER — PHARMACY VISIT (OUTPATIENT)
Dept: PHARMACY | Facility: MEDICAL CENTER | Age: 68
End: 2023-03-22
Payer: MEDICARE

## 2023-03-22 VITALS
RESPIRATION RATE: 17 BRPM | BODY MASS INDEX: 24.64 KG/M2 | DIASTOLIC BLOOD PRESSURE: 86 MMHG | HEART RATE: 82 BPM | SYSTOLIC BLOOD PRESSURE: 168 MMHG | HEIGHT: 67 IN | OXYGEN SATURATION: 92 % | TEMPERATURE: 97.8 F | WEIGHT: 156.97 LBS

## 2023-03-22 LAB
ANION GAP SERPL CALC-SCNC: 11 MMOL/L (ref 7–16)
APTT PPP: 23.3 SEC (ref 24.7–36)
BUN SERPL-MCNC: 7 MG/DL (ref 8–22)
CALCIUM SERPL-MCNC: 9 MG/DL (ref 8.5–10.5)
CHLORIDE SERPL-SCNC: 100 MMOL/L (ref 96–112)
CO2 SERPL-SCNC: 23 MMOL/L (ref 20–33)
CREAT SERPL-MCNC: 0.6 MG/DL (ref 0.5–1.4)
ERYTHROCYTE [DISTWIDTH] IN BLOOD BY AUTOMATED COUNT: 46.8 FL (ref 35.9–50)
GFR SERPLBLD CREATININE-BSD FMLA CKD-EPI: 99 ML/MIN/1.73 M 2
GLUCOSE SERPL-MCNC: 141 MG/DL (ref 65–99)
HCT VFR BLD AUTO: 36.2 % (ref 37–47)
HGB BLD-MCNC: 12.1 G/DL (ref 12–16)
INR PPP: 0.95 (ref 0.87–1.13)
MCH RBC QN AUTO: 30.9 PG (ref 27–33)
MCHC RBC AUTO-ENTMCNC: 33.4 G/DL (ref 33.6–35)
MCV RBC AUTO: 92.3 FL (ref 81.4–97.8)
PLATELET # BLD AUTO: 429 K/UL (ref 164–446)
PMV BLD AUTO: 9.6 FL (ref 9–12.9)
POTASSIUM SERPL-SCNC: 3.7 MMOL/L (ref 3.6–5.5)
PROTHROMBIN TIME: 12.6 SEC (ref 12–14.6)
RBC # BLD AUTO: 3.92 M/UL (ref 4.2–5.4)
SODIUM SERPL-SCNC: 134 MMOL/L (ref 135–145)
WBC # BLD AUTO: 6.9 K/UL (ref 4.8–10.8)

## 2023-03-22 PROCEDURE — 80048 BASIC METABOLIC PNL TOTAL CA: CPT

## 2023-03-22 PROCEDURE — 85730 THROMBOPLASTIN TIME PARTIAL: CPT

## 2023-03-22 PROCEDURE — 99024 POSTOP FOLLOW-UP VISIT: CPT | Performed by: PHYSICIAN ASSISTANT

## 2023-03-22 PROCEDURE — 700101 HCHG RX REV CODE 250: Performed by: PHYSICIAN ASSISTANT

## 2023-03-22 PROCEDURE — RXMED WILLOW AMBULATORY MEDICATION CHARGE: Performed by: PHYSICIAN ASSISTANT

## 2023-03-22 PROCEDURE — A9270 NON-COVERED ITEM OR SERVICE: HCPCS | Performed by: PHYSICIAN ASSISTANT

## 2023-03-22 PROCEDURE — 700102 HCHG RX REV CODE 250 W/ 637 OVERRIDE(OP): Performed by: PHYSICIAN ASSISTANT

## 2023-03-22 PROCEDURE — 85610 PROTHROMBIN TIME: CPT

## 2023-03-22 PROCEDURE — 85027 COMPLETE CBC AUTOMATED: CPT

## 2023-03-22 RX ORDER — ACETAMINOPHEN 500 MG
1000 TABLET ORAL EVERY 6 HOURS
Qty: 100 TABLET | Refills: 1 | Status: ON HOLD | OUTPATIENT
Start: 2023-03-22 | End: 2023-09-27

## 2023-03-22 RX ORDER — METHOCARBAMOL 750 MG/1
750 TABLET, FILM COATED ORAL EVERY 8 HOURS PRN
Qty: 90 TABLET | Refills: 1 | Status: SHIPPED | OUTPATIENT
Start: 2023-03-22 | End: 2023-05-19

## 2023-03-22 RX ORDER — OXYCODONE HYDROCHLORIDE 10 MG/1
10 TABLET ORAL EVERY 4 HOURS PRN
Qty: 42 TABLET | Refills: 0 | Status: SHIPPED | OUTPATIENT
Start: 2023-03-22 | End: 2023-03-29

## 2023-03-22 RX ORDER — DOXYCYCLINE 100 MG/1
100 TABLET ORAL EVERY 12 HOURS
Qty: 10 TABLET | Refills: 0 | Status: ACTIVE | OUTPATIENT
Start: 2023-03-22 | End: 2023-03-27

## 2023-03-22 RX ORDER — DOXYCYCLINE 100 MG/1
100 TABLET ORAL EVERY 12 HOURS
Status: DISCONTINUED | OUTPATIENT
Start: 2023-03-22 | End: 2023-03-22 | Stop reason: HOSPADM

## 2023-03-22 RX ADMIN — LABETALOL HYDROCHLORIDE 10 MG: 5 INJECTION INTRAVENOUS at 04:41

## 2023-03-22 RX ADMIN — OXYCODONE HYDROCHLORIDE 10 MG: 10 TABLET ORAL at 02:42

## 2023-03-22 RX ADMIN — LEVOTHYROXINE SODIUM 100 MCG: 0.1 TABLET ORAL at 04:42

## 2023-03-22 RX ADMIN — OMEPRAZOLE 40 MG: 20 CAPSULE, DELAYED RELEASE ORAL at 04:42

## 2023-03-22 RX ADMIN — LOSARTAN POTASSIUM 50 MG: 50 TABLET, FILM COATED ORAL at 04:42

## 2023-03-22 RX ADMIN — ACETAMINOPHEN 1000 MG: 500 TABLET, FILM COATED ORAL at 05:46

## 2023-03-22 RX ADMIN — METHOCARBAMOL 750 MG: 750 TABLET ORAL at 10:00

## 2023-03-22 RX ADMIN — OXYCODONE HYDROCHLORIDE 10 MG: 10 TABLET ORAL at 08:42

## 2023-03-22 RX ADMIN — DOXYCYCLINE 100 MG: 100 TABLET, FILM COATED ORAL at 08:42

## 2023-03-22 RX ADMIN — DOCUSATE SODIUM 100 MG: 100 CAPSULE, LIQUID FILLED ORAL at 04:42

## 2023-03-22 RX ADMIN — EZETIMIBE 10 MG: 10 TABLET ORAL at 04:42

## 2023-03-22 RX ADMIN — METOCLOPRAMIDE 10 MG: 10 TABLET ORAL at 04:42

## 2023-03-22 RX ADMIN — SODIUM CHLORIDE 1 G: 1 TABLET ORAL at 04:42

## 2023-03-22 ASSESSMENT — PAIN DESCRIPTION - PAIN TYPE
TYPE: SURGICAL PAIN
TYPE: SURGICAL PAIN

## 2023-03-22 NOTE — DISCHARGE PLANNING
Met with patient at bedside, discussed plan for discharge home today, no HH needs per PT/OT, IMM given.  to drive, no further case management needs.

## 2023-03-22 NOTE — PROGRESS NOTES
Patient arrived to [facility]. NM paper work, meds, and follow up appointments reviewed with patient. Questions addressed. Patient has ride home with .

## 2023-03-22 NOTE — PROGRESS NOTES
"Neurosurgery  POD# 2  Mobilizing  Voiding  Tolerating oral medications  Denies nausea, vomiting  Pain controlled on current medication regimen  Arm symptoms improved  Blood sugars stable  Right shoulder pain improved.  Anxious to go home    Objective:  BP (!) 159/85   Pulse 86   Temp 36.6 °C (97.9 °F) (Temporal)   Resp 18   Ht 1.702 m (5' 7\")   Wt 71.2 kg (156 lb 15.5 oz)   SpO2 92%     Intake/Output Summary (Last 24 hours) at 3/21/2023 0718  Last data filed at 3/21/2023 0400  Gross per 24 hour   Intake 2050 ml   Output 1755 ml   Net 295 ml       Recent Labs     03/21/23  0300 03/22/23  0545   WBC 9.2 6.9   RBC 3.54* 3.92*   HEMOGLOBIN 11.0* 12.1   HEMATOCRIT 32.1* 36.2*   MCV 90.7 92.3   MCH 31.1 30.9   MCHC 34.3 33.4*   RDW 44.6 46.8   PLATELETCT 437 429   MPV 9.7 9.6     Recent Labs     03/21/23  0300 03/22/23  0545   SODIUM 134* 134*   POTASSIUM 4.7 3.7   CHLORIDE 101 100   CO2 22 23   GLUCOSE 203* 141*   BUN 9 7*     Recent Labs     03/21/23  0300 03/22/23  0545   APTT 26.0 23.3*   INR 0.99 0.95     VSS  LS  Hemovac 10 and 10 cc/8hr am  Prevena compressed  Strength:  Upper extremities are 5/5 grossly  Otherwise neurologically intact    Assessment:  Active Hospital Problems    Diagnosis     Cervical stenosis of spine [M48.02]      Added automatically from request for surgery [id number]      Cervical myelopathy (HCC) [G95.9]      Added automatically from request for surgery [id number]       POD#2 S/p C3-T1 posterior cervical decompression and fusion  Chemoprophylaxis: No    Plan:  1. Ambulate with PT/OT as tolerated- collar off for meals  2. D/c hemovacs X2 and on-q catheters at 1000am. Reinforce prevena dressing with tegaderms as needed.  3. D/c home after drains are out, meds to beds for scripts, call with any issues  4. Prevena to be removed in office Monday morning  "

## 2023-03-22 NOTE — CARE PLAN
The patient is Stable - Low risk of patient condition declining or worsening     Shift Goals  Clinical Goals: pain control, safety  Patient Goals: pain control, rest  Family Goals: pain control and mobility     Progress made toward(s) clinical / shift goals:    Problem: Pain - Standard  Goal: Alleviation of pain or a reduction in pain to the patient’s comfort goal  Outcome: Progressing  Note: Pt states pain is relieved with current pain medication regimen. Pt medicated per MAR, pt provided ice packs and pillows for comfort.      Problem: Fall Risk  Goal: Patient will remain free from falls  Outcome: Progressing  Note: Pt educated on importance of use of call light when needing assistance. Bed locked in lowest position, call light within reach, no DME at bedside, hourly rounding in place.

## 2023-03-22 NOTE — DISCHARGE SUMMARY
Discharge Summary    CHIEF COMPLAINT ON ADMISSION  No chief complaint on file.      Reason for Admission  Other hyperlipidemia     Admission Date  3/20/2023    CODE STATUS  Full Code    HPI & HOSPITAL COURSE  8/19/2022  Ines Long is a 67 y.o. female seen today in a neurosurgery/spine consultation.  Her  was in attendance today.  She was seen in conjunction with Doctor Díaz today.  When we originally saw her back in April she was having low back and left leg symptomatology with a spastic gait.  She reports her gait has been like that for approximately fear 5 years after a foot surgery.  She is found to have a L5-S1 left-sided disc protrusion and subsequently underwent a discectomy surgery.  Left leg symptoms have largely settled.  She continues with unsteady gait as well as intermittent neck pain and stiffness this is usually every 3 to 4 weeks.  She denies any pain, numbness or tingling into her arms.  She does feel some weakness in her bilateral .  She does have upcoming right knee surgery next week.      In essence, this is a 67-year-old female with off-and-on neck pain and stiffness without any clear radicular complaints with subjective weakness in her hands and a spastic gait.      Pertinent past medical history is remarkable for diabetes, she does have insulin pump, her blood sugars have been quite labile in the 500s.  She has had a difficult time getting a follow up with her endocrinologist who manages this.  Previous left carotid surgery for carotid artery stenosis.        12/2/2022  Ines comes back.  She still has unsteady gait.  On questioning she does have clumsiness in the hands.  Neck pain is a little bit of an issue.  She had hip surgery did well with it.  She had unsteady regular and tandem gait today.  MRI scan shows moderate stenosis.  I have suggested we get her carotids looked at again.  She is seeing Dr. Shant Card for heart.  We will regroup back in  January.Surgery on the neck is on the table.  She is seeing Dr. Gao on December 16.  He is trying exclude a brain cause for her unsteadiness.  If nothing else turns up.  It is worthwhile fixing the neck to try to stop the symptoms progressing.        1/20/2023  Ines returns with her daughter.  She feels she is getting worse in terms of arm and leg function.  She seen her neurology team they feel it cervical.  She had a carotid ultrasound and nuclear.  She wants to proceed with surgery.  I went over the surgery and the recovery again using sheets and models.  She received an email form from me with extensive paperwork.  We will get a cardiology clearance.     2/17/2023  Returns for preoperative review.  Risks, benefits and alternatives of surgery were discussed with the patient.  She has hyperkalemia, this is being managed by her nephrologist.  Cardiac clearance is pending.  She does again request to proceed with surgery at today's visit.     3/16/2023  Returns for preoperative and postoperative review.  She was seen in conjunction with Doctor Díaz today.  Her  was in attendance.  Her neck pain has settled to a degree.  She continues with arm and leg function which is impaired.  Cardiology has reviewed her and felt no additional testing is indicated as she was hypotensive during her last surgery.  She was fluid restricted from her nephrologist.  They have increased her fluid restriction to 40 mL/day.  She is going to increase this over the weekend.  She is going to have a sugar-free Gatorade the morning of surgery 4 hours in advance.  Risk, benefits and alternatives were again discussed.  She does again wish to proceed with surgery today.      3/22/2023  Postoperative day #2 the patient is doing quite well.  They patient reports improvement of their preoperative arm symptoms.  There continues to be some incisional site discomfort which is well controlled with oral analgesics at this time.  The patient is  eating and drinking without complication.  They are mobilizing well.  They do not report any nausea, vomiting, fever or headache.  They have remained hemodynamically stable.  Based upon the above information the patient will be discharged to home in a stable condition.       No notes on file    Therefore, she is discharged in good and stable condition to home with close outpatient follow-up.    The patient met 2-midnight criteria for an inpatient stay at the time of discharge.    Discharge Date  3/22/2023     FOLLOW UP ITEMS POST DISCHARGE  Follow up with our office in 2, 6, and 12 weeks.  Report to ER with any complications.  Call our office with any questions or concerns.  No anti-inflammatories for 3 months, no blood thinners for 2 weeks.   Clear to shower the front on Thursday, pat incision dry, no special creams or ointments.   Avoid bending, lifting and twisting.   Walk 4-6 times per day as tolerated to help prevent blood clots.     DISCHARGE DIAGNOSES  Active Problems:    Cervical stenosis of spine POA: Unknown      Overview: Added automatically from request for surgery [id number]    Cervical myelopathy (HCC) POA: Unknown      Overview: Added automatically from request for surgery [id number]  Resolved Problems:    * No resolved hospital problems. *      FOLLOW UP  Future Appointments   Date Time Provider Department Center   3/23/2023  2:15 PM [facility] EXAM 12 ECHO [facility]   4/3/2023 11:30 AM Brigido Richter P.A.-C. [facility]   5/18/2023  4:00 PM Ghassan Gray M.D. [facility] None   6/20/2023  9:40 AM [facility] [address]     No follow-up provider specified.    MEDICATIONS ON DISCHARGE     Medication List        START taking these medications        Instructions   doxycycline monohydrate 100 MG tablet  Commonly known as: ADOXA   Take 1 Tablet by mouth every 12 hours for 5 days.  Dose: 100 mg     oxyCODONE immediate release 10 MG immediate release tablet  Commonly known as:  ROXICODONE   Take 1 Tablet by mouth every four hours as needed for Severe Pain for up to 7 days.  Dose: 10 mg            CONTINUE taking these medications        Instructions   acetaminophen 500 MG Tabs  Commonly known as: TYLENOL   Take 2 Tablets by mouth every 6 hours.  Dose: 1,000 mg     CALCIUM 600 PO   Take 600 mg by mouth every day.  Dose: 600 mg     CRANBERRY PO   Take 500 mg by mouth every evening.  Dose: 500 mg     ELDERBERRY PO   Take  by mouth every day.     ezetimibe 10 MG Tabs  Commonly known as: ZETIA   Take 1 Tablet by mouth every day.  Dose: 10 mg     * Fiasp 100 UNIT/ML Soln  Generic drug: Insulin Aspart (w/Niacinamide)   4-50 units with pump daily     * Fiasp 100 UNIT/ML Soln  Generic drug: Insulin Aspart (w/Niacinamide)      insulin infusion pump Rosibel   Inject  under the skin continuous. Patient's own SQ insulin pump    Type of Insulin: Fiasp  Last change of tubing: 3/19/2023 - Change tubing and site every 72 hours    Dosing:  Basal rate:   0000 - 0329 = 0.5 units/hr   0330 - 1129 = 0.85 units/hr   1130 - 1359 = 0.5 units/hr              1400 - 1759 = 0.45 units/hr              1800 - 2359 = 0.5 units/hr    Bolus ratio:   1 unit : 12 g carbohydrate at  (breakfast, lunch, dinner, snacks)      Correction ratio:    1 units for every 50 over 150 mg/dL    Disconnect pump if patient becomes hypoglycemic and altered.     Levoxyl 100 MCG Tabs  Generic drug: levothyroxine   Take 1 Tablet by mouth every morning on an empty stomach.  Dose: 100 mcg     losartan 50 MG Tabs  Commonly known as: COZAAR   Take 1 Tablet by mouth every day.  Dose: 50 mg     methocarbamol 750 MG Tabs  Commonly known as: ROBAXIN   Take 1 Tablet by mouth every 8 hours as needed (muscle spasm).  Dose: 750 mg     metoclopramide 10 MG Tabs  Commonly known as: REGLAN   Take 1 Tablet by mouth 2 times a day.  Dose: 10 mg     MULTIVITAMIN PO   Take  by mouth every day.     NovoLOG FlexPen 100 UNIT/ML injection PEN  Generic drug: insulin  "aspart      OMEGA-3 FATTY ACIDS PO   Take  by mouth.     pantoprazole 40 MG Tbec  Commonly known as: PROTONIX   Take 1 Tablet by mouth every day.  Dose: 40 mg     rosuvastatin 40 MG tablet  Commonly known as: Crestor   Doctor's comments: Please contact PCP for further medication refills. Thank you  Take 1 Tab by mouth every bedtime.  Dose: 40 mg     sodium chloride 1 GM Tabs  Commonly known as: SALT   Take 1 Tablet by mouth 2 times a day.  Dose: 1 g     Veltassa 8.4 g Pack  Generic drug: patiromer      VITAMIN B COMPLEX PO   Take 1 Tablet by mouth every evening.  Dose: 1 Tablet     VITAMIN D-3 PO   Take 5,000 mg by mouth every evening.  Dose: 5,000 mg           * This list has 2 medication(s) that are the same as other medications prescribed for you. Read the directions carefully, and ask your doctor or other care provider to review them with you.                STOP taking these medications      cyclobenzaprine 5 mg tablet  Commonly known as: Flexeril              Allergies  Allergies   Allergen Reactions    Ceclor [Cefaclor] Hives and Swelling    Augmentin Hives, Diarrhea and Vomiting     Fever blisters   Sickness lasts forever    Naproxen      Face Swells     Tape Rash     \"Certain band aids\"  PAPER TAPE OKAY/ Tegaderm ok    Cefaclor Hives and Swelling       DIET  Orders Placed This Encounter   Procedures    Diet Order Diet: Regular     Standing Status:   Standing     Number of Occurrences:   1     Order Specific Question:   Diet:     Answer:   Regular [1]       ACTIVITY  As tolerated.  Weight bearing as tolerated    CONSULTATIONS  None    PROCEDURES  TITLE OF PROCEDURE:    C3 posterior decompressive laminectomy and bilaterally foraminotomies  C4 posterior decompressive laminectomy and bilaterally foraminotomies  C5 posterior decompressive laminectomy and bilaterally foraminotomies  C6 posterior decompressive laminectomy and bilaterally foraminotomies  C7 posterior decompressive laminectomy and bilaterally " foraminotomies  T1 posterior decompressive laminectomy and bilaterally foraminotomies  T1-N7-V6-C6-C7-T1 posterolateral fusion using local morcellized autograft and bone graft substitute.  Q1-L9-B9-C6-C7-T1 segmental fixation using posterior cervical lateral mass and pedicle screws.  Microscopic microdissection.  Fluoroscopic guidance for placement of screws.  O- Arm navigation for screw placement   i/o On-Q Pain catheters in paraspinal musculature    LABORATORY  Lab Results   Component Value Date    SODIUM 134 (L) 03/22/2023    POTASSIUM 3.7 03/22/2023    CHLORIDE 100 03/22/2023    CO2 23 03/22/2023    GLUCOSE 141 (H) 03/22/2023    BUN 7 (L) 03/22/2023    CREATININE 0.60 03/22/2023        Lab Results   Component Value Date    WBC 6.9 03/22/2023    HEMOGLOBIN 12.1 03/22/2023    HEMATOCRIT 36.2 (L) 03/22/2023    PLATELETCT 429 03/22/2023        Total time of the discharge process exceeds 30 minutes.

## 2023-03-22 NOTE — DISCHARGE INSTRUCTIONS
FOLLOW UP ITEMS POST DISCHARGE  Follow up with our office in 2, 6, and 12 weeks.  Report to ER with any complications.  Call our office with any questions or concerns.  No anti-inflammatories for 3 months, no blood thinners for 2 weeks.   Clear to shower the front on Thursday, pat incision dry, no special creams or ointments.   Avoid bending, lifting and twisting.   Walk 4-6 times per day as tolerated to help prevent blood clots.     Spinal Stenosis    Spinal stenosis occurs when the open space (spinal canal) between the bones of your spine (vertebrae) narrows, putting pressure on the spinal cord or nerves.  What are the causes?  This condition is caused by areas of bone pushing into the central canals of your vertebrae. This condition may be present at birth (congenital), or it may be caused by:  Arthritic deterioration of your vertebrae (spinal degeneration). This usually starts around age 50.  Injury or trauma to the spine.  Tumors in the spine.  Calcium deposits in the spine.  What are the signs or symptoms?  Symptoms of this condition include:  Pain in the neck or back that is generally worse with activities, particularly when standing and walking.  Numbness, tingling, hot or cold sensations, weakness, or weariness in your legs.  Pain going up and down the leg (sciatica).  Frequent episodes of falling.  A foot-slapping gait that leads to muscle weakness.  In more serious cases, you may develop:  Problems passing stool or passing urine.  Difficulty having sex.  Loss of feeling in part or all of your leg.  Symptoms may come on slowly and get worse over time.  How is this diagnosed?  This condition is diagnosed based on your medical history and a physical exam. Tests will also be done, such as:  MRI.  CT scan.  X-ray.  How is this treated?  Treatment for this condition often focuses on managing your pain and any other symptoms. Treatment may include:  Practicing good posture to lessen pressure on your  nerves.  Exercising to strengthen muscles, build endurance, improve balance, and maintain good joint movement (range of motion).  Losing weight, if needed.  Taking medicines to reduce swelling, inflammation, or pain.  Assistive devices, such as a corset or brace.  In some cases, surgery may be needed. The most common procedure is decompression laminectomy. This is done to remove excess bone that puts pressure on your nerve roots.  Follow these instructions at home:  Managing pain, stiffness, and swelling  Do all exercises and stretches as told by your health care provider.  Practice good posture. If you were given a brace or a corset, wear it as told by your health care provider.  Do not do any activities that cause pain. Ask your health care provider what activities are safe for you.  Do not lift anything that is heavier than 10 lb (4.5 kg) or the limit that your health care provider tells you.  Maintain a healthy weight. Talk with your health care provider if you need help losing weight.  If directed, apply heat to the affected area as often as told by your health care provider. Use the heat source that your health care provider recommends, such as a moist heat pack or a heating pad.  Place a towel between your skin and the heat source.  Leave the heat on for 20-30 minutes.  Remove the heat if your skin turns bright red. This is especially important if you are not able to feel pain, heat, or cold. You may have a greater risk of getting burned.  General instructions  Take over-the-counter and prescription medicines only as told by your health care provider.  Do not use any products that contain nicotine or tobacco, such as cigarettes and e-cigarettes. If you need help quitting, ask your health care provider.  Eat a healthy diet. This includes plenty of fruits and vegetables, whole grains, and low-fat (lean) protein.  Keep all follow-up visits as told by your health care provider. This is important.  Contact a  health care provider if:  Your symptoms do not get better or they get worse.  You have a fever.  Get help right away if:  You have new or worse pain in your neck or upper back.  You have severe pain that cannot be controlled with medicines.  You are dizzy.  You have vision problems, blurred vision, or double vision.  You have a severe headache that is worse when you stand.  You have nausea or you vomit.  You develop new or worse numbness or tingling in your back or legs.  You have pain, redness, swelling, or warmth in your arm or leg.  Summary  Spinal stenosis occurs when the open space (spinal canal) between the bones of your spine (vertebrae) narrows. This narrowing puts pressure on the spinal cord or nerves.  Spinal stenosis can cause numbness, weakness, or pain in the neck, back, and legs.  This condition may be caused by a birth defect, arthritic deterioration of your vertebrae, injury, tumors, or calcium deposits.  This condition is usually diagnosed with MRIs, CT scans, and X-rays.  This information is not intended to replace advice given to you by your health care provider. Make sure you discuss any questions you have with your health care provider.  Document Released: 03/09/2005 Document Revised: 11/30/2018 Document Reviewed: 11/22/2017  [employer] Patient Education © 2020 [employer]

## 2023-03-22 NOTE — PROGRESS NOTES
/86, pt states she is hypertensive at home even with her BP meds. Notified Brigido WILLIAM- still okay to discharge pt. Discussed with pt to check her BP at home and write down numbers and follow up with her PCP or cardiologist.

## 2023-03-22 NOTE — CARE PLAN
The patient is Stable - Low risk of patient condition declining or worsening    Shift Goals  Clinical Goals: pain control, mobilize, d/c drains  Patient Goals: discharge today  Family Goals: n/a    Progress made toward(s) clinical / shift goals:    Problem: Pain - Standard  Goal: Alleviation of pain or a reduction in pain to the patient’s comfort goal  Outcome: Progressing   Oxy and Robaxin given PRN with +results. Educated pt on importance of pain control- pt verbalized understanding.    Problem: Knowledge Deficit - Standard  Goal: Patient and family/care givers will demonstrate understanding of plan of care, disease process/condition, diagnostic tests and medications  Outcome: Progressing  POC discussed- pt verbalized understanding.      Patient is not progressing towards the following goals:

## 2023-04-13 ENCOUNTER — HOSPITAL ENCOUNTER (OUTPATIENT)
Dept: LAB | Facility: MEDICAL CENTER | Age: 68
End: 2023-04-13
Attending: NURSE PRACTITIONER
Payer: MEDICARE

## 2023-04-13 LAB
ALBUMIN SERPL BCP-MCNC: 4.4 G/DL (ref 3.2–4.9)
ALBUMIN/GLOB SERPL: 1.6 G/DL
ALP SERPL-CCNC: 94 U/L (ref 30–99)
ALT SERPL-CCNC: 31 U/L (ref 2–50)
ANION GAP SERPL CALC-SCNC: 12 MMOL/L (ref 7–16)
APPEARANCE UR: CLEAR
AST SERPL-CCNC: 22 U/L (ref 12–45)
BACTERIA #/AREA URNS HPF: NEGATIVE /HPF
BASOPHILS # BLD AUTO: 0.5 % (ref 0–1.8)
BASOPHILS # BLD: 0.04 K/UL (ref 0–0.12)
BILIRUB SERPL-MCNC: 0.3 MG/DL (ref 0.1–1.5)
BILIRUB UR QL STRIP.AUTO: NEGATIVE
BUN SERPL-MCNC: 13 MG/DL (ref 8–22)
CALCIUM ALBUM COR SERPL-MCNC: 9.5 MG/DL (ref 8.5–10.5)
CALCIUM SERPL-MCNC: 9.8 MG/DL (ref 8.5–10.5)
CHLORIDE SERPL-SCNC: 97 MMOL/L (ref 96–112)
CO2 SERPL-SCNC: 24 MMOL/L (ref 20–33)
COLOR UR: YELLOW
CREAT SERPL-MCNC: 0.65 MG/DL (ref 0.5–1.4)
CREAT UR-MCNC: 46.8 MG/DL
EOSINOPHIL # BLD AUTO: 0.08 K/UL (ref 0–0.51)
EOSINOPHIL NFR BLD: 1.1 % (ref 0–6.9)
EPI CELLS #/AREA URNS HPF: NEGATIVE /HPF
ERYTHROCYTE [DISTWIDTH] IN BLOOD BY AUTOMATED COUNT: 49.1 FL (ref 35.9–50)
GFR SERPLBLD CREATININE-BSD FMLA CKD-EPI: 97 ML/MIN/1.73 M 2
GLOBULIN SER CALC-MCNC: 2.8 G/DL (ref 1.9–3.5)
GLUCOSE SERPL-MCNC: 114 MG/DL (ref 65–99)
GLUCOSE UR STRIP.AUTO-MCNC: NEGATIVE MG/DL
HCT VFR BLD AUTO: 42 % (ref 37–47)
HGB BLD-MCNC: 13.3 G/DL (ref 12–16)
HYALINE CASTS #/AREA URNS LPF: NORMAL /LPF
IMM GRANULOCYTES # BLD AUTO: 0.03 K/UL (ref 0–0.11)
IMM GRANULOCYTES NFR BLD AUTO: 0.4 % (ref 0–0.9)
KETONES UR STRIP.AUTO-MCNC: NEGATIVE MG/DL
LEUKOCYTE ESTERASE UR QL STRIP.AUTO: ABNORMAL
LYMPHOCYTES # BLD AUTO: 1.67 K/UL (ref 1–4.8)
LYMPHOCYTES NFR BLD: 21.9 % (ref 22–41)
MAGNESIUM SERPL-MCNC: 1.9 MG/DL (ref 1.5–2.5)
MCH RBC QN AUTO: 30.3 PG (ref 27–33)
MCHC RBC AUTO-ENTMCNC: 31.7 G/DL (ref 33.6–35)
MCV RBC AUTO: 95.7 FL (ref 81.4–97.8)
MICRO URNS: ABNORMAL
MICROALBUMIN UR-MCNC: <1.2 MG/DL
MICROALBUMIN/CREAT UR: NORMAL MG/G (ref 0–30)
MONOCYTES # BLD AUTO: 0.59 K/UL (ref 0–0.85)
MONOCYTES NFR BLD AUTO: 7.8 % (ref 0–13.4)
NEUTROPHILS # BLD AUTO: 5.2 K/UL (ref 2–7.15)
NEUTROPHILS NFR BLD: 68.3 % (ref 44–72)
NITRITE UR QL STRIP.AUTO: NEGATIVE
NRBC # BLD AUTO: 0 K/UL
NRBC BLD-RTO: 0 /100 WBC
PH UR STRIP.AUTO: 6.5 [PH] (ref 5–8)
PHOSPHATE SERPL-MCNC: 3.6 MG/DL (ref 2.5–4.5)
PLATELET # BLD AUTO: 365 K/UL (ref 164–446)
PMV BLD AUTO: 10.3 FL (ref 9–12.9)
POTASSIUM SERPL-SCNC: 4.7 MMOL/L (ref 3.6–5.5)
PROT SERPL-MCNC: 7.2 G/DL (ref 6–8.2)
PROT UR QL STRIP: NEGATIVE MG/DL
RBC # BLD AUTO: 4.39 M/UL (ref 4.2–5.4)
RBC # URNS HPF: NORMAL /HPF
RBC UR QL AUTO: NEGATIVE
SODIUM SERPL-SCNC: 133 MMOL/L (ref 135–145)
SP GR UR STRIP.AUTO: 1.01
URATE SERPL-MCNC: 3.2 MG/DL (ref 1.9–8.2)
UROBILINOGEN UR STRIP.AUTO-MCNC: 0.2 MG/DL
WBC # BLD AUTO: 7.6 K/UL (ref 4.8–10.8)
WBC #/AREA URNS HPF: NORMAL /HPF

## 2023-04-13 PROCEDURE — 84550 ASSAY OF BLOOD/URIC ACID: CPT

## 2023-04-13 PROCEDURE — 82570 ASSAY OF URINE CREATININE: CPT | Mod: 91

## 2023-04-13 PROCEDURE — 82043 UR ALBUMIN QUANTITATIVE: CPT

## 2023-04-13 PROCEDURE — 83735 ASSAY OF MAGNESIUM: CPT

## 2023-04-13 PROCEDURE — 85025 COMPLETE CBC W/AUTO DIFF WBC: CPT

## 2023-04-13 PROCEDURE — 80053 COMPREHEN METABOLIC PANEL: CPT

## 2023-04-13 PROCEDURE — 84100 ASSAY OF PHOSPHORUS: CPT

## 2023-04-13 PROCEDURE — 84156 ASSAY OF PROTEIN URINE: CPT

## 2023-04-13 PROCEDURE — 36415 COLL VENOUS BLD VENIPUNCTURE: CPT

## 2023-04-13 PROCEDURE — 81001 URINALYSIS AUTO W/SCOPE: CPT

## 2023-04-14 LAB
CREAT UR-MCNC: 46.6 MG/DL
PROT UR-MCNC: 5 MG/DL (ref 0–15)
PROT/CREAT UR: 107 MG/G (ref 10–107)

## 2023-04-25 DIAGNOSIS — E10.69 TYPE 1 DIABETES MELLITUS WITH OTHER SPECIFIED COMPLICATION (HCC): ICD-10-CM

## 2023-04-25 DIAGNOSIS — E11.43 GASTROPARESIS DUE TO DM (HCC): ICD-10-CM

## 2023-04-25 DIAGNOSIS — K31.84 GASTROPARESIS DUE TO DM (HCC): ICD-10-CM

## 2023-04-25 RX ORDER — METOCLOPRAMIDE 10 MG/1
TABLET ORAL
Qty: 120 TABLET | Refills: 0 | Status: SHIPPED | OUTPATIENT
Start: 2023-04-25 | End: 2023-06-13

## 2023-05-04 RX ORDER — LOSARTAN POTASSIUM 50 MG/1
50 TABLET ORAL DAILY
Qty: 30 TABLET | Refills: 0 | Status: SHIPPED | OUTPATIENT
Start: 2023-05-04 | End: 2023-05-24

## 2023-05-11 ENCOUNTER — HOSPITAL ENCOUNTER (OUTPATIENT)
Dept: LAB | Facility: MEDICAL CENTER | Age: 68
End: 2023-05-11
Attending: STUDENT IN AN ORGANIZED HEALTH CARE EDUCATION/TRAINING PROGRAM
Payer: MEDICARE

## 2023-05-11 LAB
APPEARANCE UR: CLEAR
BACTERIA #/AREA URNS HPF: NEGATIVE /HPF
BILIRUB UR QL STRIP.AUTO: NEGATIVE
COLOR UR: YELLOW
CREAT UR-MCNC: 72.44 MG/DL
EPI CELLS #/AREA URNS HPF: NEGATIVE /HPF
GFR SERPLBLD CREATININE-BSD FMLA CKD-EPI: 85 ML/MIN/1.73 M 2
GLUCOSE UR STRIP.AUTO-MCNC: NEGATIVE MG/DL
HYALINE CASTS #/AREA URNS LPF: NORMAL /LPF
KETONES UR STRIP.AUTO-MCNC: NEGATIVE MG/DL
LEUKOCYTE ESTERASE UR QL STRIP.AUTO: ABNORMAL
MICRO URNS: ABNORMAL
NITRITE UR QL STRIP.AUTO: NEGATIVE
PH UR STRIP.AUTO: 6.5 [PH] (ref 5–8)
PROT UR QL STRIP: NEGATIVE MG/DL
PROT UR-MCNC: 6 MG/DL (ref 0–15)
PROT/CREAT UR: 83 MG/G (ref 10–107)
RBC # URNS HPF: NORMAL /HPF
RBC UR QL AUTO: NEGATIVE
SODIUM UR-SCNC: 109 MMOL/L
SP GR UR STRIP.AUTO: 1.02
UROBILINOGEN UR STRIP.AUTO-MCNC: 0.2 MG/DL
WBC #/AREA URNS HPF: NORMAL /HPF

## 2023-05-11 PROCEDURE — 82043 UR ALBUMIN QUANTITATIVE: CPT

## 2023-05-11 PROCEDURE — 36415 COLL VENOUS BLD VENIPUNCTURE: CPT

## 2023-05-11 PROCEDURE — 83935 ASSAY OF URINE OSMOLALITY: CPT

## 2023-05-11 PROCEDURE — 80069 RENAL FUNCTION PANEL: CPT

## 2023-05-11 PROCEDURE — 84300 ASSAY OF URINE SODIUM: CPT

## 2023-05-11 PROCEDURE — 81001 URINALYSIS AUTO W/SCOPE: CPT

## 2023-05-11 PROCEDURE — 84156 ASSAY OF PROTEIN URINE: CPT

## 2023-05-11 PROCEDURE — 82570 ASSAY OF URINE CREATININE: CPT | Mod: 91

## 2023-05-12 LAB
ALBUMIN SERPL BCP-MCNC: 4.5 G/DL (ref 3.2–4.9)
BUN SERPL-MCNC: 11 MG/DL (ref 8–22)
CALCIUM ALBUM COR SERPL-MCNC: 9.3 MG/DL (ref 8.5–10.5)
CALCIUM SERPL-MCNC: 9.7 MG/DL (ref 8.5–10.5)
CHLORIDE SERPL-SCNC: 95 MMOL/L (ref 96–112)
CO2 SERPL-SCNC: 24 MMOL/L (ref 20–33)
CREAT SERPL-MCNC: 0.77 MG/DL (ref 0.5–1.4)
CREAT UR-MCNC: 74.3 MG/DL
GLUCOSE SERPL-MCNC: 264 MG/DL (ref 65–99)
MICROALBUMIN UR-MCNC: <1.2 MG/DL
MICROALBUMIN/CREAT UR: NORMAL MG/G (ref 0–30)
OSMOLALITY UR: 466 MOSM/KG H2O (ref 300–900)
PHOSPHATE SERPL-MCNC: 4.6 MG/DL (ref 2.5–4.5)
POTASSIUM SERPL-SCNC: 5.9 MMOL/L (ref 3.6–5.5)
SODIUM SERPL-SCNC: 132 MMOL/L (ref 135–145)

## 2023-05-16 RX ORDER — METHOCARBAMOL 750 MG/1
750 TABLET, FILM COATED ORAL EVERY 8 HOURS PRN
Qty: 90 TABLET | Refills: 0 | OUTPATIENT
Start: 2023-05-16

## 2023-05-16 NOTE — TELEPHONE ENCOUNTER
Would need to discuss the refill of the muscle relaxer with Dr. Richter he was the one who previously prescribed this.  Regarding her elevated potassium is she asking to change her losartan?  Has she contacted Dr. Davies regarding these results?  The schedule her an appointment.

## 2023-05-16 NOTE — TELEPHONE ENCOUNTER
VOICEMAIL  1. Caller Name: Ines Long                        Call Back Number: [phone] (home)       2. Message: pt called in and left a vm requesting muscle relaxer be refilled pt also noted that potassium was high in recent labs wants to know if she can get a new medication please advise.    3. Patient approves office to leave a detailed voicemail/MyChart message: N\A

## 2023-05-16 NOTE — TELEPHONE ENCOUNTER
Spoke with pt regarding provider note informed her she needs an appt to get any new rx or any past rx prescription filled I set her an appt with Delmer and also informed her to contact other providers that prescribed the medications she was requesting pt agreed.       Dejon Daly Ass't

## 2023-05-18 ENCOUNTER — OFFICE VISIT (OUTPATIENT)
Dept: VASCULAR LAB | Facility: MEDICAL CENTER | Age: 68
End: 2023-05-18
Attending: FAMILY MEDICINE
Payer: MEDICARE

## 2023-05-18 DIAGNOSIS — I10 PRIMARY HYPERTENSION: ICD-10-CM

## 2023-05-18 DIAGNOSIS — I65.22 STENOSIS OF LEFT CAROTID ARTERY: ICD-10-CM

## 2023-05-18 DIAGNOSIS — Z96.41 INSULIN PUMP IN PLACE: ICD-10-CM

## 2023-05-18 DIAGNOSIS — Z98.890 HISTORY OF LEFT-SIDED CAROTID ENDARTERECTOMY: ICD-10-CM

## 2023-05-18 DIAGNOSIS — E10.69 TYPE 1 DIABETES MELLITUS WITH OTHER SPECIFIED COMPLICATION (HCC): ICD-10-CM

## 2023-05-18 DIAGNOSIS — E78.41 ELEVATED LIPOPROTEIN(A): Chronic | ICD-10-CM

## 2023-05-18 DIAGNOSIS — E78.49 OTHER HYPERLIPIDEMIA: ICD-10-CM

## 2023-05-18 PROCEDURE — 99214 OFFICE O/P EST MOD 30 MIN: CPT | Performed by: FAMILY MEDICINE

## 2023-05-18 RX ORDER — FUROSEMIDE 20 MG/1
TABLET ORAL
COMMUNITY
Start: 2023-05-15 | End: 2023-07-13

## 2023-05-18 RX ORDER — AMLODIPINE BESYLATE 5 MG/1
5 TABLET ORAL
Qty: 90 TABLET | Refills: 3 | Status: SHIPPED | OUTPATIENT
Start: 2023-05-18 | End: 2023-07-13

## 2023-05-18 ASSESSMENT — ENCOUNTER SYMPTOMS
WHEEZING: 0
COUGH: 0
HEMOPTYSIS: 0
SHORTNESS OF BREATH: 0
PALPITATIONS: 0
FEVER: 0
SPUTUM PRODUCTION: 0
CHILLS: 0
CLAUDICATION: 0
ORTHOPNEA: 0
PND: 0

## 2023-05-18 NOTE — PATIENT INSTRUCTIONS
Tam(joanie) websites:    [email or web address]    [email or web address]      [employer] website:  [email or web address]

## 2023-05-18 NOTE — PROGRESS NOTES
This visit was conducted via [employer] video  using secure and encrypted video conferencing technology to reduce spread of and/or potential exposures to COVID.  The patient was in a private location (home) in the state of [place].    The patient's identity was confirmed and verbal consent was obtained for this virtual visit.     [facility] - FOLLOW-UP  05/18/23     Ines Long has been referred for evaluation and management of dyslipidemia    Referral Source: Dr. Card (cardiology)     Subjective   Current/interval symptoms or concerns:  last seen 1/2023, started on zetia and tolerarting.  Had labs.    Nephrology started veltassa due to hyperK+, pending additional labs.   Currently on losartan and has fluid restrictions to improve hypoNa.  No other high potassium foods or causative meds.      Change in weight: Stable  BMI Readings from Last 5 Encounters:   04/03/23 24.43 kg/m²   03/27/23 24.43 kg/m²   03/20/23 24.58 kg/m²   03/01/23 24.55 kg/m²   01/13/23 24.75 kg/m²     Exercise habits:  limited   Dietary patterns: low fat  Etoh: No  Reported barriers to care: limited walking due to imbalance and ongoing eval with neurology   History of ASCVD: Documented clinical evidence of ASCVD: and Carotid plaque, >50% stenosis,     # s/p L CEA 2020 with Dr. Garcia.  No prior CVA/TIA.  Feeling well     # Cerebral small vessel dz -per MR, no prior CVA/TIA.      # elevated CACS = 180.  No hx of ascvd, no sx.    Secondary causes of dyslipidemia:  Endocrine/Hypothyroidism:  stable hypothyroidism   Liver disease: none reported   Renal disease/nephrotic syndrome:  none reported  Medications: None  Other Established (non-atherosclerotic) Vascular Disease, if Present: None  Age at Initial Diagnosis of Dyslipidemia: >5yrs     Current Prescription Lipid Lowering Medications - including dose:   Statin: rosuva 40mg  Non-Statin: zetia 10mg   Current Lipid Lowering and Related Supplements: omega 3 FAs   Any Current  Side Effects Potentially Related to Lipid Lowering therapy? No  Current Adherence to Lipid Lowering Therapies: Complete  Previously Attempted Interventions for Lipids - including outcome  Statin: None   Outcome: N/A  Non-Statin: None  Outcome: N/A  Any Previous History of Statin Intolerance? No  Baseline Lipids (no off-tx lipid panel available in our system):   Latest Reference Range & Units 02/23/15 07:48   Cholesterol,Tot 100 - 199 mg/dL 245 (H)   Triglycerides 0 - 149 mg/dL 78   HDL >=40 mg/dL 99   LDL <100 mg/dL 130 (H)     Anticoagulation/antiplats: Yes, Details: ASA 81mg , no bleeding noted     HTN:  Started after MAYNOR last year   Home BP log: mostly 140s/70-80s  Adherence to current HTN meds: compliant all of the time      T1D:  Stable, on insuling pump  Last A1c   Lab Results   Component Value Date    HBA1C 6.9 (H) 03/08/2023     Lab Results   Component Value Date/Time    MALBCRT see below 05/11/2023 01:37 PM    MICROALBUR <1.2 05/11/2023 01:37 PM        Current Outpatient Medications:     amLODIPine, 5 mg, Oral, QHS    Evolocumab (REPATHA), 1 mL, Subcutaneous, Q14 DAYS    metoclopramide, TAKE ONE TABLET BY MOUTH TWICE A DAY    furosemide,     losartan, 50 mg, Oral, DAILY    acetaminophen, 1,000 mg, Oral, Q6HRS    methocarbamol, 750 mg, Oral, Q8HRS PRN    ezetimibe, 10 mg, Oral, DAILY    Fiasp,     OMEGA-3 FATTY ACIDS PO, Take  by mouth.    sodium chloride, 1 g, Oral, BID    Calcium Carbonate (CALCIUM 600 PO), 600 mg, Oral, DAILY    NovoLOG FlexPen,     Multiple Vitamin (MULTIVITAMIN PO), Take  by mouth every day.    ELDERBERRY PO, Take  by mouth every day.    Fiasp, 4-50 units with pump daily    insulin infusion pump, Inject  under the skin continuous. Patient's own SQ insulin pump  Type of Insulin: Fiasp Last change of tubing: 3/19/2023 - Change tubing and site every 72 hours  Dosing: Basal rate:  0000 - 0329 = 0.5 units/hr  0330 - 1129 = 0.85 units/hr  1130 - 1359 = 0.5 units/hr             1400 - 1759 =  0.45 units/hr             1800 - 2359 = 0.5 units/hr  Bolus ratio:  1 unit : 12 g carbohydrate at  (breakfast, lunch, dinner, snacks)    Correction ratio:   1 units for every 50 over 150 mg/dL  Disconnect pump if patient becomes hypoglycemic and altered.    CRANBERRY PO, 500 mg, Oral, Q EVENING    B Complex Vitamins (VITAMIN B COMPLEX PO), 1 Tablet, Oral, Q EVENING    Levoxyl, 100 mcg, Oral, AM ES    pantoprazole, 40 mg, Oral, DAILY    rosuvastatin, 40 mg, Oral, QHS    Cholecalciferol (VITAMIN D-3 PO), 5,000 mg, Oral, Q EVENING    Family History   Problem Relation Age of Onset    Cancer Mother         rectal cancer    Stroke Father          59    Heart Disease Father     Hypertension Father     Lung Disease Father         tb    Hyperlipidemia Father     Diabetes Sister     Psychiatric Illness Sister         bipolar    Heart Disease Maternal Grandfather     Stroke Paternal Grandmother     Heart Disease Paternal Grandfather        Social History     Tobacco Use    Smoking status: Former     Packs/day: 0.00     Years: 2.00     Pack years: 0.00     Types: Cigarettes     Quit date: 1977     Years since quittin.4    Smokeless tobacco: Never   Vaping Use    Vaping Use: Never used   Substance Use Topics    Alcohol use: Yes     Alcohol/week: 1.2 oz     Types: 2 Glasses of wine per week     Comment: about 3-4 drinks per week.     Drug use: Never       Review of Systems   Constitutional:  Negative for chills, fever and malaise/fatigue.   Respiratory:  Negative for cough, hemoptysis, sputum production, shortness of breath and wheezing.    Cardiovascular:  Negative for chest pain, palpitations, orthopnea, claudication, leg swelling and PND.         Objective    There were no vitals filed for this visit.     BP Readings from Last 5 Encounters:   23 (!) 168/86   23 (!) 160/81   23 132/71   23 120/70   22 120/60     Physical Exam  Constitutional:       General: She is not in acute  distress.     Appearance: Normal appearance. She is well-developed. She is not diaphoretic.   HENT:      Head: Normocephalic.   Eyes:      Extraocular Movements: Extraocular movements intact.      Conjunctiva/sclera: Conjunctivae normal.   Pulmonary:      Effort: Pulmonary effort is normal. No respiratory distress.   Skin:     Coloration: Skin is not pale.      Findings: No rash.   Neurological:      Mental Status: She is alert and oriented to person, place, and time.      Cranial Nerves: No cranial nerve deficit.         DATA REVIEW:  Most Recent Lipid Panel:   Lab Results   Component Value Date    CHOLSTRLTOT 141 03/08/2023    TRIGLYCERIDE 141 03/08/2023    HDL 43 03/08/2023    LDL 70 03/08/2023     Lab Results   Component Value Date/Time    LIPOPROTA 286 (H) 03/08/2023 09:10 AM      No results found for: APOB      Other Pertinent Blood Work:   Lab Results   Component Value Date    SODIUM 132 (L) 05/11/2023    POTASSIUM 5.9 (H) 05/11/2023    CHLORIDE 95 (L) 05/11/2023    CO2 24 05/11/2023    ANION 12.0 04/13/2023    GLUCOSE 264 (H) 05/11/2023    BUN 11 05/11/2023    CREATININE 0.77 05/11/2023    CALCIUM 9.7 05/11/2023    ASTSGOT 22 04/13/2023    ALTSGPT 31 04/13/2023    ALKPHOSPHAT 94 04/13/2023    TBILIRUBIN 0.3 04/13/2023    ALBUMIN 4.5 05/11/2023    AGRATIO 1.6 04/13/2023    CREACTPROT <0.30 06/17/2021    LIPOPROTA 286 (H) 03/08/2023    TSHULTRASEN 3.140 02/23/2023     VASCULAR IMAGING:     MR brain 2021       CACS 11/2022   Coronary calcification:  LMA - 0.0  LCX - 0.0  LAD - 180.1  RCA - 0.0  PDA - 0.0   Total Calcium Score: 180.1   Percentile: Calcium score is above the 75th percentile for the patient's age and sex.   Other findings:  Heart: Normal size.  Lungs: Clear.  Mediastinum: Normal.  Upper abdomen: Normal.    Carotid 12/2022   Normal right carotid exam.    Post left internal carotid endarterectomy.    Mild stenosis of the left internal carotid artery (<50%).           ASSESSMENT AND PLAN  1. Primary  hypertension  amLODIPine (NORVASC) 5 MG Tab      2. Other hyperlipidemia  Lipid Profile    Lipoprotein (a)    Evolocumab, REPATHA, 140 MG/ML Solution Auto-injector      3. Elevated lipoprotein(a)  Evolocumab, REPATHA, 140 MG/ML Solution Auto-injector    severe = 286 mg/dl      4. Stenosis of left carotid artery  Evolocumab, REPATHA, 140 MG/ML Solution Auto-injector      5. History of left-sided carotid endarterectomy  Evolocumab, REPATHA, 140 MG/ML Solution Auto-injector      6. Type 1 diabetes mellitus with other specified complication (HCC)        7. Insulin pump in place          Patient Type, check all that apply: Secondary Prevention, T1D, polyvascular     Established Atherosclerotic Cardiovascular Disease (ASCVD): yes    1) L carotid stenosis, s/p L CEA  (Dr. Garcia)- stable  No prior CVA/TIA   - most recent duplex stable 2022   - repeat duplex 3-5yrs     2) coronary ave per CT, CACS = 180 (88%ile for age/gender)  - indicates higher short/longer-term ASCVD risk, so aggressive tx goals   - continue secondary med mgmt   - defer functional studies to cardiology decision or if new s/s develop     3) small vessel dz, cerebral per MR - stable  No dementia or CVA/TIA  - continue strict BP and lipid mgmt     Other Established (non-atherosclerotic) Vascular Disease, if Present: None    Evidence of Heterozygous Familial Hypercholesterolemia (FH): No   Though father  age 59 from CVA and apparently had major HLD disorder   FH genotyping:  not recommended    ACC/AHA Indication for Statin Therapy, bryan all that apply:  Secondary Prevention - Very high risk ASCVD - 2 major events or 1 event with other high-risk conditions    Calculated Risk for ASCVD, if applicable    The ASCVD Risk score (Alma Delia MORATAYA, et al., 2019) failed to calculate., N/A    Other Significant Risk Markers, if any, bryan all that apply   elevated coronary calcium score  Severely elevated lp(a) = 286 mg/dl - signficant prothrombotic and atherogenic  risks    Goal LDL-C and nonHDL-C based on Clinic Protocol  LDL-C <55 due to polyvascular, severe lp(a) elevation   At goal? No, 3/2023    LIFESTYLE INTERVENTIONS:    SMOKING:    reports that she quit smoking about 46 years ago. Her smoking use included cigarettes. She has never used smokeless tobacco.   - continued complete avoidance of all tobacco products     PHYSICAL ACTIVITY: continue healthy activity to improve CV fitness.  In general, targeting >150min/week of moderate-level activity or as much as tolerated in light of functional status and co-morbidities     WEIGHT MANAGEMENT AND NUTRITION: Mediterranean style dietary approach  and Provide specific dietary approach handouts      LIPID LOWERING MEDICATION MANAGEMENT:     Statin Therapy Recommendations from Today’s Visit:   - continue rosuva 40mg daily     Non-Statin Medications Recommendations from Today’s Visit:   - continue zetia 10mg daily  - start repatha 140mg Q2wks for further LDL-C and 30% lp(a) reduction - reviewed website, ADRs, costs - [facility] for prior auth    Indication for PCSK9 Inhibitor, if applicable:   PSCK9i indicated per 2018 ACC/AHA guidelines in this patient with established ASCVD whose LDL-C remains above threshold of 70 mg/dl despite maximally tolerated statin therapy  - - FOURIER trial yielded evidence that lp(a) reduction with Repatha led to further risk reduction for ASCVD independent of LDL-C lowering, thus very high risk patients with high lp(a) would benefit disproportionately from lipid-lowering strategy that includes pcsk9i (O'Coty, et al, Circulation 2019).    - ODYSSEY Outcomes trial of patients with an acute coronary syndrome, alirocumab reduced Lp(a) by 5 mg/dL and reduced the risk of major adverse cardiovascular evens (hazard ratio 0.85, 95% CI 0.78-0.93). The beneficial impact of Lp(a) lowering by alirocumab was independent of its lowering of LDL-C (Denis, et al, JACC, 2020).     Supplements  Recommended at this visit: None     RECOMMENDATIONS FOR OTHER CV RISK FACTORS:    1) BLOOD PRESSURE MANAGEMENT:  ACC/AHA (2017) goal <130/80  Home BP at goal:  yes  Office BP at goal:  yes  24h ABPM: not ordered to date  Plan:   Monitoring:   - start/continue home BP monitoring, reviewed correct technique, provide BP log and instructions  - order 24h ABPM:  NO  - monitor lytes/gfr routinely   - contact office if BP consistently >140/>90 for discussion of tx adjustments   Medications:  - continue losartan 50mg daily - monitor K+, consider d/c if not improving  - start amlodipine 5mg daily     2) Glycemic Status: Diabetic, T1  Goal A1c < 7.5 reasonable but defer to endocrine MD   Lab Results   Component Value Date    HBA1C 6.9 (H) 03/08/2023      Lab Results   Component Value Date/Time    MALBCRT see below 05/11/2023 01:37 PM    MICROALBUR <1.2 05/11/2023 01:37 PM     Plan:  - continue current medication plan   - recommmend for routine care with PCP (or endocrine) to include regular A1c monitoring, annual albumin/creatinine ratio (ACR), annual diabetic retinopathy screening, foot exams, annual flu vaccine, and updates to pneumonia vaccines as appropriate      ANTITHROMBOTIC THERAPY continue ASA 81mg daily     OTHER ISSUES:    # gait instability - ongoing care with PM&R and neurology for further assessment     # hypothyroidism, stable  Tolerating meds, TSH normal   - continue current treatment plan, defer mgmt to PCP      # hyperkalemia = 5.9, mild, no sx   Unclear etiology  - taking veltassa per nephrology, ongoing lab surveillance   - did not tolerate furosemide    # hypoNa = 132, mild, no sx   - ongoing care with nephrology   - best to avoid thiazides     Studies Ordered at Todays Visit: carotid duplex 2026-28   Blood Work Ordered At Today’s visit: as noted above   Follow-Up: 3mo    Ghassan Gray M.D.  RONDA, Board-certified clinical lipidologist   [facility]   [facility]   [phone]

## 2023-05-23 DIAGNOSIS — Z98.890 HISTORY OF LEFT-SIDED CAROTID ENDARTERECTOMY: ICD-10-CM

## 2023-05-23 DIAGNOSIS — R93.1 AGATSTON CORONARY ARTERY CALCIUM SCORE BETWEEN 100 AND 199: ICD-10-CM

## 2023-05-23 DIAGNOSIS — E78.49 OTHER HYPERLIPIDEMIA: ICD-10-CM

## 2023-05-23 RX ORDER — ALIROCUMAB 150 MG/ML
150 INJECTION, SOLUTION SUBCUTANEOUS
Qty: 3 ML | Refills: 3 | Status: SHIPPED | OUTPATIENT
Start: 2023-05-23 | End: 2023-11-08 | Stop reason: SDUPTHER

## 2023-05-23 NOTE — PROGRESS NOTES
[facility]    Pt's insurance requires Praluent instead of Repatha.  New Rx sent.    Ghassan Urias, PharmD

## 2023-05-24 ENCOUNTER — DOCUMENTATION (OUTPATIENT)
Dept: VASCULAR LAB | Facility: MEDICAL CENTER | Age: 68
End: 2023-05-24
Payer: MEDICARE

## 2023-05-24 RX ORDER — LOSARTAN POTASSIUM 50 MG/1
TABLET ORAL
Qty: 30 TABLET | Refills: 0 | Status: SHIPPED | OUTPATIENT
Start: 2023-05-24 | End: 2023-06-13

## 2023-05-24 NOTE — PROGRESS NOTES
Ines Long ([place]: [id number]) - [phone]  Praluent 150MG/ML auto-injectors  Status: PA Request  Created: May 24th, 2023  Sent: May 24th, 2023

## 2023-05-24 NOTE — TELEPHONE ENCOUNTER
Received request via: Pharmacy    Was the patient seen in the last year in this department? Yes    Does the patient have an active prescription (recently filled or refills available) for medication(s) requested? No    Does the patient have nursing home Plus and need 100 day supply (blood pressure, diabetes and cholesterol meds only)? Patient does not have SCP   [facility]  Internal Medicine  [address] [place] [address]  Phone: [phone]  Fax: [phone]

## 2023-05-30 ENCOUNTER — HOSPITAL ENCOUNTER (OUTPATIENT)
Dept: LAB | Facility: MEDICAL CENTER | Age: 68
End: 2023-05-30
Attending: NURSE PRACTITIONER
Payer: MEDICARE

## 2023-05-30 LAB
ALBUMIN SERPL BCP-MCNC: 4.8 G/DL (ref 3.2–4.9)
BUN SERPL-MCNC: 18 MG/DL (ref 8–22)
CALCIUM ALBUM COR SERPL-MCNC: 9.2 MG/DL (ref 8.5–10.5)
CALCIUM SERPL-MCNC: 9.8 MG/DL (ref 8.5–10.5)
CHLORIDE SERPL-SCNC: 94 MMOL/L (ref 96–112)
CO2 SERPL-SCNC: 23 MMOL/L (ref 20–33)
CREAT SERPL-MCNC: 0.88 MG/DL (ref 0.5–1.4)
GFR SERPLBLD CREATININE-BSD FMLA CKD-EPI: 72 ML/MIN/1.73 M 2
GLUCOSE SERPL-MCNC: 89 MG/DL (ref 65–99)
PHOSPHATE SERPL-MCNC: 3.8 MG/DL (ref 2.5–4.5)
POTASSIUM SERPL-SCNC: 5.9 MMOL/L (ref 3.6–5.5)
SODIUM SERPL-SCNC: 130 MMOL/L (ref 135–145)

## 2023-05-30 PROCEDURE — 36415 COLL VENOUS BLD VENIPUNCTURE: CPT

## 2023-05-30 PROCEDURE — 80069 RENAL FUNCTION PANEL: CPT

## 2023-05-31 NOTE — OR NURSING
Transported pt to room on monitor and o2 at 2L NC. Bj RN at bedside. Ambulated to bed w/ walker and 2 assist. Pt vomited water/ bile. Reports feeling better afterwards.     Family at bedside . 1 bag belongings and cane placed near couch   0 (no pain/absence of nonverbal indicators of pain)

## 2023-06-01 ENCOUNTER — DOCUMENTATION (OUTPATIENT)
Dept: VASCULAR LAB | Facility: MEDICAL CENTER | Age: 68
End: 2023-06-01
Payer: MEDICARE

## 2023-06-01 PROCEDURE — RXMED WILLOW AMBULATORY MEDICATION CHARGE: Performed by: FAMILY MEDICINE

## 2023-06-01 NOTE — PROGRESS NOTES
Drug: Praluent 150mg/ml SOAJ     Copay: $150.71-28 days     PA Approval: Approved. This drug has been approved under the Member's Medicare Part D benefit. Approved quantity: 2 units per 28 day(s). You may fill up to a 90 day supply except for those on Specialty Tier 5, which can be filled up to a 30 day supply    FA: The patient has to confirm her income before moving forward with the Praluent PAP program.      The will fill with [facility]. She has provided payment and her son's address ([address] [place] [phone]) for delivery for 6/6/2023.

## 2023-06-05 ENCOUNTER — PHARMACY VISIT (OUTPATIENT)
Dept: PHARMACY | Facility: MEDICAL CENTER | Age: 68
End: 2023-06-05
Payer: MEDICARE

## 2023-06-05 ENCOUNTER — DOCUMENTATION (OUTPATIENT)
Dept: PHARMACY | Facility: MEDICAL CENTER | Age: 68
End: 2023-06-05
Payer: MEDICARE

## 2023-06-05 NOTE — PROGRESS NOTES
Initial Assessment 23  Dx: Other hyperlipidemia [E78.49]  Primary or Secondary Prevention of clinical ASCVD: secondary      S/Sx: none  Clinically Relevant, Abnormal Labs: 23   - CBC: WNL (23)   - Chem7: sodium: low 130, potassium: high 5.9, chloride: low 94    ? Cr: 0.88, GFR: 72   - LFTs/Tbili: WNL (23)   - A1c: 6.9 (3/8/23)   - Lipids: TC: 141, T, LDL: 70, HDL: 43, apo A: 286 (3/8/23)   - HeFH/HoFH: no     Tx prescribed: Praluent 150mg/mL pen, inject 1 pen SQ every 14 days (+ rosuvastatin 40mg and ezetimibe 10mg daily)   - Administration:     ? SQ into stomach area 2 inches away from belly button, upper front thigh, or back or arm if someone else injecting    ? rotate sites    ? Clean with alcohol and let dry completely, if doesn't dry completely can have some stinging    ? Pen use: uncap and press pen firmly against skin at 90 degree angle, shouldn't see yellow safety cover, press grey button and hold for about 20 seconds, will se dosing window turn yellow, when all yellow and done should hear a second click to know it's done, pull straight out and discard in sharps   - Proper Handling/Disposal: sharps container   - Storage/Excursion: fridge, can be left at room temp for up to 30 days    - Duration of therapy:  until ineffective or intolerable side effects      Adherence & Potential Barriers: advised to use phone alarm reminders for day injecting every 2 weeks   **Adherence Predictor Tool** low risk, understands importance    - Missed dose mgmt: asap unless >7 days then skip and proceed with next dose as normal      List Common, Serious SE & Mitigation Reviewed: injection site reaction- redness/irritation around injection site, usually goes away after a few hours- rotate sites  List Precautions Reviewed: if develop rash that spreads or difficulty breathing, allergic reaction, emergency, rare   List Current SE Reviewed (if applicable): n/a   - Mitigation/mgmt: n/a      Wellness/Lifestyle  Counseling:   - Diet: encouraged low sodium and diabetic diet, vegetables, fruits   - Exercise: encouraged at least 150 min a week or 30 min 5 days a week of moderate exercise    - Smoking: quit in 1977 per EMR    - Immunizations: deferred     Med Rec/Updated drug list    - EMR inaccurate, medication changes reviewed with patient. Not currently taking Flexeril, lasix, will be stopping losartan and metoclopramide   DI Check:   -Cat C: losartan + amlodipine = increased risk for hypotension, monitor BP, will be stopping losartan, monitors BP  Common DI & Dietary Avoidance: none     Goals of Therapy:    Achieve goal LDL levels (< mg/dL) to reduce risk of cardiovascular events   Implement lifestyle modifications: diet, exercise, weight loss, and smoking cessation    ? Patient has agreed/understands to goals of therapy during education/counseling     Additional:   Had the pleasure of speaking with Ines to review her new therapy, Praluent.  She gave herself insulin injections in the past,  familiar with injections but new to using a pen device. Reviewed injection training and pen use, felt comfortable after review. She understand goals of lowering LDL-C and apoprotein A. Let her know it may take 3 injections or 6 weeks to see those labs decrease. Encouraged her to call with any questions. She was appreciative of review and welcoming to future calls.  Will check in next week to see how first injection went. Next [facility] immunizations.

## 2023-06-13 ENCOUNTER — HOSPITAL ENCOUNTER (OUTPATIENT)
Dept: LAB | Facility: MEDICAL CENTER | Age: 68
End: 2023-06-13
Attending: STUDENT IN AN ORGANIZED HEALTH CARE EDUCATION/TRAINING PROGRAM
Payer: MEDICARE

## 2023-06-13 ENCOUNTER — OFFICE VISIT (OUTPATIENT)
Dept: MEDICAL GROUP | Facility: PHYSICIAN GROUP | Age: 68
End: 2023-06-13
Payer: MEDICARE

## 2023-06-13 VITALS
TEMPERATURE: 97.9 F | HEIGHT: 67 IN | OXYGEN SATURATION: 99 % | SYSTOLIC BLOOD PRESSURE: 138 MMHG | BODY MASS INDEX: 24.71 KG/M2 | HEART RATE: 83 BPM | WEIGHT: 157.4 LBS | DIASTOLIC BLOOD PRESSURE: 70 MMHG

## 2023-06-13 DIAGNOSIS — I10 PRIMARY HYPERTENSION: ICD-10-CM

## 2023-06-13 DIAGNOSIS — M62.838 MUSCLE SPASM: ICD-10-CM

## 2023-06-13 DIAGNOSIS — M48.02 CERVICAL STENOSIS OF SPINE: ICD-10-CM

## 2023-06-13 DIAGNOSIS — E10.69 TYPE 1 DIABETES MELLITUS WITH OTHER SPECIFIED COMPLICATION (HCC): ICD-10-CM

## 2023-06-13 DIAGNOSIS — M25.561 ACUTE PAIN OF RIGHT KNEE: ICD-10-CM

## 2023-06-13 LAB
ALBUMIN SERPL BCP-MCNC: 4.6 G/DL (ref 3.2–4.9)
APPEARANCE UR: CLEAR
BACTERIA #/AREA URNS HPF: NEGATIVE /HPF
BASOPHILS # BLD AUTO: 0.7 % (ref 0–1.8)
BASOPHILS # BLD: 0.03 K/UL (ref 0–0.12)
BILIRUB UR QL STRIP.AUTO: NEGATIVE
BUN SERPL-MCNC: 14 MG/DL (ref 8–22)
CALCIUM ALBUM COR SERPL-MCNC: 9.3 MG/DL (ref 8.5–10.5)
CALCIUM SERPL-MCNC: 9.8 MG/DL (ref 8.5–10.5)
CHLORIDE SERPL-SCNC: 99 MMOL/L (ref 96–112)
CO2 SERPL-SCNC: 22 MMOL/L (ref 20–33)
COLOR UR: YELLOW
CREAT SERPL-MCNC: 0.71 MG/DL (ref 0.5–1.4)
CREAT UR-MCNC: 114.36 MG/DL
EOSINOPHIL # BLD AUTO: 0.05 K/UL (ref 0–0.51)
EOSINOPHIL NFR BLD: 1.1 % (ref 0–6.9)
EPI CELLS #/AREA URNS HPF: ABNORMAL /HPF
ERYTHROCYTE [DISTWIDTH] IN BLOOD BY AUTOMATED COUNT: 44.2 FL (ref 35.9–50)
EST. AVERAGE GLUCOSE BLD GHB EST-MCNC: 160 MG/DL
GFR SERPLBLD CREATININE-BSD FMLA CKD-EPI: 94 ML/MIN/1.73 M 2
GLUCOSE SERPL-MCNC: 115 MG/DL (ref 65–99)
GLUCOSE UR STRIP.AUTO-MCNC: NEGATIVE MG/DL
HBA1C MFR BLD: 7.2 % (ref 4–5.6)
HCT VFR BLD AUTO: 41.1 % (ref 37–47)
HGB BLD-MCNC: 13.7 G/DL (ref 12–16)
HYALINE CASTS #/AREA URNS LPF: ABNORMAL /LPF
IMM GRANULOCYTES # BLD AUTO: 0 K/UL (ref 0–0.11)
IMM GRANULOCYTES NFR BLD AUTO: 0 % (ref 0–0.9)
KETONES UR STRIP.AUTO-MCNC: ABNORMAL MG/DL
LEUKOCYTE ESTERASE UR QL STRIP.AUTO: ABNORMAL
LYMPHOCYTES # BLD AUTO: 1.67 K/UL (ref 1–4.8)
LYMPHOCYTES NFR BLD: 36.4 % (ref 22–41)
MCH RBC QN AUTO: 30.8 PG (ref 27–33)
MCHC RBC AUTO-ENTMCNC: 33.3 G/DL (ref 32.2–35.5)
MCV RBC AUTO: 92.4 FL (ref 81.4–97.8)
MICRO URNS: ABNORMAL
MICROALBUMIN UR-MCNC: <1.2 MG/DL
MICROALBUMIN/CREAT UR: NORMAL MG/G (ref 0–30)
MONOCYTES # BLD AUTO: 0.43 K/UL (ref 0–0.85)
MONOCYTES NFR BLD AUTO: 9.4 % (ref 0–13.4)
NEUTROPHILS # BLD AUTO: 2.41 K/UL (ref 1.82–7.42)
NEUTROPHILS NFR BLD: 52.4 % (ref 44–72)
NITRITE UR QL STRIP.AUTO: NEGATIVE
NRBC # BLD AUTO: 0 K/UL
NRBC BLD-RTO: 0 /100 WBC (ref 0–0.2)
PH UR STRIP.AUTO: 7 [PH] (ref 5–8)
PHOSPHATE SERPL-MCNC: 2.7 MG/DL (ref 2.5–4.5)
PLATELET # BLD AUTO: 313 K/UL (ref 164–446)
PMV BLD AUTO: 10.8 FL (ref 9–12.9)
POTASSIUM SERPL-SCNC: 4.1 MMOL/L (ref 3.6–5.5)
PROT UR QL STRIP: NEGATIVE MG/DL
RBC # BLD AUTO: 4.45 M/UL (ref 4.2–5.4)
RBC # URNS HPF: ABNORMAL /HPF
RBC UR QL AUTO: NEGATIVE
SODIUM SERPL-SCNC: 136 MMOL/L (ref 135–145)
SP GR UR STRIP.AUTO: 1.02
T4 FREE SERPL-MCNC: 1.19 NG/DL (ref 0.93–1.7)
TSH SERPL DL<=0.005 MIU/L-ACNC: 3.54 UIU/ML (ref 0.38–5.33)
UROBILINOGEN UR STRIP.AUTO-MCNC: 0.2 MG/DL
WBC # BLD AUTO: 4.6 K/UL (ref 4.8–10.8)
WBC #/AREA URNS HPF: ABNORMAL /HPF

## 2023-06-13 PROCEDURE — 82570 ASSAY OF URINE CREATININE: CPT

## 2023-06-13 PROCEDURE — 3075F SYST BP GE 130 - 139MM HG: CPT | Performed by: NURSE PRACTITIONER

## 2023-06-13 PROCEDURE — 81001 URINALYSIS AUTO W/SCOPE: CPT

## 2023-06-13 PROCEDURE — 80069 RENAL FUNCTION PANEL: CPT

## 2023-06-13 PROCEDURE — 84439 ASSAY OF FREE THYROXINE: CPT

## 2023-06-13 PROCEDURE — 85025 COMPLETE CBC W/AUTO DIFF WBC: CPT

## 2023-06-13 PROCEDURE — 3078F DIAST BP <80 MM HG: CPT | Performed by: NURSE PRACTITIONER

## 2023-06-13 PROCEDURE — 83036 HEMOGLOBIN GLYCOSYLATED A1C: CPT | Mod: GA

## 2023-06-13 PROCEDURE — 82043 UR ALBUMIN QUANTITATIVE: CPT

## 2023-06-13 PROCEDURE — 99215 OFFICE O/P EST HI 40 MIN: CPT | Performed by: NURSE PRACTITIONER

## 2023-06-13 PROCEDURE — 92250 FUNDUS PHOTOGRAPHY W/I&R: CPT | Mod: TC | Performed by: NURSE PRACTITIONER

## 2023-06-13 PROCEDURE — 36415 COLL VENOUS BLD VENIPUNCTURE: CPT | Mod: GA

## 2023-06-13 PROCEDURE — 84443 ASSAY THYROID STIM HORMONE: CPT

## 2023-06-13 RX ORDER — SUCRALFATE ORAL 1 G/10ML
1 SUSPENSION ORAL 4 TIMES DAILY
COMMUNITY
End: 2023-06-20

## 2023-06-13 RX ORDER — SUCRALFATE 1 G/1
TABLET ORAL
COMMUNITY
Start: 2023-06-01 | End: 2023-06-20

## 2023-06-13 RX ORDER — TIZANIDINE 4 MG/1
2-4 TABLET ORAL EVERY 6 HOURS PRN
Qty: 90 TABLET | Refills: 0 | Status: SHIPPED | OUTPATIENT
Start: 2023-06-13 | End: 2023-06-19

## 2023-06-13 ASSESSMENT — ENCOUNTER SYMPTOMS
COUGH: 0
PALPITATIONS: 0
CHILLS: 0
DIZZINESS: 0
FEVER: 0
SHORTNESS OF BREATH: 0
ABDOMINAL PAIN: 0
DEPRESSION: 0

## 2023-06-13 ASSESSMENT — FIBROSIS 4 INDEX: FIB4 SCORE: 0.71

## 2023-06-14 DIAGNOSIS — E78.5 DYSLIPIDEMIA: ICD-10-CM

## 2023-06-14 NOTE — ASSESSMENT & PLAN NOTE
- Patient completed, patient is healing well, some concern that procedure is affecting worsening pain in her lower body and knees.

## 2023-06-14 NOTE — ASSESSMENT & PLAN NOTE
2/24/2023         RE: Ora Gonzalez  [address] [place] [id number]        Dear Colleague,    Thank you for referring your patient, Ora Gonzalez, to the [facility] [employer]. Please see a copy of my visit note below.    [facility] Hematology and Oncology Progress Note    Patient: Ora Gonzalez  MRN: [id number]  Date of Service: Feb 24, 2023        Assessment and Plan:    1. Immune thrombocytopenia:   Continues weekly N-plate injections. She is tolerating her medication well with no side effects.     Platelet count continues to remain stable (>200).  Today, platelets 338.     Plan:  -Continue Nplate at same dose/schedule. She's on the lowest dose (1 mcg/kg), but has had some weight loss over the year, so will readjust dose according to weight (from 55 mcg to 50 mcg)  -Monthly CBC  -Return with provider in 6 mths    2. Leukocytosis, neutrophilia:   Stable, mild, chronic. Follow.      ECOG Performance  0    Diagnosis:    1. 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 Bone marrow biopsy was unremarkable, November 2014. Thyroid functions were normal. Anticardiolipin antibodies were normal.     2. Right-sided pulmonary embolism: Provoked. Diagnosed February 8, 2016.    Treatment:    She started course of prednisone on November 14, 2014 and finished on January 5, 2015. She had a complete response. Quickly relapsed and treated with Rituxan weekly from March 12 through April 2, 2015. She responded with highest platelet count of 112.     She then relapsed in October, 2015. She was started on Promacta in October. She responded within 2-3 weeks. She then quickly lost response after dose reduction from 50 to 25mg. She was started back on Promacta 50 mg and prednisone 60 mg daily. She did not respond. She received 2 doses of IVIG on December 4 and 5, 2015. She responded briefly with a platelet count of 59 on December 7 but then quickly lost response by December 14. At that point she  - Tizanidine 4 mg by mouth up to 3 times daily-for severe muscle spasming of her leg  -recommended contacting her neurologist for discussion regarding the extreme sensitivity that may be nerve pain related--has the potential of trying something like gabapentin or Lyrica if muscle relaxer is not helpful  -Commended follow-up with knee surgeon as she does need further evaluation of the significant swelling.   "was admitted for splenectomy. She received 2 more doses of IVIG on December 16 and 17th with minimal response.   Splenectomy was performed on December 20, 2015. She did not respond.   We started Rituxan on December 29, 2015. Steroids were continued. Romiplostim was started on January 5, 2016. 1 dose of WinRho was given on January 6, 2016.  Platelet response noted on January 13.  Her last dose of Rituxan was on January 20th, 2016.     She was restarted on N-plate on July 28, 2016 for relapse. Continues on weekly    Interim History:    Ora comes in today for a 6-month follow-up visit.  Has continued weekly NPlate injections.  Overall has been doing well.  No acute complaints today. No shortness of breath.  No bleeding or bruising.  No fevers.    Past History:    Past Medical History:   Diagnosis Date     Adjustment disorder with mixed anxiety and depressed mood 1/22/2016     Adjustment reaction to chronic stress 1/22/2016     Chronic kidney disease      History of pulmonary embolism 7/8/2020     Hypertension      ITP (idiopathic thrombocytopenic purpura)      Osteoporosis      Paroxysmal atrial fibrillation (H)      Pulmonary embolism (H) 2/8/2016     Thrombocytopenia (H)      Physical Exam:    BP (!) 158/70 (BP Location: Left arm, Patient Position: Sitting, Cuff Size: Adult Regular)   Pulse 70   Temp 99.1  F (37.3  C)   Resp 24   Ht 1.568 m (5' 1.75\")   Wt 50.3 kg (111 lb)   SpO2 96%   BMI 20.47 kg/m      General: patient appears stated age of 89 year old. Nontoxic and in no distress.   HEENT: Head: atraumatic, normocephalic. Sclerae anicteric.  Chest:  Normal respiratory effort  Cardiac:  No edema.   Abdomen: abdomen is non-distended  Extremities: normal tone and muscle bulk.  Skin: no lesions or rash on visible skin. Warm and dry.   CNS: alert and oriented. Grossly non-focal.   Psychiatric: normal mood and affect.     Lab Results:    Recent Results (from the past 168 hour(s))   CBC with platelets and " "differential   Result Value Ref Range    WBC Count 13.7 (H) 4.0 - 11.0 10e3/uL    RBC Count 4.74 3.80 - 5.20 10e6/uL    Hemoglobin 15.1 11.7 - 15.7 g/dL    Hematocrit 46.5 35.0 - 47.0 %    MCV 98 78 - 100 fL    MCH 31.9 26.5 - 33.0 pg    MCHC 32.5 31.5 - 36.5 g/dL    RDW 14.6 10.0 - 15.0 %    Platelet Count 338 150 - 450 10e3/uL   Manual Differential   Result Value Ref Range    % Neutrophils 55 %    % Lymphocytes 36 %    % Monocytes 8 %    % Eosinophils 1 %    % Basophils 0 %    Absolute Neutrophils 7.5 1.6 - 8.3 10e3/uL    Absolute Lymphocytes 4.9 0.8 - 5.3 10e3/uL    Absolute Monocytes 1.1 0.0 - 1.3 10e3/uL    Absolute Eosinophils 0.1 0.0 - 0.7 10e3/uL    Absolute Basophils 0.0 0.0 - 0.2 10e3/uL    RBC Morphology Confirmed RBC Indices     Platelet Assessment  Automated Count Confirmed. Platelet morphology is normal.     Automated Count Confirmed. Platelet morphology is normal.    [place] Bodies Present (A) None Seen    Target Cells Slight (A) None Seen     Imaging:    No results found.    Total time 30 minutes, to include face to face visit, review of EMR, ordering, documentation and coordination of care on date of service    Signed by: Elida Tamez NP      Oncology Rooming Note    February 24, 2023 1:52 PM   Ora Gonzalez is a 89 year old female who presents for:    Chief Complaint   Patient presents with     Hematology     6 month return Immune thrombocytopenic purpura, review labs & injection     Initial Vitals: BP (!) 158/70 (BP Location: Left arm, Patient Position: Sitting, Cuff Size: Adult Regular)   Pulse 70   Temp 99.1  F (37.3  C)   Resp 24   Ht 1.568 m (5' 1.75\")   Wt 50.3 kg (111 lb)   SpO2 96%   BMI 20.47 kg/m   Estimated body mass index is 20.47 kg/m  as calculated from the following:    Height as of this encounter: 1.568 m (5' 1.75\").    Weight as of this encounter: 50.3 kg (111 lb). Body surface area is 1.48 meters squared.  No Pain (0) Comment: Data Unavailable   No LMP " recorded.  Allergies reviewed: Yes  Medications reviewed: Yes    Medications: Medication refills not needed today.  Pharmacy name entered into [employer]: [facility] #[id number] - [place] [id number]    Clinical concerns: Immune thrombocytopenic purpura      Cherelle Colón [facility]                Again, thank you for allowing me to participate in the care of your patient.        Sincerely,        Elida Tamez NP

## 2023-06-15 ENCOUNTER — PATIENT MESSAGE (OUTPATIENT)
Dept: MEDICAL GROUP | Facility: PHYSICIAN GROUP | Age: 68
End: 2023-06-15
Payer: MEDICARE

## 2023-06-15 DIAGNOSIS — E10.69 TYPE 1 DIABETES MELLITUS WITH OTHER SPECIFIED COMPLICATION (HCC): ICD-10-CM

## 2023-06-15 RX ORDER — ROSUVASTATIN CALCIUM 40 MG/1
40 TABLET, COATED ORAL
Qty: 90 TABLET | Refills: 3 | Status: SHIPPED | OUTPATIENT
Start: 2023-06-15

## 2023-06-19 ENCOUNTER — PATIENT MESSAGE (OUTPATIENT)
Dept: MEDICAL GROUP | Facility: PHYSICIAN GROUP | Age: 68
End: 2023-06-19
Payer: MEDICARE

## 2023-06-19 DIAGNOSIS — M62.838 MUSCLE SPASM: ICD-10-CM

## 2023-06-19 DIAGNOSIS — M79.604 PAIN AND SWELLING OF RIGHT LOWER EXTREMITY: ICD-10-CM

## 2023-06-19 DIAGNOSIS — M79.89 PAIN AND SWELLING OF RIGHT LOWER EXTREMITY: ICD-10-CM

## 2023-06-19 RX ORDER — BACLOFEN 10 MG/1
10 TABLET ORAL 3 TIMES DAILY PRN
Qty: 90 TABLET | Refills: 0 | Status: SHIPPED | OUTPATIENT
Start: 2023-06-19 | End: 2023-06-27

## 2023-06-19 RX ORDER — LISINOPRIL 2.5 MG/1
2.5 TABLET ORAL DAILY
Qty: 30 TABLET | Refills: 0 | Status: SHIPPED | OUTPATIENT
Start: 2023-06-19 | End: 2023-07-13

## 2023-06-20 ENCOUNTER — OFFICE VISIT (OUTPATIENT)
Dept: MEDICAL GROUP | Facility: PHYSICIAN GROUP | Age: 68
End: 2023-06-20
Payer: MEDICARE

## 2023-06-20 VITALS
WEIGHT: 164.3 LBS | BODY MASS INDEX: 25.79 KG/M2 | HEART RATE: 67 BPM | DIASTOLIC BLOOD PRESSURE: 60 MMHG | HEIGHT: 67 IN | TEMPERATURE: 97 F | OXYGEN SATURATION: 100 % | SYSTOLIC BLOOD PRESSURE: 130 MMHG

## 2023-06-20 DIAGNOSIS — N18.2 BENIGN HYPERTENSIVE HEART AND KIDNEY DISEASE WITH DIASTOLIC CHF, NYHA CLASS II AND CKD STAGE 2 (HCC): ICD-10-CM

## 2023-06-20 DIAGNOSIS — M25.561 ACUTE PAIN OF RIGHT KNEE: ICD-10-CM

## 2023-06-20 DIAGNOSIS — G95.9 CERVICAL MYELOPATHY (HCC): ICD-10-CM

## 2023-06-20 DIAGNOSIS — M62.838 MUSCLE SPASM: ICD-10-CM

## 2023-06-20 DIAGNOSIS — I13.0 BENIGN HYPERTENSIVE HEART AND KIDNEY DISEASE WITH DIASTOLIC CHF, NYHA CLASS II AND CKD STAGE 2 (HCC): ICD-10-CM

## 2023-06-20 DIAGNOSIS — I10 PRIMARY HYPERTENSION: ICD-10-CM

## 2023-06-20 DIAGNOSIS — I50.30 BENIGN HYPERTENSIVE HEART AND KIDNEY DISEASE WITH DIASTOLIC CHF, NYHA CLASS II AND CKD STAGE 2 (HCC): ICD-10-CM

## 2023-06-20 DIAGNOSIS — E10.69 TYPE 1 DIABETES MELLITUS WITH OTHER SPECIFIED COMPLICATION (HCC): ICD-10-CM

## 2023-06-20 PROCEDURE — 3078F DIAST BP <80 MM HG: CPT | Performed by: NURSE PRACTITIONER

## 2023-06-20 PROCEDURE — 3075F SYST BP GE 130 - 139MM HG: CPT | Performed by: NURSE PRACTITIONER

## 2023-06-20 PROCEDURE — 99215 OFFICE O/P EST HI 40 MIN: CPT | Performed by: NURSE PRACTITIONER

## 2023-06-20 ASSESSMENT — ENCOUNTER SYMPTOMS
CHILLS: 0
HEADACHES: 0
DIZZINESS: 0
SHORTNESS OF BREATH: 0
FEVER: 0
WHEEZING: 0
ABDOMINAL PAIN: 0
PALPITATIONS: 0
MYALGIAS: 1
COUGH: 0
HEARTBURN: 0
DEPRESSION: 0

## 2023-06-20 ASSESSMENT — FIBROSIS 4 INDEX: FIB4 SCORE: 0.83

## 2023-06-20 NOTE — PROGRESS NOTES
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  Stable.  Blood pressure today is 130/60.  Continues amlodipine 5 mg by mouth daily. denies chest pain, palpitations, dizziness, blurry vision.     Muscle Spasm    Continued issue. States that she has tenderness and pain in her lower back and neck after chopping veggies. States that muscle spasm better last 2 days. States using states pillow under leg made her stiff and achey, but did not improve symptoms. Tizanidine is helping, states she is sleeping some, but still tossing and turning throughout the night states 4-5 hours per night. Concerned with stomach and does not want to try baclofen. At this time. States taking tylenol 650, so 1250 mg and then 1000 mg in the evening. Is taking ibuprofen 1200 mg daily. States nothing is working for pain.      Cervical Myelopathy (Hcc)    Continues to have neck and shoulder pain.     Acute Pain of Right Knee    Improved issue.  Its that over the last 2 days she has had some improvement in the spasming and cramping.  Does have appointments coming up with Dr. Díaz, Dr. Smith, and neurology. States she is getting some sleep and states the tizanidine is working better. States she has been trying to complete recommended PT exercises, but notices that when she completes these exercises that she will notice severe pain and worse difficulty with sleep. States that she is still having constant muscle spasms triggered by any touch. States that while pain is somewhat improved it is still 6-7/10.     Diabetes Mellitus (Hcc) (Resolved)    Hx - DM.  Glucose 61 as low - now 104.  FSG q AC and hs.  Continue  insulin pump.   HbA1C 6.8.         Current Outpatient Medications Ordered in Epic   Medication Sig Dispense Refill    baclofen (LIORESAL) 10 MG Tab Take 1 Tablet by mouth 3 times a day as needed (muscle spasms). 90 Tablet 0    rosuvastatin (CRESTOR) 40 MG tablet Take 1 Tablet by mouth at bedtime. 90 Tablet 3    Alirocumab (PRALUENT) 150 MG/ML Solution Auto-injector Inject 150 mg under the skin every 14 days. 3 mL 3    amLODIPine (NORVASC) 5 MG Tab Take 1 Tablet by mouth at bedtime. 90 Tablet 3    acetaminophen (TYLENOL) 500 MG Tab Take 2 Tablets by mouth every 6 hours. 100 Tablet 1    ezetimibe (ZETIA) 10 MG Tab Take 1 Tablet by mouth every day. 90 Tablet 3    FIASP 100 UNIT/ML Solution       OMEGA-3 FATTY ACIDS PO Take  by mouth.      sodium chloride (SALT) 1 GM Tab Take 1 Tablet by mouth 2 times a day.      Calcium Carbonate (CALCIUM 600 PO) Take 600 mg by mouth every day.      NOVOLOG FLEXPEN 100 UNIT/ML solution for injection       Multiple Vitamin (MULTIVITAMIN PO) Take  by mouth every day.      ELDERBERRY PO Take  by mouth every day.      FIASP 100 UNIT/ML Solution 4-50 units with pump daily      insulin infusion pump Device Inject  under the skin continuous. Patient's own SQ insulin pump    Type of Insulin: Fiasp  Last change of tubing: 3/19/2023 - Change tubing and site every 72 hours    Dosing:  Basal rate:   0000 - 0329 = 0.5 units/hr   0330 - 1129 = 0.85 units/hr   1130 - 1359 = 0.5 units/hr              1400 - 1759 = 0.45 units/hr              1800 - 2359 = 0.5 units/hr    Bolus ratio:   1 unit : 12 g carbohydrate at  (breakfast, lunch, dinner, snacks)      Correction ratio:    1 units for every 50 over 150 mg/dL    Disconnect pump if patient becomes hypoglycemic and altered.      CRANBERRY PO Take 500 mg by mouth every evening.      B Complex Vitamins (VITAMIN B COMPLEX PO) Take 1 Tablet by mouth every evening.      LEVOXYL 100 MCG Tab Take 1 Tablet by mouth every morning on an empty stomach.       "pantoprazole (PROTONIX) 40 MG Tablet Delayed Response Take 1 Tablet by mouth every day.      Cholecalciferol (VITAMIN D-3 PO) Take 5,000 mg by mouth every evening.      lisinopril (PRINIVIL) 2.5 MG Tab Take 1 Tablet by mouth every day. (Patient not taking: Reported on 6/20/2023) 30 Tablet 0    furosemide (LASIX) 20 MG Tab  (Patient not taking: Reported on 6/20/2023)       No current [facility]-ordered facility-administered medications on file.       Health Maintenance: schedule AWV    Review of Systems   Constitutional:  Negative for chills, fever and malaise/fatigue.   Respiratory:  Negative for cough, shortness of breath and wheezing.    Cardiovascular:  Negative for chest pain, palpitations and leg swelling.   Gastrointestinal:  Negative for abdominal pain and heartburn.   Musculoskeletal:  Positive for joint pain and myalgias.   Neurological:  Negative for dizziness and headaches.   Psychiatric/Behavioral:  Negative for depression.          Objective:     Exam:  /60 (BP Location: Left arm, Patient Position: Sitting, BP Cuff Size: Adult)   Pulse 67   Temp 36.1 °C (97 °F) (Temporal)   Ht 1.702 m (5' 7\")   Wt 74.5 kg (164 lb 4.8 oz)   LMP 01/01/1983 (LMP Unknown)   SpO2 100%   BMI 25.73 kg/m²  Body mass index is 25.73 kg/m².    Physical Exam  Constitutional:       Appearance: Normal appearance.   HENT:      Head: Normocephalic.   Cardiovascular:      Rate and Rhythm: Normal rate and regular rhythm.      Pulses: Normal pulses.      Heart sounds: Normal heart sounds.   Pulmonary:      Effort: Pulmonary effort is normal.      Breath sounds: Normal breath sounds.   Musculoskeletal:      Right knee: Swelling present. Tenderness present.      Right ankle: Swelling present. No deformity or ecchymosis. Tenderness present. Normal range of motion.        Legs:    Skin:     General: Skin is warm and dry.      Capillary Refill: Capillary refill takes less than 2 seconds.   Neurological:      General: No focal deficit " present.      Mental Status: She is alert and oriented to person, place, and time.       Assessment & Plan:     68 y.o. female with the following -     Problem List Items Addressed This Visit       Acute pain of right knee     - Discussed the baclofen at length  -Prefers to try tizanidine 8 mg in the evening to see if this will better affect her symptoms.  Extensive discussion on max doses for both tizanidine, Tylenol, ibuprofen.  We did discuss that I do not recommend her taking ibuprofen.  -We discussed decreasing PT exercises and starting more slowly  - she will contact Dr. Díaz for sooner follow-up         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, 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 ([facility])     - continue amlodipine 5 mg PO daily.   -check renin/aldosterone         Cervical myelopathy ([facility])     - follow-up with Dr. Díaz         Muscle spasm    Primary hypertension    Relevant Orders    Comp Metabolic Panel    RENIN ACTIVITY AND ALDOSTERONE    Type 1 diabetes mellitus ([facility])    Relevant Orders    Referral to Endocrinology       I spent a total of 40 minutes with record review, exam, communication with the patient occluding extensive discussion of chronic kidney disease, communication with other providers, and documentation of this encounter.      Return in about 2 weeks (around 7/4/2023), or if symptoms worsen or fail to improve.    I have placed the below orders and discussed them with an approved delegating provider. The MA is performing the below orders under the direction of Dr. Sierra.     Please note that this dictation was created using voice recognition software. I have made every reasonable attempt to correct obvious errors, but I expect that there are errors of grammar and possibly content that I did not discover before finalizing the note.

## 2023-06-20 NOTE — ASSESSMENT & PLAN NOTE
- Discussed the baclofen at length  -Prefers to try tizanidine 8 mg in the evening to see if this will better affect her symptoms.  Extensive discussion on max doses for both tizanidine, Tylenol, ibuprofen.  We did discuss that I do not recommend her taking ibuprofen.  -We discussed decreasing PT exercises and starting more slowly  - she will contact Dr. Díaz for sooner follow-up

## 2023-06-27 ENCOUNTER — OFFICE VISIT (OUTPATIENT)
Dept: MEDICAL GROUP | Facility: PHYSICIAN GROUP | Age: 68
End: 2023-06-27
Payer: MEDICARE

## 2023-06-27 VITALS
BODY MASS INDEX: 24.61 KG/M2 | WEIGHT: 156.8 LBS | HEART RATE: 82 BPM | OXYGEN SATURATION: 97 % | TEMPERATURE: 98.8 F | HEIGHT: 67 IN | DIASTOLIC BLOOD PRESSURE: 62 MMHG | SYSTOLIC BLOOD PRESSURE: 138 MMHG

## 2023-06-27 DIAGNOSIS — M79.604 RIGHT LEG PAIN: ICD-10-CM

## 2023-06-27 DIAGNOSIS — I10 PRIMARY HYPERTENSION: ICD-10-CM

## 2023-06-27 DIAGNOSIS — I50.30 BENIGN HYPERTENSIVE HEART AND KIDNEY DISEASE WITH DIASTOLIC CHF, NYHA CLASS II AND CKD STAGE 2 (HCC): ICD-10-CM

## 2023-06-27 DIAGNOSIS — N18.2 BENIGN HYPERTENSIVE HEART AND KIDNEY DISEASE WITH DIASTOLIC CHF, NYHA CLASS II AND CKD STAGE 2 (HCC): ICD-10-CM

## 2023-06-27 DIAGNOSIS — I13.0 BENIGN HYPERTENSIVE HEART AND KIDNEY DISEASE WITH DIASTOLIC CHF, NYHA CLASS II AND CKD STAGE 2 (HCC): ICD-10-CM

## 2023-06-27 DIAGNOSIS — E10.69 TYPE 1 DIABETES MELLITUS WITH OTHER SPECIFIED COMPLICATION (HCC): ICD-10-CM

## 2023-06-27 PROCEDURE — 99214 OFFICE O/P EST MOD 30 MIN: CPT | Performed by: NURSE PRACTITIONER

## 2023-06-27 PROCEDURE — 3078F DIAST BP <80 MM HG: CPT | Performed by: NURSE PRACTITIONER

## 2023-06-27 PROCEDURE — 3075F SYST BP GE 130 - 139MM HG: CPT | Performed by: NURSE PRACTITIONER

## 2023-06-27 RX ORDER — AMLODIPINE BESYLATE 2.5 MG/1
2.5 TABLET ORAL DAILY
Qty: 30 TABLET | Refills: 0 | Status: SHIPPED | OUTPATIENT
Start: 2023-06-27 | End: 2023-08-15

## 2023-06-27 RX ORDER — LISINOPRIL 10 MG/1
10 TABLET ORAL DAILY
Qty: 30 TABLET | Refills: 0 | Status: SHIPPED | OUTPATIENT
Start: 2023-06-27 | End: 2023-07-13

## 2023-06-27 RX ORDER — MELOXICAM 15 MG/1
15 TABLET ORAL DAILY
COMMUNITY
End: 2023-07-13

## 2023-06-27 ASSESSMENT — FIBROSIS 4 INDEX: FIB4 SCORE: 0.83

## 2023-06-27 ASSESSMENT — ENCOUNTER SYMPTOMS
WHEEZING: 0
COUGH: 0
DEPRESSION: 0
DIZZINESS: 0
MYALGIAS: 1
ABDOMINAL PAIN: 0
SHORTNESS OF BREATH: 0
HEADACHES: 0
FEVER: 0
PALPITATIONS: 0
CHILLS: 0

## 2023-06-28 NOTE — PROGRESS NOTES
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  Stable.  Blood pressure today is 138/62.  Increase Lisinopril 10 mg PO daily and decrease amlodipine 2.5 mg by mouth daily. denies chest pain, palpitations, dizziness, blurry vision.     Right Leg Pain    Continued issue. Mostly \"tightness\" in her right leg. States symptoms are improved. The tizanidine 4mg twice daily, meloxicam 15 mg, and tylenol 1200 mg in am, then 1000 mg in evening and 1000 in the night. Dr. Díaz plans complete spine MRI and neurology will complete EMG.   Stomach has been bothering her. States currently stable. Cymbalta ineffective, baclofen stomach side effects, ineffective.      Type 1 Diabetes Mellitus (Hcc)    Chronic in nature.  States sugars have been stable and improved. She says she is doing much better.           Current Outpatient Medications Ordered in Epic   Medication Sig Dispense Refill    meloxicam (MOBIC) 15 MG tablet Take 15 mg by mouth every day.      lisinopril (PRINIVIL) 10 MG Tab Take 1 Tablet by mouth every day. 30 Tablet 0    amLODIPine (NORVASC) 2.5 MG Tab Take 1 Tablet by mouth every day. 30 Tablet 0    lisinopril (PRINIVIL) 2.5 MG Tab Take 1 Tablet by mouth every day. 30 Tablet 0    rosuvastatin (CRESTOR) 40 MG tablet Take 1 Tablet by mouth at bedtime. 90 Tablet 3    Alirocumab (PRALUENT) 150 MG/ML Solution Auto-injector Inject 150 mg under the skin every 14 days. 3 mL 3    furosemide (LASIX) 20 MG Tab       amLODIPine (NORVASC) 5 MG Tab Take 1 Tablet by mouth at bedtime. 90 Tablet 3    acetaminophen (TYLENOL) 500 MG Tab Take 2 Tablets by mouth every 6 hours. 100 Tablet 1    ezetimibe (ZETIA) 10 MG Tab Take 1 Tablet by mouth every " day. 90 Tablet 3    FIASP 100 UNIT/ML Solution       OMEGA-3 FATTY ACIDS PO Take  by mouth.      sodium chloride (SALT) 1 GM Tab Take 1 Tablet by mouth 2 times a day.      Calcium Carbonate (CALCIUM 600 PO) Take 600 mg by mouth every day.      NOVOLOG FLEXPEN 100 UNIT/ML solution for injection       Multiple Vitamin (MULTIVITAMIN PO) Take  by mouth every day.      ELDERBERRY PO Take  by mouth every day.      FIASP 100 UNIT/ML Solution 4-50 units with pump daily      insulin infusion pump Device Inject  under the skin continuous. Patient's own SQ insulin pump    Type of Insulin: Fiasp  Last change of tubing: 3/19/2023 - Change tubing and site every 72 hours    Dosing:  Basal rate:   0000 - 0329 = 0.5 units/hr   0330 - 1129 = 0.85 units/hr   1130 - 1359 = 0.5 units/hr              1400 - 1759 = 0.45 units/hr              1800 - 2359 = 0.5 units/hr    Bolus ratio:   1 unit : 12 g carbohydrate at  (breakfast, lunch, dinner, snacks)      Correction ratio:    1 units for every 50 over 150 mg/dL    Disconnect pump if patient becomes hypoglycemic and altered.      CRANBERRY PO Take 500 mg by mouth every evening.      B Complex Vitamins (VITAMIN B COMPLEX PO) Take 1 Tablet by mouth every evening.      LEVOXYL 100 MCG Tab Take 1 Tablet by mouth every morning on an empty stomach.      pantoprazole (PROTONIX) 40 MG Tablet Delayed Response Take 1 Tablet by mouth every day.      Cholecalciferol (VITAMIN D-3 PO) Take 5,000 mg by mouth every evening.       No current Epic-ordered facility-administered medications on file.       Health Maintenance: schedule annual exam    Review of Systems   Constitutional:  Negative for chills, fever and malaise/fatigue.   Respiratory:  Negative for cough, shortness of breath and wheezing.    Cardiovascular:  Negative for chest pain, palpitations and leg swelling.   Gastrointestinal:  Negative for abdominal pain.   Genitourinary:  Negative for dysuria.   Musculoskeletal:  Positive for joint pain  "and myalgias.   Neurological:  Negative for dizziness and headaches.   Psychiatric/Behavioral:  Negative for depression.          Objective:     Exam:  /62   Pulse 82   Temp 37.1 °C (98.8 °F) (Temporal)   Ht 1.702 m (5' 7\")   Wt 71.1 kg (156 lb 12.8 oz)   LMP 01/01/1983 (LMP Unknown)   SpO2 97%   BMI 24.56 kg/m²  Body mass index is 24.56 kg/m².    Physical Exam  Constitutional:       Appearance: Normal appearance.   HENT:      Head: Normocephalic.   Eyes:      Pupils: Pupils are equal, round, and reactive to light.   Cardiovascular:      Rate and Rhythm: Normal rate and regular rhythm.   Pulmonary:      Effort: Pulmonary effort is normal.      Breath sounds: Normal breath sounds.   Musculoskeletal:      Right upper leg: Tenderness present. No edema or deformity.      Left upper leg: Normal.      Right knee: Swelling present. Normal range of motion.      Left knee: Normal.      Right lower leg: No swelling. No edema.      Left lower leg: Normal.      Right ankle: Swelling present. No tenderness.      Left ankle: Normal.   Skin:     General: Skin is warm and dry.      Capillary Refill: Capillary refill takes less than 2 seconds.   Neurological:      General: No focal deficit present.      Mental Status: She is alert and oriented to person, place, and time.       Assessment & Plan:     68 y.o. female with the following -     Problem List Items Addressed This Visit       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, 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)     - Lisinopril 10 mg by mouth daily, amlodipine 2.5 mg by mouth         Relevant Medications    lisinopril (PRINIVIL) 10 MG Tab    amLODIPine (NORVASC) 2.5 MG Tab    Primary hypertension    Relevant Medications    lisinopril (PRINIVIL) 10 MG Tab    amLODIPine (NORVASC) 2.5 MG Tab    Other Relevant Orders    Comp Metabolic Panel    Right leg pain     -continue tizanidine, tylenol, meloxicam.         Type 1 diabetes mellitus (HCC) "         Return in about 1 month (around 7/27/2023) for pain.    I have placed the below orders and discussed them with an approved delegating provider. The MA is performing the below orders under the direction of Dr. Rizzo.     Please note that this dictation was created using voice recognition software. I have made every reasonable attempt to correct obvious errors, but I expect that there are errors of grammar and possibly content that I did not discover before finalizing the note.

## 2023-06-29 ENCOUNTER — PHARMACY VISIT (OUTPATIENT)
Dept: PHARMACY | Facility: MEDICAL CENTER | Age: 68
End: 2023-06-29
Payer: MEDICARE

## 2023-06-29 PROCEDURE — RXMED WILLOW AMBULATORY MEDICATION CHARGE: Performed by: FAMILY MEDICINE

## 2023-06-30 ENCOUNTER — HOSPITAL ENCOUNTER (OUTPATIENT)
Dept: RADIOLOGY | Facility: MEDICAL CENTER | Age: 68
End: 2023-06-30
Attending: INTERNAL MEDICINE
Payer: MEDICARE

## 2023-06-30 DIAGNOSIS — K31.84 GASTROPARESIS: ICD-10-CM

## 2023-06-30 PROCEDURE — A9541 TC99M SULFUR COLLOID: HCPCS

## 2023-07-02 ENCOUNTER — PATIENT MESSAGE (OUTPATIENT)
Dept: MEDICAL GROUP | Facility: PHYSICIAN GROUP | Age: 68
End: 2023-07-02
Payer: MEDICARE

## 2023-07-02 DIAGNOSIS — M62.838 MUSCLE SPASM: ICD-10-CM

## 2023-07-03 ENCOUNTER — HOSPITAL ENCOUNTER (OUTPATIENT)
Dept: LAB | Facility: MEDICAL CENTER | Age: 68
End: 2023-07-03
Attending: NURSE PRACTITIONER
Payer: MEDICARE

## 2023-07-03 DIAGNOSIS — I10 PRIMARY HYPERTENSION: ICD-10-CM

## 2023-07-03 LAB
ALBUMIN SERPL BCP-MCNC: 4.6 G/DL (ref 3.2–4.9)
ALBUMIN/GLOB SERPL: 1.8 G/DL
ALP SERPL-CCNC: 91 U/L (ref 30–99)
ALT SERPL-CCNC: 40 U/L (ref 2–50)
ANION GAP SERPL CALC-SCNC: 13 MMOL/L (ref 7–16)
AST SERPL-CCNC: 27 U/L (ref 12–45)
BILIRUB SERPL-MCNC: 0.5 MG/DL (ref 0.1–1.5)
BUN SERPL-MCNC: 12 MG/DL (ref 8–22)
CALCIUM ALBUM COR SERPL-MCNC: 9.3 MG/DL (ref 8.5–10.5)
CALCIUM SERPL-MCNC: 9.8 MG/DL (ref 8.5–10.5)
CHLORIDE SERPL-SCNC: 96 MMOL/L (ref 96–112)
CO2 SERPL-SCNC: 24 MMOL/L (ref 20–33)
CREAT SERPL-MCNC: 0.73 MG/DL (ref 0.5–1.4)
FASTING STATUS PATIENT QL REPORTED: NORMAL
GFR SERPLBLD CREATININE-BSD FMLA CKD-EPI: 91 ML/MIN/1.73 M 2
GLOBULIN SER CALC-MCNC: 2.6 G/DL (ref 1.9–3.5)
GLUCOSE SERPL-MCNC: 167 MG/DL (ref 65–99)
POTASSIUM SERPL-SCNC: 4.7 MMOL/L (ref 3.6–5.5)
PROT SERPL-MCNC: 7.2 G/DL (ref 6–8.2)
SODIUM SERPL-SCNC: 133 MMOL/L (ref 135–145)

## 2023-07-03 PROCEDURE — 80053 COMPREHEN METABOLIC PANEL: CPT

## 2023-07-03 PROCEDURE — 84244 ASSAY OF RENIN: CPT

## 2023-07-03 PROCEDURE — 36415 COLL VENOUS BLD VENIPUNCTURE: CPT

## 2023-07-03 PROCEDURE — 82088 ASSAY OF ALDOSTERONE: CPT

## 2023-07-03 RX ORDER — TIZANIDINE 4 MG/1
2-4 TABLET ORAL EVERY 6 HOURS PRN
Qty: 90 TABLET | Refills: 0 | Status: SHIPPED | OUTPATIENT
Start: 2023-07-03 | End: 2023-07-13

## 2023-07-03 NOTE — PATIENT COMMUNICATION
Received request via: Patient    Was the patient seen in the last year in this department? Yes    Does the patient have an active prescription (recently filled or refills available) for medication(s) requested? No    Does the patient have intermediate Plus and need 100 day supply (blood pressure, diabetes and cholesterol meds only)? Patient does not have SCP

## 2023-07-05 LAB — ALDOST SERPL-MCNC: <3 NG/DL

## 2023-07-06 ENCOUNTER — TELEPHONE (OUTPATIENT)
Dept: MEDICAL GROUP | Facility: PHYSICIAN GROUP | Age: 68
End: 2023-07-06
Payer: MEDICARE

## 2023-07-06 NOTE — TELEPHONE ENCOUNTER
Ines left a vm stating she does not understand her ALDOSTERONE   results and would like you to explain them a little more in detail. She stated it is ok to send her a my chart message with your response. Please advise.

## 2023-07-07 LAB — RENIN PLAS-CCNC: 8.2 NG/ML/HR

## 2023-07-10 ENCOUNTER — TELEPHONE (OUTPATIENT)
Dept: MEDICAL GROUP | Facility: PHYSICIAN GROUP | Age: 68
End: 2023-07-10
Payer: MEDICARE

## 2023-07-12 ENCOUNTER — TELEPHONE (OUTPATIENT)
Dept: NEUROSURGERY | Facility: MEDICAL CENTER | Age: 68
End: 2023-07-12
Payer: MEDICARE

## 2023-07-12 DIAGNOSIS — M51.16 LUMBAR DISC HERNIATION WITH RADICULOPATHY: ICD-10-CM

## 2023-07-12 RX ORDER — OXYCODONE HYDROCHLORIDE 5 MG/1
5 TABLET ORAL EVERY 8 HOURS PRN
Qty: 21 TABLET | Refills: 0 | Status: SHIPPED | OUTPATIENT
Start: 2023-07-12 | End: 2023-07-19

## 2023-07-12 NOTE — TELEPHONE ENCOUNTER
"Offered pain medications at her appt yesterday. Will send in a small supply.    ----- Message from Dejon Sanchez Ass't sent at 7/12/2023  9:27 AM PDT -----  I just spoke with Ines and she states she is struggling. She was in tears and states she was unable to get any sleep last night. She tried sleeping in her bed and recliner and was unable to get comfortable in either one. She said the pain is terrible and she doesn't know what to do to help it. Christiana should be reaching out to her today or tomorrow. She is wanting to know if there is another medication that might help ease the pain.    She also said \"I truly appreciate you guys. You make me feel like family. Thank you for everything.\"    "

## 2023-07-13 ENCOUNTER — APPOINTMENT (OUTPATIENT)
Dept: MEDICAL GROUP | Facility: PHYSICIAN GROUP | Age: 68
End: 2023-07-13
Payer: MEDICARE

## 2023-07-13 ENCOUNTER — OFFICE VISIT (OUTPATIENT)
Dept: MEDICAL GROUP | Facility: PHYSICIAN GROUP | Age: 68
End: 2023-07-13
Payer: MEDICARE

## 2023-07-13 VITALS
OXYGEN SATURATION: 96 % | SYSTOLIC BLOOD PRESSURE: 138 MMHG | DIASTOLIC BLOOD PRESSURE: 60 MMHG | WEIGHT: 153 LBS | TEMPERATURE: 98.2 F | HEART RATE: 89 BPM | BODY MASS INDEX: 24.01 KG/M2 | HEIGHT: 67 IN

## 2023-07-13 DIAGNOSIS — I50.30 BENIGN HYPERTENSIVE HEART AND KIDNEY DISEASE WITH DIASTOLIC CHF, NYHA CLASS II AND CKD STAGE 2 (HCC): ICD-10-CM

## 2023-07-13 DIAGNOSIS — E10.69 TYPE 1 DIABETES MELLITUS WITH OTHER SPECIFIED COMPLICATION (HCC): ICD-10-CM

## 2023-07-13 DIAGNOSIS — M79.604 RIGHT LEG PAIN: ICD-10-CM

## 2023-07-13 DIAGNOSIS — M54.50 CHRONIC LOW BACK PAIN, UNSPECIFIED BACK PAIN LATERALITY, UNSPECIFIED WHETHER SCIATICA PRESENT: ICD-10-CM

## 2023-07-13 DIAGNOSIS — N18.2 BENIGN HYPERTENSIVE HEART AND KIDNEY DISEASE WITH DIASTOLIC CHF, NYHA CLASS II AND CKD STAGE 2 (HCC): ICD-10-CM

## 2023-07-13 DIAGNOSIS — I10 PRIMARY HYPERTENSION: ICD-10-CM

## 2023-07-13 DIAGNOSIS — I13.0 BENIGN HYPERTENSIVE HEART AND KIDNEY DISEASE WITH DIASTOLIC CHF, NYHA CLASS II AND CKD STAGE 2 (HCC): ICD-10-CM

## 2023-07-13 DIAGNOSIS — M54.2 NECK PAIN ON LEFT SIDE: ICD-10-CM

## 2023-07-13 DIAGNOSIS — G89.29 CHRONIC LOW BACK PAIN, UNSPECIFIED BACK PAIN LATERALITY, UNSPECIFIED WHETHER SCIATICA PRESENT: ICD-10-CM

## 2023-07-13 PROBLEM — I95.9 HYPOTENSION: Status: RESOLVED | Noted: 2023-03-01 | Resolved: 2023-07-13

## 2023-07-13 PROCEDURE — 3078F DIAST BP <80 MM HG: CPT | Performed by: NURSE PRACTITIONER

## 2023-07-13 PROCEDURE — 99214 OFFICE O/P EST MOD 30 MIN: CPT | Performed by: NURSE PRACTITIONER

## 2023-07-13 PROCEDURE — 3075F SYST BP GE 130 - 139MM HG: CPT | Performed by: NURSE PRACTITIONER

## 2023-07-13 RX ORDER — LISINOPRIL 20 MG/1
TABLET ORAL
COMMUNITY
Start: 2023-07-06 | End: 2023-08-25

## 2023-07-13 ASSESSMENT — ENCOUNTER SYMPTOMS
CHILLS: 0
DEPRESSION: 0
COUGH: 0
DIZZINESS: 0
FEVER: 0
PALPITATIONS: 0
SHORTNESS OF BREATH: 0
HEADACHES: 0
HEARTBURN: 0
ABDOMINAL PAIN: 0
WHEEZING: 0

## 2023-07-13 ASSESSMENT — FIBROSIS 4 INDEX: FIB4 SCORE: 0.9

## 2023-07-13 NOTE — ASSESSMENT & PLAN NOTE
- Lisinopril 20 mg, amlodipine 2.5 mg.  -We discussed primary aldosteronism, Sublette's disease, results of her aldosterone and renin activity testing, the effects of blood pressure medications on these tests  -Discussed her blood pressures as her home blood pressures are running in the 160s over 80s and blood pressures in the office have been consistently 130's/60s in our office  -Patient will come in for an MA visit and bring her home blood pressure cuff so we can verify the accuracy of her home blood pressure cuff.  She states that a lot of the higher blood pressures are when she is having significant pain we discussed the effect of pain on blood pressure

## 2023-07-13 NOTE — PROGRESS NOTES
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  Stable.  Blood pressure today is 136/40, 138/60 on repeat..  He saw her nephrologist who increased her lisinopril to 20 mg.  Denies chest pain, palpitations, dizziness, blurry vision.     Right Leg Pain    Continued issue. Mostly \"tightness\" in her right leg. States symptoms are improved.  At this point Dr. Díaz does think this is related to some \"movement in her lumbar spine, he is planning to do steroid injections for this, he recommended that she try Robaxin which is helping as she did have some dizziness with tizanidine.     Low Back Pain    Leg pain associated with disc protrusion.     Neck Pain On Left Side    Doing well.     Type 1 Diabetes Mellitus (Hcc)    Chronic in nature.  States sugars have been stable and improved. Last a1c is 7.2. She says she is doing much better.       Hypotension (Resolved)       Current Outpatient Medications Ordered in Epic   Medication Sig Dispense Refill    lisinopril (PRINIVIL) 20 MG Tab       oxyCODONE immediate-release (ROXICODONE) 5 MG Tab Take 1 Tablet by mouth every 8 hours as needed for Severe Pain for up to 7 days. 21 Tablet 0    methocarbamol (ROBAXIN-750) 750 MG Tab One tablet (750 mg) every 6-8 hours as needed for muscle spasms 90 Tablet 5    amLODIPine (NORVASC) 2.5 MG Tab Take 1 Tablet by mouth every day. 30 Tablet 0    rosuvastatin (CRESTOR) 40 MG tablet Take 1 Tablet by mouth at bedtime. 90 Tablet 3    Alirocumab (PRALUENT) 150 MG/ML Solution Auto-injector Inject 150 mg under the skin every 14 days. 3 mL 3    acetaminophen (TYLENOL) 500 MG Tab Take 2 Tablets by mouth every 6 hours. 100 Tablet 1    ezetimibe (ZETIA) 10 MG Tab Take 1 Tablet by mouth every day. 90 Tablet 3    FIASP 100 UNIT/ML Solution       OMEGA-3 " FATTY ACIDS PO Take  by mouth.      sodium chloride (SALT) 1 GM Tab Take 1 Tablet by mouth 2 times a day.      Calcium Carbonate (CALCIUM 600 PO) Take 600 mg by mouth every day.      NOVOLOG FLEXPEN 100 UNIT/ML solution for injection       Multiple Vitamin (MULTIVITAMIN PO) Take  by mouth every day.      ELDERBERRY PO Take  by mouth every day.      insulin infusion pump Device Inject  under the skin continuous. Patient's own SQ insulin pump    Type of Insulin: Fiasp  Last change of tubing: 3/19/2023 - Change tubing and site every 72 hours    Dosing:  Basal rate:   0000 - 0329 = 0.5 units/hr   0330 - 1129 = 0.85 units/hr   1130 - 1359 = 0.5 units/hr              1400 - 1759 = 0.45 units/hr              1800 - 2359 = 0.5 units/hr    Bolus ratio:   1 unit : 12 g carbohydrate at  (breakfast, lunch, dinner, snacks)      Correction ratio:    1 units for every 50 over 150 mg/dL    Disconnect pump if patient becomes hypoglycemic and altered.      CRANBERRY PO Take 500 mg by mouth every evening.      B Complex Vitamins (VITAMIN B COMPLEX PO) Take 1 Tablet by mouth every evening.      LEVOXYL 100 MCG Tab Take 1 Tablet by mouth every morning on an empty stomach.      pantoprazole (PROTONIX) 40 MG Tablet Delayed Response Take 1 Tablet by mouth every day.      Cholecalciferol (VITAMIN D-3 PO) Take 5,000 mg by mouth every evening.       No current Epic-ordered facility-administered medications on file.       Health Maintenance: will schedule AWV    Review of Systems   Constitutional:  Negative for chills, fever and malaise/fatigue.   Respiratory:  Negative for cough, shortness of breath and wheezing.    Cardiovascular:  Negative for chest pain, palpitations and leg swelling.   Gastrointestinal:  Negative for abdominal pain and heartburn.   Neurological:  Negative for dizziness and headaches.   Psychiatric/Behavioral:  Negative for depression and suicidal ideas.          Objective:     Exam:  /60   Pulse 89   Temp 36.8 °C  "(98.2 °F) (Temporal)   Ht 1.702 m (5' 7\")   Wt 69.4 kg (153 lb)   LMP 01/01/1983 (LMP Unknown)   SpO2 96%   BMI 23.96 kg/m²  Body mass index is 23.96 kg/m².    Physical Exam  Constitutional:       Appearance: Normal appearance.   HENT:      Head: Normocephalic and atraumatic.   Cardiovascular:      Rate and Rhythm: Normal rate and regular rhythm.      Pulses: Normal pulses.      Heart sounds: Normal heart sounds.   Pulmonary:      Effort: Pulmonary effort is normal.      Breath sounds: Normal breath sounds.   Skin:     General: Skin is warm and dry.      Capillary Refill: Capillary refill takes less than 2 seconds.   Neurological:      General: No focal deficit present.      Mental Status: She is alert and oriented to person, place, and time.       Assessment & Plan:     68 y.o. female with the following -     Problem List Items Addressed This Visit       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, 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)     - Lisinopril 20 mg, amlodipine 2.5 mg.  -We discussed primary aldosteronism, Segundo's disease, results of her aldosterone and renin activity testing, the effects of blood pressure medications on these tests  -Discussed her blood pressures as her home blood pressures are running in the 160s over 80s and blood pressures in the office have been consistently 130's/60s in our office  -Patient will come in for an MA visit and bring her home blood pressure cuff so we can verify the accuracy of her home blood pressure cuff.  She states that a lot of the higher blood pressures are when she is having significant pain we discussed the effect of pain on blood pressure         Relevant Medications    lisinopril (PRINIVIL) 20 MG Tab    Other Relevant Orders    ACTH    CORTISOL - AM    Low back pain    Neck pain on left side    Primary hypertension    Relevant Medications    lisinopril (PRINIVIL) 20 MG Tab    Other Relevant Orders    ACTH    CORTISOL - AM    Comp " Metabolic Panel    Right leg pain     - Sent per Dr. Nur         Type 1 diabetes mellitus (HCC)     -using insulin pump.   -continue pump, follow-up with endocrinology.         Relevant Orders    Comp Metabolic Panel       I spent a total of 37 minutes with record review, exam, communication with the patient, communication with other providers, and documentation of this encounter.      Return in about 1 month (around 8/13/2023), or if symptoms worsen or fail to improve, for HTN.    I have placed the below orders and discussed them with an approved delegating provider. The MA is performing the below orders under the direction of Dr. Sierra.     Please note that this dictation was created using voice recognition software. I have made every reasonable attempt to correct obvious errors, but I expect that there are errors of grammar and possibly content that I did not discover before finalizing the note.

## 2023-07-14 ENCOUNTER — NON-PROVIDER VISIT (OUTPATIENT)
Dept: MEDICAL GROUP | Facility: PHYSICIAN GROUP | Age: 68
End: 2023-07-14
Payer: MEDICARE

## 2023-07-14 ENCOUNTER — HOSPITAL ENCOUNTER (OUTPATIENT)
Dept: LAB | Facility: MEDICAL CENTER | Age: 68
End: 2023-07-14
Attending: NURSE PRACTITIONER
Payer: MEDICARE

## 2023-07-14 VITALS — SYSTOLIC BLOOD PRESSURE: 116 MMHG | DIASTOLIC BLOOD PRESSURE: 62 MMHG

## 2023-07-14 DIAGNOSIS — I13.0 BENIGN HYPERTENSIVE HEART AND KIDNEY DISEASE WITH DIASTOLIC CHF, NYHA CLASS II AND CKD STAGE 2 (HCC): ICD-10-CM

## 2023-07-14 DIAGNOSIS — I50.30 BENIGN HYPERTENSIVE HEART AND KIDNEY DISEASE WITH DIASTOLIC CHF, NYHA CLASS II AND CKD STAGE 2 (HCC): ICD-10-CM

## 2023-07-14 DIAGNOSIS — I10 PRIMARY HYPERTENSION: ICD-10-CM

## 2023-07-14 DIAGNOSIS — E10.69 TYPE 1 DIABETES MELLITUS WITH OTHER SPECIFIED COMPLICATION (HCC): ICD-10-CM

## 2023-07-14 DIAGNOSIS — N18.2 BENIGN HYPERTENSIVE HEART AND KIDNEY DISEASE WITH DIASTOLIC CHF, NYHA CLASS II AND CKD STAGE 2 (HCC): ICD-10-CM

## 2023-07-14 LAB
ALBUMIN SERPL BCP-MCNC: 4.7 G/DL (ref 3.2–4.9)
ALBUMIN/GLOB SERPL: 2.1 G/DL
ALP SERPL-CCNC: 88 U/L (ref 30–99)
ALT SERPL-CCNC: 21 U/L (ref 2–50)
ANION GAP SERPL CALC-SCNC: 11 MMOL/L (ref 7–16)
AST SERPL-CCNC: 18 U/L (ref 12–45)
BILIRUB SERPL-MCNC: 0.9 MG/DL (ref 0.1–1.5)
BUN SERPL-MCNC: 10 MG/DL (ref 8–22)
CALCIUM ALBUM COR SERPL-MCNC: 9.5 MG/DL (ref 8.5–10.5)
CALCIUM SERPL-MCNC: 10.1 MG/DL (ref 8.5–10.5)
CHLORIDE SERPL-SCNC: 92 MMOL/L (ref 96–112)
CO2 SERPL-SCNC: 24 MMOL/L (ref 20–33)
CORTIS SERPL-MCNC: 14.5 UG/DL (ref 0–23)
CREAT SERPL-MCNC: 0.74 MG/DL (ref 0.5–1.4)
GFR SERPLBLD CREATININE-BSD FMLA CKD-EPI: 89 ML/MIN/1.73 M 2
GLOBULIN SER CALC-MCNC: 2.2 G/DL (ref 1.9–3.5)
GLUCOSE SERPL-MCNC: 170 MG/DL (ref 65–99)
POTASSIUM SERPL-SCNC: 4.9 MMOL/L (ref 3.6–5.5)
PROT SERPL-MCNC: 6.9 G/DL (ref 6–8.2)
SODIUM SERPL-SCNC: 127 MMOL/L (ref 135–145)

## 2023-07-14 PROCEDURE — 80053 COMPREHEN METABOLIC PANEL: CPT

## 2023-07-14 PROCEDURE — 82533 TOTAL CORTISOL: CPT

## 2023-07-14 PROCEDURE — 99999 PR NO CHARGE: CPT | Performed by: FAMILY MEDICINE

## 2023-07-14 PROCEDURE — 36415 COLL VENOUS BLD VENIPUNCTURE: CPT

## 2023-07-14 PROCEDURE — 82024 ASSAY OF ACTH: CPT

## 2023-07-15 LAB — ACTH PLAS-MCNC: 46.8 PG/ML (ref 7.2–63.3)

## 2023-07-17 ENCOUNTER — APPOINTMENT (OUTPATIENT)
Dept: RADIOLOGY | Facility: MEDICAL CENTER | Age: 68
End: 2023-07-17
Attending: NURSE PRACTITIONER
Payer: MEDICARE

## 2023-07-19 ENCOUNTER — TELEPHONE (OUTPATIENT)
Dept: NEUROSURGERY | Facility: MEDICAL CENTER | Age: 68
End: 2023-07-19
Payer: MEDICARE

## 2023-07-19 DIAGNOSIS — M51.16 LUMBAR DISC HERNIATION WITH RADICULOPATHY: ICD-10-CM

## 2023-07-19 RX ORDER — OXYCODONE HYDROCHLORIDE 5 MG/1
5 TABLET ORAL EVERY 8 HOURS PRN
Qty: 21 TABLET | Refills: 0 | Status: SHIPPED | OUTPATIENT
Start: 2023-07-19 | End: 2023-07-26

## 2023-07-19 RX ORDER — PREGABALIN 150 MG/1
150 CAPSULE ORAL 3 TIMES DAILY
Qty: 90 CAPSULE | Refills: 0 | Status: SHIPPED | OUTPATIENT
Start: 2023-07-19 | End: 2023-08-18

## 2023-07-20 NOTE — TELEPHONE ENCOUNTER
"I will send in LYrica and additional pain meds PDMP was reviewed.   Discussed with Ines, SNRB scheduled for tomorrow.  GG      ----- Message from Dejon Matthews Ass't sent at 7/19/2023 12:59 PM PDT -----  Regarding: Pt wanting to try Lyrica  Patient called, she was only able to get 1.5 hours of sleep last night. She did take Oxycodone during the day and now has 1 pill left, but said \"This doesn't seem to touch the pain\". She would like to try Lyrica instead of Oxycodone as she heard it makes you sleepy. She would use Tylenol during the day and Lyrica at night.     NIKKI reports they attempted calling her on Monday (7/17) to schedule the STAT SNRB but states her phone was busy. Ines reports she never received any call. I will follow up with them on this, I gave Ines their number to call as well.    "

## 2023-07-24 ENCOUNTER — DOCUMENTATION (OUTPATIENT)
Dept: PHARMACY | Facility: MEDICAL CENTER | Age: 68
End: 2023-07-24
Payer: MEDICARE

## 2023-07-24 PROCEDURE — RXMED WILLOW AMBULATORY MEDICATION CHARGE: Performed by: FAMILY MEDICINE

## 2023-07-25 NOTE — PROGRESS NOTES
07/24/23: Spoke with Ines. She reports having a nerve block for her deteriorating vertebrae. She was given Lyrica and Oxycodone. She reports cutting one tablet of Lyrica in half and only taking it at night as it makes her groggy. She reports only taking Oxycodone if the other medications do not help, and Tylenol being the initial medication for pain relief. She reports being diagnosed with Gastroparesis on 07/20. She is doing well on the Praluent 150mg/mL and is scheduled to see her doctor soon. She reports no side effects to the medication and no new allergies. She reports 0 missed doses and has maybe 1 dose on hand, with next dose due 08/06. (Every other Sunday) Sending delivery for 07/28 via UPS, overnight. Okay to charge $137.31 to [facility] [place]. She is aware she is still in the donut hole.

## 2023-07-27 ENCOUNTER — APPOINTMENT (OUTPATIENT)
Dept: MEDICAL GROUP | Facility: PHYSICIAN GROUP | Age: 68
End: 2023-07-27
Payer: MEDICARE

## 2023-07-27 ENCOUNTER — PHARMACY VISIT (OUTPATIENT)
Dept: PHARMACY | Facility: MEDICAL CENTER | Age: 68
End: 2023-07-27
Payer: MEDICARE

## 2023-07-27 ENCOUNTER — HOSPITAL ENCOUNTER (OUTPATIENT)
Dept: LAB | Facility: MEDICAL CENTER | Age: 68
End: 2023-07-27
Attending: STUDENT IN AN ORGANIZED HEALTH CARE EDUCATION/TRAINING PROGRAM
Payer: MEDICARE

## 2023-07-27 LAB
ALBUMIN SERPL BCP-MCNC: 4.3 G/DL (ref 3.2–4.9)
BUN SERPL-MCNC: 11 MG/DL (ref 8–22)
CALCIUM ALBUM COR SERPL-MCNC: 9.5 MG/DL (ref 8.5–10.5)
CALCIUM SERPL-MCNC: 9.7 MG/DL (ref 8.5–10.5)
CHLORIDE SERPL-SCNC: 98 MMOL/L (ref 96–112)
CO2 SERPL-SCNC: 23 MMOL/L (ref 20–33)
CREAT SERPL-MCNC: 0.76 MG/DL (ref 0.5–1.4)
GFR SERPLBLD CREATININE-BSD FMLA CKD-EPI: 86 ML/MIN/1.73 M 2
GLUCOSE SERPL-MCNC: 155 MG/DL (ref 65–99)
PHOSPHATE SERPL-MCNC: 3.6 MG/DL (ref 2.5–4.5)
POTASSIUM SERPL-SCNC: 4.7 MMOL/L (ref 3.6–5.5)
SODIUM SERPL-SCNC: 132 MMOL/L (ref 135–145)

## 2023-07-27 PROCEDURE — 80069 RENAL FUNCTION PANEL: CPT

## 2023-07-27 PROCEDURE — 36415 COLL VENOUS BLD VENIPUNCTURE: CPT

## 2023-07-28 LAB — RETINAL SCREEN: NEGATIVE

## 2023-08-01 ENCOUNTER — HOSPITAL ENCOUNTER (OUTPATIENT)
Dept: LAB | Facility: MEDICAL CENTER | Age: 68
End: 2023-08-01
Attending: FAMILY MEDICINE
Payer: MEDICARE

## 2023-08-01 ENCOUNTER — HOSPITAL ENCOUNTER (OUTPATIENT)
Dept: LAB | Facility: MEDICAL CENTER | Age: 68
End: 2023-08-01
Attending: NURSE PRACTITIONER
Payer: MEDICARE

## 2023-08-01 DIAGNOSIS — E87.1 HYPONATREMIA: ICD-10-CM

## 2023-08-01 DIAGNOSIS — E78.49 OTHER HYPERLIPIDEMIA: ICD-10-CM

## 2023-08-01 DIAGNOSIS — I10 PRIMARY HYPERTENSION: ICD-10-CM

## 2023-08-01 LAB
ALBUMIN SERPL BCP-MCNC: 4.6 G/DL (ref 3.2–4.9)
ALBUMIN/GLOB SERPL: 2 G/DL
ALP SERPL-CCNC: 100 U/L (ref 30–99)
ALT SERPL-CCNC: 28 U/L (ref 2–50)
ANION GAP SERPL CALC-SCNC: 12 MMOL/L (ref 7–16)
AST SERPL-CCNC: 22 U/L (ref 12–45)
BILIRUB SERPL-MCNC: 0.4 MG/DL (ref 0.1–1.5)
BUN SERPL-MCNC: 14 MG/DL (ref 8–22)
CALCIUM ALBUM COR SERPL-MCNC: 9.5 MG/DL (ref 8.5–10.5)
CALCIUM SERPL-MCNC: 10 MG/DL (ref 8.5–10.5)
CHLORIDE SERPL-SCNC: 101 MMOL/L (ref 96–112)
CHOLEST SERPL-MCNC: 110 MG/DL (ref 100–199)
CO2 SERPL-SCNC: 22 MMOL/L (ref 20–33)
CORTIS SERPL-MCNC: 13.7 UG/DL (ref 0–23)
CREAT SERPL-MCNC: 0.61 MG/DL (ref 0.5–1.4)
FASTING STATUS PATIENT QL REPORTED: NORMAL
GFR SERPLBLD CREATININE-BSD FMLA CKD-EPI: 98 ML/MIN/1.73 M 2
GLOBULIN SER CALC-MCNC: 2.3 G/DL (ref 1.9–3.5)
GLUCOSE SERPL-MCNC: 129 MG/DL (ref 65–99)
HDLC SERPL-MCNC: 66 MG/DL
LDLC SERPL CALC-MCNC: 31 MG/DL
POTASSIUM SERPL-SCNC: 4.8 MMOL/L (ref 3.6–5.5)
PROT SERPL-MCNC: 6.9 G/DL (ref 6–8.2)
SODIUM SERPL-SCNC: 135 MMOL/L (ref 135–145)
TRIGL SERPL-MCNC: 64 MG/DL (ref 0–149)

## 2023-08-01 PROCEDURE — 82533 TOTAL CORTISOL: CPT

## 2023-08-01 PROCEDURE — 80061 LIPID PANEL: CPT

## 2023-08-01 PROCEDURE — 83695 ASSAY OF LIPOPROTEIN(A): CPT

## 2023-08-01 PROCEDURE — 36415 COLL VENOUS BLD VENIPUNCTURE: CPT

## 2023-08-01 PROCEDURE — 84244 ASSAY OF RENIN: CPT

## 2023-08-01 PROCEDURE — 80053 COMPREHEN METABOLIC PANEL: CPT

## 2023-08-03 LAB — LPA SERPL-MCNC: 149 MG/DL

## 2023-08-04 LAB — RENIN PLAS-CCNC: 26.9 NG/ML/HR

## 2023-08-04 NOTE — PROGRESS NOTES
Ines Long is a 68 y.o. female here for a non-provider visit for blood pressure check     If abnormal was an in office provider notified today (if so, indicate provider)? Yes    Routed to PCP? Yes    Patient expressed no known problems or needs

## 2023-08-15 ENCOUNTER — OFFICE VISIT (OUTPATIENT)
Dept: MEDICAL GROUP | Facility: PHYSICIAN GROUP | Age: 68
End: 2023-08-15
Payer: MEDICARE

## 2023-08-15 VITALS
OXYGEN SATURATION: 99 % | SYSTOLIC BLOOD PRESSURE: 128 MMHG | TEMPERATURE: 99.4 F | BODY MASS INDEX: 23.86 KG/M2 | DIASTOLIC BLOOD PRESSURE: 72 MMHG | HEIGHT: 67 IN | HEART RATE: 81 BPM | WEIGHT: 152 LBS

## 2023-08-15 DIAGNOSIS — E10.69 TYPE 1 DIABETES MELLITUS WITH OTHER SPECIFIED COMPLICATION (HCC): ICD-10-CM

## 2023-08-15 DIAGNOSIS — I10 PRIMARY HYPERTENSION: ICD-10-CM

## 2023-08-15 DIAGNOSIS — M48.062 LUMBAR STENOSIS WITH NEUROGENIC CLAUDICATION: ICD-10-CM

## 2023-08-15 PROCEDURE — 3078F DIAST BP <80 MM HG: CPT | Performed by: NURSE PRACTITIONER

## 2023-08-15 PROCEDURE — 99214 OFFICE O/P EST MOD 30 MIN: CPT | Performed by: NURSE PRACTITIONER

## 2023-08-15 PROCEDURE — 3074F SYST BP LT 130 MM HG: CPT | Performed by: NURSE PRACTITIONER

## 2023-08-15 ASSESSMENT — ENCOUNTER SYMPTOMS
HEARTBURN: 0
COUGH: 0
DEPRESSION: 0
FEVER: 0
SHORTNESS OF BREATH: 0
HEADACHES: 0
CHILLS: 0
NAUSEA: 0
WHEEZING: 0
DIZZINESS: 0
PALPITATIONS: 0
BACK PAIN: 1

## 2023-08-15 ASSESSMENT — FIBROSIS 4 INDEX: FIB4 SCORE: 0.88

## 2023-08-15 NOTE — PROGRESS NOTES
Subjective:     Chief Complaint   Patient presents with    Follow-Up     Pain started back up last night hips in pain and couldn't bend knees pt states    Lab Results     Renin was high and pt wants to know is there another med besides lisinopril she can take        HPI:   Ines presents today with     Problem   Lumbar Stenosis With Neurogenic Claudication    This is a continued issue. States that knees and legs were spasming, was unable to straighten her legs. States that she had pain into the pelvic area. States was able to use OTC topical medication which did allow her to straighten. States she is unsure what triggered the pain, states she was up cooking quite a bit. Did not lift her grandson or any other action that she thinks that could have triggered this severe pain.  At this time she has a call into her spine surgeon's office and she is awaiting a call back she states that she is asking them to help her manage this episode.     Primary Hypertension    Chronic in nature. States that on 20 mg lisinopril taken at night she is having /60, states it has been low at PT. Currently 128/72. Denies chest pain, palpitations, dizziness, headache, blurry vision.     Type 1 Diabetes Mellitus (Hcc)    Chronic in nature.  Stable.  Last a1c is 7.2.  She continues to follow with Decon closely.         Current Outpatient Medications Ordered in Epic   Medication Sig Dispense Refill    pregabalin (LYRICA) 150 MG Cap Take 1 Capsule by mouth in the morning, at noon, and at bedtime for 30 days. 90 Capsule 0    lisinopril (PRINIVIL) 20 MG Tab       methocarbamol (ROBAXIN-750) 750 MG Tab One tablet (750 mg) every 6-8 hours as needed for muscle spasms 90 Tablet 5    rosuvastatin (CRESTOR) 40 MG tablet Take 1 Tablet by mouth at bedtime. 90 Tablet 3    Alirocumab (PRALUENT) 150 MG/ML Solution Auto-injector Inject 150 mg under the skin every 14 days. 3 mL 3    acetaminophen (TYLENOL) 500 MG Tab Take 2 Tablets by mouth every 6  hours. 100 Tablet 1    ezetimibe (ZETIA) 10 MG Tab Take 1 Tablet by mouth every day. 90 Tablet 3    FIASP 100 UNIT/ML Solution       OMEGA-3 FATTY ACIDS PO Take  by mouth.      sodium chloride (SALT) 1 GM Tab Take 1 Tablet by mouth 2 times a day.      Calcium Carbonate (CALCIUM 600 PO) Take 600 mg by mouth every day.      NOVOLOG FLEXPEN 100 UNIT/ML solution for injection       Multiple Vitamin (MULTIVITAMIN PO) Take  by mouth every day.      ELDERBERRY PO Take  by mouth every day.      insulin infusion pump Device Inject  under the skin continuous. Patient's own SQ insulin pump    Type of Insulin: Fiasp  Last change of tubing: 3/19/2023 - Change tubing and site every 72 hours    Dosing:  Basal rate:   0000 - 0329 = 0.5 units/hr   0330 - 1129 = 0.85 units/hr   1130 - 1359 = 0.5 units/hr              1400 - 1759 = 0.45 units/hr              1800 - 2359 = 0.5 units/hr    Bolus ratio:   1 unit : 12 g carbohydrate at  (breakfast, lunch, dinner, snacks)      Correction ratio:    1 units for every 50 over 150 mg/dL    Disconnect pump if patient becomes hypoglycemic and altered.      CRANBERRY PO Take 500 mg by mouth every evening.      B Complex Vitamins (VITAMIN B COMPLEX PO) Take 1 Tablet by mouth every evening.      LEVOXYL 100 MCG Tab Take 1 Tablet by mouth every morning on an empty stomach.      pantoprazole (PROTONIX) 40 MG Tablet Delayed Response Take 1 Tablet by mouth every day.      Cholecalciferol (VITAMIN D-3 PO) Take 5,000 mg by mouth every evening.       No current Epic-ordered facility-administered medications on file.       Health Maintenance: Completed    Review of Systems   Constitutional:  Negative for chills, fever and malaise/fatigue.   Respiratory:  Negative for cough, shortness of breath and wheezing.    Cardiovascular:  Negative for chest pain, palpitations and leg swelling.   Gastrointestinal:  Negative for heartburn and nausea.   Musculoskeletal:  Positive for back pain.   Neurological:  Negative  "for dizziness and headaches.   Psychiatric/Behavioral:  Negative for depression and suicidal ideas.          Objective:     Exam:  /72 Comment: pt bp cuff  Pulse 81   Temp 37.4 °C (99.4 °F) (Temporal)   Ht 1.702 m (5' 7\")   Wt 68.9 kg (152 lb)   LMP 01/01/1983 (LMP Unknown)   SpO2 99%   BMI 23.81 kg/m²  Body mass index is 23.81 kg/m².    Physical Exam  Constitutional:       Appearance: Normal appearance.   HENT:      Head: Normocephalic and atraumatic.   Cardiovascular:      Rate and Rhythm: Normal rate.      Pulses: Normal pulses.   Pulmonary:      Effort: Pulmonary effort is normal.   Skin:     General: Skin is warm and dry.   Neurological:      General: No focal deficit present.      Mental Status: She is alert and oriented to person, place, and time.     Continues to walk with a cane. No change in gait, no incontinence, no numbness or tingling  Assessment & Plan:     68 y.o. female with the following -     Problem List Items Addressed This Visit       Lumbar stenosis with neurogenic claudication     - Patient will follow-up with Dr. Díaz regarding episode of severe pain.         Primary hypertension     -continue Lisinopril 20 mg PO daily take medication in the morning, assess blood pressure throughout the day.  If blood pressure continues to be exceptionally low causing patient to feel unwell in the morning we will consider adjusting the dose.  -Question regarding the elevated renin in context of lisinopril, advised patient to discuss this further with nephrology.           Type 1 diabetes mellitus (HCC)     - Follow-up with endocrinology/nephrology            Return in about 3 months (around 11/15/2023), or if symptoms worsen or fail to improve.    I have placed the below orders and discussed them with an approved delegating provider. The MA is performing the below orders under the direction of Dr. Rizzo.     Please note that this dictation was created using voice recognition software. I " have made every reasonable attempt to correct obvious errors, but I expect that there are errors of grammar and possibly content that I did not discover before finalizing the note.

## 2023-08-15 NOTE — ASSESSMENT & PLAN NOTE
-continue Lisinopril 20 mg PO daily take medication in the morning, assess blood pressure throughout the day.  If blood pressure continues to be exceptionally low causing patient to feel unwell in the morning we will consider adjusting the dose.  -Question regarding the elevated renin in context of lisinopril, advised patient to discuss this further with nephrology.

## 2023-08-18 PROBLEM — M43.16 SPONDYLOLISTHESIS, LUMBAR REGION: Status: ACTIVE | Noted: 2023-08-18

## 2023-08-21 ENCOUNTER — TELEPHONE (OUTPATIENT)
Dept: NEUROSURGERY | Facility: MEDICAL CENTER | Age: 68
End: 2023-08-21
Payer: MEDICARE

## 2023-08-22 ENCOUNTER — APPOINTMENT (OUTPATIENT)
Dept: ADMISSIONS | Facility: MEDICAL CENTER | Age: 68
End: 2023-08-22
Attending: NEUROLOGICAL SURGERY
Payer: MEDICARE

## 2023-08-22 NOTE — TELEPHONE ENCOUNTER
Had medial branch blocks today, these helped with her back pain.  Is having increasing leg pain with cramping.  She is not sure what to do at this time.  Again we reinforced that surgery is better to improve her leg symptoms.  I do think this is reasonable for her to proceed with the proposed surgery from Doctor Arjun.  She will call our surgery scheduler in the morning and let her know.  She will call with any further questions or concerns.    ----- Message from Dejon Matthews Ass't sent at 8/21/2023  3:50 PM PDT -----  Regarding: Additional questions  Pablo Fofana, this patient called today with additional questions re: injections versus surgery. I did speak with her at length on Friday about this, she seemed satisfied at that point. It appears she has additional questions. She was hoping for a phone when you're available.

## 2023-08-24 ENCOUNTER — HOSPITAL ENCOUNTER (OUTPATIENT)
Dept: LAB | Facility: MEDICAL CENTER | Age: 68
End: 2023-08-24
Attending: STUDENT IN AN ORGANIZED HEALTH CARE EDUCATION/TRAINING PROGRAM
Payer: MEDICARE

## 2023-08-24 LAB
ALBUMIN SERPL BCP-MCNC: 4.5 G/DL (ref 3.2–4.9)
BUN SERPL-MCNC: 11 MG/DL (ref 8–22)
CALCIUM ALBUM COR SERPL-MCNC: 9.6 MG/DL (ref 8.5–10.5)
CALCIUM SERPL-MCNC: 10 MG/DL (ref 8.5–10.5)
CHLORIDE SERPL-SCNC: 102 MMOL/L (ref 96–112)
CO2 SERPL-SCNC: 24 MMOL/L (ref 20–33)
CREAT SERPL-MCNC: 0.69 MG/DL (ref 0.5–1.4)
GFR SERPLBLD CREATININE-BSD FMLA CKD-EPI: 95 ML/MIN/1.73 M 2
GLUCOSE SERPL-MCNC: 201 MG/DL (ref 65–99)
PHOSPHATE SERPL-MCNC: 3.4 MG/DL (ref 2.5–4.5)
POTASSIUM SERPL-SCNC: 5.1 MMOL/L (ref 3.6–5.5)
SODIUM SERPL-SCNC: 137 MMOL/L (ref 135–145)

## 2023-08-24 PROCEDURE — 36415 COLL VENOUS BLD VENIPUNCTURE: CPT

## 2023-08-24 PROCEDURE — 80069 RENAL FUNCTION PANEL: CPT

## 2023-08-25 ENCOUNTER — OFFICE VISIT (OUTPATIENT)
Dept: VASCULAR LAB | Facility: MEDICAL CENTER | Age: 68
End: 2023-08-25
Attending: FAMILY MEDICINE
Payer: MEDICARE

## 2023-08-25 VITALS
BODY MASS INDEX: 23.2 KG/M2 | WEIGHT: 147.8 LBS | SYSTOLIC BLOOD PRESSURE: 127 MMHG | DIASTOLIC BLOOD PRESSURE: 69 MMHG | HEART RATE: 91 BPM | HEIGHT: 67 IN

## 2023-08-25 DIAGNOSIS — R93.1 AGATSTON CORONARY ARTERY CALCIUM SCORE BETWEEN 100 AND 199: ICD-10-CM

## 2023-08-25 DIAGNOSIS — E03.9 ACQUIRED HYPOTHYROIDISM: ICD-10-CM

## 2023-08-25 DIAGNOSIS — E10.69 TYPE 1 DIABETES MELLITUS WITH OTHER SPECIFIED COMPLICATION (HCC): ICD-10-CM

## 2023-08-25 DIAGNOSIS — Z98.890 HISTORY OF LEFT-SIDED CAROTID ENDARTERECTOMY: ICD-10-CM

## 2023-08-25 DIAGNOSIS — I10 PRIMARY HYPERTENSION: ICD-10-CM

## 2023-08-25 DIAGNOSIS — E78.41 ELEVATED LIPOPROTEIN(A): ICD-10-CM

## 2023-08-25 DIAGNOSIS — I67.9 SMALL VESSEL DISEASE, CEREBROVASCULAR: Chronic | ICD-10-CM

## 2023-08-25 DIAGNOSIS — Z79.02 LONG TERM (CURRENT) USE OF ANTITHROMBOTICS/ANTIPLATELETS: ICD-10-CM

## 2023-08-25 DIAGNOSIS — I65.22 STENOSIS OF LEFT CAROTID ARTERY: ICD-10-CM

## 2023-08-25 DIAGNOSIS — E78.49 OTHER HYPERLIPIDEMIA: ICD-10-CM

## 2023-08-25 PROCEDURE — 99214 OFFICE O/P EST MOD 30 MIN: CPT | Performed by: FAMILY MEDICINE

## 2023-08-25 PROCEDURE — 3078F DIAST BP <80 MM HG: CPT | Performed by: FAMILY MEDICINE

## 2023-08-25 PROCEDURE — 3074F SYST BP LT 130 MM HG: CPT | Performed by: FAMILY MEDICINE

## 2023-08-25 PROCEDURE — 99212 OFFICE O/P EST SF 10 MIN: CPT

## 2023-08-25 RX ORDER — LOSARTAN POTASSIUM 50 MG/1
50 TABLET ORAL DAILY
Qty: 90 TABLET | Refills: 3 | Status: ON HOLD
Start: 2023-08-25 | End: 2023-09-27

## 2023-08-25 ASSESSMENT — ENCOUNTER SYMPTOMS
SPUTUM PRODUCTION: 0
CHILLS: 0
CLAUDICATION: 0
HEMOPTYSIS: 0
ORTHOPNEA: 0
PND: 0
PALPITATIONS: 0
FEVER: 0
WHEEZING: 0
SHORTNESS OF BREATH: 0
COUGH: 0

## 2023-08-25 ASSESSMENT — FIBROSIS 4 INDEX: FIB4 SCORE: 0.88

## 2023-08-25 NOTE — PROGRESS NOTES
[facility] - FOLLOW-UP  08/25/23     Ines Long has been referred for evaluation and management of dyslipidemia    Referral Source: Dr. Card (cardiology)     Subjective   Current/interval symptoms or concerns:  last seen 3mo ago, denies new sx  Tolerating meds.  Had labs done.   Reports intermittent positional dizz, still on fluid restrictions to <30oz/d per nephrology and taking NaCl tabs per nephrology - seeing next week.      Change in weight: Stable  Wt Readings from Last 3 Encounters:   08/25/23 67 kg (147 lb 12.8 oz)   08/15/23 68.9 kg (152 lb)   07/13/23 69.4 kg (153 lb)      Exercise habits:  limited   Dietary patterns: low fat  Etoh: No  Reported barriers to care: limited walking due to imbalance and ongoing eval with neurology   History of ASCVD: Documented clinical evidence of ASCVD: and Carotid plaque, >50% stenosis,     # s/p L CEA 2020 with Dr. Garcia.  No prior CVA/TIA.     # Cerebral small vessel dz -per MR, no prior CVA/TIA.      # elevated CACS = 180.  No hx of ascvd, no sx.    Secondary causes of dyslipidemia: none, stable hypothyroidism   Other Established (non-atherosclerotic) Vascular Disease, if Present: None  Age at Initial Diagnosis of Dyslipidemia: >5yrs     Current Prescription Lipid Lowering Medications - including dose:   Statin: rosuva 40mg  Non-Statin: zetia 10mg, praluent 150mg Q2wks  Current Lipid Lowering and Related Supplements: omega 3 FAs   Any Current Side Effects Potentially Related to Lipid Lowering therapy? No  Current Adherence to Lipid Lowering Therapies: Complete  Previously Attempted Interventions for Lipids - including outcome  Statin: None     Outcome: N/A  Non-Statin: None  Outcome: N/A  Any Previous History of Statin Intolerance? No  Baseline Lipids (no off-tx lipid panel available in our system):   Latest Reference Range & Units 02/23/15 07:48   Cholesterol,Tot 100 - 199 mg/dL 245 (H)   Triglycerides 0 - 149 mg/dL 78   HDL >=40 mg/dL 99   LDL  <100 mg/dL 130 (H)     Anticoagulation/antiplats: Yes, Details: ASA 81mg , no bleeding noted     HTN:  Started after MAYNOR   Home BP los/70s  Adherence to current HTN meds: great     T1D:  Stable, on insuling pump  Last A1c   Lab Results   Component Value Date    HBA1C 7.2 (H) 2023       Current Outpatient Medications:     losartan, 50 mg, Oral, DAILY    oxyCODONE immediate-release, 5 mg, Oral, Q4HRS PRN (Patient taking differently: 10 mg, Oral, EVERY 4 HOURS PRN, Severe Pain), Taking    methocarbamol, One tablet (750 mg) every 6-8 hours as needed for muscle spasms, Taking    rosuvastatin, 40 mg, Oral, QHS, Taking    Praluent, 150 mg, Subcutaneous, Q14 DAYS, Taking    acetaminophen, 1,000 mg, Oral, Q6HRS, PRN    ezetimibe, 10 mg, Oral, DAILY, Taking    Fiasp, , Taking    OMEGA-3 FATTY ACIDS PO, Take  by mouth., Taking    sodium chloride, 1 g, Oral, BID, Taking    Calcium Carbonate (CALCIUM 600 PO), 600 mg, Oral, DAILY, Taking    NovoLOG FlexPen, , Taking    Multiple Vitamin (MULTIVITAMIN PO), Take  by mouth every day., Taking    ELDERBERRY PO, Take  by mouth every day., Taking    insulin infusion pump, Inject  under the skin continuous. Patient's own SQ insulin pump  Type of Insulin: Fiasp Last change of tubing: 3/19/2023 - Change tubing and site every 72 hours  Dosing: Basal rate:  0000 - 0329 = 0.5 units/hr  0330 - 1129 = 0.85 units/hr  1130 - 1359 = 0.5 units/hr             1400 - 1759 = 0.45 units/hr             1800 - 2359 = 0.5 units/hr  Bolus ratio:  1 unit : 12 g carbohydrate at  (breakfast, lunch, dinner, snacks)    Correction ratio:   1 units for every 50 over 150 mg/dL  Disconnect pump if patient becomes hypoglycemic and altered., Taking    CRANBERRY PO, 500 mg, Oral, Q EVENING, Taking    B Complex Vitamins (VITAMIN B COMPLEX PO), 1 Tablet, Oral, Q EVENING, Taking    Levoxyl, 100 mcg, Oral, AM ES, Taking    pantoprazole, 40 mg, Oral, DAILY, Taking    Cholecalciferol (VITAMIN D-3 PO), 5,000  "mg, Oral, Q EVENING, Taking    Social History     Tobacco Use    Smoking status: Former     Current packs/day: 0.00     Types: Cigarettes     Quit date: 1975     Years since quittin.6    Smokeless tobacco: Never   Vaping Use    Vaping Use: Never used   Substance Use Topics    Alcohol use: Yes     Alcohol/week: 1.2 oz     Types: 2 Glasses of wine per week     Comment: about 3-4 drinks per week.     Drug use: Never       Review of Systems   Constitutional:  Negative for chills, fever and malaise/fatigue.   Respiratory:  Negative for cough, hemoptysis, sputum production, shortness of breath and wheezing.    Cardiovascular:  Negative for chest pain, palpitations, orthopnea, claudication, leg swelling and PND.   Neurological:  Positive for focal weakness (at baseline, gait slow but stable). Negative for dizziness, tingling, tremors and headaches.         Objective    Vitals:    23 1332   BP: 127/69   BP Location: Left arm   Patient Position: Sitting   BP Cuff Size: Adult   Pulse: 91   Weight: 67 kg (147 lb 12.8 oz)   Height: 1.702 m (5' 7\")        BP Readings from Last 5 Encounters:   23 127/69   08/15/23 128/72   23 116/62   23 138/60   23 138/62     Physical Exam  Constitutional:       General: She is not in acute distress.     Appearance: Normal appearance. She is well-developed. She is not diaphoretic.   HENT:      Head: Normocephalic and atraumatic.   Eyes:      Extraocular Movements: Extraocular movements intact.      Conjunctiva/sclera: Conjunctivae normal.   Cardiovascular:      Rate and Rhythm: Normal rate and regular rhythm.      Pulses: Normal pulses.      Heart sounds: Normal heart sounds. No murmur heard.     No friction rub. No gallop.   Pulmonary:      Effort: Pulmonary effort is normal. No respiratory distress.      Breath sounds: Normal breath sounds.   Musculoskeletal:         General: No tenderness. Normal range of motion.      Cervical back: Normal range of " "motion and neck supple.   Skin:     General: Skin is warm and dry.      Coloration: Skin is not pale.      Findings: No rash.   Neurological:      Mental Status: She is alert and oriented to person, place, and time.      Cranial Nerves: No cranial nerve deficit.   Psychiatric:         Mood and Affect: Mood and affect normal.         Speech: Speech normal.         DATA REVIEW:  Most Recent Lipid Panel:   Lab Results   Component Value Date    CHOLSTRLTOT 110 08/01/2023    TRIGLYCERIDE 64 08/01/2023    HDL 66 08/01/2023    LDL 31 08/01/2023     Lab Results   Component Value Date/Time    LIPOPROTA 149 (H) 08/01/2023 09:36 AM      No results found for: \"APOB\"      Other Pertinent Blood Work:   Lab Results   Component Value Date    SODIUM 137 08/24/2023    POTASSIUM 5.1 08/24/2023    CHLORIDE 102 08/24/2023    CO2 24 08/24/2023    ANION 12.0 08/01/2023    GLUCOSE 201 (H) 08/24/2023    BUN 11 08/24/2023    CREATININE 0.69 08/24/2023    CALCIUM 10.0 08/24/2023    ASTSGOT 22 08/01/2023    ALTSGPT 28 08/01/2023    ALKPHOSPHAT 100 (H) 08/01/2023    TBILIRUBIN 0.4 08/01/2023    ALBUMIN 4.5 08/24/2023    AGRATIO 2.0 08/01/2023    LIPOPROTA 149 (H) 08/01/2023    TSHULTRASEN 3.540 06/13/2023     VASCULAR IMAGING:     MR brain 2021       CACS 11/2022   Coronary calcification:  LMA - 0.0  LCX - 0.0  LAD - 180.1  RCA - 0.0  PDA - 0.0   Total Calcium Score: 180.1   Percentile: Calcium score is above the 75th percentile for the patient's age and sex.   Other findings:  Heart: Normal size.  Lungs: Clear.  Mediastinum: Normal.  Upper abdomen: Normal.    Carotid 12/2022   Normal right carotid exam.    Post left internal carotid endarterectomy.    Mild stenosis of the left internal carotid artery (<50%).     Echo 3/2023  No prior study is available for comparison.   Normal left ventricular systolic function.   No evidence of valvular abnormality based on Doppler evaluation.     Cardiac PET 6/30/23   NUCLEAR IMAGING INTERPRETATION   Normal " myocardial perfusion with no ischemia.   Normal left ventricular wall motion.  LV ejection fraction = 77%.   ECG INTERPRETATION   No ischemic changes with Dipyridamole             ASSESSMENT AND PLAN  1. Stenosis of left carotid artery        2. History of left-sided carotid endarterectomy        3. Other hyperlipidemia  losartan (COZAAR) 50 MG Tab    Lipid Profile      4. Elevated lipoprotein(a)        5. Primary hypertension        6. Agatston coronary artery calcium score between 100 and 199        7. Type 1 diabetes mellitus with other specified complication (HCC)        8. Small vessel disease, cerebrovascular        9. Acquired hypothyroidism        10. Long term (current) use of antithrombotics/antiplatelets          Patient Type, check all that apply: Secondary Prevention, T1D, polyvascular     Established Atherosclerotic Cardiovascular Disease (ASCVD): yes    1) L carotid stenosis, s/p L CEA  (Dr. Garcia)- stable  No prior CVA/TIA   Duplex stable >2yrs  - repeat duplex Q2yr, sooner if new sx and CTA if progressive dz     2) coronary ave per CT, CACS = 180 (88%ile for age/gender)  - indicates higher short/longer-term ASCVD risk, so aggressive tx goals   - continue secondary med mgmt   - defer functional studies to cardiology decision or if new s/s develop     3) small vessel dz, cerebral per MR - stable  No dementia or CVA/TIA  - continue strict BP and lipid mgmt     Other Established (non-atherosclerotic) Vascular Disease, if Present: None    Evidence of Heterozygous Familial Hypercholesterolemia (FH): No   Though father  age 59 from CVA and apparently had major HLD disorder   FH genotyping:  not recommended    ACC/AHA Indication for Statin Therapy, bryan all that apply:  Secondary Prevention - Very high risk ASCVD - 2 major events or 1 event with other high-risk conditions    Calculated Risk for ASCVD, if applicable    The ASCVD Risk score (Alma Delia MORATAYA, et al., 2019) failed to calculate.,  N/A    Other Significant Risk Markers, if any, bryan all that apply   elevated coronary calcium score  Severely elevated lp(a) = 286 mg/dl - signficant prothrombotic and atherogenic risks  - reduced to 149 (47%) with praluent    Goal LDL-C and nonHDL-C based on Clinic Protocol  LDL-C <55 due to polyvascular, severe lp(a) elevation   At goal? Yes, 8/2023     - as reviewed, there is no lower limit of LDL-C nor prior indications of safety issues with very low LDL-C as per all observational trials and sub-analyses of PCSK9i studies  - reviewed that she continues to have linear reduction in ASCVD risk with lower LDL-C w/o a floor limit, so we have opted to continue current tx     LIFESTYLE INTERVENTIONS:    SMOKING:    reports that she quit smoking about 48 years ago. Her smoking use included cigarettes. She has never used smokeless tobacco.   - continued complete avoidance of all tobacco products     PHYSICAL ACTIVITY: continue healthy activity to improve CV fitness.  In general, targeting >150min/week of moderate-level activity or as much as tolerated in light of functional status and co-morbidities     WEIGHT MANAGEMENT AND NUTRITION: Mediterranean style dietary approach  and Provide specific dietary approach handouts      LIPID LOWERING MEDICATION MANAGEMENT:     Statin Therapy Recommendations from Today’s Visit:   - continue rosuva 40mg daily     Non-Statin Medications Recommendations from Today’s Visit:   - continue zetia 10mg daily (consider stopping if LDL <10-20 in future)  - continue praluent 150mg Q2wks (preferred agent)  - excellent response     Indication for PCSK9 Inhibitor, if applicable:   PSCK9i indicated per 2018 ACC/AHA guidelines in this patient with established ASCVD whose LDL-C remains above threshold of 70 mg/dl despite maximally tolerated statin therapy  - - FOURIER trial yielded evidence that lp(a) reduction with Repatha led to further risk reduction for ASCVD independent of LDL-C lowering, thus  very high risk patients with high lp(a) would benefit disproportionately from lipid-lowering strategy that includes pcsk9i (ESTEFANIA'Coty, et al, Circulation 2019).    - ODYSSEY Outcomes trial of patients with an acute coronary syndrome, alirocumab reduced Lp(a) by 5 mg/dL and reduced the risk of major adverse cardiovascular evens (hazard ratio 0.85, 95% CI 0.78-0.93). The beneficial impact of Lp(a) lowering by alirocumab was independent of its lowering of LDL-C (Denis et al, [facility], 2020).     Supplements Recommended at this visit: None     RECOMMENDATIONS FOR OTHER CV RISK FACTORS:    1) BLOOD PRESSURE MANAGEMENT:  ACC/AHA (2017) goal <130/80  Home BP at goal:  yes  Office BP at goal:  yes  24h ABPM: not ordered to date  Plan:   Monitoring:   - start/continue home BP monitoring, reviewed correct technique, provide BP log and instructions  - order 24h ABPM:  NO  - monitor lytes/gfr routinely   - contact office if BP consistently >140/>90 for discussion of tx adjustments   Medications:  - continue losartan 50mg daily - monitor K+, consider d/c if not improving  - stopped amlodipine 5mg due to edema     2) Glycemic Status: Diabetic, T1  Goal A1c < 7.5 reasonable but defer to endocrine MD   Lab Results   Component Value Date    HBA1C 7.2 (H) 06/13/2023      Lab Results   Component Value Date/Time    MALBCRT see below 06/13/2023 10:40 AM    MICROALBUR <1.2 06/13/2023 10:40 AM     Plan:  - continue current medication plan   - recommmend for routine care with PCP (or endocrine) to include regular A1c monitoring, annual albumin/creatinine ratio (ACR), annual diabetic retinopathy screening, foot exams, annual flu vaccine, and updates to pneumonia vaccines as appropriate      ANTITHROMBOTIC THERAPY continue ASA 81mg daily     OTHER ISSUES:    # gait instability - ongoing care with PM&R and neurology for further assessment     # hypothyroidism, stable  Tolerating meds, TSH normal   - continue current treatment plan, defer mgmt  to PCP      # hyperkalemia = resolved  Unclear etiology  - taking veltassa per nephrology, ongoing lab surveillance   - did not tolerate furosemide    # hypoNa = resolved, prior moderate 127  - ongoing care with nephrology   - currently on NaCl tabs and fluid restrictions - review with nephrology   - best to avoid thiazides     Studies Ordered at Todays Visit: carotid duplex 2024 - RN to track   Blood Work Ordered At Today’s visit: see orders   Follow-Up: 6mo, sooner prn     Ghassan Gray M.D.  ABCL, Board-certified clinical lipidologist   [facility]   [facility]   [phone]

## 2023-08-26 PROCEDURE — RXMED WILLOW AMBULATORY MEDICATION CHARGE: Performed by: FAMILY MEDICINE

## 2023-08-26 ASSESSMENT — ENCOUNTER SYMPTOMS
FOCAL WEAKNESS: 1
TINGLING: 0
TREMORS: 0
DIZZINESS: 0
HEADACHES: 0

## 2023-08-28 ENCOUNTER — PHARMACY VISIT (OUTPATIENT)
Dept: PHARMACY | Facility: MEDICAL CENTER | Age: 68
End: 2023-08-28
Payer: MEDICARE

## 2023-09-01 ENCOUNTER — PATIENT OUTREACH (OUTPATIENT)
Dept: HEALTH INFORMATION MANAGEMENT | Facility: OTHER | Age: 68
End: 2023-09-01
Payer: MEDICARE

## 2023-09-01 DIAGNOSIS — E08.00 DIABETES MELLITUS DUE TO UNDERLYING CONDITION WITH HYPEROSMOLARITY WITHOUT COMA, WITH LONG-TERM CURRENT USE OF INSULIN (HCC): ICD-10-CM

## 2023-09-01 DIAGNOSIS — Z79.4 DIABETES MELLITUS DUE TO UNDERLYING CONDITION WITH HYPEROSMOLARITY WITHOUT COMA, WITH LONG-TERM CURRENT USE OF INSULIN (HCC): ICD-10-CM

## 2023-09-01 NOTE — PROGRESS NOTES
Spoke with patient about the Personal Care management program. Patient has multiple health issues that are a major life impact at the moment. She feels like she could really benefit from our program. Patient is scheduled for intake phone call on 9/12/23 at 11 am. I look forward to speaking with her.

## 2023-09-05 ENCOUNTER — APPOINTMENT (OUTPATIENT)
Dept: ADMISSIONS | Facility: MEDICAL CENTER | Age: 68
End: 2023-09-05
Attending: NEUROLOGICAL SURGERY
Payer: MEDICARE

## 2023-09-06 ENCOUNTER — PRE-ADMISSION TESTING (OUTPATIENT)
Dept: ADMISSIONS | Facility: MEDICAL CENTER | Age: 68
End: 2023-09-06
Attending: NEUROLOGICAL SURGERY
Payer: MEDICARE

## 2023-09-06 RX ORDER — LISINOPRIL 20 MG/1
20 TABLET ORAL NIGHTLY
COMMUNITY
End: 2024-02-21 | Stop reason: SINTOL

## 2023-09-06 RX ORDER — PREGABALIN 150 MG/1
150 CAPSULE ORAL 2 TIMES DAILY
Status: ON HOLD | COMMUNITY
End: 2023-09-27

## 2023-09-12 ENCOUNTER — PATIENT OUTREACH (OUTPATIENT)
Dept: MEDICAL GROUP | Facility: PHYSICIAN GROUP | Age: 68
End: 2023-09-12

## 2023-09-12 ENCOUNTER — PRE-ADMISSION TESTING (OUTPATIENT)
Dept: ADMISSIONS | Facility: MEDICAL CENTER | Age: 68
End: 2023-09-12
Attending: NEUROLOGICAL SURGERY
Payer: MEDICARE

## 2023-09-12 ENCOUNTER — HOSPITAL ENCOUNTER (OUTPATIENT)
Dept: RADIOLOGY | Facility: MEDICAL CENTER | Age: 68
End: 2023-09-12
Attending: NEUROLOGICAL SURGERY
Payer: MEDICARE

## 2023-09-12 DIAGNOSIS — E10.69 TYPE 1 DIABETES MELLITUS WITH OTHER SPECIFIED COMPLICATION (HCC): ICD-10-CM

## 2023-09-12 DIAGNOSIS — I10 PRIMARY HYPERTENSION: ICD-10-CM

## 2023-09-12 DIAGNOSIS — M48.062 SPINAL STENOSIS OF LUMBAR REGION WITH NEUROGENIC CLAUDICATION: ICD-10-CM

## 2023-09-12 LAB
25(OH)D3 SERPL-MCNC: 53 NG/ML (ref 30–100)
ABO GROUP BLD: NORMAL
ANION GAP SERPL CALC-SCNC: 13 MMOL/L (ref 7–16)
APPEARANCE UR: CLEAR
APTT PPP: 24.7 SEC (ref 24.7–36)
BASOPHILS # BLD AUTO: 0.4 % (ref 0–1.8)
BASOPHILS # BLD: 0.03 K/UL (ref 0–0.12)
BILIRUB UR QL STRIP.AUTO: NEGATIVE
BLD GP AB SCN SERPL QL: NORMAL
BUN SERPL-MCNC: 21 MG/DL (ref 8–22)
CALCIUM SERPL-MCNC: 9.8 MG/DL (ref 8.5–10.5)
CHLORIDE SERPL-SCNC: 99 MMOL/L (ref 96–112)
CO2 SERPL-SCNC: 24 MMOL/L (ref 20–33)
COLOR UR: YELLOW
CREAT SERPL-MCNC: 0.75 MG/DL (ref 0.5–1.4)
EKG IMPRESSION: NORMAL
EOSINOPHIL # BLD AUTO: 0.05 K/UL (ref 0–0.51)
EOSINOPHIL NFR BLD: 0.7 % (ref 0–6.9)
ERYTHROCYTE [DISTWIDTH] IN BLOOD BY AUTOMATED COUNT: 46.1 FL (ref 35.9–50)
EST. AVERAGE GLUCOSE BLD GHB EST-MCNC: 143 MG/DL
GFR SERPLBLD CREATININE-BSD FMLA CKD-EPI: 88 ML/MIN/1.73 M 2
GLUCOSE SERPL-MCNC: 169 MG/DL (ref 65–99)
GLUCOSE UR STRIP.AUTO-MCNC: NEGATIVE MG/DL
HBA1C MFR BLD: 6.6 % (ref 4–5.6)
HCT VFR BLD AUTO: 43 % (ref 37–47)
HGB BLD-MCNC: 14.2 G/DL (ref 12–16)
IMM GRANULOCYTES # BLD AUTO: 0.01 K/UL (ref 0–0.11)
IMM GRANULOCYTES NFR BLD AUTO: 0.1 % (ref 0–0.9)
INR PPP: 0.93 (ref 0.87–1.13)
KETONES UR STRIP.AUTO-MCNC: NEGATIVE MG/DL
LEUKOCYTE ESTERASE UR QL STRIP.AUTO: NEGATIVE
LYMPHOCYTES # BLD AUTO: 2.24 K/UL (ref 1–4.8)
LYMPHOCYTES NFR BLD: 33.1 % (ref 22–41)
MCH RBC QN AUTO: 30.8 PG (ref 27–33)
MCHC RBC AUTO-ENTMCNC: 33 G/DL (ref 32.2–35.5)
MCV RBC AUTO: 93.3 FL (ref 81.4–97.8)
MICRO URNS: NORMAL
MONOCYTES # BLD AUTO: 0.48 K/UL (ref 0–0.85)
MONOCYTES NFR BLD AUTO: 7.1 % (ref 0–13.4)
NEUTROPHILS # BLD AUTO: 3.96 K/UL (ref 1.82–7.42)
NEUTROPHILS NFR BLD: 58.6 % (ref 44–72)
NITRITE UR QL STRIP.AUTO: NEGATIVE
NRBC # BLD AUTO: 0 K/UL
NRBC BLD-RTO: 0 /100 WBC (ref 0–0.2)
PH UR STRIP.AUTO: 6 [PH] (ref 5–8)
PLATELET # BLD AUTO: 301 K/UL (ref 164–446)
PMV BLD AUTO: 10.6 FL (ref 9–12.9)
POTASSIUM SERPL-SCNC: 4.6 MMOL/L (ref 3.6–5.5)
PROT UR QL STRIP: NEGATIVE MG/DL
PROTHROMBIN TIME: 12.5 SEC (ref 12–14.6)
RBC # BLD AUTO: 4.61 M/UL (ref 4.2–5.4)
RBC UR QL AUTO: NEGATIVE
RH BLD: NORMAL
SODIUM SERPL-SCNC: 136 MMOL/L (ref 135–145)
SP GR UR STRIP.AUTO: 1.02
UROBILINOGEN UR STRIP.AUTO-MCNC: 0.2 MG/DL
WBC # BLD AUTO: 6.8 K/UL (ref 4.8–10.8)

## 2023-09-12 PROCEDURE — 93005 ELECTROCARDIOGRAM TRACING: CPT

## 2023-09-12 PROCEDURE — 86850 RBC ANTIBODY SCREEN: CPT

## 2023-09-12 PROCEDURE — 83036 HEMOGLOBIN GLYCOSYLATED A1C: CPT

## 2023-09-12 PROCEDURE — 85610 PROTHROMBIN TIME: CPT

## 2023-09-12 PROCEDURE — 86900 BLOOD TYPING SEROLOGIC ABO: CPT

## 2023-09-12 PROCEDURE — 82306 VITAMIN D 25 HYDROXY: CPT

## 2023-09-12 PROCEDURE — 85730 THROMBOPLASTIN TIME PARTIAL: CPT

## 2023-09-12 PROCEDURE — 71046 X-RAY EXAM CHEST 2 VIEWS: CPT

## 2023-09-12 PROCEDURE — 36415 COLL VENOUS BLD VENIPUNCTURE: CPT

## 2023-09-12 PROCEDURE — 86901 BLOOD TYPING SEROLOGIC RH(D): CPT

## 2023-09-12 PROCEDURE — 80048 BASIC METABOLIC PNL TOTAL CA: CPT

## 2023-09-12 PROCEDURE — 85025 COMPLETE CBC W/AUTO DIFF WBC: CPT

## 2023-09-12 PROCEDURE — 81003 URINALYSIS AUTO W/O SCOPE: CPT

## 2023-09-12 PROCEDURE — 93010 ELECTROCARDIOGRAM REPORT: CPT | Performed by: INTERNAL MEDICINE

## 2023-09-12 NOTE — PROGRESS NOTES
11:00 am: Patient contacted by phone for CCM enrollment. Patient had lab and vet appointments and was out of the house. Requested call back 3 pm 9/13/23.     9/13/23  3 pm Attempted to call patient at . No answer. Voicemail left with contact information.    9/26/23: Patient contacted by phone. Patient to undergo her spine surgery tomorrow. Patient requests 10/23/23 at 2:30 pm for her enrollment encounter. I will contact her at that time.

## 2023-09-18 PROCEDURE — RXMED WILLOW AMBULATORY MEDICATION CHARGE: Performed by: FAMILY MEDICINE

## 2023-09-21 ENCOUNTER — PHARMACY VISIT (OUTPATIENT)
Dept: PHARMACY | Facility: MEDICAL CENTER | Age: 68
End: 2023-09-21
Payer: MEDICARE

## 2023-09-26 ENCOUNTER — ANESTHESIA EVENT (OUTPATIENT)
Dept: SURGERY | Facility: MEDICAL CENTER | Age: 68
End: 2023-09-26
Payer: MEDICARE

## 2023-09-27 ENCOUNTER — APPOINTMENT (OUTPATIENT)
Dept: RADIOLOGY | Facility: MEDICAL CENTER | Age: 68
End: 2023-09-27
Attending: NEUROLOGICAL SURGERY
Payer: MEDICARE

## 2023-09-27 ENCOUNTER — ANESTHESIA (OUTPATIENT)
Dept: SURGERY | Facility: MEDICAL CENTER | Age: 68
End: 2023-09-27
Payer: MEDICARE

## 2023-09-27 ENCOUNTER — HOSPITAL ENCOUNTER (OUTPATIENT)
Facility: MEDICAL CENTER | Age: 68
End: 2023-09-28
Attending: NEUROLOGICAL SURGERY | Admitting: NEUROLOGICAL SURGERY
Payer: MEDICARE

## 2023-09-27 DIAGNOSIS — G89.18 POSTOPERATIVE PAIN: ICD-10-CM

## 2023-09-27 PROBLEM — M48.061 SPINAL STENOSIS OF LUMBAR REGION WITH RADICULOPATHY: Status: ACTIVE | Noted: 2023-09-27

## 2023-09-27 PROBLEM — M54.16 SPINAL STENOSIS OF LUMBAR REGION WITH RADICULOPATHY: Status: ACTIVE | Noted: 2023-09-27

## 2023-09-27 LAB
GLUCOSE BLD STRIP.AUTO-MCNC: 105 MG/DL (ref 65–99)
GLUCOSE BLD STRIP.AUTO-MCNC: 160 MG/DL (ref 65–99)
GLUCOSE BLD STRIP.AUTO-MCNC: 169 MG/DL (ref 65–99)
GLUCOSE BLD STRIP.AUTO-MCNC: 90 MG/DL (ref 65–99)

## 2023-09-27 PROCEDURE — 95937 NEUROMUSCULAR JUNCTION TEST: CPT | Performed by: NEUROLOGICAL SURGERY

## 2023-09-27 PROCEDURE — 700111 HCHG RX REV CODE 636 W/ 250 OVERRIDE (IP): Mod: JZ | Performed by: ANESTHESIOLOGY

## 2023-09-27 PROCEDURE — A9270 NON-COVERED ITEM OR SERVICE: HCPCS | Performed by: PHYSICIAN ASSISTANT

## 2023-09-27 PROCEDURE — 63053 LAM FACTC/FRMT ARTHRD LUM EA: CPT | Mod: ASROC,59 | Performed by: PHYSICIAN ASSISTANT

## 2023-09-27 PROCEDURE — 22853 INSJ BIOMECHANICAL DEVICE: CPT | Mod: ASROC | Performed by: PHYSICIAN ASSISTANT

## 2023-09-27 PROCEDURE — 700102 HCHG RX REV CODE 250 W/ 637 OVERRIDE(OP): Performed by: PHYSICIAN ASSISTANT

## 2023-09-27 PROCEDURE — 96375 TX/PRO/DX INJ NEW DRUG ADDON: CPT

## 2023-09-27 PROCEDURE — 22634 ARTHRD CMBN 1NTRSPC EA ADDL: CPT | Mod: ASROC | Performed by: PHYSICIAN ASSISTANT

## 2023-09-27 PROCEDURE — 700101 HCHG RX REV CODE 250: Performed by: NEUROLOGICAL SURGERY

## 2023-09-27 PROCEDURE — 700111 HCHG RX REV CODE 636 W/ 250 OVERRIDE (IP): Performed by: NEUROLOGICAL SURGERY

## 2023-09-27 PROCEDURE — 22842 INSERT SPINE FIXATION DEVICE: CPT | Mod: ASROC | Performed by: PHYSICIAN ASSISTANT

## 2023-09-27 PROCEDURE — 700101 HCHG RX REV CODE 250: Performed by: ANESTHESIOLOGY

## 2023-09-27 PROCEDURE — 160035 HCHG PACU - 1ST 60 MINS PHASE I: Performed by: NEUROLOGICAL SURGERY

## 2023-09-27 PROCEDURE — 160002 HCHG RECOVERY MINUTES (STAT): Performed by: NEUROLOGICAL SURGERY

## 2023-09-27 PROCEDURE — 110454 HCHG SHELL REV 250: Performed by: NEUROLOGICAL SURGERY

## 2023-09-27 PROCEDURE — 63052 LAM FACETC/FRMT ARTHRD LUM 1: CPT | Mod: ASROC,22ROC,59 | Performed by: PHYSICIAN ASSISTANT

## 2023-09-27 PROCEDURE — C1713 ANCHOR/SCREW BN/BN,TIS/BN: HCPCS | Performed by: NEUROLOGICAL SURGERY

## 2023-09-27 PROCEDURE — 160042 HCHG SURGERY MINUTES - EA ADDL 1 MIN LEVEL 5: Performed by: NEUROLOGICAL SURGERY

## 2023-09-27 PROCEDURE — G0378 HOSPITAL OBSERVATION PER HR: HCPCS

## 2023-09-27 PROCEDURE — 72100 X-RAY EXAM L-S SPINE 2/3 VWS: CPT

## 2023-09-27 PROCEDURE — 160031 HCHG SURGERY MINUTES - 1ST 30 MINS LEVEL 5: Performed by: NEUROLOGICAL SURGERY

## 2023-09-27 PROCEDURE — 700105 HCHG RX REV CODE 258: Performed by: NEUROLOGICAL SURGERY

## 2023-09-27 PROCEDURE — 700111 HCHG RX REV CODE 636 W/ 250 OVERRIDE (IP): Performed by: ANESTHESIOLOGY

## 2023-09-27 PROCEDURE — 63052 LAM FACETC/FRMT ARTHRD LUM 1: CPT | Mod: 22ROC,59 | Performed by: NEUROLOGICAL SURGERY

## 2023-09-27 PROCEDURE — 22842 INSERT SPINE FIXATION DEVICE: CPT | Performed by: NEUROLOGICAL SURGERY

## 2023-09-27 PROCEDURE — 8968 PR NO CHARGE - PROCEDURE: Mod: ASROC | Performed by: PHYSICIAN ASSISTANT

## 2023-09-27 PROCEDURE — 700111 HCHG RX REV CODE 636 W/ 250 OVERRIDE (IP): Mod: JZ | Performed by: PHYSICIAN ASSISTANT

## 2023-09-27 PROCEDURE — 22633 ARTHRD CMBN 1NTRSPC LUMBAR: CPT | Mod: ASROC | Performed by: PHYSICIAN ASSISTANT

## 2023-09-27 PROCEDURE — 82962 GLUCOSE BLOOD TEST: CPT

## 2023-09-27 PROCEDURE — 95940 IONM IN OPERATNG ROOM 15 MIN: CPT | Performed by: NEUROLOGICAL SURGERY

## 2023-09-27 PROCEDURE — 700102 HCHG RX REV CODE 250 W/ 637 OVERRIDE(OP): Performed by: ANESTHESIOLOGY

## 2023-09-27 PROCEDURE — 96365 THER/PROPH/DIAG IV INF INIT: CPT

## 2023-09-27 PROCEDURE — 20936 SP BONE AGRFT LOCAL ADD-ON: CPT | Performed by: NEUROLOGICAL SURGERY

## 2023-09-27 PROCEDURE — 22853 INSJ BIOMECHANICAL DEVICE: CPT | Performed by: NEUROLOGICAL SURGERY

## 2023-09-27 PROCEDURE — 110371 HCHG SHELL REV 272: Performed by: NEUROLOGICAL SURGERY

## 2023-09-27 PROCEDURE — 160048 HCHG OR STATISTICAL LEVEL 1-5: Performed by: NEUROLOGICAL SURGERY

## 2023-09-27 PROCEDURE — 700101 HCHG RX REV CODE 250: Performed by: PHYSICIAN ASSISTANT

## 2023-09-27 PROCEDURE — A4306 DRUG DELIVERY SYSTEM <=50 ML: HCPCS | Performed by: NEUROLOGICAL SURGERY

## 2023-09-27 PROCEDURE — 502000 HCHG MISC OR IMPLANTS RC 0278: Performed by: NEUROLOGICAL SURGERY

## 2023-09-27 PROCEDURE — 63053 LAM FACTC/FRMT ARTHRD LUM EA: CPT | Mod: 59 | Performed by: NEUROLOGICAL SURGERY

## 2023-09-27 PROCEDURE — 95938 SOMATOSENSORY TESTING: CPT | Performed by: NEUROLOGICAL SURGERY

## 2023-09-27 PROCEDURE — 700106 HCHG RX REV CODE 271: Performed by: NEUROLOGICAL SURGERY

## 2023-09-27 PROCEDURE — 160009 HCHG ANES TIME/MIN: Performed by: NEUROLOGICAL SURGERY

## 2023-09-27 PROCEDURE — 95861 NEEDLE EMG 2 EXTREMITIES: CPT | Performed by: NEUROLOGICAL SURGERY

## 2023-09-27 PROCEDURE — 22634 ARTHRD CMBN 1NTRSPC EA ADDL: CPT | Performed by: NEUROLOGICAL SURGERY

## 2023-09-27 PROCEDURE — 95870 NDL EMG LMTD STD MUSC 1 XTR: CPT | Performed by: NEUROLOGICAL SURGERY

## 2023-09-27 PROCEDURE — 20930 SP BONE ALGRFT MORSEL ADD-ON: CPT | Performed by: NEUROLOGICAL SURGERY

## 2023-09-27 PROCEDURE — A9270 NON-COVERED ITEM OR SERVICE: HCPCS | Performed by: ANESTHESIOLOGY

## 2023-09-27 PROCEDURE — 22633 ARTHRD CMBN 1NTRSPC LUMBAR: CPT | Performed by: NEUROLOGICAL SURGERY

## 2023-09-27 DEVICE — GRAFT BONE KIT INFUSE X-SMALL: Type: IMPLANTABLE DEVICE | Site: SPINE LUMBAR | Status: FUNCTIONAL

## 2023-09-27 DEVICE — IMPLANTABLE DEVICE: Type: IMPLANTABLE DEVICE | Site: SPINE LUMBAR | Status: FUNCTIONAL

## 2023-09-27 DEVICE — SCREW SET CD HORIZON SOLERAL VOYAGER 5/6MM (1EA): Type: IMPLANTABLE DEVICE | Site: SPINE LUMBAR | Status: FUNCTIONAL

## 2023-09-27 DEVICE — GRAFT BONE INJECT GRAFTON 6CC (1EA): Type: IMPLANTABLE DEVICE | Site: SPINE LUMBAR | Status: FUNCTIONAL

## 2023-09-27 DEVICE — CAGE SPINAL CATALYFT SPACER PL40 INTERBODY SHORT 24MM X 27MM X 7MM (1EA): Type: IMPLANTABLE DEVICE | Site: SPINE LUMBAR | Status: FUNCTIONAL

## 2023-09-27 RX ORDER — AMOXICILLIN 250 MG
1 CAPSULE ORAL NIGHTLY
Status: DISCONTINUED | OUTPATIENT
Start: 2023-09-27 | End: 2023-09-28 | Stop reason: HOSPADM

## 2023-09-27 RX ORDER — MAGNESIUM HYDROXIDE 1200 MG/15ML
LIQUID ORAL
Status: COMPLETED | OUTPATIENT
Start: 2023-09-27 | End: 2023-09-27

## 2023-09-27 RX ORDER — AMOXICILLIN 250 MG
1 CAPSULE ORAL
Status: DISCONTINUED | OUTPATIENT
Start: 2023-09-27 | End: 2023-09-28 | Stop reason: HOSPADM

## 2023-09-27 RX ORDER — GABAPENTIN 100 MG/1
400 CAPSULE ORAL
COMMUNITY
End: 2023-11-16

## 2023-09-27 RX ORDER — TRANEXAMIC ACID 100 MG/ML
1000 INJECTION, SOLUTION INTRAVENOUS ONCE
Status: COMPLETED | OUTPATIENT
Start: 2023-09-27 | End: 2023-09-27

## 2023-09-27 RX ORDER — LISINOPRIL 20 MG/1
20 TABLET ORAL NIGHTLY
Status: DISCONTINUED | OUTPATIENT
Start: 2023-09-27 | End: 2023-09-28 | Stop reason: HOSPADM

## 2023-09-27 RX ORDER — CEFAZOLIN SODIUM 1 G/3ML
INJECTION, POWDER, FOR SOLUTION INTRAMUSCULAR; INTRAVENOUS PRN
Status: DISCONTINUED | OUTPATIENT
Start: 2023-09-27 | End: 2023-09-27 | Stop reason: SURG

## 2023-09-27 RX ORDER — GABAPENTIN 400 MG/1
400 CAPSULE ORAL
Status: DISCONTINUED | OUTPATIENT
Start: 2023-09-27 | End: 2023-09-28

## 2023-09-27 RX ORDER — OMEPRAZOLE 20 MG/1
20 CAPSULE, DELAYED RELEASE ORAL DAILY
Status: DISCONTINUED | OUTPATIENT
Start: 2023-09-28 | End: 2023-09-28 | Stop reason: HOSPADM

## 2023-09-27 RX ORDER — TRANEXAMIC ACID 100 MG/ML
1000 INJECTION, SOLUTION INTRAVENOUS ONCE
Status: DISCONTINUED | OUTPATIENT
Start: 2023-09-27 | End: 2023-09-27 | Stop reason: HOSPADM

## 2023-09-27 RX ORDER — LEVOTHYROXINE SODIUM 0.05 MG/1
50 TABLET ORAL
Status: DISCONTINUED | OUTPATIENT
Start: 2023-10-01 | End: 2023-09-28 | Stop reason: HOSPADM

## 2023-09-27 RX ORDER — CLINDAMYCIN PHOSPHATE 900 MG/50ML
900 INJECTION, SOLUTION INTRAVENOUS EVERY 8 HOURS
Status: DISCONTINUED | OUTPATIENT
Start: 2023-09-27 | End: 2023-09-27

## 2023-09-27 RX ORDER — ONDANSETRON 2 MG/ML
INJECTION INTRAMUSCULAR; INTRAVENOUS PRN
Status: DISCONTINUED | OUTPATIENT
Start: 2023-09-27 | End: 2023-09-27 | Stop reason: SURG

## 2023-09-27 RX ORDER — VANCOMYCIN HYDROCHLORIDE 1 G/20ML
INJECTION, POWDER, LYOPHILIZED, FOR SOLUTION INTRAVENOUS
Status: COMPLETED | OUTPATIENT
Start: 2023-09-27 | End: 2023-09-27

## 2023-09-27 RX ORDER — ACETAMINOPHEN 500 MG
1000 TABLET ORAL EVERY 6 HOURS PRN
Status: DISCONTINUED | OUTPATIENT
Start: 2023-10-02 | End: 2023-09-28 | Stop reason: HOSPADM

## 2023-09-27 RX ORDER — ONDANSETRON 4 MG/1
4 TABLET, ORALLY DISINTEGRATING ORAL EVERY 4 HOURS PRN
Status: DISCONTINUED | OUTPATIENT
Start: 2023-09-27 | End: 2023-09-28 | Stop reason: HOSPADM

## 2023-09-27 RX ORDER — LEVOTHYROXINE SODIUM 0.1 MG/1
100 TABLET ORAL
Status: DISCONTINUED | OUTPATIENT
Start: 2023-09-28 | End: 2023-09-28 | Stop reason: HOSPADM

## 2023-09-27 RX ORDER — LEVOTHYROXINE SODIUM 0.05 MG/1
50-100 TABLET ORAL
Status: DISCONTINUED | OUTPATIENT
Start: 2023-09-27 | End: 2023-09-27

## 2023-09-27 RX ORDER — ACETAMINOPHEN 500 MG
1000 TABLET ORAL EVERY 6 HOURS
Status: DISCONTINUED | OUTPATIENT
Start: 2023-09-27 | End: 2023-09-28 | Stop reason: HOSPADM

## 2023-09-27 RX ORDER — SODIUM CHLORIDE, SODIUM LACTATE, POTASSIUM CHLORIDE, CALCIUM CHLORIDE 600; 310; 30; 20 MG/100ML; MG/100ML; MG/100ML; MG/100ML
INJECTION, SOLUTION INTRAVENOUS CONTINUOUS
Status: DISCONTINUED | OUTPATIENT
Start: 2023-09-27 | End: 2023-09-27 | Stop reason: HOSPADM

## 2023-09-27 RX ORDER — ONDANSETRON 2 MG/ML
4 INJECTION INTRAMUSCULAR; INTRAVENOUS
Status: DISCONTINUED | OUTPATIENT
Start: 2023-09-27 | End: 2023-09-27 | Stop reason: HOSPADM

## 2023-09-27 RX ORDER — PANTOPRAZOLE SODIUM 40 MG/1
40 TABLET, DELAYED RELEASE ORAL DAILY
Status: DISCONTINUED | OUTPATIENT
Start: 2023-09-27 | End: 2023-09-27

## 2023-09-27 RX ORDER — HYDROMORPHONE HYDROCHLORIDE 1 MG/ML
0.2 INJECTION, SOLUTION INTRAMUSCULAR; INTRAVENOUS; SUBCUTANEOUS
Status: DISCONTINUED | OUTPATIENT
Start: 2023-09-27 | End: 2023-09-27 | Stop reason: HOSPADM

## 2023-09-27 RX ORDER — CYCLOBENZAPRINE HCL 10 MG
10 TABLET ORAL EVERY 8 HOURS PRN
Status: DISCONTINUED | OUTPATIENT
Start: 2023-09-27 | End: 2023-09-28 | Stop reason: HOSPADM

## 2023-09-27 RX ORDER — BISACODYL 10 MG
10 SUPPOSITORY, RECTAL RECTAL
Status: DISCONTINUED | OUTPATIENT
Start: 2023-09-27 | End: 2023-09-28 | Stop reason: HOSPADM

## 2023-09-27 RX ORDER — OXYCODONE HYDROCHLORIDE 5 MG/1
5 TABLET ORAL
Status: DISCONTINUED | OUTPATIENT
Start: 2023-09-27 | End: 2023-09-28 | Stop reason: HOSPADM

## 2023-09-27 RX ORDER — ROSUVASTATIN CALCIUM 20 MG/1
40 TABLET, COATED ORAL
Status: DISCONTINUED | OUTPATIENT
Start: 2023-09-27 | End: 2023-09-28 | Stop reason: HOSPADM

## 2023-09-27 RX ORDER — EZETIMIBE 10 MG/1
10 TABLET ORAL NIGHTLY
Status: DISCONTINUED | OUTPATIENT
Start: 2023-09-27 | End: 2023-09-28 | Stop reason: HOSPADM

## 2023-09-27 RX ORDER — OXYCODONE HCL 10 MG/1
10 TABLET, FILM COATED, EXTENDED RELEASE ORAL ONCE
Status: COMPLETED | OUTPATIENT
Start: 2023-09-27 | End: 2023-09-27

## 2023-09-27 RX ORDER — ENEMA 19; 7 G/133ML; G/133ML
1 ENEMA RECTAL
Status: DISCONTINUED | OUTPATIENT
Start: 2023-09-27 | End: 2023-09-28 | Stop reason: HOSPADM

## 2023-09-27 RX ORDER — SODIUM CHLORIDE AND POTASSIUM CHLORIDE 150; 900 MG/100ML; MG/100ML
INJECTION, SOLUTION INTRAVENOUS CONTINUOUS
Status: DISCONTINUED | OUTPATIENT
Start: 2023-09-27 | End: 2023-09-28 | Stop reason: HOSPADM

## 2023-09-27 RX ORDER — DOCUSATE SODIUM 100 MG/1
100 CAPSULE, LIQUID FILLED ORAL 2 TIMES DAILY
Status: DISCONTINUED | OUTPATIENT
Start: 2023-09-27 | End: 2023-09-28 | Stop reason: HOSPADM

## 2023-09-27 RX ORDER — METHYLPREDNISOLONE ACETATE 40 MG/ML
INJECTION, SUSPENSION INTRA-ARTICULAR; INTRALESIONAL; INTRAMUSCULAR; SOFT TISSUE
Status: DISCONTINUED | OUTPATIENT
Start: 2023-09-27 | End: 2023-09-27 | Stop reason: HOSPADM

## 2023-09-27 RX ORDER — SODIUM CHLORIDE, SODIUM LACTATE, POTASSIUM CHLORIDE, CALCIUM CHLORIDE 600; 310; 30; 20 MG/100ML; MG/100ML; MG/100ML; MG/100ML
INJECTION, SOLUTION INTRAVENOUS CONTINUOUS
Status: ACTIVE | OUTPATIENT
Start: 2023-09-27 | End: 2023-09-27

## 2023-09-27 RX ORDER — HYDROMORPHONE HYDROCHLORIDE 1 MG/ML
0.5 INJECTION, SOLUTION INTRAMUSCULAR; INTRAVENOUS; SUBCUTANEOUS
Status: DISCONTINUED | OUTPATIENT
Start: 2023-09-27 | End: 2023-09-28 | Stop reason: HOSPADM

## 2023-09-27 RX ORDER — CEFAZOLIN SODIUM 1 G/3ML
INJECTION, POWDER, FOR SOLUTION INTRAMUSCULAR; INTRAVENOUS
Status: DISCONTINUED | OUTPATIENT
Start: 2023-09-27 | End: 2023-09-27 | Stop reason: HOSPADM

## 2023-09-27 RX ORDER — ACETAMINOPHEN 500 MG
500-1000 TABLET ORAL EVERY 6 HOURS PRN
Status: ON HOLD | COMMUNITY
End: 2023-09-28

## 2023-09-27 RX ORDER — OXYCODONE HCL 5 MG/5 ML
10 SOLUTION, ORAL ORAL
Status: COMPLETED | OUTPATIENT
Start: 2023-09-27 | End: 2023-09-27

## 2023-09-27 RX ORDER — BUPIVACAINE HYDROCHLORIDE AND EPINEPHRINE 5; 5 MG/ML; UG/ML
INJECTION, SOLUTION EPIDURAL; INTRACAUDAL; PERINEURAL
Status: DISCONTINUED | OUTPATIENT
Start: 2023-09-27 | End: 2023-09-27 | Stop reason: HOSPADM

## 2023-09-27 RX ORDER — HYDRALAZINE HYDROCHLORIDE 20 MG/ML
10 INJECTION INTRAMUSCULAR; INTRAVENOUS
Status: DISCONTINUED | OUTPATIENT
Start: 2023-09-27 | End: 2023-09-28 | Stop reason: HOSPADM

## 2023-09-27 RX ORDER — HYDROMORPHONE HYDROCHLORIDE 2 MG/ML
INJECTION, SOLUTION INTRAMUSCULAR; INTRAVENOUS; SUBCUTANEOUS PRN
Status: DISCONTINUED | OUTPATIENT
Start: 2023-09-27 | End: 2023-09-27 | Stop reason: SURG

## 2023-09-27 RX ORDER — CEFAZOLIN SODIUM 1 G/3ML
2 INJECTION, POWDER, FOR SOLUTION INTRAMUSCULAR; INTRAVENOUS ONCE
Status: DISCONTINUED | OUTPATIENT
Start: 2023-09-27 | End: 2023-09-27 | Stop reason: HOSPADM

## 2023-09-27 RX ORDER — HYDROMORPHONE HYDROCHLORIDE 1 MG/ML
0.1 INJECTION, SOLUTION INTRAMUSCULAR; INTRAVENOUS; SUBCUTANEOUS
Status: DISCONTINUED | OUTPATIENT
Start: 2023-09-27 | End: 2023-09-27 | Stop reason: HOSPADM

## 2023-09-27 RX ORDER — MORPHINE SULFATE 0.5 MG/ML
INJECTION, SOLUTION EPIDURAL; INTRATHECAL; INTRAVENOUS
Status: DISCONTINUED | OUTPATIENT
Start: 2023-09-27 | End: 2023-09-27 | Stop reason: HOSPADM

## 2023-09-27 RX ORDER — DIPHENHYDRAMINE HCL 25 MG
25 TABLET ORAL EVERY 6 HOURS PRN
Status: DISCONTINUED | OUTPATIENT
Start: 2023-09-27 | End: 2023-09-28 | Stop reason: HOSPADM

## 2023-09-27 RX ORDER — DEXTROSE MONOHYDRATE 25 G/50ML
25 INJECTION, SOLUTION INTRAVENOUS
Status: DISCONTINUED | OUTPATIENT
Start: 2023-09-27 | End: 2023-09-28 | Stop reason: HOSPADM

## 2023-09-27 RX ORDER — CEFAZOLIN SODIUM 1 G/50ML
1 INJECTION, SOLUTION INTRAVENOUS EVERY 8 HOURS
Status: COMPLETED | OUTPATIENT
Start: 2023-09-27 | End: 2023-09-28

## 2023-09-27 RX ORDER — DEXAMETHASONE SODIUM PHOSPHATE 4 MG/ML
INJECTION, SOLUTION INTRA-ARTICULAR; INTRALESIONAL; INTRAMUSCULAR; INTRAVENOUS; SOFT TISSUE PRN
Status: DISCONTINUED | OUTPATIENT
Start: 2023-09-27 | End: 2023-09-27 | Stop reason: SURG

## 2023-09-27 RX ORDER — ACETAMINOPHEN 325 MG/1
650 TABLET ORAL ONCE
Status: COMPLETED | OUTPATIENT
Start: 2023-09-27 | End: 2023-09-27

## 2023-09-27 RX ORDER — OXYCODONE HYDROCHLORIDE 10 MG/1
10 TABLET ORAL
Status: DISCONTINUED | OUTPATIENT
Start: 2023-09-27 | End: 2023-09-28 | Stop reason: HOSPADM

## 2023-09-27 RX ORDER — DIPHENHYDRAMINE HYDROCHLORIDE 50 MG/ML
25 INJECTION INTRAMUSCULAR; INTRAVENOUS EVERY 6 HOURS PRN
Status: DISCONTINUED | OUTPATIENT
Start: 2023-09-27 | End: 2023-09-28 | Stop reason: HOSPADM

## 2023-09-27 RX ORDER — SODIUM CHLORIDE, SODIUM LACTATE, POTASSIUM CHLORIDE, AND CALCIUM CHLORIDE .6; .31; .03; .02 G/100ML; G/100ML; G/100ML; G/100ML
IRRIGANT IRRIGATION
Status: DISCONTINUED | OUTPATIENT
Start: 2023-09-27 | End: 2023-09-27 | Stop reason: HOSPADM

## 2023-09-27 RX ORDER — ONDANSETRON 2 MG/ML
4 INJECTION INTRAMUSCULAR; INTRAVENOUS EVERY 4 HOURS PRN
Status: DISCONTINUED | OUTPATIENT
Start: 2023-09-27 | End: 2023-09-28 | Stop reason: HOSPADM

## 2023-09-27 RX ORDER — ROCURONIUM BROMIDE 10 MG/ML
INJECTION, SOLUTION INTRAVENOUS PRN
Status: DISCONTINUED | OUTPATIENT
Start: 2023-09-27 | End: 2023-09-27 | Stop reason: SURG

## 2023-09-27 RX ORDER — HYDROMORPHONE HYDROCHLORIDE 1 MG/ML
0.4 INJECTION, SOLUTION INTRAMUSCULAR; INTRAVENOUS; SUBCUTANEOUS
Status: DISCONTINUED | OUTPATIENT
Start: 2023-09-27 | End: 2023-09-27 | Stop reason: HOSPADM

## 2023-09-27 RX ORDER — OXYCODONE HCL 5 MG/5 ML
5 SOLUTION, ORAL ORAL
Status: COMPLETED | OUTPATIENT
Start: 2023-09-27 | End: 2023-09-27

## 2023-09-27 RX ORDER — INSULIN ASPART INJECTION 100 [IU]/ML
2.2 INJECTION, SOLUTION SUBCUTANEOUS ONCE
COMMUNITY

## 2023-09-27 RX ORDER — POLYETHYLENE GLYCOL 3350 17 G/17G
1 POWDER, FOR SOLUTION ORAL 2 TIMES DAILY PRN
Status: DISCONTINUED | OUTPATIENT
Start: 2023-09-27 | End: 2023-09-28 | Stop reason: HOSPADM

## 2023-09-27 RX ORDER — DIPHENHYDRAMINE HYDROCHLORIDE 50 MG/ML
12.5 INJECTION INTRAMUSCULAR; INTRAVENOUS
Status: DISCONTINUED | OUTPATIENT
Start: 2023-09-27 | End: 2023-09-27 | Stop reason: HOSPADM

## 2023-09-27 RX ADMIN — FENTANYL CITRATE 50 MCG: 50 INJECTION, SOLUTION INTRAMUSCULAR; INTRAVENOUS at 12:40

## 2023-09-27 RX ADMIN — SENNOSIDES AND DOCUSATE SODIUM 1 TABLET: 50; 8.6 TABLET ORAL at 20:10

## 2023-09-27 RX ADMIN — HYDROMORPHONE HYDROCHLORIDE 0.5 MG: 2 INJECTION INTRAMUSCULAR; INTRAVENOUS; SUBCUTANEOUS at 12:02

## 2023-09-27 RX ADMIN — POTASSIUM CHLORIDE AND SODIUM CHLORIDE: 900; 150 INJECTION, SOLUTION INTRAVENOUS at 16:07

## 2023-09-27 RX ADMIN — ONDANSETRON 8 MG: 2 INJECTION INTRAMUSCULAR; INTRAVENOUS at 11:57

## 2023-09-27 RX ADMIN — OXYCODONE HYDROCHLORIDE 10 MG: 10 TABLET ORAL at 16:08

## 2023-09-27 RX ADMIN — ONDANSETRON 4 MG: 2 INJECTION INTRAMUSCULAR; INTRAVENOUS at 17:22

## 2023-09-27 RX ADMIN — LISINOPRIL 20 MG: 20 TABLET ORAL at 20:10

## 2023-09-27 RX ADMIN — ROSUVASTATIN CALCIUM 40 MG: 20 TABLET, FILM COATED ORAL at 20:10

## 2023-09-27 RX ADMIN — CEFAZOLIN SODIUM 1 G: 1 INJECTION, SOLUTION INTRAVENOUS at 17:45

## 2023-09-27 RX ADMIN — OXYCODONE HYDROCHLORIDE 10 MG: 10 TABLET, FILM COATED, EXTENDED RELEASE ORAL at 07:48

## 2023-09-27 RX ADMIN — ACETAMINOPHEN 650 MG: 325 TABLET, FILM COATED ORAL at 07:47

## 2023-09-27 RX ADMIN — TRANEXAMIC ACID 1000 MG: 100 INJECTION, SOLUTION INTRAVENOUS at 11:58

## 2023-09-27 RX ADMIN — TRANEXAMIC ACID 1000 MG: 100 INJECTION, SOLUTION INTRAVENOUS at 09:59

## 2023-09-27 RX ADMIN — HYDROMORPHONE HYDROCHLORIDE 1 MG: 2 INJECTION INTRAMUSCULAR; INTRAVENOUS; SUBCUTANEOUS at 09:40

## 2023-09-27 RX ADMIN — OXYCODONE HYDROCHLORIDE 10 MG: 10 TABLET ORAL at 20:11

## 2023-09-27 RX ADMIN — ACETAMINOPHEN 1000 MG: 500 TABLET, FILM COATED ORAL at 17:22

## 2023-09-27 RX ADMIN — EZETIMIBE 10 MG: 10 TABLET ORAL at 20:10

## 2023-09-27 RX ADMIN — PROPOFOL 150 MG: 10 INJECTION, EMULSION INTRAVENOUS at 09:44

## 2023-09-27 RX ADMIN — Medication: at 12:41

## 2023-09-27 RX ADMIN — CHOLECALCIFEROL TAB 125 MCG (5000 UNIT) 5000 UNITS: 125 TAB at 16:08

## 2023-09-27 RX ADMIN — PROPOFOL 120 MCG/KG/MIN: 10 INJECTION, EMULSION INTRAVENOUS at 09:51

## 2023-09-27 RX ADMIN — DOCUSATE SODIUM 100 MG: 100 CAPSULE, LIQUID FILLED ORAL at 17:23

## 2023-09-27 RX ADMIN — ROCURONIUM BROMIDE 40 MG: 50 INJECTION, SOLUTION INTRAVENOUS at 09:45

## 2023-09-27 RX ADMIN — SODIUM CHLORIDE, POTASSIUM CHLORIDE, SODIUM LACTATE AND CALCIUM CHLORIDE: 600; 310; 30; 20 INJECTION, SOLUTION INTRAVENOUS at 07:58

## 2023-09-27 RX ADMIN — CEFAZOLIN 2 G: 1 INJECTION, POWDER, FOR SOLUTION INTRAMUSCULAR; INTRAVENOUS at 09:48

## 2023-09-27 RX ADMIN — GABAPENTIN 400 MG: 400 CAPSULE ORAL at 20:10

## 2023-09-27 RX ADMIN — FENTANYL CITRATE 50 MCG: 50 INJECTION, SOLUTION INTRAMUSCULAR; INTRAVENOUS at 12:59

## 2023-09-27 RX ADMIN — OXYCODONE HYDROCHLORIDE 10 MG: 5 SOLUTION ORAL at 12:40

## 2023-09-27 RX ADMIN — DEXAMETHASONE SODIUM PHOSPHATE 4 MG: 4 INJECTION INTRA-ARTICULAR; INTRALESIONAL; INTRAMUSCULAR; INTRAVENOUS; SOFT TISSUE at 09:50

## 2023-09-27 ASSESSMENT — PAIN DESCRIPTION - PAIN TYPE
TYPE: SURGICAL PAIN
TYPE: SURGICAL PAIN
TYPE: ACUTE PAIN;SURGICAL PAIN
TYPE: ACUTE PAIN
TYPE: SURGICAL PAIN
TYPE: ACUTE PAIN;SURGICAL PAIN
TYPE: SURGICAL PAIN

## 2023-09-27 ASSESSMENT — FIBROSIS 4 INDEX
FIB4 SCORE: 0.91
FIB4 SCORE: 0.91

## 2023-09-27 ASSESSMENT — PAIN SCALES - GENERAL: PAIN_LEVEL: 1

## 2023-09-27 NOTE — ANESTHESIA PREPROCEDURE EVALUATION
Case: [id number] Date/Time: 09/27/23 0915    Procedures:       L4/5 and L5/S1 redo minimally invasive posterior decompression and instrumented fusion (Spine Lumbar) - Time: 3  hours      DECOMPRESSION, SPINE, LUMBAR (Spine Lumbar)    Anesthesia type: General    Diagnosis:       Neurogenic claudication due to lumbar spinal stenosis [M48.062]      Spondylolisthesis, lumbar region [M43.16]    Pre-op diagnosis: Neurogenic claudication due to lumbar spinal stenosis. Spondylolisthesis, lumbar region     Location: [place] [address] / SURGERY [facility]    Surgeons: Evon Díaz M.D.          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, 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)      ENDO   (positive) Hypothyroidism   (positive) Type 1 diabetes mellitus (HCC)       Physical Exam    Airway   Mallampati: II  TM distance: >3 FB  Neck ROM: full       Cardiovascular - normal exam  Rhythm: regular  Rate: normal  (-) murmur     Dental - normal exam           Pulmonary - normal exam  Breath sounds clear to auscultation     Abdominal    Neurological - normal exam                 Anesthesia Plan    ASA 2       Plan - general       Airway plan will be ETT          Induction: intravenous    Postoperative Plan: Postoperative administration of opioids is intended.    Pertinent diagnostic labs and testing reviewed    Informed Consent:    Anesthetic plan and risks discussed with patient.    Use of blood products discussed with: patient whom consented to blood products.

## 2023-09-27 NOTE — LETTER
August 22, 2023    Patient Name: Ines Long  Surgeon Name: Evon Díaz M.D.  Surgery Facility: [facility] ([address] [place] [address])  Surgery Date: 9/27/2023    The time of your surgery is not final and may change up to and until the day of your surgery. You will be contacted 1-2 business days prior to your surgery date with your check-in and surgery time.    BEFORE YOUR SURGERY - WITH THE FACILITY  Pre Registration and/or Lab Work/Tests must be done within 60 days of your surgery date. These will be done at [facility], with an appointment. This appointment should be completed before your pre op appointment in our office.    On 8.28.2023 - if you have not already heard from [facility] Pre Admission/Registration Department, please call them at [phone] option 2, then option 1, for an appointment.      BEFORE YOUR SURGERY - WITH YOUR SURGEONS OFFICE  Pre op Appointment:   Date: 9.14.2023   Time: 10:30 AM   Provider: Evelio Jones PA-C    Location: [address] [place] [address]    Bring a list of all medications you are currently taking including the dosing and frequency.    Please read the MEDICATION INSTRUCTIONS below completely.    DAY OF YOUR SURGERY  Nothing to eat or drink and refrain from smoking any substance after midnight the day of your surgery. Smoking may interfere with the anesthetic and frequently produces nausea during the recovery period.    Continue taking all lifesaving medications, including the morning of your surgery with small sip of water.    You will need a responsible adult to drive you to and from your surgery.                  AFTER YOUR SURGERY  2 Week Post op Appointment:   Date: 10.11.2023   Time: 10:30 AM  With: Evelio Jones PA-C   Location: [address] [place], [place] [address]    6 Week Post op Appointment:   Date: 11.09.2023   Time: 10:30 AM   With: Evelio Jones PA-C   Location: [address] [place], [place] [address]    MEDICATION INSTRUCTIONS   Do not take these medications prior to  your procedure:  Anti-inflammatories: stop 7 days prior, restart when advised. For fusions avoid for 12 weeks after surgery  Naproxen (Naprosyn or Alleve)  Motrin  Ibuprofen  Nabumetone (Relafen)  Meloxicam (Mobic)  Celebrex  Salsalate  Diclofenac (Arthrotec, Voltaren, Flector)  Sulindac (Clinoril  Etodolac (Lodine)  Indomethacin (Indocin)  Ketoprofen  Ketorolac  Oxaprozin (Daypro)  Piroxicam (Feldene)  Blood thinners (stop after approval from the prescribing physician)  Aspirin (any dosage): 10 days prior, restart 7 days after procedure  Any medications that contain aspirin in combination (ie: Excedrin migraine, Fiorinal, and Norgesic)  Warfarin (Coumadin): Stop 7 days prior, INR day of procedure, restart 2-3 weeks after procedure  Antiplatelet: restart 7 days after procedure  Ticlid (ticlopidine): Stop 14 days prior  Plavix (clopidogrel): Stop 7 days prior  Aggrenox or Dipyridamole: Stop 14 days prior  ReoPro (abciximab): Stop 14 days prior  Integrilin (eptifibatide): Stop 14 days prior  Aggrastat (tirofiban): Stop 14 days prior  Lovenox (Enoxaparin): Stop 24hrs before and restart 24hrs after procedure  Heparin: Stop 24hrs before   Dalteparin (Fragmin): Stop 24hrs before  Fondaparinux (Arixtra): Stop 24hrs before  Xeralto, Dabigatran (Pradaxa) Stop 5 days prior  Eliquis (apibaxan) Stop 7 days prior    Okay to take these medications as prescribed:  Muscle relaxers  Acetaminophen and pain medications that have it in addition to oxycodone and hydrocodone  Blood pressure, cholesterol and diabetes medications are ok    TIME OFF WORK  FMLA or Disability forms can be faxed directly to [phone] or you may drop them off at any [facility]. Our office charges a $35.00 fee. Forms will be completed within 5-10 business days after payment is received. For the status of your forms you may contact our disability office directly at [phone].    DENTAL PROCDURES/CLEANINGS Avoid 3 weeks before surgery and for  3 months after surgery.    QUESTIONS ABOUT COSTS  Contact our Patient Financial Services department at [phone] for more information.    If you have any questions, please contact our office.    Ya    Surgery Scheduler  [email or web address]  ? [phone]   Fax: [phone]  EXT [address]04 [address]  [place] [id number]  [phone]

## 2023-09-27 NOTE — PROGRESS NOTES
Deroyal off the shelf LSO back support brace has been delivered to [facility] OR PACU recovery unit at [facility] to wear when ready. If any questions or assistance is needed with sizing or application of LSO brace please do not hesitate to contact the ortho  tech team.

## 2023-09-27 NOTE — ANESTHESIA PROCEDURE NOTES
Airway    Date/Time: 9/27/2023 9:46 AM    Performed by: Miguel Edwards M.D.  Authorized by: Miguel Edwards M.D.    Location:  OR  Urgency:  Elective  Indications for Airway Management:  Anesthesia      Spontaneous Ventilation: absent    Sedation Level:  Deep  Preoxygenated: Yes    Patient Position:  Sniffing  Mask Difficulty Assessment:  1 - vent by mask  Final Airway Type:  Endotracheal airway  Final Endotracheal Airway:  ETT  Cuffed: Yes    Technique Used for Successful ETT Placement:  Video laryngoscopy    Insertion Site:  Oral  Blade Type:  Glide  Laryngoscope Blade/Videolaryngoscope Blade Size:  3  ETT Size (mm):  7.0  Measured from:  Teeth  ETT to Teeth (cm):  23  Placement Verified by: auscultation and capnometry    Cormack-Lehane Classification:  Grade I - full view of glottis  Number of Attempts at Approach:  1

## 2023-09-27 NOTE — OR NURSING
Assume care for patient in pre-op. Allergies and NPO status verified. Consents for surgical procedure signed and on chart. PIV started.  Call light within reach. Bed at lowest position, hourly rounding in progress. COD done and sent to lab.

## 2023-09-27 NOTE — ANESTHESIA POSTPROCEDURE EVALUATION
Patient: Ines Long    Procedure Summary     Date: 09/27/23 Room / Location: [place] [address] / SURGERY [facility]    Anesthesia Start: 0939 Anesthesia Stop: 1233    Procedures:       L4/5 and L5/S1 redo minimally invasive posterior decompression and instrumented fusion (Spine Lumbar)      DECOMPRESSION, SPINE, LUMBAR (Spine Lumbar) Diagnosis:       Neurogenic claudication due to lumbar spinal stenosis      Spondylolisthesis, lumbar region      (Neurogenic claudication due to lumbar spinal stenosis. Spondylolisthesis, lumbar region )    Surgeons: Evon Díaz M.D. Responsible Provider: Miguel Edwards M.D.    Anesthesia Type: general ASA Status: 2          Final Anesthesia Type: general  Last vitals  BP   Blood Pressure : (!) 169/98    Temp   36.1 °C (97 °F)    Pulse   91   Resp   16    SpO2   97 %      Anesthesia Post Evaluation    Patient location during evaluation: PACU  Patient participation: complete - patient participated  Level of consciousness: awake and alert  Pain score: 1    Airway patency: patent  Anesthetic complications: no  Cardiovascular status: hemodynamically stable  Respiratory status: acceptable  Hydration status: euvolemic    PONV: none          No notable events documented.     Nurse Pain Score: 4 (NPRS)

## 2023-09-27 NOTE — OR SURGEON
Immediate Post OP Note    PreOp Diagnosis:     status post C3-T1 posterior cervical decompression and fusion with Doctor Arjun 2023  Continued intermittent numbness in her right thumb, steady improvement of her gait and unsteadiness  Months of persistent right leg pain, cramping and hyperesthesias generally worse at night refractory to medications and time  Underlying right knee issues, this is managed by Dr. Smith  Underlying diabetes  Facet mediated low back pain with a right L4-5 radiculopathy with  L4-5 spondylolisthesis and moderate right-sided neuroforaminal stenosis at L4-5.  7.  Left-sided foraminal L5-S1 disc protrusion  8.  Failed conservative therapy         PostOp Diagnosis:     status post C3-T1 posterior cervical decompression and fusion with Doctor Arjun 2023  Continued intermittent numbness in her right thumb, steady improvement of her gait and unsteadiness  Months of persistent right leg pain, cramping and hyperesthesias generally worse at night refractory to medications and time  Underlying right knee issues, this is managed by Dr. Smith  Underlying diabetes  Facet mediated low back pain with a right L4-5 radiculopathy with  L4-5 spondylolisthesis and moderate right-sided neuroforaminal stenosis at L4-5.  7.  Left-sided foraminal L5-S1 disc protrusion  8.  Failed conservative therapy         Procedure(s):  L4/5 and L5/S1 redo minimally invasive posterior decompression and instrumented fusion - Wound Class: Clean with Drain  DECOMPRESSION, SPINE, LUMBAR - Wound Class: Clean with Drain    Surgeon(s):  Evon Díaz M.D.    Anesthesiologist/Type of Anesthesia:  Anesthesiologist: Miguel Edwards M.D./General    Surgical Staff:  Assistant: Evelio Jones P.A.-C.  Circulator: Lukas D. Gansert, R.N.  Relief Circulator: Emre Candelaria R.N.  Scrub Person: Ya Swan  Radiology Technologist: Alyssa Kingston    Specimens removed if any:  * No specimens in log *    Assistants: Evelio  LUIS ANTONIO Jones  Specimen: nil    Estimated Blood Loss: 50 cc    Findings: n/a    Complications: nil         9/27/2023 12:44 PM Evon Díaz M.D.

## 2023-09-27 NOTE — OR NURSING
Report called to receiving MYNOR Rodríguez. Pt taken via [employer] to [id number]. One bag of belongings and cane sent w/ pt. VSS. No distress.

## 2023-09-27 NOTE — ANESTHESIA TIME REPORT
Anesthesia Start and Stop Event Times     Date Time Event    9/27/2023 0939 Anesthesia Start     1233 Anesthesia Stop        Responsible Staff  09/27/23    Name Role Begin End    Miguel Edwards M.D. Anesth 0939 1233        Overtime Reason:  no overtime (within assigned shift)    Comments:

## 2023-09-27 NOTE — OP REPORT
1. DATE OF SURGERY:  6/14/2023    2. SURGEON:  Evon Díaz MD, PhD, CRISTIANO, Neurosurgeon    3. ASSISTANT:  Evelio Jones PA-C    An assistant was crucial to have during the case as the assistant helped with positioning, keeping the field clear of blood and retraction. This case could not be done easily without a qualified assistant. It was medically necessary  .       4. TYPE OF ANESTHESIA:  General anesthesia with endotracheal intubation    5. PREOPERATIVE DIAGNOSIS:      status post C3-T1 posterior cervical decompression and fusion with Doctor Arjun 2023  Continued intermittent numbness in her right thumb, steady improvement of her gait and unsteadiness  Months of persistent right leg pain, cramping and hyperesthesias generally worse at night refractory to medications and time  Underlying right knee issues, this is managed by Dr. Smith  Underlying diabetes  Facet mediated low back pain with a right L4-5 radiculopathy with  L4-5 spondylolisthesis and moderate right-sided neuroforaminal stenosis at L4-5.  7.  Left-sided foraminal L5-S1 disc protrusion  8.  Failed conservative therapy      6. POSTOPERATIVE DIAGNOSIS: as above      status post C3-T1 posterior cervical decompression and fusion with Doctor Arjun 2023  Continued intermittent numbness in her right thumb, steady improvement of her gait and unsteadiness  Months of persistent right leg pain, cramping and hyperesthesias generally worse at night refractory to medications and time  Underlying right knee issues, this is managed by Dr. Smith  Underlying diabetes  Facet mediated low back pain with a right L4-5 radiculopathy with  L4-5 spondylolisthesis and moderate right-sided neuroforaminal stenosis at L4-5.  7.  Left-sided foraminal L5-S1 disc protrusion  8.  Failed conservative therapy      7. HISTORY: '    5/5/2023    This is a very nice 67-year-old female she did recently undergo a C3-T1 posterior cervical decompression and fusion, this was on 3/20/2023  with Doctor Díaz.  Prior to the surgery she had been suffering from unsteady gait.  She did have some clumsiness in the hands.  She was having some neck pain.  She had moderate stenosis.  We had attempted surgery on her 2 weeks prior.  Unfortunately she was significantly hypotensive during the surgery.  We elected to stop at that time.  She did consequently undergo the subsequent surgery.        Today she is 6 weeks out from surgery, she was seen in conjunction with Doctor Díaz today.  Her  was in attendance at today's visit.  She feels like she is doing quite well today.  She has no neck pain.  She feels like her gait and unsteadiness is improving.  She has continued difficulties with her right knee, this is being managed by Dr. Smith.  She has intermittent numbness or tingling in her right hand which is positional.  She is not taking any medications for pain at this time.  Overall, she is very pleased with her recovery at this point.     6/23/2023  Returns for review.  She has no real neck pain today.  Her arms are doing good.  Over the last 4 weeks she has developed intensive back and right leg symptomatology.  There is generally worse at night.  There is right leg spasms that occur throughout the night.  She does describe hyperesthesias in her right lower extremity.  She started on tizanidine which helps to a degree.  She has been applying magnesium cream which helps as well.  She does have some catching and locking in her right knee although not significantly painful.  She has developed swelling in her right greater than the left lower extremities which she attributes to the amlodipine that she was started on for her blood pressure.  She has follow-up with her primary care next week for this.  Physical therapy assessment is pending in July.  She has been doing aquatic exercise.     7/11/2023  Returns for review, she was seen in conjunction with Doctor Díaz today.  Her sister was in attendance.   Since her last visit she had MRIs of her cervical, thoracic and lumbar spine.  She continues with low back pain/pressure generally worse with prolonged static postures such as sitting or standing.  This does awaken her at night.  She gets associated pain down her right posterior lateral thigh extending into her knee.  She has underlying right knee issues which is being sorted out by Dr. Smith.  Her pain today is 3/10.  Baclofen made her sick.  Tizanidine has dropped her blood pressure.  Cymbalta and meloxicam are helping to a degree.  She has recently started aquatic physical therapy which is helping.     8/18/2023     Patient returns.  Still getting a lot of pain.  She has done PT.  She had facet blocks.  She has had ablations.  She has selective nerve root blocks.  When she sitting up pain control.  When she sits she gets groin pain bilateral buttock pain posterior thigh pain and back pain.  It is very corticated and goes to an 8 out of 10.  I reviewed her imaging.  She has a spinal listhesis.  This is at L4-5.  She has a left-sided foraminal disc at L5-S1.  I repeat decompression and stabilization is on the table.  She will think things over.  EMG howed slightly worse neuropathy.    8. EXAM:      The surgical incision is clean, dry and intact out any overt signs of infection.  Focal tenderness palpation of the bilateral L4-5 and L5-S1 facet joints.  Positive facet loading signs.  Positive straight leg raising sign on the right while seated at 90 degrees.  Strength testing reveals grossly 5/5 strength in the bilateral upper extremities.  Reflexes are brisk and symmetric.  Sensation is intact.  The patient is otherwise neurologically intact.              Last Imaging Result(s):   I independently reviewed and assessed the imaging. I also reviewed all imaging reports.   5/5/2023   4 view cervical x-rays from the [facility] reviewed by myself.  This shows posterior instrumented fusion C3-T1.  There is no  evidence of screw pullout or hardware lucencies.  There is a large fusion mass which appears stable.  I see no evidence of screw pullout or lucencies about the screws.  There is no overt instability between flexion and extension maneuvers.  These are stable postoperative images.     6/23/2023  4 view cervical x-rays from the [facility] reviewed.  This shows instrumented fusion posteriorly C3-T1.  There is no evidence of screw pullout or hardware lucencies.  There is a solid fusion mass extending C3-T1.  There is no instability between flexion and extension maneuvers.  These are stable postoperative images.     7/11/2023  4 view lumbar x-rays from the [facility] were reviewed dated 7/11/2023.  This shows grade 1/2 spondylolisthesis at L4-5.  This does not move significantly between flexion and extension maneuvers.  Otherwise minimal degenerative changes are noted.  Prominent facet arthropathy L4-5 and L5-S1.  Previous x-rays did not show anterolisthesis at L4-5.     Cervical, thoracic and lumbar MRI from [facility] dated 7/8/2023 were reviewed.  Lumbar MRI shows laminectomy defects at L4-S1.  There is grade 1 anterolisthesis at L4-5 with extensive Modic endplate changes.  There is moderate right-sided L4-5 neuroforaminal stenosis with flattening of the exiting L4 nerve root.  Thoracic MRI shows no significant stenosis.  No obvious fractures.  Cervical MRI shows posterior decompression with instrumented fusion from C3-T1 without evidence of complication or residual stenosis.          9. TITLE OF THE PROCEDURE:    Bilateral L4 decompressive laminectomy, bilateral L4-5 foraminotomies and bilateral L4-L5 facetectomies (Tay procedure) (laminectomies for neural decompression, not just for implant placement)  Bilateral L5 decompressive laminectomy and bilateral foraminotomies (laminectomies for neural decompression, not just for implant placement)  Left S1 decompressive laminectomy and  bilateral foraminotomies (laminectomies for neural decompression, not just for implant placement) and complete left L5-S1 facetectomy  Left L4-5 transpedicular decompression of the common thecal sac and exiting L4 nerve roots (laminectomies for neural decompression, not just for implant placement)  Left L4-L5 microdiscectomy.  Left L5-S1 transpedicular decompression of the common thecal sac and exiting L4 nerve roots (laminectomies for neural decompression, not just for implant placement)  Left L5-S1 microdiscectomy.  L4-5-S1 nsegmental fixation using pedicle screws and rods with O-arm navigation  Left L4-5 and L5-S1  Robert osteotomy with removal of all bone from pedicle to pedicle at L4-5 and L5-S1  Placement of a L4-L5 and L5-S1 eexpandible interbody cage  L4-5 and L5-S1 Interbody fusion using local morcellized autograft and InFUSE BMP  L4-5-S1 Posterolateral fusion using local morcellized autograft  Open reduction of L4-5 and L5-S1  spondylolisthesis  O- Arm navigation for screw placement  Microscopic magnification  i/o On-Q Pain catheters in paraspinal musculature  22-this is a revision procedure.  She had a previous decompression.  Consequently there was a lot of scar tissue they.  This took an extra 60 minutes.  The scar tissue had to be taken down.      10. OPERATIVE FINDINGS:  Stenosis as outlined above.  She had a previous laminectomy.  There was a grade 1/2 L4-5 spinal listhesis with moderate bilateral foraminal narrowing.  There is a broad-based left-sided L5-S1 disc protrusion extending into the foramen.  This stenosis is beyond that which was required for placing instrumentation.  She was well decompressed.  As expected there was scar tissue in the lateral recesses.    The degree of compression was  significant. The bone work required was way beyond that which was required for implant placement and I had to perform decompressions as a separate procedure to the bone work required for implant placement.  The stenosis was bilateral and at the levels described and needed to be addressed. Additional bone work was required for placement of the cage but this was distinctly different to the decompressions required to address stenosis.     11. OPERATED LEVELS: L4-5-S1    12. IMPLANTS USED:  Medtronic Voyager screws and rods    L4 6.5 x 50 mm  L5 6.5 x 15 mm  S1 left 6.5 x 15 mm, right 6.5 x 55 mm  70 mm william left 65 mm william right    Medtronic Catalyft expandable interbody cage    L4-5 7 x 27 mm cage  L5-S1 9 x 27 mm cage  XS Ifuse BMP with graft on injectable x2 allograft  Space-D retractor system      13. COMPLICATIONS:  Nil.    14. ESTIMATED BLOOD LOSS:       50  mL    15. OPERATIVE DETAILS:    After fully informed consent, the patient was brought to the operating room. General anesthesia was administered.  The patient was given intravenous antibiotic.  A Dunlap catheter was placed.  The patient was carefully turned prone on the OSI operating table in the Kyaw frame.  All pressure points were padded.      The posterior lumbar region was prepped and draped in a standard fashion.    A right iliac spike was placed.  The o-arm was brought in.  A spin was affected.  I then used localization to perform bilateral Wil[place]ie exposures at the L4-5 and L5-S1 level.  I exposed the lateral facet joints on either side.  I used the arm then to cannulate the L4, L5 and S1 pedicle screws.  In each case these were cannulated, tapped, palpated and pedicle screws were placed.  Screws were stimulated to ensure there was no breach.   AP lateral x-ray was taken to confirm placement.     Initially approached L4-5 on the right side.  He did complete facetectomy at this level.  I used magnification illumination of the space to retracted.  I exposed the common thecal sac.  I took down the ligamentum flavum and expose the exiting and traversing nerve root.  There was a large amount of scar tissue which had been taken down from the previous  laminectomy.    I then performed the decompression using a left-sided approach.  This Space-D retractor was put into place.  Under magnification illumination the microscope I did a complete facetectomy removal of most of the facet joint at L4-5 and L5-S1 on the left.  I exposed the common thecal sac and the exiting and traversing nerve roots.  Great care was taken not to violate the pedicles.  I used a 2 mm Kerrison punch to clean it up and expose the disc space.  Again there was a large amount of scar tissue from previous surgery which had to be taken down with sharp curettes.  Great care was taken not to violate the dura.      With gentle medial retraction of the traversing nerve root, the L4-5 disk space was incised.  I then entered the disk space at an oblique 30-degree angle and using O-arm guidance to ensure that I did violate the anterior disk space, a complete discectomy was affected.  Great care was taken not to violate the annulus.  The disk material was removed with various spreaders, margo and gouges.  The endplates were then decorticated.  I then selected the various trials and at a 30-degree angle with gentle medial retraction of the traversing nerve root and protecting the exiting nerve root, I selected the height and length of the cage that I would place.      Further bone graft was placed anteriorly into the interspace, into the cage as well as posteriorly.     The cage was then impacted into place. This was an expandable cage.  It was expanded until it was finger tight.  Hemostasis obtained.  More bone graft was placed through the  the cage  along with a sliver of BMP.  Using injectable allograft into the displaced after placing the bone graft.  Patient      I then turned to the L5-S1 level.  I performed the decompression using a left-sided approach.  This Space-D retractor was put into place.  Under magnification illumination the microscope I did a complete facetectomy removal of most of the  facet joint at L5-S1 on the left.  I exposed the common thecal sac and the exiting and traversing nerve roots.  Great care was taken not to violate the pedicles.  I used a 2 mm Kerrison punch to clean it up and expose the disc space.  Again there was a large ventral scar tissue from the previous surgery of that today.      With gentle medial retraction of the traversing nerve root, the L5-S1 disk space was incised.  I then entered the disk space at an oblique 30-degree angle and using O-arm guidance to ensure that I did violate the anterior disk space, a complete discectomy was affected.  Great care was taken not to violate the annulus.  The disk material was removed with various spreaders, margo and gouges.  The endplates were then decorticated.  I then selected the various trials and at a 30-degree angle with gentle medial retraction of the traversing nerve root and protecting the exiting nerve root, I selected the height and length of the cage that I would place. Further bone graft was placed anteriorly into the interspace, into the cage as well as posteriorly.  The cage was then impacted into place. This was an expandable cage.  It was expanded until it was finger tight.  Hemostasis obtained.  More bone graft was placed through the  the cage  along with a sliver of BMP.  Using injectable allograft to put further allograft into the displaced.     I also placed bone graft over the decorticated posterolateral gutter on the left from L4-S1.  Hemostasis obtained.  At the end of the cage placements, all the nerve roots were free.  AP and lateral fluoroscopy confirmed satisfactory placement of the cage.  I then went on to complete the rest of the procedure.     Appropriately sized rods were then secured with locking caps to the screws. Open reduction of the spondylolisthesis was affected. Properly sized rods were placed prior to reduction. A final AP lateral x-ray was taken.  All the instrumentation was tightened.  I  placed a combination of Depo-Medrol and Duramorph over the nerve roots.      Hemostasis obtained. Final x-ray showed good placement of the instrumentation satisfactory reduction of the spinal listhesis at L4-5       Final AP and lateral x-ray was taken.  Meticulous hemostasis was obtained.  Vancomycin powder was placed throughout the wound.  1 section subfascial Hemovac was placed.  Two paraspinal On-Q Pain catheters were then placed using direct visualization to ensure they stayed in the paraspinal musculature.  Dermabond was placed over the puncture sites for the On-Q Pain catheters.  The drain was secured using 2-0 silk.  The wound was then closed in multiple layers using 0 Vicryl for the fascia, then 2-0 Vicryl for the subdermis, and then staples for skin.  A dressing was applied.  All counts were correct and all instruments were accounted for.  Hemostasis obtained.     All counts were correct and all instruments accounted for.      16. PROGNOSIS:    The surgery went well.  The patient was moving both lower extremities well at the end of the case.  We will see how things progress.  I would anticipate discharge in 24 hours unless there are issues that require rehabilitation and effecting mobility.  The patient will be asked to abstain from smoking and anti-inflammatories for 3 months.  A brace will be provided and this needs to be worn every time out of bed.  The patient has also been instructed that they need to avoid any bending, lifting, or twisting, and stay away from anti-inflammatories.  The patient was instructed not to shower for the next 72 hours.      Evon Díaz MD, PhD, CRISTIANO        CC:  Roel Rizo, SCOTT.P.R.N.   No ref. provider found er.

## 2023-09-27 NOTE — OR NURSING
Pt arrived via gurney from OR w/ RN and anesthesia. VSS. Report received. Orders reviewed and initiated. Norris c/d/I. Hemovac and ON-Q pump in place.

## 2023-09-27 NOTE — PROGRESS NOTES
1400-Assumed care of patient and received report Low LOWE. Assessment completed.Pt A&Ox 4. Respirations are even and unlabored 2L nasal cannula. Pt denies pain. Pt self administered 2 units through her personal continuous glucose monitoring pump read 238. Medical pt, VS stable, call light and belongings are within reach. POC updated. Released all orders and advanced diet. Initiated post op vitals.  and sister at bedside. Pt educated on room and call light, pt verbalized understanding. Needs met.

## 2023-09-28 VITALS
HEIGHT: 67 IN | SYSTOLIC BLOOD PRESSURE: 125 MMHG | DIASTOLIC BLOOD PRESSURE: 43 MMHG | WEIGHT: 145.28 LBS | OXYGEN SATURATION: 95 % | TEMPERATURE: 98.4 F | BODY MASS INDEX: 22.8 KG/M2 | HEART RATE: 79 BPM | RESPIRATION RATE: 16 BRPM

## 2023-09-28 LAB
ANION GAP SERPL CALC-SCNC: 13 MMOL/L (ref 7–16)
BUN SERPL-MCNC: 14 MG/DL (ref 8–22)
CALCIUM SERPL-MCNC: 8.8 MG/DL (ref 8.5–10.5)
CHLORIDE SERPL-SCNC: 93 MMOL/L (ref 96–112)
CO2 SERPL-SCNC: 20 MMOL/L (ref 20–33)
CREAT SERPL-MCNC: 0.65 MG/DL (ref 0.5–1.4)
ERYTHROCYTE [DISTWIDTH] IN BLOOD BY AUTOMATED COUNT: 41.6 FL (ref 35.9–50)
GFR SERPLBLD CREATININE-BSD FMLA CKD-EPI: 97 ML/MIN/1.73 M 2
GLUCOSE SERPL-MCNC: 131 MG/DL (ref 65–99)
HCT VFR BLD AUTO: 33.8 % (ref 37–47)
HGB BLD-MCNC: 11.4 G/DL (ref 12–16)
MCH RBC QN AUTO: 31.1 PG (ref 27–33)
MCHC RBC AUTO-ENTMCNC: 33.7 G/DL (ref 32.2–35.5)
MCV RBC AUTO: 92.1 FL (ref 81.4–97.8)
PLATELET # BLD AUTO: 231 K/UL (ref 164–446)
PMV BLD AUTO: 10.2 FL (ref 9–12.9)
POTASSIUM SERPL-SCNC: 5.3 MMOL/L (ref 3.6–5.5)
RBC # BLD AUTO: 3.67 M/UL (ref 4.2–5.4)
SODIUM SERPL-SCNC: 126 MMOL/L (ref 135–145)
WBC # BLD AUTO: 7.3 K/UL (ref 4.8–10.8)

## 2023-09-28 PROCEDURE — 700105 HCHG RX REV CODE 258: Performed by: PHYSICIAN ASSISTANT

## 2023-09-28 PROCEDURE — G0378 HOSPITAL OBSERVATION PER HR: HCPCS

## 2023-09-28 PROCEDURE — 700111 HCHG RX REV CODE 636 W/ 250 OVERRIDE (IP): Mod: JZ | Performed by: PHYSICIAN ASSISTANT

## 2023-09-28 PROCEDURE — 80048 BASIC METABOLIC PNL TOTAL CA: CPT

## 2023-09-28 PROCEDURE — A9270 NON-COVERED ITEM OR SERVICE: HCPCS | Performed by: PHYSICIAN ASSISTANT

## 2023-09-28 PROCEDURE — 97166 OT EVAL MOD COMPLEX 45 MIN: CPT

## 2023-09-28 PROCEDURE — 700101 HCHG RX REV CODE 250: Performed by: PHYSICIAN ASSISTANT

## 2023-09-28 PROCEDURE — 97535 SELF CARE MNGMENT TRAINING: CPT

## 2023-09-28 PROCEDURE — 700102 HCHG RX REV CODE 250 W/ 637 OVERRIDE(OP): Performed by: PHYSICIAN ASSISTANT

## 2023-09-28 PROCEDURE — 96367 TX/PROPH/DG ADDL SEQ IV INF: CPT

## 2023-09-28 PROCEDURE — 97161 PT EVAL LOW COMPLEX 20 MIN: CPT

## 2023-09-28 PROCEDURE — 85027 COMPLETE CBC AUTOMATED: CPT

## 2023-09-28 RX ORDER — SODIUM CHLORIDE 1 G/1
1 TABLET ORAL
Status: DISCONTINUED | OUTPATIENT
Start: 2023-09-28 | End: 2023-09-28 | Stop reason: HOSPADM

## 2023-09-28 RX ORDER — OXYCODONE HYDROCHLORIDE AND ACETAMINOPHEN 5; 325 MG/1; MG/1
1 TABLET ORAL EVERY 4 HOURS PRN
Qty: 42 TABLET | Refills: 0 | Status: SHIPPED | OUTPATIENT
Start: 2023-09-28 | End: 2023-10-05

## 2023-09-28 RX ORDER — GABAPENTIN 300 MG/1
300 CAPSULE ORAL 3 TIMES DAILY
Status: DISCONTINUED | OUTPATIENT
Start: 2023-09-28 | End: 2023-09-28 | Stop reason: HOSPADM

## 2023-09-28 RX ORDER — CYCLOBENZAPRINE HCL 10 MG
10 TABLET ORAL EVERY 8 HOURS PRN
Qty: 45 TABLET | Refills: 0 | Status: SHIPPED | OUTPATIENT
Start: 2023-09-28 | End: 2023-11-16

## 2023-09-28 RX ADMIN — CHOLECALCIFEROL TAB 125 MCG (5000 UNIT) 5000 UNITS: 125 TAB at 05:12

## 2023-09-28 RX ADMIN — ACETAMINOPHEN 1000 MG: 500 TABLET, FILM COATED ORAL at 12:20

## 2023-09-28 RX ADMIN — CEFAZOLIN SODIUM 1 G: 1 INJECTION, SOLUTION INTRAVENOUS at 05:17

## 2023-09-28 RX ADMIN — ACETAMINOPHEN 1000 MG: 500 TABLET, FILM COATED ORAL at 00:27

## 2023-09-28 RX ADMIN — LEVOTHYROXINE SODIUM 100 MCG: 0.1 TABLET ORAL at 05:12

## 2023-09-28 RX ADMIN — ACETAMINOPHEN 1000 MG: 500 TABLET, FILM COATED ORAL at 05:11

## 2023-09-28 RX ADMIN — CYCLOBENZAPRINE 10 MG: 10 TABLET, FILM COATED ORAL at 02:57

## 2023-09-28 RX ADMIN — DOCUSATE SODIUM 100 MG: 100 CAPSULE, LIQUID FILLED ORAL at 05:12

## 2023-09-28 RX ADMIN — SODIUM CHLORIDE 1 G: 1 TABLET ORAL at 12:20

## 2023-09-28 RX ADMIN — OMEPRAZOLE 20 MG: 20 CAPSULE, DELAYED RELEASE ORAL at 05:12

## 2023-09-28 RX ADMIN — GABAPENTIN 300 MG: 300 CAPSULE ORAL at 14:26

## 2023-09-28 RX ADMIN — GABAPENTIN 300 MG: 300 CAPSULE ORAL at 08:39

## 2023-09-28 RX ADMIN — POTASSIUM CHLORIDE AND SODIUM CHLORIDE: 900; 150 INJECTION, SOLUTION INTRAVENOUS at 02:59

## 2023-09-28 RX ADMIN — OXYCODONE HYDROCHLORIDE 10 MG: 10 TABLET ORAL at 12:21

## 2023-09-28 RX ADMIN — METHOCARBAMOL 1000 MG: 100 INJECTION, SOLUTION INTRAMUSCULAR; INTRAVENOUS at 08:17

## 2023-09-28 RX ADMIN — METHOCARBAMOL 1000 MG: 100 INJECTION, SOLUTION INTRAMUSCULAR; INTRAVENOUS at 14:30

## 2023-09-28 RX ADMIN — SODIUM CHLORIDE 1 G: 1 TABLET ORAL at 07:53

## 2023-09-28 RX ADMIN — Medication 1 APPLICATOR: at 05:12

## 2023-09-28 RX ADMIN — OXYCODONE HYDROCHLORIDE 10 MG: 10 TABLET ORAL at 08:18

## 2023-09-28 ASSESSMENT — COGNITIVE AND FUNCTIONAL STATUS - GENERAL
SUGGESTED CMS G CODE MODIFIER MOBILITY: CJ
WALKING IN HOSPITAL ROOM: A LITTLE
CLIMB 3 TO 5 STEPS WITH RAILING: A LITTLE
SUGGESTED CMS G CODE MODIFIER DAILY ACTIVITY: CH
MOBILITY SCORE: 22
DAILY ACTIVITIY SCORE: 24

## 2023-09-28 ASSESSMENT — GAIT ASSESSMENTS
DISTANCE (FEET): 150
ASSISTIVE DEVICE: FRONT WHEEL WALKER
GAIT LEVEL OF ASSIST: STANDBY ASSIST
DEVIATION: STEP TO;DECREASED HEEL STRIKE

## 2023-09-28 ASSESSMENT — PAIN DESCRIPTION - PAIN TYPE
TYPE: ACUTE PAIN;SURGICAL PAIN

## 2023-09-28 ASSESSMENT — ACTIVITIES OF DAILY LIVING (ADL): TOILETING: INDEPENDENT

## 2023-09-28 NOTE — PROGRESS NOTES
4 Eyes Skin Assessment Completed by Jayshree, RN, Lashonda, RN and Suzie RN.    Head WDL  Ears WDL  Nose WDL  Mouth WDL  Neck WDL  Breast/Chest WDL  Shoulder Blades WDL  Spine: surgical incision to lumbar spine, dressing in place with hemovac  (R) Arm/Elbow/Hand WDL  (L) Arm/Elbow/Hand WDL  Abdomen WDL  Groin WDL  Scrotum/Coccyx/Buttocks WDL  (R) Leg WDL  (L) Leg WDL  (R) Heel/Foot/Toe WDL  (L) Heel/Foot/Toe WDL          Devices In Places Blood Pressure Cuff, Pulse Ox, and Nasal Cannula      Interventions In Place Pillows    Possible Skin Injury No    Pictures Uploaded Into Epic N/A  Wound Consult Placed N/A  RN Wound Prevention Protocol Ordered Yes

## 2023-09-28 NOTE — CARE PLAN
The patient is Stable - Low risk of patient condition declining or worsening    Shift Goals  Clinical Goals: Pain mangement, incsion care, hemodynamic stability, ambulation, pulmonary hygiene  Patient Goals: Pain mangement, eat, rest  Family Goals: Remain updated on POC      Problem: Pain - Standard  Goal: Alleviation of pain or a reduction in pain to the patient’s comfort goal  Outcome: Progressing  Note: Educated patient on the use of 1-10 pain scale and use of pain descriptors. Administered pain medication when needed per MAR. Non-pharmacological methods for pain control in place such as rest, repositioning, and enforcing a calm and conductive environment.      Problem: Fall Risk  Goal: Patient will remain free from falls  Outcome: Progressing  Note: Bed in lowest and locked position, call light is within reach, bed alarm is on, treaded socks in place. Patient oriented to room, plan of care and educated to use call light for assistance. Patient educated to not get out of bed without staff present.      Problem: Knowledge Deficit - Standard  Goal: Patient and family/care givers will demonstrate understanding of plan of care, disease process/condition, diagnostic tests and medications  Outcome: Progressing  Note: Patient educated on plan and goals of care and disease process. Education provided on medications, procedures, and equipment. Will continue to re-inforce education when required. All questions and concerns answered at this time.

## 2023-09-28 NOTE — CARE PLAN
The patient is Stable - Low risk of patient condition declining or worsening    Shift Goals  Clinical Goals: Pain management, N/V control, monitor drain output  Patient Goals: Pain management, rest, N/V control  Family Goals: Not at bedside    Progress made toward(s) clinical / shift goals:    Problem: Pain - Standard  Goal: Alleviation of pain or a reduction in pain to the patient’s comfort goal  Outcome: Progressing  Note: Patient's pain assesed with the 1-10 scale. Patient states pain is manageable with ordered analgesics and nonpharmacological interventions. Patient educated on medications given and potential adverse effects.      Problem: Fall Risk  Goal: Patient will remain free from falls  Outcome: Progressing  Note: Patient educated on fall preventions. Patient calls appropriately for assistance. Patient refused to mobilize for NOC RN due to nausea and vomiting. Patient educated on benefits of mobilization.      Problem: Knowledge Deficit - Standard  Goal: Patient and family/care givers will demonstrate understanding of plan of care, disease process/condition, diagnostic tests and medications  Outcome: Progressing  Note: Patient updated on plan of care. Education provided on medications given. Questions from patient answered.        Patient is not progressing towards the following goals:

## 2023-09-28 NOTE — PROGRESS NOTES
Postoperative check in. Resting comfortably in pain is controlled. Moving all extremities. Wound is flat with no obvious drainage. My back drains are in place.    Plan:  One. Routine postoperative orders.  Two. Will try to discharge her tomorrow if she’s remain stable

## 2023-09-28 NOTE — PROGRESS NOTES
Neurosurgery Progress Note    Subjective:  POD #1 seen with Dr. Díaz  Pain controlled w/ orals  Mobilizing  Voiding  Eating and Drinking   No N/V  Some muscle spasm this am    Exam:  Wound: c/D/I  Na+ 126  VSS  Labs otherwise stable  LE motor 5/5 throughout bilaterally      BP  Min: 121/51  Max: 145/80  Pulse  Av.7  Min: 72  Max: 93  Resp  Av.8  Min: 12  Max: 18  Temp  Av.7 °C (98 °F)  Min: 36.1 °C (97 °F)  Max: 37.3 °C (99.1 °F)  Monitored Temp 2  Av.6 °C (97.9 °F)  Min: 36.6 °C (97.9 °F)  Max: 36.6 °C (97.9 °F)  SpO2  Av.4 %  Min: 95 %  Max: 98 %    No data recorded    Recent Labs     23  0406   WBC 7.3   RBC 3.67*   HEMOGLOBIN 11.4*   HEMATOCRIT 33.8*   MCV 92.1   MCH 31.1   MCHC 33.7   RDW 41.6   PLATELETCT 231   MPV 10.2     Recent Labs     23  0406   SODIUM 126*   POTASSIUM 5.3   CHLORIDE 93*   CO2 20   GLUCOSE 131*   BUN 14   CREATININE 0.65   CALCIUM 8.8               Intake/Output                         23 - 2359 23 - 23 0659      Total  Total                 Intake    P.O.  30  -- 30  --  -- --    P.O. 30 -- 30 -- -- --    Total Intake 30 -- 30 -- -- --       Output    Drains  0  20 20  --  -- --    Output (mL) (Closed/Suction Drain Midline;Posterior Back Hemovac) 0 20 20 -- -- --    Stool  --  -- --  --  -- --    Number of Times Stooled 1 x -- 1 x -- -- --    Total Output 0 20 20 -- -- --       Net I/O     30 -20 10 -- -- --              Intake/Output Summary (Last 24 hours) at 2023 0720  Last data filed at 2023 0500  Gross per 24 hour   Intake 30 ml   Output 20 ml   Net 10 ml             sodium chloride  1 g TID WITH MEALS    ezetimibe  10 mg Nightly    gabapentin  400 mg QHS    lisinopril  20 mg Nightly    rosuvastatin  40 mg QHS    insulin infusion pump   Continuous    Pharmacy Consult Request  1 Each PHARMACY TO DOSE    MD ALERT...DO NOT ADMINISTER NSAIDS or ASPIRIN unless  ORDERED By Neurosurgery  1 Each PRN    docusate sodium  100 mg BID    senna-docusate  1 Tablet Nightly    senna-docusate  1 Tablet Q24HRS PRN    polyethylene glycol/lytes  1 Packet BID PRN    magnesium hydroxide  30 mL QDAY PRN    bisacodyl  10 mg Q24HRS PRN    sodium phosphate  1 Each Once PRN    0.9 % NaCl with KCl 20 mEq 1,000 mL   Continuous    acetaminophen  1,000 mg Q6HRS    Followed by    [START ON 10/2/2023] acetaminophen  1,000 mg Q6HRS PRN    oxyCODONE immediate-release  5 mg Q3HRS PRN    Or    oxyCODONE immediate-release  10 mg Q3HRS PRN    Or    HYDROmorphone  0.5 mg Q3HRS PRN    diphenhydrAMINE  25 mg Q6HRS PRN    Or    diphenhydrAMINE  25 mg Q6HRS PRN    ondansetron  4 mg Q4HRS PRN    ondansetron  4 mg Q4HRS PRN    cyclobenzaprine  10 mg Q8HRS PRN    hydrALAZINE  10 mg Q HOUR PRN    benzocaine-menthol  1 Lozenge Q2HRS PRN    vitamin D3  5,000 Units DAILY    dextrose bolus  25 g Q15 MIN PRN    levothyroxine  100 mcg Once per day on Mon Tue Wed Thu Fri Sat    And    [START ON 10/1/2023] levothyroxine  50 mcg Q SUNDAY    omeprazole  20 mg DAILY    Nozin nasal  swab  1 Applicator BID    ropivacaine 0.2 % (Naropin) 270 mL in On-Q Pump infusion   Continuous       Assessment and Plan:  Hospital day #2  POD #1    Hyponatremia-give salt tabs    Plan:  Na+ tabs (now and on discharge)   Mobilize with PT/OT  LSO brace when mobilizing   D/C hemovac on OnQ today at 1300hrs   D/C home today at 1300hrs

## 2023-09-28 NOTE — PROGRESS NOTES
Patient to be discharged today - patient aware and agreeable to plan. D/c instructions reviewed with patient - ?'s/concerns answered. 1 piv removed, no meds to beds.

## 2023-09-28 NOTE — THERAPY
Physical Therapy   Initial Evaluation     Patient Name: Ines Long  Age:  68 y.o., Sex:  female  Medical Record #: [id number]  Today's Date: 9/28/2023     Precautions  Precautions: (P) Fall Risk;Spinal / Back Precautions ;Lumbosacral Orthosis  Comments: (P) LSO when OOB > 5 mins    Assessment  Patient is 68 y.o. female who was seen s/p L4-S1 decompression/fusion and has orders to wear LSO when OOB > 5 mins. Pt was agreeable to therapy evaluation and pt was able to demonstrate safe upright mobility with FWW use. Pt was able to verbalize spinal precautions as she was able to learn them during OT evaluation. Pt was able to return demo correct mechanics with sit<>stands and transfers and able to maintain spinal precautions. Pt was able to demonstrate safe functional ambulation with household distances with FWW use. Pt demonstrates with impaired gait mechanics with dec heel strike, dec knee extension during loading phase, and crouched like gait. However, pt state this is baseline due to a hx of low back concerns/deficits. Pt was able to go up/down 1 step with FWW use and demonstrated with safe mechanics. Pt is in no acute skilled PT needs at this time, anticipate pt to d/c home with spouse support and recs for OP therapy services.     Plan    Physical Therapy Initial Treatment Plan   Duration: (P) Evaluation only    DC Equipment Recommendations: (P) Front-Wheel Walker  Discharge Recommendations: (P) Recommend outpatient physical therapy services to address higher level deficits    Objective       09/28/23 1415   Initial Contact Note    Initial Contact Note Order Received and Verified, Evaluation Only - Patient Does Not Require Further Acute Physical Therapy at this Time.  However, May Benefit from Post Acute Therapy for Higher Level Functional Deficits.   Precautions   Precautions Fall Risk;Spinal / Back Precautions ;Lumbosacral Orthosis   Comments LSO when OOB > 5 mins   Pain 0 - 10 Group   Location Back    Location Orientation Right;Left   Description Cramping;Deep;Exhausting;Gnawing   Therapist Pain Assessment During Activity;Prior to Activity;Post Activity;Nurse Notified;5   Prior Living Situation   Prior Services Home-Independent   Housing / Facility 1 Story House   Steps Into Home 0   Steps In Home 1   Bathroom Set up Walk In Shower;Shower Chair   Equipment Owned Grab Bar(s) By Toilet;Raised Toilet Seat With Arms;Single Point Cane;Front-Wheel Walker   Lives with - Patient's Self Care Capacity Spouse   Comments spouse is supportive and can asisst upon d/c to home   Prior Level of Functional Mobility   Bed Mobility Independent   Transfer Status Independent   Ambulation Independent   Ambulation Distance   (community)   Assistive Devices Used None   Stairs Independent   History of Falls   History of Falls No   Cognition    Cognition / Consciousness WDL   Level of Consciousness Alert   Comments pleasant/cooperative   Passive ROM Upper Body   Passive ROM Upper Body WDL   Active ROM Upper Body   Active ROM Upper Body  WDL   Strength Upper Body   Upper Body Strength  WDL   Sensation Upper Body   Upper Extremity Sensation  WDL   Upper Body Muscle Tone   Upper Body Muscle Tone  WDL   Passive ROM Lower Body   Passive ROM Lower Body X   Comments presents with limited knee extension in B LE, however, this is baseline per pt due to a hx of low back issues/concerns   Active ROM Lower Body    Active ROM Lower Body  X   Comments same as above, however, able to demo functional AROM   Strength Lower Body   Lower Body Strength  X   Comments pt demonstrates with 5/5 strength functional, however, due to hx of low back issues pt states strength has not been what it has been   Sensation Lower Body   Lower Extremity Sensation   WDL   Lower Body Muscle Tone   Lower Body Muscle Tone  WDL   Neurological Concerns   Neurological Concerns Yes   Comments given hx of low back concerns with limited knee extension   Coordination Upper Body    Coordination WDL   Coordination Lower Body    Coordination Lower Body  WDL   Balance Assessment   Sitting Balance (Static) Good   Sitting Balance (Dynamic) Fair +   Standing Balance (Static) Fair   Standing Balance (Dynamic) Fair   Weight Shift Sitting Good   Weight Shift Standing Fair   Comments w/fww use   Bed Mobility    Supine to Sit Supervised   Sit to Supine Supervised   Scooting Supervised   Comments HOB flat and rails down   Gait Analysis   Gait Level Of Assist Standby Assist   Assistive Device Front Wheel Walker   Distance (Feet) 150   # of Times Distance was Traveled 1   Deviation Step To;Decreased Heel Strike   # of Stairs Climbed 2   Level of Assist with Stairs Contact Guard Assist   Weight Bearing Status fwb   Functional Mobility   Sit to Stand Supervised   Bed, Chair, Wheelchair Transfer Supervised   Transfer Method Stand Step   Mobility sit<>stand, ambulation, steps, back to bed supine   How much difficulty does the patient currently have...   Turning over in bed (including adjusting bedclothes, sheets and blankets)? 4   Sitting down on and standing up from a chair with arms (e.g., wheelchair, bedside commode, etc.) 4   Moving from lying on back to sitting on the side of the bed? 4   How much help from another person does the patient currently need...   Moving to and from a bed to a chair (including a wheelchair)? 4   Need to walk in a hospital room? 3   Climbing 3-5 steps with a railing? 3   6 clicks Mobility Score 22   Activity Tolerance   Sitting in Chair functional   Sitting Edge of Bed 2 mins   Standing 9 mins   Comments no adverse events noted   Edema / Skin Assessment   Edema / Skin  Not Assessed   Education Group   Education Provided Role of Physical Therapist;Stair Training;Spine Precautions   Spine Precautions Patient Response Patient;Acceptance;Explanation;Demonstration;Verbal Demonstration;Action Demonstration;Handout   Role of Physical Therapist Patient Response  Patient;Acceptance;Demonstration;Explanation;Verbal Demonstration;Action Demonstration   Stair Training Patient Response Patient;Acceptance;Explanation;Demonstration;Verbal Demonstration;Action Demonstration   Physical Therapy Initial Treatment Plan    Duration Evaluation only   Anticipated Discharge Equipment and Recommendations   DC Equipment Recommendations Front-Wheel Walker   Discharge Recommendations Recommend outpatient physical therapy services to address higher level deficits   Interdisciplinary Plan of Care Collaboration   IDT Collaboration with  Nursing   Patient Position at End of Therapy In Bed;Call Light within Reach;Family / Friend in Room;Phone within Reach;Tray Table within Reach   Collaboration Comments aware of visit and recs   Session Information   Date / Session Number  9/28 eval only

## 2023-09-28 NOTE — PROGRESS NOTES
Postop check  Pain controlled  Wound flat  No leg pain.  Drain working.    Motor 5/5    Imp: Looks good. No issues.

## 2023-09-28 NOTE — DISCHARGE INSTRUCTIONS
Spinal Fusion, Adult, Care After  This sheet gives you information about how to care for yourself after your procedure. Your doctor may also give you more instructions. If you have problems or questions, contact your doctor.  What can I expect after the procedure?  After the procedure, it is common to have:  Back pain and stiffness.  Pain in the area around your cut from surgery (incision).  Follow these instructions at home:  Medicines    Take over-the-counter and prescription medicines only as told by your doctor. These include any medicines for pain or medicines to thin your blood (anticoagulants).  If you were prescribed an antibiotic medicine, take it as told by your doctor. Do not stop it even if you start to feel better.  If told, take steps to prevent problems with pooping (constipation). You may need to:  Drink enough fluid to keep your pee (urine) pale yellow.  Take medicines. You will be told what medicines to take.  Eat foods that are high in fiber. These include beans, whole grains, and fresh fruits and vegetables.  Limit foods that are high in fat and sugar. These include fried or sweet foods.  Ask your doctor if you should avoid driving or using machines while you are taking your medicine.  Bleeding precautions  If you are taking blood thinners:  Talk with your doctor before taking any medicines that have aspirin or NSAIDs, such as ibuprofen.  Take medicines exactly as told. Take them at the same time each day.  Avoid doing things that could hurt or bruise you. Take action to prevent falls.  Wear an alert bracelet or carry a card that shows you are taking blood thinners.  If you have a brace:  Wear the brace as told by your doctor. Take it off only as told by your doctor.  Check the skin around the brace every day. Tell your doctor if you have any concerns.  Loosen the brace if your legs or toes:  Tingle.  Become numb.  Turn cold and blue.  Keep the brace clean.  If the brace is not waterproof:  Do  not let it get wet.  Cover it with a watertight covering when you take a bath or shower.  Managing pain, stiffness, and swelling  If told, put ice on the affected area. To do this:  If you have a removable brace, take it off as told by your doctor.  Put ice in a plastic bag.  Place a towel between your skin and the bag.  Leave the ice on for 20 minutes, 2-3 times a day.  Take off the ice if your skin turns bright red. This is very important. If you cannot feel pain, heat, or cold, you have a greater risk of damage to the area.    Incision care    Follow instructions from your doctor about how to take care of your incision. Make sure you:  Wash your hands with soap and water for at least 20 seconds before and after you change your bandage. If you cannot use soap and water, use hand .  Change your bandage.  Leave stitches or skin glue in place for at least 2 weeks.  Leave tape strips alone unless you are told to take them off. You may trim the edges of the tape strips if they curl up.  Keep your incision clean and dry.  Do not take baths, swim, or use a hot tub. Ask your doctor about taking showers or sponge baths.  Check your incision every day for signs of infection. Check for:  More redness, swelling, or pain.  Fluid or blood.  Warmth.  Pus or a bad smell.  Activity    Rest as told by your doctor.  Get up to take short walks every 1 to 2 hours. Ask for help if you feel weak or unsteady.  Follow instructions from your doctor about how to move. Use good posture to help your spine heal.  Do not lift anything at all, or anything that is heavier than the limit you are told, until your doctor says that it is safe.  Do not twist or bend at the waist until your doctor says it is okay.  Protect your back. To do this:  Do not make pushing and pulling motions.  Do not lift anything over your head.  Do not sit or lie down in the same position for a long time.  Do exercises as told by your doctor.  General  instructions  Do not drive until your doctor says it is okay.  Wear compression stockings as told by your doctor.  Do not take out your drain tube. Follow instructions from your doctor about how to take care of it.  Do not smoke or use any products that contain nicotine or tobacco. If you need help quitting, ask your doctor.  Keep all follow-up visits.  Contact a doctor if:  Your pain gets worse or does not get better with medicine.  Your legs become painful, swollen, red, or warm to the touch.  You have any of these signs of infection in your incision:  More redness, swelling, or pain.  Fluid or blood.  Warmth.  Pus or a bad smell.  You have a fever.  You vomit or you feel like you may vomit.  You have new or worse weakness or loss of feeling (numbness) in your legs.  You cannot control when you poop or pee.  Get help right away if:  Your pain is very bad.  You have a headache that is worse when you are sitting or standing.  You have chest pain.  You have trouble breathing.  These symptoms may be an emergency. Get help right away. Call your local emergency services (911 in the [place]  Do not wait to see if the symptoms will go away.  Do not drive yourself to the hospital.  Summary  After the procedure, it is common to have pain in your back and pain in your incision.  Putting ice on the area and taking pain medicines may help to control the pain. Follow directions from your doctor.  Rest and protect your back as much as possible.  Do not twist or bend at the waist.  Rest, but get up to take short walks every 1-2 hours.  This information is not intended to replace advice given to you by your health care provider. Make sure you discuss any questions you have with your health care provider.  Document Revised: 04/07/2021 Document Reviewed: 04/07/2021  Elsevier Patient Education © 2023 [employer]

## 2023-09-28 NOTE — THERAPY
Occupational Therapy   Initial Evaluation     Patient Name: Ines Long  Age:  68 y.o., Sex:  female  Medical Record #: [id number]  Today's Date: 9/28/2023     Precautions: Fall Risk, Spinal / Back Precautions , Lumbosacral Orthosis  Comments: LSO when OOB>5min    Assessment  Patient is a very pleasant 69 yo female s/p L4-L5 and L5-S1 redo fusion with hx of lumbar and cervical spine sx seen for OT evaluation. Pt with baseline balance deficits 2/2 low back concerns, reports no hx of falls. Pt demonstrated supervision level for low body dressing, donning/doffing LSO, standing G/H, and ADLtxfs with mod verbal cues for spinal precautions. Provided education to pt and spouse regarding ADLs while maintaining neutral spine position, brace wear and care, compensatory strategies for IADLs, pacing of activities, and recommended AD. Pt and spouse receptive and verbalized understanding and agreement. Spouse able to assist during post op recovery period. No further OT needs indicated at this time.       Plan    Occupational Therapy Initial Treatment Plan   Duration: (P) Evaluation only    DC Equipment Recommendations: (P) None  Discharge Recommendations: (P) Anticipate that the patient will have no further occupational therapy needs after discharge from the hospital        09/28/23 1115   Prior Living Situation   Prior Services Home-Independent   Housing / Facility 1 Story House   Bathroom Set up Walk In Shower;Shower Chair   Equipment Owned Front-Wheel Walker;Tub / Shower Seat;Raised Toilet Seat With Arms   Lives with - Patient's Self Care Capacity Spouse   Prior Level of ADL Function   Self Feeding Independent   Grooming / Hygiene Independent   Bathing Independent   Dressing Independent   Toileting Independent   Prior Level of IADL Function   Medication Management Independent   Laundry Requires Assist   Kitchen Mobility Independent   Finances Independent   Home Management Independent   History of Falls   History of  Falls No   Date of Last Fall   (declines hx of falls)   Precautions   Precautions Fall Risk;Spinal / Back Precautions ;Lumbosacral Orthosis   Comments LSO when OOB>5min   Cognition    Cognition / Consciousness WDL   Comments pleasant and receptive   Active ROM Upper Body   Active ROM Upper Body  WDL   Strength Upper Body   Upper Body Strength  WDL   Balance Assessment   Sitting Balance (Static) Fair   Sitting Balance (Dynamic) Fair   Standing Balance (Static) Fair -   Standing Balance (Dynamic) Fair -   Weight Shift Sitting Fair   Weight Shift Standing Fair   Comments with FWW   Bed Mobility    Supine to Sit Supervised   Scooting Supervised   ADL Assessment   Grooming Supervision;Standing   Upper Body Dressing Supervision   Lower Body Dressing Supervision   Toileting Supervision   How much help from another person does the patient currently need...   Putting on and taking off regular lower body clothing? 4   Bathing (including washing, rinsing, and drying)? 4   Toileting, which includes using a toilet, bedpan, or urinal? 4   Putting on and taking off regular upper body clothing? 4   Taking care of personal grooming such as brushing teeth? 4   Eating meals? 4   6 Clicks Daily Activity Score 24   Functional Mobility   Sit to Stand Supervised   Bed, Chair, Wheelchair Transfer Supervised   Toilet Transfers Supervised   Patient / Family Goals   Patient / Family Goal #1 to go home   Education Group   Education Provided Spinal Precautions   Role of Occupational Therapist Patient Response Patient;Acceptance;Explanation   Spinal Precautions Patient Response Patient;Acceptance;Explanation;Handout   Occupational Therapy Initial Treatment Plan    Duration Evaluation only   Anticipated Discharge Equipment and Recommendations   DC Equipment Recommendations None   Discharge Recommendations Anticipate that the patient will have no further occupational therapy needs after discharge from the hospital

## 2023-09-28 NOTE — DISCHARGE SUMMARY
Discharge Summary    CHIEF COMPLAINT ON ADMISSION  No chief complaint on file.      Reason for Admission  Neurogenic claudication due to lumbar stenosis  L4-L5 and L5-S1 redo minimally invasive posterior lumbar fusion    Admission Date  9/27/2023    CODE STATUS  Full Code    HPI & HOSPITAL COURSE  This is a 68 y.o. female here with multilevel lumbar spinal stenosis.  She had previous lumbar laminectomy with ongoing lumbar radiculopathy type symptoms.  She had a spondylolisthesis at L4-L5.  She had MRI and x-ray imaging which demonstrated stenosis at L4-L5 and L5-S1.  She underwent multiple conservative therapies with ongoing symptoms.  She was then offered the above-mentioned surgery which she consented to.    She underwent this operation without complication.  On postoperative day #1 she did have some spasms in her low back and right leg.  She was giving IV muscle relaxers.  She began to mobilize with physical therapy.  She was eating, drinking, voiding.  She has chronic hyponatremia.  She was hyponatremic on postoperative day #1 and was given salt tabs.  At that time was determined she met criteria for discharge and was discharged home in stable condition.    No notes on file    Therefore, she is discharged in good and stable condition to home with close outpatient follow-up.    The patient recovered much more quickly than anticipated on admission.    Discharge Date  9/28/2023    FOLLOW UP ITEMS POST DISCHARGE  Follow-up with Dr. Díaz in 2 and 6 weeks  No NSAIDs x3 months  LSO brace when she is up and about  She may shower in 2 days but does not submerge her wound  No activities more strenuous than walking  Continue salt tabs on discharge.  She has prescription at home.    DISCHARGE DIAGNOSES  Principal Problem:    Spondylolisthesis, lumbar region (POA: Unknown)  Active Problems:    Neurogenic claudication due to lumbar spinal stenosis (POA: Unknown)      Overview: Added automatically from request for surgery  [id number]    Spinal stenosis of lumbar region with radiculopathy (POA: Yes)  Resolved Problems:    * No resolved hospital problems. *      FOLLOW UP  Future Appointments   Date Time Provider Department Center   10/11/2023 10:30 AM FRANK [facility]   11/9/2023 10:30 [facility]   11/16/2023 10:20 AM SOBIA [address]   2/13/2024  9:00 AM LAB MIA OGDEN None   2/27/2024 10:20 AM JANET CifuentesED None     No follow-up provider specified.    MEDICATIONS ON DISCHARGE     Medication List        START taking these medications        Instructions   oxyCODONE-acetaminophen 5-325 MG Tabs  Commonly known as: Percocet   Take 1 Tablet by mouth every four hours as needed for Moderate Pain for up to 7 days.  Dose: 1 Tablet            CHANGE how you take these medications        Instructions   cyclobenzaprine 10 mg Tabs  What changed:   medication strength  how much to take  when to take this  Commonly known as: Flexeril   Take 1 Tablet by mouth every 8 hours as needed for Muscle Spasms.  Dose: 10 mg     ezetimibe 10 MG Tabs  What changed: when to take this  Commonly known as: Zetia   Take 1 Tablet by mouth every day.  Dose: 10 mg            CONTINUE taking these medications        Instructions   Fiasp 100 UNIT/ML Soln  Generic drug: Insulin Aspart (w/Niacinamide)   Inject 2.2 Units as directed one time.  Dose: 2.2 Units     gabapentin 100 MG Caps  Commonly known as: Neurontin   Take 400 mg by mouth at bedtime.  Dose: 400 mg     insulin infusion pump Rosibel   Inject  under the skin continuous. Patient's own SQ insulin pump    Type of Insulin: Fiasp  Last change of tubing: 3/19/2023 - Change tubing and site every 72 hours    Dosing:  Basal rate:   0000 - 0329 = 0.5 units/hr   0330 - 1129 = 0.85 units/hr   1130 - 1359 = 0.5 units/hr              1400 - 1759 = 0.45 units/hr              1800 - 2359 = 0.5 units/hr    Bolus ratio:   1  "unit : 12 g carbohydrate at  (breakfast, lunch, dinner, snacks)      Correction ratio:    1 units for every 50 over 150 mg/dL    Disconnect pump if patient becomes hypoglycemic and altered.     Levoxyl 100 MCG Tabs  Generic drug: levothyroxine   Take  mcg by mouth every morning on an empty stomach. Takes 1/2 tab on Sundays  Dose:  mcg     lisinopril 20 MG Tabs  Commonly known as: Prinivil   Take 20 mg by mouth every evening.  Dose: 20 mg     pantoprazole 40 MG Tbec  Commonly known as: Protonix   Take 1 Tablet by mouth every day.  Dose: 40 mg     Praluent 150 MG/ML Soaj  Generic drug: Alirocumab   Doctor's comments: This Rx has been ordered by a pharmacist working under a collaborative practice agreement.  Inject 150 mg under the skin every 14 days.  Dose: 150 mg     rosuvastatin 40 MG tablet  Commonly known as: Crestor   Doctor's comments: Please contact PCP for further medication refills. Thank you  Take 1 Tablet by mouth at bedtime.  Dose: 40 mg     sodium chloride 1 GM Tabs  Commonly known as: Salt   Take 1 g by mouth every day.  Dose: 1 g     VITAMIN D-3 PO   Take 5,000 mg by mouth every evening.  Dose: 5,000 mg            STOP taking these medications      acetaminophen 500 MG Tabs  Commonly known as: Tylenol     CALCIUM 600 PO     CRANBERRY PO     ELDERBERRY PO     MULTIVITAMIN PO     OMEGA-3 FATTY ACIDS PO     VITAMIN B COMPLEX PO              Allergies  Allergies   Allergen Reactions    Ceclor [Cefaclor] Hives and Swelling    Augmentin Hives, Diarrhea and Vomiting     Fever blisters   Sickness lasts forever    Naproxen      Face Swells     Tape Rash     \"Certain band aids\"  PAPER TAPE OKAY/ Tegaderm ok    Amlodipine Swelling     5mg = swelling     Amoxicillin-Pot Clavulanate      Other reaction(s): Not available    Cefaclor Hives and Swelling       DIET  Orders Placed This Encounter   Procedures    Diet Order Diet: Consistent CHO (Diabetic) (carb count, mediterrean); Nutrient modifications: " (optional): Low Potassium     Standing Status:   Standing     Number of Occurrences:   1     Order Specific Question:   Diet:     Answer:   Consistent CHO (Diabetic) [4]     Comments:   carb count, mediterrean     Order Specific Question:   Nutrient modifications: (optional)     Answer:   Low Potassium [11]       ACTIVITY  As tolerated.  Weight bearing as tolerated    CONSULTATIONS  None    PROCEDURES  Redo minimally invasive L4-L5, L5-S1 lumbar laminectomy/fusion    LABORATORY  Lab Results   Component Value Date    SODIUM 126 (L) 09/28/2023    POTASSIUM 5.3 09/28/2023    CHLORIDE 93 (L) 09/28/2023    CO2 20 09/28/2023    GLUCOSE 131 (H) 09/28/2023    BUN 14 09/28/2023    CREATININE 0.65 09/28/2023        Lab Results   Component Value Date    WBC 7.3 09/28/2023    HEMOGLOBIN 11.4 (L) 09/28/2023    HEMATOCRIT 33.8 (L) 09/28/2023    PLATELETCT 231 09/28/2023        Total time of the discharge process exceeds 30 minutes.

## 2023-09-28 NOTE — PROGRESS NOTES
Assumed care of patient and received report from Lashonda LOWE. Assessment completed.Pt A&Ox 4. Respirations are even and unlabored on room air . Pt reports severe pain and muscle spams going down her legs. Reports 10/10 pain. Medication per MAR. Medical pt, VS stable, call light and belongings are within reach. POC updated.  at bedside. Pt educated on room and call light, pt verbalized understanding. Needs met.

## 2023-10-04 ENCOUNTER — HOSPITAL ENCOUNTER (OUTPATIENT)
Dept: LAB | Facility: MEDICAL CENTER | Age: 68
End: 2023-10-04
Attending: STUDENT IN AN ORGANIZED HEALTH CARE EDUCATION/TRAINING PROGRAM
Payer: MEDICARE

## 2023-10-04 LAB
ALBUMIN SERPL BCP-MCNC: 4.7 G/DL (ref 3.2–4.9)
BUN SERPL-MCNC: 17 MG/DL (ref 8–22)
CALCIUM ALBUM COR SERPL-MCNC: 9 MG/DL (ref 8.5–10.5)
CALCIUM SERPL-MCNC: 9.6 MG/DL (ref 8.5–10.5)
CHLORIDE SERPL-SCNC: 92 MMOL/L (ref 96–112)
CO2 SERPL-SCNC: 26 MMOL/L (ref 20–33)
CREAT SERPL-MCNC: 0.69 MG/DL (ref 0.5–1.4)
GFR SERPLBLD CREATININE-BSD FMLA CKD-EPI: 95 ML/MIN/1.73 M 2
GLUCOSE SERPL-MCNC: 147 MG/DL (ref 65–99)
PHOSPHATE SERPL-MCNC: 4.2 MG/DL (ref 2.5–4.5)
POTASSIUM SERPL-SCNC: 6.2 MMOL/L (ref 3.6–5.5)
SODIUM SERPL-SCNC: 129 MMOL/L (ref 135–145)

## 2023-10-04 PROCEDURE — 36415 COLL VENOUS BLD VENIPUNCTURE: CPT

## 2023-10-04 PROCEDURE — 80069 RENAL FUNCTION PANEL: CPT

## 2023-10-11 ENCOUNTER — TELEPHONE (OUTPATIENT)
Dept: PHARMACY | Facility: MEDICAL CENTER | Age: 68
End: 2023-10-11
Payer: MEDICARE

## 2023-10-11 PROCEDURE — RXMED WILLOW AMBULATORY MEDICATION CHARGE: Performed by: FAMILY MEDICINE

## 2023-10-11 NOTE — TELEPHONE ENCOUNTER
Contact:  [id number]  Ines Long        Phone number: [phone]    Name of person spoken with and relationship to patient: Ines-self    Patient’s Adherence:            How patient is doing on medication:  well    How many missed doses and reason: 0    Any new medications: no    Any new conditions: no    Any new allergies: no    Any new side effects: no     Any new diagnoses: no     How many doses remainin    Did patient want to speak with pharmacist: no  Delivery:            Delivery date and method:  ship out 10/18 overnight cold to arrive 10/19    Needs by Date:  11/3    Signature required:  no    Any additional details for :  rashmi  Teach Appointment Date:  na  Shipping Address:  [address] [place] [id number]  Medication(name, strength and dose):  Praluent 150 MG/ML SubQ Inject every 14 days  Copay: $137.31   Payment Method: ccof  Supplies:  na  Additional info:  NIKKI Chacon. She stated doing well on the medication. No further questions/concerns at this time

## 2023-10-12 ENCOUNTER — HOSPITAL ENCOUNTER (OUTPATIENT)
Dept: LAB | Facility: MEDICAL CENTER | Age: 68
End: 2023-10-12
Attending: PHYSICIAN ASSISTANT
Payer: MEDICARE

## 2023-10-12 LAB
EST. AVERAGE GLUCOSE BLD GHB EST-MCNC: 151 MG/DL
HBA1C MFR BLD: 6.9 % (ref 4–5.6)
T4 FREE SERPL-MCNC: 1.56 NG/DL (ref 0.93–1.7)
TSH SERPL DL<=0.005 MIU/L-ACNC: 1.83 UIU/ML (ref 0.38–5.33)

## 2023-10-12 PROCEDURE — 84443 ASSAY THYROID STIM HORMONE: CPT

## 2023-10-12 PROCEDURE — 36415 COLL VENOUS BLD VENIPUNCTURE: CPT

## 2023-10-12 PROCEDURE — 83036 HEMOGLOBIN GLYCOSYLATED A1C: CPT | Mod: GA

## 2023-10-12 PROCEDURE — 84439 ASSAY OF FREE THYROXINE: CPT

## 2023-10-16 ENCOUNTER — DOCUMENTATION (OUTPATIENT)
Dept: PHARMACY | Facility: MEDICAL CENTER | Age: 68
End: 2023-10-16
Payer: MEDICARE

## 2023-10-17 ENCOUNTER — HOSPITAL ENCOUNTER (OUTPATIENT)
Dept: LAB | Facility: MEDICAL CENTER | Age: 68
End: 2023-10-17
Attending: STUDENT IN AN ORGANIZED HEALTH CARE EDUCATION/TRAINING PROGRAM
Payer: MEDICARE

## 2023-10-17 LAB
ALBUMIN SERPL BCP-MCNC: 4.3 G/DL (ref 3.2–4.9)
BUN SERPL-MCNC: 11 MG/DL (ref 8–22)
CALCIUM ALBUM COR SERPL-MCNC: 9.4 MG/DL (ref 8.5–10.5)
CALCIUM SERPL-MCNC: 9.6 MG/DL (ref 8.5–10.5)
CHLORIDE SERPL-SCNC: 97 MMOL/L (ref 96–112)
CO2 SERPL-SCNC: 23 MMOL/L (ref 20–33)
CREAT SERPL-MCNC: 0.61 MG/DL (ref 0.5–1.4)
GFR SERPLBLD CREATININE-BSD FMLA CKD-EPI: 98 ML/MIN/1.73 M 2
GLUCOSE SERPL-MCNC: 136 MG/DL (ref 65–99)
PHOSPHATE SERPL-MCNC: 3.5 MG/DL (ref 2.5–4.5)
POTASSIUM SERPL-SCNC: 5.5 MMOL/L (ref 3.6–5.5)
SODIUM SERPL-SCNC: 133 MMOL/L (ref 135–145)

## 2023-10-17 PROCEDURE — 36415 COLL VENOUS BLD VENIPUNCTURE: CPT

## 2023-10-17 PROCEDURE — 80069 RENAL FUNCTION PANEL: CPT

## 2023-10-18 ENCOUNTER — PHARMACY VISIT (OUTPATIENT)
Dept: PHARMACY | Facility: MEDICAL CENTER | Age: 68
End: 2023-10-18
Payer: MEDICARE

## 2023-10-19 NOTE — PROGRESS NOTES
([facility]) PHARMACIST FOLLOW UP - Follow Up Assessment   Dx: Mixed HLD      Tx prescribed: Praluent 150mg/ml 1 pen SC every 14 days (+Rosurvastatin 40mg daily) (+Zetia 10mg daily)   - Administration: Injecting 1 pen SC every other     Adherence: None; injects every other     - Missed dose mgmt: n/a       Current SE: None    - Mitigation/Mgmt: n/a       List Changes to Allergies, Diagnoses: Back surgery 23      Current S/Sx: None     Any new CVE since last call? None     Clinically Relevant, Abnormal Labs:    Lipids (23): TC: 110N; TN; HDL: 66N; LDL: 31   A1c: 6.9H   BP: 125/43 (23)     Wellness/Lifestyle Counseling: she is on low potassium diet; low carb    - Support/QOL: No missed    - Immunizations: planning to do pneumonia, RSV, flu shot  vaccines      Med Rec/Updated drug list:   - EMR inaccurate, medication changes reviewed with patient/caregiver:  (+Veltassa)   DI Check: Veltassa + Levothyroxine = Veltassa may dec the serum concentration of levothyroxine. Separate by at least 2 hours to decrease DDI. Taking at 3pm and  by 3 hours.        Goals of Therapy:  -    Achieve goal LDL levels (< 70 - 100 mg/dL) to reduce risk of cardiovascular events   Implement lifestyle modifications: diet, exercise    Progression toward goals - LDL is at goal at 31 and lipid panel is normal. No SE or concerns with Praluent    - Patient has agreed/understands to goals of therapy during education/counseling       Additional: Spoke with Ines regarding praluent which was started in  this year. She said it's amazing and have seen tremendous improvement in her lipid panel. She recently had back surgery on  and recovering well; she does have support at home if needed. She also started Veltassa recently due ot high potassium and following low potassium diet as instructed. She has no further questions or concerns.

## 2023-10-23 ENCOUNTER — HOSPITAL ENCOUNTER (EMERGENCY)
Facility: MEDICAL CENTER | Age: 68
End: 2023-10-23
Attending: EMERGENCY MEDICINE
Payer: MEDICARE

## 2023-10-23 ENCOUNTER — APPOINTMENT (OUTPATIENT)
Dept: RADIOLOGY | Facility: MEDICAL CENTER | Age: 68
End: 2023-10-23
Attending: EMERGENCY MEDICINE
Payer: MEDICARE

## 2023-10-23 VITALS
HEIGHT: 63 IN | HEART RATE: 89 BPM | DIASTOLIC BLOOD PRESSURE: 59 MMHG | SYSTOLIC BLOOD PRESSURE: 129 MMHG | OXYGEN SATURATION: 98 % | TEMPERATURE: 98 F | WEIGHT: 145 LBS | RESPIRATION RATE: 16 BRPM | BODY MASS INDEX: 25.69 KG/M2

## 2023-10-23 DIAGNOSIS — M54.50 ACUTE LEFT-SIDED LOW BACK PAIN WITHOUT SCIATICA: ICD-10-CM

## 2023-10-23 PROCEDURE — 99285 EMERGENCY DEPT VISIT HI MDM: CPT

## 2023-10-23 PROCEDURE — 72100 X-RAY EXAM L-S SPINE 2/3 VWS: CPT

## 2023-10-23 PROCEDURE — 700102 HCHG RX REV CODE 250 W/ 637 OVERRIDE(OP): Performed by: EMERGENCY MEDICINE

## 2023-10-23 PROCEDURE — A9270 NON-COVERED ITEM OR SERVICE: HCPCS | Performed by: EMERGENCY MEDICINE

## 2023-10-23 RX ORDER — OXYCODONE HYDROCHLORIDE 5 MG/1
10 TABLET ORAL ONCE
Status: COMPLETED | OUTPATIENT
Start: 2023-10-23 | End: 2023-10-23

## 2023-10-23 RX ADMIN — OXYCODONE HYDROCHLORIDE 10 MG: 5 TABLET ORAL at 13:01

## 2023-10-23 ASSESSMENT — PAIN DESCRIPTION - PAIN TYPE: TYPE: ACUTE PAIN;SURGICAL PAIN

## 2023-10-23 ASSESSMENT — FIBROSIS 4 INDEX: FIB4 SCORE: 1.19

## 2023-10-23 NOTE — ED TRIAGE NOTES
Chief Complaint   Patient presents with    Low Back Pain     Pt reports low back pain that has not really gotten better since her L4-5 fusion in Sept. Pt states to be worse with radiating pain down leg. Pt can only remember that maybe she moved weird or injured herself a couple of wks ago while using the bathroom.      Explained to pt triage process, made pt aware to tell this RN/staff of any changes/concerns, pt verbalized understanding of process and instructions given. Pt to JADEN callahan.

## 2023-10-23 NOTE — ED NOTES
Patient to room from lobby in wheelchair. Connected to monitor. Agree with triage note. Patient reports 10/10 pain in left buttock that shoots down left hamstring when standing.     Charted for ERP. Call light within reach. Family at bedside.

## 2023-10-23 NOTE — ED NOTES
Patient provided discharge instructions. Patient verbalized understanding. Patient leaving ER in stable condition in wheelchair assisted to vehicle.

## 2023-10-23 NOTE — ED PROVIDER NOTES
"ED PHYSICIAN NOTE    CHIEF COMPLAINT  Chief Complaint   Patient presents with    Low Back Pain     Pt reports low back pain that has not really gotten better since her L4-5 fusion in Sept. Pt states to be worse with radiating pain down leg. Pt can only remember that maybe she moved weird or injured herself a couple of wks ago while using the bathroom.        EXTERNAL RECORDS REVIEWED  Outpatient Notes patient was seen by orthopedics/spine 10/11/2023.  Patient has history of recent lumbar laminectomy about 9/27/2023.  Patient reported improvement but she had an event where she ivette her back and had increased spasms.  She was given a prescription for Flexeril.    HPI/ROS      Ines Long is a 68 y.o. female who presents with low back pain.  She reports that she has had ongoing pain since her surgery.  Seem to overall be improving but having some spasms.  She is restless throughout the night and is usually up a couple times.  She takes Tylenol before bed and then she wakes up in the middle of the night and takes a more as well as a Flexeril to help her sleep and help with spasms.  She did her normal routine last night.  She denies any injury fall or trauma.  She states that when she woke up today she could not get out of bed because her back pain was so worse was her pain is manageable, but trying to walk makes the pain worse.  Is located in the left lower back.  Her previous back has been in the right lower spine.  This radiates into the left buttock.  She denies any bowel or bladder incontinence or urinary retention.  She has been a bit constipated.  No saddle anesthesia.  No weakness or numbness in the extremities.  She has not had fever.  No trauma.    PAST MEDICAL HISTORY  Past Medical History:   Diagnosis Date    Acute nasopharyngitis 04/29/2022    \"I'm at the tailend of a cold\" .  Symptoms started 4/26/22 included exhaustion, and stuffy nose, cough.  Symptoms are better but pt. reports she still " "feels \"A little congested\"    Anesthesia     Post op N/V    Blocked artery Around 2019    Carotid artery- had surgery.  Pt. follows up with Dr. Surya Garcia every year.    Cataract     no surgery yet    Diabetes type 1, controlled (HCC) 2010    Insulin pump in place.  Endocrinologist is Dr. Dewey.     Dyslipidemia 2010    High cholesterol     Hypertension     pt states controlled on meds    Hypothyroidism 2010    Indigestion     Insulin pump in place 2011    Left carotid artery stenosis - severe 2019     PONV (postoperative nausea and vomiting)     Routine health maintenance 2011    Urinary incontinence     \"If I sneeze, I leak\"       SOCIAL HISTORY  Social History     Tobacco Use    Smoking status: Former     Current packs/day: 0.00     Types: Cigarettes     Quit date: 1975     Years since quittin.8    Smokeless tobacco: Never   Vaping Use    Vaping Use: Never used   Substance Use Topics    Alcohol use: Yes     Alcohol/week: 1.2 oz     Types: 2 Glasses of wine per week     Comment: about 3-4 drinks per week.     Drug use: Yes     Comment: tried CBD gummies; no THC       CURRENT MEDICATIONS  Home Medications       Reviewed by Mundo Negro R.N. (Registered Nurse) on 10/23/23 at 1142  Med List Status: Not Addressed     Medication Last Dose Status   Alirocumab (PRALUENT) 150 MG/ML Solution Auto-injector  Active   Cholecalciferol (VITAMIN D-3 PO)  Active   cyclobenzaprine (FLEXERIL) 10 mg Tab  Active   cyclobenzaprine (FLEXERIL) 10 mg Tab  Active   ezetimibe (ZETIA) 10 MG Tab  Active   gabapentin (NEURONTIN) 100 MG Cap  Active   gabapentin (NEURONTIN) 300 MG Cap  Active   Insulin Aspart, w/Niacinamide, (FIASP) 100 UNIT/ML Solution  Active   insulin infusion pump Device  Active   LEVOXYL 100 MCG Tab  Active   lisinopril (PRINIVIL) 20 MG Tab  Active   pantoprazole (PROTONIX) 40 MG Tablet Delayed Response  Active   rosuvastatin (CRESTOR) 40 MG tablet  Active   sodium " "chloride (SALT) 1 GM Tab  Active                    ALLERGIES  Allergies   Allergen Reactions    Ceclor [Cefaclor] Hives and Swelling    Augmentin Hives, Diarrhea and Vomiting     Fever blisters   Sickness lasts forever    Naproxen      Face Swells     Tape Rash     \"Certain band aids\"  PAPER TAPE OKAY/ Tegaderm ok    Amlodipine Swelling     5mg = swelling     Amoxicillin-Pot Clavulanate      Other reaction(s): Not available    Cefaclor Hives and Swelling       PHYSICAL EXAM  VITAL SIGNS: /67   Pulse 90   Temp 36.7 °C (98 °F) (Temporal)   Resp 18   Ht 1.6 m (5' 3\")   Wt 65.8 kg (145 lb)   LMP 01/01/1983 (LMP Unknown)   SpO2 97%   BMI 25.69 kg/m²    Constitutional: Awake and alert  HENT: Normal inspection  Eyes: Normal inspection  Neck: Grossly normal range of motion.  Cardiovascular: Normal heart rate, Normal rhythm.  Symmetric peripheral pulses.   Thorax & Lungs: No respiratory distress, No wheezing, No rales, No rhonchi, No chest tenderness.   Abdomen: Bowel sounds normal, soft, non-distended, nontender, no mass  Skin: Completely healed lumbar surgical incision.  Back: She is wearing a back brace.  She has a pain pump.  There is mild tenderness of the paraspinous musculature bilaterally.  There is no redness warmth swelling or specific area of tenderness identified.  Extremities: No clubbing, cyanosis, edema, no Homans or cords.  Neurologic: aWake alert oriented.  She has 5/5 EHL, FHL, gastrocnemius, tibialis anterior, quadriceps hamstring.  Reports normal sensory.  Psychiatric: Appears anxious    DIAGNOSTIC STUDIES / PROCEDURES    RADIOLOGY  I have independently interpreted the diagnostic imaging associated with this visit and am waiting the final reading from the radiologist.   My preliminary interpretation is as follows: Lumbar x-ray shows normal alignment.  Hardware in place.    Radiologist interpretation:   DX-LUMBAR SPINE-2 OR 3 VIEWS    (Results Pending)         COURSE & MEDICAL DECISION " MAKING    INITIAL ASSESSMENT, COURSE AND PLAN  Care Narrative: Patient presents with low back pain.  Acute exacerbation of chronic pain.  Now 3 weeks out of surgery.  She is neurologically intact.  She has not had a fever.  Her wound appears normal.  Is been taking Tylenol and Flexeril for pain.  Ordered x-ray.  Patient was given 1 oxycodone tablet.    Symptoms were improved with treatment.  X-ray is reassuring.    I discussed case with STEWART Escobar at the [facility].  Works with Dr. German the patient's spine surgeon.  He can see the patient tomorrow in the office for further pain management.  I discussed this plan with the patient.  She was comfortable with this.  Upon discharge planning patient reported that she been constipated.  She has a benign abdominal exam.  I have advised increasing her home MiraLAX and stool softener.  Drinking plenty of fluids.  Precaution to return to the ER for any fevers, weakness in extremities, abdominal pain or other concerning symptoms      DISPOSITION AND DISCUSSIONS  I have discussed management of the patient with the following physicians and JOSE E's: Evelio Jones    Escalation of care considered, and ultimately not performed:blood analysis    Prescription drugs considered and/or prescribed: Considered opiate prescription, but the patient will be seen tomorrow by her physician.  Pain management to be directed by primary doctors.    FINAL IMPRESSION  1.  Acute exacerbation of low back pain  2.  Constipation    This dictation was created using voice recognition software. The accuracy of the dictation is limited to the abilities of the software. I expect there may be some errors of grammar and possibly content. The nursing notes were reviewed and certain aspects of this information were incorporated into this note.    Electronically signed by: Jamin Loving M.D., 10/23/2023

## 2023-10-31 ENCOUNTER — PATIENT OUTREACH (OUTPATIENT)
Dept: HEALTH INFORMATION MANAGEMENT | Facility: OTHER | Age: 68
End: 2023-10-31
Payer: MEDICARE

## 2023-10-31 DIAGNOSIS — Z79.4 DIABETES MELLITUS DUE TO UNDERLYING CONDITION WITH HYPEROSMOLARITY WITHOUT COMA, WITH LONG-TERM CURRENT USE OF INSULIN (HCC): ICD-10-CM

## 2023-10-31 DIAGNOSIS — I10 PRIMARY HYPERTENSION: ICD-10-CM

## 2023-10-31 DIAGNOSIS — E08.00 DIABETES MELLITUS DUE TO UNDERLYING CONDITION WITH HYPEROSMOLARITY WITHOUT COMA, WITH LONG-TERM CURRENT USE OF INSULIN (HCC): ICD-10-CM

## 2023-10-31 NOTE — PROGRESS NOTES
11:30 am: I reached out to the patient today to reschedule our enrollment encounter, as she was in the ER when it was originally scheduled. No answer. Left message with contact information.

## 2023-11-08 DIAGNOSIS — Z98.890 HISTORY OF LEFT-SIDED CAROTID ENDARTERECTOMY: ICD-10-CM

## 2023-11-08 DIAGNOSIS — E78.49 OTHER HYPERLIPIDEMIA: ICD-10-CM

## 2023-11-08 RX ORDER — ALIROCUMAB 150 MG/ML
150 INJECTION, SOLUTION SUBCUTANEOUS
Qty: 3 ML | Refills: 3 | Status: SHIPPED | OUTPATIENT
Start: 2023-11-08

## 2023-11-14 ENCOUNTER — TELEPHONE (OUTPATIENT)
Dept: PHARMACY | Facility: MEDICAL CENTER | Age: 68
End: 2023-11-14
Payer: MEDICARE

## 2023-11-14 PROCEDURE — RXMED WILLOW AMBULATORY MEDICATION CHARGE: Performed by: FAMILY MEDICINE

## 2023-11-14 NOTE — TELEPHONE ENCOUNTER
Contact:  [phone]     Ines Long     Phone number: [phone]    Name of person spoken with and relationship to patient: Ines, self   Patient’s Adherence:            How patient is doing on medication:  very well    How many missed doses and reason: 0    Any new medications: no    Any new conditions: no    Any new allergies: no    Any new side effects: no     Any new diagnoses: no     How many doses remainin (last one taken on )    Did patient want to speak with pharmacist: no  Delivery:            Delivery date and method:  ship  overnight cold to arrive     Needs by Date:      Signature required:  no    Any additional details for :  may leave at door if no answer  Teach Appointment Date:  na  Shipping Address: [address] [place] [id number]   Medication(name, strength and dose):  Praluent 150 MG/ML SubQ Inject every 14 days  Copay: $27.46  Payment Method: ccof  Supplies:  na  Additional info:  NIKKI Chacon. She stated doing very well on the medication. She was appreciative of the call. No further questions/concerns at this time

## 2023-11-16 ENCOUNTER — OFFICE VISIT (OUTPATIENT)
Dept: MEDICAL GROUP | Facility: PHYSICIAN GROUP | Age: 68
End: 2023-11-16
Payer: MEDICARE

## 2023-11-16 ENCOUNTER — PATIENT OUTREACH (OUTPATIENT)
Dept: HEALTH INFORMATION MANAGEMENT | Facility: OTHER | Age: 68
End: 2023-11-16

## 2023-11-16 VITALS
HEIGHT: 63 IN | TEMPERATURE: 97.6 F | DIASTOLIC BLOOD PRESSURE: 38 MMHG | OXYGEN SATURATION: 99 % | HEART RATE: 89 BPM | BODY MASS INDEX: 26.33 KG/M2 | SYSTOLIC BLOOD PRESSURE: 110 MMHG | WEIGHT: 148.6 LBS

## 2023-11-16 DIAGNOSIS — I50.30 BENIGN HYPERTENSIVE HEART AND KIDNEY DISEASE WITH DIASTOLIC CHF, NYHA CLASS II AND CKD STAGE 2 (HCC): ICD-10-CM

## 2023-11-16 DIAGNOSIS — M48.062 LUMBAR STENOSIS WITH NEUROGENIC CLAUDICATION: ICD-10-CM

## 2023-11-16 DIAGNOSIS — Z23 NEED FOR VACCINATION: ICD-10-CM

## 2023-11-16 DIAGNOSIS — N18.2 BENIGN HYPERTENSIVE HEART AND KIDNEY DISEASE WITH DIASTOLIC CHF, NYHA CLASS II AND CKD STAGE 2 (HCC): ICD-10-CM

## 2023-11-16 DIAGNOSIS — E08.00 DIABETES MELLITUS DUE TO UNDERLYING CONDITION WITH HYPEROSMOLARITY WITHOUT COMA, WITH LONG-TERM CURRENT USE OF INSULIN (HCC): ICD-10-CM

## 2023-11-16 DIAGNOSIS — I10 PRIMARY HYPERTENSION: ICD-10-CM

## 2023-11-16 DIAGNOSIS — E78.49 OTHER HYPERLIPIDEMIA: ICD-10-CM

## 2023-11-16 DIAGNOSIS — I13.0 BENIGN HYPERTENSIVE HEART AND KIDNEY DISEASE WITH DIASTOLIC CHF, NYHA CLASS II AND CKD STAGE 2 (HCC): ICD-10-CM

## 2023-11-16 DIAGNOSIS — Z79.4 DIABETES MELLITUS DUE TO UNDERLYING CONDITION WITH HYPEROSMOLARITY WITHOUT COMA, WITH LONG-TERM CURRENT USE OF INSULIN (HCC): ICD-10-CM

## 2023-11-16 PROBLEM — M43.16 SPONDYLOLISTHESIS, LUMBAR REGION: Status: RESOLVED | Noted: 2023-08-18 | Resolved: 2023-11-16

## 2023-11-16 PROBLEM — M48.061 SPINAL STENOSIS OF LUMBAR REGION WITH RADICULOPATHY: Status: RESOLVED | Noted: 2023-09-27 | Resolved: 2023-11-16

## 2023-11-16 PROBLEM — M54.16 SPINAL STENOSIS OF LUMBAR REGION WITH RADICULOPATHY: Status: RESOLVED | Noted: 2023-09-27 | Resolved: 2023-11-16

## 2023-11-16 PROCEDURE — 3078F DIAST BP <80 MM HG: CPT | Performed by: NURSE PRACTITIONER

## 2023-11-16 PROCEDURE — G0008 ADMIN INFLUENZA VIRUS VAC: HCPCS | Performed by: NURSE PRACTITIONER

## 2023-11-16 PROCEDURE — 90662 IIV NO PRSV INCREASED AG IM: CPT | Performed by: NURSE PRACTITIONER

## 2023-11-16 PROCEDURE — 90677 PCV20 VACCINE IM: CPT | Performed by: NURSE PRACTITIONER

## 2023-11-16 PROCEDURE — 99214 OFFICE O/P EST MOD 30 MIN: CPT | Mod: 25 | Performed by: NURSE PRACTITIONER

## 2023-11-16 PROCEDURE — G0009 ADMIN PNEUMOCOCCAL VACCINE: HCPCS | Performed by: NURSE PRACTITIONER

## 2023-11-16 PROCEDURE — 3074F SYST BP LT 130 MM HG: CPT | Performed by: NURSE PRACTITIONER

## 2023-11-16 RX ORDER — PATIROMER 8.4 G/1
8.4 POWDER, FOR SUSPENSION ORAL DAILY
COMMUNITY
Start: 2023-10-12 | End: 2024-03-07

## 2023-11-16 RX ORDER — EZETIMIBE 10 MG/1
10 TABLET ORAL DAILY
Qty: 90 TABLET | Refills: 3 | Status: SHIPPED | OUTPATIENT
Start: 2023-11-16

## 2023-11-16 ASSESSMENT — ENCOUNTER SYMPTOMS
ABDOMINAL PAIN: 0
HEARTBURN: 0
COUGH: 0
FEVER: 0
HEADACHES: 0
CHILLS: 0
SHORTNESS OF BREATH: 0
DEPRESSION: 0
PALPITATIONS: 0
DIZZINESS: 0
WHEEZING: 0

## 2023-11-16 ASSESSMENT — FIBROSIS 4 INDEX: FIB4 SCORE: 1.19

## 2023-11-16 NOTE — PROGRESS NOTES
I touched base with the patient today when she was in clinic to visit with her PCP. She is finally feeling like she is beginning to recover from her spinal surgery. She says that she is still dealing with managing her pain but that overall things are improving. She has 6 more weeks of a brace and close monitoring by Orthopedics and then will begin physical therapy. It was agreed between us that we would speak after [place] and schedule an enrollment encounter at the beginning of the new year. She is looking forward to the holidays and being able to do more with her grandchildren as she recovers.

## 2023-11-16 NOTE — PROGRESS NOTES
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  Stable.  Blood pressure today is 110/38.  He saw her nephrologist who increased her lisinopril to 20 mg.  Denies chest pain, palpitations, dizziness, blurry vision.     Lumbar Stenosis With Neurogenic Claudication    Patient states that pain has significantly improved and that she is continue to work with the specialist regarding this issue.  States that she is no longer taking pain medication.  And with the numbness on her right thigh she has been using a trick shown to her by the physical therapist to make this feel better.  Per Dr. Díaz, numbness may be permanent.     Diabetes Mellitus (Hcc)    Chronic in nature.  Stable.  Patient is following closely with endocrinology regarding this issue.  States that she is much happier with her current endocrinology team, he is ordering to have a lot of of follow-up labs completed she has been noticing intermittently that her morning sugars will be 300.  States that endocrinology is aware.     Spinal Stenosis of Lumbar Region With Radiculopathy (Resolved)   Spondylolisthesis, Lumbar Region (Resolved)     HCC Gap Form    Diagnosis to address: E26.1 - Secondary hyperaldosteronism (HCC)  Assessment and plan: Chronic, stable, as based on today's assessment and impact on other conditions evaluated today. Continue with current treatment plan: continue follow-up with endocrinology and cardiology. Continue managing ASCVD risk. Follow-up with specialist as directed, but at least annually.  Diagnosis: I47.10 - PSVT (paroxysmal supraventricular tachycardia)  Assessment and plan: Chronic, stable, as based on today's assessment and impact on other conditions evaluated today. No c/o palpitations. Continue with current treatment plan: monitoring  with cardiology. Follow-up with specialist as directed, but at least annually.  Last edited 11/16/23 12:45 PST by Roel Rizo, [email or web address].         Current Outpatient Medications Ordered in Epic   Medication Sig Dispense Refill    VELTASSA 8.4 g Pack Take 8.4 g by mouth every day.      ezetimibe (ZETIA) 10 MG Tab Take 1 Tablet by mouth every day. 90 Tablet 3    clindamycin (CLEOCIN) 150 MG Cap Take 3 capsules PO prior to dental procedure 6 Capsule 0    Alirocumab (PRALUENT) 150 MG/ML Solution Auto-injector Inject 150 mg under the skin every 14 days. 3 mL 3    cyclobenzaprine (FLEXERIL) 10 mg Tab Take 1 Tablet by mouth 3 times a day as needed for Muscle Spasms. 90 Tablet 1    gabapentin (NEURONTIN) 300 MG Cap 1 capsule PO BID. May slowly increase to a max of 900mg PO BID 90 Capsule 2    Insulin Aspart, w/Niacinamide, (FIASP) 100 UNIT/ML Solution Inject 2.2 Units as directed one time.      lisinopril (PRINIVIL) 20 MG Tab Take 20 mg by mouth every evening.      rosuvastatin (CRESTOR) 40 MG tablet Take 1 Tablet by mouth at bedtime. 90 Tablet 3    sodium chloride (SALT) 1 GM Tab Take 1 g by mouth every day.      insulin infusion pump Device Inject  under the skin continuous. Patient's own SQ insulin pump    Type of Insulin: Fiasp  Last change of tubing: 3/19/2023 - Change tubing and site every 72 hours    Dosing:  Basal rate:   0000 - 0329 = 0.5 units/hr   0330 - 1129 = 0.85 units/hr   1130 - 1359 = 0.5 units/hr              1400 - 1759 = 0.45 units/hr              1800 - 2359 = 0.5 units/hr    Bolus ratio:   1 unit : 12 g carbohydrate at  (breakfast, lunch, dinner, snacks)      Correction ratio:    1 units for every 50 over 150 mg/dL    Disconnect pump if patient becomes hypoglycemic and altered.      LEVOXYL 100 MCG Tab Take  mcg by mouth every morning on an empty stomach. Takes 1/2 tab on Sundays      pantoprazole (PROTONIX) 40 MG Tablet Delayed Response Take 1 Tablet by mouth every day.       "Cholecalciferol (VITAMIN D-3 PO) Take 5,000 mg by mouth every evening.       No current Epic-ordered facility-administered medications on file.       Health Maintenance: Completed    Review of Systems   Constitutional:  Negative for chills, fever and malaise/fatigue.   Respiratory:  Negative for cough, shortness of breath and wheezing.    Cardiovascular:  Negative for chest pain, palpitations and leg swelling.   Gastrointestinal:  Negative for abdominal pain and heartburn.   Neurological:  Negative for dizziness and headaches.   Psychiatric/Behavioral:  Negative for depression and suicidal ideas.          Objective:     Exam:  BP (!) 110/38 (BP Location: Left arm, Patient Position: Sitting, BP Cuff Size: Adult)   Pulse 89   Temp 36.4 °C (97.6 °F) (Temporal)   Ht 1.6 m (5' 3\")   Wt 67.4 kg (148 lb 9.6 oz)   LMP 01/01/1983 (LMP Unknown)   SpO2 99%   BMI 26.32 kg/m²  Body mass index is 26.32 kg/m².    Physical Exam  Assessment & Plan:     68 y.o. female with the following -     Problem List Items Addressed This Visit       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, 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)     -continue lisinopril 20 mg PO daily         Relevant Medications    ezetimibe (ZETIA) 10 MG Tab    Diabetes mellitus (HCC)     -Continue close follow-up with endocrinology.         Lumbar stenosis with neurogenic claudication     -Continue follow-up with Dr. Díaz  -Continue physical therapy.         Other hyperlipidemia    Relevant Medications    ezetimibe (ZETIA) 10 MG Tab     Other Visit Diagnoses       Need for vaccination        Relevant Orders    INFLUENZA VACCINE, HIGH DOSE (65+ ONLY) (Completed)    Pneumococcal Conjugate Vaccine 20-Valent (6 wks+) (Completed)            Return in about 3 months (around 2/16/2024), or if symptoms worsen or fail to improve, for DM.    I have placed the below orders and discussed them with an approved delegating provider. The MA is performing the " below orders under the direction of Dr. Sierra.     Please note that this dictation was created using voice recognition software. I have made every reasonable attempt to correct obvious errors, but I expect that there are errors of grammar and possibly content that I did not discover before finalizing the note.

## 2023-11-20 ENCOUNTER — PHARMACY VISIT (OUTPATIENT)
Dept: PHARMACY | Facility: MEDICAL CENTER | Age: 68
End: 2023-11-20
Payer: MEDICARE

## 2023-12-07 ENCOUNTER — HOSPITAL ENCOUNTER (OUTPATIENT)
Dept: LAB | Facility: MEDICAL CENTER | Age: 68
End: 2023-12-07
Attending: STUDENT IN AN ORGANIZED HEALTH CARE EDUCATION/TRAINING PROGRAM
Payer: MEDICARE

## 2023-12-07 LAB
ALBUMIN SERPL BCP-MCNC: 4.4 G/DL (ref 3.2–4.9)
BUN SERPL-MCNC: 18 MG/DL (ref 8–22)
CALCIUM ALBUM COR SERPL-MCNC: 9.7 MG/DL (ref 8.5–10.5)
CALCIUM SERPL-MCNC: 10 MG/DL (ref 8.5–10.5)
CHLORIDE SERPL-SCNC: 101 MMOL/L (ref 96–112)
CO2 SERPL-SCNC: 26 MMOL/L (ref 20–33)
CREAT SERPL-MCNC: 0.68 MG/DL (ref 0.5–1.4)
GFR SERPLBLD CREATININE-BSD FMLA CKD-EPI: 96 ML/MIN/1.73 M 2
GLUCOSE SERPL-MCNC: 76 MG/DL (ref 65–99)
MAGNESIUM SERPL-MCNC: 2 MG/DL (ref 1.5–2.5)
PHOSPHATE SERPL-MCNC: 3.9 MG/DL (ref 2.5–4.5)
POTASSIUM SERPL-SCNC: 5.6 MMOL/L (ref 3.6–5.5)
SODIUM SERPL-SCNC: 136 MMOL/L (ref 135–145)

## 2023-12-07 PROCEDURE — 83735 ASSAY OF MAGNESIUM: CPT

## 2023-12-07 PROCEDURE — 80069 RENAL FUNCTION PANEL: CPT

## 2023-12-07 PROCEDURE — 36415 COLL VENOUS BLD VENIPUNCTURE: CPT

## 2023-12-16 PROCEDURE — RXMED WILLOW AMBULATORY MEDICATION CHARGE: Performed by: FAMILY MEDICINE

## 2023-12-19 ENCOUNTER — TELEPHONE (OUTPATIENT)
Dept: PHARMACY | Facility: MEDICAL CENTER | Age: 68
End: 2023-12-19
Payer: MEDICARE

## 2023-12-19 NOTE — TELEPHONE ENCOUNTER
Contact:             Phone number: [phone]     Name of person spoken with and relationship to patient: MARKELL KRAMER   Medication:   Patient’s Adherence:             How patient is doing on medication: well     How many missed doses and reason: 0     Any new medications: no     Any new conditions: no     Any new allergies: no     Any new side effects: no      Any new diagnoses: no      How many doses remainin     Did patient want to speak with pharmacist: no   Delivery:             Delivery date and method: FEDEX/UPS      Needs by Date:      Signature required: no     Any additional details for : MITUL Mendoza Appointment Date: MITUL   Shipping Address: [address] [place] [id number]   Medication(name, strength and dose): PRALUENT 150     Copay: $27.46   Payment Method: [employer]   Supplies:   Additional Information:

## 2023-12-21 ENCOUNTER — PHARMACY VISIT (OUTPATIENT)
Dept: PHARMACY | Facility: MEDICAL CENTER | Age: 68
End: 2023-12-21
Payer: MEDICARE

## 2024-01-03 ENCOUNTER — HOSPITAL ENCOUNTER (OUTPATIENT)
Dept: LAB | Facility: MEDICAL CENTER | Age: 69
End: 2024-01-03
Attending: PHYSICIAN ASSISTANT
Payer: MEDICARE

## 2024-01-03 LAB
25(OH)D3 SERPL-MCNC: 50 NG/ML (ref 30–100)
ALBUMIN SERPL BCP-MCNC: 4.5 G/DL (ref 3.2–4.9)
ALBUMIN/GLOB SERPL: 1.7 G/DL
ALP SERPL-CCNC: 121 U/L (ref 30–99)
ALT SERPL-CCNC: 34 U/L (ref 2–50)
ANION GAP SERPL CALC-SCNC: 9 MMOL/L (ref 7–16)
AST SERPL-CCNC: 34 U/L (ref 12–45)
BASOPHILS # BLD AUTO: 0.4 % (ref 0–1.8)
BASOPHILS # BLD: 0.02 K/UL (ref 0–0.12)
BILIRUB SERPL-MCNC: 0.6 MG/DL (ref 0.1–1.5)
BUN SERPL-MCNC: 13 MG/DL (ref 8–22)
CALCIUM ALBUM COR SERPL-MCNC: 9.1 MG/DL (ref 8.5–10.5)
CALCIUM SERPL-MCNC: 9.5 MG/DL (ref 8.5–10.5)
CHLORIDE SERPL-SCNC: 95 MMOL/L (ref 96–112)
CHOLEST SERPL-MCNC: 108 MG/DL (ref 100–199)
CO2 SERPL-SCNC: 26 MMOL/L (ref 20–33)
CREAT SERPL-MCNC: 0.66 MG/DL (ref 0.5–1.4)
DHEA-S SERPL-MCNC: 69.1 UG/DL (ref 9.4–246)
EOSINOPHIL # BLD AUTO: 0.12 K/UL (ref 0–0.51)
EOSINOPHIL NFR BLD: 2.4 % (ref 0–6.9)
ERYTHROCYTE [DISTWIDTH] IN BLOOD BY AUTOMATED COUNT: 46.9 FL (ref 35.9–50)
FASTING STATUS PATIENT QL REPORTED: NORMAL
FOLATE SERPL-MCNC: 20.4 NG/ML
GFR SERPLBLD CREATININE-BSD FMLA CKD-EPI: 96 ML/MIN/1.73 M 2
GLOBULIN SER CALC-MCNC: 2.7 G/DL (ref 1.9–3.5)
GLUCOSE SERPL-MCNC: 116 MG/DL (ref 65–99)
HCT VFR BLD AUTO: 39.7 % (ref 37–47)
HDLC SERPL-MCNC: 66 MG/DL
HGB BLD-MCNC: 13.1 G/DL (ref 12–16)
IMM GRANULOCYTES # BLD AUTO: 0 K/UL (ref 0–0.11)
IMM GRANULOCYTES NFR BLD AUTO: 0 % (ref 0–0.9)
LDLC SERPL CALC-MCNC: 29 MG/DL
LYMPHOCYTES # BLD AUTO: 1.68 K/UL (ref 1–4.8)
LYMPHOCYTES NFR BLD: 33.9 % (ref 22–41)
MCH RBC QN AUTO: 30.9 PG (ref 27–33)
MCHC RBC AUTO-ENTMCNC: 33 G/DL (ref 32.2–35.5)
MCV RBC AUTO: 93.6 FL (ref 81.4–97.8)
MONOCYTES # BLD AUTO: 0.37 K/UL (ref 0–0.85)
MONOCYTES NFR BLD AUTO: 7.5 % (ref 0–13.4)
NEUTROPHILS # BLD AUTO: 2.77 K/UL (ref 1.82–7.42)
NEUTROPHILS NFR BLD: 55.8 % (ref 44–72)
NRBC # BLD AUTO: 0 K/UL
NRBC BLD-RTO: 0 /100 WBC (ref 0–0.2)
PLATELET # BLD AUTO: 347 K/UL (ref 164–446)
PMV BLD AUTO: 10.3 FL (ref 9–12.9)
POTASSIUM SERPL-SCNC: 4.6 MMOL/L (ref 3.6–5.5)
PROT SERPL-MCNC: 7.2 G/DL (ref 6–8.2)
RBC # BLD AUTO: 4.24 M/UL (ref 4.2–5.4)
SODIUM SERPL-SCNC: 130 MMOL/L (ref 135–145)
T4 FREE SERPL-MCNC: 1.45 NG/DL (ref 0.93–1.7)
TESTOST SERPL-MCNC: 3 NG/DL (ref 9–75)
THYROPEROXIDASE AB SERPL-ACNC: <9 IU/ML (ref 0–9)
TRIGL SERPL-MCNC: 64 MG/DL (ref 0–149)
TSH SERPL DL<=0.005 MIU/L-ACNC: 1 UIU/ML (ref 0.38–5.33)
VIT B12 SERPL-MCNC: 543 PG/ML (ref 211–911)
WBC # BLD AUTO: 5 K/UL (ref 4.8–10.8)

## 2024-01-03 PROCEDURE — 86376 MICROSOMAL ANTIBODY EACH: CPT

## 2024-01-03 PROCEDURE — 82626 DEHYDROEPIANDROSTERONE: CPT

## 2024-01-03 PROCEDURE — 84681 ASSAY OF C-PEPTIDE: CPT

## 2024-01-03 PROCEDURE — 84270 ASSAY OF SEX HORMONE GLOBUL: CPT

## 2024-01-03 PROCEDURE — 82607 VITAMIN B-12: CPT

## 2024-01-03 PROCEDURE — 80053 COMPREHEN METABOLIC PANEL: CPT

## 2024-01-03 PROCEDURE — 84403 ASSAY OF TOTAL TESTOSTERONE: CPT

## 2024-01-03 PROCEDURE — 84439 ASSAY OF FREE THYROXINE: CPT

## 2024-01-03 PROCEDURE — 82570 ASSAY OF URINE CREATININE: CPT

## 2024-01-03 PROCEDURE — 82746 ASSAY OF FOLIC ACID SERUM: CPT

## 2024-01-03 PROCEDURE — 82043 UR ALBUMIN QUANTITATIVE: CPT

## 2024-01-03 PROCEDURE — 36415 COLL VENOUS BLD VENIPUNCTURE: CPT

## 2024-01-03 PROCEDURE — 84443 ASSAY THYROID STIM HORMONE: CPT

## 2024-01-03 PROCEDURE — 82306 VITAMIN D 25 HYDROXY: CPT | Mod: GA

## 2024-01-03 PROCEDURE — 85025 COMPLETE CBC W/AUTO DIFF WBC: CPT

## 2024-01-03 PROCEDURE — 82627 DEHYDROEPIANDROSTERONE: CPT

## 2024-01-03 PROCEDURE — 84402 ASSAY OF FREE TESTOSTERONE: CPT

## 2024-01-03 PROCEDURE — 80061 LIPID PANEL: CPT

## 2024-01-04 LAB
CREAT UR-MCNC: 59.07 MG/DL
MICROALBUMIN UR-MCNC: <1.2 MG/DL
MICROALBUMIN/CREAT UR: NORMAL MG/G (ref 0–30)

## 2024-01-05 LAB — C PEPTIDE SERPL-MCNC: 0.1 NG/ML (ref 0.5–3.3)

## 2024-01-07 LAB
DHEA SERPL-MCNC: 0.67 NG/ML (ref 0.63–4.7)
SHBG SERPL-SCNC: 77 NMOL/L (ref 17–125)
TESTOST FREE SERPL-MCNC: 0.7 PG/ML (ref 0.6–3.8)
TESTOST SERPL-MCNC: 7 NG/DL (ref 5–32)

## 2024-01-10 ENCOUNTER — PATIENT OUTREACH (OUTPATIENT)
Dept: HEALTH INFORMATION MANAGEMENT | Facility: OTHER | Age: 69
End: 2024-01-10
Payer: MEDICARE

## 2024-01-10 DIAGNOSIS — I10 PRIMARY HYPERTENSION: ICD-10-CM

## 2024-01-10 DIAGNOSIS — Z79.4 DIABETES MELLITUS DUE TO UNDERLYING CONDITION WITH HYPEROSMOLARITY WITHOUT COMA, WITH LONG-TERM CURRENT USE OF INSULIN (HCC): ICD-10-CM

## 2024-01-10 DIAGNOSIS — E08.00 DIABETES MELLITUS DUE TO UNDERLYING CONDITION WITH HYPEROSMOLARITY WITHOUT COMA, WITH LONG-TERM CURRENT USE OF INSULIN (HCC): ICD-10-CM

## 2024-01-10 NOTE — PROGRESS NOTES
I spoke to the patient today over the phone. She continues to be interested in the chronic care management program and is managing some issues with her back. She will contact me over the next couple of weeks to schedule for her enrollment after she has begun physical therapy and is able to have more comfort and focus.

## 2024-01-14 PROCEDURE — RXMED WILLOW AMBULATORY MEDICATION CHARGE: Performed by: FAMILY MEDICINE

## 2024-01-16 ENCOUNTER — TELEPHONE (OUTPATIENT)
Dept: VASCULAR LAB | Facility: MEDICAL CENTER | Age: 69
End: 2024-01-16

## 2024-01-16 NOTE — TELEPHONE ENCOUNTER
Caller:  Ines    Topic/issue: Ines is asking about patients assistance for her:   : Alirocumab (PRALUENT) 150 MG/ML Solution Auto-injector [[id number]] , which is over $400    Callback Number: [phone]    Thank you,   Elizabeth NAVARRO  
never

## 2024-01-17 ENCOUNTER — TELEPHONE (OUTPATIENT)
Dept: PHARMACY | Facility: MEDICAL CENTER | Age: 69
End: 2024-01-17
Payer: MEDICARE

## 2024-01-17 NOTE — TELEPHONE ENCOUNTER
Contact:  [id number]  Ines Long         Phone number: [phone]    Name of person spoken with and relationship to patient: Ines, self   Patient’s Adherence:            How patient is doing on medication:  well    How many missed doses and reason: 0    Any new medications: no    Any new conditions: no    Any new allergies: no    Any new side effects: no     Any new diagnoses: no     How many doses remainin (next dose due this )    Did patient want to speak with pharmacist: no  Delivery:            Delivery date and method: ship  overnight cold to arrive on     Needs by Date:      Signature required:  no    Any additional details for :  may leave at door if no answer  Teach Appointment Date:  na  Shipping Address: [address] [place] [id number]   Medication(name, strength and dose):  Praluent 150 MG/ML SubQ Inject every 14 days   Copay: $434.89  Payment Method: new cc added to profile for this fill only  Supplies:  na  Additional info:  NIKKI Chacon. She stated doing well on the medication. Previously sw patient and went thru all adherence questions. Did a brief summary on this call to make sure I still had all info correct. Prior she was not sure how she was going to pay for this fill. Her  provided cc to fill since deductible needs to be met . Also sent for FA for future fills to [employer]agar[employer]ner to see if any assistance. Patient is aware she may call her to get more info if needed regarding income. etc.  No further questions/concerns at this time

## 2024-01-19 ENCOUNTER — PHARMACY VISIT (OUTPATIENT)
Dept: PHARMACY | Facility: MEDICAL CENTER | Age: 69
End: 2024-01-19
Payer: COMMERCIAL

## 2024-01-22 ENCOUNTER — TELEPHONE (OUTPATIENT)
Dept: CARDIOLOGY | Facility: MEDICAL CENTER | Age: 69
End: 2024-01-22
Payer: MEDICARE

## 2024-01-22 NOTE — TELEPHONE ENCOUNTER
Medication: Praluent 150mg/mL auto injector pens  Type of Insurance: Government funded (Medicare/Medicare Advantage)  Type of Financial assistance requested Foundation  Source: [employer]   Source Phone #: [phone]  Outcome: Approved  Effective dates: 12/23/23 until 12/22/2024  Details/Billing Information:   [id number]  PCN: [id number]  GRP: [id number]  ID: [id number]  Max Award Amount: $2,500  Final Copay: $0    Received message regarding patient needing assistance for her Praluent due to her current deductible. I called the patient and offered her the Steven, as she stated that she was previously over income for the PAP assistance. Since Praluent transitioned to [employer] assistance and has a higher income maximum, the patient qualified for assistance. I applied for her and enrolled her into the Steven program where she was approved until 12/22/24. Patient was shipped Praluent refill on 01/19/24, and had already paid deductible prior to my being notified of assistance being requested. I left a message for patient letting her know that she was approved for a [employer] Steven going forward, and that would bring her future costs to $0.

## 2024-01-31 NOTE — TELEPHONE ENCOUNTER
Called and spoke to Pt regarding with the financial assistance update. Per Pt, she is doing great with Praluent, but she would like request to fill for a 3 month supply. Discussed with Pt that PA may require if needs to fill for a 3 month supply through insurance. Pt verbally consented to go ahead and a request for a new script for 3 month supply.     Notified provider for pt's request. Pt is due for another refill on 2/5/2024.

## 2024-02-03 ENCOUNTER — OFFICE VISIT (OUTPATIENT)
Dept: URGENT CARE | Facility: CLINIC | Age: 69
End: 2024-02-03
Payer: MEDICARE

## 2024-02-03 VITALS
HEIGHT: 67 IN | TEMPERATURE: 98 F | OXYGEN SATURATION: 99 % | RESPIRATION RATE: 14 BRPM | WEIGHT: 147 LBS | DIASTOLIC BLOOD PRESSURE: 60 MMHG | HEART RATE: 86 BPM | BODY MASS INDEX: 23.07 KG/M2 | SYSTOLIC BLOOD PRESSURE: 142 MMHG

## 2024-02-03 DIAGNOSIS — J02.9 SORE THROAT: ICD-10-CM

## 2024-02-03 DIAGNOSIS — R05.1 ACUTE COUGH: ICD-10-CM

## 2024-02-03 LAB — S PYO DNA SPEC NAA+PROBE: NOT DETECTED

## 2024-02-03 PROCEDURE — 99213 OFFICE O/P EST LOW 20 MIN: CPT | Performed by: FAMILY MEDICINE

## 2024-02-03 PROCEDURE — 3077F SYST BP >= 140 MM HG: CPT | Performed by: FAMILY MEDICINE

## 2024-02-03 PROCEDURE — 87651 STREP A DNA AMP PROBE: CPT | Performed by: FAMILY MEDICINE

## 2024-02-03 PROCEDURE — 3078F DIAST BP <80 MM HG: CPT | Performed by: FAMILY MEDICINE

## 2024-02-03 RX ORDER — DEXTROMETHORPHAN HYDROBROMIDE AND PROMETHAZINE HYDROCHLORIDE 15; 6.25 MG/5ML; MG/5ML
5 SYRUP ORAL EVERY 4 HOURS PRN
Qty: 120 ML | Refills: 0 | Status: SHIPPED | OUTPATIENT
Start: 2024-02-03 | End: 2024-02-21

## 2024-02-03 ASSESSMENT — ENCOUNTER SYMPTOMS
EYES NEGATIVE: 1
CONSTITUTIONAL NEGATIVE: 1
CARDIOVASCULAR NEGATIVE: 1
GASTROINTESTINAL NEGATIVE: 1
SORE THROAT: 1
COUGH: 1

## 2024-02-03 ASSESSMENT — FIBROSIS 4 INDEX: FIB4 SCORE: 1.11

## 2024-02-03 NOTE — PROGRESS NOTES
"Subjective:   Ines Long is a 68 y.o. female who presents for Cough (Started 1/27), Pharyngitis, and Body Aches      Cough  Associated symptoms include a sore throat.   Pharyngitis   Associated symptoms include congestion and coughing.       Review of Systems   Constitutional: Negative.    HENT:  Positive for congestion and sore throat.    Eyes: Negative.    Respiratory:  Positive for cough.    Cardiovascular: Negative.    Gastrointestinal: Negative.    Genitourinary: Negative.    Skin: Negative.        Medications, Allergies, and current problem list reviewed today in Epic.     Objective:     BP (!) 142/60 (BP Location: Left arm, Patient Position: Sitting, BP Cuff Size: Adult)   Pulse 86   Temp 36.7 °C (98 °F) (Temporal)   Resp 14   Ht 1.702 m (5' 7\")   Wt 66.7 kg (147 lb)   SpO2 99%     Physical Exam  Vitals and nursing note reviewed.   Constitutional:       Appearance: Normal appearance.   HENT:      Head: Normocephalic and atraumatic.      Right Ear: Tympanic membrane normal.      Left Ear: Tympanic membrane normal.      Nose: Nose normal.      Mouth/Throat:      Pharynx: Posterior oropharyngeal erythema present.   Cardiovascular:      Rate and Rhythm: Normal rate and regular rhythm.      Pulses: Normal pulses.      Heart sounds: Normal heart sounds.   Pulmonary:      Effort: Pulmonary effort is normal.      Breath sounds: Normal breath sounds.   Abdominal:      General: Abdomen is flat. Bowel sounds are normal.      Palpations: Abdomen is soft.   Lymphadenopathy:      Cervical: No cervical adenopathy.   Neurological:      Mental Status: She is alert.         Assessment/Plan:     Diagnosis and associated orders:     1. Sore throat  POCT CEPHEID GROUP A STREP - PCR      2. Acute cough  promethazine-dextromethorphan (PROMETHAZINE-DM) 6.25-15 MG/5ML syrup         Comments/MDM:              Differential diagnosis, natural history, supportive care, and indications for immediate follow-up " discussed.    Advised the patient to follow-up with the primary care physician for recheck, reevaluation, and consideration of further management.    Please note that this dictation was created using voice recognition software. I have made a reasonable attempt to correct obvious errors, but I expect that there are errors of grammar and possibly content that I did not discover before finalizing the note.    This note was electronically signed by Thiago Melo M.D.

## 2024-02-06 ENCOUNTER — PATIENT OUTREACH (OUTPATIENT)
Dept: HEALTH INFORMATION MANAGEMENT | Facility: OTHER | Age: 69
End: 2024-02-06
Payer: MEDICARE

## 2024-02-06 ENCOUNTER — HOSPITAL ENCOUNTER (OUTPATIENT)
Dept: LAB | Facility: MEDICAL CENTER | Age: 69
End: 2024-02-06
Attending: STUDENT IN AN ORGANIZED HEALTH CARE EDUCATION/TRAINING PROGRAM
Payer: MEDICARE

## 2024-02-06 DIAGNOSIS — E78.49 OTHER HYPERLIPIDEMIA: ICD-10-CM

## 2024-02-06 DIAGNOSIS — I10 PRIMARY HYPERTENSION: ICD-10-CM

## 2024-02-06 DIAGNOSIS — I65.29 STENOSIS OF CAROTID ARTERY, UNSPECIFIED LATERALITY: ICD-10-CM

## 2024-02-06 DIAGNOSIS — Z79.4 DIABETES MELLITUS DUE TO UNDERLYING CONDITION WITH HYPEROSMOLARITY WITHOUT COMA, WITH LONG-TERM CURRENT USE OF INSULIN (HCC): ICD-10-CM

## 2024-02-06 DIAGNOSIS — E08.00 DIABETES MELLITUS DUE TO UNDERLYING CONDITION WITH HYPEROSMOLARITY WITHOUT COMA, WITH LONG-TERM CURRENT USE OF INSULIN (HCC): ICD-10-CM

## 2024-02-06 DIAGNOSIS — Z98.890 HISTORY OF LEFT-SIDED CAROTID ENDARTERECTOMY: ICD-10-CM

## 2024-02-06 LAB
ALBUMIN SERPL BCP-MCNC: 4.8 G/DL (ref 3.2–4.9)
APPEARANCE UR: CLEAR
BACTERIA #/AREA URNS HPF: NEGATIVE /HPF
BILIRUB UR QL STRIP.AUTO: NEGATIVE
BUN SERPL-MCNC: 9 MG/DL (ref 8–22)
CALCIUM ALBUM COR SERPL-MCNC: 8.8 MG/DL (ref 8.5–10.5)
CALCIUM SERPL-MCNC: 9.4 MG/DL (ref 8.5–10.5)
CHLORIDE SERPL-SCNC: 87 MMOL/L (ref 96–112)
CO2 SERPL-SCNC: 22 MMOL/L (ref 20–33)
COLOR UR: YELLOW
CREAT SERPL-MCNC: 0.63 MG/DL (ref 0.5–1.4)
CREAT UR-MCNC: 65.53 MG/DL
CREAT UR-MCNC: 70.46 MG/DL
EPI CELLS #/AREA URNS HPF: NEGATIVE /HPF
GFR SERPLBLD CREATININE-BSD FMLA CKD-EPI: 97 ML/MIN/1.73 M 2
GLUCOSE SERPL-MCNC: 173 MG/DL (ref 65–99)
GLUCOSE UR STRIP.AUTO-MCNC: NEGATIVE MG/DL
HYALINE CASTS #/AREA URNS LPF: ABNORMAL /LPF
KETONES UR STRIP.AUTO-MCNC: NEGATIVE MG/DL
LEUKOCYTE ESTERASE UR QL STRIP.AUTO: ABNORMAL
MAGNESIUM SERPL-MCNC: 1.8 MG/DL (ref 1.5–2.5)
MICRO URNS: ABNORMAL
MICROALBUMIN UR-MCNC: 1.2 MG/DL
MICROALBUMIN/CREAT UR: 17 MG/G (ref 0–30)
NITRITE UR QL STRIP.AUTO: NEGATIVE
PH UR STRIP.AUTO: 6.5 [PH] (ref 5–8)
PHOSPHATE SERPL-MCNC: 3.3 MG/DL (ref 2.5–4.5)
POTASSIUM SERPL-SCNC: 4.3 MMOL/L (ref 3.6–5.5)
PROT UR QL STRIP: NEGATIVE MG/DL
PROT UR-MCNC: 10 MG/DL (ref 0–15)
PROT/CREAT UR: 153 MG/G (ref 10–107)
RBC # URNS HPF: ABNORMAL /HPF
RBC UR QL AUTO: NEGATIVE
SODIUM SERPL-SCNC: 122 MMOL/L (ref 135–145)
SP GR UR STRIP.AUTO: 1.02
UROBILINOGEN UR STRIP.AUTO-MCNC: 0.2 MG/DL
WBC #/AREA URNS HPF: ABNORMAL /HPF

## 2024-02-06 PROCEDURE — 83735 ASSAY OF MAGNESIUM: CPT

## 2024-02-06 PROCEDURE — 82043 UR ALBUMIN QUANTITATIVE: CPT

## 2024-02-06 PROCEDURE — 84156 ASSAY OF PROTEIN URINE: CPT

## 2024-02-06 PROCEDURE — 82570 ASSAY OF URINE CREATININE: CPT | Mod: 91

## 2024-02-06 PROCEDURE — 80069 RENAL FUNCTION PANEL: CPT

## 2024-02-06 PROCEDURE — 81001 URINALYSIS AUTO W/SCOPE: CPT

## 2024-02-06 PROCEDURE — 87086 URINE CULTURE/COLONY COUNT: CPT | Mod: GA

## 2024-02-06 PROCEDURE — 36415 COLL VENOUS BLD VENIPUNCTURE: CPT

## 2024-02-06 NOTE — PROGRESS NOTES
"I spoke with the patient today. She reports \"having a bit of a funny cold,\" but doing well overall. She is about to start physical therapy. The patient is ready to schedule her enrollment encounter by phone February 29th, 10 am. We will re-assess if the patient feels unable at that time.   "

## 2024-02-07 ENCOUNTER — OFFICE VISIT (OUTPATIENT)
Dept: MEDICAL GROUP | Facility: PHYSICIAN GROUP | Age: 69
End: 2024-02-07
Payer: MEDICARE

## 2024-02-07 VITALS
OXYGEN SATURATION: 99 % | TEMPERATURE: 97.9 F | BODY MASS INDEX: 22.7 KG/M2 | SYSTOLIC BLOOD PRESSURE: 122 MMHG | DIASTOLIC BLOOD PRESSURE: 68 MMHG | HEIGHT: 67 IN | HEART RATE: 90 BPM | WEIGHT: 144.6 LBS

## 2024-02-07 DIAGNOSIS — J06.9 ACUTE URI: ICD-10-CM

## 2024-02-07 LAB
FLUAV RNA SPEC QL NAA+PROBE: NEGATIVE
FLUBV RNA SPEC QL NAA+PROBE: NEGATIVE
RSV RNA SPEC QL NAA+PROBE: NEGATIVE
SARS-COV-2 RNA RESP QL NAA+PROBE: NEGATIVE

## 2024-02-07 PROCEDURE — 3078F DIAST BP <80 MM HG: CPT | Performed by: STUDENT IN AN ORGANIZED HEALTH CARE EDUCATION/TRAINING PROGRAM

## 2024-02-07 PROCEDURE — 99213 OFFICE O/P EST LOW 20 MIN: CPT | Performed by: STUDENT IN AN ORGANIZED HEALTH CARE EDUCATION/TRAINING PROGRAM

## 2024-02-07 PROCEDURE — 3074F SYST BP LT 130 MM HG: CPT | Performed by: STUDENT IN AN ORGANIZED HEALTH CARE EDUCATION/TRAINING PROGRAM

## 2024-02-07 PROCEDURE — 0241U POCT CEPHEID COV-2, FLU A/B, RSV - PCR: CPT | Performed by: STUDENT IN AN ORGANIZED HEALTH CARE EDUCATION/TRAINING PROGRAM

## 2024-02-07 RX ORDER — AZITHROMYCIN 250 MG/1
TABLET, FILM COATED ORAL
Qty: 6 TABLET | Refills: 0 | Status: SHIPPED | OUTPATIENT
Start: 2024-02-07 | End: 2024-02-21

## 2024-02-07 RX ORDER — PREDNISONE 20 MG/1
40 TABLET ORAL DAILY
Qty: 10 TABLET | Refills: 0 | Status: SHIPPED | OUTPATIENT
Start: 2024-02-07 | End: 2024-02-12

## 2024-02-07 ASSESSMENT — FIBROSIS 4 INDEX: FIB4 SCORE: 1.11

## 2024-02-07 NOTE — PROGRESS NOTES
Chief Complaint   Patient presents with    Cough     Going on a week     Fatigue    Body Aches    Fever     100.7 was the highest.         HPI: Patient is a 68 y.o. female complaining of 10 days of illness including: chills, productive of clear sputum cough, fever, nasal congestion, sore throat, mild headache . Max temp 100.5F.   Similarly ill exposures: yes, her sister has had similar symptoms.   Treatments tried: coricedin, throat lozenges, salt water, tea  She  reports that she quit smoking about 49 years ago. Her smoking use included cigarettes. She has never used smokeless tobacco..     ROS:  No rash, chest pain, shortness of breath, or N/V/D.     I reviewed the patient's medications, allergies and medical history:  Current Outpatient Medications   Medication Sig Dispense Refill    promethazine-dextromethorphan (PROMETHAZINE-DM) 6.25-15 MG/5ML syrup Take 5 mL by mouth every four hours as needed for Cough. 120 mL 0    gabapentin (NEURONTIN) 300 MG Cap Take 1 Capsule by mouth 3 times a day. 90 Capsule 5    cyclobenzaprine (FLEXERIL) 10 mg Tab Take 1 Tablet by mouth 3 times a day as needed for Muscle Spasms. 90 Tablet 1    clindamycin (CLEOCIN) 150 MG Cap Take 3 capsules PO prior to dental procedure 6 Capsule 0    gabapentin (NEURONTIN) 300 MG Cap Take 1 Capsule by mouth 2 times a day. 180 Capsule 1    VELTASSA 8.4 g Pack Take 8.4 g by mouth every day.      ezetimibe (ZETIA) 10 MG Tab Take 1 Tablet by mouth every day. 90 Tablet 3    Alirocumab (PRALUENT) 150 MG/ML Solution Auto-injector Inject 150 mg under the skin every 14 days. 3 mL 3    gabapentin (NEURONTIN) 300 MG Cap 1 capsule PO BID. May slowly increase to a max of 900mg PO BID 90 Capsule 2    Insulin Aspart, w/Niacinamide, (FIASP) 100 UNIT/ML Solution Inject 2.2 Units as directed one time.      lisinopril (PRINIVIL) 20 MG Tab Take 20 mg by mouth every evening.      rosuvastatin (CRESTOR) 40 MG tablet Take 1 Tablet by mouth at bedtime. 90 Tablet 3    sodium  "chloride (SALT) 1 GM Tab Take 1 g by mouth every day.      insulin infusion pump Device Inject  under the skin continuous. Patient's own SQ insulin pump    Type of Insulin: Fiasp  Last change of tubing: 3/19/2023 - Change tubing and site every 72 hours    Dosing:  Basal rate:   0000 - 0329 = 0.5 units/hr   0330 - 1129 = 0.85 units/hr   1130 - 1359 = 0.5 units/hr              1400 - 1759 = 0.45 units/hr              1800 - 2359 = 0.5 units/hr    Bolus ratio:   1 unit : 12 g carbohydrate at  (breakfast, lunch, dinner, snacks)      Correction ratio:    1 units for every 50 over 150 mg/dL    Disconnect pump if patient becomes hypoglycemic and altered.      LEVOXYL 100 MCG Tab Take  mcg by mouth every morning on an empty stomach. Takes 1/2 tab on Sundays      pantoprazole (PROTONIX) 40 MG Tablet Delayed Response Take 1 Tablet by mouth every day.      Cholecalciferol (VITAMIN D-3 PO) Take 5,000 mg by mouth every evening.       No current facility-administered medications for this visit.     Ceclor [cefaclor], Augmentin, Naproxen, Tape, Amlodipine, Amoxicillin-pot clavulanate, and Cefaclor  Past Medical History:   Diagnosis Date    Acute nasopharyngitis 04/29/2022    \"I'm at the tailend of a cold\" .  Symptoms started 4/26/22 included exhaustion, and stuffy nose, cough.  Symptoms are better but pt. reports she still feels \"A little congested\"    Anesthesia     Post op N/V    Blocked artery Around 2019    Carotid artery- had surgery.  Pt. follows up with Dr. Surya Garcia every year.    Cataract 2023    no surgery yet    Diabetes type 1, controlled (HCC) 11/01/2010    Insulin pump in place.  Endocrinologist is Dr. Dewey.     Dyslipidemia 11/01/2010    High cholesterol     Hypertension     pt states controlled on meds    Hypothyroidism 11/01/2010    Indigestion     Insulin pump in place 05/18/2011    Left carotid artery stenosis - severe 2019     PONV (postoperative nausea and vomiting)     Routine health " "maintenance 05/18/2011    Urinary incontinence     \"If I sneeze, I leak\"        EXAM:  /68 (BP Location: Left arm, Patient Position: Sitting, BP Cuff Size: Adult)   Pulse 90   Temp 36.6 °C (97.9 °F) (Temporal)   Ht 1.702 m (5' 7\")   Wt 65.6 kg (144 lb 9.6 oz)   SpO2 99%   General: Alert, no conversational dyspnea or audible wheeze, non-toxic appearance.  Eyes: PERRL, conjunctiva slightly injected, no eye discharge.  Ears: Normal pinnae,TM's normal bilaterally.  Nares: Patent with thin mucus.  Sinuses: non tender over maxillary / frontal sinuses.  Throat: Erythematous injection without exudate.   Neck: Supple, with no adenopathy.  Lungs: Clear to auscultation bilaterally, no wheeze, crackles or rhonchi.   Heart: Regular rate without murmur.  Skin: Warm and dry without rash.     ASSESSMENT:     1. Acute URI  Acute problem, symptoms onset 10 days ago.  Patient has had no improvement in symptoms and has had persistent low-grade fevers intermittently.  She was tested for strep in urgent care 4 days ago and was negative.  She was tested today for COVID, flu, RSV and was negative.  Given duration of symptoms without improvement and with persistent fever, I will treat with an antibiotic.  I have prescribed a Z-Abdoulaye.  I have also prescribed a 5-day course of prednisone for her cough and sore throat.  The patient does have type 1 diabetes, I have counseled her that the prednisone will likely elevate her blood sugars temporarily. Also reviewed OTC treatment options for symptom relief.  - POCT Cepheid CoV-2, Flu A/B, RSV - PCR  - azithromycin (ZITHROMAX) 250 MG Tab; Take 2 tablets on day 1. Then take 1 tablet days 2-5.  Dispense: 6 Tablet; Refill: 0  - predniSONE (DELTASONE) 20 MG Tab; Take 2 Tablets by mouth every day for 5 days.  Dispense: 10 Tablet; Refill: 0       PLAN:  1. Educated patient that majority of upper respiratory infections are viral and do not need antibiotics. As symptoms have been worsening over the " last week, will treat with antibiotics as above.  2. Twice daily use of nasal saline rinse or Neti-Pot.  3. OTC anti-pyretics and decongestants as needed.  4. Follow-up in office or urgent care for worsening symptoms, difficulty breathing, lack of expected recovery, or should new symptoms or problems arise.

## 2024-02-08 LAB
BACTERIA UR CULT: NORMAL
SIGNIFICANT IND 70042: NORMAL
SITE SITE: NORMAL
SOURCE SOURCE: NORMAL

## 2024-02-12 ENCOUNTER — TELEPHONE (OUTPATIENT)
Dept: VASCULAR LAB | Facility: MEDICAL CENTER | Age: 69
End: 2024-02-12

## 2024-02-12 PROCEDURE — RXMED WILLOW AMBULATORY MEDICATION CHARGE: Performed by: FAMILY MEDICINE

## 2024-02-12 NOTE — TELEPHONE ENCOUNTER
BRIDGETTE     Caller: Ines Long     Topic/issue: Patient states that she was to get labs done and she has an appt tomorrow at the [facility] location, however there are no labs in the system.  Please place labs     Callback Number: [phone]    Thank You     Alyssa NAVA

## 2024-02-13 ENCOUNTER — TELEPHONE (OUTPATIENT)
Dept: PHARMACY | Facility: MEDICAL CENTER | Age: 69
End: 2024-02-13
Payer: MEDICARE

## 2024-02-13 NOTE — TELEPHONE ENCOUNTER
Contact:             Phone number: [phone]     Name of person spoken with and relationship to patient: MARKELL KRAMER   Medication:   Patient’s Adherence:             How patient is doing on medication: well     How many missed doses and reason: 0     Any new medications: no     Any new conditions: no     Any new allergies: no     Any new side effects: no      Any new diagnoses: no      How many doses remainin     Did patient want to speak with pharmacist: no   Delivery:             Delivery date and method: FEDEX/UPS COLD      Needs by Date:       Signature required: no     Any additional details for : MITUL Mendoza Appointment Date: MITUL   Shipping Address: [address] [place] [id number]   Medication(name, strength and dose): PRALUENT    Copay: $0    Payment Method: MITUL    Supplies: [facility]ARPS    Additional Information:

## 2024-02-15 ENCOUNTER — PHARMACY VISIT (OUTPATIENT)
Dept: PHARMACY | Facility: MEDICAL CENTER | Age: 69
End: 2024-02-15
Payer: COMMERCIAL

## 2024-02-15 ENCOUNTER — HOSPITAL ENCOUNTER (OUTPATIENT)
Dept: LAB | Facility: MEDICAL CENTER | Age: 69
End: 2024-02-15
Attending: STUDENT IN AN ORGANIZED HEALTH CARE EDUCATION/TRAINING PROGRAM
Payer: MEDICARE

## 2024-02-15 LAB
ALBUMIN SERPL BCP-MCNC: 4.5 G/DL (ref 3.2–4.9)
BUN SERPL-MCNC: 14 MG/DL (ref 8–22)
CALCIUM ALBUM COR SERPL-MCNC: 9.1 MG/DL (ref 8.5–10.5)
CALCIUM SERPL-MCNC: 9.5 MG/DL (ref 8.5–10.5)
CHLORIDE SERPL-SCNC: 93 MMOL/L (ref 96–112)
CO2 SERPL-SCNC: 24 MMOL/L (ref 20–33)
CREAT SERPL-MCNC: 0.73 MG/DL (ref 0.5–1.4)
GFR SERPLBLD CREATININE-BSD FMLA CKD-EPI: 90 ML/MIN/1.73 M 2
GLUCOSE SERPL-MCNC: 130 MG/DL (ref 65–99)
PHOSPHATE SERPL-MCNC: 3.3 MG/DL (ref 2.5–4.5)
POTASSIUM SERPL-SCNC: 4.3 MMOL/L (ref 3.6–5.5)
SODIUM SERPL-SCNC: 129 MMOL/L (ref 135–145)

## 2024-02-15 PROCEDURE — 36415 COLL VENOUS BLD VENIPUNCTURE: CPT

## 2024-02-15 PROCEDURE — 80069 RENAL FUNCTION PANEL: CPT

## 2024-02-21 ENCOUNTER — HOSPITAL ENCOUNTER (OUTPATIENT)
Dept: LAB | Facility: MEDICAL CENTER | Age: 69
End: 2024-02-21
Attending: INTERNAL MEDICINE
Payer: MEDICARE

## 2024-02-21 ENCOUNTER — OFFICE VISIT (OUTPATIENT)
Dept: MEDICAL GROUP | Facility: PHYSICIAN GROUP | Age: 69
End: 2024-02-21
Payer: MEDICARE

## 2024-02-21 VITALS
OXYGEN SATURATION: 98 % | HEART RATE: 88 BPM | TEMPERATURE: 98.1 F | SYSTOLIC BLOOD PRESSURE: 120 MMHG | WEIGHT: 152 LBS | DIASTOLIC BLOOD PRESSURE: 70 MMHG | BODY MASS INDEX: 23.86 KG/M2 | HEIGHT: 67 IN

## 2024-02-21 DIAGNOSIS — J20.9 ACUTE BRONCHITIS, UNSPECIFIED ORGANISM: ICD-10-CM

## 2024-02-21 DIAGNOSIS — R05.1 ACUTE COUGH: ICD-10-CM

## 2024-02-21 DIAGNOSIS — J06.9 ACUTE URI: ICD-10-CM

## 2024-02-21 LAB
25(OH)D3 SERPL-MCNC: 50 NG/ML (ref 30–100)
ALBUMIN SERPL BCP-MCNC: 4.4 G/DL (ref 3.2–4.9)
ALBUMIN/GLOB SERPL: 1.5 G/DL
ALP SERPL-CCNC: 129 U/L (ref 30–99)
ALT SERPL-CCNC: 39 U/L (ref 2–50)
ANION GAP SERPL CALC-SCNC: 13 MMOL/L (ref 7–16)
APPEARANCE UR: CLEAR
AST SERPL-CCNC: 32 U/L (ref 12–45)
BACTERIA #/AREA URNS HPF: NEGATIVE /HPF
BASOPHILS # BLD AUTO: 0.5 % (ref 0–1.8)
BASOPHILS # BLD: 0.03 K/UL (ref 0–0.12)
BILIRUB SERPL-MCNC: 0.2 MG/DL (ref 0.1–1.5)
BILIRUB UR QL STRIP.AUTO: NEGATIVE
BUN SERPL-MCNC: 13 MG/DL (ref 8–22)
CALCIUM ALBUM COR SERPL-MCNC: 9.5 MG/DL (ref 8.5–10.5)
CALCIUM SERPL-MCNC: 9.8 MG/DL (ref 8.5–10.5)
CHLORIDE SERPL-SCNC: 96 MMOL/L (ref 96–112)
CO2 SERPL-SCNC: 22 MMOL/L (ref 20–33)
COLOR UR: YELLOW
CREAT SERPL-MCNC: 0.6 MG/DL (ref 0.5–1.4)
CREAT UR-MCNC: 42.61 MG/DL
CREAT UR-MCNC: 50.95 MG/DL
EOSINOPHIL # BLD AUTO: 0.15 K/UL (ref 0–0.51)
EOSINOPHIL NFR BLD: 2.4 % (ref 0–6.9)
EPI CELLS #/AREA URNS HPF: NEGATIVE /HPF
ERYTHROCYTE [DISTWIDTH] IN BLOOD BY AUTOMATED COUNT: 41.8 FL (ref 35.9–50)
GFR SERPLBLD CREATININE-BSD FMLA CKD-EPI: 98 ML/MIN/1.73 M 2
GLOBULIN SER CALC-MCNC: 2.9 G/DL (ref 1.9–3.5)
GLUCOSE SERPL-MCNC: 56 MG/DL (ref 65–99)
GLUCOSE UR STRIP.AUTO-MCNC: NEGATIVE MG/DL
HCT VFR BLD AUTO: 40.6 % (ref 37–47)
HGB BLD-MCNC: 13.7 G/DL (ref 12–16)
HYALINE CASTS #/AREA URNS LPF: NORMAL /LPF
IMM GRANULOCYTES # BLD AUTO: 0.01 K/UL (ref 0–0.11)
IMM GRANULOCYTES NFR BLD AUTO: 0.2 % (ref 0–0.9)
KETONES UR STRIP.AUTO-MCNC: NEGATIVE MG/DL
LEUKOCYTE ESTERASE UR QL STRIP.AUTO: ABNORMAL
LYMPHOCYTES # BLD AUTO: 1.53 K/UL (ref 1–4.8)
LYMPHOCYTES NFR BLD: 24.6 % (ref 22–41)
MCH RBC QN AUTO: 30.6 PG (ref 27–33)
MCHC RBC AUTO-ENTMCNC: 33.7 G/DL (ref 32.2–35.5)
MCV RBC AUTO: 90.6 FL (ref 81.4–97.8)
MICRO URNS: ABNORMAL
MICROALBUMIN UR-MCNC: <1.2 MG/DL
MICROALBUMIN/CREAT UR: NORMAL MG/G (ref 0–30)
MONOCYTES # BLD AUTO: 0.48 K/UL (ref 0–0.85)
MONOCYTES NFR BLD AUTO: 7.7 % (ref 0–13.4)
NEUTROPHILS # BLD AUTO: 4.03 K/UL (ref 1.82–7.42)
NEUTROPHILS NFR BLD: 64.6 % (ref 44–72)
NITRITE UR QL STRIP.AUTO: NEGATIVE
NRBC # BLD AUTO: 0 K/UL
NRBC BLD-RTO: 0 /100 WBC (ref 0–0.2)
PH UR STRIP.AUTO: 6.5 [PH] (ref 5–8)
PHOSPHATE SERPL-MCNC: 4.1 MG/DL (ref 2.5–4.5)
PLATELET # BLD AUTO: 325 K/UL (ref 164–446)
PMV BLD AUTO: 9.8 FL (ref 9–12.9)
POTASSIUM SERPL-SCNC: 4.8 MMOL/L (ref 3.6–5.5)
PROT SERPL-MCNC: 7.3 G/DL (ref 6–8.2)
PROT UR QL STRIP: NEGATIVE MG/DL
RBC # BLD AUTO: 4.48 M/UL (ref 4.2–5.4)
RBC # URNS HPF: NORMAL /HPF
RBC UR QL AUTO: NEGATIVE
SODIUM SERPL-SCNC: 131 MMOL/L (ref 135–145)
SP GR UR STRIP.AUTO: 1.01
URATE SERPL-MCNC: 3.7 MG/DL (ref 1.9–8.2)
UROBILINOGEN UR STRIP.AUTO-MCNC: 0.2 MG/DL
WBC # BLD AUTO: 6.2 K/UL (ref 4.8–10.8)
WBC #/AREA URNS HPF: NORMAL /HPF

## 2024-02-21 PROCEDURE — 99214 OFFICE O/P EST MOD 30 MIN: CPT | Performed by: FAMILY MEDICINE

## 2024-02-21 PROCEDURE — 3078F DIAST BP <80 MM HG: CPT | Performed by: FAMILY MEDICINE

## 2024-02-21 PROCEDURE — 84100 ASSAY OF PHOSPHORUS: CPT

## 2024-02-21 PROCEDURE — 36415 COLL VENOUS BLD VENIPUNCTURE: CPT

## 2024-02-21 PROCEDURE — 82306 VITAMIN D 25 HYDROXY: CPT

## 2024-02-21 PROCEDURE — 82043 UR ALBUMIN QUANTITATIVE: CPT

## 2024-02-21 PROCEDURE — 82570 ASSAY OF URINE CREATININE: CPT | Mod: 91

## 2024-02-21 PROCEDURE — 84550 ASSAY OF BLOOD/URIC ACID: CPT

## 2024-02-21 PROCEDURE — 80053 COMPREHEN METABOLIC PANEL: CPT

## 2024-02-21 PROCEDURE — 81001 URINALYSIS AUTO W/SCOPE: CPT

## 2024-02-21 PROCEDURE — 85025 COMPLETE CBC W/AUTO DIFF WBC: CPT

## 2024-02-21 PROCEDURE — 3074F SYST BP LT 130 MM HG: CPT | Performed by: FAMILY MEDICINE

## 2024-02-21 RX ORDER — METHYLPREDNISOLONE 4 MG/1
TABLET ORAL
Qty: 21 TABLET | Refills: 0 | Status: SHIPPED | OUTPATIENT
Start: 2024-02-21 | End: 2024-03-07

## 2024-02-21 RX ORDER — AZITHROMYCIN 250 MG/1
TABLET, FILM COATED ORAL
Qty: 6 TABLET | Refills: 0 | Status: SHIPPED | OUTPATIENT
Start: 2024-02-21 | End: 2024-03-07

## 2024-02-21 RX ORDER — DEXTROMETHORPHAN HYDROBROMIDE AND PROMETHAZINE HYDROCHLORIDE 15; 6.25 MG/5ML; MG/5ML
5 SYRUP ORAL EVERY 4 HOURS PRN
Qty: 120 ML | Refills: 0 | Status: SHIPPED | OUTPATIENT
Start: 2024-02-21 | End: 2024-03-07

## 2024-02-21 RX ORDER — DOXYCYCLINE HYCLATE 100 MG
100 TABLET ORAL 2 TIMES DAILY
Qty: 20 TABLET | Refills: 0 | Status: SHIPPED | OUTPATIENT
Start: 2024-02-21 | End: 2024-03-02

## 2024-02-21 RX ORDER — BENZONATATE 100 MG/1
100 CAPSULE ORAL 3 TIMES DAILY PRN
Qty: 60 CAPSULE | Refills: 0 | Status: SHIPPED | OUTPATIENT
Start: 2024-02-21 | End: 2024-03-07

## 2024-02-21 ASSESSMENT — FIBROSIS 4 INDEX: FIB4 SCORE: 1.11

## 2024-02-21 ASSESSMENT — PATIENT HEALTH QUESTIONNAIRE - PHQ9: CLINICAL INTERPRETATION OF PHQ2 SCORE: 0

## 2024-02-21 NOTE — PROGRESS NOTES
Chief Complaint   Patient presents with    Cough     Cough started on Jan 28     Other     Low grade fever, feeling tired, not sleeping well. Losing voice. After seeing Phleps on 02/07, was getting better, but since Friday, she is feeling worst.         HPI: Patient is a 68 y.o. female complaining of 25 days of illness including: painful cough, productive cough, low grade fever (Tm 10.5), fatigue. Patient was seen on 2/7/24 for this as well.  Mucus is: green.  Similarly ill exposures: yes.  Treatments tried: Zpack, Promethazine-DM, Tessalon Perles  She  reports that she quit smoking about 49 years ago. Her smoking use included cigarettes. She has never used smokeless tobacco..     ROS:  Patient denies any new abdominal pain, nausea. diarrhea  I reviewed the patient's medications, allergies and medical history:  Current Outpatient Medications   Medication Sig Dispense Refill    gabapentin (NEURONTIN) 300 MG Cap Take 1 Capsule by mouth 3 times a day. 90 Capsule 5    cyclobenzaprine (FLEXERIL) 10 mg Tab Take 1 Tablet by mouth 3 times a day as needed for Muscle Spasms. 90 Tablet 1    clindamycin (CLEOCIN) 150 MG Cap Take 3 capsules PO prior to dental procedure 6 Capsule 0    gabapentin (NEURONTIN) 300 MG Cap Take 1 Capsule by mouth 2 times a day. 180 Capsule 1    VELTASSA 8.4 g Pack Take 8.4 g by mouth every day.      ezetimibe (ZETIA) 10 MG Tab Take 1 Tablet by mouth every day. 90 Tablet 3    Alirocumab (PRALUENT) 150 MG/ML Solution Auto-injector Inject 150 mg under the skin every 14 days. 3 mL 3    gabapentin (NEURONTIN) 300 MG Cap 1 capsule PO BID. May slowly increase to a max of 900mg PO BID 90 Capsule 2    Insulin Aspart, w/Niacinamide, (FIASP) 100 UNIT/ML Solution Inject 2.2 Units as directed one time.      rosuvastatin (CRESTOR) 40 MG tablet Take 1 Tablet by mouth at bedtime. 90 Tablet 3    sodium chloride (SALT) 1 GM Tab Take 2 g by mouth every day.      insulin infusion pump Device Inject  under the skin  "continuous. Patient's own SQ insulin pump    Type of Insulin: Fiasp  Last change of tubing: 3/19/2023 - Change tubing and site every 72 hours    Dosing:  Basal rate:   0000 - 0329 = 0.5 units/hr   0330 - 1129 = 0.85 units/hr   1130 - 1359 = 0.5 units/hr              1400 - 1759 = 0.45 units/hr              1800 - 2359 = 0.5 units/hr    Bolus ratio:   1 unit : 12 g carbohydrate at  (breakfast, lunch, dinner, snacks)      Correction ratio:    1 units for every 50 over 150 mg/dL    Disconnect pump if patient becomes hypoglycemic and altered.      LEVOXYL 100 MCG Tab Take  mcg by mouth every morning on an empty stomach. Takes 1/2 tab on Sundays      pantoprazole (PROTONIX) 40 MG Tablet Delayed Response Take 1 Tablet by mouth every day.      Cholecalciferol (VITAMIN D-3 PO) Take 5,000 mg by mouth every evening.      azithromycin (ZITHROMAX) 250 MG Tab Take 2 tablets on day 1. Then take 1 tablet days 2-5. (Patient not taking: Reported on 2/21/2024) 6 Tablet 0    promethazine-dextromethorphan (PROMETHAZINE-DM) 6.25-15 MG/5ML syrup Take 5 mL by mouth every four hours as needed for Cough. (Patient not taking: Reported on 2/21/2024) 120 mL 0    lisinopril (PRINIVIL) 20 MG Tab Take 20 mg by mouth every evening. (Patient not taking: Reported on 2/21/2024)       No current facility-administered medications for this visit.     Ceclor [cefaclor], Augmentin, Naproxen, Tape, Amlodipine, Amoxicillin-pot clavulanate, and Cefaclor  Past Medical History:   Diagnosis Date    Acute nasopharyngitis 04/29/2022    \"I'm at the tailend of a cold\" .  Symptoms started 4/26/22 included exhaustion, and stuffy nose, cough.  Symptoms are better but pt. reports she still feels \"A little congested\"    Anesthesia     Post op N/V    Blocked artery Around 2019    Carotid artery- had surgery.  Pt. follows up with Dr. Surya Garcia every year.    Cataract 2023    no surgery yet    Diabetes type 1, controlled (HCC) 11/01/2010    Insulin pump in " "place.  Endocrinologist is Dr. Dewey.     Dyslipidemia 11/01/2010    High cholesterol     Hypertension     pt states controlled on meds    Hypothyroidism 11/01/2010    Indigestion     Insulin pump in place 05/18/2011    Left carotid artery stenosis - severe 2019     PONV (postoperative nausea and vomiting)     Routine health maintenance 05/18/2011    Urinary incontinence     \"If I sneeze, I leak\"        EXAM:  /70 (BP Location: Right arm, Patient Position: Sitting, BP Cuff Size: Adult)   Pulse 88   Temp 36.7 °C (98.1 °F) (Temporal)   Ht 1.702 m (5' 7\")   Wt 68.9 kg (152 lb)   SpO2 98%   General: Alert, no conversational dyspnea or audible wheeze, non-toxic appearance but does appear fatigued  Eyes: PERRL, conjunctiva slightly injected, no eye discharge.  Ears: Normal pinnae,TM's normal bilaterally.  Nares: Patent with clear mucus.  Sinuses: non tender over maxillary / frontal sinuses.  Throat: Erythematous injection without exudate.   Neck: Supple, with no adenopathy.  Lungs: Clear to auscultation bilaterally, no wheeze, crackles or rhonchi.   Heart: Regular rate without murmur.  Skin: Warm and dry without rash.     ASSESSMENT:   1. Acute cough    2. Acute URI       Given the length of patient's illness and her other comorbidities such as type 1 diabetes and use of a biologic medication to treat her cholesterol she is higher risk for bacterial infection so will send in a second round of azithromycin as well as a prescription for doxycycline.  My primary diagnosis though is likely bronchitis at this time so we will also send in Rx for a steroid Dosepak.  Discussed side effects of these medications occluding elevated blood sugar levels and possible vaginal yeast infection.  Sending prescription for Diflucan if patient does get a yeast infection.  "

## 2024-02-22 ENCOUNTER — TELEPHONE (OUTPATIENT)
Dept: VASCULAR LAB | Facility: MEDICAL CENTER | Age: 69
End: 2024-02-22
Payer: MEDICARE

## 2024-02-22 DIAGNOSIS — Z98.890 HISTORY OF LEFT-SIDED CAROTID ENDARTERECTOMY: ICD-10-CM

## 2024-02-22 NOTE — TELEPHONE ENCOUNTER
Established patient  Chart prep for upcoming appointment with Dr. Gray    Any pending/incomplete orders from last visit? No, all orders completed.  Were any records requested?  No  Correct appointment type (New/Established/virtual)? Yes  Referral up to date? Yes renewal ordered and sent to provider to co-sign   Referral attached to appointment (renewals and New patients only)? Yes renewal ordered and sent to provider to co-sign

## 2024-02-27 ENCOUNTER — OFFICE VISIT (OUTPATIENT)
Dept: VASCULAR LAB | Facility: MEDICAL CENTER | Age: 69
End: 2024-02-27
Attending: FAMILY MEDICINE
Payer: MEDICARE

## 2024-02-27 VITALS
HEIGHT: 67 IN | DIASTOLIC BLOOD PRESSURE: 68 MMHG | HEART RATE: 90 BPM | BODY MASS INDEX: 23.07 KG/M2 | WEIGHT: 147 LBS | SYSTOLIC BLOOD PRESSURE: 119 MMHG

## 2024-02-27 DIAGNOSIS — I10 PRIMARY HYPERTENSION: ICD-10-CM

## 2024-02-27 DIAGNOSIS — I67.9 SMALL VESSEL DISEASE, CEREBROVASCULAR: ICD-10-CM

## 2024-02-27 DIAGNOSIS — I25.10 CORONARY ARTERY CALCIFICATION SEEN ON CT SCAN: ICD-10-CM

## 2024-02-27 DIAGNOSIS — Z79.02 LONG TERM (CURRENT) USE OF ANTITHROMBOTICS/ANTIPLATELETS: ICD-10-CM

## 2024-02-27 DIAGNOSIS — E78.41 ELEVATED LIPOPROTEIN(A): ICD-10-CM

## 2024-02-27 DIAGNOSIS — R93.1 AGATSTON CORONARY ARTERY CALCIUM SCORE BETWEEN 100 AND 199: ICD-10-CM

## 2024-02-27 DIAGNOSIS — E87.1 CHRONIC HYPONATREMIA: ICD-10-CM

## 2024-02-27 DIAGNOSIS — I44.4 LAFB (LEFT ANTERIOR FASCICULAR BLOCK): ICD-10-CM

## 2024-02-27 DIAGNOSIS — I77.9 CAROTID ARTERY DISEASE, UNSPECIFIED LATERALITY, UNSPECIFIED TYPE (HCC): ICD-10-CM

## 2024-02-27 DIAGNOSIS — E78.49 OTHER HYPERLIPIDEMIA: ICD-10-CM

## 2024-02-27 DIAGNOSIS — Z98.890 HISTORY OF LEFT-SIDED CAROTID ENDARTERECTOMY: ICD-10-CM

## 2024-02-27 DIAGNOSIS — E10.69 TYPE 1 DIABETES MELLITUS WITH OTHER SPECIFIED COMPLICATION (HCC): ICD-10-CM

## 2024-02-27 DIAGNOSIS — R74.8 ELEVATED ALKALINE PHOSPHATASE LEVEL: ICD-10-CM

## 2024-02-27 DIAGNOSIS — E03.9 ACQUIRED HYPOTHYROIDISM: ICD-10-CM

## 2024-02-27 DIAGNOSIS — Z96.41 INSULIN PUMP IN PLACE: ICD-10-CM

## 2024-02-27 PROCEDURE — 3074F SYST BP LT 130 MM HG: CPT | Performed by: FAMILY MEDICINE

## 2024-02-27 PROCEDURE — 99212 OFFICE O/P EST SF 10 MIN: CPT

## 2024-02-27 PROCEDURE — 99214 OFFICE O/P EST MOD 30 MIN: CPT | Performed by: FAMILY MEDICINE

## 2024-02-27 PROCEDURE — 3078F DIAST BP <80 MM HG: CPT | Performed by: FAMILY MEDICINE

## 2024-02-27 ASSESSMENT — ENCOUNTER SYMPTOMS
FOCAL WEAKNESS: 1
DIZZINESS: 0
FEVER: 0
ORTHOPNEA: 0
HEADACHES: 0
CLAUDICATION: 0
WHEEZING: 0
SPUTUM PRODUCTION: 0
COUGH: 0
HEMOPTYSIS: 0
TINGLING: 0
CHILLS: 0
SHORTNESS OF BREATH: 0
TREMORS: 0
PALPITATIONS: 0
PND: 0

## 2024-02-27 ASSESSMENT — FIBROSIS 4 INDEX: FIB4 SCORE: 1.04

## 2024-02-27 NOTE — PROGRESS NOTES
[facility] - FOLLOW-UP  02/27/24     Ines Long has been referred for evaluation and management of dyslipidemia  Referral Source: Dr. Card (cardiology)     Subjective   Interval hx/concerns: last seen 8/2023,  denies new sx, Tolerating meds.    Reports ongoing fluid restrictions (50oz/day).  Seeing nephrology and wanting to use lasix but she is hesitant.      LIFESTYLE MGMT  Change in weight:  DECREASED   Wt Readings from Last 3 Encounters:   02/27/24 66.7 kg (147 lb)   02/21/24 68.9 kg (152 lb)   02/07/24 65.6 kg (144 lb 9.6 oz)   Exercise habits:  limited   Dietary patterns: low fat  Etoh: No  Reported barriers to care: limited walking due to imbalance and ongoing eval with neurology     MED MGMT  Current Prescription Lipid Lowering Medications - including dose:   Statin: rosuva 40mg  Non-Statin: zetia 10mg, praluent 150mg   Current Lipid Lowering and Related Supplements: omega 3 FAs   Any Current Side Effects Potentially Related to Lipid Lowering therapy? No  Current Adherence to Lipid Lowering Therapies: Complete    PERTINENT HLD PMHX  Age at Initial Diagnosis of Dyslipidemia: >5yrs     History of ASCVD: Documented clinical evidence of ASCVD: and Carotid plaque, >50% stenosis,     # s/p L CEA 2020 with Dr. Garcia.  No new sx, no prior CVA/TIA.     # Cerebral small vessel dz -per MR, no prior CVA/TIA.      # elevated CACS = 180.  No hx of ascvd, no sx.     Secondary causes of dyslipidemia: none, stable TSH on tx   Other Established (non-atherosclerotic) Vascular Disease, if Present: None  Previously Attempted Interventions for Lipids - including outcome  Statin: None     Outcome: N/A  Non-Statin: None  Outcome: N/A  Any Previous History of Statin Intolerance? No  Baseline Lipids (no off-tx lipid panel available in our system):   Latest Reference Range & Units 02/23/15 07:48   Cholesterol,Tot 100 - 199 mg/dL 245 (H)   Triglycerides 0 - 149 mg/dL 78   HDL >=40 mg/dL 99   LDL <100 mg/dL 130  (H)     Anticoagulation/antiplats: Yes, Details: ASA 81mg , no bleeding noted     HTN: Home BP los/70s, Adherence to current HTN meds: great     T1D: Stable, on insuling pump  Last A1c   Lab Results   Component Value Date    HBA1C 6.9 (H) 10/12/2023       Current Outpatient Medications:     promethazine-dextromethorphan, 5 mL, Oral, Q4HRS PRN, Taking    methylPREDNISolone, Per  directions on package, Taking    azithromycin, Take 2 tablets on day 1. Then take 1 tablet days 2-5., Taking    doxycycline, 100 mg, Oral, BID, Taking    benzonatate, 100 mg, Oral, TID PRN, Taking    gabapentin, 300 mg, Oral, TID, Taking    cyclobenzaprine, 10 mg, Oral, TID PRN, Taking    clindamycin, Take 3 capsules PO prior to dental procedure, Taking    gabapentin, 300 mg, Oral, BID, Taking    ezetimibe, 10 mg, Oral, DAILY, Taking    Praluent, 150 mg, Subcutaneous, Q14 DAYS, Taking    gabapentin, 1 capsule PO BID. May slowly increase to a max of 900mg PO BID, Taking    Fiasp, 2.2 Units, Injection, Once, Taking    rosuvastatin, 40 mg, Oral, QHS, Taking    sodium chloride, 2 g, Oral, DAILY, Taking    insulin infusion pump, Inject  under the skin continuous. Patient's own SQ insulin pump  Type of Insulin: Fiasp Last change of tubing: 3/19/2023 - Change tubing and site every 72 hours  Dosing: Basal rate:  0000 - 0329 = 0.5 units/hr  0330 - 1129 = 0.85 units/hr  1130 - 1359 = 0.5 units/hr             1400 - 1759 = 0.45 units/hr             1800 - 2359 = 0.5 units/hr  Bolus ratio:  1 unit : 12 g carbohydrate at  (breakfast, lunch, dinner, snacks)    Correction ratio:   1 units for every 50 over 150 mg/dL  Disconnect pump if patient becomes hypoglycemic and altered., Taking    Levoxyl,  mcg, Oral, AM ES, Taking    pantoprazole, 40 mg, Oral, DAILY, Taking    Cholecalciferol (VITAMIN D-3 PO), 5,000 mg, Oral, Q EVENING, Taking    Veltassa, 8.4 g, Oral, DAILY    Social History     Tobacco Use    Smoking status: Former      "Current packs/day: 0.00     Types: Cigarettes     Quit date: 1975     Years since quittin.1    Smokeless tobacco: Never   Vaping Use    Vaping Use: Never used   Substance Use Topics    Alcohol use: Yes     Alcohol/week: 1.2 oz     Types: 2 Glasses of wine per week     Comment: about 3-4 drinks per week.     Drug use: Yes     Comment: tried CBD gummies; no THC     Review of Systems   Constitutional:  Negative for chills, fever and malaise/fatigue.   Respiratory:  Negative for cough, hemoptysis, sputum production, shortness of breath and wheezing.    Cardiovascular:  Negative for chest pain, palpitations, orthopnea, claudication, leg swelling and PND.   Neurological:  Positive for focal weakness (at baseline, gait slow but stable). Negative for dizziness, tingling, tremors and headaches.       Objective    Vitals:    24 1044 24 1048   BP: 138/68 119/68   BP Location: Left arm Left arm   Patient Position: Sitting Sitting   BP Cuff Size: Adult Adult   Pulse: 93 90   Weight: 66.7 kg (147 lb)    Height: 1.702 m (5' 7\")      BP Readings from Last 5 Encounters:   24 119/68   24 120/70   24 122/68   24 (!) 142/60   23 (!) 110/38     Physical Exam  Constitutional:       General: She is not in acute distress.     Appearance: Normal appearance. She is well-developed. She is not diaphoretic.   HENT:      Head: Normocephalic and atraumatic.   Eyes:      Extraocular Movements: Extraocular movements intact.      Conjunctiva/sclera: Conjunctivae normal.   Cardiovascular:      Rate and Rhythm: Normal rate and regular rhythm.      Pulses: Normal pulses.      Heart sounds: Normal heart sounds. No murmur heard.     No friction rub. No gallop.   Pulmonary:      Effort: Pulmonary effort is normal. No respiratory distress.      Breath sounds: Normal breath sounds.   Musculoskeletal:         General: No tenderness. Normal range of motion.      Cervical back: Normal range of motion and neck " supple.   Skin:     General: Skin is warm and dry.      Coloration: Skin is not pale.      Findings: No rash.   Neurological:      Mental Status: She is alert and oriented to person, place, and time.      Cranial Nerves: No cranial nerve deficit.   Psychiatric:         Mood and Affect: Mood and affect normal.         Speech: Speech normal.       DATA REVIEW:  Most Recent Lipid Panel:   Lab Results   Component Value Date    CHOLSTRLTOT 108 01/03/2024    TRIGLYCERIDE 64 01/03/2024    HDL 66 01/03/2024    LDL 29 01/03/2024     Lab Results   Component Value Date/Time    LIPOPROTA 149 (H) 08/01/2023 09:36 AM       Latest Reference Range & Units 03/08/23 09:10 08/01/23 09:36   Lipoprotein (a) <=29 mg/dL 286 (H) 149 (H)     Other Pertinent Blood Work:   Lab Results   Component Value Date    SODIUM 131 (L) 02/21/2024    POTASSIUM 4.8 02/21/2024    CHLORIDE 96 02/21/2024    CO2 22 02/21/2024    ANION 13.0 02/21/2024    GLUCOSE 56 (L) 02/21/2024    BUN 13 02/21/2024    CREATININE 0.60 02/21/2024    CALCIUM 9.8 02/21/2024    ASTSGOT 32 02/21/2024    ALTSGPT 39 02/21/2024    ALKPHOSPHAT 129 (H) 02/21/2024    TBILIRUBIN 0.2 02/21/2024    ALBUMIN 4.4 02/21/2024    AGRATIO 1.5 02/21/2024    LIPOPROTA 149 (H) 08/01/2023    TSHULTRASEN 1.000 01/03/2024     VASCULAR IMAGING:     MR brain 2021       CACS 11/2022   Coronary calcification:  LMA - 0.0  LCX - 0.0  LAD - 180.1  RCA - 0.0  PDA - 0.0   Total Calcium Score: 180.1   Percentile: Calcium score is above the 75th percentile for the patient's age and sex.   Other findings:  Heart: Normal size.  Lungs: Clear.  Mediastinum: Normal.  Upper abdomen: Normal.    Carotid 12/2022   Normal right carotid exam.    Post left internal carotid endarterectomy.    Mild stenosis of the left internal carotid artery (<50%).     Echo 3/2023  No prior study is available for comparison.   Normal left ventricular systolic function.   No evidence of valvular abnormality based on Doppler evaluation.      Cardiac PET 6/30/23   NUCLEAR IMAGING INTERPRETATION   Normal myocardial perfusion with no ischemia.   Normal left ventricular wall motion.  LV ejection fraction = 77%.   ECG INTERPRETATION   No ischemic changes with Dipyridamole           ASSESSMENT AND PLAN  1. Carotid artery disease, unspecified laterality, unspecified type (HCC)        2. Type 1 diabetes mellitus with other specified complication (HCC)        3. History of left-sided carotid endarterectomy        4. Insulin pump in place        5. Coronary artery calcification seen on CT scan        6. Agatston coronary artery calcium score between 100 and 199        7. Primary hypertension        8. Chronic hyponatremia        9. Other hyperlipidemia  Lipid Profile      10. Elevated lipoprotein(a)        11. Small vessel disease, cerebrovascular        12. Acquired hypothyroidism        13. LAFB (left anterior fascicular block)        14. Long term (current) use of antithrombotics/antiplatelets        15. Elevated alkaline phosphatase level          Patient Type, check all that apply: Secondary Prevention, T1D, polyvascular     Established Atherosclerotic Cardiovascular Disease (ASCVD): yes    1) L carotid stenosis, s/p L CEA 2020 (Dr. Garcia)- stable, asymptomatic   No prior CVA/TIA   12/2022 duplex = stable, has been stable >2yrs   - lifelong surveillance:  duplex Q2yr (12/2024), sooner if new sx and CTA if progressive dz     2) coronary ave per CT, CACS = 180 (88%ile for age/gender) - no sx   6/2023 cPET = normal   - indicates higher short/longer-term ASCVD risk, so aggressive tx goals   - continue secondary med mgmt   - defer functional studies to cardiology decision or if new s/s develop     3) small vessel dz, cerebral per MR - stable, no sx.  No dementia or CVA/TIA  - continue strict BP and lipid mgmt     Other Established (non-atherosclerotic) Vascular Disease, if Present: None    Evidence of Heterozygous Familial Hypercholesterolemia (FH): No    Though father  age 59 from CVA and apparently had major HLD disorder   FH genotyping:  not recommended    ACC/AHA Indication for Statin Therapy, bryan all that apply:  Secondary Prevention - Very high risk ASCVD - 2 major events or 1 event with other high-risk conditions    Calculated Risk for ASCVD, if applicable    The ASCVD Risk score (Alma Delia MORATAYA, et al., 2019) failed to calculate., N/A    Other Significant Risk Markers, if any, bryan all that apply   elevated coronary calcium score  Severely elevated lp(a) = 286 mg/dl - signficant prothrombotic and atherogenic risks  - 40+% reduction on praluent   - echo normal  - no AV stenosis     Goal LDL-C and nonHDL-C based on Clinic Protocol  LDL-C <55 due to polyvascular, severe lp(a) elevation   At goal? Yes, 2024    - as reviewed with pt, multiple safety analyses (SAMI, Lancet. 2017 Oct 28;390(77601):7120-2448;  GHULAM, N Engl J Med 2017; 377:633-643) completed on very low LDL w/o indications of risk or complications.    - reviewed that she continues to have linear reduction in ASCVD risk with lower LDL-C w/o a floor limit, so we have opted to continue current tx     LIFESTYLE INTERVENTIONS:    SMOKING:    reports that she quit smoking about 49 years ago. Her smoking use included cigarettes. She has never used smokeless tobacco.   - continued complete avoidance of all tobacco products     PHYSICAL ACTIVITY: continue healthy activity to improve CV fitness.  In general, targeting >150min/week of moderate-level activity or as much as tolerated in light of functional status and co-morbidities     WEIGHT MANAGEMENT AND NUTRITION: Mediterranean style dietary approach  and Provide specific dietary approach handouts      LIPID LOWERING MEDICATION MANAGEMENT:     Statin Therapy Recommendations from Today’s Visit:   - continue rosuva 40mg daily     Non-Statin Medications Recommendations from Today’s Visit:   - continue zetia 10mg daily (consider stopping if LDL  <10-20 in future)  - continue praluent 150mg Q2wks (preferred agent)  - excellent response     Indication for PCSK9 Inhibitor, if applicable:   PSCK9i indicated per 2018 ACC/AHA guidelines in this patient with established ASCVD whose LDL-C remains above threshold of 70 mg/dl despite maximally tolerated statin therapy  - - FOURIER trial yielded evidence that lp(a) reduction with Repatha led to further risk reduction for ASCVD independent of LDL-C lowering, thus very high risk patients with high lp(a) would benefit disproportionately from lipid-lowering strategy that includes pcsk9i (O'Coty, et al, Circulation 2019).    - ODYSSEY Outcomes trial of patients with an acute coronary syndrome, alirocumab reduced Lp(a) by 5 mg/dL and reduced the risk of major adverse cardiovascular evens (hazard ratio 0.85, 95% CI 0.78-0.93). The beneficial impact of Lp(a) lowering by alirocumab was independent of its lowering of LDL-C (Denis et al, [facility], 2020).     Supplements Recommended at this visit: None     RECOMMENDATIONS FOR OTHER CV RISK FACTORS:    1) BLOOD PRESSURE MANAGEMENT:  ACC/AHA (2017) goal <130/80  Home BP at goal:  yes  Office BP at goal:  yes, WCE noted   24h ABPM: not ordered to date  Plan:   Monitoring:   - start/continue home BP monitoring, reviewed correct technique, provide BP log and instructions  - order 24h ABPM:  NO  - monitor lytes/gfr routinely   - contact office if BP consistently >140/>90 for discussion of tx adjustments   Medications:  - continue losartan 50mg daily - monitor K+, consider d/c if not improving  - stopped amlodipine 5mg due to edema     2) Glycemic Status: Diabetic, T1 with vasc dz, HLD, HTN  Goal A1c < 7.5 reasonable but defer to endocrine MD   Lab Results   Component Value Date    HBA1C 6.9 (H) 10/12/2023      Lab Results   Component Value Date/Time    MALBCRT see below 02/21/2024 09:18 AM    MICROALBUR <1.2 02/21/2024 09:18 AM   Plan:  - continue current medication plan   -  recommmend for routine care with PCP (or endocrine) to include regular A1c monitoring, annual albumin/creatinine ratio (ACR), annual diabetic retinopathy screening, foot exams, annual flu vaccine, and updates to pneumonia vaccines as appropriate      ANTITHROMBOTIC THERAPY continue ASA 81mg daily     OTHER ISSUES:    # gait instability - ongoing care with PM&R and neurology for further assessment     # hypothyroidism, stable  Tolerating meds, TSH normal   - continue current treatment plan, defer mgmt to PCP      # hyperkalemia = resolved  Unclear etiology  - taking veltassa per nephrology, ongoing lab surveillance   - did not tolerate furosemide    # hypoNa, chronic - prior worst 122, currently mild   - ongoing care with nephrology   - continue fluid restrictions  - avoid lasix will not alter Na but will reduce K+   - best to avoid thiazides     # elevated alk phos - recommend f/u with PCP for further evaluation    HCC Gap Form    Diagnosis: E10.43 - Type 1 diabetes mellitus with diabetic autonomic (poly)neuropathy (HCC)  Assessment and plan: Chronic, stable, as based on today's assessment and impact on other conditions evaluated today. Continue with current treatment plan:  Follow-up with specialist as directed, but at least annually.  Last edited 02/27/24 17:41 PST by Ghassan Gray M.D.          Studies Ordered at Todays Visit: carotid duplex 12/2024 - RN to track   Blood Work Ordered At Today’s visit: see orders   Follow-Up: 6mo, sooner prn     Ghassan Gray M.D.  [facility], Board-certified clinical lipidologist   [facility]   [facility]   [phone]

## 2024-02-29 ENCOUNTER — PATIENT OUTREACH (OUTPATIENT)
Dept: HEALTH INFORMATION MANAGEMENT | Facility: OTHER | Age: 69
End: 2024-02-29
Payer: MEDICARE

## 2024-02-29 DIAGNOSIS — N18.2 BENIGN HYPERTENSIVE HEART AND KIDNEY DISEASE WITH DIASTOLIC CHF, NYHA CLASS II AND CKD STAGE 2 (HCC): ICD-10-CM

## 2024-02-29 DIAGNOSIS — E08.00 DIABETES MELLITUS DUE TO UNDERLYING CONDITION WITH HYPEROSMOLARITY WITHOUT COMA, WITH LONG-TERM CURRENT USE OF INSULIN (HCC): ICD-10-CM

## 2024-02-29 DIAGNOSIS — I50.30 BENIGN HYPERTENSIVE HEART AND KIDNEY DISEASE WITH DIASTOLIC CHF, NYHA CLASS II AND CKD STAGE 2 (HCC): ICD-10-CM

## 2024-02-29 DIAGNOSIS — E78.49 OTHER HYPERLIPIDEMIA: ICD-10-CM

## 2024-02-29 DIAGNOSIS — I13.0 BENIGN HYPERTENSIVE HEART AND KIDNEY DISEASE WITH DIASTOLIC CHF, NYHA CLASS II AND CKD STAGE 2 (HCC): ICD-10-CM

## 2024-02-29 DIAGNOSIS — I10 PRIMARY HYPERTENSION: ICD-10-CM

## 2024-02-29 DIAGNOSIS — Z79.4 DIABETES MELLITUS DUE TO UNDERLYING CONDITION WITH HYPEROSMOLARITY WITHOUT COMA, WITH LONG-TERM CURRENT USE OF INSULIN (HCC): ICD-10-CM

## 2024-02-29 RX ORDER — LISINOPRIL 10 MG/1
10 TABLET ORAL DAILY
COMMUNITY
End: 2024-03-25 | Stop reason: SDUPTHER

## 2024-02-29 SDOH — HEALTH STABILITY: PHYSICAL HEALTH: ON AVERAGE, HOW MANY MINUTES DO YOU ENGAGE IN EXERCISE AT THIS LEVEL?: 40 MIN

## 2024-02-29 SDOH — ECONOMIC STABILITY: TRANSPORTATION INSECURITY
IN THE PAST 12 MONTHS, HAS LACK OF TRANSPORTATION KEPT YOU FROM MEETINGS, WORK, OR FROM GETTING THINGS NEEDED FOR DAILY LIVING?: NO

## 2024-02-29 SDOH — ECONOMIC STABILITY: INCOME INSECURITY: IN THE LAST 12 MONTHS, WAS THERE A TIME WHEN YOU WERE NOT ABLE TO PAY THE MORTGAGE OR RENT ON TIME?: NO

## 2024-02-29 SDOH — ECONOMIC STABILITY: HOUSING INSECURITY
IN THE LAST 12 MONTHS, WAS THERE A TIME WHEN YOU DID NOT HAVE A STEADY PLACE TO SLEEP OR SLEPT IN A SHELTER (INCLUDING NOW)?: NO

## 2024-02-29 SDOH — ECONOMIC STABILITY: FOOD INSECURITY: WITHIN THE PAST 12 MONTHS, THE FOOD YOU BOUGHT JUST DIDN'T LAST AND YOU DIDN'T HAVE MONEY TO GET MORE.: NEVER TRUE

## 2024-02-29 SDOH — ECONOMIC STABILITY: FOOD INSECURITY: WITHIN THE PAST 12 MONTHS, YOU WORRIED THAT YOUR FOOD WOULD RUN OUT BEFORE YOU GOT MONEY TO BUY MORE.: NEVER TRUE

## 2024-02-29 SDOH — HEALTH STABILITY: PHYSICAL HEALTH: ON AVERAGE, HOW MANY DAYS PER WEEK DO YOU ENGAGE IN MODERATE TO STRENUOUS EXERCISE (LIKE A BRISK WALK)?: 1 DAY

## 2024-02-29 SDOH — ECONOMIC STABILITY: HOUSING INSECURITY: IN THE LAST 12 MONTHS, HOW MANY PLACES HAVE YOU LIVED?: 1

## 2024-02-29 SDOH — ECONOMIC STABILITY: TRANSPORTATION INSECURITY
IN THE PAST 12 MONTHS, HAS THE LACK OF TRANSPORTATION KEPT YOU FROM MEDICAL APPOINTMENTS OR FROM GETTING MEDICATIONS?: NO

## 2024-02-29 ASSESSMENT — LIFESTYLE VARIABLES
HOW OFTEN DO YOU HAVE SIX OR MORE DRINKS ON ONE OCCASION: NEVER
HOW MANY STANDARD DRINKS CONTAINING ALCOHOL DO YOU HAVE ON A TYPICAL DAY: 1 OR 2
HOW OFTEN DO YOU HAVE A DRINK CONTAINING ALCOHOL: 2-4 TIMES A MONTH
SKIP TO QUESTIONS 9-10: 1
AUDIT-C TOTAL SCORE: 2

## 2024-02-29 ASSESSMENT — SOCIAL DETERMINANTS OF HEALTH (SDOH)
HOW HARD IS IT FOR YOU TO PAY FOR THE VERY BASICS LIKE FOOD, HOUSING, MEDICAL CARE, AND HEATING?: NOT HARD AT ALL
WITHIN THE LAST YEAR, HAVE TO BEEN RAPED OR FORCED TO HAVE ANY KIND OF SEXUAL ACTIVITY BY YOUR PARTNER OR EX-PARTNER?: NO
DO YOU BELONG TO ANY CLUBS OR ORGANIZATIONS SUCH AS CHURCH GROUPS UNIONS, FRATERNAL OR ATHLETIC GROUPS, OR SCHOOL GROUPS?: NO
WITHIN THE LAST YEAR, HAVE YOU BEEN KICKED, HIT, SLAPPED, OR OTHERWISE PHYSICALLY HURT BY YOUR PARTNER OR EX-PARTNER?: NO
HOW OFTEN DO YOU ATTEND CHURCH OR RELIGIOUS SERVICES?: NEVER
IN THE PAST 12 MONTHS, HAS THE ELECTRIC, GAS, OIL, OR WATER COMPANY THREATENED TO SHUT OFF SERVICE IN YOUR HOME?: NO
IN A TYPICAL WEEK, HOW MANY TIMES DO YOU TALK ON THE PHONE WITH FAMILY, FRIENDS, OR NEIGHBORS?: MORE THAN THREE TIMES A WEEK
HOW OFTEN DO YOU ATTENT MEETINGS OF THE CLUB OR ORGANIZATION YOU BELONG TO?: NEVER
WITHIN THE LAST YEAR, HAVE YOU BEEN AFRAID OF YOUR PARTNER OR EX-PARTNER?: NO
HOW OFTEN DO YOU GET TOGETHER WITH FRIENDS OR RELATIVES?: THREE TIMES A WEEK
WITHIN THE LAST YEAR, HAVE YOU BEEN HUMILIATED OR EMOTIONALLY ABUSED IN OTHER WAYS BY YOUR PARTNER OR EX-PARTNER?: NO

## 2024-02-29 ASSESSMENT — PATIENT HEALTH QUESTIONNAIRE - PHQ9: CLINICAL INTERPRETATION OF PHQ2 SCORE: 0

## 2024-02-29 NOTE — PROGRESS NOTES
Patient returned my initial message just before going in for a physical therapy appointment. She states that she will give me a call later today when she is done to discuss her enrollment.    1152 am:   Patient is done with physical therapy and amenable to enrollment she will give me a call this afternoon for our half an hour interview.     INITIAL CARE MANAGEMENT CARE PLAN/ASSESSMENT     I spoke with the patient this afternoon for her CCM enrollment. The patient wishes to enroll in the program so that she has someone to keep her on track and provide information and resources to help her be proactive and advocate for her care.     Medication Self-Management Goals:     Reviewed medications listed below with patient.      Current Outpatient Medications:     lisinopril (PRINIVIL) 10 MG Tab, Take 10 mg by mouth every day. Patient takes at night, Disp: , Rfl:     doxycycline (VIBRAMYCIN) 100 MG Tab, Take 1 Tablet by mouth 2 times a day for 10 days., Disp: 20 Tablet, Rfl: 0    cyclobenzaprine (FLEXERIL) 10 mg Tab, Take 1 Tablet by mouth 3 times a day as needed for Muscle Spasms. (Patient taking differently: Take 10 mg by mouth 2 times a day as needed for Muscle Spasms.), Disp: 90 Tablet, Rfl: 1    clindamycin (CLEOCIN) 150 MG Cap, Take 3 capsules PO prior to dental procedure, Disp: 6 Capsule, Rfl: 0    ezetimibe (ZETIA) 10 MG Tab, Take 1 Tablet by mouth every day., Disp: 90 Tablet, Rfl: 3    Alirocumab (PRALUENT) 150 MG/ML Solution Auto-injector, Inject 150 mg under the skin every 14 days., Disp: 3 mL, Rfl: 3    Insulin Aspart, w/Niacinamide, (FIASP) 100 UNIT/ML Solution, Inject 2.2 Units as directed one time., Disp: , Rfl:     rosuvastatin (CRESTOR) 40 MG tablet, Take 1 Tablet by mouth at bedtime., Disp: 90 Tablet, Rfl: 3    sodium chloride (SALT) 1 GM Tab, Take 2 g by mouth every day., Disp: , Rfl:     insulin infusion pump Device, Inject  under the skin continuous. Patient's own SQ insulin pump  Type of Insulin: Fiasp  Last change of tubing: 3/19/2023 - Change tubing and site every 72 hours  Dosing: Basal rate:  0000 - 0329 = 0.5 units/hr  0330 - 1129 = 0.85 units/hr  1130 - 1359 = 0.5 units/hr             1400 - 1759 = 0.45 units/hr             1800 - 2359 = 0.5 units/hr  Bolus ratio:  1 unit : 12 g carbohydrate at  (breakfast, lunch, dinner, snacks)    Correction ratio:   1 units for every 50 over 150 mg/dL  Disconnect pump if patient becomes hypoglycemic and altered., Disp: , Rfl:     LEVOXYL 100 MCG Tab, Take  mcg by mouth every morning on an empty stomach. Takes 1/2 tab on Sundays, Disp: , Rfl:     pantoprazole (PROTONIX) 40 MG Tablet Delayed Response, Take 1 Tablet by mouth every day., Disp: , Rfl:     Cholecalciferol (VITAMIN D-3 PO), Take 5,000 mg by mouth every evening., Disp: , Rfl:     promethazine-dextromethorphan (PROMETHAZINE-DM) 6.25-15 MG/5ML syrup, Take 5 mL by mouth every four hours as needed for Cough. (Patient not taking: Reported on 2/29/2024), Disp: 120 mL, Rfl: 0    methylPREDNISolone (MEDROL DOSEPAK) 4 MG Tablet Therapy Pack, Per  directions on package (Patient not taking: Reported on 2/29/2024), Disp: 21 Tablet, Rfl: 0    azithromycin (ZITHROMAX) 250 MG Tab, Take 2 tablets on day 1. Then take 1 tablet days 2-5. (Patient not taking: Reported on 2/29/2024), Disp: 6 Tablet, Rfl: 0    benzonatate (TESSALON) 100 MG Cap, Take 1 Capsule by mouth 3 times a day as needed for Cough. (Patient not taking: Reported on 2/29/2024), Disp: 60 Capsule, Rfl: 0    gabapentin (NEURONTIN) 300 MG Cap, Take 1 Capsule by mouth 3 times a day. (Patient not taking: Reported on 2/29/2024), Disp: 90 Capsule, Rfl: 5    gabapentin (NEURONTIN) 300 MG Cap, Take 1 Capsule by mouth 2 times a day. (Patient not taking: Reported on 2/29/2024), Disp: 180 Capsule, Rfl: 1    VELTASSA 8.4 g Pack, Take 8.4 g by mouth every day., Disp: , Rfl:     gabapentin (NEURONTIN) 300 MG Cap, 1 capsule PO BID. May slowly increase to a max of  900mg PO BID (Patient not taking: Reported on 2/29/2024), Disp: 90 Capsule, Rfl: 2         Goal:  Continue taking medications as prescribed.        Physical/Functional/Environmental Status:     Activities of Daily Living:  Bathing: independent   Dressing: independent  Grooming: independent  Mouth Care: independent  Toileting: independent  Climbing a Flight of Stairs: independent    Independent Activities of Daily Living:  Shopping: independent  Cooking: independent  Managing Medications: independent  Using the phone and looking up numbers: independent  Driving or using public transportation: independent  Managing Finances: independent        2/29/2024     3:10 PM   STEADI Fall Risk   STEADI Risk for Falling Score 7   One or more falls in the last year No   Advised to use a cane or walker to get around safely Yes   Feels unsteady when walking Yes   Steadies self on furniture while walking at home Yes   Worried about falling No   Needs to push with hands when rising from a chair Yes   Has trouble stepping up onto a curb / using stairs No   Often has to rush to the toilet Yes   Has lost some feeling in feet No   Takes medicine that makes him/her feel lightheaded or more tired than usual No   Takes medicine to sleep or improve mood Yes   Often feels sad or depressed No         Goal:  Remain free from falls, injuries    Social Determinants of Health       Financial Status:      Financial Resource Strain: Low Risk  (2/29/2024)    Overall Financial Resource Strain (CARDIA)     Difficulty of Paying Living Expenses: Not hard at all         Referred to CHW/SW:  NA       Transportation Status:      Transportation Needs: No Transportation Needs (2/29/2024)    PRAPARE - Transportation     Lack of Transportation (Medical): No     Lack of Transportation (Non-Medical): No        Referred to CHW/SW:  NA      Food Insecurity:      Food Insecurity: No Food Insecurity (2/29/2024)    Hunger Vital Sign     Worried About Running Out of  Food in the Last Year: Never true     Ran Out of Food in the Last Year: Never true        Referred to CHW/SW:  NA       Housing Status:     Housing Stability: Low Risk  (2/29/2024)    Housing Stability Vital Sign     Unable to Pay for Housing in the Last Year: No     Number of Places Lived in the Last Year: 1     Unstable Housing in the Last Year: No        Referred to CHW/SW:  NA      Social Connections:     Social Connections: Moderately Isolated (2/29/2024)    Social Connection and Isolation Panel [NHANES]     Frequency of Communication with Friends and Family: More than three times a week     Frequency of Social Gatherings with Friends and Family: Three times a week     Attends Oriental orthodox Services: Never     Active Member of Clubs or Organizations: No     Attends Club or Organization Meetings: Never     Marital Status:         Referred to CHW/SW:  NA      Mental/Behavioral/Psychosocial Status:        9/27/2023     8:11 PM 2/21/2024     9:00 AM 2/29/2024    10:08 AM   Depression Screen (PHQ-2/PHQ-9)   PHQ-2 Total Score 0     PHQ-2 Total Score  0 0       Interpretation of PHQ-9 Total Score   Score Severity   1-4 No Depression   5-9 Mild Depression   10-14 Moderate Depression   15-19 Moderately Severe Depression   20-27 Severe Depression       Goal:  Continue to effectively utilize support system and coping skills.       Chronic Care Management Care Plan    Discussion:      The patient wishes to enroll in the program so that she has someone to keep her on track and provide information and resources to help her be proactive and advocate for her care.     Goals:     Goal:  Over the next year the patient will gain assistance staying on track and resources for being proactive in her care.   Barriers: Volume of providers, health concerns, and care measures.   Interventions:   *Over the next 12 months the patient will have monthly check ins to discuss medical information and care measures.   *Over the next 12  months the patient will receive relevant educational articles by mail once per month.   *Over the next 12 months the patient will have monthly check ins to review appointments and  recommended care measures.     Start Date: 2/29/24  End Date:  3/1/25     Next Scheduled patient outreach: One month

## 2024-03-05 ENCOUNTER — TELEPHONE (OUTPATIENT)
Dept: PHARMACY | Facility: MEDICAL CENTER | Age: 69
End: 2024-03-05
Payer: MEDICARE

## 2024-03-05 NOTE — TELEPHONE ENCOUNTER
Contact:  [id number]   Ines Long        Phone number: [phone]    Name of person spoken with and relationship to patient: Ines, self   Patient’s Adherence:            How patient is doing on medication:  very well    How many missed doses and reason: 0    Any new medications: no    Any new conditions: no    Any new allergies: no    Any new side effects: no     Any new diagnoses: no     How many doses remainin (last dose taken 3/3)    Did patient want to speak with pharmacist: no  Delivery:            Delivery date and method:  ship 3/13 overnight cold to arrive on 3/14    Needs by Date:  3/15    Signature required:  no    Any additional details for :  may leave at door if no answer  Teach Appointment Date:  na  Shipping Address:  [address] [place] [id number]  Medication(name, strength and dose):  Praluent 150 MG/ML SubQ Inject every 14 days  Copay: est $0 (system down)  Payment Method: na  Supplies:  na  Additional info:  NIKKI Chacon. She stated doing very well on the medication and has not experienced any side effects at all since starting. No further questions/concerns at this time

## 2024-03-05 NOTE — TELEPHONE ENCOUNTER
Contact:  [id number]   Ines Long        Phone number: [phone]    Name of person spoken with and relationship to patient: Ines, self   Patient’s Adherence:            How patient is doing on medication:  very well    How many missed doses and reason: 0    Any new medications: no    Any new conditions: no    Any new allergies: no    Any new side effects: no     Any new diagnoses: no     How many doses remainin (last dose taken 3/3)    Did patient want to speak with pharmacist: no  Delivery:            Delivery date and method:  ship 3/13 overnight cold to arrive on 3/14    Needs by Date:  3/15    Signature required:  no    Any additional details for :  may leave at door if no answer  Teach Appointment Date:  na  Shipping Address:  [address] [place] [id number]  Medication(name, strength and dose):  Praluent 150 MG/ML SubQ Inject every 14 days  Copay: est $0 (system down)  Payment Method: na  Supplies:  na  Additional info:  NIKKI Chacon. She stated doing very well on the medication and has not experienced any side effects at all since starting. No further questions/concerns at this time

## 2024-03-06 PROCEDURE — RXMED WILLOW AMBULATORY MEDICATION CHARGE: Performed by: FAMILY MEDICINE

## 2024-03-07 ENCOUNTER — OFFICE VISIT (OUTPATIENT)
Dept: MEDICAL GROUP | Facility: PHYSICIAN GROUP | Age: 69
End: 2024-03-07
Payer: MEDICARE

## 2024-03-07 ENCOUNTER — HOSPITAL ENCOUNTER (OUTPATIENT)
Dept: LAB | Facility: MEDICAL CENTER | Age: 69
End: 2024-03-07
Attending: STUDENT IN AN ORGANIZED HEALTH CARE EDUCATION/TRAINING PROGRAM
Payer: MEDICARE

## 2024-03-07 VITALS
DIASTOLIC BLOOD PRESSURE: 60 MMHG | BODY MASS INDEX: 23.48 KG/M2 | SYSTOLIC BLOOD PRESSURE: 116 MMHG | HEART RATE: 94 BPM | TEMPERATURE: 97.6 F | HEIGHT: 67 IN | OXYGEN SATURATION: 97 % | WEIGHT: 149.6 LBS

## 2024-03-07 DIAGNOSIS — R74.8 ELEVATED ALKALINE PHOSPHATASE LEVEL: ICD-10-CM

## 2024-03-07 DIAGNOSIS — M54.50 CHRONIC LOW BACK PAIN, UNSPECIFIED BACK PAIN LATERALITY, UNSPECIFIED WHETHER SCIATICA PRESENT: ICD-10-CM

## 2024-03-07 DIAGNOSIS — E03.9 ACQUIRED HYPOTHYROIDISM: ICD-10-CM

## 2024-03-07 DIAGNOSIS — R05.8 POST-VIRAL COUGH SYNDROME: ICD-10-CM

## 2024-03-07 DIAGNOSIS — E10.69 TYPE 1 DIABETES MELLITUS WITH OTHER SPECIFIED COMPLICATION (HCC): ICD-10-CM

## 2024-03-07 DIAGNOSIS — E87.1 HYPONATREMIA: ICD-10-CM

## 2024-03-07 DIAGNOSIS — I10 PRIMARY HYPERTENSION: ICD-10-CM

## 2024-03-07 DIAGNOSIS — G89.29 CHRONIC LOW BACK PAIN, UNSPECIFIED BACK PAIN LATERALITY, UNSPECIFIED WHETHER SCIATICA PRESENT: ICD-10-CM

## 2024-03-07 PROBLEM — M79.604 RIGHT LEG PAIN: Status: RESOLVED | Noted: 2023-06-13 | Resolved: 2024-03-07

## 2024-03-07 LAB
ALBUMIN SERPL BCP-MCNC: 4.1 G/DL (ref 3.2–4.9)
APPEARANCE UR: CLEAR
BACTERIA #/AREA URNS HPF: NEGATIVE /HPF
BILIRUB UR QL STRIP.AUTO: NEGATIVE
BUN SERPL-MCNC: 13 MG/DL (ref 8–22)
CALCIUM ALBUM COR SERPL-MCNC: 9.2 MG/DL (ref 8.5–10.5)
CALCIUM SERPL-MCNC: 9.3 MG/DL (ref 8.5–10.5)
CHLORIDE SERPL-SCNC: 101 MMOL/L (ref 96–112)
CO2 SERPL-SCNC: 21 MMOL/L (ref 20–33)
COLOR UR: YELLOW
CREAT SERPL-MCNC: 0.61 MG/DL (ref 0.5–1.4)
CREAT UR-MCNC: 136.37 MG/DL
CREAT UR-MCNC: 138.5 MG/DL
EPI CELLS #/AREA URNS HPF: ABNORMAL /HPF
GFR SERPLBLD CREATININE-BSD FMLA CKD-EPI: 98 ML/MIN/1.73 M 2
GLUCOSE SERPL-MCNC: 187 MG/DL (ref 65–99)
GLUCOSE UR STRIP.AUTO-MCNC: NEGATIVE MG/DL
HYALINE CASTS #/AREA URNS LPF: ABNORMAL /LPF
KETONES UR STRIP.AUTO-MCNC: NEGATIVE MG/DL
LEUKOCYTE ESTERASE UR QL STRIP.AUTO: ABNORMAL
MAGNESIUM SERPL-MCNC: 1.8 MG/DL (ref 1.5–2.5)
MICRO URNS: ABNORMAL
MICROALBUMIN UR-MCNC: <1.2 MG/DL
MICROALBUMIN/CREAT UR: NORMAL MG/G (ref 0–30)
MUCOUS THREADS #/AREA URNS HPF: ABNORMAL /HPF
NITRITE UR QL STRIP.AUTO: NEGATIVE
PH UR STRIP.AUTO: 5.5 [PH] (ref 5–8)
PHOSPHATE SERPL-MCNC: 3 MG/DL (ref 2.5–4.5)
POTASSIUM SERPL-SCNC: 5.3 MMOL/L (ref 3.6–5.5)
PROT UR QL STRIP: NEGATIVE MG/DL
PROT UR-MCNC: 12 MG/DL (ref 0–15)
PROT/CREAT UR: 88 MG/G (ref 10–107)
RBC # URNS HPF: ABNORMAL /HPF
RBC UR QL AUTO: NEGATIVE
SODIUM SERPL-SCNC: 135 MMOL/L (ref 135–145)
SP GR UR STRIP.AUTO: 1.02
UROBILINOGEN UR STRIP.AUTO-MCNC: 0.2 MG/DL
WBC #/AREA URNS HPF: ABNORMAL /HPF

## 2024-03-07 PROCEDURE — 81001 URINALYSIS AUTO W/SCOPE: CPT

## 2024-03-07 PROCEDURE — 3074F SYST BP LT 130 MM HG: CPT | Performed by: NURSE PRACTITIONER

## 2024-03-07 PROCEDURE — 82043 UR ALBUMIN QUANTITATIVE: CPT

## 2024-03-07 PROCEDURE — 36415 COLL VENOUS BLD VENIPUNCTURE: CPT

## 2024-03-07 PROCEDURE — 82570 ASSAY OF URINE CREATININE: CPT

## 2024-03-07 PROCEDURE — 80069 RENAL FUNCTION PANEL: CPT

## 2024-03-07 PROCEDURE — 83735 ASSAY OF MAGNESIUM: CPT

## 2024-03-07 PROCEDURE — 99214 OFFICE O/P EST MOD 30 MIN: CPT | Performed by: NURSE PRACTITIONER

## 2024-03-07 PROCEDURE — 84156 ASSAY OF PROTEIN URINE: CPT

## 2024-03-07 PROCEDURE — 3078F DIAST BP <80 MM HG: CPT | Performed by: NURSE PRACTITIONER

## 2024-03-07 RX ORDER — ALBUTEROL SULFATE 90 UG/1
2 AEROSOL, METERED RESPIRATORY (INHALATION) EVERY 4 HOURS PRN
Qty: 1 EACH | Refills: 0 | Status: SHIPPED | OUTPATIENT
Start: 2024-03-07

## 2024-03-07 ASSESSMENT — FIBROSIS 4 INDEX: FIB4 SCORE: 1.04

## 2024-03-07 NOTE — LETTER
[facility]  HAYDEN CandelarioPJoshRMARY  [address]  [place] [address]  Fax: [phone]   Authorization for Release/Disclosure of   Protected Health Information   Name: INES KRAMER : 1955 SSN: [id number]   Address: [address] [address] Phone:    There are no phone numbers on file.   I authorize the entity listed below to release/disclose the PHI below to:   [facility]/SCOTT Candelario.P.R.PRANEETH and HAYDEN CandelarioP.RMARY   Provider or Entity Name:  Dr. Davies   Address   City, State, [facility]   Phone:      Fax:     Reason for request: continuity of care   Information to be released:    [  ] LAST COLONOSCOPY,  including any PATH REPORT and follow-up  [  ] LAST FIT/COLOGUARD RESULT [  ] LAST DEXA  [  ] LAST MAMMOGRAM  [  ] LAST PAP  [  ] LAST LABS [  ] RETINA EXAM REPORT  [  ] IMMUNIZATION RECORDS  [XX] Release all info      [  ] Check here and initial the line next to each item to release ALL health information INCLUDING  _____ Care and treatment for drug and / or alcohol abuse  _____ HIV testing, infection status, or AIDS  _____ Genetic Testing    DATES OF SERVICE OR TIME PERIOD TO BE DISCLOSED: _____________  I understand and acknowledge that:  * This Authorization may be revoked at any time by you in writing, except if your health information has already been used or disclosed.  * Your health information that will be used or disclosed as a result of you signing this authorization could be re-disclosed by the recipient. If this occurs, your re-disclosed health information may no longer be protected by State or Federal laws.  * You may refuse to sign this Authorization. Your refusal will not affect your ability to obtain treatment.  * This Authorization becomes effective upon signing and will  on (date) __________.      If no date is indicated, this Authorization will  one (1) year from the signature date.    Name: Ines Munson  Ethan  Signature: Date:   3/7/2024     PLEASE FAX REQUESTED RECORDS BACK TO: [phone]

## 2024-03-07 NOTE — PROGRESS NOTES
Verbal consent was acquired by the patient to use [employer] ambient listening note generation during this visit yes    Subjective:     HPI:   Ines is a 68 y.o.female established patient who presents today for:    History of Present Illness  The patient is a 68-year-old female who is here today to follow up on diabetes. She was recently treated with steroids in late 02/2024 and antibiotic for upper respiratory infection.     Her symptoms are resolved, but she still does have a cough. She took some Tessalon Perles. Last night, she actually coughed a lot and had vomited. She still has Tessalon Perles and does not need a refill. Her cough had improved, but it was worse last night. She denies any wheezing or choking. Her cough is worse in the evening time, but it is all through the day.    Her sugars have been all over the place for the past 2 to 3 weeks. Her endocrinologist told her about the ALK phos and that she needs to follow up with her primary care provider.     Her nephrologist, Dr. Davies, wanted to take her off her blood pressure medications because her sodium had dropped down to 122. He wanted to put her on Lasix, but she does not want to take it. She is on a very restricted fluid intake. She is not taking any diuretics. She has not had any decreased kidney function.  She reports that she has completed a hyponatremia workup without results did blood work once, but they could not come up with any reason. She does not know if they did the ACTH stim test.     Related to back pain. She takes Tylenol 2 in the morning and 2 at night.    Her thyroid has been stable. She denies any constipation. She has a follow-up appointment with her surgeon on 03/21/2024.     Her leg pain has improved, but her back gets sore. She is doing physical therapy. She is up to 15 minutes on the antigravity treadmill at 1.6 for the speed and 56 of her weight.   She takes Tylenol 2 in the morning and 2 at night.     has a past medical  history of Acute nasopharyngitis (2022), Anesthesia, Blocked artery (Around ), Cataract (), Diabetes type 1, controlled (HCC) (2010), Dyslipidemia (2010), High cholesterol, Hypertension, Hypothyroidism (2010), Indigestion, Insulin pump in place (2011), Left carotid artery stenosis - severe 2019, PONV (postoperative nausea and vomiting), Routine health maintenance (2011), and Urinary incontinence.   has a past surgical history that includes cholecystectomy; abdominal hysterectomy total (1987); bunionectomy (2014); knee arthrotomy (Right, 1978); shoulder arthroscopy (Left, 1997); orthopedic osteotomy (Left, 2017); ligament repair (2017); toe fusion (2017); repair, tendon, lower extremity, using tendon graft (2017); carotid endarterectomy (Left, 2019); lumbar laminectomy diskectomy (2022); lumbar decompression (2022); foraminotomy (2022); meniscectomy, knee, arthroscopic (Right, 2022); synovectomy (Right, 2022); chondroplasty (Right, 2022); shoulder surgery; orif, knee; foot surgery ( and ); posterior cervical fusion o-arm (3/20/2023); lumbar fusion o-arm (2023); and lumbar decompression (2023).    Family History   Problem Relation Age of Onset    Cancer Mother         rectal cancer    Stroke Father          59    Heart Disease Father     Hypertension Father     Lung Disease Father         tb    Hyperlipidemia Father     Diabetes Sister     Psychiatric Illness Sister         bipolar    Heart Disease Maternal Grandfather     Stroke Paternal Grandmother     Heart Disease Paternal Grandfather        Social History     Socioeconomic History    Marital status:      Spouse name: Not on file    Number of children: Not on file    Years of education: Not on file    Highest education level: Not on file   Occupational History    Not on file   Tobacco Use    Smoking  status: Former     Current packs/day: 0.00     Types: Cigarettes     Quit date: 1975     Years since quittin.2    Smokeless tobacco: Never   Vaping Use    Vaping Use: Never used   Substance and Sexual Activity    Alcohol use: Yes     Alcohol/week: 1.2 oz     Types: 2 Glasses of wine per week     Comment: about 3-4 drinks per week.     Drug use: Yes     Comment: tried CBD gummies; no THC    Sexual activity: Yes     Partners: Male     Birth control/protection: Surgical     Comment: Pt states had a hysterectomy   Other Topics Concern    Not on file   Social History Narrative    Not on file     Social Determinants of Health     Financial Resource Strain: Low Risk  (2024)    Overall Financial Resource Strain (CARDIA)     Difficulty of Paying Living Expenses: Not hard at all   Food Insecurity: No Food Insecurity (2024)    Hunger Vital Sign     Worried About Running Out of Food in the Last Year: Never true     Ran Out of Food in the Last Year: Never true   Transportation Needs: No Transportation Needs (2024)    PRAPARE - Transportation     Lack of Transportation (Medical): No     Lack of Transportation (Non-Medical): No   Physical Activity: Insufficiently Active (2024)    Exercise Vital Sign     Days of Exercise per Week: 1 day     Minutes of Exercise per Session: 40 min   Stress: No Stress Concern Present (2024)    [employer] - Occupational Stress Questionnaire     Feeling of Stress : Not at all   Social Connections: Moderately Isolated (2024)    Social Connection and Isolation Panel [NHANES]     Frequency of Communication with Friends and Family: More than three times a week     Frequency of Social Gatherings with Friends and Family: Three times a week     Attends Pentec[employer]al Services: Never     Active Member of Clubs or Organizations: No     Attends Club or Organization Meetings: Never     Marital Status:    Intimate Partner Violence: Not At Risk  (2/29/2024)    Humiliation, Afraid, Rape, and Kick questionnaire     Fear of Current or Ex-Partner: No     Emotionally Abused: No     Physically Abused: No     Sexually Abused: No   Housing Stability: Low Risk  (2/29/2024)    Housing Stability Vital Sign     Unable to Pay for Housing in the Last Year: No     Number of Places Lived in the Last Year: 1     Unstable Housing in the Last Year: No       Patient Active Problem List    Diagnosis Date Noted    Coronary artery calcification seen on CT scan 02/27/2024    Elevated alkaline phosphatase level 02/27/2024    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, 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) 06/20/2023    Elevated lipoprotein(a) 03/10/2023    Cervical stenosis of spine 01/20/2023    Cervical myelopathy (HCC) 01/20/2023    History of left-sided carotid endarterectomy 01/13/2023    Other hyperlipidemia 01/13/2023    LAFB (left anterior fascicular block) 01/13/2023    Small vessel disease, cerebrovascular 01/13/2023    Long term (current) use of antithrombotics/antiplatelets 01/13/2023    Family history of stroke 01/13/2023    Agatston coronary artery calcium score between 100 and 199 01/13/2023    Edema 07/12/2022    Chronic hyponatremia 07/12/2022    Lumbar stenosis with neurogenic claudication 05/16/2022    Neurogenic claudication due to lumbar spinal stenosis 04/22/2022    Primary hypertension 04/14/2022    Low back pain 04/14/2022    Carotid artery stenosis 12/14/2021    Acute pain of right knee 07/15/2021    Neck pain on left side 12/10/2020    Hormone replacement therapy (HRT) 10/23/2018    Insulin pump in place 05/18/2011    Type 1 diabetes mellitus (HCC) 11/01/2010    Hypothyroidism 11/01/2010    Disorder of lipid metabolism 11/01/2010         Current Outpatient Medications Ordered in Epic   Medication Sig Dispense Refill    albuterol 108 (90 Base) MCG/ACT Aero Soln inhalation aerosol Inhale 2 Puffs every four hours as needed for Shortness of  Breath (cough). 1 Each 0    lisinopril (PRINIVIL) 10 MG Tab Take 10 mg by mouth every day. Patient takes at night      gabapentin (NEURONTIN) 300 MG Cap Take 1 Capsule by mouth 3 times a day. 90 Capsule 5    cyclobenzaprine (FLEXERIL) 10 mg Tab Take 1 Tablet by mouth 3 times a day as needed for Muscle Spasms. (Patient taking differently: Take 10 mg by mouth 2 times a day as needed for Muscle Spasms.) 90 Tablet 1    clindamycin (CLEOCIN) 150 MG Cap Take 3 capsules PO prior to dental procedure 6 Capsule 0    gabapentin (NEURONTIN) 300 MG Cap Take 1 Capsule by mouth 2 times a day. 180 Capsule 1    ezetimibe (ZETIA) 10 MG Tab Take 1 Tablet by mouth every day. 90 Tablet 3    Alirocumab (PRALUENT) 150 MG/ML Solution Auto-injector Inject 150 mg under the skin every 14 days. 3 mL 3    gabapentin (NEURONTIN) 300 MG Cap 1 capsule PO BID. May slowly increase to a max of 900mg PO BID 90 Capsule 2    Insulin Aspart, w/Niacinamide, (FIASP) 100 UNIT/ML Solution Inject 2.2 Units as directed one time.      rosuvastatin (CRESTOR) 40 MG tablet Take 1 Tablet by mouth at bedtime. 90 Tablet 3    sodium chloride (SALT) 1 GM Tab Take 2 g by mouth every day.      insulin infusion pump Device Inject  under the skin continuous. Patient's own SQ insulin pump    Type of Insulin: Fiasp  Last change of tubing: 3/19/2023 - Change tubing and site every 72 hours    Dosing:  Basal rate:   0000 - 0329 = 0.5 units/hr   0330 - 1129 = 0.85 units/hr   1130 - 1359 = 0.5 units/hr              1400 - 1759 = 0.45 units/hr              1800 - 2359 = 0.5 units/hr    Bolus ratio:   1 unit : 12 g carbohydrate at  (breakfast, lunch, dinner, snacks)      Correction ratio:    1 units for every 50 over 150 mg/dL    Disconnect pump if patient becomes hypoglycemic and altered.      LEVOXYL 100 MCG Tab Take  mcg by mouth every morning on an empty stomach. Takes 1/2 tab on Sundays      pantoprazole (PROTONIX) 40 MG Tablet Delayed Response Take 1 Tablet by mouth  "every day.      Cholecalciferol (VITAMIN D-3 PO) Take 5,000 mg by mouth every evening.       No current Epic-ordered facility-administered medications on file.     Allergies   Allergen Reactions    Ceclor [Cefaclor] Hives and Swelling    Augmentin Hives, Diarrhea and Vomiting     Fever blisters   Sickness lasts forever    Naproxen      Face Swells     Tape Rash     \"Certain band aids\"  PAPER TAPE OKAY/ Tegaderm ok    Acetaminophen Unspecified    Amlodipine Swelling     5mg = swelling     Amoxicillin-Pot Clavulanate      Other reaction(s): Not available    Cefaclor Hives and Swelling       Health Maintenance: Completed    Objective:     Exam:  /60 (BP Location: Left arm, Patient Position: Sitting, BP Cuff Size: Adult)   Pulse 94   Temp 36.4 °C (97.6 °F) (Temporal)   Ht 1.702 m (5' 7\")   Wt 67.9 kg (149 lb 9.6 oz)   LMP 01/01/1983 (LMP Unknown)   SpO2 97%   BMI 23.43 kg/m²  Body mass index is 23.43 kg/m².    Physical Exam  Constitutional:       Appearance: Normal appearance.   HENT:      Head: Normocephalic and atraumatic.   Eyes:      Pupils: Pupils are equal, round, and reactive to light.   Cardiovascular:      Rate and Rhythm: Normal rate and regular rhythm.      Pulses: Normal pulses.   Pulmonary:      Effort: Pulmonary effort is normal.      Breath sounds: Normal breath sounds.   Musculoskeletal:         General: No tenderness. Normal range of motion.   Skin:     General: Skin is warm and dry.   Neurological:      General: No focal deficit present.      Mental Status: She is alert and oriented to person, place, and time.           Results  Laboratory Studies  Labs were reviewed with the patient.    Assessment & Plan:     1. Post-viral cough syndrome  albuterol 108 (90 Base) MCG/ACT Aero Soln inhalation aerosol      2. Elevated alkaline phosphatase level  GAMMA GT (GGT)    5' NUCLEOTIDASE      3. Hyponatremia  Comp Metabolic Panel      4. Acquired hypothyroidism        5. Chronic low back pain, " unspecified back pain laterality, unspecified whether sciatica present        6. Primary hypertension        7. Type 1 diabetes mellitus with other specified complication (HCC)            Assessment & Plan  1. Cough.  Her cough is triggering the gag reflex. Continue Tessalon Perles. I will start her on albuterol inhaler 2 puffs up to every 4 hours.     2. Diabetes.  After steroid her sugars have been between 50 and 300, recommend contacting endocrinology.     3. Elevated alkaline phosphatase  I will order GGT and 5-prime nucleated nucleosis today.    4. Hypertension.  Her blood pressure is okay today. I will request a note from [facility].    5. Hyponatremia.  Her last kidney function WNL. Her sodium being low and the diuretic should make it worse. I agree with her not taking the Lasix.    Follow-up  The patient will follow up in 1 month.    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 Continue with current treatment plan: continue nephrology Follow-up with specialist as directed, but at least annually.  Last edited 03/07/24 10:46 PST by Roel Rizo, A.P.R.N.         I have placed the below orders and discussed them with an approved delegating provider. The MA is performing the below orders under the direction of Dr. Sierra.      Return in about 1 month (around 4/7/2024), or if symptoms worsen or fail to improve.    Please note that this dictation was created using voice recognition software. I have made every reasonable attempt to correct obvious errors, but I expect that there are errors of grammar and possibly content that I did not discover before finalizing the note.         Normal for race

## 2024-03-08 ENCOUNTER — HOSPITAL ENCOUNTER (OUTPATIENT)
Dept: LAB | Facility: MEDICAL CENTER | Age: 69
End: 2024-03-08
Attending: NURSE PRACTITIONER
Payer: MEDICARE

## 2024-03-08 DIAGNOSIS — E87.1 HYPONATREMIA: ICD-10-CM

## 2024-03-08 DIAGNOSIS — R74.8 ELEVATED ALKALINE PHOSPHATASE LEVEL: ICD-10-CM

## 2024-03-08 LAB
ALBUMIN SERPL BCP-MCNC: 4.1 G/DL (ref 3.2–4.9)
ALBUMIN/GLOB SERPL: 1.7 G/DL
ALP SERPL-CCNC: 118 U/L (ref 30–99)
ALT SERPL-CCNC: 28 U/L (ref 2–50)
ANION GAP SERPL CALC-SCNC: 12 MMOL/L (ref 7–16)
AST SERPL-CCNC: 30 U/L (ref 12–45)
BILIRUB SERPL-MCNC: 0.3 MG/DL (ref 0.1–1.5)
BUN SERPL-MCNC: 10 MG/DL (ref 8–22)
CALCIUM ALBUM COR SERPL-MCNC: 9.1 MG/DL (ref 8.5–10.5)
CALCIUM SERPL-MCNC: 9.2 MG/DL (ref 8.5–10.5)
CHLORIDE SERPL-SCNC: 101 MMOL/L (ref 96–112)
CO2 SERPL-SCNC: 23 MMOL/L (ref 20–33)
CREAT SERPL-MCNC: 0.55 MG/DL (ref 0.5–1.4)
GFR SERPLBLD CREATININE-BSD FMLA CKD-EPI: 100 ML/MIN/1.73 M 2
GGT SERPL-CCNC: 16 U/L (ref 7–34)
GLOBULIN SER CALC-MCNC: 2.4 G/DL (ref 1.9–3.5)
GLUCOSE SERPL-MCNC: 165 MG/DL (ref 65–99)
POTASSIUM SERPL-SCNC: 4.7 MMOL/L (ref 3.6–5.5)
PROT SERPL-MCNC: 6.5 G/DL (ref 6–8.2)
SODIUM SERPL-SCNC: 136 MMOL/L (ref 135–145)

## 2024-03-08 PROCEDURE — 36415 COLL VENOUS BLD VENIPUNCTURE: CPT

## 2024-03-08 PROCEDURE — 82977 ASSAY OF GGT: CPT

## 2024-03-08 PROCEDURE — 83915 ASSAY OF NUCLEOTIDASE: CPT

## 2024-03-08 PROCEDURE — 80053 COMPREHEN METABOLIC PANEL: CPT

## 2024-03-11 LAB — 5NT SERPL-CCNC: 9 U/L (ref 0–15)

## 2024-03-12 DIAGNOSIS — R74.8 ELEVATED ALKALINE PHOSPHATASE LEVEL: ICD-10-CM

## 2024-03-13 ENCOUNTER — HOSPITAL ENCOUNTER (OUTPATIENT)
Dept: LAB | Facility: MEDICAL CENTER | Age: 69
End: 2024-03-13
Attending: NURSE PRACTITIONER
Payer: MEDICARE

## 2024-03-13 ENCOUNTER — PHARMACY VISIT (OUTPATIENT)
Dept: PHARMACY | Facility: MEDICAL CENTER | Age: 69
End: 2024-03-13
Payer: COMMERCIAL

## 2024-03-13 DIAGNOSIS — R74.8 ELEVATED ALKALINE PHOSPHATASE LEVEL: ICD-10-CM

## 2024-03-13 LAB
25(OH)D3 SERPL-MCNC: 39 NG/ML (ref 30–100)
ALBUMIN SERPL BCP-MCNC: 4.3 G/DL (ref 3.2–4.9)
CALCIUM SERPL-MCNC: 9.2 MG/DL (ref 8.5–10.5)
CREAT SERPL-MCNC: 0.6 MG/DL (ref 0.5–1.4)
GFR SERPLBLD CREATININE-BSD FMLA CKD-EPI: 98 ML/MIN/1.73 M 2
PTH-INTACT SERPL-MCNC: 55.2 PG/ML (ref 14–72)

## 2024-03-13 PROCEDURE — 84165 PROTEIN E-PHORESIS SERUM: CPT

## 2024-03-13 PROCEDURE — 82784 ASSAY IGA/IGD/IGG/IGM EACH: CPT

## 2024-03-13 PROCEDURE — 83521 IG LIGHT CHAINS FREE EACH: CPT

## 2024-03-13 PROCEDURE — 82306 VITAMIN D 25 HYDROXY: CPT | Mod: GA

## 2024-03-13 PROCEDURE — 86334 IMMUNOFIX E-PHORESIS SERUM: CPT

## 2024-03-13 PROCEDURE — 82565 ASSAY OF CREATININE: CPT

## 2024-03-13 PROCEDURE — 36415 COLL VENOUS BLD VENIPUNCTURE: CPT

## 2024-03-13 PROCEDURE — 84155 ASSAY OF PROTEIN SERUM: CPT

## 2024-03-13 PROCEDURE — 82310 ASSAY OF CALCIUM: CPT

## 2024-03-13 PROCEDURE — 82040 ASSAY OF SERUM ALBUMIN: CPT

## 2024-03-13 PROCEDURE — 83970 ASSAY OF PARATHORMONE: CPT

## 2024-03-16 LAB
ALBUMIN SERPL ELPH-MCNC: 3.87 G/DL (ref 3.75–5.01)
ALPHA1 GLOB SERPL ELPH-MCNC: 0.34 G/DL (ref 0.19–0.46)
ALPHA2 GLOB SERPL ELPH-MCNC: 0.93 G/DL (ref 0.48–1.05)
B-GLOBULIN SERPL ELPH-MCNC: 0.79 G/DL (ref 0.48–1.1)
EER MONOCLONAL PROTEIN AND FLC, SERUM Q5224: ABNORMAL
GAMMA GLOB SERPL ELPH-MCNC: 0.67 G/DL (ref 0.62–1.51)
IGA SERPL-MCNC: 149 MG/DL (ref 68–408)
IGG SERPL-MCNC: 641 MG/DL (ref 768–1632)
IGM SERPL-MCNC: 26 MG/DL (ref 35–263)
INTERPRETATION SERPL IFE-IMP: ABNORMAL
INTERPRETATION SERPL IFE-IMP: ABNORMAL
KAPPA LC FREE SER-MCNC: 16.66 MG/L (ref 3.3–19.4)
KAPPA LC FREE/LAMBDA FREE SER NEPH: 1.33 {RATIO} (ref 0.26–1.65)
LAMBDA LC FREE SERPL-MCNC: 12.5 MG/L (ref 5.71–26.3)
MONOCLONAL PROTEIN NL11656: ABNORMAL G/DL
PROT SERPL-MCNC: 6.6 G/DL (ref 6.3–8.2)

## 2024-03-25 RX ORDER — LISINOPRIL 10 MG/1
10 TABLET ORAL DAILY
Qty: 90 TABLET | Refills: 3 | Status: SHIPPED | OUTPATIENT
Start: 2024-03-25

## 2024-03-26 NOTE — TELEPHONE ENCOUNTER
Received request via: Pharmacy    Was the patient seen in the last year in this department? Yes    Does the patient have an active prescription (recently filled or refills available) for medication(s) requested? No    Pharmacy Name: [facility]    Does the patient have [facility] Plus and need 100 day supply (blood pressure, diabetes and cholesterol meds only)? Patient does not have SCP  
no

## 2024-03-28 ENCOUNTER — PATIENT OUTREACH (OUTPATIENT)
Dept: HEALTH INFORMATION MANAGEMENT | Facility: OTHER | Age: 69
End: 2024-03-28
Payer: MEDICARE

## 2024-03-28 DIAGNOSIS — Z98.890 HISTORY OF LEFT-SIDED CAROTID ENDARTERECTOMY: ICD-10-CM

## 2024-03-28 DIAGNOSIS — I13.0 BENIGN HYPERTENSIVE HEART AND KIDNEY DISEASE WITH DIASTOLIC CHF, NYHA CLASS II AND CKD STAGE 2 (HCC): ICD-10-CM

## 2024-03-28 DIAGNOSIS — N18.2 BENIGN HYPERTENSIVE HEART AND KIDNEY DISEASE WITH DIASTOLIC CHF, NYHA CLASS II AND CKD STAGE 2 (HCC): ICD-10-CM

## 2024-03-28 DIAGNOSIS — Z79.4 DIABETES MELLITUS DUE TO UNDERLYING CONDITION WITH HYPEROSMOLARITY WITHOUT COMA, WITH LONG-TERM CURRENT USE OF INSULIN (HCC): ICD-10-CM

## 2024-03-28 DIAGNOSIS — I10 PRIMARY HYPERTENSION: ICD-10-CM

## 2024-03-28 DIAGNOSIS — I50.30 BENIGN HYPERTENSIVE HEART AND KIDNEY DISEASE WITH DIASTOLIC CHF, NYHA CLASS II AND CKD STAGE 2 (HCC): ICD-10-CM

## 2024-03-28 DIAGNOSIS — E78.49 OTHER HYPERLIPIDEMIA: ICD-10-CM

## 2024-03-28 DIAGNOSIS — E08.00 DIABETES MELLITUS DUE TO UNDERLYING CONDITION WITH HYPEROSMOLARITY WITHOUT COMA, WITH LONG-TERM CURRENT USE OF INSULIN (HCC): ICD-10-CM

## 2024-03-28 NOTE — PROGRESS NOTES
Attempt #1 made for [facility] monthly follow up. No answer. Message left with contact information. Will attempt again tomorrow if no response received.     Returned phone call:     Assessment    I spoke to the patient this morning for her monthly [facility] follow up. Patient voices awareness and understanding of her appointment on 4/16/24. Patient states that she hasn't been taking her blood pressures the past few days, denies any cardiac symptoms at this time. The patient states that her blood sugars have actually been low the last few nights to the point where she has had to drink orange juice. She mentioned in one instance that it took an entire packet of sugar tablets to get her blood sugar back to normal. She was advised to contact endocrinology and let them know and to continue monitoring closely. The patient's main concern at this point is a very severe recurrence of her back pain. She states that she had an experience that made her uncomfortable returning to the provider that saw her at [facility] last week, and has made an appointment with another orthopedic doctor for evaluation. She will see them next week. She states that her physical therapist recommended she go to [facility] in the meanwhile for further assistance in getting her pain under control so that she can at least enjoy easter with her family. The patient is going to try this this afternoon. She is agreeable to me calling tomorrow to check on her.     Education    Article on relaxation techniques to aid in chronic pain management sent by mail.     Plan of Care and Goals    *Remain free from injuries/falls  *Healthy nutrition, hydration, and activity  *Follow up with PCP, specialists as indicated.     Barriers:    Management of acute and chronic medical concerns    Progress:    In process    Next outreach:  Next CCM follow up One month

## 2024-03-29 ENCOUNTER — TELEPHONE (OUTPATIENT)
Dept: HEALTH INFORMATION MANAGEMENT | Facility: OTHER | Age: 69
End: 2024-03-29
Payer: MEDICARE

## 2024-04-05 PROCEDURE — RXMED WILLOW AMBULATORY MEDICATION CHARGE: Performed by: FAMILY MEDICINE

## 2024-04-08 ENCOUNTER — PATIENT OUTREACH (OUTPATIENT)
Dept: HEALTH INFORMATION MANAGEMENT | Facility: OTHER | Age: 69
End: 2024-04-08
Payer: MEDICARE

## 2024-04-08 DIAGNOSIS — N18.2 BENIGN HYPERTENSIVE HEART AND KIDNEY DISEASE WITH DIASTOLIC CHF, NYHA CLASS II AND CKD STAGE 2 (HCC): ICD-10-CM

## 2024-04-08 DIAGNOSIS — E78.49 OTHER HYPERLIPIDEMIA: ICD-10-CM

## 2024-04-08 DIAGNOSIS — Z79.4 DIABETES MELLITUS DUE TO UNDERLYING CONDITION WITH HYPEROSMOLARITY WITHOUT COMA, WITH LONG-TERM CURRENT USE OF INSULIN (HCC): ICD-10-CM

## 2024-04-08 DIAGNOSIS — I13.0 BENIGN HYPERTENSIVE HEART AND KIDNEY DISEASE WITH DIASTOLIC CHF, NYHA CLASS II AND CKD STAGE 2 (HCC): ICD-10-CM

## 2024-04-08 DIAGNOSIS — I65.29 STENOSIS OF CAROTID ARTERY, UNSPECIFIED LATERALITY: ICD-10-CM

## 2024-04-08 DIAGNOSIS — E08.00 DIABETES MELLITUS DUE TO UNDERLYING CONDITION WITH HYPEROSMOLARITY WITHOUT COMA, WITH LONG-TERM CURRENT USE OF INSULIN (HCC): ICD-10-CM

## 2024-04-08 DIAGNOSIS — I50.30 BENIGN HYPERTENSIVE HEART AND KIDNEY DISEASE WITH DIASTOLIC CHF, NYHA CLASS II AND CKD STAGE 2 (HCC): ICD-10-CM

## 2024-04-08 DIAGNOSIS — Z98.890 HISTORY OF LEFT-SIDED CAROTID ENDARTERECTOMY: ICD-10-CM

## 2024-04-08 DIAGNOSIS — I10 PRIMARY HYPERTENSION: ICD-10-CM

## 2024-04-08 RX ORDER — BENZONATATE 100 MG/1
100 CAPSULE ORAL 3 TIMES DAILY PRN
Qty: 60 CAPSULE | Refills: 0 | Status: SHIPPED | OUTPATIENT
Start: 2024-04-08

## 2024-04-08 NOTE — TELEPHONE ENCOUNTER
Received request via: Pharmacy    Was the patient seen in the last year in this department? Yes    Does the patient have an active prescription (recently filled or refills available) for medication(s) requested? No    Pharmacy Name:     Does the patient have [facility] Plus and need 100 day supply (blood pressure, diabetes and cholesterol meds only)? Patient does not have SCP

## 2024-04-08 NOTE — PROGRESS NOTES
"Assessment    I spoke to the patient this morning when they called in with some questions with regard to the record of a surgery that they had last year. She reports that she was told at that juncture that she had been given \"3 rounds of Epi.\" Her pain management provider mentioned to her that this is usually what is done when someone's heart stops. She was educated that I could only find record of an instance with her blood pressure dropping so low that they felt that they had to stop the surgical procedure after the anesthesiologist was not able to rectify this by adjusting the medications. Epinephrine does appear to be an accepted treatment for spinal hypotension according to the [facility]. There were no code notes and and no mention of cardiac arrest by either the surgeon on record or the anesthesiologist on record. The patient reports that since her experience at the pain management clinic, the pain is starting to creep back. She is scheduled to see them again tomorrow. The patient reports that her blood sugars were \"crazy\" the day after the last treatment but that she reported this to endocrinology. She states they were not overly concerned by what she told them. The patient reports no recent falls or injuries. She continues to carefully monitor her nutrition and hydration in accordance with instructions from her providers for optimal control of her Diabetes.  Last A1c 6.9. Patient voices understanding of her future appointment dates and times. Patient states that she is overwhelmed with hearing that she has yet another diagnoses. She was counseled to be gentle with herself on this.     Education    *Article on spring allergy management strategies sent by mail.     Plan of Care and Goals    *Remain free from falls, injuries  *Healthy activity, nutrition, and hydration  *Follow up with PCP, specialists as indicated.     Barriers:    Management of acute and chronic health concerns    Progress:    Stable    Next " outreach:  Next CCM outreach 1 month

## 2024-04-09 ENCOUNTER — TELEPHONE (OUTPATIENT)
Dept: PHARMACY | Facility: MEDICAL CENTER | Age: 69
End: 2024-04-09
Payer: MEDICARE

## 2024-04-09 ENCOUNTER — PHARMACY VISIT (OUTPATIENT)
Dept: PHARMACY | Facility: MEDICAL CENTER | Age: 69
End: 2024-04-09
Payer: COMMERCIAL

## 2024-04-09 ENCOUNTER — TELEPHONE (OUTPATIENT)
Dept: HEALTH INFORMATION MANAGEMENT | Facility: OTHER | Age: 69
End: 2024-04-09
Payer: MEDICARE

## 2024-04-09 NOTE — TELEPHONE ENCOUNTER
Alirocumab (PRALUENT) 150 MG/ML Solution Auto-injector    PA Renewal      Initiated PA in cmm Key: [id number]    Per CMM:  Prior Authorization Not Required      called [employer] provider line at [phone].    Spoke to Elyssa stated stated A PA is needed. cannot do it via phone. will sent Fax PA form for renewal.    Received Fax PA form      Provider signature required . Routed to designated liaison to obtain signature. Once obtained fax to [phone]

## 2024-04-09 NOTE — TELEPHONE ENCOUNTER
"Patient reports that pain clinic wants to do x rays of knees, mri of thoracic and lumbar spine. Patient is currently \"feeling a bit better\" but had a bit of a rough night. Patient did not receive another nerve blocker as this is pending imaging. Patient states that she has all referrals and will schedule. Patient states that pain management also mentioned something called laminectomy syndrome. Will discuss all with provider next week.   "

## 2024-04-10 ENCOUNTER — TELEPHONE (OUTPATIENT)
Dept: PHARMACY | Facility: MEDICAL CENTER | Age: 69
End: 2024-04-10
Payer: MEDICARE

## 2024-04-10 NOTE — TELEPHONE ENCOUNTER
Contact:      Phone number: [phone], Mobile    Name of person spoken with and relationship to patient: Ines Long, Self   Patient’s Adherence:      How patient is doing on medication: Good    How many missed doses and reason: 0    Any new medications: Yes, Started Lyrica, Duloxetine & Hydroxyzine    Any new conditions: No    Any new allergies: No    Any new side effects: No    Any new diagnoses: No    How many doses remainin doses    Did patient want to speak with pharmacist: No  Delivery:      Delivery date and method: 04/10 via UPS, overnight    Needs by Date:     Signature required: No    Any additional details for : N/A   Teach Appointment Date: N/A  Shipping Address: [address] [place] [id number]  Medication (name, strength and dose): Praluent 150mg/mL Soaj  Copay: $0/28ds  Payment Method: N/A, $0 copay   Supplies: None  Additional Information: Pt refilled prescription via Web and it was sent out . Next call date: .

## 2024-04-11 ENCOUNTER — TELEPHONE (OUTPATIENT)
Dept: MEDICAL GROUP | Facility: PHYSICIAN GROUP | Age: 69
End: 2024-04-11
Payer: MEDICARE

## 2024-04-11 NOTE — TELEPHONE ENCOUNTER
VOICEMAIL  1. Caller Name: Ines Long                        Call Back Number: There are no phone numbers on file.      2. Message: pt left vm stating she has been in a lot of pain  and went to pain clinic on 4/10 and her bp was at 176/70. Pt states she had a rough time sleeping last night and noticed her ankles was swollen pt took her bp it was 170/78 so she tried to relax her self and took it again it was at 160/high 70's she took her bp this morning and it was in the 150's over 70's pt is requesting a renal panel lab to make sure everything is ok. Pt also wanted to know if you think she should start taking her lasix medication she states she doesn't like to take it but will take it if you think its best. Pt is scheduled for an appt next week. Please advise     3. Patient approves office to leave a detailed voicemail/MyChart message: yes

## 2024-04-11 NOTE — TELEPHONE ENCOUNTER
Patient would need to be seen to order labs.  Can consider scheduling her with another provider in our office if she needs a sooner appointment.

## 2024-04-12 NOTE — TELEPHONE ENCOUNTER
Alirocumab (PRALUENT) 150 MG/ML Solution Auto-injector       Faxed  provider signed PA form with chart notes to [facility] fax [phone].    Prior Authorization for Praulent has been approved for a quantity of 2ml , day supply 28    Prior Authorization reference number: [id number]  Insurance: Medicare  Effective dates: 05/24/2023 until further notice per case.  Copay: $N/A.  RTS. next fill date 04/25/24.

## 2024-04-16 ENCOUNTER — OFFICE VISIT (OUTPATIENT)
Dept: MEDICAL GROUP | Facility: PHYSICIAN GROUP | Age: 69
End: 2024-04-16
Payer: MEDICARE

## 2024-04-16 VITALS
TEMPERATURE: 98.5 F | WEIGHT: 150.4 LBS | SYSTOLIC BLOOD PRESSURE: 142 MMHG | HEIGHT: 67 IN | DIASTOLIC BLOOD PRESSURE: 62 MMHG | HEART RATE: 82 BPM | OXYGEN SATURATION: 97 % | BODY MASS INDEX: 23.61 KG/M2

## 2024-04-16 DIAGNOSIS — E03.9 ACQUIRED HYPOTHYROIDISM: ICD-10-CM

## 2024-04-16 DIAGNOSIS — I10 PRIMARY HYPERTENSION: ICD-10-CM

## 2024-04-16 DIAGNOSIS — R05.3 CHRONIC COUGH: ICD-10-CM

## 2024-04-16 DIAGNOSIS — G57.71 COMPLEX REGIONAL PAIN SYNDROME TYPE 2 OF RIGHT LOWER EXTREMITY: ICD-10-CM

## 2024-04-16 DIAGNOSIS — R74.8 ELEVATED ALKALINE PHOSPHATASE LEVEL: ICD-10-CM

## 2024-04-16 DIAGNOSIS — E87.1 HYPONATREMIA: ICD-10-CM

## 2024-04-16 DIAGNOSIS — E10.69 TYPE 1 DIABETES MELLITUS WITH OTHER SPECIFIED COMPLICATION (HCC): ICD-10-CM

## 2024-04-16 PROBLEM — M25.561 ACUTE PAIN OF RIGHT KNEE: Status: RESOLVED | Noted: 2021-07-15 | Resolved: 2024-04-16

## 2024-04-16 PROCEDURE — 99215 OFFICE O/P EST HI 40 MIN: CPT | Performed by: NURSE PRACTITIONER

## 2024-04-16 PROCEDURE — 3077F SYST BP >= 140 MM HG: CPT | Performed by: NURSE PRACTITIONER

## 2024-04-16 PROCEDURE — 3078F DIAST BP <80 MM HG: CPT | Performed by: NURSE PRACTITIONER

## 2024-04-16 RX ORDER — HYDROXYZINE 50 MG/1
TABLET, FILM COATED ORAL
COMMUNITY
Start: 2024-04-09

## 2024-04-16 RX ORDER — DULOXETIN HYDROCHLORIDE 30 MG/1
CAPSULE, DELAYED RELEASE ORAL
COMMUNITY
Start: 2024-04-09

## 2024-04-16 RX ORDER — PREGABALIN 100 MG/1
CAPSULE ORAL
COMMUNITY
Start: 2024-04-09

## 2024-04-16 ASSESSMENT — FIBROSIS 4 INDEX: FIB4 SCORE: 1.15

## 2024-04-16 NOTE — PROGRESS NOTES
Verbal consent was acquired by the patient to use Natanael Ulien ambient listening note generation during this visit yes    Subjective:     HPI:   Ines is a 68 y.o.female established patient who presents today for:    History of Present Illness  The patient presents for evaluation of multiple medical concerns.    The patient experienced an inability to walk for several days upon awakening, which subsequently necessitated the use of a walker. Despite the severity of her pain, her physical therapist recommended a consultation with Dr. Prado, which she was unable to secure until 04/09/2024. Consequently, she sought medical attention at [facility], where she was diagnosed with chronic regional pain syndrome. A nerve block was administered, which provided relief for 3 to 4 days. However, her sensitivity has since returned. Trigger point injections were administered in her neck and shoulder, which provided temporary relief. A Toradol injection in her hip provided temporary relief. Her pain is predominantly on the right side. Additionally, she reported severe left knee pain, prompting an MRI of her thoracic and lower spine and x-rays of both knees. Spinal stimulation was suggested, but she expressed a desire to discontinue her medications.    The patient's ankles were previously swollen, but have since improved. She has not consulted her nephrologist and is interested in undergoing a renal panel. Her sleep is disrupted due to pain, which elevates her blood pressure. She was previously taking 3 salt tablets 3 times a day, but has since reduced to 2. She is not currently taking a diuretic.    The patient began experiencing a cough on 01/25/2024, which has shown slight improvement. She reports a hoarse voice and occasional bouts of cough. She has a history of smoke exposure from her mother, who was a severe smoker. She has tried Flonase.     has a past medical history of Acute nasopharyngitis (04/29/2022), Anesthesia,  Blocked artery (Around ), Cataract (), Diabetes type 1, controlled (HCC) (2010), Dyslipidemia (2010), High cholesterol, Hypertension, Hypothyroidism (2010), Indigestion, Insulin pump in place (2011), Left carotid artery stenosis - severe 2019, PONV (postoperative nausea and vomiting), Routine health maintenance (2011), and Urinary incontinence.   has a past surgical history that includes cholecystectomy; abdominal hysterectomy total (1987); bunionectomy (2014); knee arthrotomy (Right, 1978); shoulder arthroscopy (Left, 1997); orthopedic osteotomy (Left, 2017); ligament repair (2017); toe fusion (2017); repair, tendon, lower extremity, using tendon graft (2017); carotid endarterectomy (Left, 2019); lumbar laminectomy diskectomy (2022); lumbar decompression (2022); foraminotomy (2022); meniscectomy, knee, arthroscopic (Right, 2022); synovectomy (Right, 2022); chondroplasty (Right, 2022); shoulder surgery; orif, knee; foot surgery ( and ); posterior cervical fusion o-arm (3/20/2023); lumbar fusion o-arm (2023); and lumbar decompression (2023).    Family History   Problem Relation Age of Onset    Cancer Mother         rectal cancer    Stroke Father          59    Heart Disease Father     Hypertension Father     Lung Disease Father         tb    Hyperlipidemia Father     Diabetes Sister     Psychiatric Illness Sister         bipolar    Heart Disease Maternal Grandfather     Stroke Paternal Grandmother     Heart Disease Paternal Grandfather        Social History     Socioeconomic History    Marital status:      Spouse name: Not on file    Number of children: Not on file    Years of education: Not on file    Highest education level: Not on file   Occupational History    Not on file   Tobacco Use    Smoking status: Former     Current packs/day: 0.00     Types:  Cigarettes     Quit date: 1975     Years since quittin.3    Smokeless tobacco: Never   Vaping Use    Vaping Use: Never used   Substance and Sexual Activity    Alcohol use: Yes     Alcohol/week: 1.2 oz     Types: 2 Glasses of wine per week     Comment: about 3-4 drinks per week.     Drug use: Yes     Comment: tried CBD gummies; no THC    Sexual activity: Yes     Partners: Male     Birth control/protection: Surgical     Comment: Pt states had a hysterectomy   Other Topics Concern    Not on file   Social History Narrative    Not on file     Social Determinants of Health     Financial Resource Strain: Low Risk  (2024)    Overall Financial Resource Strain (CARDIA)     Difficulty of Paying Living Expenses: Not hard at all   Food Insecurity: No Food Insecurity (2024)    Hunger Vital Sign     Worried About Running Out of Food in the Last Year: Never true     Ran Out of Food in the Last Year: Never true   Transportation Needs: No Transportation Needs (2024)    PRAPARE - Transportation     Lack of Transportation (Medical): No     Lack of Transportation (Non-Medical): No   Physical Activity: Insufficiently Active (2024)    Exercise Vital Sign     Days of Exercise per Week: 1 day     Minutes of Exercise per Session: 40 min   Stress: No Stress Concern Present (2024)    [employer] - Occupational Stress Questionnaire     Feeling of Stress : Not at all   Social Connections: Moderately Isolated (2024)    Social Connection and Isolation Panel [NHANES]     Frequency of Communication with Friends and Family: More than three times a week     Frequency of Social Gatherings with Friends and Family: Three times a week     Attends Islam Services: Never     Active Member of Clubs or Organizations: No     Attends Club or Organization Meetings: Never     Marital Status:    Intimate Partner Violence: Not At Risk (2024)    Humiliation, Afraid, Rape, and Kick  questionnaire     Fear of Current or Ex-Partner: No     Emotionally Abused: No     Physically Abused: No     Sexually Abused: No   Housing Stability: Low Risk  (2/29/2024)    Housing Stability Vital Sign     Unable to Pay for Housing in the Last Year: No     Number of Places Lived in the Last Year: 1     Unstable Housing in the Last Year: No       Patient Active Problem List    Diagnosis Date Noted    Coronary artery calcification seen on CT scan 02/27/2024    Elevated alkaline phosphatase level 02/27/2024    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, 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) 06/20/2023    Complex regional pain syndrome type 2 of right lower extremity 06/13/2023    Elevated lipoprotein(a) 03/10/2023    Cervical stenosis of spine 01/20/2023    Cervical myelopathy (HCC) 01/20/2023    History of left-sided carotid endarterectomy 01/13/2023    Other hyperlipidemia 01/13/2023    LAFB (left anterior fascicular block) 01/13/2023    Small vessel disease, cerebrovascular 01/13/2023    Long term (current) use of antithrombotics/antiplatelets 01/13/2023    Family history of stroke 01/13/2023    Agatston coronary artery calcium score between 100 and 199 01/13/2023    Edema 07/12/2022    Chronic hyponatremia 07/12/2022    Lumbar stenosis with neurogenic claudication 05/16/2022    Neurogenic claudication due to lumbar spinal stenosis 04/22/2022    Primary hypertension 04/14/2022    Low back pain 04/14/2022    Carotid artery stenosis 12/14/2021    Neck pain on left side 12/10/2020    Hormone replacement therapy (HRT) 10/23/2018    Insulin pump in place 05/18/2011    Type 1 diabetes mellitus (HCC) 11/01/2010    Hypothyroidism 11/01/2010    Disorder of lipid metabolism 11/01/2010         Current Outpatient Medications Ordered in Epic   Medication Sig Dispense Refill    DULoxetine (CYMBALTA) 30 MG Cap DR Particles       hydrOXYzine HCl (ATARAX) 50 MG Tab       pregabalin (LYRICA) 100 MG Cap        lisinopril (PRINIVIL) 10 MG Tab Take 1 Tablet by mouth every day. Patient takes at night 90 Tablet 3    tizanidine (ZANAFLEX) 2 MG tablet Take 1-2 Tablets by mouth 3 times a day as needed (spasm). 60 Tablet 1    albuterol 108 (90 Base) MCG/ACT Aero Soln inhalation aerosol Inhale 2 Puffs every four hours as needed for Shortness of Breath (cough). 1 Each 0    clindamycin (CLEOCIN) 150 MG Cap Take 3 capsules PO prior to dental procedure 6 Capsule 0    ezetimibe (ZETIA) 10 MG Tab Take 1 Tablet by mouth every day. 90 Tablet 3    Alirocumab (PRALUENT) 150 MG/ML Solution Auto-injector Inject 150 mg under the skin every 14 days. 3 mL 3    Insulin Aspart, w/Niacinamide, (FIASP) 100 UNIT/ML Solution Inject 2.2 Units as directed one time.      rosuvastatin (CRESTOR) 40 MG tablet Take 1 Tablet by mouth at bedtime. 90 Tablet 3    sodium chloride (SALT) 1 GM Tab Take 2 g by mouth every day.      insulin infusion pump Device Inject  under the skin continuous. Patient's own SQ insulin pump    Type of Insulin: Fiasp  Last change of tubing: 3/19/2023 - Change tubing and site every 72 hours    Dosing:  Basal rate:   0000 - 0329 = 0.5 units/hr   0330 - 1129 = 0.85 units/hr   1130 - 1359 = 0.5 units/hr              1400 - 1759 = 0.45 units/hr              1800 - 2359 = 0.5 units/hr    Bolus ratio:   1 unit : 12 g carbohydrate at  (breakfast, lunch, dinner, snacks)      Correction ratio:    1 units for every 50 over 150 mg/dL    Disconnect pump if patient becomes hypoglycemic and altered.      LEVOXYL 100 MCG Tab Take  mcg by mouth every morning on an empty stomach. Takes 1/2 tab on Sundays      pantoprazole (PROTONIX) 40 MG Tablet Delayed Response Take 1 Tablet by mouth every day.      benzonatate (TESSALON) 100 MG Cap Take 1 Capsule by mouth 3 times a day as needed for Cough. (Patient not taking: Reported on 4/16/2024) 60 Capsule 0    gabapentin (NEURONTIN) 300 MG Cap Take 1 Capsule by mouth 3 times a day. (Patient not taking:  "Reported on 4/16/2024) 90 Capsule 5    cyclobenzaprine (FLEXERIL) 10 mg Tab Take 1 Tablet by mouth 3 times a day as needed for Muscle Spasms. (Patient not taking: Reported on 4/16/2024) 90 Tablet 1    gabapentin (NEURONTIN) 300 MG Cap Take 1 Capsule by mouth 2 times a day. (Patient not taking: Reported on 4/16/2024) 180 Capsule 1    gabapentin (NEURONTIN) 300 MG Cap 1 capsule PO BID. May slowly increase to a max of 900mg PO BID (Patient not taking: Reported on 4/16/2024) 90 Capsule 2    Cholecalciferol (VITAMIN D-3 PO) Take 5,000 mg by mouth every evening. (Patient not taking: Reported on 4/16/2024)       No current Epic-ordered facility-administered medications on file.     Allergies   Allergen Reactions    Ceclor [Cefaclor] Hives and Swelling    Augmentin Hives, Diarrhea and Vomiting     Fever blisters   Sickness lasts forever    Naproxen      Face Swells     Tape Rash     \"Certain band aids\"  PAPER TAPE OKAY/ Tegaderm ok    Acetaminophen Unspecified    Amlodipine Swelling     5mg = swelling     Amoxicillin-Pot Clavulanate      Other reaction(s): Not available    Cefaclor Hives and Swelling       Health Maintenance: A1c completed through endocrinology    Objective:     Exam:  BP (!) 142/62   Pulse 82   Temp 36.9 °C (98.5 °F) (Temporal)   Ht 1.702 m (5' 7\")   Wt 68.2 kg (150 lb 6.4 oz)   LMP 01/01/1983 (LMP Unknown)   SpO2 97%   BMI 23.56 kg/m²  Body mass index is 23.56 kg/m².    Physical Exam  Constitutional:       Appearance: Normal appearance.   HENT:      Head: Normocephalic and atraumatic.   Cardiovascular:      Rate and Rhythm: Normal rate.      Pulses: Normal pulses.   Pulmonary:      Effort: Pulmonary effort is normal.   Skin:     General: Skin is warm and dry.      Capillary Refill: Capillary refill takes less than 2 seconds.   Neurological:      General: No focal deficit present.      Mental Status: She is alert and oriented to person, place, and time.             Results  Laboratory " Studies  Immunoglobulin G is not low enough. Potassium is closer to mid range. Blood sugar was close. GFR was good. Calcium was good. Alkaline phosphatase is a little on the high side. Other liver enzymes are good. Proteins all looked good. Gammaglobulin was normal. PTH was 55.2.    Assessment & Plan:     1. Complex regional pain syndrome type 2 of right lower extremity        2. Primary hypertension        3. Elevated alkaline phosphatase level        4. Acquired hypothyroidism        5. Hyponatremia  Basic Metabolic Panel      6. Chronic cough  DX-CHEST-2 VIEWS          Assessment & Plan  1. Chronic regional pain syndrome.  Continue follow-up at pain management    2. Ankle swelling.  The patient's ankle swelling could be attributed to poor venous circulation/venous insufficiency.     3. Cough.  The cough has been persistent for an extended period. Acid reflux is a common cause of the patient's cough. . A chest x-ray will be ordered to rule out a lung mass or walking pneumonia that is not being treated properly. If the chest x-ray is negative, we will discuss other potential causes of her cough. If the chest x-ray result is negative, we will prescribe a specific medication for acid reflux to alleviate the cough.    4. Hypertension.  The patient's blood pressure is slightly elevated today. BP is 142/62. Dr. Gray stated that her home blood pressure machine is inaccurate. A kidney function test will be ordered.    Follow-up  The patient is scheduled for a follow-up visit in 1 month.    I have placed the below orders and discussed them with an approved delegating provider. The MA is performing the below orders under the direction of Dr. Sierra.    My total time spent caring for the patient on the day of the encounter was 44 minutes.   This does not include time spent on separately billable procedures/tests.    Return in about 1 month (around 5/16/2024), or if symptoms worsen or fail to improve, for DM.    Please  note that this dictation was created using voice recognition software. I have made every reasonable attempt to correct obvious errors, but I expect that there are errors of grammar and possibly content that I did not discover before finalizing the note.

## 2024-04-18 ENCOUNTER — HOSPITAL ENCOUNTER (OUTPATIENT)
Dept: LAB | Facility: MEDICAL CENTER | Age: 69
End: 2024-04-18
Attending: NURSE PRACTITIONER
Payer: MEDICARE

## 2024-04-18 DIAGNOSIS — E87.1 HYPONATREMIA: ICD-10-CM

## 2024-04-18 LAB
ANION GAP SERPL CALC-SCNC: 10 MMOL/L (ref 7–16)
BUN SERPL-MCNC: 11 MG/DL (ref 8–22)
CALCIUM SERPL-MCNC: 9.3 MG/DL (ref 8.5–10.5)
CHLORIDE SERPL-SCNC: 97 MMOL/L (ref 96–112)
CO2 SERPL-SCNC: 24 MMOL/L (ref 20–33)
CREAT SERPL-MCNC: 0.77 MG/DL (ref 0.5–1.4)
GFR SERPLBLD CREATININE-BSD FMLA CKD-EPI: 85 ML/MIN/1.73 M 2
GLUCOSE SERPL-MCNC: 146 MG/DL (ref 65–99)
POTASSIUM SERPL-SCNC: 5.6 MMOL/L (ref 3.6–5.5)
SODIUM SERPL-SCNC: 131 MMOL/L (ref 135–145)

## 2024-04-18 PROCEDURE — 36415 COLL VENOUS BLD VENIPUNCTURE: CPT

## 2024-04-18 PROCEDURE — 80048 BASIC METABOLIC PNL TOTAL CA: CPT

## 2024-04-19 ENCOUNTER — HOSPITAL ENCOUNTER (OUTPATIENT)
Dept: RADIOLOGY | Facility: MEDICAL CENTER | Age: 69
End: 2024-04-19
Payer: MEDICARE

## 2024-04-19 ENCOUNTER — HOSPITAL ENCOUNTER (OUTPATIENT)
Dept: RADIOLOGY | Facility: MEDICAL CENTER | Age: 69
End: 2024-04-19
Attending: NURSE PRACTITIONER
Payer: MEDICARE

## 2024-04-19 DIAGNOSIS — R05.3 CHRONIC COUGH: ICD-10-CM

## 2024-04-19 DIAGNOSIS — M25.561 RIGHT KNEE PAIN, UNSPECIFIED CHRONICITY: ICD-10-CM

## 2024-04-19 DIAGNOSIS — M25.562 LEFT KNEE PAIN, UNSPECIFIED CHRONICITY: ICD-10-CM

## 2024-04-19 PROCEDURE — 71046 X-RAY EXAM CHEST 2 VIEWS: CPT

## 2024-04-19 PROCEDURE — 73562 X-RAY EXAM OF KNEE 3: CPT | Mod: LT

## 2024-05-07 ENCOUNTER — APPOINTMENT (OUTPATIENT)
Dept: MEDICAL GROUP | Facility: PHYSICIAN GROUP | Age: 69
End: 2024-05-07
Payer: MEDICARE

## 2024-05-07 ENCOUNTER — OFFICE VISIT (OUTPATIENT)
Dept: MEDICAL GROUP | Facility: PHYSICIAN GROUP | Age: 69
End: 2024-05-07
Payer: MEDICARE

## 2024-05-07 ENCOUNTER — HOSPITAL ENCOUNTER (OUTPATIENT)
Dept: LAB | Facility: MEDICAL CENTER | Age: 69
End: 2024-05-07
Attending: NURSE PRACTITIONER
Payer: MEDICARE

## 2024-05-07 ENCOUNTER — TELEPHONE (OUTPATIENT)
Dept: MEDICAL GROUP | Facility: PHYSICIAN GROUP | Age: 69
End: 2024-05-07

## 2024-05-07 VITALS
BODY MASS INDEX: 23.98 KG/M2 | OXYGEN SATURATION: 97 % | HEART RATE: 77 BPM | SYSTOLIC BLOOD PRESSURE: 90 MMHG | DIASTOLIC BLOOD PRESSURE: 68 MMHG | WEIGHT: 152.8 LBS | HEIGHT: 67 IN | TEMPERATURE: 98.2 F

## 2024-05-07 DIAGNOSIS — S89.92XA INJURY OF LEFT KNEE, INITIAL ENCOUNTER: ICD-10-CM

## 2024-05-07 DIAGNOSIS — E10.69 TYPE 1 DIABETES MELLITUS WITH OTHER SPECIFIED COMPLICATION (HCC): ICD-10-CM

## 2024-05-07 DIAGNOSIS — I10 PRIMARY HYPERTENSION: ICD-10-CM

## 2024-05-07 DIAGNOSIS — E87.5 HYPERKALEMIA: ICD-10-CM

## 2024-05-07 DIAGNOSIS — K31.84 GASTROPARESIS DUE TO DM (HCC): ICD-10-CM

## 2024-05-07 DIAGNOSIS — E11.43 GASTROPARESIS DUE TO DM (HCC): ICD-10-CM

## 2024-05-07 DIAGNOSIS — E03.9 ACQUIRED HYPOTHYROIDISM: ICD-10-CM

## 2024-05-07 DIAGNOSIS — E87.1 HYPONATREMIA: ICD-10-CM

## 2024-05-07 LAB
ANION GAP SERPL CALC-SCNC: 10 MMOL/L (ref 7–16)
BUN SERPL-MCNC: 16 MG/DL (ref 8–22)
CALCIUM SERPL-MCNC: 9.5 MG/DL (ref 8.5–10.5)
CHLORIDE SERPL-SCNC: 99 MMOL/L (ref 96–112)
CO2 SERPL-SCNC: 27 MMOL/L (ref 20–33)
CREAT SERPL-MCNC: 0.69 MG/DL (ref 0.5–1.4)
GFR SERPLBLD CREATININE-BSD FMLA CKD-EPI: 95 ML/MIN/1.73 M 2
GLUCOSE SERPL-MCNC: 16 MG/DL (ref 65–99)
HBA1C MFR BLD: 6.2 % (ref ?–5.8)
POCT INT CON NEG: NEGATIVE
POCT INT CON POS: POSITIVE
POTASSIUM SERPL-SCNC: 4.4 MMOL/L (ref 3.6–5.5)
SODIUM SERPL-SCNC: 136 MMOL/L (ref 135–145)

## 2024-05-07 PROCEDURE — 3074F SYST BP LT 130 MM HG: CPT | Performed by: NURSE PRACTITIONER

## 2024-05-07 PROCEDURE — 99214 OFFICE O/P EST MOD 30 MIN: CPT | Performed by: NURSE PRACTITIONER

## 2024-05-07 PROCEDURE — 3078F DIAST BP <80 MM HG: CPT | Performed by: NURSE PRACTITIONER

## 2024-05-07 PROCEDURE — 83036 HEMOGLOBIN GLYCOSYLATED A1C: CPT | Performed by: NURSE PRACTITIONER

## 2024-05-07 RX ORDER — FAMOTIDINE 20 MG/1
20 TABLET, FILM COATED ORAL 2 TIMES DAILY
Qty: 60 TABLET | Refills: 0 | Status: SHIPPED | OUTPATIENT
Start: 2024-05-07

## 2024-05-07 RX ORDER — SODIUM CHLORIDE 1 G/1
2 TABLET ORAL DAILY
Qty: 90 TABLET | Refills: 1 | Status: SHIPPED | OUTPATIENT
Start: 2024-05-07

## 2024-05-07 ASSESSMENT — FIBROSIS 4 INDEX: FIB4 SCORE: 1.17

## 2024-05-07 NOTE — TELEPHONE ENCOUNTER
Called Ines regarding low sugar of 16 on lab in office. States she finished her Powerbar as she was feeling unwell when she did her labs. States her sensor was off as it told her the glucose was 100. She is contacting [employer] about the sensor. Potassium is improved as she decreased potassium in her diet. She also increased the salt in her diet. BP has been low with tizanidine states she will try the flexeril prescribed for pain. Pain provider ordered an MRI.

## 2024-05-07 NOTE — PROGRESS NOTES
Verbal consent was acquired by the patient to use [employer] ambient listening note generation during this visit yes    Subjective:     HPI:   Ines is a 69 y.o.female established patient who presents today for:    History of Present Illness  The patient presents for evaluation of multiple medical concerns.    The patient continues to exhibit a cough, predominantly in the morning or at night. She suspects the presence of acid reflux, as evidenced by an episode of emesis the previous night. Her dietary habits primarily consist of coffee with milk for breakfast. Her current treatment regimen includes pantoprazole.    The patient's gastroparesis occasionally deteriorates. She has not recently consulted a specialist, but is scheduled for a follow-up. However, due to her recent colonoscopy, she was unable to attend the appointment.     She is not currently taking gabapentin.    The patient has a scheduled appointment with nephrology this month. During her elevated potassium levels, Dr. Duane initiated her on Veltassa. She possesses some Veltassa and is contemplating its use in light of her slightly elevated potassium levels. During a period of hypotension, she administered her muscle spasm medication. She suspects she may be dehydrated and attempts to maintain adequate hydration. Despite consuming a large cup of water the previous day, she reports feeling extremely dry today. She acknowledges forgetting her low potassium diet prior to the initiation of Veltassa and has since resumed eating as minimally as possible. Dr. Duane recommended complete discontinuation of lisinopril and initiating a diuretic, but the patient declined. She was on a diuretic for a duration of 3 days, during which she did not experience excessive urination. Dr. Duane's advice to reduce her water intake to 30 ounces, while her vascular doctor, Dr. Gray, informed her that her water intake was excessively low and harmful for her muscles and  heart, and reassured her that her water intake would be satisfactory as long as her water intake was 50 ounces.  We discussed that the patient should discuss this with nephrology as we may need to try the diuretic.      The patient underwent knee x-rays, the results of which she was unable to comprehend. Her physical therapist informed her that the bone in her left knee is grey, and she is concerned for cancer. She has not followed up with an orthopedic doctor regarding the bone change, as her pain specialist ordered the x-ray. Many years ago Dr. Gillis observed an abnormality in her hip, prompting a biopsy, which revealed necrosis piece of bone. Subsequently, she was referred to an oncologist who confirmed that she was cancer-free at that time. She has an appointment with the pain specialist today at 11:15 AM.  He is the one who ordered her knee x-ray at this time I did recommend that he consult with the specialist who ordered the initial knee x-ray to form a plan.  She will contact me if he does not not order follow-up.  We discussed potentially seeing an orthopedist regarding this issue for follow-up.     has a past medical history of Acute nasopharyngitis (04/29/2022), Anesthesia, Blocked artery (Around 2019), Cataract (2023), Diabetes type 1, controlled (HCC) (11/01/2010), Dyslipidemia (11/01/2010), High cholesterol, Hypertension, Hypothyroidism (11/01/2010), Indigestion, Insulin pump in place (05/18/2011), Left carotid artery stenosis - severe 2019, PONV (postoperative nausea and vomiting), Routine health maintenance (05/18/2011), and Urinary incontinence.   has a past surgical history that includes cholecystectomy; abdominal hysterectomy total (01/01/1987); bunionectomy (01/01/2014); knee arthrotomy (Right, 01/01/1978); shoulder arthroscopy (Left, 01/01/1997); orthopedic osteotomy (Left, 07/17/2017); ligament repair (07/17/2017); toe fusion (07/17/2017); repair, tendon, lower extremity, using tendon graft  (2017); carotid endarterectomy (Left, 2019); lumbar laminectomy diskectomy (2022); lumbar decompression (2022); foraminotomy (2022); meniscectomy, knee, arthroscopic (Right, 2022); synovectomy (Right, 2022); chondroplasty (Right, 2022); shoulder surgery; orif, knee; foot surgery ( and ); posterior cervical fusion o-arm (3/20/2023); lumbar fusion o-arm (2023); and lumbar decompression (2023).    Family History   Problem Relation Age of Onset    Cancer Mother         rectal cancer    Stroke Father          59    Heart Disease Father     Hypertension Father     Lung Disease Father         tb    Hyperlipidemia Father     Diabetes Sister     Psychiatric Illness Sister         bipolar    Heart Disease Maternal Grandfather     Stroke Paternal Grandmother     Heart Disease Paternal Grandfather        Social History     Socioeconomic History    Marital status:      Spouse name: Not on file    Number of children: Not on file    Years of education: Not on file    Highest education level: Not on file   Occupational History    Not on file   Tobacco Use    Smoking status: Former     Current packs/day: 0.00     Types: Cigarettes     Quit date: 1975     Years since quittin.3    Smokeless tobacco: Never   Vaping Use    Vaping Use: Never used   Substance and Sexual Activity    Alcohol use: Yes     Alcohol/week: 1.2 oz     Types: 2 Glasses of wine per week     Comment: about 3-4 drinks per week.     Drug use: Yes     Comment: tried CBD gummies; no THC    Sexual activity: Yes     Partners: Male     Birth control/protection: Surgical     Comment: Pt states had a hysterectomy   Other Topics Concern    Not on file   Social History Narrative    Not on file     Social Determinants of Health     Financial Resource Strain: Low Risk  (2024)    Overall Financial Resource Strain (CARDIA)     Difficulty of Paying Living Expenses: Not hard at all   Food  Insecurity: No Food Insecurity (2/29/2024)    Hunger Vital Sign     Worried About Running Out of Food in the Last Year: Never true     Ran Out of Food in the Last Year: Never true   Transportation Needs: No Transportation Needs (2/29/2024)    PRAPARE - Transportation     Lack of Transportation (Medical): No     Lack of Transportation (Non-Medical): No   Physical Activity: Insufficiently Active (2/29/2024)    Exercise Vital Sign     Days of Exercise per Week: 1 day     Minutes of Exercise per Session: 40 min   Stress: No Stress Concern Present (2/29/2024)    [employer] [place] [employer] - Occupational Stress Questionnaire     Feeling of Stress : Not at all   Social Connections: Moderately Isolated (2/29/2024)    Social Connection and Isolation Panel [NHANES]     Frequency of Communication with Friends and Family: More than three times a week     Frequency of Social Gatherings with Friends and Family: Three times a week     Attends Yazdanism Services: Never     Active Member of Clubs or Organizations: No     Attends Club or Organization Meetings: Never     Marital Status:    Intimate Partner Violence: Not At Risk (2/29/2024)    Humiliation, Afraid, Rape, and Kick questionnaire     Fear of Current or Ex-Partner: No     Emotionally Abused: No     Physically Abused: No     Sexually Abused: No   Housing Stability: Low Risk  (2/29/2024)    Housing Stability Vital Sign     Unable to Pay for Housing in the Last Year: No     Number of Places Lived in the Last Year: 1     Unstable Housing in the Last Year: No       Patient Active Problem List    Diagnosis Date Noted    Coronary artery calcification seen on CT scan 02/27/2024    Elevated alkaline phosphatase level 02/27/2024    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, 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) 06/20/2023    Complex regional pain syndrome type 2 of right lower extremity 06/13/2023    Elevated lipoprotein(a) 03/10/2023     Cervical stenosis of spine 01/20/2023    Cervical myelopathy (HCC) 01/20/2023    History of left-sided carotid endarterectomy 01/13/2023    Other hyperlipidemia 01/13/2023    LAFB (left anterior fascicular block) 01/13/2023    Small vessel disease, cerebrovascular 01/13/2023    Long term (current) use of antithrombotics/antiplatelets 01/13/2023    Family history of stroke 01/13/2023    Agatston coronary artery calcium score between 100 and 199 01/13/2023    Edema 07/12/2022    Chronic hyponatremia 07/12/2022    Lumbar stenosis with neurogenic claudication 05/16/2022    Neurogenic claudication due to lumbar spinal stenosis 04/22/2022    Primary hypertension 04/14/2022    Low back pain 04/14/2022    Carotid artery stenosis 12/14/2021    Neck pain on left side 12/10/2020    Hormone replacement therapy (HRT) 10/23/2018    Insulin pump in place 05/18/2011    Type 1 diabetes mellitus (HCC) 11/01/2010    Hypothyroidism 11/01/2010    Disorder of lipid metabolism 11/01/2010         Current Outpatient Medications Ordered in Epic   Medication Sig Dispense Refill    famotidine (PEPCID) 20 MG Tab Take 1 Tablet by mouth 2 times a day. 60 Tablet 0    sodium chloride (SALT) 1 GM Tab Take 2 Tablets by mouth every day. 90 Tablet 1    DULoxetine (CYMBALTA) 30 MG Cap DR Particles       hydrOXYzine HCl (ATARAX) 50 MG Tab       pregabalin (LYRICA) 100 MG Cap       lisinopril (PRINIVIL) 10 MG Tab Take 1 Tablet by mouth every day. Patient takes at night 90 Tablet 3    tizanidine (ZANAFLEX) 2 MG tablet Take 1-2 Tablets by mouth 3 times a day as needed (spasm). 60 Tablet 1    albuterol 108 (90 Base) MCG/ACT Aero Soln inhalation aerosol Inhale 2 Puffs every four hours as needed for Shortness of Breath (cough). 1 Each 0    clindamycin (CLEOCIN) 150 MG Cap Take 3 capsules PO prior to dental procedure 6 Capsule 0    ezetimibe (ZETIA) 10 MG Tab Take 1 Tablet by mouth every day. 90 Tablet 3    Alirocumab (PRALUENT) 150 MG/ML Solution Auto-injector  "Inject 150 mg under the skin every 14 days. 3 mL 3    Insulin Aspart, w/Niacinamide, (FIASP) 100 UNIT/ML Solution Inject 2.2 Units as directed one time.      rosuvastatin (CRESTOR) 40 MG tablet Take 1 Tablet by mouth at bedtime. 90 Tablet 3    insulin infusion pump Device Inject  under the skin continuous. Patient's own SQ insulin pump    Type of Insulin: Fiasp  Last change of tubing: 3/19/2023 - Change tubing and site every 72 hours    Dosing:  Basal rate:   0000 - 0329 = 0.5 units/hr   0330 - 1129 = 0.85 units/hr   1130 - 1359 = 0.5 units/hr              1400 - 1759 = 0.45 units/hr              1800 - 2359 = 0.5 units/hr    Bolus ratio:   1 unit : 12 g carbohydrate at  (breakfast, lunch, dinner, snacks)      Correction ratio:    1 units for every 50 over 150 mg/dL    Disconnect pump if patient becomes hypoglycemic and altered.      LEVOXYL 100 MCG Tab Take  mcg by mouth every morning on an empty stomach. Takes 1/2 tab on Sundays      pantoprazole (PROTONIX) 40 MG Tablet Delayed Response Take 1 Tablet by mouth every day.      benzonatate (TESSALON) 100 MG Cap Take 1 Capsule by mouth 3 times a day as needed for Cough. (Patient not taking: Reported on 4/16/2024) 60 Capsule 0    gabapentin (NEURONTIN) 300 MG Cap 1 capsule PO BID. May slowly increase to a max of 900mg PO BID (Patient not taking: Reported on 4/16/2024) 90 Capsule 2    Cholecalciferol (VITAMIN D-3 PO) Take 5,000 mg by mouth every evening. (Patient not taking: Reported on 4/16/2024)       No current [facility]-ordered facility-administered medications on file.     Allergies   Allergen Reactions    Ceclor [Cefaclor] Hives and Swelling    Augmentin Hives, Diarrhea and Vomiting     Fever blisters   Sickness lasts forever    Naproxen      Face Swells     Tape Rash     \"Certain band aids\"  PAPER TAPE OKAY/ Tegaderm ok    Acetaminophen Unspecified    Amlodipine Swelling     5mg = swelling     Amoxicillin-Pot Clavulanate      Other reaction(s): Not available    " "Cefaclor Hives and Swelling       Health Maintenance: Completed    Objective:     Exam:  BP 90/68 (BP Location: Left arm, Patient Position: Sitting, BP Cuff Size: Adult)   Pulse 77   Temp 36.8 °C (98.2 °F) (Temporal)   Ht 1.702 m (5' 7\")   Wt 69.3 kg (152 lb 12.8 oz)   LMP 01/01/1983 (LMP Unknown)   SpO2 97%   BMI 23.93 kg/m²  Body mass index is 23.93 kg/m².    Constitutional: Alert, no distress, well-groomed.  Skin: Warm, dry, good turgor, no rashes in visible areas.  Eye: Equal, round and reactive, conjunctiva clear, lids normal.  ENMT: Lips without lesions, good dentition, moist mucous membranes.  Neck: Trachea midline, no masses, no thyromegaly.  Respiratory: Unlabored respiratory effort, no cough.  MSK: Normal gait, moves all extremities.  Neuro: Grossly non-focal.   Psych: Alert and oriented x3, normal affect and mood.      A chaperone was offered to the patient during today's exam.: Patient declined.    Results  Laboratory Studies  Potassium is high. Sodium is a little low. Immunoglobulins were a little low. Thyroid labs looked good.    Imaging  Chest x-ray showed no evidence of anything going on. Left knee x-ray showed a lucency in the proximal tibial metaphysis medially and mild medial compartment space loss.    Assessment & Plan:     1. Type 1 diabetes mellitus with other specified complication (HCC)  POCT  A1C      2. Hyperkalemia  Basic Metabolic Panel      3. Acquired hypothyroidism        4. Hyponatremia  sodium chloride (SALT) 1 GM Tab      5. Gastroparesis due to DM (HCC)  famotidine (PEPCID) 20 MG Tab      6. Primary hypertension        7. Injury of left knee, initial encounter            Assessment & Plan  1. Acid reflux and gastroparesis.  The patient will commence a regimen of Pepcid 20 mg twice daily, in conjunction with pantoprazole 40 mg daily, in an attempt to alleviate the cough. It is imperative that she consults a gastroenterologist promptly to explore alternative treatment options. " Should the cough persist despite the addition of Pepcid, a pulmonary function test will be conducted, and a CT scan of the chest may be considered.    2. Hyperkalemia.  The patient will be recommenced on Veltassa for a duration of 2 weeks. A BMP will be re-evaluated in 2 weeks.    3. Hyponatremia.  The patient is advised to consult with Dr. Duane regarding the potential use of a diuretic. Salt tablets have been refilled.    4. Diabetes.  The patient's diabetes is well-managed A1c is 6.2 today.  Will continue follow-up with endocrinology.    5. Hypothyroidism.  The patient will maintain her current regimen of levothyroxine 100 mcg.    6. Hypertension.  The patient's blood pressure is well-managed with lisinopril 10 mg. The patient will continue her current regimen of lisinopril 10 mg.    7. Left knee pain.  The patient is advised to consult with the pain specialist this afternoon to ascertain if further imaging is required for the lucency. If the pain specialist does not provide a specific plan or does not express concern, the patient is to inform me, at which point a consultation with a orthopedic specialist will be considered.    I have placed the below orders and discussed them with an approved delegating provider. The MA is performing the below orders under the direction of Dr. Owens.      Return in about 1 month (around 6/7/2024), or if symptoms worsen or fail to improve.    Please note that this dictation was created using voice recognition software. I have made every reasonable attempt to correct obvious errors, but I expect that there are errors of grammar and possibly content that I did not discover before finalizing the note.

## 2024-05-08 ENCOUNTER — PATIENT OUTREACH (OUTPATIENT)
Dept: HEALTH INFORMATION MANAGEMENT | Facility: OTHER | Age: 69
End: 2024-05-08
Payer: MEDICARE

## 2024-05-08 DIAGNOSIS — I10 PRIMARY HYPERTENSION: ICD-10-CM

## 2024-05-08 DIAGNOSIS — E78.49 OTHER HYPERLIPIDEMIA: ICD-10-CM

## 2024-05-08 DIAGNOSIS — E08.00 DIABETES MELLITUS DUE TO UNDERLYING CONDITION WITH HYPEROSMOLARITY WITHOUT COMA, WITH LONG-TERM CURRENT USE OF INSULIN (HCC): ICD-10-CM

## 2024-05-08 DIAGNOSIS — N18.2 BENIGN HYPERTENSIVE HEART AND KIDNEY DISEASE WITH DIASTOLIC CHF, NYHA CLASS II AND CKD STAGE 2 (HCC): ICD-10-CM

## 2024-05-08 DIAGNOSIS — Z79.4 DIABETES MELLITUS DUE TO UNDERLYING CONDITION WITH HYPEROSMOLARITY WITHOUT COMA, WITH LONG-TERM CURRENT USE OF INSULIN (HCC): ICD-10-CM

## 2024-05-08 DIAGNOSIS — I13.0 BENIGN HYPERTENSIVE HEART AND KIDNEY DISEASE WITH DIASTOLIC CHF, NYHA CLASS II AND CKD STAGE 2 (HCC): ICD-10-CM

## 2024-05-08 DIAGNOSIS — I65.29 STENOSIS OF CAROTID ARTERY, UNSPECIFIED LATERALITY: ICD-10-CM

## 2024-05-08 DIAGNOSIS — Z98.890 HISTORY OF LEFT-SIDED CAROTID ENDARTERECTOMY: ICD-10-CM

## 2024-05-08 DIAGNOSIS — I50.30 BENIGN HYPERTENSIVE HEART AND KIDNEY DISEASE WITH DIASTOLIC CHF, NYHA CLASS II AND CKD STAGE 2 (HCC): ICD-10-CM

## 2024-05-08 NOTE — PROGRESS NOTES
"Assessment    I spoke with the patient over the phone this afternoon for their monthly and quarterly PCM follow ups. The patient's future appointments were reviewed. The patient is aware and voices understanding. The patient reports that she has gone to finger sticks for blood sugar monitoring until she is able to have [facility] troubleshoot it and ensure that it is accurate. She reports being completely asymptomatic yesterday despite a blood sugar of 16. The patient reports that pain today is \"doing ok.\" The pain management specialist took her off of her tizanadine and has placed her on flexeril instead. She will start that after her  picks it up for her this evening. Patient states blood pressures are back to normal. Patient states that she has been accepted for a trial of a spinal stimulator and that this is in the works with pain management. The patient states that at nephrology yesterday she was told that her potassium levels had normalized with diet intervention,denies any other symptoms.The patient denies any further questions or concerns at this time. She voices understanding of how to contact me if any arise.     Education    *Article on Healthy Steps to take at any age sent by mail    Plan of Care and Goals    *Patient will remain free from falls/injuries  *Healthy nutrition, hydration, and activity    Barriers:    *Management of chronic and acute medical concerns    Progress:    Progressing    Next outreach:  Next PCM outreach One Month                         "

## 2024-05-13 DIAGNOSIS — Z98.890 HISTORY OF LEFT-SIDED CAROTID ENDARTERECTOMY: ICD-10-CM

## 2024-05-13 DIAGNOSIS — E78.49 OTHER HYPERLIPIDEMIA: ICD-10-CM

## 2024-05-13 RX ORDER — ALIROCUMAB 150 MG/ML
150 INJECTION, SOLUTION SUBCUTANEOUS
Qty: 6 ML | Refills: 3 | Status: SHIPPED | OUTPATIENT
Start: 2024-05-13

## 2024-05-15 PROCEDURE — RXMED WILLOW AMBULATORY MEDICATION CHARGE: Performed by: NURSE PRACTITIONER

## 2024-05-16 NOTE — TELEPHONE ENCOUNTER
Contact:  Phone number:[phone] (mobile)    Name of person spoken with and relationship to patient: SELF   Patient’s Adherence:  How patient is doing on medication: Very Well    How many missed doses and reason: 1  awaiting for the prescriber to send a new medications     Any new medications: no    Any new conditions: no    Any new allergies: no    Any new side effects: no    Any new diagnoses: no    How many doses remainin    Did patient want to speak with pharmacist: No   Delivery:  Delivery date and method: 2024 via FED[employer] OVERNIGHT     Next Injection Date: 2024    Signature required: No     Any additional details for :  LEAVE AT FRONT DOOR    Teach Appointment Date:  2023   Shipping Address:  [address] [place] [id number]   Medication(name,strength and dose):  PRALUENT 150MG/ML SOLUTION AUTO INJECTOR    Copay:  $0   Payment Method:  n/a $0 copay   Supplies:  SHARPS CONTAINER    Additional Information:  Received EMR from the pharmacy that Pt's praluent is ready. Called and spoke to Pt today, to confirm to set up her medication delivery for the Praluent. Pt states that she missed 1 dose of Praluent, since she wasn't able to receive an approved refill on time. Discussed with pt that she can re-start her therapy again, but pt would like to hold off and start taking her dose again on her set scheduled on . Verified and confirmed with a clinical pharmacist that pt is okay to re-start her therapy with the Praluent on her set schedule. Pt verbally understood and would like agreed to receive her medication via [employer] overnight on . Notified and updated pharmacy for the status. Updated pt's next refill call on .        KAITLYNN Contreras, PhT  Vascular Pharmacy Liaison (Rx Coordinator)  P: [phone]  2024 5:27 PM

## 2024-05-20 ENCOUNTER — PHARMACY VISIT (OUTPATIENT)
Dept: PHARMACY | Facility: MEDICAL CENTER | Age: 69
End: 2024-05-20
Payer: COMMERCIAL

## 2024-05-24 ENCOUNTER — APPOINTMENT (OUTPATIENT)
Dept: RADIOLOGY | Facility: MEDICAL CENTER | Age: 69
End: 2024-05-24
Payer: MEDICARE

## 2024-06-04 ENCOUNTER — HOSPITAL ENCOUNTER (OUTPATIENT)
Dept: LAB | Facility: MEDICAL CENTER | Age: 69
End: 2024-06-04
Attending: STUDENT IN AN ORGANIZED HEALTH CARE EDUCATION/TRAINING PROGRAM
Payer: MEDICARE

## 2024-06-04 LAB
ALBUMIN SERPL BCP-MCNC: 4.3 G/DL (ref 3.2–4.9)
BUN SERPL-MCNC: 14 MG/DL (ref 8–22)
CALCIUM ALBUM COR SERPL-MCNC: 8.9 MG/DL (ref 8.5–10.5)
CALCIUM SERPL-MCNC: 9.1 MG/DL (ref 8.5–10.5)
CHLORIDE SERPL-SCNC: 94 MMOL/L (ref 96–112)
CO2 SERPL-SCNC: 23 MMOL/L (ref 20–33)
CREAT SERPL-MCNC: 0.63 MG/DL (ref 0.5–1.4)
GFR SERPLBLD CREATININE-BSD FMLA CKD-EPI: 97 ML/MIN/1.73 M 2
GLUCOSE SERPL-MCNC: 182 MG/DL (ref 65–99)
PHOSPHATE SERPL-MCNC: 4.1 MG/DL (ref 2.5–4.5)
POTASSIUM SERPL-SCNC: 5.1 MMOL/L (ref 3.6–5.5)
SODIUM SERPL-SCNC: 130 MMOL/L (ref 135–145)

## 2024-06-04 PROCEDURE — 36415 COLL VENOUS BLD VENIPUNCTURE: CPT

## 2024-06-04 PROCEDURE — 80069 RENAL FUNCTION PANEL: CPT

## 2024-06-07 NOTE — ASSESSMENT & PLAN NOTE
Continue Synthroid   Medication Therapy Management (MTM) Encounter    ASSESSMENT:                            Medication Adherence/Access: No issues identified    Behçet's Disease/Arthralgias/Fibromyalgia/Recurrent oral/genital ulcers:   Discussed directions on how to self-administer methotrexate injections and the importance of starting folic acid. Discussed importance of getting routine labs and limiting alcohol intake. Methotrexate does cross into the breastmilk and the patient is actively breastfeeding. Recommend patient fully completes her breastfeeding journey prior to starting methotrexate since it is not recommended to breastfeed while taking methotrexate.     Vaccines:  Per ACIP guidelines, recommend the patient to get the pneumonia vaccine. Recommend patient remains up to date on vaccines due to the immunosuppressive nature of methotrexate.    Anxiety:  Stable    Chronic Migraine Headaches:  Stable    Chronic Idiopathic Constipation:  Stable    Supplements/Maternal Health:  Stable    Dry Eyes:  Stable    PLAN:                            Start your folic acid 1 mg once daily the day you start your methotrexate 10 mg (0.4 mL) injections. You can start taking folic acid 1 mg earlier.  Fully wean from breastfeeding before starting methotrexate. Limit alcohol use to less than 2 drinks per week while on methotrexate.  Follow up labs in 1 month and then every 3 months after that.  Consider getting a pneumonia vaccine. You will need a prescription sent to your pharmacy before getting it.    Follow-up: Return in about 3 months (around 9/11/2024) for MTM Pharmacist Visit.    SUBJECTIVE/OBJECTIVE:                          Samara Oropeza is a 31 year old female called for an initial visit. She was referred to me from Dr. Dominga Sosa.      Reason for visit: New methotrexate start    Allergies/ADRs: Reviewed in chart  Past Medical History: Reviewed in chart  Tobacco: She reports that she has never smoked. She has never used smokeless  tobacco.  Alcohol: 1 beverage every other week    Medication Adherence/Access: no issues reported    Behçet's Disease/Arthralgias/Fibromyalgia/Recurrent oral/genital ulcers:  Otezla 30 mg twice daily  Methotrexate injection 10 mg every 7 days - has not started yet  Betamethasone 0.1% topically twice daily as needed  Triamcinolone 0.1% ointment apply topically twice daily as needed  Lidocaine 4% cream topically as needed  Benzocaine 20% gel for mouth ulcers as needed  Folic acid 1 mg once daily - has not started yet  Gabapentin 300 mg once daily    Reports methotrexate injection is on backorder at her pharmacy.  Patient asked about needing to be done with breastfeeding before starting methotrexate. Also wondering about starting folic acid sooner before she starts methotrexate.    Liver Function Studies -   Recent Labs   Lab Test 02/06/24  1135 03/30/23  2130   PROTTOTAL  --  7.3   ALBUMIN 4.9 5.0   BILITOTAL  --  0.3   ALKPHOS  --  100   AST 16 24   ALT 14 19     Creatinine   Date Value Ref Range Status   02/06/2024 0.78 0.51 - 0.95 mg/dL Final   09/07/2020 0.79 0.52 - 1.04 mg/dL Final     Anxiety:  Citalopram 20 mg once daily  Amitriptyline 25 mg at bedtime  Hydroxyzine 25 mg as needed    Reports no side effects or issues.    Chronic Migraine Headaches:  Botox injections  Rizatriptan 5 mg ODT as needed  Ondansetron as needed    Reports no side effects or issues.    Chronic Idiopathic Constipation:  Linzess 290 mcg once daily  Bisacodyl 10 mg daily as needed  MiraLax three times daily    Reports no side effects or issues.    Supplements/Maternal Health:  Vitamin D2 50,000 units every 7 days  Prenatal gummy multivitamin once daily  Hydrocortisone 2.5% as needed    Reports no side effects or issues.    Dry Eyes:  Artificial tears ophthalmic ointment as needed  Genteal solution as needed    Reports no side effects or issues.    Today's Vitals: There were no vitals taken for this visit.  ----------------    I spent 21  minutes with this patient today. All changes were made via collaborative practice agreement with Dominga Sosa. A copy of the visit note was provided to the patient's provider(s).    A summary of these recommendations was sent via [email or web address].    Natalie Leach, PharmD  Medication Therapy Management Pharmacist  [facility]  Phone: [phone]    Joya RiveraD, MPH  PGY1 Pharmacy Resident - [facility]    Telemedicine Visit Details  Type of service:  Telephone visit  Start Time:  11:02 AM  End Time: 11:23 AM     Medication Therapy Recommendations  Behcet's disease (H)    Current Medication: methotrexate sodium, PF, 50 MG/2ML SOLN injection   Rationale: Medication product not available - Adherence - Adherence   Recommendation: Medication on backorder at her pharmacy   Status: Accepted - no CPA Needed          Current Medication: methotrexate sodium, PF, 50 MG/2ML SOLN injection   Rationale: Undesirable effect - Adverse medication event - Safety   Recommendation: Provide Education - Methotrexate is not recommended in breastfeeding as it crosses into the breastmilk. Recommend patient completely stop breastfeeding prior to starting methotrexate.   Status: Accepted - no CPA Needed          Rationale: Preventive therapy - Needs additional medication therapy - Indication   Recommendation: Consider getting a pneumonia vaccine   Status: Accepted - no CPA Needed

## 2024-06-11 ENCOUNTER — PATIENT OUTREACH (OUTPATIENT)
Dept: HEALTH INFORMATION MANAGEMENT | Facility: OTHER | Age: 69
End: 2024-06-11

## 2024-06-11 ENCOUNTER — APPOINTMENT (OUTPATIENT)
Dept: MEDICAL GROUP | Facility: PHYSICIAN GROUP | Age: 69
End: 2024-06-11
Payer: MEDICARE

## 2024-06-11 DIAGNOSIS — I10 PRIMARY HYPERTENSION: ICD-10-CM

## 2024-06-11 DIAGNOSIS — K31.84 GASTROPARESIS DUE TO DM (HCC): ICD-10-CM

## 2024-06-11 DIAGNOSIS — N18.2 BENIGN HYPERTENSIVE HEART AND KIDNEY DISEASE WITH DIASTOLIC CHF, NYHA CLASS II AND CKD STAGE 2 (HCC): ICD-10-CM

## 2024-06-11 DIAGNOSIS — I13.0 BENIGN HYPERTENSIVE HEART AND KIDNEY DISEASE WITH DIASTOLIC CHF, NYHA CLASS II AND CKD STAGE 2 (HCC): ICD-10-CM

## 2024-06-11 DIAGNOSIS — E78.49 OTHER HYPERLIPIDEMIA: ICD-10-CM

## 2024-06-11 DIAGNOSIS — E08.00 DIABETES MELLITUS DUE TO UNDERLYING CONDITION WITH HYPEROSMOLARITY WITHOUT COMA, WITH LONG-TERM CURRENT USE OF INSULIN (HCC): ICD-10-CM

## 2024-06-11 DIAGNOSIS — Z79.4 DIABETES MELLITUS DUE TO UNDERLYING CONDITION WITH HYPEROSMOLARITY WITHOUT COMA, WITH LONG-TERM CURRENT USE OF INSULIN (HCC): ICD-10-CM

## 2024-06-11 DIAGNOSIS — E11.43 GASTROPARESIS DUE TO DM (HCC): ICD-10-CM

## 2024-06-11 DIAGNOSIS — I50.30 BENIGN HYPERTENSIVE HEART AND KIDNEY DISEASE WITH DIASTOLIC CHF, NYHA CLASS II AND CKD STAGE 2 (HCC): ICD-10-CM

## 2024-06-11 DIAGNOSIS — I65.29 STENOSIS OF CAROTID ARTERY, UNSPECIFIED LATERALITY: ICD-10-CM

## 2024-06-11 RX ORDER — FAMOTIDINE 20 MG/1
20 TABLET, FILM COATED ORAL 2 TIMES DAILY
Qty: 60 TABLET | Refills: 0 | Status: SHIPPED | OUTPATIENT
Start: 2024-06-11
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
## 2024-06-12 ENCOUNTER — HOSPITAL ENCOUNTER (OUTPATIENT)
Dept: RADIOLOGY | Facility: MEDICAL CENTER | Age: 69
End: 2024-06-12
Payer: MEDICARE

## 2024-06-12 DIAGNOSIS — M54.16 LUMBAR RADICULOPATHY: ICD-10-CM

## 2024-06-12 PROCEDURE — 73721 MRI JNT OF LWR EXTRE W/O DYE: CPT | Mod: LT

## 2024-06-12 PROCEDURE — 72148 MRI LUMBAR SPINE W/O DYE: CPT

## 2024-06-18 DIAGNOSIS — E78.5 DYSLIPIDEMIA: ICD-10-CM

## 2024-06-18 RX ORDER — ROSUVASTATIN CALCIUM 40 MG/1
40 TABLET, COATED ORAL
Qty: 90 TABLET | Refills: 0 | Status: SHIPPED | OUTPATIENT
Start: 2024-06-18

## 2024-06-18 NOTE — TELEPHONE ENCOUNTER
Received request via: Pharmacy    Was the patient seen in the last year in this department? Yes    Does the patient have an active prescription (recently filled or refills available) for medication(s) requested? No    Pharmacy Name: [id number] - [place], [place] - [address]     Does the patient have jail Plus and need 100 day supply (blood pressure, diabetes and cholesterol meds only)? Patient does not have SCP

## 2024-06-26 ENCOUNTER — HOSPITAL ENCOUNTER (OUTPATIENT)
Dept: LAB | Facility: MEDICAL CENTER | Age: 69
End: 2024-06-26
Attending: STUDENT IN AN ORGANIZED HEALTH CARE EDUCATION/TRAINING PROGRAM
Payer: MEDICARE

## 2024-06-26 ENCOUNTER — HOSPITAL ENCOUNTER (OUTPATIENT)
Dept: LAB | Facility: MEDICAL CENTER | Age: 69
End: 2024-06-26
Attending: FAMILY MEDICINE
Payer: MEDICARE

## 2024-06-26 DIAGNOSIS — E78.49 OTHER HYPERLIPIDEMIA: ICD-10-CM

## 2024-06-26 LAB
ALBUMIN SERPL BCP-MCNC: 4.3 G/DL (ref 3.2–4.9)
BUN SERPL-MCNC: 13 MG/DL (ref 8–22)
CALCIUM ALBUM COR SERPL-MCNC: 9.3 MG/DL (ref 8.5–10.5)
CALCIUM SERPL-MCNC: 9.5 MG/DL (ref 8.5–10.5)
CHLORIDE SERPL-SCNC: 95 MMOL/L (ref 96–112)
CHOLEST SERPL-MCNC: 154 MG/DL (ref 100–199)
CO2 SERPL-SCNC: 25 MMOL/L (ref 20–33)
CREAT SERPL-MCNC: 0.78 MG/DL (ref 0.5–1.4)
FASTING STATUS PATIENT QL REPORTED: NORMAL
GFR SERPLBLD CREATININE-BSD FMLA CKD-EPI: 83 ML/MIN/1.73 M 2
GLUCOSE SERPL-MCNC: 165 MG/DL (ref 65–99)
HDLC SERPL-MCNC: 69 MG/DL
LDLC SERPL CALC-MCNC: 71 MG/DL
OSMOLALITY UR: 447 MOSM/KG H2O (ref 300–900)
PHOSPHATE SERPL-MCNC: 4.4 MG/DL (ref 2.5–4.5)
POTASSIUM SERPL-SCNC: 5.1 MMOL/L (ref 3.6–5.5)
SODIUM SERPL-SCNC: 130 MMOL/L (ref 135–145)
SODIUM UR-SCNC: 122 MMOL/L
TRIGL SERPL-MCNC: 69 MG/DL (ref 0–149)

## 2024-06-26 PROCEDURE — 36415 COLL VENOUS BLD VENIPUNCTURE: CPT

## 2024-06-26 PROCEDURE — 80061 LIPID PANEL: CPT

## 2024-06-26 PROCEDURE — 80069 RENAL FUNCTION PANEL: CPT

## 2024-06-26 PROCEDURE — 83935 ASSAY OF URINE OSMOLALITY: CPT

## 2024-06-26 PROCEDURE — 84300 ASSAY OF URINE SODIUM: CPT

## 2024-07-02 ENCOUNTER — APPOINTMENT (OUTPATIENT)
Dept: VASCULAR LAB | Facility: MEDICAL CENTER | Age: 69
End: 2024-07-02
Payer: MEDICARE

## 2024-07-02 ENCOUNTER — TELEPHONE (OUTPATIENT)
Dept: VASCULAR LAB | Facility: MEDICAL CENTER | Age: 69
End: 2024-07-02
Payer: MEDICARE

## 2024-07-11 ENCOUNTER — APPOINTMENT (OUTPATIENT)
Dept: MEDICAL GROUP | Facility: PHYSICIAN GROUP | Age: 69
End: 2024-07-11
Payer: MEDICARE

## 2024-07-11 DIAGNOSIS — K31.84 GASTROPARESIS DUE TO DM (HCC): ICD-10-CM

## 2024-07-11 DIAGNOSIS — E11.43 GASTROPARESIS DUE TO DM (HCC): ICD-10-CM

## 2024-07-12 ENCOUNTER — OFFICE VISIT (OUTPATIENT)
Dept: VASCULAR LAB | Facility: MEDICAL CENTER | Age: 69
End: 2024-07-12
Attending: FAMILY MEDICINE
Payer: MEDICARE

## 2024-07-12 VITALS
HEIGHT: 67 IN | SYSTOLIC BLOOD PRESSURE: 132 MMHG | BODY MASS INDEX: 24.33 KG/M2 | HEART RATE: 92 BPM | DIASTOLIC BLOOD PRESSURE: 70 MMHG | WEIGHT: 155 LBS

## 2024-07-12 DIAGNOSIS — Z98.890 HISTORY OF LEFT-SIDED CAROTID ENDARTERECTOMY: ICD-10-CM

## 2024-07-12 DIAGNOSIS — Z79.02 LONG TERM (CURRENT) USE OF ANTITHROMBOTICS/ANTIPLATELETS: ICD-10-CM

## 2024-07-12 DIAGNOSIS — E78.49 OTHER HYPERLIPIDEMIA: ICD-10-CM

## 2024-07-12 DIAGNOSIS — E10.69 TYPE 1 DIABETES MELLITUS WITH OTHER SPECIFIED COMPLICATION (HCC): ICD-10-CM

## 2024-07-12 DIAGNOSIS — E78.41 ELEVATED LIPOPROTEIN(A): ICD-10-CM

## 2024-07-12 DIAGNOSIS — I10 PRIMARY HYPERTENSION: ICD-10-CM

## 2024-07-12 DIAGNOSIS — Z96.41 INSULIN PUMP IN PLACE: ICD-10-CM

## 2024-07-12 DIAGNOSIS — I77.9 CAROTID ARTERY DISEASE, UNSPECIFIED LATERALITY, UNSPECIFIED TYPE (HCC): ICD-10-CM

## 2024-07-12 DIAGNOSIS — R93.1 AGATSTON CORONARY ARTERY CALCIUM SCORE BETWEEN 100 AND 199: ICD-10-CM

## 2024-07-12 DIAGNOSIS — I67.9 SMALL VESSEL DISEASE, CEREBROVASCULAR: ICD-10-CM

## 2024-07-12 DIAGNOSIS — I25.10 CORONARY ARTERY CALCIFICATION SEEN ON CT SCAN: ICD-10-CM

## 2024-07-12 PROCEDURE — G2211 COMPLEX E/M VISIT ADD ON: HCPCS | Performed by: FAMILY MEDICINE

## 2024-07-12 PROCEDURE — 99214 OFFICE O/P EST MOD 30 MIN: CPT | Performed by: FAMILY MEDICINE

## 2024-07-12 PROCEDURE — 3078F DIAST BP <80 MM HG: CPT | Performed by: FAMILY MEDICINE

## 2024-07-12 PROCEDURE — 99212 OFFICE O/P EST SF 10 MIN: CPT

## 2024-07-12 PROCEDURE — 3075F SYST BP GE 130 - 139MM HG: CPT | Performed by: FAMILY MEDICINE

## 2024-07-12 RX ORDER — CYCLOBENZAPRINE HCL 10 MG
10 TABLET ORAL
COMMUNITY

## 2024-07-12 ASSESSMENT — ENCOUNTER SYMPTOMS
FOCAL WEAKNESS: 1
CLAUDICATION: 0
COUGH: 0
PALPITATIONS: 0
HEADACHES: 0
SHORTNESS OF BREATH: 0
PND: 0
CHILLS: 0
DIZZINESS: 0
HEMOPTYSIS: 0
FEVER: 0
ORTHOPNEA: 0
TREMORS: 0
TINGLING: 0
SPUTUM PRODUCTION: 0
WHEEZING: 0

## 2024-07-12 ASSESSMENT — FIBROSIS 4 INDEX: FIB4 SCORE: 1.17

## 2024-07-15 RX ORDER — FAMOTIDINE 20 MG/1
20 TABLET, FILM COATED ORAL 2 TIMES DAILY
Qty: 60 TABLET | Refills: 0 | Status: SHIPPED | OUTPATIENT
Start: 2024-07-15 | End: 2024-07-18 | Stop reason: SDUPTHER

## 2024-07-16 ENCOUNTER — APPOINTMENT (OUTPATIENT)
Dept: MEDICAL GROUP | Facility: PHYSICIAN GROUP | Age: 69
End: 2024-07-16
Payer: MEDICARE

## 2024-07-18 ENCOUNTER — OFFICE VISIT (OUTPATIENT)
Dept: MEDICAL GROUP | Facility: PHYSICIAN GROUP | Age: 69
End: 2024-07-18
Payer: MEDICARE

## 2024-07-18 ENCOUNTER — PATIENT OUTREACH (OUTPATIENT)
Dept: HEALTH INFORMATION MANAGEMENT | Facility: OTHER | Age: 69
End: 2024-07-18

## 2024-07-18 VITALS
HEART RATE: 83 BPM | TEMPERATURE: 98.4 F | HEIGHT: 67 IN | SYSTOLIC BLOOD PRESSURE: 124 MMHG | DIASTOLIC BLOOD PRESSURE: 62 MMHG | BODY MASS INDEX: 24.8 KG/M2 | WEIGHT: 158 LBS | OXYGEN SATURATION: 100 %

## 2024-07-18 DIAGNOSIS — Z98.890 HISTORY OF LEFT-SIDED CAROTID ENDARTERECTOMY: ICD-10-CM

## 2024-07-18 DIAGNOSIS — I50.30 BENIGN HYPERTENSIVE HEART AND KIDNEY DISEASE WITH DIASTOLIC CHF, NYHA CLASS II AND CKD STAGE 2 (HCC): ICD-10-CM

## 2024-07-18 DIAGNOSIS — E78.49 OTHER HYPERLIPIDEMIA: ICD-10-CM

## 2024-07-18 DIAGNOSIS — K31.84 GASTROPARESIS DUE TO DM (HCC): ICD-10-CM

## 2024-07-18 DIAGNOSIS — I65.29 STENOSIS OF CAROTID ARTERY, UNSPECIFIED LATERALITY: ICD-10-CM

## 2024-07-18 DIAGNOSIS — I10 PRIMARY HYPERTENSION: ICD-10-CM

## 2024-07-18 DIAGNOSIS — E08.00 DIABETES MELLITUS DUE TO UNDERLYING CONDITION WITH HYPEROSMOLARITY WITHOUT COMA, WITH LONG-TERM CURRENT USE OF INSULIN (HCC): ICD-10-CM

## 2024-07-18 DIAGNOSIS — N18.2 BENIGN HYPERTENSIVE HEART AND KIDNEY DISEASE WITH DIASTOLIC CHF, NYHA CLASS II AND CKD STAGE 2 (HCC): ICD-10-CM

## 2024-07-18 DIAGNOSIS — M48.062 NEUROGENIC CLAUDICATION DUE TO LUMBAR SPINAL STENOSIS: ICD-10-CM

## 2024-07-18 DIAGNOSIS — I13.0 BENIGN HYPERTENSIVE HEART AND KIDNEY DISEASE WITH DIASTOLIC CHF, NYHA CLASS II AND CKD STAGE 2 (HCC): ICD-10-CM

## 2024-07-18 DIAGNOSIS — Z79.4 DIABETES MELLITUS DUE TO UNDERLYING CONDITION WITH HYPEROSMOLARITY WITHOUT COMA, WITH LONG-TERM CURRENT USE OF INSULIN (HCC): ICD-10-CM

## 2024-07-18 DIAGNOSIS — E10.69 TYPE 1 DIABETES MELLITUS WITH OTHER SPECIFIED COMPLICATION (HCC): ICD-10-CM

## 2024-07-18 DIAGNOSIS — E11.43 GASTROPARESIS DUE TO DM (HCC): ICD-10-CM

## 2024-07-18 DIAGNOSIS — G57.71 COMPLEX REGIONAL PAIN SYNDROME TYPE 2 OF RIGHT LOWER EXTREMITY: ICD-10-CM

## 2024-07-18 DIAGNOSIS — E03.9 ACQUIRED HYPOTHYROIDISM: ICD-10-CM

## 2024-07-18 PROCEDURE — 3078F DIAST BP <80 MM HG: CPT | Performed by: NURSE PRACTITIONER

## 2024-07-18 PROCEDURE — 99214 OFFICE O/P EST MOD 30 MIN: CPT | Performed by: NURSE PRACTITIONER

## 2024-07-18 PROCEDURE — 3074F SYST BP LT 130 MM HG: CPT | Performed by: NURSE PRACTITIONER

## 2024-07-18 RX ORDER — FAMOTIDINE 20 MG/1
20 TABLET, FILM COATED ORAL 2 TIMES DAILY
Qty: 180 TABLET | Refills: 3 | Status: SHIPPED | OUTPATIENT
Start: 2024-07-18

## 2024-07-18 RX ORDER — PANTOPRAZOLE SODIUM 40 MG/1
40 TABLET, DELAYED RELEASE ORAL DAILY
Qty: 90 TABLET | Refills: 3 | Status: SHIPPED | OUTPATIENT
Start: 2024-07-18

## 2024-07-18 ASSESSMENT — FIBROSIS 4 INDEX: FIB4 SCORE: 1.17

## 2024-07-31 ENCOUNTER — TELEPHONE (OUTPATIENT)
Dept: HEALTH INFORMATION MANAGEMENT | Facility: OTHER | Age: 69
End: 2024-07-31
Payer: MEDICARE

## 2024-08-05 PROCEDURE — RXMED WILLOW AMBULATORY MEDICATION CHARGE: Performed by: NURSE PRACTITIONER

## 2024-08-06 ENCOUNTER — PHARMACY VISIT (OUTPATIENT)
Dept: PHARMACY | Facility: MEDICAL CENTER | Age: 69
End: 2024-08-06
Payer: COMMERCIAL

## 2024-08-08 ENCOUNTER — APPOINTMENT (OUTPATIENT)
Dept: RADIOLOGY | Facility: MEDICAL CENTER | Age: 69
End: 2024-08-08
Attending: FAMILY MEDICINE
Payer: MEDICARE

## 2024-08-12 ENCOUNTER — DOCUMENTATION (OUTPATIENT)
Dept: PHARMACY | Facility: MEDICAL CENTER | Age: 69
End: 2024-08-12
Payer: MEDICARE
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
## 2024-08-16 ENCOUNTER — PATIENT OUTREACH (OUTPATIENT)
Dept: HEALTH INFORMATION MANAGEMENT | Facility: OTHER | Age: 69
End: 2024-08-16
Payer: MEDICARE

## 2024-08-16 DIAGNOSIS — E08.00 DIABETES MELLITUS DUE TO UNDERLYING CONDITION WITH HYPEROSMOLARITY WITHOUT COMA, WITH LONG-TERM CURRENT USE OF INSULIN (HCC): ICD-10-CM

## 2024-08-16 DIAGNOSIS — I65.29 STENOSIS OF CAROTID ARTERY, UNSPECIFIED LATERALITY: ICD-10-CM

## 2024-08-16 DIAGNOSIS — I50.30 BENIGN HYPERTENSIVE HEART AND KIDNEY DISEASE WITH DIASTOLIC CHF, NYHA CLASS II AND CKD STAGE 2 (HCC): ICD-10-CM

## 2024-08-16 DIAGNOSIS — Z79.4 DIABETES MELLITUS DUE TO UNDERLYING CONDITION WITH HYPEROSMOLARITY WITHOUT COMA, WITH LONG-TERM CURRENT USE OF INSULIN (HCC): ICD-10-CM

## 2024-08-16 DIAGNOSIS — N18.2 BENIGN HYPERTENSIVE HEART AND KIDNEY DISEASE WITH DIASTOLIC CHF, NYHA CLASS II AND CKD STAGE 2 (HCC): ICD-10-CM

## 2024-08-16 DIAGNOSIS — I10 PRIMARY HYPERTENSION: ICD-10-CM

## 2024-08-16 DIAGNOSIS — Z98.890 HISTORY OF LEFT-SIDED CAROTID ENDARTERECTOMY: ICD-10-CM

## 2024-08-16 DIAGNOSIS — E78.49 OTHER HYPERLIPIDEMIA: ICD-10-CM

## 2024-08-16 DIAGNOSIS — I13.0 BENIGN HYPERTENSIVE HEART AND KIDNEY DISEASE WITH DIASTOLIC CHF, NYHA CLASS II AND CKD STAGE 2 (HCC): ICD-10-CM

## 2024-08-16 NOTE — PROGRESS NOTES
First attempt made for August PCM follow up. No answer. Message left with contact information and request for call back.

## 2024-08-16 NOTE — PROGRESS NOTES
Assessment    I spoke to the patient this afternoon for her monthly PCM follow up. The patient reports that her blood sugar levels have had one bad day recently but have been stable otherwise. She reports no cardiac symptoms or blood pressures over 135 systolic taken at home. The patient reports that her spinal stimulator has been very helpful. The patient is a little stressed as her  is now having some health struggles. She voices understanding of self care and how to get a hold of me if any questions or concerns arise.     Education and Self Management of Care    Article on diabetes and infection sent by mail  Patient will continue to follow up with PCP/specialists as indicated    Plan of Care and Goals    Patient will remain free from falls/injuries  Healthy nutrition, hydration, and activity    Barriers:    Management of chronic health concerns    Progress:    Progressing    Next outreach:  One Month

## 2024-08-17 DIAGNOSIS — E11.43 GASTROPARESIS DUE TO DM (HCC): ICD-10-CM

## 2024-08-17 DIAGNOSIS — E87.1 HYPONATREMIA: ICD-10-CM

## 2024-08-17 DIAGNOSIS — K31.84 GASTROPARESIS DUE TO DM (HCC): ICD-10-CM

## 2024-08-19 RX ORDER — FAMOTIDINE 20 MG/1
20 TABLET, FILM COATED ORAL 2 TIMES DAILY
Qty: 60 TABLET | Refills: 0 | Status: SHIPPED | OUTPATIENT
Start: 2024-08-19

## 2024-08-19 RX ORDER — SODIUM CHLORIDE 1 G/1
2 TABLET ORAL
Qty: 90 TABLET | Refills: 1 | Status: SHIPPED | OUTPATIENT
Start: 2024-08-19

## 2024-08-19 NOTE — TELEPHONE ENCOUNTER
Received request via: Pharmacy    Was the patient seen in the last year in this department? Yes    Does the patient have an active prescription (recently filled or refills available) for medication(s) requested? No    Pharmacy Name: [id number] - [place], [place] - [address]     Does the patient have long term Plus and need 100-day supply? (This applies to ALL medications) Patient does not have SCP

## 2024-09-10 ENCOUNTER — TELEPHONE (OUTPATIENT)
Dept: VASCULAR LAB | Facility: MEDICAL CENTER | Age: 69
End: 2024-09-10
Payer: MEDICARE

## 2024-09-10 NOTE — TELEPHONE ENCOUNTER
Received ph call and patient states Hot flashes, arm went numb.  BP  - 173/94  Patient is on fluid restriction    Patient took medication in the evening, and BP seems to stabilize.  Booked f/u with tomorrow with JOSE Chi to discuss.    Provided patient ER/UC protocol instructions.    Gerri Aburto, Med Ass't  [facility]  Ph. [phone]  Fx. [phone]

## 2024-09-10 NOTE — TELEPHONE ENCOUNTER
MAHIN Bruner.  You2 hours ago (1:13 PM)     FM  Can you please make sure the patient is still not having numbness in her arm or any other TIA/CVA symptoms.  If she is - ER.  Otherwise will see her tomorrow and have her bring in bp log.    Thanks!   Pt states she get so hot and gets red, gets nauseous, and just that time her hand went numb. Has since resolved  During episode her bp is very high for her. No other symptoms at this time all have resolved. Will discuss issue further in clinic tomorrow

## 2024-09-11 ENCOUNTER — APPOINTMENT (OUTPATIENT)
Dept: VASCULAR LAB | Facility: MEDICAL CENTER | Age: 69
End: 2024-09-11
Payer: MEDICARE

## 2024-09-12 ENCOUNTER — OFFICE VISIT (OUTPATIENT)
Dept: VASCULAR LAB | Facility: MEDICAL CENTER | Age: 69
End: 2024-09-12
Attending: FAMILY MEDICINE
Payer: MEDICARE

## 2024-09-12 VITALS
HEART RATE: 85 BPM | SYSTOLIC BLOOD PRESSURE: 166 MMHG | HEIGHT: 67 IN | BODY MASS INDEX: 23.54 KG/M2 | WEIGHT: 150 LBS | DIASTOLIC BLOOD PRESSURE: 80 MMHG

## 2024-09-12 DIAGNOSIS — E78.5 DYSLIPIDEMIA: ICD-10-CM

## 2024-09-12 DIAGNOSIS — E78.41 ELEVATED LIPOPROTEIN(A): ICD-10-CM

## 2024-09-12 DIAGNOSIS — I25.10 CORONARY ARTERY CALCIFICATION SEEN ON CT SCAN: ICD-10-CM

## 2024-09-12 DIAGNOSIS — I10 PRIMARY HYPERTENSION: ICD-10-CM

## 2024-09-12 DIAGNOSIS — R93.1 AGATSTON CORONARY ARTERY CALCIUM SCORE BETWEEN 100 AND 199: ICD-10-CM

## 2024-09-12 DIAGNOSIS — I77.9 CAROTID ARTERY DISEASE, UNSPECIFIED LATERALITY, UNSPECIFIED TYPE (HCC): Chronic | ICD-10-CM

## 2024-09-12 DIAGNOSIS — Z98.890 HISTORY OF LEFT-SIDED CAROTID ENDARTERECTOMY: ICD-10-CM

## 2024-09-12 DIAGNOSIS — E10.69 TYPE 1 DIABETES MELLITUS WITH OTHER SPECIFIED COMPLICATION (HCC): ICD-10-CM

## 2024-09-12 DIAGNOSIS — E87.1 CHRONIC HYPONATREMIA: ICD-10-CM

## 2024-09-12 PROCEDURE — 3079F DIAST BP 80-89 MM HG: CPT | Performed by: FAMILY MEDICINE

## 2024-09-12 PROCEDURE — 3077F SYST BP >= 140 MM HG: CPT | Performed by: FAMILY MEDICINE

## 2024-09-12 PROCEDURE — G2211 COMPLEX E/M VISIT ADD ON: HCPCS | Performed by: FAMILY MEDICINE

## 2024-09-12 PROCEDURE — 99212 OFFICE O/P EST SF 10 MIN: CPT

## 2024-09-12 PROCEDURE — 99214 OFFICE O/P EST MOD 30 MIN: CPT | Performed by: FAMILY MEDICINE

## 2024-09-12 RX ORDER — ROSUVASTATIN CALCIUM 40 MG/1
40 TABLET, COATED ORAL DAILY
Qty: 100 TABLET | Refills: 3 | Status: SHIPPED | OUTPATIENT
Start: 2024-09-12

## 2024-09-12 RX ORDER — EZETIMIBE 10 MG/1
10 TABLET ORAL DAILY
Qty: 100 TABLET | Refills: 3 | Status: SHIPPED | OUTPATIENT
Start: 2024-09-12

## 2024-09-12 RX ORDER — TELMISARTAN 20 MG/1
20 TABLET ORAL DAILY
Qty: 100 TABLET | Refills: 3 | Status: SHIPPED | OUTPATIENT
Start: 2024-09-12

## 2024-09-12 ASSESSMENT — ENCOUNTER SYMPTOMS
WHEEZING: 0
TREMORS: 0
CHILLS: 0
DIZZINESS: 0
SPUTUM PRODUCTION: 0
SHORTNESS OF BREATH: 0
ORTHOPNEA: 0
CLAUDICATION: 0
HEMOPTYSIS: 0
TINGLING: 0
PALPITATIONS: 0
HEADACHES: 0
FOCAL WEAKNESS: 1
FEVER: 0
PND: 0
COUGH: 0

## 2024-09-12 ASSESSMENT — FIBROSIS 4 INDEX: FIB4 SCORE: 1.17

## 2024-09-12 NOTE — PROGRESS NOTES
[facility] - FOLLOW-UP  24     Ines Long referred for eval/mgmt of dyslipidemia, est 2023  Referral Source: Dr. Card (cardiology)     Subjective   Interval hx/concerns: last seen 2024, reports some flushing and sweats intermittently.  Does not seem to correlate with any current meds.  Reports having normoglycemia during that time.  Had hormonal evaluation with endocrine.  No other new sx.  Does report increase BP over time despite current tx plan.   Has been rx'd NaCl tabs by another provider for her chronic hypoNa.     LIFESTYLE MGMT  Change in weight: mild increase   Exercise habits:  limited   Dietary patterns: low fat  Etoh: No  Reported barriers to care: limited walking due to imbalance and ongoing eval with neurology     MED MGMT  Current Prescription:   Statin: rosuva 40mg  Non-Statin: zetia 10mg, praluent 150mg   Current Supplements: omega 3 FAs   Any Current Side Effects? No  Current Adherence: Complete    PERTINENT HLD PMHX  Age at Initial Diagnosis of Dyslipidemia: >5yrs     History of ASCVD: yes    # carotid stenosis, s/p L CEA  with Dr. Garcia- No new sx, no prior CVA/TIA.     # Cerebral small vessel dz - no prior sx, noted per MR, no prior CVA/TIA.      # elevated CACS = 180.  No hx of ascvd, no sx.     Secondary causes of dyslipidemia: none identified  Previously Attempted Interventions for Lipids - including outcome  Statin: None     Outcome: N/A  Non-Statin: None  Outcome: N/A  Any Previous History of Statin Intolerance? No  Baseline Lipids (no off-tx lipid panel available in our system):   Latest Reference Range & Units 02/23/15 07:48   Cholesterol,Tot 100 - 199 mg/dL 245 (H)   Triglycerides 0 - 149 mg/dL 78   HDL >=40 mg/dL 99   LDL <100 mg/dL 130 (H)     Anticoagulation/antiplats: Yes, Details: ASA 81mg , no bleeding noted   HTN: Home BP los-140s/80s, good adherence/tolerance  T1D: Stable, on insuling pump, seeing endocrine    Current Outpatient  Medications on File Prior to Visit   Medication Sig Dispense Refill    sodium chloride (SALT) 1 GM Tab TAKE TWO TABLETS BY MOUTH EVERY DAY (Patient taking differently: Take 3 g by mouth every day.) 90 Tablet 1    famotidine (PEPCID) 20 MG Tab TAKE ONE TABLET BY MOUTH TWICE A DAY 60 Tablet 0    pantoprazole (PROTONIX) 40 MG Tablet Delayed Response Take 1 Tablet by mouth every day. 90 Tablet 3    cyclobenzaprine (FLEXERIL) 10 mg Tab Take 10 mg by mouth 2 (two) times a day.      Alirocumab (PRALUENT) 150 MG/ML Solution Auto-injector Inject 150 mg under the skin every 14 days. 6 mL 3    DULoxetine (CYMBALTA) 30 MG Cap DR Particles 60 mg.      pregabalin (LYRICA) 100 MG Cap 200 mg.      clindamycin (CLEOCIN) 150 MG Cap Take 3 capsules PO prior to dental procedure 6 Capsule 0    Insulin Aspart, w/Niacinamide, (FIASP) 100 UNIT/ML Solution Inject 2.2 Units as directed one time.      insulin infusion pump Device Inject  under the skin continuous. Patient's own SQ insulin pump    Type of Insulin: Fiasp  Last change of tubing: 3/19/2023 - Change tubing and site every 72 hours    Dosing:  Basal rate:   0000 - 0329 = 0.5 units/hr   0330 - 1129 = 0.85 units/hr   1130 - 1359 = 0.5 units/hr              1400 - 1759 = 0.45 units/hr              1800 - 2359 = 0.5 units/hr    Bolus ratio:   1 unit : 12 g carbohydrate at  (breakfast, lunch, dinner, snacks)      Correction ratio:    1 units for every 50 over 150 mg/dL    Disconnect pump if patient becomes hypoglycemic and altered.      LEVOXYL 100 MCG Tab Take  mcg by mouth every morning on an empty stomach. Takes 1/2 tab on Sundays      Cholecalciferol (VITAMIN D-3 PO) Take 5,000 mg by mouth every evening.       No current facility-administered medications on file prior to visit.      Allergies   Allergen Reactions    Ceclor [Cefaclor] Hives and Swelling    Augmentin Hives, Diarrhea and Vomiting     Fever blisters   Sickness lasts forever    Naproxen      Face Swells     Tape  "Rash     \"Certain band aids\"  PAPER TAPE OKAY/ Tegaderm ok    Amlodipine Swelling     5mg = swelling     Amoxicillin-Pot Clavulanate      Other reaction(s): Not available    Cefaclor Hives and Swelling    Clindamycin Unspecified    Penicillin G Benzathine Unspecified      Social History     Tobacco Use    Smoking status: Former     Current packs/day: 0.00     Types: Cigarettes     Quit date: 1975     Years since quittin.7    Smokeless tobacco: Never   Vaping Use    Vaping status: Never Used   Substance Use Topics    Alcohol use: Yes     Alcohol/week: 1.2 oz     Types: 2 Glasses of wine per week     Comment: about 3-4 drinks per week.     Drug use: Yes     Comment: tried CBD gummies; no THC     Review of Systems   Constitutional:  Negative for chills, fever and malaise/fatigue.   Respiratory:  Negative for cough, hemoptysis, sputum production, shortness of breath and wheezing.    Cardiovascular:  Negative for chest pain, palpitations, orthopnea, claudication, leg swelling and PND.   Neurological:  Positive for focal weakness (at baseline, gait slow but stable). Negative for dizziness, tingling, tremors and headaches.       Objective    Vitals:    24 1129 24 1132   BP: (!) 152/80 (!) 166/80   Pulse: 84 85   Weight: 68 kg (150 lb)    Height: 1.702 m (5' 7\")      BP Readings from Last 5 Encounters:   24 (!) 166/80   24 124/62   24 132/70   24 90/68   24 (!) 142/62     Physical Exam  Constitutional:       General: She is not in acute distress.     Appearance: Normal appearance. She is well-developed. She is not diaphoretic.   HENT:      Head: Normocephalic and atraumatic.   Eyes:      Extraocular Movements: Extraocular movements intact.      Conjunctiva/sclera: Conjunctivae normal.   Cardiovascular:      Rate and Rhythm: Normal rate and regular rhythm.      Pulses: Normal pulses.      Heart sounds: Normal heart sounds. No murmur heard.     No friction rub. No gallop. " "  Pulmonary:      Effort: Pulmonary effort is normal. No respiratory distress.      Breath sounds: Normal breath sounds.   Musculoskeletal:         General: No tenderness. Normal range of motion.      Cervical back: Normal range of motion and neck supple.   Skin:     General: Skin is warm and dry.      Coloration: Skin is not pale.      Findings: No rash.   Neurological:      Mental Status: She is alert and oriented to person, place, and time.      Cranial Nerves: No cranial nerve deficit.   Psychiatric:         Mood and Affect: Mood and affect normal.         Speech: Speech normal.       DATA REVIEW:  Most Recent Lipid Panel:   Lab Results   Component Value Date    CHOLSTRLTOT 154 06/26/2024    TRIGLYCERIDE 69 06/26/2024    HDL 69 06/26/2024    LDL 71 06/26/2024     Lab Results   Component Value Date/Time    LDL 71 06/26/2024 10:48 AM    LDL 29 01/03/2024 10:37 AM    LDL 31 08/01/2023 09:36 AM    LDL 70 03/08/2023 09:10 AM    LDL 89 07/19/2022 09:04 AM       Lab Results   Component Value Date/Time    LIPOPROTA 149 (H) 08/01/2023 09:36 AM       Latest Reference Range & Units 03/08/23 09:10 08/01/23 09:36   Lipoprotein (a) <=29 mg/dL 286 (H) 149 (H)     No results found for: \"APOB\"   No results found for: \"CRPHIGHSEN\"      Other Pertinent Blood Work:   Lab Results   Component Value Date    SODIUM 130 (L) 06/26/2024    POTASSIUM 5.1 06/26/2024    CHLORIDE 95 (L) 06/26/2024    CO2 25 06/26/2024    ANION 10.0 05/07/2024    GLUCOSE 165 (H) 06/26/2024    BUN 13 06/26/2024    CREATININE 0.78 06/26/2024    CALCIUM 9.5 06/26/2024    ASTSGOT 30 03/08/2024    ALTSGPT 28 03/08/2024    ALKPHOSPHAT 118 (H) 03/08/2024    TBILIRUBIN 0.3 03/08/2024    ALBUMIN 4.3 06/26/2024    ALBUMIN 3.87 03/13/2024    AGRATIO 1.7 03/08/2024    LIPOPROTA 149 (H) 08/01/2023    TSHULTRASEN 1.000 01/03/2024     VASCULAR IMAGING    MR brain 2021       CACS 11/2022   Coronary calcification:  LMA - 0.0  LCX - 0.0  LAD - 180.1  RCA - 0.0  PDA - 0.0   " Total Calcium Score: 180.1   Percentile: Calcium score is above the 75th percentile for the patient's age and sex.   Other findings:  Heart: Normal size.  Lungs: Clear.  Mediastinum: Normal.  Upper abdomen: Normal.    Carotid 12/2022   Normal right carotid exam.    Post left internal carotid endarterectomy.    Mild stenosis of the left internal carotid artery (<50%).     Echo 3/2023  No prior study is available for comparison.   Normal left ventricular systolic function.   No evidence of valvular abnormality based on Doppler evaluation.     Cardiac PET 6/30/23   NUCLEAR IMAGING INTERPRETATION   Normal myocardial perfusion with no ischemia.   Normal left ventricular wall motion.  LV ejection fraction = 77%.   ECG INTERPRETATION   No ischemic changes with Dipyridamole           ASSESSMENT AND PLAN  1. Carotid artery disease, unspecified laterality, unspecified type (HCC)  US-CAROTID DOPPLER BILAT    CANCELED: US-CAROTID DOPPLER BILAT      2. Type 1 diabetes mellitus with other specified complication (HCC)        3. Dyslipidemia  ezetimibe (ZETIA) 10 MG Tab    rosuvastatin (CRESTOR) 40 MG tablet    CRP HIGH SENSITIVE (CARDIAC)    Lipid Profile    Lipoprotein (a)    Comp Metabolic Panel      4. History of left-sided carotid endarterectomy  US-CAROTID DOPPLER BILAT    CANCELED: US-CAROTID DOPPLER BILAT      5. Coronary artery calcification seen on CT scan        6. Agatston coronary artery calcium score between 100 and 199        7. Elevated lipoprotein(a)  CRP HIGH SENSITIVE (CARDIAC)    Lipid Profile    Lipoprotein (a)      8. Chronic hyponatremia  Comp Metabolic Panel      9. Primary hypertension  telmisartan (MICARDIS) 20 MG tablet        Patient Type: Secondary Prevention, T1D, polyvascular, very high risk      MAJOR ASCVD:     1) L carotid stenosis, s/p L CEA 2020 (Dr. Garcia)- stable, asymptomatic - current sx would not correlated to carotid dz and I have reassured pt   No prior CVA/TIA   12/2022 duplex = stable,  has been stable >2yrs   Plan  - continue secondary prevention treatment goals, intensive med mgmt and lifestyle interventions   - lifelong surveillance:  duplex q2yr (2024), sooner if new sx and CTA if progressive dz     Established Vascular Disease:     1) Asymptomatic, subclinical CAD (evidenced by CACS = 180 (888%ile for age/gender) in )  - no prior dx ASCVD events  - no LMA plaque noted   - no indications for functional testing w/o symptoms   - as reviewed with pt, ACC/AHA guidelines for chronic CAD mgmt () support GDMT alone as initial mgmt citing multiple RCTs (ISCHEMIA, COURAGE, JAQUI 2D) demonstrating early revasc strategy plus GDMT did not improve MACE outcomes compared to GDMT alone  2023 cPET = normal   Plan:  - continue treatment plan as noted below  - ongoing with cardiology   - report any new sx and consider functional testing if new symptoms over time      2) small vessel dz, cerebral per MR - stable, no sx.  No dementia or CVA/TIA  - continue strict BP and lipid mgmt     Evidence of genetic dyslipidemia: yes  - low prob FH due to baseline LDL-C <190  Though father  age 59 from CVA and apparently had major HLD disorder     Elevated lp(a) = 286mg/dl, >3xULN, primary LP risk , higher risk for AV stenosis development. Has prothrombotic properties  - echo normal  - no AV stenosis  Plan:  - had 40+% reduction on praluent    - recommend for family screening   - has influence on treatment decisions relating to initiate or intensity of lipid lowering as well as aspirin therapy      FH genotyping:  not recommended    ACC/AHA Indication for Statin Therapy:  Secondary Prevention - Very high risk ASCVD - 2 major events or 1 event with other high-risk conditions    Calculated Risk for ASCVD n/a     Other Significant Risk Markers  elevated coronary calcium score  Severe high lp(a) - as reviewed above     Goal LDL-C and nonHDL-C / apoB  LDL-C <55 due to polyvascular, severe lp(a) elevation    At goal? No, 6/2024 but had prior control 8/2023 through 1/2024 on same meds    - as reviewed with pt, multiple safety analyses (SAMI, Lancet. 2017 Oct 28;390(37180):5618-4056;  GHULAM N Engl J Med 2017; 377:633-643) completed on very low LDL w/o indications of risk or complications.    - reviewed that she continues to have linear reduction in ASCVD risk with lower LDL-C w/o a floor limit, so we have opted to continue current tx     LIFESTYLE INTERVENTIONS  TOBACCO: Stages of Change: Maintenance (sustained change >6mo)    reports that she quit smoking about 49 years ago. Her smoking use included cigarettes. She has never used smokeless tobacco.   - continued complete avoidance of all tobacco products   PHYSICAL ACTIVITY: >150min/week of mod-intensity activity or as much as tolerated  NUTRITION: Mediterranean   ETOH: limit to 1 or less standard drinks/day  WT MGMT: maintain healthy weight     LIPID LOWERING MEDICATION MANAGEMENT:     Statin Therapy  - continue rosuva 40mg daily     Non-Statin Medications  - continue zetia 10mg daily (consider stopping if LDL <10-20 in future)  - continue praluent 150mg Q2wks (preferred agent)  - excellent response on LDL and lp(a)    Indication for PCSK9 Inhibitor  PSCK9i indicated per 2018 ACC/AHA guidelines in this patient with established ASCVD whose LDL-C remains above threshold of 70 mg/dl despite maximally tolerated statin therapy    - FOURIER trial yielded evidence that lp(a) reduction with Repatha led to further risk reduction for ASCVD independent of LDL-C lowering, thus very high risk patients with high lp(a) would benefit disproportionately from lipid-lowering strategy that includes pcsk9i (ESTEFANIA'Coty, et al, Circulation 2019).    - ODYSSEY Outcomes trial of patients with an acute coronary syndrome, alirocumab reduced Lp(a) by 5 mg/dL and reduced the risk of major adverse cardiovascular evens (hazard ratio 0.85, 95% CI 0.78-0.93). The beneficial impact of Lp(a)  lowering by alirocumab was independent of its lowering of LDL-C (Denis et al, [facility], 2020).     BLOOD PRESSURE MANAGEMENT:  ACC/AHA (2017) goal <130/80  Home BP at goal:  no  Office BP at goal:  no  24h ABPM: not ordered to date  Plan:   - continue home BP monitoring  Medications:  - stop lisinopril, start telmisartan 20mg daily   - avoid thiazide, MRA due to chronic hypokalemia   - stopped amlodipine 5mg due to edema     Glycemic Status: Diabetic, T1 with vasc dz, HLD, HTN  Goal A1c < 7.5 reasonable but defer to endocrine MD   Lab Results   Component Value Date    HBA1C 6.2 (A) 05/07/2024      Lab Results   Component Value Date/Time    MALBCRT see below 03/07/2024 09:27 AM    MICROALBUR <1.2 03/07/2024 09:27 AM   Plan:  - continue current medication plan, defer to DECON   - recommmend for routine care with PCP (or endocrine) to include regular A1c monitoring, annual albumin/creatinine ratio (ACR), annual diabetic retinopathy screening, foot exams, annual flu vaccine, and updates to pneumonia vaccines as appropriate      ANTITHROMBOTIC THERAPY continue ASA 81mg daily     OTHER    # gait instability - ongoing care with PM&R and neurology for further assessment     # hypothyroidism, stable - continue current treatment plan, defer mgmt to PCP      # hyperkalemia = resolved, unclear etiology  Plan:  - taking veltassa per nephrology - defer surveillance and mgmt to nephro  - did not tolerate furosemide    # chronic hyponatremia - no symptoms - prior worst 122, currently mild 130  Plan:  - defer surveillance and mgmt to nephrology   - continue fluid restrictions, apparently on NaCl tabs per nephrology   - best to avoid thiazides, MRA    STUDIES: carotid duplex - ordered now (prior 12/2024) - RN to track   LABS: as noted above  Follow up in: RTC in 3 months     Ghassan Gray M.D.  RONDA, Board-certified clinical lipidologist   [facility]   [facility]   [phone]

## 2024-09-14 ENCOUNTER — APPOINTMENT (OUTPATIENT)
Dept: RADIOLOGY | Facility: MEDICAL CENTER | Age: 69
DRG: 493 | End: 2024-09-14
Attending: STUDENT IN AN ORGANIZED HEALTH CARE EDUCATION/TRAINING PROGRAM
Payer: MEDICARE

## 2024-09-14 ENCOUNTER — ANESTHESIA EVENT (OUTPATIENT)
Dept: SURGERY | Facility: MEDICAL CENTER | Age: 69
DRG: 493 | End: 2024-09-14
Payer: MEDICARE

## 2024-09-14 ENCOUNTER — APPOINTMENT (OUTPATIENT)
Dept: RADIOLOGY | Facility: MEDICAL CENTER | Age: 69
DRG: 493 | End: 2024-09-14
Attending: EMERGENCY MEDICINE
Payer: MEDICARE

## 2024-09-14 ENCOUNTER — ANESTHESIA (OUTPATIENT)
Dept: SURGERY | Facility: MEDICAL CENTER | Age: 69
DRG: 493 | End: 2024-09-14
Payer: MEDICARE

## 2024-09-14 ENCOUNTER — HOSPITAL ENCOUNTER (INPATIENT)
Facility: MEDICAL CENTER | Age: 69
LOS: 1 days | DRG: 493 | End: 2024-09-15
Attending: EMERGENCY MEDICINE | Admitting: STUDENT IN AN ORGANIZED HEALTH CARE EDUCATION/TRAINING PROGRAM
Payer: MEDICARE

## 2024-09-14 DIAGNOSIS — E78.5 DYSLIPIDEMIA: ICD-10-CM

## 2024-09-14 DIAGNOSIS — T40.2X5A CONSTIPATION DUE TO OPIOID THERAPY: ICD-10-CM

## 2024-09-14 DIAGNOSIS — S82.401A CLOSED FRACTURE OF RIGHT TIBIA AND FIBULA, INITIAL ENCOUNTER: ICD-10-CM

## 2024-09-14 DIAGNOSIS — S82.201A CLOSED FRACTURE OF RIGHT TIBIA AND FIBULA, INITIAL ENCOUNTER: ICD-10-CM

## 2024-09-14 DIAGNOSIS — K59.03 CONSTIPATION DUE TO OPIOID THERAPY: ICD-10-CM

## 2024-09-14 PROBLEM — R11.2 PONV (POSTOPERATIVE NAUSEA AND VOMITING): Status: ACTIVE | Noted: 2024-09-14

## 2024-09-14 PROBLEM — S82.101A BILATERAL CLOSED PROXIMAL TIBIAL FRACTURE: Status: ACTIVE | Noted: 2024-09-14

## 2024-09-14 PROBLEM — S82.102A BILATERAL CLOSED PROXIMAL TIBIAL FRACTURE: Status: ACTIVE | Noted: 2024-09-14

## 2024-09-14 PROBLEM — Z98.890 PONV (POSTOPERATIVE NAUSEA AND VOMITING): Status: ACTIVE | Noted: 2024-09-14

## 2024-09-14 LAB
ALBUMIN SERPL BCP-MCNC: 4.3 G/DL (ref 3.2–4.9)
ALBUMIN/GLOB SERPL: 1.5 G/DL
ALP SERPL-CCNC: 129 U/L (ref 30–99)
ALT SERPL-CCNC: 114 U/L (ref 2–50)
ANION GAP SERPL CALC-SCNC: 10 MMOL/L (ref 7–16)
APTT PPP: 25.1 SEC (ref 24.7–36)
AST SERPL-CCNC: 65 U/L (ref 12–45)
BASOPHILS # BLD AUTO: 0.4 % (ref 0–1.8)
BASOPHILS # BLD: 0.04 K/UL (ref 0–0.12)
BILIRUB SERPL-MCNC: 0.5 MG/DL (ref 0.1–1.5)
BUN SERPL-MCNC: 20 MG/DL (ref 8–22)
CALCIUM ALBUM COR SERPL-MCNC: 8.9 MG/DL (ref 8.5–10.5)
CALCIUM SERPL-MCNC: 9.1 MG/DL (ref 8.5–10.5)
CHLORIDE SERPL-SCNC: 95 MMOL/L (ref 96–112)
CO2 SERPL-SCNC: 21 MMOL/L (ref 20–33)
CREAT SERPL-MCNC: 0.73 MG/DL (ref 0.5–1.4)
EKG IMPRESSION: NORMAL
EOSINOPHIL # BLD AUTO: 0.07 K/UL (ref 0–0.51)
EOSINOPHIL NFR BLD: 0.8 % (ref 0–6.9)
ERYTHROCYTE [DISTWIDTH] IN BLOOD BY AUTOMATED COUNT: 47.2 FL (ref 35.9–50)
GFR SERPLBLD CREATININE-BSD FMLA CKD-EPI: 90 ML/MIN/1.73 M 2
GLOBULIN SER CALC-MCNC: 2.8 G/DL (ref 1.9–3.5)
GLUCOSE BLD STRIP.AUTO-MCNC: 165 MG/DL (ref 65–99)
GLUCOSE BLD STRIP.AUTO-MCNC: 261 MG/DL (ref 65–99)
GLUCOSE BLD STRIP.AUTO-MCNC: 331 MG/DL (ref 65–99)
GLUCOSE SERPL-MCNC: 224 MG/DL (ref 65–99)
HCT VFR BLD AUTO: 42.8 % (ref 37–47)
HGB BLD-MCNC: 14.5 G/DL (ref 12–16)
IMM GRANULOCYTES # BLD AUTO: 0.02 K/UL (ref 0–0.11)
IMM GRANULOCYTES NFR BLD AUTO: 0.2 % (ref 0–0.9)
INR PPP: 0.86 (ref 0.87–1.13)
LYMPHOCYTES # BLD AUTO: 1.5 K/UL (ref 1–4.8)
LYMPHOCYTES NFR BLD: 16.1 % (ref 22–41)
MCH RBC QN AUTO: 31.8 PG (ref 27–33)
MCHC RBC AUTO-ENTMCNC: 33.9 G/DL (ref 32.2–35.5)
MCV RBC AUTO: 93.9 FL (ref 81.4–97.8)
MONOCYTES # BLD AUTO: 0.56 K/UL (ref 0–0.85)
MONOCYTES NFR BLD AUTO: 6 % (ref 0–13.4)
NEUTROPHILS # BLD AUTO: 7.12 K/UL (ref 1.82–7.42)
NEUTROPHILS NFR BLD: 76.5 % (ref 44–72)
NRBC # BLD AUTO: 0 K/UL
NRBC BLD-RTO: 0 /100 WBC (ref 0–0.2)
PLATELET # BLD AUTO: 301 K/UL (ref 164–446)
PMV BLD AUTO: 9.8 FL (ref 9–12.9)
POTASSIUM SERPL-SCNC: 4.7 MMOL/L (ref 3.6–5.5)
PROT SERPL-MCNC: 7.1 G/DL (ref 6–8.2)
PROTHROMBIN TIME: 11.8 SEC (ref 12–14.6)
RBC # BLD AUTO: 4.56 M/UL (ref 4.2–5.4)
SODIUM SERPL-SCNC: 126 MMOL/L (ref 135–145)
WBC # BLD AUTO: 9.3 K/UL (ref 4.8–10.8)

## 2024-09-14 PROCEDURE — 99223 1ST HOSP IP/OBS HIGH 75: CPT | Performed by: STUDENT IN AN ORGANIZED HEALTH CARE EDUCATION/TRAINING PROGRAM

## 2024-09-14 PROCEDURE — 700102 HCHG RX REV CODE 250 W/ 637 OVERRIDE(OP): Performed by: STUDENT IN AN ORGANIZED HEALTH CARE EDUCATION/TRAINING PROGRAM

## 2024-09-14 PROCEDURE — 96374 THER/PROPH/DIAG INJ IV PUSH: CPT

## 2024-09-14 PROCEDURE — A9270 NON-COVERED ITEM OR SERVICE: HCPCS | Performed by: STUDENT IN AN ORGANIZED HEALTH CARE EDUCATION/TRAINING PROGRAM

## 2024-09-14 PROCEDURE — 85025 COMPLETE CBC W/AUTO DIFF WBC: CPT

## 2024-09-14 PROCEDURE — 73590 X-RAY EXAM OF LOWER LEG: CPT | Mod: RT

## 2024-09-14 PROCEDURE — 99291 CRITICAL CARE FIRST HOUR: CPT

## 2024-09-14 PROCEDURE — 82962 GLUCOSE BLOOD TEST: CPT

## 2024-09-14 PROCEDURE — 73562 X-RAY EXAM OF KNEE 3: CPT | Mod: RT

## 2024-09-14 PROCEDURE — 80053 COMPREHEN METABOLIC PANEL: CPT

## 2024-09-14 PROCEDURE — 700102 HCHG RX REV CODE 250 W/ 637 OVERRIDE(OP): Performed by: EMERGENCY MEDICINE

## 2024-09-14 PROCEDURE — 160036 HCHG PACU - EA ADDL 30 MINS PHASE I: Performed by: STUDENT IN AN ORGANIZED HEALTH CARE EDUCATION/TRAINING PROGRAM

## 2024-09-14 PROCEDURE — 27759 TREATMENT OF TIBIA FRACTURE: CPT | Mod: RT | Performed by: STUDENT IN AN ORGANIZED HEALTH CARE EDUCATION/TRAINING PROGRAM

## 2024-09-14 PROCEDURE — C1713 ANCHOR/SCREW BN/BN,TIS/BN: HCPCS | Performed by: STUDENT IN AN ORGANIZED HEALTH CARE EDUCATION/TRAINING PROGRAM

## 2024-09-14 PROCEDURE — A9270 NON-COVERED ITEM OR SERVICE: HCPCS | Performed by: EMERGENCY MEDICINE

## 2024-09-14 PROCEDURE — 700105 HCHG RX REV CODE 258: Performed by: STUDENT IN AN ORGANIZED HEALTH CARE EDUCATION/TRAINING PROGRAM

## 2024-09-14 PROCEDURE — 160029 HCHG SURGERY MINUTES - 1ST 30 MINS LEVEL 4: Performed by: STUDENT IN AN ORGANIZED HEALTH CARE EDUCATION/TRAINING PROGRAM

## 2024-09-14 PROCEDURE — 700111 HCHG RX REV CODE 636 W/ 250 OVERRIDE (IP): Performed by: STUDENT IN AN ORGANIZED HEALTH CARE EDUCATION/TRAINING PROGRAM

## 2024-09-14 PROCEDURE — 51798 US URINE CAPACITY MEASURE: CPT

## 2024-09-14 PROCEDURE — 700101 HCHG RX REV CODE 250: Performed by: STUDENT IN AN ORGANIZED HEALTH CARE EDUCATION/TRAINING PROGRAM

## 2024-09-14 PROCEDURE — 85610 PROTHROMBIN TIME: CPT

## 2024-09-14 PROCEDURE — 160002 HCHG RECOVERY MINUTES (STAT): Performed by: STUDENT IN AN ORGANIZED HEALTH CARE EDUCATION/TRAINING PROGRAM

## 2024-09-14 PROCEDURE — 160041 HCHG SURGERY MINUTES - EA ADDL 1 MIN LEVEL 4: Performed by: STUDENT IN AN ORGANIZED HEALTH CARE EDUCATION/TRAINING PROGRAM

## 2024-09-14 PROCEDURE — 73700 CT LOWER EXTREMITY W/O DYE: CPT | Mod: RT

## 2024-09-14 PROCEDURE — 71045 X-RAY EXAM CHEST 1 VIEW: CPT

## 2024-09-14 PROCEDURE — 770001 HCHG ROOM/CARE - MED/SURG/GYN PRIV*

## 2024-09-14 PROCEDURE — 160048 HCHG OR STATISTICAL LEVEL 1-5: Performed by: STUDENT IN AN ORGANIZED HEALTH CARE EDUCATION/TRAINING PROGRAM

## 2024-09-14 PROCEDURE — 160035 HCHG PACU - 1ST 60 MINS PHASE I: Performed by: STUDENT IN AN ORGANIZED HEALTH CARE EDUCATION/TRAINING PROGRAM

## 2024-09-14 PROCEDURE — 700105 HCHG RX REV CODE 258: Performed by: EMERGENCY MEDICINE

## 2024-09-14 PROCEDURE — 700111 HCHG RX REV CODE 636 W/ 250 OVERRIDE (IP): Mod: JZ | Performed by: EMERGENCY MEDICINE

## 2024-09-14 PROCEDURE — 700111 HCHG RX REV CODE 636 W/ 250 OVERRIDE (IP): Mod: JZ | Performed by: STUDENT IN AN ORGANIZED HEALTH CARE EDUCATION/TRAINING PROGRAM

## 2024-09-14 PROCEDURE — 36415 COLL VENOUS BLD VENIPUNCTURE: CPT

## 2024-09-14 PROCEDURE — 99222 1ST HOSP IP/OBS MODERATE 55: CPT | Mod: 57 | Performed by: STUDENT IN AN ORGANIZED HEALTH CARE EDUCATION/TRAINING PROGRAM

## 2024-09-14 PROCEDURE — 110371 HCHG SHELL REV 272: Performed by: STUDENT IN AN ORGANIZED HEALTH CARE EDUCATION/TRAINING PROGRAM

## 2024-09-14 PROCEDURE — 0QSG36Z REPOSITION RIGHT TIBIA WITH INTRAMEDULLARY INTERNAL FIXATION DEVICE, PERCUTANEOUS APPROACH: ICD-10-PCS | Performed by: STUDENT IN AN ORGANIZED HEALTH CARE EDUCATION/TRAINING PROGRAM

## 2024-09-14 PROCEDURE — 85730 THROMBOPLASTIN TIME PARTIAL: CPT

## 2024-09-14 PROCEDURE — 502240 HCHG MISC OR SUPPLY RC 0272: Performed by: STUDENT IN AN ORGANIZED HEALTH CARE EDUCATION/TRAINING PROGRAM

## 2024-09-14 PROCEDURE — 93005 ELECTROCARDIOGRAM TRACING: CPT | Performed by: EMERGENCY MEDICINE

## 2024-09-14 PROCEDURE — 160009 HCHG ANES TIME/MIN: Performed by: STUDENT IN AN ORGANIZED HEALTH CARE EDUCATION/TRAINING PROGRAM

## 2024-09-14 DEVICE — IMPLANTABLE DEVICE: Type: IMPLANTABLE DEVICE | Site: TIBIA | Status: FUNCTIONAL

## 2024-09-14 DEVICE — K-WIRE 3X285MM: Type: IMPLANTABLE DEVICE | Site: TIBIA | Status: FUNCTIONAL

## 2024-09-14 DEVICE — LOCKING SCREW DIA 5X37.5MM: Type: IMPLANTABLE DEVICE | Site: TIBIA | Status: FUNCTIONAL

## 2024-09-14 DEVICE — LOCKING SCREW DIA 5X47.5MM: Type: IMPLANTABLE DEVICE | Site: TIBIA | Status: FUNCTIONAL

## 2024-09-14 DEVICE — LOCKING SCREW DIA 5X35MM: Type: IMPLANTABLE DEVICE | Site: TIBIA | Status: FUNCTIONAL

## 2024-09-14 RX ORDER — DEXAMETHASONE SODIUM PHOSPHATE 4 MG/ML
INJECTION, SOLUTION INTRA-ARTICULAR; INTRALESIONAL; INTRAMUSCULAR; INTRAVENOUS; SOFT TISSUE PRN
Status: DISCONTINUED | OUTPATIENT
Start: 2024-09-14 | End: 2024-09-14 | Stop reason: SURG

## 2024-09-14 RX ORDER — SODIUM CHLORIDE 9 MG/ML
1000 INJECTION, SOLUTION INTRAVENOUS ONCE
Status: COMPLETED | OUTPATIENT
Start: 2024-09-14 | End: 2024-09-14

## 2024-09-14 RX ORDER — ONDANSETRON 4 MG/1
4 TABLET, ORALLY DISINTEGRATING ORAL EVERY 4 HOURS PRN
Status: DISCONTINUED | OUTPATIENT
Start: 2024-09-14 | End: 2024-09-15 | Stop reason: HOSPADM

## 2024-09-14 RX ORDER — SODIUM CHLORIDE 9 MG/ML
INJECTION, SOLUTION INTRAVENOUS CONTINUOUS
Status: DISCONTINUED | OUTPATIENT
Start: 2024-09-14 | End: 2024-09-15 | Stop reason: HOSPADM

## 2024-09-14 RX ORDER — MIDAZOLAM HYDROCHLORIDE 1 MG/ML
1 INJECTION INTRAMUSCULAR; INTRAVENOUS
Status: DISCONTINUED | OUTPATIENT
Start: 2024-09-14 | End: 2024-09-14 | Stop reason: HOSPADM

## 2024-09-14 RX ORDER — SCOLOPAMINE TRANSDERMAL SYSTEM 1 MG/1
PATCH, EXTENDED RELEASE TRANSDERMAL
Status: COMPLETED
Start: 2024-09-14 | End: 2024-09-14

## 2024-09-14 RX ORDER — HALOPERIDOL 5 MG/ML
1 INJECTION INTRAMUSCULAR
Status: DISCONTINUED | OUTPATIENT
Start: 2024-09-14 | End: 2024-09-14 | Stop reason: HOSPADM

## 2024-09-14 RX ORDER — DIPHENHYDRAMINE HYDROCHLORIDE 50 MG/ML
12.5 INJECTION INTRAMUSCULAR; INTRAVENOUS
Status: DISCONTINUED | OUTPATIENT
Start: 2024-09-14 | End: 2024-09-14 | Stop reason: HOSPADM

## 2024-09-14 RX ORDER — OXYCODONE AND ACETAMINOPHEN 5; 325 MG/1; MG/1
1 TABLET ORAL ONCE
Status: COMPLETED | OUTPATIENT
Start: 2024-09-14 | End: 2024-09-14

## 2024-09-14 RX ORDER — TELMISARTAN 40 MG/1
20 TABLET ORAL DAILY
Status: DISCONTINUED | OUTPATIENT
Start: 2024-09-14 | End: 2024-09-15 | Stop reason: HOSPADM

## 2024-09-14 RX ORDER — LABETALOL HYDROCHLORIDE 5 MG/ML
10 INJECTION, SOLUTION INTRAVENOUS EVERY 4 HOURS PRN
Status: DISCONTINUED | OUTPATIENT
Start: 2024-09-14 | End: 2024-09-15 | Stop reason: HOSPADM

## 2024-09-14 RX ORDER — ACETAMINOPHEN 500 MG
1500 TABLET ORAL 2 TIMES DAILY
Status: ON HOLD | COMMUNITY
End: 2024-09-15

## 2024-09-14 RX ORDER — LIDOCAINE HYDROCHLORIDE 20 MG/ML
INJECTION, SOLUTION EPIDURAL; INFILTRATION; INTRACAUDAL; PERINEURAL PRN
Status: DISCONTINUED | OUTPATIENT
Start: 2024-09-14 | End: 2024-09-14 | Stop reason: SURG

## 2024-09-14 RX ORDER — MORPHINE SULFATE 4 MG/ML
4 INJECTION INTRAVENOUS EVERY 4 HOURS PRN
Status: DISCONTINUED | OUTPATIENT
Start: 2024-09-14 | End: 2024-09-15

## 2024-09-14 RX ORDER — DEXMEDETOMIDINE HYDROCHLORIDE 100 UG/ML
INJECTION, SOLUTION INTRAVENOUS PRN
Status: DISCONTINUED | OUTPATIENT
Start: 2024-09-14 | End: 2024-09-14 | Stop reason: SURG

## 2024-09-14 RX ORDER — PHENYLEPHRINE HCL IN 0.9% NACL 1 MG/10 ML
SYRINGE (ML) INTRAVENOUS PRN
Status: DISCONTINUED | OUTPATIENT
Start: 2024-09-14 | End: 2024-09-14 | Stop reason: SURG

## 2024-09-14 RX ORDER — ONDANSETRON 2 MG/ML
INJECTION INTRAMUSCULAR; INTRAVENOUS PRN
Status: DISCONTINUED | OUTPATIENT
Start: 2024-09-14 | End: 2024-09-14 | Stop reason: SURG

## 2024-09-14 RX ORDER — PREGABALIN 100 MG/1
200 CAPSULE ORAL EVERY EVENING
Status: DISCONTINUED | OUTPATIENT
Start: 2024-09-14 | End: 2024-09-15 | Stop reason: HOSPADM

## 2024-09-14 RX ORDER — ONDANSETRON 4 MG/1
4 TABLET, ORALLY DISINTEGRATING ORAL ONCE
Status: COMPLETED | OUTPATIENT
Start: 2024-09-14 | End: 2024-09-14

## 2024-09-14 RX ORDER — ACETAMINOPHEN 325 MG/1
650 TABLET ORAL EVERY 6 HOURS PRN
Status: DISCONTINUED | OUTPATIENT
Start: 2024-09-14 | End: 2024-09-15

## 2024-09-14 RX ORDER — SODIUM CHLORIDE, SODIUM LACTATE, POTASSIUM CHLORIDE, CALCIUM CHLORIDE 600; 310; 30; 20 MG/100ML; MG/100ML; MG/100ML; MG/100ML
INJECTION, SOLUTION INTRAVENOUS
Status: DISCONTINUED | OUTPATIENT
Start: 2024-09-14 | End: 2024-09-14 | Stop reason: HOSPADM

## 2024-09-14 RX ORDER — LABETALOL HYDROCHLORIDE 5 MG/ML
5 INJECTION, SOLUTION INTRAVENOUS
Status: DISCONTINUED | OUTPATIENT
Start: 2024-09-14 | End: 2024-09-14 | Stop reason: HOSPADM

## 2024-09-14 RX ORDER — CEFAZOLIN SODIUM 1 G/3ML
INJECTION, POWDER, FOR SOLUTION INTRAMUSCULAR; INTRAVENOUS PRN
Status: DISCONTINUED | OUTPATIENT
Start: 2024-09-14 | End: 2024-09-14 | Stop reason: SURG

## 2024-09-14 RX ORDER — ROSUVASTATIN CALCIUM 20 MG/1
40 TABLET, COATED ORAL DAILY
Status: DISCONTINUED | OUTPATIENT
Start: 2024-09-14 | End: 2024-09-15 | Stop reason: HOSPADM

## 2024-09-14 RX ORDER — EZETIMIBE 10 MG/1
10 TABLET ORAL DAILY
Status: DISCONTINUED | OUTPATIENT
Start: 2024-09-14 | End: 2024-09-15 | Stop reason: HOSPADM

## 2024-09-14 RX ORDER — MIDAZOLAM HYDROCHLORIDE 1 MG/ML
INJECTION INTRAMUSCULAR; INTRAVENOUS PRN
Status: DISCONTINUED | OUTPATIENT
Start: 2024-09-14 | End: 2024-09-14 | Stop reason: SURG

## 2024-09-14 RX ORDER — HYDRALAZINE HYDROCHLORIDE 20 MG/ML
5 INJECTION INTRAMUSCULAR; INTRAVENOUS
Status: DISCONTINUED | OUTPATIENT
Start: 2024-09-14 | End: 2024-09-14 | Stop reason: HOSPADM

## 2024-09-14 RX ORDER — DULOXETIN HYDROCHLORIDE 30 MG/1
60 CAPSULE, DELAYED RELEASE ORAL DAILY
Status: DISCONTINUED | OUTPATIENT
Start: 2024-09-14 | End: 2024-09-15 | Stop reason: HOSPADM

## 2024-09-14 RX ORDER — OXYCODONE HCL 5 MG/5 ML
10 SOLUTION, ORAL ORAL
Status: DISCONTINUED | OUTPATIENT
Start: 2024-09-14 | End: 2024-09-14 | Stop reason: HOSPADM

## 2024-09-14 RX ORDER — LEVOTHYROXINE SODIUM 50 UG/1
50-100 TABLET ORAL
Status: DISCONTINUED | OUTPATIENT
Start: 2024-09-14 | End: 2024-09-14

## 2024-09-14 RX ORDER — ROCURONIUM BROMIDE 10 MG/ML
INJECTION, SOLUTION INTRAVENOUS PRN
Status: DISCONTINUED | OUTPATIENT
Start: 2024-09-14 | End: 2024-09-14 | Stop reason: SURG

## 2024-09-14 RX ORDER — IPRATROPIUM BROMIDE AND ALBUTEROL SULFATE 2.5; .5 MG/3ML; MG/3ML
3 SOLUTION RESPIRATORY (INHALATION)
Status: DISCONTINUED | OUTPATIENT
Start: 2024-09-14 | End: 2024-09-14 | Stop reason: HOSPADM

## 2024-09-14 RX ORDER — EPHEDRINE SULFATE 50 MG/ML
5 INJECTION, SOLUTION INTRAVENOUS
Status: DISCONTINUED | OUTPATIENT
Start: 2024-09-14 | End: 2024-09-14 | Stop reason: HOSPADM

## 2024-09-14 RX ORDER — ONDANSETRON 2 MG/ML
4 INJECTION INTRAMUSCULAR; INTRAVENOUS
Status: DISCONTINUED | OUTPATIENT
Start: 2024-09-14 | End: 2024-09-14 | Stop reason: HOSPADM

## 2024-09-14 RX ORDER — SCOLOPAMINE TRANSDERMAL SYSTEM 1 MG/1
1 PATCH, EXTENDED RELEASE TRANSDERMAL
Status: DISCONTINUED | OUTPATIENT
Start: 2024-09-14 | End: 2024-09-14 | Stop reason: HOSPADM

## 2024-09-14 RX ORDER — OXYCODONE HCL 5 MG/5 ML
5 SOLUTION, ORAL ORAL
Status: DISCONTINUED | OUTPATIENT
Start: 2024-09-14 | End: 2024-09-14 | Stop reason: HOSPADM

## 2024-09-14 RX ORDER — LEVOTHYROXINE SODIUM 100 UG/1
100 TABLET ORAL
Status: DISCONTINUED | OUTPATIENT
Start: 2024-09-14 | End: 2024-09-15 | Stop reason: HOSPADM

## 2024-09-14 RX ORDER — OXYCODONE HYDROCHLORIDE 5 MG/1
5 TABLET ORAL EVERY 4 HOURS PRN
Status: DISCONTINUED | OUTPATIENT
Start: 2024-09-14 | End: 2024-09-14

## 2024-09-14 RX ORDER — MORPHINE SULFATE 4 MG/ML
4 INJECTION INTRAVENOUS ONCE
Status: COMPLETED | OUTPATIENT
Start: 2024-09-14 | End: 2024-09-14

## 2024-09-14 RX ORDER — OXYCODONE HYDROCHLORIDE 5 MG/1
5 TABLET ORAL ONCE
Status: COMPLETED | OUTPATIENT
Start: 2024-09-14 | End: 2024-09-14

## 2024-09-14 RX ORDER — LEVOTHYROXINE SODIUM 50 UG/1
50 TABLET ORAL
Status: DISCONTINUED | OUTPATIENT
Start: 2024-09-15 | End: 2024-09-15 | Stop reason: HOSPADM

## 2024-09-14 RX ORDER — CYCLOBENZAPRINE HCL 10 MG
5 TABLET ORAL
Status: DISCONTINUED | OUTPATIENT
Start: 2024-09-14 | End: 2024-09-15 | Stop reason: HOSPADM

## 2024-09-14 RX ORDER — DEXTROSE MONOHYDRATE 25 G/50ML
25 INJECTION, SOLUTION INTRAVENOUS
Status: DISCONTINUED | OUTPATIENT
Start: 2024-09-14 | End: 2024-09-15 | Stop reason: HOSPADM

## 2024-09-14 RX ORDER — ONDANSETRON 2 MG/ML
4 INJECTION INTRAMUSCULAR; INTRAVENOUS EVERY 4 HOURS PRN
Status: DISCONTINUED | OUTPATIENT
Start: 2024-09-14 | End: 2024-09-15 | Stop reason: HOSPADM

## 2024-09-14 RX ADMIN — DEXMEDETOMIDINE HYDROCHLORIDE 20 MCG: 100 INJECTION, SOLUTION INTRAVENOUS at 12:10

## 2024-09-14 RX ADMIN — MIDAZOLAM HYDROCHLORIDE 2 MG: 2 INJECTION, SOLUTION INTRAMUSCULAR; INTRAVENOUS at 11:35

## 2024-09-14 RX ADMIN — SUGAMMADEX 200 MG: 100 INJECTION, SOLUTION INTRAVENOUS at 12:36

## 2024-09-14 RX ADMIN — SCOPOLAMINE 1 PATCH: 1.5 PATCH, EXTENDED RELEASE TRANSDERMAL at 11:28

## 2024-09-14 RX ADMIN — ONDANSETRON 4 MG: 2 INJECTION INTRAMUSCULAR; INTRAVENOUS at 11:48

## 2024-09-14 RX ADMIN — SODIUM CHLORIDE 1000 ML: 9 INJECTION, SOLUTION INTRAVENOUS at 09:51

## 2024-09-14 RX ADMIN — PROPOFOL 150 MG: 10 INJECTION, EMULSION INTRAVENOUS at 11:39

## 2024-09-14 RX ADMIN — SODIUM CHLORIDE, POTASSIUM CHLORIDE, SODIUM LACTATE AND CALCIUM CHLORIDE: 600; 310; 30; 20 INJECTION, SOLUTION INTRAVENOUS at 11:32

## 2024-09-14 RX ADMIN — CEFAZOLIN 2 G: 1 INJECTION, POWDER, FOR SOLUTION INTRAMUSCULAR; INTRAVENOUS at 11:46

## 2024-09-14 RX ADMIN — OXYCODONE HYDROCHLORIDE 5 MG: 5 TABLET ORAL at 20:22

## 2024-09-14 RX ADMIN — PREGABALIN 200 MG: 100 CAPSULE ORAL at 20:22

## 2024-09-14 RX ADMIN — DEXAMETHASONE SODIUM PHOSPHATE 4 MG: 4 INJECTION INTRA-ARTICULAR; INTRALESIONAL; INTRAMUSCULAR; INTRAVENOUS; SOFT TISSUE at 11:48

## 2024-09-14 RX ADMIN — ONDANSETRON 4 MG: 4 TABLET, ORALLY DISINTEGRATING ORAL at 08:47

## 2024-09-14 RX ADMIN — MORPHINE SULFATE 4 MG: 4 INJECTION, SOLUTION INTRAMUSCULAR; INTRAVENOUS at 09:52

## 2024-09-14 RX ADMIN — ROCURONIUM BROMIDE 50 MG: 50 INJECTION, SOLUTION INTRAVENOUS at 11:39

## 2024-09-14 RX ADMIN — FENTANYL CITRATE 100 MCG: 50 INJECTION, SOLUTION INTRAMUSCULAR; INTRAVENOUS at 11:39

## 2024-09-14 RX ADMIN — OXYCODONE AND ACETAMINOPHEN 1 TABLET: 5; 325 TABLET ORAL at 08:48

## 2024-09-14 RX ADMIN — LIDOCAINE HYDROCHLORIDE 100 MG: 20 INJECTION, SOLUTION EPIDURAL; INFILTRATION; INTRACAUDAL at 11:39

## 2024-09-14 RX ADMIN — IBUPROFEN 800 MG: 200 TABLET, FILM COATED ORAL at 08:47

## 2024-09-14 RX ADMIN — SODIUM CHLORIDE 1000 ML: 9 INJECTION, SOLUTION INTRAVENOUS at 15:55

## 2024-09-14 RX ADMIN — MORPHINE SULFATE 4 MG: 4 INJECTION, SOLUTION INTRAMUSCULAR; INTRAVENOUS at 16:38

## 2024-09-14 RX ADMIN — Medication 100 MCG: at 11:46

## 2024-09-14 RX ADMIN — CYCLOBENZAPRINE 5 MG: 10 TABLET, FILM COATED ORAL at 20:22

## 2024-09-14 RX ADMIN — MINERAL OIL, AND WHITE PETROLATUM 1 APPLICATION: 425; 573 OINTMENT OPHTHALMIC at 11:39

## 2024-09-14 ASSESSMENT — ENCOUNTER SYMPTOMS
NEUROLOGICAL NEGATIVE: 1
RESPIRATORY NEGATIVE: 1
PSYCHIATRIC NEGATIVE: 1
FALLS: 1
CONSTITUTIONAL NEGATIVE: 1
CARDIOVASCULAR NEGATIVE: 1
GASTROINTESTINAL NEGATIVE: 1
EYES NEGATIVE: 1

## 2024-09-14 ASSESSMENT — PAIN DESCRIPTION - PAIN TYPE
TYPE: ACUTE PAIN
TYPE: ACUTE PAIN;SURGICAL PAIN

## 2024-09-14 ASSESSMENT — FIBROSIS 4 INDEX: FIB4 SCORE: 1.17

## 2024-09-14 ASSESSMENT — PAIN SCALES - GENERAL: PAIN_LEVEL: 0

## 2024-09-14 NOTE — ANESTHESIA PROCEDURE NOTES
Airway    Date/Time: 9/14/2024 11:40 AM    Performed by: Rey Hays M.D.  Authorized by: Rey Hays M.D.    Location:  OR  Urgency:  Elective  Difficult Airway: No    Indications for Airway Management:  Anesthesia      Spontaneous Ventilation: absent    Sedation Level:  Deep  Preoxygenated: Yes    Patient Position:  Sniffing  Mask Difficulty Assessment:  2 - vent by mask + OA or adjuvant +/- NMBA  Final Airway Type:  Endotracheal airway  Final Endotracheal Airway:  ETT  Cuffed: Yes    Technique Used for Successful ETT Placement:  Direct laryngoscopy    Insertion Site:  Oral  Blade Type:  Lucio  Laryngoscope Blade/Videolaryngoscope Blade Size:  3  ETT Size (mm):  7.0  Measured from:  Teeth  ETT to Teeth (cm):  22  Placement Verified by: auscultation and capnometry    Cormack-Lehane Classification:  Grade I - full view of glottis  Number of Attempts at Approach:  1

## 2024-09-14 NOTE — ANESTHESIA POSTPROCEDURE EVALUATION
Patient: Ines Long    Procedure Summary       Date: 09/14/24 Room / Location: [place] [address] / SURGERY [facility]    Anesthesia Start: 1132 Anesthesia Stop: 1245    Procedure: INSERTION, INTRAMEDULLARY SUE, TIBIA (Right: Leg Lower) Diagnosis: (spiral tib-fib fracture of the right lower extremity)    Surgeons: Richard Sol M.D. Responsible Provider: Rey Hays M.D.    Anesthesia Type: general ASA Status: 3            Final Anesthesia Type: general  Last vitals  BP   Blood Pressure : 134/62    Temp   36.1 °C (97 °F)    Pulse   83   Resp   16    SpO2   99 %      Anesthesia Post Evaluation    Patient location during evaluation: PACU  Patient participation: complete - patient participated  Level of consciousness: awake and alert  Pain score: 0    Airway patency: patent  Anesthetic complications: no  Cardiovascular status: hemodynamically stable  Respiratory status: acceptable  Hydration status: euvolemic    PONV: none          No notable events documented.     Nurse Pain Score: 0 (NPRS)

## 2024-09-14 NOTE — PROGRESS NOTES
4 Eyes Skin Assessment Completed by Manda, RN and Phuc MARCH RN.    Head WDL  Ears WDL  Nose WDL  Mouth WDL  Neck WDL  Breast/Chest WDL  Shoulder Blades WDL  Spine scars previous back surgery  (R) Arm/Elbow/Hand WDL  (L) Arm/Elbow/Hand WDL  Abdomen WDL  Groin WDL  Scrotum/Coccyx/Buttocks WDL  (R) Leg Swelling and Incision  (L) Leg Abrasion knee  (R) Heel/Foot/Toe WDL  (L) Heel/Foot/Toe WDL          Devices In Places Pulse Ox and SCD's; Walking boot RLE; personal insulin pump      Interventions In Place Pillows and Pressure Redistribution Mattress    Possible Skin Injury No    Pictures Uploaded Into Epic N/A  Wound Consult Placed N/A  RN Wound Prevention Protocol Ordered No

## 2024-09-14 NOTE — ED NOTES
Med rec is complete per patient at bedside  Outpatient antibiotics within the last 30 days: NONE  Anticoagulants: NONE  Patient's home pharmacy - [address] ([phone])    Lizzie Richter, PhT

## 2024-09-14 NOTE — H&P
Hospital Medicine History & Physical Note    Date of Service  9/14/2024    Primary Care Physician  SOBIA Candelario    Consultants  Orthopedic surgery    Code Status  Full Code    Chief Complaint  Chief Complaint   Patient presents with    Leg Pain     Right lower leg     Fall       History of Presenting Illness  Ines Long is a 69 y.o. female who presented 9/14/2024 with a mechanical fall.  Patient has a history of diabetes, hypertension, hyperlipidemia, hypothyroidism who presents to the ED for mechanical fall at 6 AM this morning.  She last ate at 7 PM last night.  She was getting up to ambulate and go to the restroom this morning, when her knee suddenly gave out, causing her to fall and land on the right lower extremity.  Unable to bear weight after due to the pain.  She denies head strike and loss of consciousness.  She decided to come into the ER for further evaluation.    In the ER, patient found to have normal vital signs.  Labs pending at time of writing.  X-ray showing Acute, mildly displaced, minimally comminuted proximal fibular diaphyseal fracture.  Orthopedic surgery on board, possible OR today.    I discussed the plan of care with patient.    Review of Systems  Review of Systems   Constitutional: Negative.    HENT: Negative.     Eyes: Negative.    Respiratory: Negative.     Cardiovascular: Negative.    Gastrointestinal: Negative.    Genitourinary: Negative.    Musculoskeletal:  Positive for falls and joint pain.   Skin: Negative.    Neurological: Negative.    Endo/Heme/Allergies: Negative.    Psychiatric/Behavioral: Negative.         Past Medical History   has a past medical history of Acute nasopharyngitis (04/29/2022), Anesthesia, Blocked artery (Around 2019), Cataract (2023), Diabetes type 1, controlled (HCC) (11/01/2010), Dyslipidemia (11/01/2010), High cholesterol, Hypertension, Hypothyroidism (11/01/2010), Indigestion, Insulin pump in place (05/18/2011), Left  carotid artery stenosis - severe 2019, PONV (postoperative nausea and vomiting), Routine health maintenance (05/18/2011), and Urinary incontinence.    Surgical History   has a past surgical history that includes cholecystectomy; abdominal hysterectomy total (01/01/1987); bunionectomy (01/01/2014); knee arthrotomy (Right, 01/01/1978); shoulder arthroscopy (Left, 01/01/1997); orthopedic osteotomy (Left, 07/17/2017); ligament repair (07/17/2017); toe fusion (07/17/2017); repair, tendon, lower extremity, using tendon graft (07/17/2017); carotid endarterectomy (Left, 12/05/2019); lumbar laminectomy diskectomy (05/16/2022); lumbar decompression (05/16/2022); foraminotomy (05/16/2022); meniscectomy, knee, arthroscopic (Right, 08/22/2022); synovectomy (Right, 08/22/2022); chondroplasty (Right, 08/22/2022); shoulder surgery; orif, knee; foot surgery (2016 and 2018); posterior cervical fusion o-arm (3/20/2023); lumbar fusion o-arm (9/27/2023); and lumbar decompression (9/27/2023).     Family History  family history includes Cancer in her mother; Diabetes in her sister; Heart Disease in her father, maternal grandfather, and paternal grandfather; Hyperlipidemia in her father; Hypertension in her father; Lung Disease in her father; Psychiatric Illness in her sister; Stroke in her father and paternal grandmother.   Family history reviewed with patient. There is no family history that is pertinent to the chief complaint.     Social History   reports that she quit smoking about 49 years ago. Her smoking use included cigarettes. She has never used smokeless tobacco. She reports current alcohol use of about 1.2 oz of alcohol per week. She reports current drug use.    Allergies  Allergies   Allergen Reactions    Ceclor [Cefaclor] Hives and Swelling    Augmentin Hives, Diarrhea and Vomiting     Fever blisters   Sickness lasts forever    Naproxen      Face Swells   9/14/24: spoke to pt, states she can take ibuprofen with no issues.     "Tape Rash     \"Certain band aids\"  PAPER TAPE OKAY/ Tegaderm ok    Amlodipine Swelling     5mg = swelling     Amoxicillin-Pot Clavulanate      Other reaction(s): Not available    Cefaclor Hives and Swelling    Clindamycin Unspecified    Penicillin G Benzathine Unspecified       Medications  Prior to Admission Medications   Prescriptions Last Dose Informant Patient Reported? Taking?   Alirocumab (PRALUENT) 150 MG/ML Solution Auto-injector 9/8/2024 Patient No No   Sig: Inject 150 mg under the skin every 14 days.   Cholecalciferol (VITAMIN D-3 PO) Unk at Unk Patient Yes No   Sig: Take 5,000 mg by mouth every evening.   DULoxetine HCl 60 MG Capsule Delayed Release Sprinkle 9/13/2024 at AM Patient Yes Yes   Sig: Take 60 mg by mouth every day.   Insulin Aspart, w/Niacinamide, (FIASP) 100 UNIT/ML Solution In Pump Patient Yes No   Sig: Inject 2.2 Units as directed one time.   LEVOXYL 100 MCG Tab 9/13/2024 at AM Patient Yes Yes   Sig: Take  mcg by mouth every morning on an empty stomach. Takes 1/2 tab on Sundays   acetaminophen (TYLENOL) 500 MG Tab 9/13/2024 at PM Patient Yes Yes   Sig: Take 1,500 mg by mouth 2 times a day.   clindamycin (CLEOCIN) 150 MG Cap PRN at >1 month Patient No No   Sig: Take 3 capsules PO prior to dental procedure   cyclobenzaprine (FLEXERIL) 5 mg tablet 9/13/2024 at PM Patient Yes Yes   Sig: Take 5 mg by mouth 2 (two) times a day.   ezetimibe (ZETIA) 10 MG Tab 9/13/2024 at Unk Patient No Yes   Sig: Take 1 Tablet by mouth every day. To lower cholesterol   famotidine (PEPCID) 20 MG Tab 9/13/2024 at PM Patient No Yes   Sig: TAKE ONE TABLET BY MOUTH TWICE A DAY   insulin infusion pump Device Current Patient Yes No   Sig: Inject  under the skin continuous. Patient's own SQ insulin pump    Type of Insulin: Fiasp  Last change of tubing: 3/19/2023 - Change tubing and site every 72 hours    Dosing:  Basal rate:   0000 - 0329 = 0.5 units/hr   0330 - 1129 = 0.85 units/hr   1130 - 1359 = 0.5 units/hr      "         1400 - 1759 = 0.45 units/hr              1800 - 2359 = 0.5 units/hr    Bolus ratio:   1 unit : 12 g carbohydrate at  (breakfast, lunch, dinner, snacks)      Correction ratio:    1 units for every 50 over 150 mg/dL    Disconnect pump if patient becomes hypoglycemic and altered.   pantoprazole (PROTONIX) 40 MG Tablet Delayed Response 9/13/2024 at AM Patient No Yes   Sig: Take 1 Tablet by mouth every day.   pregabalin (LYRICA) 100 MG Cap 9/13/2024 at PM Patient Yes Yes   Sig: Take 200 mg by mouth every evening.   rosuvastatin (CRESTOR) 40 MG tablet 9/13/2024 at Unk Patient No Yes   Sig: Take 1 Tablet by mouth every day. To lower cholesterol   sodium chloride (SALT) 1 GM Tab 9/13/2024 at PM Patient No Yes   Sig: TAKE TWO TABLETS BY MOUTH EVERY DAY   Patient taking differently: Take 3 g by mouth every day. 2g in AM, and 1g in PM   telmisartan (MICARDIS) 20 MG tablet Not started Patient No No   Sig: Take 1 Tablet by mouth every day. to lower blood pressure      Facility-Administered Medications: None       Physical Exam  Temp:  [36.1 °C (97 °F)] 36.1 °C (97 °F)  Pulse:  [76-85] 85  Resp:  [16] 16  BP: (102-182)/(53-81) 182/81  SpO2:  [93 %-100 %] 93 %  Blood Pressure : (!) 182/81   Temperature: 36.1 °C (97 °F)   Pulse: 85   Respiration: 16   Pulse Oximetry: 93 %       Physical Exam  Constitutional:       Appearance: Normal appearance. She is normal weight.   HENT:      Head: Normocephalic.      Nose: Nose normal.      Mouth/Throat:      Mouth: Mucous membranes are moist.   Eyes:      Pupils: Pupils are equal, round, and reactive to light.   Cardiovascular:      Rate and Rhythm: Normal rate and regular rhythm.      Pulses: Normal pulses.      Heart sounds: Normal heart sounds.   Pulmonary:      Effort: Pulmonary effort is normal.      Breath sounds: Normal breath sounds.   Abdominal:      General: Abdomen is flat. Bowel sounds are normal.      Palpations: Abdomen is soft.   Musculoskeletal:      Cervical back: Neck  "supple.      Comments: Range of motion limited in right lower extremity due to pain.  Right foot internally rotated.  Dorsalis pedis pulse palpated.   Skin:     General: Skin is warm.   Neurological:      Mental Status: She is alert and oriented to person, place, and time. Mental status is at baseline.   Psychiatric:         Mood and Affect: Mood normal.         Behavior: Behavior normal.         Thought Content: Thought content normal.         Judgment: Judgment normal.         Laboratory:  Recent Labs     09/14/24  0947   WBC 9.3   RBC 4.56   HEMOGLOBIN 14.5   HEMATOCRIT 42.8   MCV 93.9   MCH 31.8   MCHC 33.9   RDW 47.2   PLATELETCT 301   MPV 9.8         No results for input(s): \"ALTSGPT\", \"ASTSGOT\", \"ALKPHOSPHAT\", \"TBILIRUBIN\", \"DBILIRUBIN\", \"GAMMAGT\", \"AMYLASE\", \"LIPASE\", \"ALB\", \"PREALBUMIN\", \"GLUCOSE\" in the last 72 hours.      No results for input(s): \"NTPROBNP\" in the last 72 hours.      No results for input(s): \"TROPONINT\" in the last 72 hours.    Imaging:  CT-ANKLE W/O PLUS RECONS RIGHT   Final Result      1.  Acute, obliquely oriented distal tibial diaphyseal fracture.   2.  Associated acute, minimally displaced fracture of the anteromedial tibial plafond.   3.  No distal fibular fractures. No dislocation.   4.  No fractures of the visualized hindfoot.   5.  Bone demineralization.      DX-KNEE 3 VIEWS RIGHT   Final Result      Acute, mildly displaced, minimally comminuted proximal fibular diaphyseal fracture.      DX-TIBIA AND FIBULA RIGHT   Final Result      1.  Acute, mildly displaced, minimally comminuted proximal fibular diaphyseal fracture.   2.  Acute, displaced, obliquely oriented distal tibial diaphyseal fracture.      DX-CHEST-PORTABLE (1 VIEW)    (Results Pending)   DX-PORTABLE FLUORO > 1 HOUR    (Results Pending)   DX-TIBIA AND FIBULA RIGHT    (Results Pending)       X-Ray:  I have personally reviewed the images and compared with prior images.    Assessment/Plan:  Justification for Admission " Status  I anticipate this patient will require at least two midnights for appropriate medical management, necessitating inpatient admission because patient has a right fibular fracture    Patient will need a Med/Surg bed on ORTHOPEDICS service .  The need is secondary to fibular fracture.    * Right fibular fracture  Assessment & Plan  X-ray showing Acute, mildly displaced, minimally comminuted proximal fibular diaphyseal fracture.  Patient's last meal was at 7 PM last night  Orthopedic surgery consulted, possible OR today, will leave n.p.o.  Fluids  PT OT  Morphine as needed      Dyslipidemia- (present on admission)  Assessment & Plan  Crestor    Hypertension  Assessment & Plan  Resume home meds    Hypothyroidism- (present on admission)  Assessment & Plan  Continue home medications    DM (diabetes mellitus) ([facility])  Assessment & Plan  History of type 1 diabetes  Has her own insulin pump        VTE prophylaxis: pharmacologic prophylaxis contraindicated due to OR

## 2024-09-14 NOTE — CONSULTS
"9/14/2024        HPI: Ines Long is a 69 y.o. female who presents with right leg pain after a fall.  She has known history of type 1 diabetes.  She was brought to emergency department where x-rays revealed displaced tib-fib fracture with joint involvement.    Past Medical History:   Diagnosis Date    Acute nasopharyngitis 04/29/2022    \"I'm at the tailend of a cold\" .  Symptoms started 4/26/22 included exhaustion, and stuffy nose, cough.  Symptoms are better but pt. reports she still feels \"A little congested\"    Anesthesia     Post op N/V    Blocked artery Around 2019    Carotid artery- had surgery.  Pt. follows up with Dr. Surya Garcia every year.    Cataract 2023    no surgery yet    Diabetes type 1, controlled (HCC) 11/01/2010    Insulin pump in place.  Endocrinologist is Dr. Dewey.     Dyslipidemia 11/01/2010    High cholesterol     Hypertension     pt states controlled on meds    Hypothyroidism 11/01/2010    Indigestion     Insulin pump in place 05/18/2011    Left carotid artery stenosis - severe 2019     PONV (postoperative nausea and vomiting)     Routine health maintenance 05/18/2011    Urinary incontinence     \"If I sneeze, I leak\"       Past Surgical History:   Procedure Laterality Date    LUMBAR FUSION O-ARM  9/27/2023    Procedure: L4/5 and L5/S1 redo minimally invasive posterior decompression and instrumented fusion;  Surgeon: Evno Díaz M.D.;  Location: [facility];  Service: Neurosurgery    LUMBAR DECOMPRESSION  9/27/2023    Procedure: DECOMPRESSION, SPINE, LUMBAR;  Surgeon: Evon Díaz M.D.;  Location: [facility];  Service: Neurosurgery    POSTERIOR CERVICAL FUSION O-ARM  3/20/2023    Procedure: G8-5-2-6-7-T1 posterior decompressive laminectomy and B2-9-3-6-7-T1 posterior instrumented fusion with O-arm imaging guidance;  Surgeon: Evon Díaz M.D.;  Location: [facility];  Service: Orthopedics    MENISCECTOMY, KNEE, ARTHROSCOPIC Right " 08/22/2022    Procedure: RIGHT KNEE ARTHROSCOPY, PARTIAL MEDIAL MENISCECTOMY;  Surgeon: Ravi Gillis M.D.;  Location: [facility];  Service: Orthopedics    SYNOVECTOMY Right 08/22/2022    Procedure: SYNOVECTOMY;  Surgeon: Ravi Gillis M.D.;  Location: [facility];  Service: Orthopedics    CHONDROPLASTY Right 08/22/2022    Procedure: CHONDROPLASTY;  Surgeon: Ravi Gillis M.D.;  Location: [facility];  Service: Orthopedics    LUMBAR LAMINECTOMY DISKECTOMY  05/16/2022    Procedure: L4-5 and L5-S1 decompressive laminectomy, bilateral foraminotomies and left L5-S1;  Surgeon: Evon Díaz M.D.;  Location: [facility];  Service: Orthopedics    LUMBAR DECOMPRESSION  05/16/2022    Procedure: DECOMPRESSION, SPINE, LUMBAR;  Surgeon: Eovn Díaz M.D.;  Location: [facility];  Service: Orthopedics    FORAMINOTOMY  05/16/2022    Procedure: FORAMINOTOMY, SPINE;  Surgeon: Evon Díaz M.D.;  Location: [facility];  Service: Orthopedics    CAROTID ENDARTERECTOMY Left 12/05/2019    Procedure: ENDARTERECTOMY, CAROTID- WITH NEUROMONITORIING;  Surgeon: Surya Garcia M.D.;  Location: [facility];  Service: Vascular    ORTHOPEDIC OSTEOTOMY Left 07/17/2017    Procedure: ORTHOPEDIC OSTEOTOMY CALCANEAL, KIDNER, EXCISION NAVICULAR, GASTROC SOLEUS RECESSION;  Surgeon: Adebayo Layton M.D.;  Location: [facility];  Service:     LIGAMENT REPAIR  07/17/2017    Procedure: LIGAMENT REPAIR SPRING;  Surgeon: Adebayo Layton M.D.;  Location: [facility];  Service:     TOE FUSION  07/17/2017    Procedure: TOE FUSION 1ST METATARSAL JOINT, 2ND TOE RECONSTRUCTION ;  Surgeon: Adebayo Layton M.D.;  Location: [facility];  Service:     REPAIR, TENDON, LOWER EXTREMITY, USING TENDON GRAFT  07/17/2017    Procedure: FLEXOR TENDON REPAIR- RELEASE/POSSIBLE FLEXOR DIGITORIUM LONGUS;  Surgeon: Adebayo Layton M.D.;  Location:  [facility];  Service:     BUNIONECTOMY  01/01/2014    SHOULDER ARTHROSCOPY Left 01/01/1997    frozen shoulder    ABDOMINAL HYSTERECTOMY TOTAL  01/01/1987    KNEE ARTHROTOMY Right 01/01/1978    [facility]     CHOLECYSTECTOMY      FOOT SURGERY  2016 and 2018    ORIF, KNEE      [facility] [place]    SHOULDER SURGERY      Frozen shoulder       Medications  No current facility-administered medications on file prior to encounter.     Current Outpatient Medications on File Prior to Encounter   Medication Sig Dispense Refill    acetaminophen (TYLENOL) 500 MG Tab Take 1,500 mg by mouth 2 times a day.      ezetimibe (ZETIA) 10 MG Tab Take 1 Tablet by mouth every day. To lower cholesterol 100 Tablet 3    rosuvastatin (CRESTOR) 40 MG tablet Take 1 Tablet by mouth every day. To lower cholesterol 100 Tablet 3    sodium chloride (SALT) 1 GM Tab TAKE TWO TABLETS BY MOUTH EVERY DAY (Patient taking differently: Take 3 g by mouth every day. 2g in AM, and 1g in PM) 90 Tablet 1    famotidine (PEPCID) 20 MG Tab TAKE ONE TABLET BY MOUTH TWICE A DAY 60 Tablet 0    pantoprazole (PROTONIX) 40 MG Tablet Delayed Response Take 1 Tablet by mouth every day. 90 Tablet 3    cyclobenzaprine (FLEXERIL) 5 mg tablet Take 5 mg by mouth 2 (two) times a day.      DULoxetine HCl 60 MG Capsule Delayed Release Sprinkle Take 60 mg by mouth every day.      pregabalin (LYRICA) 100 MG Cap Take 200 mg by mouth every evening.      LEVOXYL 100 MCG Tab Take  mcg by mouth every morning on an empty stomach. Takes 1/2 tab on Sundays      telmisartan (MICARDIS) 20 MG tablet Take 1 Tablet by mouth every day. to lower blood pressure 100 Tablet 3    Alirocumab (PRALUENT) 150 MG/ML Solution Auto-injector Inject 150 mg under the skin every 14 days. 6 mL 3    clindamycin (CLEOCIN) 150 MG Cap Take 3 capsules PO prior to dental procedure 6 Capsule 0    Insulin Aspart, w/Niacinamide, (FIASP) 100 UNIT/ML Solution Inject 2.2 Units as directed one time.      insulin  infusion pump Device Inject  under the skin continuous. Patient's own SQ insulin pump    Type of Insulin: Fiasp  Last change of tubing: 3/19/2023 - Change tubing and site every 72 hours    Dosing:  Basal rate:   0000 - 0329 = 0.5 units/hr   0330 - 1129 = 0.85 units/hr   1130 - 1359 = 0.5 units/hr              1400 - 1759 = 0.45 units/hr              1800 - 2359 = 0.5 units/hr    Bolus ratio:   1 unit : 12 g carbohydrate at  (breakfast, lunch, dinner, snacks)      Correction ratio:    1 units for every 50 over 150 mg/dL    Disconnect pump if patient becomes hypoglycemic and altered.      Cholecalciferol (VITAMIN D-3 PO) Take 5,000 mg by mouth every evening.         Allergies  Ceclor [cefaclor], Augmentin, Naproxen, Tape, Amlodipine, Amoxicillin-pot clavulanate, Cefaclor, Clindamycin, and Penicillin g benzathine    ROS  . All other systems were reviewed and found to be negative    Family History   Problem Relation Age of Onset    Cancer Mother         rectal cancer    Stroke Father          59    Heart Disease Father     Hypertension Father     Lung Disease Father         tb    Hyperlipidemia Father     Diabetes Sister     Psychiatric Illness Sister         bipolar    Heart Disease Maternal Grandfather     Stroke Paternal Grandmother     Heart Disease Paternal Grandfather        Social History     Socioeconomic History    Marital status:    Tobacco Use    Smoking status: Former     Current packs/day: 0.00     Types: Cigarettes     Quit date: 1975     Years since quittin.7    Smokeless tobacco: Never   Vaping Use    Vaping status: Never Used   Substance and Sexual Activity    Alcohol use: Yes     Alcohol/week: 1.2 oz     Types: 2 Glasses of wine per week     Comment: about 3-4 drinks per week.     Drug use: Yes     Comment: tried CBD gummies; no THC    Sexual activity: Yes     Partners: Male     Birth control/protection: Surgical     Comment: Pt states had a hysterectomy     Social Determinants of  "Health     Financial Resource Strain: Low Risk  (2/29/2024)    Overall Financial Resource Strain (CARDIA)     Difficulty of Paying Living Expenses: Not hard at all   Food Insecurity: No Food Insecurity (2/29/2024)    Hunger Vital Sign     Worried About Running Out of Food in the Last Year: Never true     Ran Out of Food in the Last Year: Never true   Transportation Needs: No Transportation Needs (2/29/2024)    PRAPARE - Transportation     Lack of Transportation (Medical): No     Lack of Transportation (Non-Medical): No   Physical Activity: Insufficiently Active (2/29/2024)    Exercise Vital Sign     Days of Exercise per Week: 1 day     Minutes of Exercise per Session: 40 min   Stress: No Stress Concern Present (2/29/2024)    [employer] - Occupational Stress Questionnaire     Feeling of Stress : Not at all   Social Connections: Moderately Isolated (2/29/2024)    Social Connection and Isolation Panel [NHANES]     Frequency of Communication with Friends and Family: More than three times a week     Frequency of Social Gatherings with Friends and Family: Three times a week     Attends Oriental orthodox Services: Never     Active Member of Clubs or Organizations: No     Attends Club or Organization Meetings: Never     Marital Status:    Intimate Partner Violence: Not At Risk (2/29/2024)    Humiliation, Afraid, Rape, and Kick questionnaire     Fear of Current or Ex-Partner: No     Emotionally Abused: No     Physically Abused: No     Sexually Abused: No   Housing Stability: Low Risk  (2/29/2024)    Housing Stability Vital Sign     Unable to Pay for Housing in the Last Year: No     Number of Places Lived in the Last Year: 1     Unstable Housing in the Last Year: No       Physical Exam  Vitals  BP (!) 153/72   Pulse 84   Temp 35.8 °C (96.5 °F) (Temporal)   Resp 18   Ht 1.702 m (5' 7\")   Wt 68 kg (150 lb)   SpO2 95%   General: Well Developed, Well Nourished, Age appropriate appearance  HEENT: " Normocephalic, atraumatic  Psych: Normal mood and affect  Neck: Supple, nontender, no masses  Lungs: Breathing unlabored, No audible wheezing  Heart: Regular heart rate and rhythm  Abdomen: Soft, NT, ND  Neuro: Sensation grossly intact to BUE and BLE, moving all four extremities  Skin: Intact, no open wounds  Vascular: , Capillary refill <2 seconds  MSK: Right lower extremity: Compartments soft and compressible.  No skin tenting noted.  Wiggles toes.  Sensation tact diffusely.      Radiographs:  DX-CHEST-PORTABLE (1 VIEW)   Final Result      No acute cardiopulmonary abnormality.         CT-ANKLE W/O PLUS RECONS RIGHT   Final Result      1.  Acute, obliquely oriented distal tibial diaphyseal fracture.   2.  Associated acute, minimally displaced fracture of the anteromedial tibial plafond.   3.  No distal fibular fractures. No dislocation.   4.  No fractures of the visualized hindfoot.   5.  Bone demineralization.      DX-KNEE 3 VIEWS RIGHT   Final Result      Acute, mildly displaced, minimally comminuted proximal fibular diaphyseal fracture.      DX-TIBIA AND FIBULA RIGHT   Final Result      1.  Acute, mildly displaced, minimally comminuted proximal fibular diaphyseal fracture.   2.  Acute, displaced, obliquely oriented distal tibial diaphyseal fracture.      DX-PORTABLE FLUORO > 1 HOUR    (Results Pending)   DX-TIBIA AND FIBULA RIGHT    (Results Pending)       Laboratory Values  Recent Labs     09/14/24  0947   WBC 9.3   RBC 4.56   HEMOGLOBIN 14.5   HEMATOCRIT 42.8   MCV 93.9   MCH 31.8   MCHC 33.9   RDW 47.2   PLATELETCT 301   MPV 9.8     Recent Labs     09/14/24  0947   SODIUM 126*   POTASSIUM 4.7   CHLORIDE 95*   CO2 21   GLUCOSE 224*   BUN 20     Recent Labs     09/14/24  0947   APTT 25.1   INR 0.86*         Impression: 69-year-old female type I diabetic with spiral tib-fib fracture of the right lower extremity.  Plan for fixation today with intra measure nail.  She also has a small anterior lateral plafond  fracture that we discussed we may or may not fix depending on intraoperative alignment.    Plan:We discussed the diagnosis and findings with the patient at length.  We reviewed possible non operative and operative interventions and the risks and benefits of each of these.  she had a chance to ask questions and all of these were answered to her satisfaction. The patient chose to proceed with surgical intervention. Risks and benefits of surgery were discussed which include but are not limited to bleeding, infection, neurovascular damage, malunion, nonunion, instability, limb length discrepancy, DVT, PE, MI, Stroke and death. They understand these risks and wish to proceed.      Richard Sol MD  Orthopedic Trauma Surgery

## 2024-09-14 NOTE — PROGRESS NOTES
Patient reported she checked her BG on her monitor and it read 190's, she bolused 5 units through her pump. Educated patient to inform the RN prior to administering insulin.     1747    Patient BG reading 331. Patient has active insulin onboard via insulin pump. Will recheck sugar

## 2024-09-14 NOTE — CARE PLAN
The patient is Stable - Low risk of patient condition declining or worsening    Shift Goals  Clinical Goals: pain management, mobility, rest  Patient Goals: Pain management, mobility, rest    Progress made toward(s) clinical / shift goals:  Yes      Problem: Pain - Standard  Goal: Alleviation of pain or a reduction in pain to the patient’s comfort goal  Outcome: Progressing     Educated patient on the pain scale and reporting pain promptly. Patient stated understanding and demonstrated understanding throughout the shift. Non-pharm interventions used in addition such as rest, repositioned, ice, extra pillows, and blankets.     Problem: Knowledge Deficit - Standard  Goal: Patient and family/care givers will demonstrate understanding of plan of care, disease process/condition, diagnostic tests and medications  Outcome: Progressing     Problem: Fall Risk  Goal: Patient will remain free from falls  Outcome: Progressing       Patient is not progressing towards the following goals:

## 2024-09-14 NOTE — PROGRESS NOTES
Right tib/fib fx  Last meal last night  Will try and add for sx today possibly having to push to tomorrow pending schedule  Splint in ED  NPO    Richard Sol MD  Orthopedic Trauma Surgery

## 2024-09-14 NOTE — OR NURSING
PACU note- Respirations easy.  Denies pain and nausea.  She can wiggle her toes, they are warm,  nailbeds pink with brisk capillary refill.  Traction up and gave CAM boot to patient.  Patient denies pain and nausea.

## 2024-09-14 NOTE — ASSESSMENT & PLAN NOTE
X-ray showing Acute, mildly displaced, minimally comminuted proximal fibular diaphyseal fracture.  Patient's last meal was at 7 PM last night  Orthopedic surgery consulted, possible OR today, will leave n.p.o.  Fluids  PT OT  Morphine as needed

## 2024-09-14 NOTE — PROGRESS NOTES
Received report from MYNOR Reyes, patient arrived to the floor on [facility], transferred to hospital bed. Patients VS stable, educated on IS, resting comfortably, and call light in reach. No complaints of pain, CAM boot in place, pillows used for support. Educated the patient on the plan of care for the day, answered any questions.

## 2024-09-14 NOTE — ED TRIAGE NOTES
Chief Complaint   Patient presents with    Leg Pain     Right lower leg     Fall     Pt wheeled to triage with above complaints. Pt said that she got up and was walking to restroom this morning when her knee gave out and fell. Denies hitting head , no loc or blood thinner.   Right lower leg bruising and swelling noted. +cms, 2+ pedal pulse.

## 2024-09-14 NOTE — ANESTHESIA PREPROCEDURE EVALUATION
Case: [id number] Date/Time: 09/14/24 1219    Procedure: INSERTION, INTRAMEDULLARY SUE, TIBIA (Right)    Location: [facility] OR 16 / SURGERY [facility]    Surgeons: Richard Sol M.D.            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, 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)      ENDO   (positive) Diabetes mellitus type 1 (HCC)   (positive) Hypothyroidism       Physical Exam    Airway   Mallampati: II  TM distance: >3 FB  Neck ROM: full       Cardiovascular - normal exam  Rhythm: regular  Rate: normal  (-) murmur     Dental - normal exam           Pulmonary - normal exam  Breath sounds clear to auscultation     Abdominal    Neurological - normal exam                   Anesthesia Plan    ASA 3   ASA physical status 3 criteria: diabetes - poorly controlled    Plan - general       Airway plan will be ETT          Induction: intravenous    Postoperative Plan: Postoperative administration of opioids is intended.    Pertinent diagnostic labs and testing reviewed    Informed Consent:    Anesthetic plan and risks discussed with patient.    Use of blood products discussed with: patient whom consented to blood products.

## 2024-09-14 NOTE — PROGRESS NOTES
Size medium tall CAM boot fit to patients RLE. Capillary refill and motor sensation verified before after application of the DME.    Contact traction with any questions or concerns regarding this DME.

## 2024-09-14 NOTE — ANESTHESIA TIME REPORT
Anesthesia Start and Stop Event Times       Date Time Event    9/14/2024 1129 Ready for Procedure     1132 Anesthesia Start     1245 Anesthesia Stop          Responsible Staff  09/14/24      Name Role Begin End    Rey Hays M.D. Anesth 1132 1245          Overtime Reason:  no overtime (within assigned shift)    Comments:

## 2024-09-14 NOTE — ED PROVIDER NOTES
"ED Provider Note    CHIEF COMPLAINT  Chief Complaint   Patient presents with    Leg Pain     Right lower leg     Fall       EXTERNAL RECORDS REVIEWED  PDMP only 3 narcotic controlled substances    HPI/ROS  LIMITATION TO HISTORY   Select: : None  OUTSIDE HISTORIAN(S):  none    Ines Long is a 69 y.o. female who presents stating that she has had knee surgery previously and her knees \"gave out\" and she fell to the ground injuring her right lower extremity.  She denies chest pain, no focal neurologic deficits.    PAST MEDICAL HISTORY   has a past medical history of Acute nasopharyngitis (04/29/2022), Anesthesia, Blocked artery (Around 2019), Cataract (2023), Diabetes type 1, controlled (HCC) (11/01/2010), Dyslipidemia (11/01/2010), High cholesterol, Hypertension, Hypothyroidism (11/01/2010), Indigestion, Insulin pump in place (05/18/2011), Left carotid artery stenosis - severe 2019, PONV (postoperative nausea and vomiting), Routine health maintenance (05/18/2011), and Urinary incontinence.    SURGICAL HISTORY   has a past surgical history that includes cholecystectomy; abdominal hysterectomy total (01/01/1987); bunionectomy (01/01/2014); knee arthrotomy (Right, 01/01/1978); shoulder arthroscopy (Left, 01/01/1997); orthopedic osteotomy (Left, 07/17/2017); ligament repair (07/17/2017); toe fusion (07/17/2017); repair, tendon, lower extremity, using tendon graft (07/17/2017); carotid endarterectomy (Left, 12/05/2019); lumbar laminectomy diskectomy (05/16/2022); lumbar decompression (05/16/2022); foraminotomy (05/16/2022); meniscectomy, knee, arthroscopic (Right, 08/22/2022); synovectomy (Right, 08/22/2022); chondroplasty (Right, 08/22/2022); shoulder surgery; orif, knee; foot surgery (2016 and 2018); posterior cervical fusion o-arm (3/20/2023); lumbar fusion o-arm (9/27/2023); and lumbar decompression (9/27/2023).    FAMILY HISTORY  Family History   Problem Relation Age of Onset    Cancer Mother         rectal " cancer    Stroke Father          59    Heart Disease Father     Hypertension Father     Lung Disease Father         tb    Hyperlipidemia Father     Diabetes Sister     Psychiatric Illness Sister         bipolar    Heart Disease Maternal Grandfather     Stroke Paternal Grandmother     Heart Disease Paternal Grandfather        SOCIAL HISTORY  Social History     Tobacco Use    Smoking status: Former     Current packs/day: 0.00     Types: Cigarettes     Quit date: 1975     Years since quittin.7    Smokeless tobacco: Never   Vaping Use    Vaping status: Never Used   Substance and Sexual Activity    Alcohol use: Yes     Alcohol/week: 1.2 oz     Types: 2 Glasses of wine per week     Comment: about 3-4 drinks per week.     Drug use: Yes     Comment: tried CBD gummies; no THC    Sexual activity: Yes     Partners: Male     Birth control/protection: Surgical     Comment: Pt states had a hysterectomy       CURRENT MEDICATIONS  Home Medications       Reviewed by Sharonda Proctor (Pharmacy Tech) on 24 at 1009  Med List Status: Complete     Medication Last Dose Status   acetaminophen (TYLENOL) 500 MG Tab 2024 Active   Alirocumab (PRALUENT) 150 MG/ML Solution Auto-injector 2024 Active   Cholecalciferol (VITAMIN D-3 PO) Unk Active   clindamycin (CLEOCIN) 150 MG Cap PRN Active   cyclobenzaprine (FLEXERIL) 5 mg tablet 2024 Active   DULoxetine HCl 60 MG Capsule Delayed Release Sprinkle 2024 Active   ezetimibe (ZETIA) 10 MG Tab 2024 Active   famotidine (PEPCID) 20 MG Tab 2024 Active   Insulin Aspart, w/Niacinamide, (FIASP) 100 UNIT/ML Solution In Pump Active   insulin infusion pump Device Current Active   LEVOXYL 100 MCG Tab 2024 Active   pantoprazole (PROTONIX) 40 MG Tablet Delayed Response 2024 Active   pregabalin (LYRICA) 100 MG Cap 2024 Active   rosuvastatin (CRESTOR) 40 MG tablet 2024 Active   sodium chloride (SALT) 1 GM Tab 2024 Active   telmisartan  "(MICARDIS) 20 MG tablet Not started Active                         ALLERGIES  Allergies   Allergen Reactions    Ceclor [Cefaclor] Hives and Swelling    Augmentin Hives, Diarrhea and Vomiting     Fever blisters   Sickness lasts forever    Naproxen      Face Swells   9/14/24: spoke to pt, states she can take ibuprofen with no issues.    Tape Rash     \"Certain band aids\"  PAPER TAPE OKAY/ Tegaderm ok    Amlodipine Swelling     5mg = swelling     Amoxicillin-Pot Clavulanate      Other reaction(s): Not available    Cefaclor Hives and Swelling    Clindamycin Unspecified    Penicillin G Benzathine Unspecified       PHYSICAL EXAM  VITAL SIGNS: BP (!) 182/81   Pulse 85   Temp 36.1 °C (97 °F) (Temporal)   Resp 16   Ht 1.702 m (5' 7\")   Wt 68 kg (150 lb)   LMP 01/01/1983 (LMP Unknown)   SpO2 93%   BMI 23.49 kg/m²      Constitutional: Alert.  HENT: No signs of trauma, Bilateral external ears normal, Nose normal.   Eyes: Pupils are equal and reactive, Conjunctiva normal, Non-icteric.   Neck: Normal range of motion, No tenderness, Supple, No stridor.   Extremities: Intact distal pulses, tenderness of the right knee and right tib-fib..  Musculoskeletal: Good range of motion in all major joints. No tenderness to palpation or major deformities noted.   Neurologic: Alert , Normal motor function, Normal sensory function, No focal deficits noted.   Psychiatric: Affect normal, Judgment normal, Mood normal.      EKG/LABS      Sinus rhythm  Borderline low voltage, extremity leads  Probable anteroseptal infarct, old  Compared to ECG 09/12/2023 10:14:40  Nonspecific changes  I have independently interpreted this EKG      Labs Reviewed   CBC WITH DIFFERENTIAL - Abnormal; Notable for the following components:       Result Value    Neutrophils-Polys 76.50 (*)     Lymphocytes 16.10 (*)     All other components within normal limits   COMP METABOLIC PANEL   PROTHROMBIN TIME   APTT         RADIOLOGY/PROCEDURES   I have independently " interpreted the diagnostic imaging associated with this visit and am waiting the final reading from the radiologist.   My preliminary interpretation is as follows: Right tib-fib fracture    Radiologist interpretation:  CT-ANKLE W/O PLUS RECONS RIGHT   Final Result      1.  Acute, obliquely oriented distal tibial diaphyseal fracture.   2.  Associated acute, minimally displaced fracture of the anteromedial tibial plafond.   3.  No distal fibular fractures. No dislocation.   4.  No fractures of the visualized hindfoot.   5.  Bone demineralization.      DX-KNEE 3 VIEWS RIGHT   Final Result      Acute, mildly displaced, minimally comminuted proximal fibular diaphyseal fracture.      DX-TIBIA AND FIBULA RIGHT   Final Result      1.  Acute, mildly displaced, minimally comminuted proximal fibular diaphyseal fracture.   2.  Acute, displaced, obliquely oriented distal tibial diaphyseal fracture.      DX-CHEST-PORTABLE (1 VIEW)    (Results Pending)   DX-PORTABLE FLUORO > 1 HOUR    (Results Pending)   DX-TIBIA AND FIBULA RIGHT    (Results Pending)       COURSE & MEDICAL DECISION MAKING    ASSESSMENT, COURSE AND PLAN  Care Narrative:     The patient presents with right lower extremity pain after falling.  X-rays were ordered to evaluate for possible fracture.  Patient was given Percocet 5 mg p.o. and Zofran 4 mg p.o.      9:33 AM the patient's x-rays demonstrate tib-fib fracture with displacement.  I spoke with the orthopedic surgeon, the patient's last meal was last night, she has had water this morning.  He will take the patient to the operating room later today or tomorrow morning.  The patient will be admitted by the internal medicine team.  I have ordered EKG, chest x-ray, labs for preop.  She is giving IV fluids and kept NPO.  She is given 4 mg IV morphine.                ADDITIONAL PROBLEMS MANAGED  Tib-fib fracture right    DISPOSITION AND DISCUSSIONS  I have discussed management of the patient with the following  physicians and JOSE E's: Hospitalist and orthopedic surgeon on-call Dr. Richard Sol    Discussion of management with other [facility] or appropriate source(s): None       Barriers to care at this time, including but not limited to:  None .     Decision tools and prescription drugs considered including, but not limited to:  IV pain medication .    FINAL DIAGNOSIS  1. Closed fracture of right tibia and fibula, initial encounter         Electronically signed by: Raf Devlin M.D., 9/14/2024 8:26 AM

## 2024-09-14 NOTE — OP REPORT
DATE OF OPERATION: 9/14/2024     PREOPERATIVE DIAGNOSIS: Right distal third tib-fib fracture  Right tibial plafond avulsion fracture    POSTOPERATIVE DIAGNOSIS: Same    PROCEDURE PERFORMED: Right tibia intramedullary nail placement    SURGEON: Richard Sol M.D.     ASSISTANT:   STEWART Myers    ANESTHESIA: General    SPECIMEN: None    ESTIMATED BLOOD LOSS: 75 mL    IMPLANTS: [place] 9 x 345 mm tibial nail      INDICATIONS: The patient is a 69 y.o. female who presented with above.  I discussed the risks and benefits of the procedure which include but are not limited to risks of infection, wound healing complication, neurovascular injury, pain, malunion, non-union, malrotation, and the medical risks of anesthesia including MI, stroke, and death.  Alternatives to surgery were also discussed, including non-operative management, which I did not recommend.  The patient was in agreement with the plan to proceed, and the informed consent was signed and documented.  I met with the patient pre-operatively and marked the operative extremity with their agreement.  We proceeded to the operating room.     DESCRIPTION OF PROCEDURE:  Patient was seen in the preoperative holding area on the day of surgery. The operative site was marked with my initials.  she was taken to the operating room and placed supine on the operative table.  Anesthesia was induced.  The operative extremity was prepped and draped in the normal sterile fashion.  Operative pause was conducted and the correct patient, site, side, procedure, and surgeon's initials on extremity were identified.  Using percutaneous technique medium Danielson clamps were placed at the fracture site.  Using combination axial traction well-placed clamps the fracture was reduced and held provisionally.  We then performed a standard suprapatellar approach.  Her starting guidewire was placed checking AP and lateral views.  This was advanced.  Then used into reamer again anterior  canal.  Ball-tipped guidewire was then passed distal to fracture at the ankle.  This was measured sequential reaming ensued.  We reamed up to a 10.5 mm reamer with plans to place a 9 mm nail.  We placed our nail over the ball-tipped guidewire.  Care was taken avoid displacement of the fracture during reaming and/or nail placement.  Once we passed the nail to the appropriate depth we then drilled and placed 3 distal interlock screws using perfect [place] IRA technique.  We then drilled and placed a proximal interlock through the jig.  Jig was removed final images were obtained after clamp was removed showing appropriate reduction implant position.  The wounds were irrigated with sterile saline closed in layered fashion.  Ankle stress view showed no obvious ligamentous injury or instability.  We decided to forego fixation of that small mildly comminuted [place] fragment.  After wound closure sterile dressings were applied.  Patient was awoken taken to PACU in stable condition.    POSTOPERATIVE PLAN: Cam boot for right lower extremity.  Weightbearing as tolerated in the cam boot weight bearing.  Mobilize with physical and occupational therapies.  DVT prophylaxis with SCDs and Lovenox until mobilizing independently and then can be switched to aspirin for 4 weeks.  The patient will follow up in clinic in 2 weeks to check wounds and remove sutures/staples.      ____________________________________   Richard Sol M.D.   DD: 9/14/2024  12:44 PM

## 2024-09-15 ENCOUNTER — PHARMACY VISIT (OUTPATIENT)
Dept: PHARMACY | Facility: MEDICAL CENTER | Age: 69
End: 2024-09-15
Payer: COMMERCIAL

## 2024-09-15 VITALS
RESPIRATION RATE: 17 BRPM | BODY MASS INDEX: 23.54 KG/M2 | HEART RATE: 84 BPM | TEMPERATURE: 97.5 F | HEIGHT: 67 IN | DIASTOLIC BLOOD PRESSURE: 53 MMHG | WEIGHT: 150 LBS | SYSTOLIC BLOOD PRESSURE: 136 MMHG | OXYGEN SATURATION: 96 %

## 2024-09-15 PROBLEM — M80.061A: Status: ACTIVE | Noted: 2024-09-15

## 2024-09-15 PROBLEM — D62 ACUTE BLOOD LOSS ANEMIA (ABLA): Status: ACTIVE | Noted: 2024-09-15

## 2024-09-15 PROBLEM — S82.871A: Status: ACTIVE | Noted: 2024-09-15

## 2024-09-15 PROBLEM — Z98.890 PONV (POSTOPERATIVE NAUSEA AND VOMITING): Status: RESOLVED | Noted: 2024-09-14 | Resolved: 2024-09-15

## 2024-09-15 PROBLEM — R74.01 TRANSAMINITIS: Status: ACTIVE | Noted: 2024-09-15

## 2024-09-15 PROBLEM — R11.2 PONV (POSTOPERATIVE NAUSEA AND VOMITING): Status: RESOLVED | Noted: 2024-09-14 | Resolved: 2024-09-15

## 2024-09-15 PROBLEM — M80.071A: Status: ACTIVE | Noted: 2024-09-15

## 2024-09-15 LAB
ANION GAP SERPL CALC-SCNC: 10 MMOL/L (ref 7–16)
BUN SERPL-MCNC: 14 MG/DL (ref 8–22)
CALCIUM SERPL-MCNC: 8.8 MG/DL (ref 8.5–10.5)
CHLORIDE SERPL-SCNC: 99 MMOL/L (ref 96–112)
CO2 SERPL-SCNC: 22 MMOL/L (ref 20–33)
CREAT SERPL-MCNC: 0.57 MG/DL (ref 0.5–1.4)
ERYTHROCYTE [DISTWIDTH] IN BLOOD BY AUTOMATED COUNT: 46.5 FL (ref 35.9–50)
GFR SERPLBLD CREATININE-BSD FMLA CKD-EPI: 99 ML/MIN/1.73 M 2
GLUCOSE BLD STRIP.AUTO-MCNC: 111 MG/DL (ref 65–99)
GLUCOSE BLD STRIP.AUTO-MCNC: 67 MG/DL (ref 65–99)
GLUCOSE SERPL-MCNC: 130 MG/DL (ref 65–99)
HAV IGM SERPL QL IA: NORMAL
HBV CORE IGM SER QL: NORMAL
HBV SURFACE AG SER QL: NORMAL
HCT VFR BLD AUTO: 31.4 % (ref 37–47)
HCV AB SER QL: NORMAL
HGB BLD-MCNC: 10.9 G/DL (ref 12–16)
MCH RBC QN AUTO: 31.8 PG (ref 27–33)
MCHC RBC AUTO-ENTMCNC: 34.7 G/DL (ref 32.2–35.5)
MCV RBC AUTO: 91.5 FL (ref 81.4–97.8)
PLATELET # BLD AUTO: 257 K/UL (ref 164–446)
PMV BLD AUTO: 10.3 FL (ref 9–12.9)
POTASSIUM SERPL-SCNC: 4.6 MMOL/L (ref 3.6–5.5)
RBC # BLD AUTO: 3.43 M/UL (ref 4.2–5.4)
SODIUM SERPL-SCNC: 131 MMOL/L (ref 135–145)
WBC # BLD AUTO: 7.6 K/UL (ref 4.8–10.8)

## 2024-09-15 PROCEDURE — 700102 HCHG RX REV CODE 250 W/ 637 OVERRIDE(OP): Performed by: STUDENT IN AN ORGANIZED HEALTH CARE EDUCATION/TRAINING PROGRAM

## 2024-09-15 PROCEDURE — 97162 PT EVAL MOD COMPLEX 30 MIN: CPT

## 2024-09-15 PROCEDURE — 97535 SELF CARE MNGMENT TRAINING: CPT

## 2024-09-15 PROCEDURE — A9270 NON-COVERED ITEM OR SERVICE: HCPCS | Performed by: STUDENT IN AN ORGANIZED HEALTH CARE EDUCATION/TRAINING PROGRAM

## 2024-09-15 PROCEDURE — 700105 HCHG RX REV CODE 258: Performed by: STUDENT IN AN ORGANIZED HEALTH CARE EDUCATION/TRAINING PROGRAM

## 2024-09-15 PROCEDURE — 97166 OT EVAL MOD COMPLEX 45 MIN: CPT

## 2024-09-15 PROCEDURE — 97530 THERAPEUTIC ACTIVITIES: CPT

## 2024-09-15 PROCEDURE — 80074 ACUTE HEPATITIS PANEL: CPT

## 2024-09-15 PROCEDURE — 36415 COLL VENOUS BLD VENIPUNCTURE: CPT

## 2024-09-15 PROCEDURE — 700111 HCHG RX REV CODE 636 W/ 250 OVERRIDE (IP): Mod: JZ | Performed by: STUDENT IN AN ORGANIZED HEALTH CARE EDUCATION/TRAINING PROGRAM

## 2024-09-15 PROCEDURE — 82962 GLUCOSE BLOOD TEST: CPT

## 2024-09-15 PROCEDURE — 99239 HOSP IP/OBS DSCHRG MGMT >30: CPT | Performed by: STUDENT IN AN ORGANIZED HEALTH CARE EDUCATION/TRAINING PROGRAM

## 2024-09-15 PROCEDURE — RXMED WILLOW AMBULATORY MEDICATION CHARGE: Performed by: STUDENT IN AN ORGANIZED HEALTH CARE EDUCATION/TRAINING PROGRAM

## 2024-09-15 PROCEDURE — 80048 BASIC METABOLIC PNL TOTAL CA: CPT

## 2024-09-15 PROCEDURE — 85027 COMPLETE CBC AUTOMATED: CPT

## 2024-09-15 RX ORDER — OXYCODONE HYDROCHLORIDE 5 MG/1
5 TABLET ORAL
Status: DISCONTINUED | OUTPATIENT
Start: 2024-09-15 | End: 2024-09-15 | Stop reason: HOSPADM

## 2024-09-15 RX ORDER — ACETAMINOPHEN 500 MG
1000 TABLET ORAL EVERY 6 HOURS PRN
Status: DISCONTINUED | OUTPATIENT
Start: 2024-09-20 | End: 2024-09-15 | Stop reason: HOSPADM

## 2024-09-15 RX ORDER — DOCUSATE SODIUM 100 MG/1
100 CAPSULE, LIQUID FILLED ORAL 2 TIMES DAILY
Status: DISCONTINUED | OUTPATIENT
Start: 2024-09-15 | End: 2024-09-15 | Stop reason: HOSPADM

## 2024-09-15 RX ORDER — ACETAMINOPHEN 500 MG
1000 TABLET ORAL EVERY 6 HOURS PRN
COMMUNITY
Start: 2024-09-15

## 2024-09-15 RX ORDER — POLYETHYLENE GLYCOL 3350 17 G/17G
17 POWDER, FOR SOLUTION ORAL DAILY
Qty: 30 EACH | Refills: 1 | Status: SHIPPED | OUTPATIENT
Start: 2024-09-15

## 2024-09-15 RX ORDER — DOCUSATE SODIUM 100 MG/1
100 CAPSULE, LIQUID FILLED ORAL 2 TIMES DAILY
Qty: 60 CAPSULE | Refills: 1 | Status: SHIPPED | OUTPATIENT
Start: 2024-09-15

## 2024-09-15 RX ORDER — OXYCODONE HYDROCHLORIDE 10 MG/1
10 TABLET ORAL
Status: DISCONTINUED | OUTPATIENT
Start: 2024-09-15 | End: 2024-09-15 | Stop reason: HOSPADM

## 2024-09-15 RX ORDER — OXYCODONE AND ACETAMINOPHEN 5; 325 MG/1; MG/1
1 TABLET ORAL EVERY 6 HOURS PRN
Qty: 20 TABLET | Refills: 0 | Status: SHIPPED | OUTPATIENT
Start: 2024-09-15 | End: 2024-09-20

## 2024-09-15 RX ORDER — POLYETHYLENE GLYCOL 3350 17 G/17G
1 POWDER, FOR SOLUTION ORAL DAILY
Status: DISCONTINUED | OUTPATIENT
Start: 2024-09-15 | End: 2024-09-15 | Stop reason: HOSPADM

## 2024-09-15 RX ORDER — MORPHINE SULFATE 4 MG/ML
4 INJECTION INTRAVENOUS
Status: DISCONTINUED | OUTPATIENT
Start: 2024-09-15 | End: 2024-09-15 | Stop reason: HOSPADM

## 2024-09-15 RX ORDER — ACETAMINOPHEN 500 MG
1000 TABLET ORAL EVERY 6 HOURS
Status: DISCONTINUED | OUTPATIENT
Start: 2024-09-15 | End: 2024-09-15 | Stop reason: HOSPADM

## 2024-09-15 RX ADMIN — ROSUVASTATIN CALCIUM 40 MG: 20 TABLET, FILM COATED ORAL at 04:23

## 2024-09-15 RX ADMIN — DULOXETINE HYDROCHLORIDE 60 MG: 30 CAPSULE, DELAYED RELEASE ORAL at 04:23

## 2024-09-15 RX ADMIN — CYCLOBENZAPRINE 5 MG: 10 TABLET, FILM COATED ORAL at 08:55

## 2024-09-15 RX ADMIN — EZETIMIBE 10 MG: 10 TABLET ORAL at 04:23

## 2024-09-15 RX ADMIN — SODIUM CHLORIDE: 9 INJECTION, SOLUTION INTRAVENOUS at 02:08

## 2024-09-15 RX ADMIN — MORPHINE SULFATE 4 MG: 4 INJECTION, SOLUTION INTRAMUSCULAR; INTRAVENOUS at 08:53

## 2024-09-15 RX ADMIN — LEVOTHYROXINE SODIUM 50 MCG: 0.1 TABLET ORAL at 04:25

## 2024-09-15 RX ADMIN — OXYCODONE HYDROCHLORIDE 10 MG: 10 TABLET ORAL at 11:24

## 2024-09-15 RX ADMIN — ACETAMINOPHEN 1000 MG: 500 TABLET ORAL at 11:23

## 2024-09-15 RX ADMIN — POLYETHYLENE GLYCOL 3350 1 PACKET: 17 POWDER, FOR SOLUTION ORAL at 14:40

## 2024-09-15 RX ADMIN — TELMISARTAN 20 MG: 40 TABLET ORAL at 04:23

## 2024-09-15 ASSESSMENT — ACTIVITIES OF DAILY LIVING (ADL): TOILETING: INDEPENDENT

## 2024-09-15 ASSESSMENT — PAIN DESCRIPTION - PAIN TYPE
TYPE: ACUTE PAIN;SURGICAL PAIN

## 2024-09-15 ASSESSMENT — GAIT ASSESSMENTS
DISTANCE (FEET): 15
DEVIATION: BRADYKINETIC;ANTALGIC;STEP TO
ASSISTIVE DEVICE: FRONT WHEEL WALKER
GAIT LEVEL OF ASSIST: STANDBY ASSIST

## 2024-09-15 ASSESSMENT — COGNITIVE AND FUNCTIONAL STATUS - GENERAL
TOILETING: A LITTLE
SUGGESTED CMS G CODE MODIFIER DAILY ACTIVITY: CJ
STANDING UP FROM CHAIR USING ARMS: A LITTLE
CLIMB 3 TO 5 STEPS WITH RAILING: A LITTLE
MOBILITY SCORE: 18
DAILY ACTIVITIY SCORE: 21
STANDING UP FROM CHAIR USING ARMS: A LITTLE
SUGGESTED CMS G CODE MODIFIER DAILY ACTIVITY: CJ
HELP NEEDED FOR BATHING: A LITTLE
WALKING IN HOSPITAL ROOM: A LITTLE
MOBILITY SCORE: 18
MOVING TO AND FROM BED TO CHAIR: A LITTLE
MOVING FROM LYING ON BACK TO SITTING ON SIDE OF FLAT BED: A LITTLE
DAILY ACTIVITIY SCORE: 20
WALKING IN HOSPITAL ROOM: A LITTLE
MOVING FROM LYING ON BACK TO SITTING ON SIDE OF FLAT BED: A LITTLE
DRESSING REGULAR LOWER BODY CLOTHING: A LITTLE
TURNING FROM BACK TO SIDE WHILE IN FLAT BAD: A LITTLE
SUGGESTED CMS G CODE MODIFIER MOBILITY: CK
TOILETING: A LITTLE
HELP NEEDED FOR BATHING: A LITTLE
CLIMB 3 TO 5 STEPS WITH RAILING: A LITTLE
DRESSING REGULAR LOWER BODY CLOTHING: A LITTLE
TURNING FROM BACK TO SIDE WHILE IN FLAT BAD: A LITTLE
SUGGESTED CMS G CODE MODIFIER MOBILITY: CK
MOVING TO AND FROM BED TO CHAIR: A LITTLE
PERSONAL GROOMING: A LITTLE

## 2024-09-15 NOTE — THERAPY
"Physical Therapy   Initial Evaluation     Patient Name: Ines Long  Age:  69 y.o., Sex:  female  Medical Record #: [id number]  Today's Date: 9/15/2024     Precautions  Precautions: Fall Risk;Weight Bearing As Tolerated Right Lower Extremity;CAM Boot  Comments: R LE WBAT in CAM boot    Assessment  Patient is 69 y.o. female presents stating that she has had knee surgery previously and her knees \"gave out\" and she fell to the ground injuring her right lower extremity.   Dx'd with R Acute, obliquely oriented distal tibial diaphyseal fracture. R Acute, mildly displaced, minimally comminuted proximal fibular diaphyseal fracture. 9/14/24: S/P Right tibia intramedullary nail placement. History of diabetes, hypertension, hyperlipidemia, hypothyroidism.  Pt able to amb with FWW, decreased weight on R LE due to pain, step to gait, limited activity tolerance due to fatigue. Pt motivated to go home. Pt with a PT clinical presentation of Evolving with the following Problems: Pain, Impaired Bed Mobility, Impaired Transfers, Impaired Ambulation, Decreased Activity Tolerance. Pt is currently below their functional baseline and anticipate that pt will benefit from home with family assistance upon DC from hospital.       Plan    Physical Therapy Initial Treatment Plan   Treatment Plan : Bed Mobility, Gait Training, Neuro Re-Education / Balance, Therapeutic Activities, Therapeutic Exercise, Stair Training  Treatment Frequency: 4 Times per Week  Duration: Until Therapy Goals Met    DC Equipment Recommendations: None  Discharge Recommendations: Anticipate that the patient will have no further physical therapy needs after discharge from the hospital       Subjective    Pt resting in bed, agreed to PT/OT.      Objective       09/15/24 1438   Time In/Time Out   Therapy Start Time 1408   Therapy End Time 1438   Total Therapy Time 30   Precautions   Precautions Fall Risk;Weight Bearing As Tolerated Right Lower Extremity;CAM Boot "   Comments R LE WBAT in CAM boot   Vitals   O2 Delivery Device None - Room Air   Vitals Comments no s/s distress   Pain 0 - 10 Group   Location Leg   Location Orientation Right   Therapist Pain Assessment During Activity;Post Activity Pain Same as Prior to Activity;Nurse Notified   Prior Living Situation   Prior Services None   Housing / Facility 1 Story House   Steps Into Home 1  (ramp)   Steps In Home 1  (ramp)   Equipment Owned Front-Wheel Walker;Wheelchair;Ramp   Lives with - Patient's Self Care Capacity Spouse   Comments pt lives with spouse, daughter is PT in town, spouse building a ramp for step outside and inside   Prior Level of Functional Mobility   Bed Mobility Independent   Transfer Status Independent   Ambulation Independent   Ambulation Distance community   Assistive Devices Used None   History of Falls   History of Falls Yes   Date of Last Fall 09/14/24   Cognition    Cognition / Consciousness WDL   Level of Consciousness Alert   Comments pleasant, cooperative receptive to education   Active ROM Lower Body    Active ROM Lower Body  X   Comments WDL exept R lower leg in CAM boot   Strength Lower Body   Lower Body Strength  X   Comments R LE NT   Coordination Lower Body    Coordination Lower Body  WDL   Balance Assessment   Sitting Balance (Static) Good   Sitting Balance (Dynamic) Good   Standing Balance (Static) Fair +   Standing Balance (Dynamic) Fair   Weight Shift Sitting Good   Weight Shift Standing Fair   Comments standing assessed with FWW, at sink, with 1 UE support for ADls   Bed Mobility    Supine to Sit Supervised   Scooting Supervised   Rolling Supervised   Comments HOB flat, bed rail   Gait Analysis   Gait Level Of Assist Standby Assist   Assistive Device Front Wheel Walker   Distance (Feet) 15   # of Times Distance was Traveled 2   Deviation Bradykinetic;Antalgic;Step To   # of Stairs Climbed 1  (up step going backwards with FWW)   Level of Assist with Stairs Contact Guard Assist    Comments VCs for sequencing on step   Functional Mobility   Sit to Stand Standby Assist   Bed, Chair, Wheelchair Transfer Standby Assist   Transfer Method Stand Step   Comments min extra time for sit to stand   6 Clicks Assessment - How much HELP from from another person do you currently need... (If the patient hasn't done an activity recently, how much help from another person do you think he/she would need if he/she tried?)   Turning from your back to your side while in a flat bed without using bedrails? 3   Moving from lying on your back to sitting on the side of a flat bed without using bedrails? 3   Moving to and from a bed to a chair (including a wheelchair)? 3   Standing up from a chair using your arms (e.g., wheelchair, or bedside chair)? 3   Walking in hospital room? 3   Climbing 3-5 steps with a railing? 3   6 clicks Mobility Score 18   Activity Tolerance   Sitting in Chair up in recliner post session   Sitting Edge of Bed 5 minutes   Standing 10 minutes total   Comments limted by pain and fatigue   Edema / Skin Assessment   Comments min dried saturation posterior aspect of calf on ace wrap   Short Term Goals    Short Term Goal # 1 Pt will perform supine to/from sit with flat bed and no features supervised in 6 visits to progress bed mobility   Short Term Goal # 2 Pt will perform sit to/from stand with FWW supervised in 6 visits to progress transfers   Short Term Goal # 3 Pt will amb with FWW 100ft supervised in 6 visits to progress with functional household mobility.   Education Group   Education Provided Role of Physical Therapist;Gait Training;Use of Assistive Device;Stair Training;Weight Bearing Precautions;Brace Wear and Care   Role of Physical Therapist Patient Response Patient;Acceptance;Explanation;Verbal Demonstration   Gait Training Patient Response Patient;Acceptance;Explanation;Action Demonstration   Stair Training Patient Response Patient;Acceptance;Explanation;Action  Demonstration;Demonstration   Use of Assistive Device Patient Response Patient;Acceptance;Explanation;Action Demonstration   Weight Bearing Precautions Patient Response Patient;Acceptance;Explanation;Action Demonstration   Brace Wear & Care Patient Response Patient;Acceptance;Explanation;Action Demonstration   Physical Therapy Initial Treatment Plan    Treatment Plan  Bed Mobility;Gait Training;Neuro Re-Education / Balance;Therapeutic Activities;Therapeutic Exercise;Stair Training   Treatment Frequency 4 Times per Week   Duration Until Therapy Goals Met   Problem List    Problems Pain;Impaired Bed Mobility;Impaired Transfers;Impaired Ambulation;Decreased Activity Tolerance   Anticipated Discharge Equipment and Recommendations   DC Equipment Recommendations None   Discharge Recommendations Anticipate that the patient will have no further physical therapy needs after discharge from the hospital   Interdisciplinary Plan of Care Collaboration   IDT Collaboration with  Nursing;Occupational Therapist  Patient seen for team evaluation with Occupational Therapist for the following reason(s):  Therapy sessions needed to be done by a certain time period for both disciplines, and did not impede patient's progress.  Physical Therapist facilitated gait, balance, precautions while Occupational Therapist simultaneously treated pt according to POC.     Patient Position at End of Therapy Seated;Chair Alarm On;Tray Table within Reach;Phone within Reach   Collaboration Comments RN updated     Ordered pt an even up for L shoe.

## 2024-09-15 NOTE — DISCHARGE PLANNING
Case Management Discharge Planning    Admission Date: 9/14/2024  GMLOS: 4.2  ALOS: 1    6-Clicks ADL Score: 21  6-Clicks Mobility Score: 18      Anticipated Discharge Dispo: Discharge Disposition: D/T to home under [facility]HA care in anticipation of covered skilled care (06)    DME Needed: No    Action(s) Taken: Updated Provider/Nurse on Discharge Plan    Pt  was dicussed in IDT rounds with Dr Butler. . Per MYNOR Martell, Dr Butler agrees to DC Pt with pending  acceptance. HH choice faxed to Bela UMAÑA.     Pt s address ::[address] [place] [id number].    Per Dr Carrie Jang is agreeable to DC PT with pending  acceptance.     16:20 Accepted with [facility]    Escalations Completed: None    Medically Clear: Yes    Next Steps:   CM to continue to assist Pt with discharge as needed    Barriers to Discharge: None    Is the patient up for discharge tomorrow: No

## 2024-09-15 NOTE — DISCHARGE SUMMARY
Discharge Summary    CHIEF COMPLAINT ON ADMISSION  Chief Complaint   Patient presents with    Leg Pain     Right lower leg     Fall       Reason for Admission  Right distal third tib-fib fracture following ground-level fall    Admission Date  9/14/2024    CODE STATUS  Full Code    HPI & HOSPITAL COURSE  Ines Long is a 69 y.o. female who presented 9/14/2024 with a mechanical fall.  This is a pleasant woman with a history of adult onset diabetes mellitus type 1 on insulin pump, hypertension, hyperlipidemia, and hypothyroidism.  She had a mechanical fall at 6 AM in the morning when she was getting up to ambulate to go to the restroom.  Her knee suddenly gave out causing her to fall landing on the right lower extremity.  She was unable to bear weight after the fall due to severe pain.  No significant loss of consciousness or head strike.  No preceding dizziness.    She came to the emergency room for further evaluation, where a x-ray showed acute mildly displaced minimally comminuted proximal fibular diaphyseal fracture acute displaced obliquely oriented distal tibial diaphyseal fracture.  X-ray of the right knee showed acute mildly displaced minimally comminuted proximal fibular diaphyseal fracture.  CT scan of the ankle with reconstructions was performed, which acute obliquely oriented distal tibial diaphyseal fracture, associated acute minimally displaced fracture of the anterior lateral tibial plafond.  No distal fibular fractures with no dislocation.  No fractures of the visualized hindfoot.  Bone demineralization noted.    Patient was admitted to the orthopedic surgery floor for further care and evaluation.  Orthopedic surgery was consulted and the patient was noted to have distal third tib-fib fracture with a right tibial plafond avulsion fracture.  The patient was taken to the operating room by Dr. Richard Sol on 9/14/2024 for a right tibial intramedullary nail placement.  Ankle stress view  intraoperatively showed no obvious ligamentous injury or instability.  Decided to forego fixation of small mildly comminuted complete fragment.    Patient tolerated procedure well and was readmitted to the orthopedic surgery floor.  Pain was controlled with oxycodone and IV morphine.  Scheduled Tylenol was started as well.  Patient was evaluated by occupational and physical therapy, recommended home health for continued therapies.    Patient's hemoglobin dropped to 10.9 from 14.5 from acute blood loss anemia likely from intraoperative blood loss.  She was noted to have mild transaminitis with AST of 65, , and alkaline phosphatase of 129.  Patient will need follow-up CMP for resolution of her transaminitis.  Consider right upper quadrant ultrasound if persistent transaminitis noted.    Notably, patient's TSH was 1.0 on 1/3/2024.  Patient should also have repeat TSH with free T4 testing on outpatient basis for TSH goal of 4-6 given her age group.    Patient was discharged to home with home health as per her request.    Therefore, she is discharged in good and stable condition to home with organized home healthcare and close outpatient follow-up.    The patient recovered much more quickly than anticipated on admission.    Discharge Date  9/15/2024    FOLLOW UP ITEMS POST DISCHARGE  -Follow-up primary care provider in 3 to 5 days with labs.  Repeat TSH and free T4 testing.  Consider DEXA scan to assess for osteoporosis.  -Follow-up with Dr. Richard Sol at the [facility] in 2 weeks for wound check and staple/suture removal.    DISCHARGE DIAGNOSES  Principal Problem:    Pathological fracture of right tibia due to age-related osteoporosis (POA: Yes)  Active Problems:    Right fibular fracture (POA: Unknown)    Pathological fracture of right fibula due to age-related osteoporosis (POA: Yes)    Closed traumatic displaced fracture of right tibial plafond (POA: Yes)    Diabetes mellitus type 1 (HCC) (POA:  Yes)      Overview: Chronic in nature.  Stable.  Last a1c is 7.2.  She continues to follow       with [facility] closely.    Hypothyroidism (POA: Yes)      Overview: Chronic in nature.  Stable.  Patient is on daily symptoms currently.    Hyponatremia (POA: Yes)      Overview: Na 136 - improved.   IV NaCl - follow.    Hypertension (POA: Unknown)      Overview: Chronic in nature. States that on 20 mg lisinopril taken at night she is       having /60, states it has been low at PT. Currently 128/72. Denies       chest pain, palpitations, dizziness, headache, blurry vision.    Dyslipidemia (POA: Yes)    Bilateral closed proximal tibial fracture (POA: Yes)    Acute blood loss anemia (ABLA) (POA: Yes)    Transaminitis (POA: Yes)  Resolved Problems:    PONV (postoperative nausea and vomiting) (POA: Unknown)      FOLLOW UP  Future Appointments   Date Time Provider Department Center   10/3/2024  9:15 AM V EXAM 6 [facility]   10/24/2024  9:40 AM Roel Rizo A.P.R.NJosh [address]   11/19/2024 11:20 AM Ghassan Gray M.D. VMED None   12/5/2024  9:00 AM LAB MOR Rizo A.P.R.NJosh  [address]  [facility] [address]  [place] [address]  [phone]    Follow up      Richard Sol M.D.  [address] [facility] [place] [id number] [phone]    Follow up in 2 week(s)  For wound re-check, For suture removal      MEDICATIONS ON DISCHARGE     Medication List        START taking these medications        Instructions   docusate sodium 100 MG Caps  Commonly known as: Colace   Take 1 Capsule by mouth 2 times a day. To prevent constipation while taking narcotic pain medications.  Dose: 100 mg     oxyCODONE-acetaminophen 5-325 MG Tabs  Commonly known as: Percocet   Take 1 Tablet by mouth every 6 hours as needed for Severe Pain for up to 5 days.  Dose: 1 Tablet     polyethylene glycol/lytes Pack  Commonly known as: Miralax   Take 1 Packet by mouth every day. To prevent constipation  while taking narcotic pain medications.  Dose: 17 g            CHANGE how you take these medications        Instructions   acetaminophen 500 MG Tabs  What changed:   how much to take  when to take this  reasons to take this  Commonly known as: Tylenol   Take 2 Tablets by mouth every 6 hours as needed for Mild Pain or Moderate Pain.  Dose: 1,000 mg            CONTINUE taking these medications        Instructions   clindamycin 150 MG Caps  Commonly known as: Cleocin   Take 3 capsules PO prior to dental procedure     cyclobenzaprine 5 mg tablet  Commonly known as: Flexeril   Take 5 mg by mouth 2 (two) times a day.  Dose: 5 mg     DULoxetine HCl 60 MG Csdr   Take 60 mg by mouth every day.  Dose: 60 mg     ezetimibe 10 MG Tabs  Commonly known as: Zetia   Take 1 Tablet by mouth every day. To lower cholesterol  Dose: 10 mg     famotidine 20 MG Tabs  Commonly known as: Pepcid   TAKE ONE TABLET BY MOUTH TWICE A DAY  Dose: 20 mg     Fiasp 100 UNIT/ML Soln  Generic drug: Insulin Aspart (w/Niacinamide)   Inject 2.2 Units as directed one time.  Dose: 2.2 Units     insulin infusion pump Rosibel   Inject  under the skin continuous. Patient's own SQ insulin pump    Type of Insulin: Fiasp  Last change of tubing: 3/19/2023 - Change tubing and site every 72 hours    Dosing:  Basal rate:   0000 - 0329 = 0.5 units/hr   0330 - 1129 = 0.85 units/hr   1130 - 1359 = 0.5 units/hr              1400 - 1759 = 0.45 units/hr              1800 - 2359 = 0.5 units/hr    Bolus ratio:   1 unit : 12 g carbohydrate at  (breakfast, lunch, dinner, snacks)      Correction ratio:    1 units for every 50 over 150 mg/dL    Disconnect pump if patient becomes hypoglycemic and altered.     Levoxyl 100 MCG Tabs  Generic drug: levothyroxine   Take  mcg by mouth every morning on an empty stomach. Takes 1/2 tab on Sundays  Dose:  mcg     pantoprazole 40 MG Tbec  Commonly known as: Protonix   Take 1 Tablet by mouth every day.  Dose: 40 mg     Praluent 150  "MG/ML Soaj  Generic drug: Alirocumab   Inject 150 mg under the skin every 14 days.  Dose: 150 mg     pregabalin 100 MG Caps  Commonly known as: Lyrica   Take 200 mg by mouth every evening.  Dose: 200 mg     rosuvastatin 40 MG tablet  Commonly known as: Crestor   Take 1 Tablet by mouth every day. To lower cholesterol  Dose: 40 mg     sodium chloride 1 GM Tabs  Commonly known as: Salt   TAKE TWO TABLETS BY MOUTH EVERY DAY  Dose: 2 g     telmisartan 20 MG tablet  Commonly known as: Micardis   Take 1 Tablet by mouth every day. to lower blood pressure  Dose: 20 mg     VITAMIN D-3 PO   Take 5,000 mg by mouth every evening.  Dose: 5,000 mg              Allergies  Allergies   Allergen Reactions    Ceclor [Cefaclor] Hives and Swelling    Augmentin Hives, Diarrhea and Vomiting     Fever blisters   Sickness lasts forever    Naproxen      Face Swells   9/14/24: spoke to pt, states she can take ibuprofen with no issues.    Tape Rash     \"Certain band aids\"  PAPER TAPE OKAY/ Tegaderm ok    Amlodipine Swelling     5mg = swelling     Amoxicillin-Pot Clavulanate      Other reaction(s): Not available    Cefaclor Hives and Swelling    Clindamycin Unspecified    Penicillin G Benzathine Unspecified       DIET  Orders Placed This Encounter   Procedures    Diet Order Diet: Consistent CHO (Diabetic)     Standing Status:   Standing     Number of Occurrences:   1     Order Specific Question:   Diet:     Answer:   Consistent CHO (Diabetic) [4]       ACTIVITY  As tolerated.  Weight bearing as tolerated    CONSULTATIONS  Orthopedic surgery    PROCEDURES        DATE OF OPERATION: 9/14/2024     PREOPERATIVE DIAGNOSIS: Right distal third tib-fib fracture  Right tibial plafond avulsion fracture     POSTOPERATIVE DIAGNOSIS: Same     PROCEDURE PERFORMED: Right tibia intramedullary nail placement     SURGEON: Richard Sol M.D.      ASSISTANT:   STEWART Myers     ANESTHESIA: General     SPECIMEN: None     ESTIMATED BLOOD LOSS: 75 mL   "   IMPLANTS: [place] 9 x 345 mm tibial nail        INDICATIONS: The patient is a 69 y.o. female who presented with above.  I discussed the risks and benefits of the procedure which include but are not limited to risks of infection, wound healing complication, neurovascular injury, pain, malunion, non-union, malrotation, and the medical risks of anesthesia including MI, stroke, and death.  Alternatives to surgery were also discussed, including non-operative management, which I did not recommend.  The patient was in agreement with the plan to proceed, and the informed consent was signed and documented.  I met with the patient pre-operatively and marked the operative extremity with their agreement.  We proceeded to the operating room.      DESCRIPTION OF PROCEDURE:  Patient was seen in the preoperative holding area on the day of surgery. The operative site was marked with my initials.  she was taken to the operating room and placed supine on the operative table.  Anesthesia was induced.  The operative extremity was prepped and draped in the normal sterile fashion.  Operative pause was conducted and the correct patient, site, side, procedure, and surgeon's initials on extremity were identified.  Using percutaneous technique medium Danielson clamps were placed at the fracture site.  Using combination axial traction well-placed clamps the fracture was reduced and held provisionally.  We then performed a standard suprapatellar approach.  Her starting guidewire was placed checking AP and lateral views.  This was advanced.  Then used into reamer again anterior canal.  Ball-tipped guidewire was then passed distal to fracture at the ankle.  This was measured sequential reaming ensued.  We reamed up to a 10.5 mm reamer with plans to place a 9 mm nail.  We placed our nail over the ball-tipped guidewire.  Care was taken avoid displacement of the fracture during reaming and/or nail placement.  Once we passed the nail to the appropriate  depth we then drilled and placed 3 distal interlock screws using perfect [place] technique.  We then drilled and placed a proximal interlock through the jig.  Jig was removed final images were obtained after clamp was removed showing appropriate reduction implant position.  The wounds were irrigated with sterile saline closed in layered fashion.  Ankle stress view showed no obvious ligamentous injury or instability.  We decided to forego fixation of that small mildly comminuted [place] fragment.  After wound closure sterile dressings were applied.  Patient was awoken taken to PACU in stable condition.     POSTOPERATIVE PLAN: Cam boot for right lower extremity.  Weightbearing as tolerated in the cam boot weight bearing.  Mobilize with physical and occupational therapies.  DVT prophylaxis with SCDs and Lovenox until mobilizing independently and then can be switched to aspirin for 4 weeks.  The patient will follow up in clinic in 2 weeks to check wounds and remove sutures/staples.        LABORATORY  Lab Results   Component Value Date    SODIUM 131 (L) 09/15/2024    POTASSIUM 4.6 09/15/2024    CHLORIDE 99 09/15/2024    CO2 22 09/15/2024    GLUCOSE 130 (H) 09/15/2024    BUN 14 09/15/2024    CREATININE 0.57 09/15/2024        Lab Results   Component Value Date    WBC 7.6 09/15/2024    HEMOGLOBIN 10.9 (L) 09/15/2024    HEMATOCRIT 31.4 (L) 09/15/2024    PLATELETCT 257 09/15/2024        Total time of the discharge process 33 minutes.

## 2024-09-15 NOTE — PROGRESS NOTES
Size medium shoe lift/even up dispensed to patient.    Contact traction with any questions or concerns regarding this DME.

## 2024-09-15 NOTE — DISCHARGE INSTRUCTIONS
Thank you for coming to [facility].  It has been my pleasure to take care of you!      -Please follow-up with your primary care provider in 3 to 5 days with labs.  -Have your TSH checked with your primary care provider.  Adjust thyroid medications for TSH goal of 4-6.  -Consider checking DEXA scan with your primary care provider.  -Monitor liver function testing.  If still elevated consider right upper quadrant ultrasound and further workup.  -Follow-up with Dr. Richard Sol at the [facility] in 2 weeks for wound check and staple/suture removal.

## 2024-09-15 NOTE — CARE PLAN
The patient is Stable - Low risk of patient condition declining or worsening    Shift Goals  Clinical Goals: Pain, safety  Patient Goals: Comfort  Family Goals: not present    Progress made toward(s) clinical / shift goals:    Problem: Pain - Standard  Goal: Alleviation of pain or a reduction in pain to the patient’s comfort goal  Outcome: Progressing     Problem: Knowledge Deficit - Standard  Goal: Patient and family/care givers will demonstrate understanding of plan of care, disease process/condition, diagnostic tests and medications  Outcome: Progressing     Problem: Fall Risk  Goal: Patient will remain free from falls  Outcome: Progressing       Patient is not progressing towards the following goals:

## 2024-09-15 NOTE — PROGRESS NOTES
Pt A&O x4. Plan of care discussed, all concerns addressed. No complaints of pain, SOB, or chest pain. Call light and belongings within reach. Pt educated on fall precautions with verbalized understanding. Bed locked and in lowest position. All needs met at this time.

## 2024-09-15 NOTE — THERAPY
Occupational Therapy   Initial Evaluation     Patient Name: Ines Long  Age:  69 y.o., Sex:  female  Medical Record #: [id number]  Today's Date: 9/15/2024    Precautions: Fall Risk, Weight Bearing As Tolerated Right Lower Extremity, CAM Boot  Comments: R LE WBAT in CAM boot    Assessment  Patient is 69 y.o. female admitted after a GLF and was found to have a right distal third tib-fib fx and right tibial plafond avulsion fx. Pt seen s/p right tibia IM nail placement. PMHx of adult onset diabetes mellitus type 1 on insulin pump, HTN, hyperlipidemia, and hypothyroidism. Pt seen for eval and tx. Pt currently resides with her  in a 1-story house and was independent with ADLs and IADLs.    During OT eval, pt presented with pain as well as deficits in self-care tasks, balance, functional mobility, and activity tolerance. She required supv-CGA to complete ADLs, functional mobility, and txfs with FWW. She was provided education on role of acute OT, WB precautions, brace management, home safety, compensatory strategies to safely complete ADLs, and importance of OOB ADLs. Currently recommend home health for further OT services after DC. Will continue to follow for ongoing acute OT services.      Plan    Occupational Therapy Initial Treatment Plan   Treatment Interventions: Self Care / Activities of Daily Living, Therapeutic Exercises, Therapeutic Activity, Family / Caregiver Training, Adaptive Equipment  Treatment Frequency: 3 Times per Week  Duration: Until Therapy Goals Met    DC Equipment Recommendations: None  Discharge Recommendations: Recommend home health for continued occupational therapy services      Objective      Prior Living Situation   Prior Services Home-Independent   Housing / Facility [facility] [place]   Steps Into Home 1  (However, pt's spouse is currently building her a ramp)   Steps In Home 1  (Up into living room, although pt reports having a ramp)   Bathroom Set up Walk In Shower;Shower  Chair;Grab Bars   Equipment Owned Front-Wheel Walker;Wheelchair;Tub / Shower Seat;Grab Bar(s) In Tub / Shower;Grab Bar(s) By Toilet;4-Wheel Walker;Ramp;Raised Toilet Seat With Arms;Single Point Cane   Lives with - Patient's Self Care Capacity Spouse   Comments Pt currently resides with her  who is able to provide assist as needed. Pt's daughter works as a PT in town   Prior Level of ADL Function   Self Feeding Independent   Grooming / Hygiene Independent   Bathing Independent   Dressing Independent   Toileting Independent   Prior Level of IADL Function   Medication Management Independent   Laundry Independent   Kitchen Mobility Independent   Finances Independent   Home Management Independent   Shopping Independent   Prior Level Of Mobility Independent With Device in Community;Independent With Device in Home   Driving / Transportation Driving Independent   Occupation (Pre-Hospital Vocational) Retired Due To Age   History of Falls   History of Falls Yes   Date of Last Fall   (Related to admit)   Precautions   Precautions Fall Risk;Weight Bearing As Tolerated Right Lower Extremity;CAM Boot   Vitals   O2 Delivery Device None - Room Air   Pain 0 - 10 Group   Therapist Pain Assessment Post Activity Pain Same as Prior to Activity;Nurse Notified  (Not rated, reported a mild increase in RLE pain with activity)   Cognition    Cognition / Consciousness WDL   Level of Consciousness Alert   Comments Pleasant, cooperative, and receptive to education   Active ROM Upper Body   Active ROM Upper Body  WDL   Dominant Hand Right   Comments Observed during functional tasks   Strength Upper Body   Upper Body Strength  WDL   Balance Assessment   Sitting Balance (Static) Good   Sitting Balance (Dynamic) Good   Standing Balance (Static) Fair +   Standing Balance (Dynamic) Fair   Weight Shift Sitting Good   Weight Shift Standing Fair   Comments w/FWW   Bed Mobility    Supine to Sit Supervised   Sit to Supine   (Up to chair post)    Scooting Supervised   Rolling Supervised   Comments HOB flat, use of rails   ADL Assessment   Eating Supervision   Grooming Contact Guard Assist;Standing  (Oral care at sink)   Upper Body Dressing Supervision   Lower Body Dressing Contact Guard Assist  (Assist with balance while donning slipper; pt politely declined need for training on how to don/doff CAM boot due to previous experience)   Toileting   (NT; had previously completed prior to start of session)   How much help from another person does the patient currently need...   6 Clicks Daily Activity Score 20   Functional Mobility   Sit to Stand Standby Assist   Bed, Chair, Wheelchair Transfer Standby Assist   Mobility Functional mobility in room w/FWW   Activity Tolerance   Sitting in Chair Up to chair post   Sitting Edge of Bed 5 mn   Standing 10 min   Comments Limited by fatigue   Patient / Family Goals   Patient / Family Goal #1 To go home   Short Term Goals   Short Term Goal # 1 Pt will complete ADL txfs with supv   Short Term Goal # 2 Pt will complete LB dressing (pants) with supv using AE PRN   Short Term Goal # 3 Pt will complete toileting ADLs with supv   Short Term Goal # 4 Pt will complete standing g/h routine with supv   Education Group   Education Provided Energy Conservation;Brace Wear and Care;Home Safety;Role of Occupational Therapist;Activities of Daily Living;Weight Bearing Precautions;Pathology of bedrest   Role of Occupational Therapist Patient Response Patient;Acceptance;Explanation;Verbal Demonstration   Energy Conservation Patient Response Patient;Acceptance;Explanation;Verbal Demonstration   Brace Wear & Care Patient Response Patient;Acceptance;Explanation;Verbal Demonstration;Action Demonstration   Home Safety Patient Response Patient;Acceptance;Explanation;Verbal Demonstration   ADL Patient Response Patient;Acceptance;Explanation;Verbal Demonstration;Action Demonstration;Reinforcement Needed   Weight Bearing Precautions Patient  Response Patient;Acceptance;Explanation;Verbal Demonstration;Action Demonstration   Pathology of Bedrest Patient Response Patient;Acceptance;Explanation;Verbal Demonstration;Action Demonstration     Pt seen for OT eval in coordination with PT due to the need for two skilled therapists due to limited mobility and high pain

## 2024-09-15 NOTE — CARE PLAN
The patient is Stable - Low risk of patient condition declining or worsening    Shift Goals  Clinical Goals: pain management, mobility, rest  Patient Goals: pain management, rest  Family Goals: n/a    Progress made toward(s) clinical / shift goals:    Problem: Pain - Standard  Goal: Alleviation of pain or a reduction in pain to the patient’s comfort goal  Outcome: Progressing     Problem: Knowledge Deficit - Standard  Goal: Patient and family/care givers will demonstrate understanding of plan of care, disease process/condition, diagnostic tests and medications  Outcome: Progressing     Problem: Fall Risk  Goal: Patient will remain free from falls  Outcome: Progressing       Patient is not progressing towards the following goals:

## 2024-09-15 NOTE — PROGRESS NOTES
2245: RN notified of bladder scan 786 ml. RN notified PCP. New orders placed.  2300: Straight cath amount 1000 ml of clear, yellow urine. No new orders. Patient complains of no pain and feels relief.

## 2024-09-16 ENCOUNTER — PATIENT OUTREACH (OUTPATIENT)
Dept: HEALTH INFORMATION MANAGEMENT | Facility: OTHER | Age: 69
End: 2024-09-16
Payer: MEDICARE

## 2024-09-16 ENCOUNTER — PATIENT OUTREACH (OUTPATIENT)
Dept: MEDICAL GROUP | Facility: PHYSICIAN GROUP | Age: 69
End: 2024-09-16
Payer: MEDICARE

## 2024-09-16 DIAGNOSIS — N18.2 BENIGN HYPERTENSIVE HEART AND KIDNEY DISEASE WITH DIASTOLIC CHF, NYHA CLASS II AND CKD STAGE 2 (HCC): ICD-10-CM

## 2024-09-16 DIAGNOSIS — I65.29 STENOSIS OF CAROTID ARTERY, UNSPECIFIED LATERALITY: ICD-10-CM

## 2024-09-16 DIAGNOSIS — Z98.890 HISTORY OF LEFT-SIDED CAROTID ENDARTERECTOMY: ICD-10-CM

## 2024-09-16 DIAGNOSIS — E08.00 DIABETES MELLITUS DUE TO UNDERLYING CONDITION WITH HYPEROSMOLARITY WITHOUT COMA, WITH LONG-TERM CURRENT USE OF INSULIN (HCC): ICD-10-CM

## 2024-09-16 DIAGNOSIS — E78.49 OTHER HYPERLIPIDEMIA: ICD-10-CM

## 2024-09-16 DIAGNOSIS — I10 PRIMARY HYPERTENSION: ICD-10-CM

## 2024-09-16 DIAGNOSIS — I13.0 BENIGN HYPERTENSIVE HEART AND KIDNEY DISEASE WITH DIASTOLIC CHF, NYHA CLASS II AND CKD STAGE 2 (HCC): ICD-10-CM

## 2024-09-16 DIAGNOSIS — I10 HYPERTENSION, UNSPECIFIED TYPE: ICD-10-CM

## 2024-09-16 DIAGNOSIS — I50.30 BENIGN HYPERTENSIVE HEART AND KIDNEY DISEASE WITH DIASTOLIC CHF, NYHA CLASS II AND CKD STAGE 2 (HCC): ICD-10-CM

## 2024-09-16 DIAGNOSIS — Z79.4 DIABETES MELLITUS DUE TO UNDERLYING CONDITION WITH HYPEROSMOLARITY WITHOUT COMA, WITH LONG-TERM CURRENT USE OF INSULIN (HCC): ICD-10-CM

## 2024-09-16 ASSESSMENT — PATIENT HEALTH QUESTIONNAIRE - PHQ9: CLINICAL INTERPRETATION OF PHQ2 SCORE: 0

## 2024-09-16 NOTE — PROGRESS NOTES
I spoke with the patient this morning for their post hospital follow up. She was provided with her medications meds to beds and was already in position of recommended orthopedic equipment. Patient voices understanding of her post hospitalization visit date and time at her Primary Care Office and the need to contact [facility] today in order to schedule a post surgical visit there. She has the contact information for [facility] and was apprised to call to ensure that her referral was received and proceed with scheduling for their services as well. Patient voices understanding of the need to ice, elevate, and keep pain well controlled. Patient does voice urinary frequency but no other symptoms. Chart forwarded to PCP for review.     Transitional Care Management  TCM Outreach Date and Time: Filed (9/16/2024  9:33 AM)    Discharge Questions  Actual Discharge Date: 09/15/24  Now that you are home, how are you feeling?: Good  Did you receive any new prescriptions?: Yes  Were you able to get them filled?: Yes  Meds to Bed or Pharmacy filled?: Meds to Bed  Do you have any questions about your current medications or new medications (Review Med Rec)?: No  Did you have any durable medical equipment ordered?: No (Patient already in posession of needed equipment)  Do you have a follow up appointment scheduled with your PCP?: Yes  Appointment Date: 09/19/24  Appointment Time: 0200  Any issues or paperwork you wish to discuss with your PCP?: No  Are you (patient) able to get to the appointment?: Yes  If Home Health was ordered, have they contacted you (Patient): No (Give phone number) (Still within 72 hours. Patient provided with contact information)  Did you have enough support after your last discharge?: Yes (Yes,  and sisters to provide assistance.)  Does this patient qualify for the CCM program?: Yes    Transitional Care  Number of attempts made to contact patient: 1  Current or previous attempts completed within  two business days of discharge? : Yes  Provided education regarding treatment plan, medications, self-management, ADLs?: Yes  Has patient completed an Advanced Directive?: No  Has the Care Manager's phone number provided?: Yes  Is there anything else I can help you with?: No    Discharge Summary  Chief Complaint: GLF, R lower leg pain  Admitting Diagnosis: Mildly displaced Tib/Fib fracture post ground level fall in need of surgical repair.  Discharge Diagnosis: Mildly displaced Tib/Fib fracture post ground level fall in need of surgical repair.

## 2024-09-16 NOTE — PROGRESS NOTES
Patient called in to state that [facility] told her that they hadn't received any orders for care. I re-faxed the patient's order and confirmed with [facility] that it was received and is with their intake specialists. The patient in addition to frequency also confirms strong odor and a tingling sensation. She was assisted in scheduling an appointment tomorrow for UTI testing.

## 2024-09-17 ENCOUNTER — OFFICE VISIT (OUTPATIENT)
Dept: MEDICAL GROUP | Facility: PHYSICIAN GROUP | Age: 69
End: 2024-09-17
Payer: MEDICARE

## 2024-09-17 ENCOUNTER — HOSPITAL ENCOUNTER (OUTPATIENT)
Facility: MEDICAL CENTER | Age: 69
End: 2024-09-17
Attending: FAMILY MEDICINE
Payer: MEDICARE

## 2024-09-17 VITALS
HEART RATE: 87 BPM | DIASTOLIC BLOOD PRESSURE: 54 MMHG | BODY MASS INDEX: 25.52 KG/M2 | TEMPERATURE: 98 F | WEIGHT: 162.6 LBS | OXYGEN SATURATION: 97 % | HEIGHT: 67 IN | SYSTOLIC BLOOD PRESSURE: 134 MMHG

## 2024-09-17 DIAGNOSIS — N30.00 ACUTE CYSTITIS WITHOUT HEMATURIA: ICD-10-CM

## 2024-09-17 DIAGNOSIS — R30.0 DYSURIA: ICD-10-CM

## 2024-09-17 LAB
APPEARANCE UR: CLEAR
BILIRUB UR STRIP-MCNC: NEGATIVE MG/DL
COLOR UR AUTO: YELLOW
GLUCOSE UR STRIP.AUTO-MCNC: NEGATIVE MG/DL
KETONES UR STRIP.AUTO-MCNC: NEGATIVE MG/DL
LEUKOCYTE ESTERASE UR QL STRIP.AUTO: ABNORMAL
NITRITE UR QL STRIP.AUTO: POSITIVE
PH UR STRIP.AUTO: 8.5 [PH] (ref 5–8)
PROT UR QL STRIP: 100 MG/DL
RBC UR QL AUTO: NEGATIVE
SP GR UR STRIP.AUTO: 1.02
UROBILINOGEN UR STRIP-MCNC: 2 MG/DL

## 2024-09-17 PROCEDURE — 3078F DIAST BP <80 MM HG: CPT | Performed by: FAMILY MEDICINE

## 2024-09-17 PROCEDURE — 3075F SYST BP GE 130 - 139MM HG: CPT | Performed by: FAMILY MEDICINE

## 2024-09-17 PROCEDURE — 87086 URINE CULTURE/COLONY COUNT: CPT

## 2024-09-17 PROCEDURE — 99213 OFFICE O/P EST LOW 20 MIN: CPT | Performed by: FAMILY MEDICINE

## 2024-09-17 PROCEDURE — 87186 SC STD MICRODIL/AGAR DIL: CPT

## 2024-09-17 PROCEDURE — 87077 CULTURE AEROBIC IDENTIFY: CPT

## 2024-09-17 PROCEDURE — 81002 URINALYSIS NONAUTO W/O SCOPE: CPT | Performed by: FAMILY MEDICINE

## 2024-09-17 RX ORDER — NITROFURANTOIN 25; 75 MG/1; MG/1
100 CAPSULE ORAL EVERY 12 HOURS
Qty: 10 CAPSULE | Refills: 0 | Status: SHIPPED | OUTPATIENT
Start: 2024-09-17 | End: 2024-09-22

## 2024-09-17 ASSESSMENT — ENCOUNTER SYMPTOMS
VOMITING: 0
COUGH: 0
ABDOMINAL PAIN: 0
SORE THROAT: 0
NAUSEA: 0
SHORTNESS OF BREATH: 0
FEVER: 0
PALPITATIONS: 0
CHILLS: 0

## 2024-09-17 ASSESSMENT — FIBROSIS 4 INDEX: FIB4 SCORE: 1.59

## 2024-09-17 NOTE — PROGRESS NOTES
Verbal consent was acquired by the patient to use [employer] ambient listening note generation during this visit    Subjective:     HPI:   History of Present Illness  The patient is a 69-year-old female presenting today for evaluation of a possible urinary tract infection (UTI).    She reports increased frequency and urgency of urination, but no pain or dysuria. There are no associated fever or chills. She experiences mild pain on the left lower side of her abdomen, a pain when urinating, and a foul smell during urination. These symptoms started on Sunday night. She has a history of UTIs and is allergic to penicillin and cephalosporins. She has not noticed blood in her urine or experienced nausea or vomiting. She usually experiences lower abdominal pain during UTIs. She also reports constipation, which she attributes to her pain medications. She takes chewable MiraLAX and Dulcolax for this and tries to drink plenty of water.    She was recently hospitalized, required surgical intervention and anesthesia, and was likely catheterized during her stay. A Dunlap catheter was inserted during her hospital stay, which may have increased her risk for UTIs. She denies having fever, chills, flank pain, or back pain. She has been managing type 1 diabetes for 40 years and is keen to protect her kidneys.    She experienced a fall resulting in a broken leg that required surgery. She was cleaning her house the day before Friday, bent down, and her knees gave out, causing her to land with her knees underneath her. She might have sprained her knees. When she tried to get up and take a walk after sleeping all night, her knee gave out, she heard a loud pop, and broke her tibia. Dr. Sol noted a small triangle in her ankle and a small break higher up. Her last bone scan indicated poor bone health. She is going to do home health with some physical therapy.    She typically follows with [facility] and has a past medical history of  "neurogenic claudication due to lumbar stenosis, complex regional pain syndrome, diabetes type 1, hypothyroidism, hyperlipidemia, hypertension, hyponatremia, left anterior fascicular block, cervical stenosis and myelopathy, and osteoporosis. She is currently taking Zetia, Crestor, Pepcid, Protonix, Praluent, Cymbalta, Lyrica, clindamycin, insulin, and Levoxyl.    She also had neck surgery twice. The first time, her blood pressure dropped significantly, requiring three rounds of epinephrine to stabilize. Dr. Bustos performed the surgery.    ALLERGIES  She is allergic to PENICILLIN and cephalosporin  Review of Systems   Constitutional:  Negative for chills and fever.   HENT:  Negative for congestion and sore throat.    Respiratory:  Negative for cough and shortness of breath.    Cardiovascular:  Negative for chest pain and palpitations.   Gastrointestinal:  Negative for abdominal pain, nausea and vomiting.   Genitourinary:  Positive for dysuria, frequency and urgency.       Health Maintenance: Completed    Objective:     Exam:  /54 (BP Location: Right arm, Patient Position: Sitting, BP Cuff Size: Adult)   Pulse 87   Temp 36.7 °C (98 °F) (Temporal)   Ht 1.702 m (5' 7\")   Wt 73.8 kg (162 lb 9.6 oz)   LMP 01/01/1983 (LMP Unknown)   SpO2 97%   BMI 25.47 kg/m²  Body mass index is 25.47 kg/m².    Physical Exam  Vitals reviewed.   Constitutional:       Appearance: Normal appearance.   HENT:      Head: Normocephalic and atraumatic.      Right Ear: External ear normal.      Left Ear: External ear normal.      Nose: Nose normal.   Cardiovascular:      Rate and Rhythm: Normal rate and regular rhythm.      Pulses: Normal pulses.      Heart sounds: Normal heart sounds. No murmur heard.  Pulmonary:      Effort: Pulmonary effort is normal. No respiratory distress.      Breath sounds: Normal breath sounds. No wheezing.   Abdominal:      General: Abdomen is flat.      Palpations: Abdomen is soft.   Skin:     General: Skin " is warm and dry.   Neurological:      Mental Status: She is alert and oriented to person, place, and time.   Psychiatric:         Mood and Affect: Mood normal.         Behavior: Behavior normal.             Results  Laboratory Studies  Urine test shows presence of nitrites and white blood cells, indicating a urinary tract infection.    Assessment & Plan:     1. Acute cystitis without hematuria  URINE CULTURE(NEW)    Urine Culture    POCT Urinalysis    nitrofurantoin (MACROBID) 100 MG Cap    CANCELED: POCT Urinalysis      2. Dysuria            Assessment & Plan  1. Urinary Tract Infection.  The presence of nitrites and white blood cells in her urine, along with the absence of blood, confirms a urinary tract infection. Symptoms include increased frequency, urgency, foul-smelling urine, and a tingly, painful feeling when urinating. She has a history of catheterization during a recent hospitalization, which increased her risk for UTIs. A urine culture will be sent for further analysis to identify the specific pathogen and its antibiotic sensitivities. A prescription for Macrobid (nitrofurantoin) will be sent to her pharmacy, to be taken twice daily for 5 days. If the culture results indicate resistance, the antibiotic may be changed accordingly.    2. Constipation.  She reports constipation, likely due to her pain medications. She is currently taking chewable MiraLAX and Dulcolax. She is advised to continue these medications and increase her water intake to help alleviate symptoms.    Follow-up  A follow-up appointment is scheduled for Thursday.      Return in about 2 weeks (around 10/1/2024) for f/up uti .    Please note that this dictation was created using voice recognition software. I have made every reasonable attempt to correct obvious errors, but I expect that there are errors of grammar and possibly content that I did not discover before finalizing the note.    Jane Helm MD  Family Medicine and Non -  Operative Sports Medicine   [facility]

## 2024-09-23 DIAGNOSIS — K31.84 GASTROPARESIS DUE TO DM (HCC): ICD-10-CM

## 2024-09-23 DIAGNOSIS — E11.43 GASTROPARESIS DUE TO DM (HCC): ICD-10-CM

## 2024-09-23 RX ORDER — FAMOTIDINE 20 MG/1
20 TABLET, FILM COATED ORAL 2 TIMES DAILY
Qty: 60 TABLET | Refills: 0 | Status: SHIPPED | OUTPATIENT
Start: 2024-09-23

## 2024-09-23 NOTE — TELEPHONE ENCOUNTER
Received request via: Pharmacy    Was the patient seen in the last year in this department? Yes    Does the patient have an active prescription (recently filled or refills available) for medication(s) requested? No    [facility]    Does the patient have [facility] and need 100-day supply? (This applies to ALL medications) Patient does not have SCP

## 2024-09-25 ENCOUNTER — TELEPHONE (OUTPATIENT)
Dept: MEDICAL GROUP | Facility: MEDICAL CENTER | Age: 69
End: 2024-09-25
Payer: MEDICARE

## 2024-09-25 DIAGNOSIS — N30.00 ACUTE CYSTITIS WITHOUT HEMATURIA: ICD-10-CM

## 2024-09-25 RX ORDER — NITROFURANTOIN 25; 75 MG/1; MG/1
100 CAPSULE ORAL 2 TIMES DAILY
Qty: 10 CAPSULE | Refills: 0 | Status: SHIPPED | OUTPATIENT
Start: 2024-09-25 | End: 2024-09-30

## 2024-09-26 NOTE — TELEPHONE ENCOUNTER
We contact this patient and try to get her in for an appointment today or tomorrow related to continued symptoms of urinary tract infection.

## 2024-09-26 NOTE — TELEPHONE ENCOUNTER
On-call page coverage    Patient called that her UTI was better for 1 and a half day after finishing last antibiotic course.  Her symptoms are back again.    Last urine culture showed ESBL but sensitive to Macrobid which was given.  Discussed that the most appropriate way to treat it is with an office evaluation and retest to make sure it is still an infection, getting another culture to check sensitivity.  She mentioned that she does not drive and may have a hard time getting to the office.    Recommended to try getting in tomorrow with any available provider.  In case she does not make it, she could try another course of Macrobid which has been sent to pharmacy.

## 2024-09-30 NOTE — TELEPHONE ENCOUNTER
Called to see if [facility] would like to come in today. Ines declined and stated that her symptoms are improving.

## 2024-10-02 ENCOUNTER — HOSPITAL ENCOUNTER (OUTPATIENT)
Dept: LAB | Facility: MEDICAL CENTER | Age: 69
End: 2024-10-02
Attending: FAMILY MEDICINE
Payer: MEDICARE

## 2024-10-02 ENCOUNTER — HOSPITAL ENCOUNTER (OUTPATIENT)
Dept: LAB | Facility: MEDICAL CENTER | Age: 69
End: 2024-10-02
Attending: STUDENT IN AN ORGANIZED HEALTH CARE EDUCATION/TRAINING PROGRAM
Payer: MEDICARE

## 2024-10-02 DIAGNOSIS — E87.1 CHRONIC HYPONATREMIA: ICD-10-CM

## 2024-10-02 DIAGNOSIS — I77.9 CAROTID ARTERY DISEASE, UNSPECIFIED LATERALITY, UNSPECIFIED TYPE (HCC): ICD-10-CM

## 2024-10-02 DIAGNOSIS — E78.5 DYSLIPIDEMIA: ICD-10-CM

## 2024-10-02 DIAGNOSIS — E78.41 ELEVATED LIPOPROTEIN(A): ICD-10-CM

## 2024-10-02 DIAGNOSIS — E78.49 OTHER HYPERLIPIDEMIA: ICD-10-CM

## 2024-10-02 LAB
BASOPHILS # BLD AUTO: 0.8 % (ref 0–1.8)
BASOPHILS # BLD: 0.05 K/UL (ref 0–0.12)
EOSINOPHIL # BLD AUTO: 0.08 K/UL (ref 0–0.51)
EOSINOPHIL NFR BLD: 1.2 % (ref 0–6.9)
ERYTHROCYTE [DISTWIDTH] IN BLOOD BY AUTOMATED COUNT: 49.4 FL (ref 35.9–50)
HCT VFR BLD AUTO: 37.3 % (ref 37–47)
HGB BLD-MCNC: 12.2 G/DL (ref 12–16)
IMM GRANULOCYTES # BLD AUTO: 0.01 K/UL (ref 0–0.11)
IMM GRANULOCYTES NFR BLD AUTO: 0.2 % (ref 0–0.9)
LYMPHOCYTES # BLD AUTO: 2.12 K/UL (ref 1–4.8)
LYMPHOCYTES NFR BLD: 31.8 % (ref 22–41)
MCH RBC QN AUTO: 30.8 PG (ref 27–33)
MCHC RBC AUTO-ENTMCNC: 32.7 G/DL (ref 32.2–35.5)
MCV RBC AUTO: 94.2 FL (ref 81.4–97.8)
MONOCYTES # BLD AUTO: 0.57 K/UL (ref 0–0.85)
MONOCYTES NFR BLD AUTO: 8.6 % (ref 0–13.4)
NEUTROPHILS # BLD AUTO: 3.83 K/UL (ref 1.82–7.42)
NEUTROPHILS NFR BLD: 57.4 % (ref 44–72)
NRBC # BLD AUTO: 0 K/UL
NRBC BLD-RTO: 0 /100 WBC (ref 0–0.2)
PLATELET # BLD AUTO: 525 K/UL (ref 164–446)
PMV BLD AUTO: 9.8 FL (ref 9–12.9)
RBC # BLD AUTO: 3.96 M/UL (ref 4.2–5.4)
WBC # BLD AUTO: 6.7 K/UL (ref 4.8–10.8)

## 2024-10-02 PROCEDURE — 83695 ASSAY OF LIPOPROTEIN(A): CPT

## 2024-10-02 PROCEDURE — 85025 COMPLETE CBC W/AUTO DIFF WBC: CPT

## 2024-10-02 PROCEDURE — 36415 COLL VENOUS BLD VENIPUNCTURE: CPT

## 2024-10-02 PROCEDURE — 80069 RENAL FUNCTION PANEL: CPT

## 2024-10-02 PROCEDURE — 86141 C-REACTIVE PROTEIN HS: CPT

## 2024-10-02 PROCEDURE — 84300 ASSAY OF URINE SODIUM: CPT

## 2024-10-02 PROCEDURE — 80053 COMPREHEN METABOLIC PANEL: CPT

## 2024-10-02 PROCEDURE — 83935 ASSAY OF URINE OSMOLALITY: CPT

## 2024-10-02 PROCEDURE — 80061 LIPID PANEL: CPT

## 2024-10-03 LAB
ALBUMIN SERPL BCP-MCNC: 4.3 G/DL (ref 3.2–4.9)
ALBUMIN SERPL BCP-MCNC: 4.4 G/DL (ref 3.2–4.9)
ALBUMIN/GLOB SERPL: 1.5 G/DL
ALP SERPL-CCNC: 153 U/L (ref 30–99)
ALT SERPL-CCNC: 58 U/L (ref 2–50)
ANION GAP SERPL CALC-SCNC: 11 MMOL/L (ref 7–16)
AST SERPL-CCNC: 45 U/L (ref 12–45)
BILIRUB SERPL-MCNC: 0.5 MG/DL (ref 0.1–1.5)
BUN SERPL-MCNC: 20 MG/DL (ref 8–22)
BUN SERPL-MCNC: 20 MG/DL (ref 8–22)
CALCIUM ALBUM COR SERPL-MCNC: 9.5 MG/DL (ref 8.5–10.5)
CALCIUM ALBUM COR SERPL-MCNC: 9.5 MG/DL (ref 8.5–10.5)
CALCIUM SERPL-MCNC: 9.7 MG/DL (ref 8.5–10.5)
CALCIUM SERPL-MCNC: 9.8 MG/DL (ref 8.5–10.5)
CHLORIDE SERPL-SCNC: 94 MMOL/L (ref 96–112)
CHLORIDE SERPL-SCNC: 94 MMOL/L (ref 96–112)
CHOLEST SERPL-MCNC: 120 MG/DL (ref 100–199)
CO2 SERPL-SCNC: 23 MMOL/L (ref 20–33)
CO2 SERPL-SCNC: 24 MMOL/L (ref 20–33)
CREAT SERPL-MCNC: 0.63 MG/DL (ref 0.5–1.4)
CREAT SERPL-MCNC: 0.67 MG/DL (ref 0.5–1.4)
CRP SERPL HS-MCNC: 0.5 MG/L (ref 0–3)
FASTING STATUS PATIENT QL REPORTED: NORMAL
GFR SERPLBLD CREATININE-BSD FMLA CKD-EPI: 95 ML/MIN/1.73 M 2
GFR SERPLBLD CREATININE-BSD FMLA CKD-EPI: 97 ML/MIN/1.73 M 2
GLOBULIN SER CALC-MCNC: 2.8 G/DL (ref 1.9–3.5)
GLUCOSE SERPL-MCNC: 130 MG/DL (ref 65–99)
GLUCOSE SERPL-MCNC: 130 MG/DL (ref 65–99)
HDLC SERPL-MCNC: 61 MG/DL
LDLC SERPL CALC-MCNC: 45 MG/DL
OSMOLALITY UR: 795 MOSM/KG H2O (ref 300–900)
PHOSPHATE SERPL-MCNC: 3.7 MG/DL (ref 2.5–4.5)
POTASSIUM SERPL-SCNC: 4.7 MMOL/L (ref 3.6–5.5)
POTASSIUM SERPL-SCNC: 5 MMOL/L (ref 3.6–5.5)
PROT SERPL-MCNC: 7.1 G/DL (ref 6–8.2)
SODIUM SERPL-SCNC: 129 MMOL/L (ref 135–145)
SODIUM SERPL-SCNC: 130 MMOL/L (ref 135–145)
SODIUM UR-SCNC: 147 MMOL/L
TRIGL SERPL-MCNC: 69 MG/DL (ref 0–149)

## 2024-10-04 ENCOUNTER — TELEPHONE (OUTPATIENT)
Dept: VASCULAR LAB | Facility: MEDICAL CENTER | Age: 69
End: 2024-10-04
Payer: MEDICARE

## 2024-10-05 LAB — LPA SERPL-MCNC: 297 MG/DL

## 2024-10-08 DIAGNOSIS — E11.43 GASTROPARESIS DUE TO DM (HCC): ICD-10-CM

## 2024-10-08 DIAGNOSIS — K31.84 GASTROPARESIS DUE TO DM (HCC): ICD-10-CM

## 2024-10-08 RX ORDER — FAMOTIDINE 20 MG/1
20 TABLET, FILM COATED ORAL 2 TIMES DAILY
Qty: 60 TABLET | Refills: 0 | Status: SHIPPED | OUTPATIENT
Start: 2024-10-08

## 2024-10-10 ENCOUNTER — PATIENT OUTREACH (OUTPATIENT)
Dept: HEALTH INFORMATION MANAGEMENT | Facility: OTHER | Age: 69
End: 2024-10-10
Payer: MEDICARE

## 2024-10-10 DIAGNOSIS — I13.0 BENIGN HYPERTENSIVE HEART AND KIDNEY DISEASE WITH DIASTOLIC CHF, NYHA CLASS II AND CKD STAGE 2 (HCC): ICD-10-CM

## 2024-10-10 DIAGNOSIS — Z79.4 DIABETES MELLITUS DUE TO UNDERLYING CONDITION WITH HYPEROSMOLARITY WITHOUT COMA, WITH LONG-TERM CURRENT USE OF INSULIN (HCC): ICD-10-CM

## 2024-10-10 DIAGNOSIS — Z98.890 HISTORY OF LEFT-SIDED CAROTID ENDARTERECTOMY: ICD-10-CM

## 2024-10-10 DIAGNOSIS — N18.2 BENIGN HYPERTENSIVE HEART AND KIDNEY DISEASE WITH DIASTOLIC CHF, NYHA CLASS II AND CKD STAGE 2 (HCC): ICD-10-CM

## 2024-10-10 DIAGNOSIS — E08.00 DIABETES MELLITUS DUE TO UNDERLYING CONDITION WITH HYPEROSMOLARITY WITHOUT COMA, WITH LONG-TERM CURRENT USE OF INSULIN (HCC): ICD-10-CM

## 2024-10-10 DIAGNOSIS — I50.30 BENIGN HYPERTENSIVE HEART AND KIDNEY DISEASE WITH DIASTOLIC CHF, NYHA CLASS II AND CKD STAGE 2 (HCC): ICD-10-CM

## 2024-10-10 DIAGNOSIS — I10 PRIMARY HYPERTENSION: ICD-10-CM

## 2024-10-10 DIAGNOSIS — E78.49 OTHER HYPERLIPIDEMIA: ICD-10-CM

## 2024-10-10 PROCEDURE — RXMED WILLOW AMBULATORY MEDICATION CHARGE: Performed by: NURSE PRACTITIONER

## 2024-10-15 ENCOUNTER — TELEPHONE (OUTPATIENT)
Dept: HEALTH INFORMATION MANAGEMENT | Facility: OTHER | Age: 69
End: 2024-10-15
Payer: MEDICARE

## 2024-10-15 ENCOUNTER — PHARMACY VISIT (OUTPATIENT)
Dept: PHARMACY | Facility: MEDICAL CENTER | Age: 69
End: 2024-10-15
Payer: COMMERCIAL

## 2024-10-24 ENCOUNTER — HOSPITAL ENCOUNTER (OUTPATIENT)
Facility: MEDICAL CENTER | Age: 69
End: 2024-10-24
Attending: NURSE PRACTITIONER
Payer: MEDICARE

## 2024-10-24 ENCOUNTER — DOCUMENTATION (OUTPATIENT)
Dept: HEALTH INFORMATION MANAGEMENT | Facility: OTHER | Age: 69
End: 2024-10-24

## 2024-10-24 ENCOUNTER — HOSPITAL ENCOUNTER (OUTPATIENT)
Dept: LAB | Facility: MEDICAL CENTER | Age: 69
End: 2024-10-24
Attending: NURSE PRACTITIONER
Payer: MEDICARE

## 2024-10-24 ENCOUNTER — OFFICE VISIT (OUTPATIENT)
Dept: MEDICAL GROUP | Facility: PHYSICIAN GROUP | Age: 69
End: 2024-10-24
Payer: MEDICARE

## 2024-10-24 VITALS
WEIGHT: 167.4 LBS | HEART RATE: 79 BPM | TEMPERATURE: 97.9 F | SYSTOLIC BLOOD PRESSURE: 132 MMHG | RESPIRATION RATE: 16 BRPM | HEIGHT: 67 IN | OXYGEN SATURATION: 99 % | BODY MASS INDEX: 26.27 KG/M2 | DIASTOLIC BLOOD PRESSURE: 68 MMHG

## 2024-10-24 DIAGNOSIS — N30.00 ACUTE CYSTITIS WITHOUT HEMATURIA: ICD-10-CM

## 2024-10-24 DIAGNOSIS — M48.062 LUMBAR STENOSIS WITH NEUROGENIC CLAUDICATION: ICD-10-CM

## 2024-10-24 DIAGNOSIS — I10 PRIMARY HYPERTENSION: ICD-10-CM

## 2024-10-24 DIAGNOSIS — R30.0 DYSURIA: ICD-10-CM

## 2024-10-24 DIAGNOSIS — E10.69 TYPE 1 DIABETES MELLITUS WITH OTHER SPECIFIED COMPLICATION (HCC): ICD-10-CM

## 2024-10-24 DIAGNOSIS — Z23 NEED FOR VACCINATION: ICD-10-CM

## 2024-10-24 DIAGNOSIS — E03.9 ACQUIRED HYPOTHYROIDISM: ICD-10-CM

## 2024-10-24 DIAGNOSIS — D75.839 THROMBOCYTOSIS: ICD-10-CM

## 2024-10-24 DIAGNOSIS — E87.1 HYPONATREMIA: ICD-10-CM

## 2024-10-24 PROBLEM — D62 ACUTE BLOOD LOSS ANEMIA (ABLA): Status: RESOLVED | Noted: 2024-09-15 | Resolved: 2024-10-24

## 2024-10-24 LAB
APPEARANCE UR: CLEAR
BASOPHILS # BLD AUTO: 0.3 % (ref 0–1.8)
BASOPHILS # BLD: 0.02 K/UL (ref 0–0.12)
BILIRUB UR STRIP-MCNC: NORMAL MG/DL
COLOR UR AUTO: YELLOW
EOSINOPHIL # BLD AUTO: 0.12 K/UL (ref 0–0.51)
EOSINOPHIL NFR BLD: 2.1 % (ref 0–6.9)
ERYTHROCYTE [DISTWIDTH] IN BLOOD BY AUTOMATED COUNT: 49.8 FL (ref 35.9–50)
GLUCOSE UR STRIP.AUTO-MCNC: NORMAL MG/DL
HCT VFR BLD AUTO: 37.4 % (ref 37–47)
HGB BLD-MCNC: 12.8 G/DL (ref 12–16)
IMM GRANULOCYTES # BLD AUTO: 0.02 K/UL (ref 0–0.11)
IMM GRANULOCYTES NFR BLD AUTO: 0.3 % (ref 0–0.9)
KETONES UR STRIP.AUTO-MCNC: 1.02 MG/DL
LEUKOCYTE ESTERASE UR QL STRIP.AUTO: NORMAL
LYMPHOCYTES # BLD AUTO: 1.94 K/UL (ref 1–4.8)
LYMPHOCYTES NFR BLD: 33.7 % (ref 22–41)
MCH RBC QN AUTO: 32.6 PG (ref 27–33)
MCHC RBC AUTO-ENTMCNC: 34.2 G/DL (ref 32.2–35.5)
MCV RBC AUTO: 95.2 FL (ref 81.4–97.8)
MONOCYTES # BLD AUTO: 0.42 K/UL (ref 0–0.85)
MONOCYTES NFR BLD AUTO: 7.3 % (ref 0–13.4)
NEUTROPHILS # BLD AUTO: 3.23 K/UL (ref 1.82–7.42)
NEUTROPHILS NFR BLD: 56.3 % (ref 44–72)
NITRITE UR QL STRIP.AUTO: NORMAL
NRBC # BLD AUTO: 0 K/UL
NRBC BLD-RTO: 0 /100 WBC (ref 0–0.2)
PH UR STRIP.AUTO: 8 [PH] (ref 5–8)
PLATELET # BLD AUTO: 279 K/UL (ref 164–446)
PMV BLD AUTO: 11 FL (ref 9–12.9)
PROT UR QL STRIP: NORMAL MG/DL
RBC # BLD AUTO: 3.93 M/UL (ref 4.2–5.4)
RBC UR QL AUTO: NORMAL
SP GR UR STRIP.AUTO: NORMAL
UROBILINOGEN UR STRIP-MCNC: 0.2 MG/DL
WBC # BLD AUTO: 5.8 K/UL (ref 4.8–10.8)

## 2024-10-24 PROCEDURE — 85025 COMPLETE CBC W/AUTO DIFF WBC: CPT

## 2024-10-24 PROCEDURE — 99214 OFFICE O/P EST MOD 30 MIN: CPT | Mod: 25 | Performed by: NURSE PRACTITIONER

## 2024-10-24 PROCEDURE — 3075F SYST BP GE 130 - 139MM HG: CPT | Performed by: NURSE PRACTITIONER

## 2024-10-24 PROCEDURE — 87086 URINE CULTURE/COLONY COUNT: CPT

## 2024-10-24 PROCEDURE — 90662 IIV NO PRSV INCREASED AG IM: CPT | Performed by: NURSE PRACTITIONER

## 2024-10-24 PROCEDURE — G0008 ADMIN INFLUENZA VIRUS VAC: HCPCS | Performed by: NURSE PRACTITIONER

## 2024-10-24 PROCEDURE — 81002 URINALYSIS NONAUTO W/O SCOPE: CPT | Performed by: NURSE PRACTITIONER

## 2024-10-24 PROCEDURE — 3078F DIAST BP <80 MM HG: CPT | Performed by: NURSE PRACTITIONER

## 2024-10-24 PROCEDURE — 36415 COLL VENOUS BLD VENIPUNCTURE: CPT

## 2024-10-24 ASSESSMENT — FIBROSIS 4 INDEX: FIB4 SCORE: 0.75

## 2024-10-26 LAB
BACTERIA UR CULT: NORMAL
SIGNIFICANT IND 70042: NORMAL
SITE SITE: NORMAL
SOURCE SOURCE: NORMAL

## 2024-10-31 ENCOUNTER — HOSPITAL ENCOUNTER (OUTPATIENT)
Dept: LAB | Facility: MEDICAL CENTER | Age: 69
End: 2024-10-31
Attending: STUDENT IN AN ORGANIZED HEALTH CARE EDUCATION/TRAINING PROGRAM
Payer: MEDICARE

## 2024-10-31 LAB
ALBUMIN SERPL BCP-MCNC: 4.7 G/DL (ref 3.2–4.9)
BASOPHILS # BLD AUTO: 0.6 % (ref 0–1.8)
BASOPHILS # BLD: 0.03 K/UL (ref 0–0.12)
BUN SERPL-MCNC: 14 MG/DL (ref 8–22)
CALCIUM ALBUM COR SERPL-MCNC: 9.2 MG/DL (ref 8.5–10.5)
CALCIUM SERPL-MCNC: 9.8 MG/DL (ref 8.5–10.5)
CHLORIDE SERPL-SCNC: 96 MMOL/L (ref 96–112)
CO2 SERPL-SCNC: 24 MMOL/L (ref 20–33)
CREAT SERPL-MCNC: 0.69 MG/DL (ref 0.5–1.4)
EOSINOPHIL # BLD AUTO: 0.12 K/UL (ref 0–0.51)
EOSINOPHIL NFR BLD: 2.3 % (ref 0–6.9)
ERYTHROCYTE [DISTWIDTH] IN BLOOD BY AUTOMATED COUNT: 48.8 FL (ref 35.9–50)
GFR SERPLBLD CREATININE-BSD FMLA CKD-EPI: 95 ML/MIN/1.73 M 2
GLUCOSE SERPL-MCNC: 80 MG/DL (ref 65–99)
HCT VFR BLD AUTO: 42.9 % (ref 37–47)
HGB BLD-MCNC: 13.7 G/DL (ref 12–16)
IMM GRANULOCYTES # BLD AUTO: 0.01 K/UL (ref 0–0.11)
IMM GRANULOCYTES NFR BLD AUTO: 0.2 % (ref 0–0.9)
LYMPHOCYTES # BLD AUTO: 2.12 K/UL (ref 1–4.8)
LYMPHOCYTES NFR BLD: 40.9 % (ref 22–41)
MCH RBC QN AUTO: 30.7 PG (ref 27–33)
MCHC RBC AUTO-ENTMCNC: 31.9 G/DL (ref 32.2–35.5)
MCV RBC AUTO: 96.2 FL (ref 81.4–97.8)
MONOCYTES # BLD AUTO: 0.49 K/UL (ref 0–0.85)
MONOCYTES NFR BLD AUTO: 9.5 % (ref 0–13.4)
NEUTROPHILS # BLD AUTO: 2.41 K/UL (ref 1.82–7.42)
NEUTROPHILS NFR BLD: 46.5 % (ref 44–72)
NRBC # BLD AUTO: 0 K/UL
NRBC BLD-RTO: 0 /100 WBC (ref 0–0.2)
OSMOLALITY SERPL: 285 MOSM/KG H2O (ref 278–298)
PHOSPHATE SERPL-MCNC: 4 MG/DL (ref 2.5–4.5)
PLATELET # BLD AUTO: 357 K/UL (ref 164–446)
PMV BLD AUTO: 10.9 FL (ref 9–12.9)
POTASSIUM SERPL-SCNC: 5.1 MMOL/L (ref 3.6–5.5)
RBC # BLD AUTO: 4.46 M/UL (ref 4.2–5.4)
SODIUM SERPL-SCNC: 133 MMOL/L (ref 135–145)
WBC # BLD AUTO: 5.2 K/UL (ref 4.8–10.8)

## 2024-10-31 PROCEDURE — 83935 ASSAY OF URINE OSMOLALITY: CPT

## 2024-10-31 PROCEDURE — 36415 COLL VENOUS BLD VENIPUNCTURE: CPT

## 2024-10-31 PROCEDURE — 84300 ASSAY OF URINE SODIUM: CPT

## 2024-10-31 PROCEDURE — 80069 RENAL FUNCTION PANEL: CPT

## 2024-10-31 PROCEDURE — 83930 ASSAY OF BLOOD OSMOLALITY: CPT

## 2024-10-31 PROCEDURE — 85025 COMPLETE CBC W/AUTO DIFF WBC: CPT

## 2024-11-01 ENCOUNTER — HOSPITAL ENCOUNTER (OUTPATIENT)
Dept: RADIOLOGY | Facility: MEDICAL CENTER | Age: 69
End: 2024-11-01
Attending: FAMILY MEDICINE
Payer: MEDICARE

## 2024-11-01 DIAGNOSIS — I77.9 CAROTID ARTERY DISEASE, UNSPECIFIED LATERALITY, UNSPECIFIED TYPE (HCC): Chronic | ICD-10-CM

## 2024-11-01 DIAGNOSIS — Z98.890 HISTORY OF LEFT-SIDED CAROTID ENDARTERECTOMY: ICD-10-CM

## 2024-11-01 LAB
OSMOLALITY UR: 525 MOSM/KG H2O (ref 300–900)
SODIUM UR-SCNC: 105 MMOL/L

## 2024-11-01 PROCEDURE — 93880 EXTRACRANIAL BILAT STUDY: CPT

## 2024-11-04 ENCOUNTER — DOCUMENTATION (OUTPATIENT)
Dept: VASCULAR LAB | Facility: MEDICAL CENTER | Age: 69
End: 2024-11-04
Payer: MEDICARE

## 2024-11-04 NOTE — PROGRESS NOTES
Carotid duplex reviewed.  S/p L CEA 2020.  Stable, no changes.   Pending f/u, anticipate repeat duplex q2yr (next 11/2026) - RN to track

## 2024-11-05 ENCOUNTER — PATIENT MESSAGE (OUTPATIENT)
Dept: HEALTH INFORMATION MANAGEMENT | Facility: OTHER | Age: 69
End: 2024-11-05

## 2024-11-05 ENCOUNTER — PATIENT OUTREACH (OUTPATIENT)
Dept: HEALTH INFORMATION MANAGEMENT | Facility: OTHER | Age: 69
End: 2024-11-05
Payer: MEDICARE

## 2024-11-05 DIAGNOSIS — I10 PRIMARY HYPERTENSION: ICD-10-CM

## 2024-11-05 DIAGNOSIS — I50.30 BENIGN HYPERTENSIVE HEART AND KIDNEY DISEASE WITH DIASTOLIC CHF, NYHA CLASS II AND CKD STAGE 2 (HCC): ICD-10-CM

## 2024-11-05 DIAGNOSIS — Z79.4 DIABETES MELLITUS DUE TO UNDERLYING CONDITION WITH HYPEROSMOLARITY WITHOUT COMA, WITH LONG-TERM CURRENT USE OF INSULIN (HCC): ICD-10-CM

## 2024-11-05 DIAGNOSIS — E78.49 OTHER HYPERLIPIDEMIA: ICD-10-CM

## 2024-11-05 DIAGNOSIS — Z98.890 HISTORY OF LEFT-SIDED CAROTID ENDARTERECTOMY: ICD-10-CM

## 2024-11-05 DIAGNOSIS — E08.00 DIABETES MELLITUS DUE TO UNDERLYING CONDITION WITH HYPEROSMOLARITY WITHOUT COMA, WITH LONG-TERM CURRENT USE OF INSULIN (HCC): ICD-10-CM

## 2024-11-05 DIAGNOSIS — N18.2 BENIGN HYPERTENSIVE HEART AND KIDNEY DISEASE WITH DIASTOLIC CHF, NYHA CLASS II AND CKD STAGE 2 (HCC): ICD-10-CM

## 2024-11-05 DIAGNOSIS — I13.0 BENIGN HYPERTENSIVE HEART AND KIDNEY DISEASE WITH DIASTOLIC CHF, NYHA CLASS II AND CKD STAGE 2 (HCC): ICD-10-CM

## 2024-11-05 NOTE — CARE PLAN
Active Plans       Staying on track and self advocacy       Outpatient problem   No active goals                     Care Plans       Chronic Care Management (CMS)    Met: 0 of 9 Met: 0 of 9      No Outcome (9)       Patient educated about diabetes (Knowledge of diabetes: Long Term)  Disciplines:  Nurse, Interdisciplinary  Expected end:  11/05/25     In conjunction with patient, identify which health habits can be modified (Recognizing current health habits that may contribute to diabetes: Long Term)  Disciplines:  Nurse, Interdisciplinary  Expected end:  11/05/25     Ensure patient has had proper education on self glucose testing (Knowledge of self-monitoring blood sugars : Long Term)  Disciplines:  Nurse, Interdisciplinary  Expected end:  11/05/25     Demonstrate Knowledge of Hypertension (Knowledge of hypertension : Long Term)  Disciplines:  Interdisciplinary  Expected end:  11/05/25     Demonstrate factors that can contribute to high blood pressure (Knowledge of factors contributing to hypertension: Long Term)  Disciplines:  Interdisciplinary  Expected end:  11/05/25     Identify which modifiable health habits can be modifed (Recognize current health habits that may contribute to hypertension: Long Term)  Disciplines:  Interdisciplinary  Expected end:  11/05/25     Identify barriers to managing blood pressure (Patient barriers to managing high blood pressure : Long Term)  Disciplines:  Interdisciplinary  Expected end:  11/05/25     Demonstrate the elements of self-monitoring blood pressure (Knowledge of self-monitoring blood pressure : Long Term)  Disciplines:  Interdisciplinary  Expected end:  11/05/25     Patient Stated Goal: Over the next year the patient will gain assistance from nursing in staying on track and accessing resources for being proactive in her care.  (Patient Stated Problem: Need for assistance in staying on track and being proactive in her care. )  Disciplines:  Nurse  Interdisciplinary  Expected end:  11/05/25   Patient-stated:  Yes

## 2024-11-05 NOTE — PROGRESS NOTES
Assessment    I spoke to the patient today when she called in to ask about recent lab resutls and for recommendations with regard to her sodium intake and output. The role of sodium and other electrolytes in the body discussed. Role of medication and considerations with regard to sodium levels reviewed. The patient still reports significant incontinence PCP notified. She recommends consulting the surgeon. She is happy to place a referral to urology for further evaluation. The patient reports blood pressures in the 116 range systolic at home and blood sugars between 80 and 150. The patient denies any cardiac symptoms at this time. The patient denies any falls or injuries. The patient  Will call back this afternoon for further information on testing ordered by Dr. Card.     Education and Self-Management of Care    *Article on taking blood pressures at home sent by mail  *Patient will continue to follow up with PCP/specialists  See Care Plan Note    Plan of Care and Goals    Patient will remain free from falls/injuries  Healthy nutrition, hydration, and activity    Barriers:    Management of chronic and acute health concerns    Progress:    Stable    Next outreach: One Month

## 2024-11-19 ENCOUNTER — OFFICE VISIT (OUTPATIENT)
Dept: VASCULAR LAB | Facility: MEDICAL CENTER | Age: 69
End: 2024-11-19
Attending: FAMILY MEDICINE
Payer: MEDICARE

## 2024-11-19 VITALS
WEIGHT: 164 LBS | HEART RATE: 76 BPM | BODY MASS INDEX: 25.74 KG/M2 | DIASTOLIC BLOOD PRESSURE: 82 MMHG | HEIGHT: 67 IN | SYSTOLIC BLOOD PRESSURE: 167 MMHG

## 2024-11-19 DIAGNOSIS — R93.1 AGATSTON CORONARY ARTERY CALCIUM SCORE BETWEEN 100 AND 199: ICD-10-CM

## 2024-11-19 DIAGNOSIS — I25.10 CORONARY ARTERY CALCIFICATION SEEN ON CT SCAN: ICD-10-CM

## 2024-11-19 DIAGNOSIS — E87.1 CHRONIC HYPONATREMIA: ICD-10-CM

## 2024-11-19 DIAGNOSIS — I67.9 SMALL VESSEL DISEASE, CEREBROVASCULAR: ICD-10-CM

## 2024-11-19 DIAGNOSIS — Z79.02 LONG TERM (CURRENT) USE OF ANTITHROMBOTICS/ANTIPLATELETS: ICD-10-CM

## 2024-11-19 DIAGNOSIS — Z98.890 HISTORY OF LEFT-SIDED CAROTID ENDARTERECTOMY: ICD-10-CM

## 2024-11-19 DIAGNOSIS — I77.1 SUBCLAVIAN ARTERY STENOSIS, RIGHT (HCC): Chronic | ICD-10-CM

## 2024-11-19 DIAGNOSIS — I10 PRIMARY HYPERTENSION: ICD-10-CM

## 2024-11-19 DIAGNOSIS — I77.9 CAROTID ARTERY DISEASE, UNSPECIFIED LATERALITY, UNSPECIFIED TYPE (HCC): ICD-10-CM

## 2024-11-19 DIAGNOSIS — E78.41 ELEVATED LIPOPROTEIN(A): ICD-10-CM

## 2024-11-19 DIAGNOSIS — E10.69 TYPE 1 DIABETES MELLITUS WITH OTHER SPECIFIED COMPLICATION (HCC): ICD-10-CM

## 2024-11-19 DIAGNOSIS — E78.5 DYSLIPIDEMIA: ICD-10-CM

## 2024-11-19 DIAGNOSIS — Z82.3 FAMILY HISTORY OF STROKE: ICD-10-CM

## 2024-11-19 PROCEDURE — 3078F DIAST BP <80 MM HG: CPT | Performed by: FAMILY MEDICINE

## 2024-11-19 PROCEDURE — 99214 OFFICE O/P EST MOD 30 MIN: CPT | Performed by: FAMILY MEDICINE

## 2024-11-19 PROCEDURE — 3077F SYST BP >= 140 MM HG: CPT | Performed by: FAMILY MEDICINE

## 2024-11-19 PROCEDURE — 99212 OFFICE O/P EST SF 10 MIN: CPT

## 2024-11-19 PROCEDURE — G2211 COMPLEX E/M VISIT ADD ON: HCPCS | Performed by: FAMILY MEDICINE

## 2024-11-19 RX ORDER — TELMISARTAN 40 MG/1
40 TABLET ORAL DAILY
Qty: 100 TABLET | Refills: 3 | Status: SHIPPED | OUTPATIENT
Start: 2024-11-19

## 2024-11-19 ASSESSMENT — ENCOUNTER SYMPTOMS
DIZZINESS: 0
HEMOPTYSIS: 0
FEVER: 0
CHILLS: 0
SPUTUM PRODUCTION: 0
HEADACHES: 0
TINGLING: 0
ORTHOPNEA: 0
PND: 0
TREMORS: 0
COUGH: 0
SHORTNESS OF BREATH: 0
FOCAL WEAKNESS: 1
PALPITATIONS: 0
WHEEZING: 0
CLAUDICATION: 0

## 2024-11-19 ASSESSMENT — FIBROSIS 4 INDEX: FIB4 SCORE: 1.11

## 2024-11-19 NOTE — PROGRESS NOTES
[facility] - FOLLOW-UP  24     Ines Long referred for eval/mgmt of dyslipidemia, est 2023  Referral Source: Dr. Card (cardiology)     Subjective   Interval hx/concerns: last seen 2024, had labs and carotid duplex normal with new findings of R subclav stenosis w/o steal dynamics.  Denies RUE claudication or weakness. Overall feeling well  Reports improved BP to 130s.  Tolerating meds.  No new sx.      LIFESTYLE MGMT  Change in weight: mild increase   Exercise habits:  limited   Dietary patterns: low fat  Etoh: No  Reported barriers to care: limited walking due to imbalance and ongoing eval with neurology     MED MGMT  Current Prescription:   Statin: rosuva 40mg  Non-Statin: zetia 10mg, praluent 150mg   Current Supplements: omega 3 FAs   Any Current Side Effects? No  Current Adherence: Complete    PERTINENT HLD PMHX  Age at Initial Diagnosis of Dyslipidemia: early 60s  History of ASCVD: yes    # carotid stenosis, s/p L CEA  (Dr. Garcia) - No new sx, no prior CVA/TIA.     # Cerebral small vessel dz - no prior sx, noted per MR, no prior CVA/TIA.      # elevated CACS = 180.  No hx of ascvd, no sx.     Secondary causes of dyslipidemia: none identified  Any Previous History of Statin Intolerance? No  Baseline Lipids (no off-tx lipid panel available in our system):   Latest Reference Range & Units 02/23/15 07:48   Cholesterol,Tot 100 - 199 mg/dL 245 (H)   Triglycerides 0 - 149 mg/dL 78   HDL >=40 mg/dL 99   LDL <100 mg/dL 130 (H)     Anticoagulation/antiplats: Yes, Details: ASA 81mg , no bleeding noted   HTN: Home BP los/70s, good adherence/tolerance  T1D: Stable, on insuling pump, seeing endocrine    Current Outpatient Medications on File Prior to Visit   Medication Sig Dispense Refill    famotidine (PEPCID) 20 MG Tab Take 1 Tablet by mouth 2 times a day. 60 Tablet 0    docusate sodium (COLACE) 100 MG Cap Take 1 Capsule by mouth 2 times a day. To prevent constipation while  taking narcotic pain medications. 60 Capsule 1    acetaminophen (TYLENOL) 500 MG Tab Take 2 Tablets by mouth every 6 hours as needed for Mild Pain or Moderate Pain.      ezetimibe (ZETIA) 10 MG Tab Take 1 Tablet by mouth every day. To lower cholesterol 100 Tablet 3    rosuvastatin (CRESTOR) 40 MG tablet Take 1 Tablet by mouth every day. To lower cholesterol 100 Tablet 3    sodium chloride (SALT) 1 GM Tab TAKE TWO TABLETS BY MOUTH EVERY DAY (Patient taking differently: Take 3 g by mouth every day. 2g in AM, and 1g in PM) 90 Tablet 1    pantoprazole (PROTONIX) 40 MG Tablet Delayed Response Take 1 Tablet by mouth every day. 90 Tablet 3    Alirocumab (PRALUENT) 150 MG/ML Solution Auto-injector Inject 150 mg under the skin every 14 days. 6 mL 3    DULoxetine HCl 60 MG Capsule Delayed Release Sprinkle Take 60 mg by mouth every day.      pregabalin (LYRICA) 100 MG Cap Take 200 mg by mouth every evening.      clindamycin (CLEOCIN) 150 MG Cap Take 3 capsules PO prior to dental procedure 6 Capsule 0    Insulin Aspart, w/Niacinamide, (FIASP) 100 UNIT/ML Solution Inject 2.2 Units as directed one time.      insulin infusion pump Device Inject  under the skin continuous. Patient's own SQ insulin pump    Type of Insulin: Fiasp  Last change of tubing: 3/19/2023 - Change tubing and site every 72 hours    Dosing:  Basal rate:   0000 - 0329 = 0.5 units/hr   0330 - 1129 = 0.85 units/hr   1130 - 1359 = 0.5 units/hr              1400 - 1759 = 0.45 units/hr              1800 - 2359 = 0.5 units/hr    Bolus ratio:   1 unit : 12 g carbohydrate at  (breakfast, lunch, dinner, snacks)      Correction ratio:    1 units for every 50 over 150 mg/dL    Disconnect pump if patient becomes hypoglycemic and altered.      LEVOXYL 100 MCG Tab Take  mcg by mouth every morning on an empty stomach. Takes 1/2 tab on Sundays      Cholecalciferol (VITAMIN D-3 PO) Take 5,000 mg by mouth every evening.      polyethylene glycol/lytes (MIRALAX) Pack Take 1  "Packet by mouth every day. To prevent constipation while taking narcotic pain medications. 30 Each 1     No current facility-administered medications on file prior to visit.      Allergies   Allergen Reactions    Ceclor [Cefaclor] Hives and Swelling    Augmentin Hives, Diarrhea and Vomiting     Fever blisters   Sickness lasts forever    Naproxen      Face Swells   24: spoke to pt, states she can take ibuprofen with no issues.    Tape Rash     \"Certain band aids\"  PAPER TAPE OKAY/ Tegaderm ok    Amlodipine Swelling     5mg = swelling     Amoxicillin-Pot Clavulanate      Other reaction(s): Not available    Cefaclor Hives and Swelling    Clindamycin Unspecified    Penicillin G Benzathine Unspecified      Social History     Tobacco Use    Smoking status: Former     Current packs/day: 0.00     Types: Cigarettes     Quit date: 1975     Years since quittin.9    Smokeless tobacco: Never   Vaping Use    Vaping status: Never Used   Substance Use Topics    Alcohol use: Yes     Alcohol/week: 1.2 oz     Types: 2 Glasses of wine per week     Comment: about 3-4 drinks per week.     Drug use: Not Currently     Comment: tried CBD gummies; no THC     Review of Systems   Constitutional:  Negative for chills, fever and malaise/fatigue.   Respiratory:  Negative for cough, hemoptysis, sputum production, shortness of breath and wheezing.    Cardiovascular:  Negative for chest pain, palpitations, orthopnea, claudication, leg swelling and PND.   Neurological:  Positive for focal weakness (at baseline, gait slow but stable). Negative for dizziness, tingling, tremors and headaches.       Objective    Vitals:    24 1135 24 1138   BP: (!) 148/79 (!) 167/82   BP Location: Left arm Left arm   Patient Position: Sitting Sitting   BP Cuff Size: Adult Adult   Pulse: 80 76   Weight: 74.4 kg (164 lb)    Height: 1.702 m (5' 7\")      BP Readings from Last 5 Encounters:   24 (!) 167/82   10/24/24 132/68   24 134/54 "   09/15/24 136/53   09/12/24 (!) 166/80     BMI Readings from Last 1 Encounters:   11/19/24 25.69 kg/m²      Wt Readings from Last 3 Encounters:   11/19/24 74.4 kg (164 lb)   10/24/24 75.9 kg (167 lb 6.4 oz)   10/02/24 68 kg (150 lb)      Physical Exam  Constitutional:       General: She is not in acute distress.     Appearance: Normal appearance. She is well-developed. She is not diaphoretic.   HENT:      Head: Normocephalic and atraumatic.   Eyes:      Extraocular Movements: Extraocular movements intact.      Conjunctiva/sclera: Conjunctivae normal.   Cardiovascular:      Rate and Rhythm: Normal rate and regular rhythm.      Pulses: Normal pulses.      Heart sounds: Normal heart sounds. No murmur heard.     No friction rub. No gallop.   Pulmonary:      Effort: Pulmonary effort is normal. No respiratory distress.      Breath sounds: Normal breath sounds.   Musculoskeletal:         General: No tenderness. Normal range of motion.      Cervical back: Normal range of motion and neck supple.   Skin:     General: Skin is warm and dry.      Coloration: Skin is not pale.      Findings: No rash.   Neurological:      Mental Status: She is alert and oriented to person, place, and time.      Cranial Nerves: No cranial nerve deficit.   Psychiatric:         Mood and Affect: Mood and affect normal.         Speech: Speech normal.       DATA REVIEW:  Most Recent Lipid Panel:   Lab Results   Component Value Date    CHOLSTRLTOT 120 10/02/2024    TRIGLYCERIDE 69 10/02/2024    HDL 61 10/02/2024    LDL 45 10/02/2024     Lab Results   Component Value Date/Time    LDL 45 10/02/2024 10:45 AM    LDL 71 06/26/2024 10:48 AM    LDL 29 01/03/2024 10:37 AM    LDL 31 08/01/2023 09:36 AM    LDL 70 03/08/2023 09:10 AM       Lab Results   Component Value Date/Time    LIPOPROTA 297 (H) 10/02/2024 10:45 AM       Latest Reference Range & Units 03/08/23 09:10 08/01/23 09:36 10/02/24 10:45   Lipoprotein (a) <=29 mg/dL 286 (H) 149 (H) 297 (H)     No  "results found for: \"APOB\"   Lab Results   Component Value Date/Time    CRPHIGHSEN 0.5 10/02/2024 10:45 AM       Other Pertinent Blood Work:   Lab Results   Component Value Date    SODIUM 133 (L) 10/31/2024    POTASSIUM 5.1 10/31/2024    CHLORIDE 96 10/31/2024    CO2 24 10/31/2024    ANION 11.0 10/02/2024    GLUCOSE 80 10/31/2024    BUN 14 10/31/2024    CREATININE 0.69 10/31/2024    CALCIUM 9.8 10/31/2024    ASTSGOT 45 10/02/2024    ALTSGPT 58 (H) 10/02/2024    ALKPHOSPHAT 153 (H) 10/02/2024    TBILIRUBIN 0.5 10/02/2024    ALBUMIN 4.7 10/31/2024    ALBUMIN 3.87 03/13/2024    AGRATIO 1.5 10/02/2024    CRPHIGHSEN 0.5 10/02/2024    LIPOPROTA 297 (H) 10/02/2024    TSHULTRASEN 1.000 01/03/2024     VASCULAR IMAGING    MR brain 2021       CACS 11/2022   Coronary calcification:  LMA - 0.0  LCX - 0.0  LAD - 180.1  RCA - 0.0  PDA - 0.0   Total Calcium Score: 180.1   Percentile: Calcium score is above the 75th percentile for the patient's age and sex.   Other findings:  Heart: Normal size.  Lungs: Clear.  Mediastinum: Normal.  Upper abdomen: Normal.    Carotid 12/2022   Normal right carotid exam.    Post left internal carotid endarterectomy.    Mild stenosis of the left internal carotid artery (<50%).     Echo 3/2023  No prior study is available for comparison.   Normal left ventricular systolic function.   No evidence of valvular abnormality based on Doppler evaluation.     Cardiac PET 6/30/23   NUCLEAR IMAGING INTERPRETATION   Normal myocardial perfusion with no ischemia.   Normal left ventricular wall motion.  LV ejection fraction = 77%.   ECG INTERPRETATION   No ischemic changes with Dipyridamole     11/2024 Carotid    Prior LEFT carotid endarterectomy.   No significant carotid stenosis.   Mild proximal RIGHT subclavian stenosis.   Right.    Flow velocities and Doppler waveforms are normal throughout the carotid arteries without evidence of plaque.    Elevated velocities in the proximal subclavian artery consistent with " >50% stenosis.     Left.    Surgical changes of the carotid bifurcation consistent with post carotid    endarterectomy status.          ASSESSMENT AND PLAN  1. Carotid artery disease, unspecified laterality, unspecified type (HCC)        2. Subclavian artery stenosis, right (HCC)        3. Type 1 diabetes mellitus with other specified complication (HCC)        4. History of left-sided carotid endarterectomy        5. Coronary artery calcification seen on CT scan        6. Small vessel disease, cerebrovascular        7. Agatston coronary artery calcium score between 100 and 199        8. Dyslipidemia  APOLIPOPROTEIN B    Comp Metabolic Panel    Lipid Profile      9. Elevated lipoprotein(a)        10. Chronic hyponatremia        11. Primary hypertension  telmisartan (MICARDIS) 40 MG Tab    Basic Metabolic Panel      12. Long term (current) use of antithrombotics/antiplatelets        13. Family history of stroke          Patient Type: Secondary Prevention, T1D, polyvascular, very high risk      MAJOR ASCVD:     1) L carotid stenosis, s/p L CEA (2019, Dr. Garcia)- stable, asymptomatic - current sx would not correlated to carotid dz and I have reassured pt   No prior CVA/TIA   12/2022 duplex = stable, has been stable >2yrs   11/2024 duplex = stable, no stenoses  Plan  - continue secondary prevention treatment goals, intensive med mgmt and lifestyle interventions   - lifelong surveillance:  duplex q2yr (next 11/2026), sooner if new sx and CTA if progressive dz     Established Vascular Disease:     1) Asymptomatic, subclinical CAD (evidenced by CACS = 180 (88%ile for age/gender) in 2022  - no prior dx ASCVD events  - no LMA plaque noted   - no indications for functional testing w/o symptoms   - as reviewed with pt, ACC/AHA guidelines for chronic CAD mgmt (2023) support GDMT alone as initial mgmt citing multiple RCTs (ISCHEMIA, COURAGE, JAQUI 2D) demonstrating early revasc strategy plus GDMT did not improve MACE outcomes  compared to GDMT alone  2023 cPET = normal   Plan:  - continue treatment plan as noted below  - ongoing with cardiology   - report any new sx and consider functional testing if new symptoms over time      2) small vessel dz, cerebral per MR - stable, no sx.  No dementia or CVA/TIA  - continue strict BP and lipid mgmt     3) R subclavian artery stenosis, new dx  - no UE claudication or steal dynamics, asymptomatic  2024 duplex;  >50% stenosis   Plan:  monitor for new UE or post-circ sx, use LUE for BP monitoring, surveillance with carotid duplex     Evidence of genetic dyslipidemia: yes    - low prob FH due to baseline LDL-C <190  Though father  age 59 from CVA and apparently had major HLD disorder     Elevated lp(a) = 286mg/dl, >3xULN, primary LP risk , higher risk for AV stenosis development. Has prothrombotic properties  - echo normal  - no AV stenosis  Plan:  - had 40+% reduction on praluent  in past but now back to bsaeline   - recommend for family screening   - has influence on treatment decisions relating to initiate or intensity of lipid lowering as well as aspirin therapy      FH genotyping:  not recommended    ACC/AHA Indication for Statin Therapy:  Secondary Prevention - Very high risk ASCVD - 2 major events or 1 event with other high-risk conditions    Calculated Risk for ASCVD n/a     Other Significant Risk Markers  elevated coronary calcium score  Severe high lp(a) - as reviewed above     Goal LDL-C and nonHDL-C / apoB  LDL-C <55  -- due to polyvascular, severe lp(a) elevation   apoB <60   At goal?  Yes, 10/2024     - as reviewed with pt, multiple safety analyses (SAMI, Lancet. 2017 Oct 28;390(96422):9548-3281;  GHULAM, N Engl J Med 2017; 377:633-643) completed on very low LDL w/o indications of risk or complications.    - reviewed that she continues to have linear reduction in ASCVD risk with lower LDL-C w/o a floor limit, so we have opted to continue current tx      LIFESTYLE INTERVENTIONS  TOBACCO: Stages of Change: Maintenance (sustained change >6mo)    reports that she quit smoking about 49 years ago. Her smoking use included cigarettes. She has never used smokeless tobacco.   - continued complete avoidance of all tobacco products   PHYSICAL ACTIVITY: >150min/week of mod-intensity activity or as much as tolerated  NUTRITION: Mediterranean   ETOH: limit to 1 or less standard drinks/day  WT MGMT: maintain healthy weight     LIPID LOWERING MEDICATION MANAGEMENT:     Statin Therapy  - continue rosuva 40mg daily     Non-Statin Medications  - continue zetia 10mg daily  - continue praluent 150mg q2wks (preferred agent)  - excellent response on LDL     Indication for PCSK9 Inhibitor  PSCK9i indicated per 2018 ACC/AHA guidelines in this patient with established ASCVD whose LDL-C remains above threshold of 70 mg/dl despite maximally tolerated statin therapy    - FOURIER trial yielded evidence that lp(a) reduction with Repatha led to further risk reduction for ASCVD independent of LDL-C lowering, thus very high risk patients with high lp(a) would benefit disproportionately from lipid-lowering strategy that includes pcsk9i (O'Coty, et al, Circulation 2019).    - ODYSSEY Outcomes trial of patients with an acute coronary syndrome, alirocumab reduced Lp(a) by 5 mg/dL and reduced the risk of major adverse cardiovascular evens (hazard ratio 0.85, 95% CI 0.78-0.93). The beneficial impact of Lp(a) lowering by alirocumab was independent of its lowering of LDL-C (Denis et al, [facility], 2020).     BLOOD PRESSURE MANAGEMENT:  ACC/AHA (2017) goal <130/80  Home BP at goal:  no  Office BP at goal:  no  24h ABPM: not ordered to date  Plan:   - continue home BP monitoring  Medications:  - increase telmisartan to 40mg daily ; prior stopped lisinopril - update BMP in 3 weeks   - avoid thiazide, MRA due to chronic hypokalemia   - stopped amlodipine 5mg due to edema     Glycemic Status: Diabetic, T1  with vasc dz, HLD, HTN  Goal A1c < 7.5 reasonable but defer to endocrine MD   Lab Results   Component Value Date    HBA1C 6.2 (A) 05/07/2024      Lab Results   Component Value Date/Time    MALBCRT see below 03/07/2024 09:27 AM    MICROALBUR <1.2 03/07/2024 09:27 AM   Plan:  - continue current medication plan, defer to [facility]   - recommmend for routine care with PCP (or endocrine) to include regular A1c monitoring, annual albumin/creatinine ratio (ACR), annual diabetic retinopathy screening, foot exams, annual flu vaccine, and updates to pneumonia vaccines as appropriate      ANTITHROMBOTIC THERAPY continue ASA 81mg daily     OTHER    # gait instability - ongoing care with PM&R and neurology for further assessment     # hypothyroidism, stable - continue current treatment plan, defer mgmt to PCP      # hyperkalemia = resolved, unclear etiology, currently 5.1  Plan:  - prior on veltassa per nephrology - defer surveillance and mgmt to nephro  - did not tolerate furosemide  - monitor lytes with ARB titration     # chronic, mild hyponatremia - no symptoms - prior worst 122, currently mild 133  Plan:  - defer surveillance and mgmt to nephrology   - continue fluid restrictions, apparently on NaCl tabs per nephrology   - best to avoid thiazides, MRA    STUDIES:   11/2026 - carotid duplex - RN to track   LABS: BMP in 3 weeks - will msg results, CMP, lipid, apoB in 3mo  Follow up in: RTC in 3 months     Ghassan Gray M.D.  RONDA, Board-certified clinical lipidologist   [facility]   [facility]   [phone]

## 2024-11-21 ENCOUNTER — HOSPITAL ENCOUNTER (OUTPATIENT)
Dept: LAB | Facility: MEDICAL CENTER | Age: 69
End: 2024-11-21
Attending: NURSE PRACTITIONER
Payer: MEDICARE

## 2024-11-21 ENCOUNTER — OFFICE VISIT (OUTPATIENT)
Dept: MEDICAL GROUP | Facility: PHYSICIAN GROUP | Age: 69
End: 2024-11-21
Payer: MEDICARE

## 2024-11-21 VITALS
SYSTOLIC BLOOD PRESSURE: 124 MMHG | WEIGHT: 163.6 LBS | BODY MASS INDEX: 25.68 KG/M2 | TEMPERATURE: 98 F | HEIGHT: 67 IN | OXYGEN SATURATION: 96 % | DIASTOLIC BLOOD PRESSURE: 70 MMHG | HEART RATE: 68 BPM

## 2024-11-21 DIAGNOSIS — N39.41 URGE INCONTINENCE OF URINE: ICD-10-CM

## 2024-11-21 DIAGNOSIS — E11.43 GASTROPARESIS DUE TO DM (HCC): ICD-10-CM

## 2024-11-21 DIAGNOSIS — R13.12 OROPHARYNGEAL DYSPHAGIA: ICD-10-CM

## 2024-11-21 DIAGNOSIS — E10.69 TYPE 1 DIABETES MELLITUS WITH OTHER SPECIFIED COMPLICATION (HCC): ICD-10-CM

## 2024-11-21 DIAGNOSIS — K31.84 GASTROPARESIS DUE TO DM (HCC): ICD-10-CM

## 2024-11-21 LAB
ALBUMIN SERPL BCP-MCNC: 4.6 G/DL (ref 3.2–4.9)
ALBUMIN/GLOB SERPL: 1.6 G/DL
ALP SERPL-CCNC: 136 U/L (ref 30–99)
ALT SERPL-CCNC: 37 U/L (ref 2–50)
ANION GAP SERPL CALC-SCNC: 11 MMOL/L (ref 7–16)
AST SERPL-CCNC: 38 U/L (ref 12–45)
BILIRUB SERPL-MCNC: 0.4 MG/DL (ref 0.1–1.5)
BUN SERPL-MCNC: 10 MG/DL (ref 8–22)
CALCIUM ALBUM COR SERPL-MCNC: 9.1 MG/DL (ref 8.5–10.5)
CALCIUM SERPL-MCNC: 9.6 MG/DL (ref 8.5–10.5)
CHLORIDE SERPL-SCNC: 98 MMOL/L (ref 96–112)
CO2 SERPL-SCNC: 25 MMOL/L (ref 20–33)
CREAT SERPL-MCNC: 0.67 MG/DL (ref 0.5–1.4)
GFR SERPLBLD CREATININE-BSD FMLA CKD-EPI: 95 ML/MIN/1.73 M 2
GLOBULIN SER CALC-MCNC: 2.8 G/DL (ref 1.9–3.5)
GLUCOSE SERPL-MCNC: 145 MG/DL (ref 65–99)
POTASSIUM SERPL-SCNC: 4.5 MMOL/L (ref 3.6–5.5)
PROT SERPL-MCNC: 7.4 G/DL (ref 6–8.2)
SODIUM SERPL-SCNC: 134 MMOL/L (ref 135–145)

## 2024-11-21 PROCEDURE — 3074F SYST BP LT 130 MM HG: CPT | Performed by: NURSE PRACTITIONER

## 2024-11-21 PROCEDURE — 3078F DIAST BP <80 MM HG: CPT | Performed by: NURSE PRACTITIONER

## 2024-11-21 PROCEDURE — 80053 COMPREHEN METABOLIC PANEL: CPT

## 2024-11-21 PROCEDURE — 99214 OFFICE O/P EST MOD 30 MIN: CPT | Performed by: NURSE PRACTITIONER

## 2024-11-21 PROCEDURE — 36415 COLL VENOUS BLD VENIPUNCTURE: CPT

## 2024-11-21 RX ORDER — PANTOPRAZOLE SODIUM 40 MG/1
40 TABLET, DELAYED RELEASE ORAL DAILY
Qty: 90 TABLET | Refills: 3 | Status: SHIPPED | OUTPATIENT
Start: 2024-11-21

## 2024-11-21 ASSESSMENT — FIBROSIS 4 INDEX: FIB4 SCORE: 1.11

## 2024-11-21 NOTE — PROGRESS NOTES
Verbal consent was acquired by the patient to use [employer] ambient listening note generation during this visit yes    Subjective:     HPI:   History of Present Illness  The patient is a 69-year-old female who presents today to follow up on multiple issues.    Hot Flashes  She has been experiencing hot flashes for the past 4 to 6 months, occurring more frequently in the evenings or late afternoons and lasting for about 3 to 4 minutes. These episodes occur 2 to 3 times daily without any identifiable triggers. She manages these episodes by fanning herself and using cold towels.  - Onset: Past 4 to 6 months.  - Timing: More frequent in the evenings or late afternoons, lasting 3 to 4 minutes, occurring 2 to 3 times daily.  - Alleviating Factors: Fanning herself and using cold towels.    Chest Pain and Acid Reflux  She reports chest pain, which she attributes to acid reflux, occurring 3 to 4 nights ago. She experiences heartburn about 5 times a week and takes Pepcid and Tums for relief. She also reports difficulty swallowing certain foods, such as pasta, and feels like her muscles are not moving correctly. She has been advised to avoid acidic foods. Additionally, she experiences increased acid reflux and difficulty swallowing saliva, especially at night. She has to force herself to swallow and has experienced choking while eating pasta. She finds it difficult to drink water and her voice decreases when she talks a lot. She is under the care of a gastroenterologist, Dr. Perla Davila. She was previously taking Protonix but has not refilled the prescription. She takes [place] at least 5 times a week, sometimes twice a day.  - Onset: Chest pain 3 to 4 nights ago.  - Duration: Heartburn about 5 times a week.  - Character: Chest pain attributed to acid reflux, heartburn, difficulty swallowing certain foods, increased acid reflux, difficulty swallowing saliva, choking while eating pasta, difficulty drinking water,  decreased voice when talking a lot.  - Alleviating Factors: Pepcid, Tums, [place], avoiding acidic foods.  - Timing: Especially at night, [place] at least 5 times a week, sometimes twice a day.    Urinary Incontinence  She also reports urinary incontinence, which started around the same time as her right leg surgery in 09/2024. Despite some improvement, she still experiences dribbling. She has been advised to drink plenty of water but is on a fluid restriction due to low sodium levels. She has declined a referral to a neurology or urology clinic. She reports no burning sensation during urination, flank pains, or spasms. She had a bladder scan 5 to 8 years ago, which was clear. She has sudden urges to urinate and cannot hold it back once she sees the toilet.  - Onset: Around the time of right leg surgery in 09/2024.  - Character: Urinary incontinence, dribbling, sudden urges to urinate, cannot hold it back once she sees the toilet.  - Alleviating Factors: Advised to drink plenty of water but on fluid restriction due to low sodium levels.    Diabetes Management  She is under the care of an endocrinologist for diabetes, which is well managed. Her blood sugar levels are between 84 and 87. She has an appointment with Dr. Bach in 01/2025 and has requested a refill of her insulin if she cannot get it through them.  - Character: Well managed diabetes.  - Timing: Blood sugar levels between 84 and 87.  - Alleviating Factors: Under the care of an endocrinologist, requested insulin refill.    Hearing Changes  She has noticed changes in her hearing and plans to visit [facility]. She has been advised to use [employer] before her visit.    Pain Management  She is currently under the care of a pain specialist and is taking duloxetine and Lyrica at night. She underwent a hysterectomy a long time ago.    Health Maintenance: sees endocrinology    Objective:     Exam:  /70 (BP Location: Left arm, Patient Position: Sitting,  "BP Cuff Size: Adult)   Pulse 68   Temp 36.7 °C (98 °F) (Temporal)   Ht 1.702 m (5' 7\")   Wt 74.2 kg (163 lb 9.6 oz)   LMP 01/01/1983 (LMP Unknown)   SpO2 96%   BMI 25.62 kg/m²  Body mass index is 25.62 kg/m².    Physical Exam  Constitutional:       Appearance: Normal appearance.   HENT:      Head: Normocephalic and atraumatic.   Cardiovascular:      Rate and Rhythm: Normal rate and regular rhythm.      Pulses: Normal pulses.      Heart sounds: Normal heart sounds.   Pulmonary:      Effort: Pulmonary effort is normal.      Breath sounds: Normal breath sounds.   Skin:     General: Skin is warm and dry.      Capillary Refill: Capillary refill takes less than 2 seconds.   Neurological:      General: No focal deficit present.      Mental Status: She is alert and oriented to person, place, and time.         Results  Laboratory Studies  Decreased red blood cell count but no evidence of anemia. Decreased platelet count. Blood sugar levels are 84 and 87.    Assessment & Plan:     1. Type 1 diabetes mellitus with other specified complication (HCC)        2. Gastroparesis due to DM (HCC)  pantoprazole (PROTONIX) 40 MG Tablet Delayed Response      3. Oropharyngeal dysphagia  DX-ESOPHAGUS - [id number]      4. Urge incontinence of urine  Referral to Urology          Assessment & Plan  1. Hot flashes - Given her high cardiac risk and existing kidney issues, hormone replacement therapy is not a viable option. She will follow-up with gynecology of desired.    2. Dysphagia - She reports difficulty swallowing, particularly with certain foods like pasta, and experiences acid reflux symptoms.    - A swallow study will be ordered.  - Advised to avoid acidic foods.  - Prescription for Protonix 40 mg PO daily will be sent to her pharmacy.  - Contact Dr. Davila in gastroenterology for further evaluation and potential need for a scope.    3. Gastroesophageal Reflux Disease (GERD) - She experiences acid reflux symptoms and has been " taking Pepcid and Tums.    - Avoid acidic foods.  - Prescription for Protonix will be sent to her pharmacy.    4. Urinary incontinence - She experiences dribbling and urgency, which started around the time of her right leg surgery in September. She is on a fluid restriction due to low sodium levels.    - Referral to a urologist is recommended to determine if her incontinence is due to a nervous system issue or a bladder problem.    5. Diabetes Mellitus - Her diabetes is well-managed with blood sugar levels around 84-87.    - Follow up closely with endocrinologist and nephrologist.    Follow-up  - Patient is scheduled for a follow-up visit on February 20, 2025.          Return in about 3 months (around 2/21/2025), or if symptoms worsen or fail to improve, for Medication Review.    I have placed the below orders and discussed them with an approved delegating provider. The MA is performing the below orders under the direction of Dr. Sierra.      Please note that this dictation was created using voice recognition software. I have made every reasonable attempt to correct obvious errors, but I expect that there are errors of grammar and possibly content that I did not discover before finalizing the note.

## 2024-11-27 ENCOUNTER — TELEPHONE (OUTPATIENT)
Dept: CARDIOLOGY | Facility: MEDICAL CENTER | Age: 69
End: 2024-11-27
Payer: MEDICARE

## 2024-11-27 NOTE — TELEPHONE ENCOUNTER
Medication: Praluent  Type of Insurance: Government funded (Medicare/Medicare Advantage)  Type of Financial assistance requested Foundation  Source: [employer]  Source Phone #: [phone]  Outcome: Approved  Effective dates: 12/23/24 until 12/22/25  Details/Billing Information:   [id number]  PCN: [id number]  GRP: [id number]  ID: [id number]  Max Award Amount: $2,500  Final Copay: $0    Confirmed via [employer] portal that patients renewal has been approved until 12/22/2025. Updating patients pharmacy profile with new ID number for assistance.

## 2024-11-27 NOTE — TELEPHONE ENCOUNTER
Received a phone call from Skyla from the [facility] to verify the diagnosis code for the co-pay assistance renewal for this patient's Praluent prescription for the next 11 months.     I provided Skyla with ICD-10 code E78.49 per Rx on file for Praluent prescribed by Adamaris Gray.     Skyla informed me that the patient passed the diagnosis verification process and are proceeding the finalization of determining her eligibility for renewal.

## 2024-12-05 ENCOUNTER — APPOINTMENT (OUTPATIENT)
Dept: LAB | Facility: MEDICAL CENTER | Age: 69
End: 2024-12-05
Payer: MEDICARE

## 2024-12-06 ENCOUNTER — PATIENT OUTREACH (OUTPATIENT)
Dept: HEALTH INFORMATION MANAGEMENT | Facility: OTHER | Age: 69
End: 2024-12-06
Payer: MEDICARE

## 2024-12-06 DIAGNOSIS — I65.29 STENOSIS OF CAROTID ARTERY, UNSPECIFIED LATERALITY: ICD-10-CM

## 2024-12-06 DIAGNOSIS — I10 PRIMARY HYPERTENSION: ICD-10-CM

## 2024-12-06 DIAGNOSIS — N18.2 BENIGN HYPERTENSIVE HEART AND KIDNEY DISEASE WITH DIASTOLIC CHF, NYHA CLASS II AND CKD STAGE 2 (HCC): ICD-10-CM

## 2024-12-06 DIAGNOSIS — I50.30 BENIGN HYPERTENSIVE HEART AND KIDNEY DISEASE WITH DIASTOLIC CHF, NYHA CLASS II AND CKD STAGE 2 (HCC): ICD-10-CM

## 2024-12-06 DIAGNOSIS — E08.00 DIABETES MELLITUS DUE TO UNDERLYING CONDITION WITH HYPEROSMOLARITY WITHOUT COMA, WITH LONG-TERM CURRENT USE OF INSULIN (HCC): ICD-10-CM

## 2024-12-06 DIAGNOSIS — E78.49 OTHER HYPERLIPIDEMIA: ICD-10-CM

## 2024-12-06 DIAGNOSIS — Z79.4 DIABETES MELLITUS DUE TO UNDERLYING CONDITION WITH HYPEROSMOLARITY WITHOUT COMA, WITH LONG-TERM CURRENT USE OF INSULIN (HCC): ICD-10-CM

## 2024-12-06 DIAGNOSIS — I13.0 BENIGN HYPERTENSIVE HEART AND KIDNEY DISEASE WITH DIASTOLIC CHF, NYHA CLASS II AND CKD STAGE 2 (HCC): ICD-10-CM

## 2024-12-06 NOTE — PROGRESS NOTES
First attempt made for Personal Care Management monthly outreach. No answer. Message left with contact information and request for call back.

## 2024-12-10 ENCOUNTER — HOSPITAL ENCOUNTER (OUTPATIENT)
Dept: LAB | Facility: MEDICAL CENTER | Age: 69
End: 2024-12-10
Attending: FAMILY MEDICINE
Payer: MEDICARE

## 2024-12-10 DIAGNOSIS — I10 PRIMARY HYPERTENSION: ICD-10-CM

## 2024-12-10 PROCEDURE — 80048 BASIC METABOLIC PNL TOTAL CA: CPT

## 2024-12-10 PROCEDURE — 36415 COLL VENOUS BLD VENIPUNCTURE: CPT

## 2024-12-11 LAB
ANION GAP SERPL CALC-SCNC: 13 MMOL/L (ref 7–16)
BUN SERPL-MCNC: 12 MG/DL (ref 8–22)
CALCIUM SERPL-MCNC: 9.4 MG/DL (ref 8.5–10.5)
CHLORIDE SERPL-SCNC: 98 MMOL/L (ref 96–112)
CO2 SERPL-SCNC: 26 MMOL/L (ref 20–33)
CREAT SERPL-MCNC: 0.6 MG/DL (ref 0.5–1.4)
GFR SERPLBLD CREATININE-BSD FMLA CKD-EPI: 98 ML/MIN/1.73 M 2
GLUCOSE SERPL-MCNC: 187 MG/DL (ref 65–99)
POTASSIUM SERPL-SCNC: 3.8 MMOL/L (ref 3.6–5.5)
SODIUM SERPL-SCNC: 137 MMOL/L (ref 135–145)

## 2024-12-17 NOTE — CARE PLAN
Active Plans       Staying on track and self advocacy       Outpatient problem   No active goals                     Care Plans       Chronic Care Management (CMS)    Met: 1 of 9 Met: 1 of 9      Progressing (8)       Patient educated about diabetes (Knowledge of diabetes: Long Term)  Disciplines:  Nurse Interdisciplinary  Expected end:  11/05/25      Outcome: Progressing Documented by Vida Mendoza R.N. on 12/17/24 1129     In conjunction with patient, identify which health habits can be modified (Recognizing current health habits that may contribute to diabetes: Long Term)  Disciplines:  Nurse, Interdisciplinary  Expected end:  11/05/25      Outcome: Progressing Documented by Vida Mendoza R.N. on 12/17/24 1129     Ensure patient has had proper education on self glucose testing (Knowledge of self-monitoring blood sugars : Long Term)  Disciplines:  Nurse Interdisciplinary  Expected end:  11/05/25      Outcome: Progressing Documented by Vida Mendoza R.N. on 12/17/24 1129     Demonstrate Knowledge of Hypertension (Knowledge of hypertension : Long Term)  Disciplines:  Interdisciplinary  Expected end:  11/05/25      Outcome: Progressing Documented by Vida Mendoza R.N. on 12/17/24 1129     Demonstrate factors that can contribute to high blood pressure (Knowledge of factors contributing to hypertension: Long Term)  Disciplines:  Interdisciplinary  Expected end:  11/05/25      Outcome: Progressing Documented by Vida Mendoza R.N. on 12/17/24 1129     Identify which modifiable health habits can be modifed (Recognize current health habits that may contribute to hypertension: Long Term)  Disciplines:  Interdisciplinary  Expected end:  11/05/25      Outcome: Progressing Documented by Vida Mendoza R.N. on 12/17/24 1129     Identify barriers to managing blood pressure (Patient barriers to managing high blood pressure : Long Term)  Disciplines:  Interdisciplinary  Expected end:  11/05/25       Outcome: Progressing Documented by Vida Mendoza R.N. on 12/17/24 1129     Patient Stated Goal: Over the next year the patient will gain assistance from nursing in staying on track and accessing resources for being proactive in her care.  (Patient Stated Problem: Need for assistance in staying on track and being proactive in her care. )  Disciplines:  Nurse, Interdisciplinary  Expected end:  11/05/25   Patient-stated:  Yes      Outcome: Progressing Documented by Vida Mendoza R.N. on 12/17/24 1129                       Met (1)       Demonstrate the elements of self-monitoring blood pressure (Knowledge of self-monitoring blood pressure : Long Term)  Disciplines:  Interdisciplinary  Expected end:  11/05/25      Outcome: Met Documented by Vida Mendoza R.N. on 12/17/24 1129

## 2024-12-17 NOTE — PROGRESS NOTES
Assessment    I spoke to the patient this morning for her monthly PCM follow up. The patient denies any falls or injuries since our last conversation. Her future appointment dates and times were reviewed and she is aware of all of them. The patient voices understanding of her plan of care going forward.   DM: Patient continues to utilize continuous blood glucose monitoring system. She reports being within the goal range she has set with her Endocrinologist % of the time. She voices that she is establishing care with Dr. Bach here at [facility] instead of [facility] as she feels that [facility] keeps telling her that there is nothing more they can do.  HTN: Patient reports home blood pressure readings between 120-140/ 60-70s The patient reports no chest pain, shortness of breath, edema, or acute headaches at this time.   CKD: patient establishing with urology re: Incontinence. The patient denies any other concerns with regard to urination. The patient's GFR was 98 12/10/24/  The patient voices no further questions or concerns at this time. She voices understanding of how to get a hold of me if anything comes up.     Education and Self-Management of Care    Article on CKD sent by mail  Patient will continue to follow up with PCP, specialists as indicated    Plan of Care and Goals    Patient will remain free from falls/injuries  Healthy nutrition, hydration, and activity    Barriers:    Management of chronic and acute health concerns    Progress:    Patient reports excellent percentages of being within blood glucose range. Patient reports blood pressures stable. Patient reports understanding of nutrition and hydration    Next outreach: One Month

## 2024-12-17 NOTE — PROGRESS NOTES
I spoke with the patient this morning. She was visiting with a friend. She states that she will give me a call back later.

## 2024-12-19 ENCOUNTER — OFFICE VISIT (OUTPATIENT)
Dept: UROLOGY | Facility: MEDICAL CENTER | Age: 69
End: 2024-12-19
Payer: MEDICARE

## 2024-12-19 DIAGNOSIS — N39.41 URGE INCONTINENCE OF URINE: ICD-10-CM

## 2024-12-19 LAB
POC POST-VOID: 87 ML
POC PRE-VOID: NORMAL

## 2024-12-19 PROCEDURE — 99204 OFFICE O/P NEW MOD 45 MIN: CPT | Performed by: STUDENT IN AN ORGANIZED HEALTH CARE EDUCATION/TRAINING PROGRAM

## 2024-12-19 PROCEDURE — 51798 US URINE CAPACITY MEASURE: CPT | Performed by: STUDENT IN AN ORGANIZED HEALTH CARE EDUCATION/TRAINING PROGRAM

## 2024-12-19 RX ORDER — MIRABEGRON 25 MG/1
25 TABLET, FILM COATED, EXTENDED RELEASE ORAL DAILY
Qty: 30 TABLET | Refills: 3 | Status: SHIPPED | OUTPATIENT
Start: 2024-12-19

## 2024-12-19 NOTE — PROGRESS NOTES
Subjective  Ines Arpit Long is a 69 y.o. female who presents today for evaluation of OAB and UI.    Incontinence 2-3x/day, large volume  DTF Q3-4 hours  NTF none    Denies recurrent UTI, hematuria, dsyruia, kidney stones, pelvic pain. Has history of hysterectomy, denies prolapse.    Stream is normal. Bowel movement every other day. She has increased fruit and veggie intake, has helped with the regularity of BM. She has one cup of coffee a day.     Family History   Problem Relation Age of Onset    Cancer Mother         rectal cancer    Stroke Father          59    Heart Disease Father     Hypertension Father     Lung Disease Father         tb    Hyperlipidemia Father     Diabetes Sister     Psychiatric Illness Sister         bipolar    Heart Disease Maternal Grandfather     Stroke Paternal Grandmother     Heart Disease Paternal Grandfather        Social History     Socioeconomic History    Marital status:      Spouse name: Not on file    Number of children: Not on file    Years of education: Not on file    Highest education level: Not on file   Occupational History    Not on file   Tobacco Use    Smoking status: Former     Current packs/day: 0.00     Types: Cigarettes     Quit date: 1975     Years since quittin.0    Smokeless tobacco: Never   Vaping Use    Vaping status: Never Used   Substance and Sexual Activity    Alcohol use: Yes     Alcohol/week: 1.2 oz     Types: 2 Glasses of wine per week     Comment: about 3-4 drinks per week.     Drug use: Not Currently     Comment: tried CBD gummies; no THC    Sexual activity: Yes     Partners: Male     Birth control/protection: Surgical     Comment: Pt states had a hysterectomy   Other Topics Concern    Not on file   Social History Narrative    Not on file     Social Drivers of Health     Financial Resource Strain: Low Risk  (2024)    Overall Financial Resource Strain (CARDIA)     Difficulty of Paying Living Expenses: Not hard at all   Food  Insecurity: No Food Insecurity (2/29/2024)    Hunger Vital Sign     Worried About Running Out of Food in the Last Year: Never true     Ran Out of Food in the Last Year: Never true   Transportation Needs: No Transportation Needs (2/29/2024)    PRAPARE - Transportation     Lack of Transportation (Medical): No     Lack of Transportation (Non-Medical): No   Physical Activity: Insufficiently Active (2/29/2024)    Exercise Vital Sign     Days of Exercise per Week: 1 day     Minutes of Exercise per Session: 40 min   Stress: No Stress Concern Present (2/29/2024)    [employer] [place] - Occupational Stress Questionnaire     Feeling of Stress : Not at all   Social Connections: Moderately Isolated (2/29/2024)    Social Connection and Isolation Panel [NHANES]     Frequency of Communication with Friends and Family: More than three times a week     Frequency of Social Gatherings with Friends and Family: Three times a week     Attends Mu-ism Services: Never     Active Member of Clubs or Organizations: No     Attends Club or Organization Meetings: Never     Marital Status:    Intimate Partner Violence: Not At Risk (2/29/2024)    Humiliation, Afraid, Rape, and Kick questionnaire     Fear of Current or Ex-Partner: No     Emotionally Abused: No     Physically Abused: No     Sexually Abused: No   Housing Stability: Low Risk  (2/29/2024)    Housing Stability Vital Sign     Unable to Pay for Housing in the Last Year: No     Number of Places Lived in the Last Year: 1     Unstable Housing in the Last Year: No       Past Surgical History:   Procedure Laterality Date    TIBIA NAILING INTRAMEDULLARY Right 9/14/2024    Procedure: INSERTION, INTRAMEDULLARY SUE, TIBIA;  Surgeon: Richard Sol M.D.;  Location: SURGERY [facility];  Service: Orthopedics    LUMBAR FUSION O-ARM  9/27/2023    Procedure: L4/5 and L5/S1 redo minimally invasive posterior decompression and instrumented fusion;  Surgeon: Evon BUSTOS  JANET Díaz;  Location: [facility];  Service: Neurosurgery    LUMBAR DECOMPRESSION  9/27/2023    Procedure: DECOMPRESSION, SPINE, LUMBAR;  Surgeon: Evon Díaz M.D.;  Location: [facility];  Service: Neurosurgery    POSTERIOR CERVICAL FUSION O-ARM  3/20/2023    Procedure: E6-6-0-6-7-T1 posterior decompressive laminectomy and L6-1-0-6-7-T1 posterior instrumented fusion with O-arm imaging guidance;  Surgeon: Evon Díaz M.D.;  Location: [facility];  Service: Orthopedics    MENISCECTOMY, KNEE, ARTHROSCOPIC Right 08/22/2022    Procedure: RIGHT KNEE ARTHROSCOPY, PARTIAL MEDIAL MENISCECTOMY;  Surgeon: Ravi Gillis M.D.;  Location: [facility];  Service: Orthopedics    SYNOVECTOMY Right 08/22/2022    Procedure: SYNOVECTOMY;  Surgeon: Ravi Gillis M.D.;  Location: [facility];  Service: Orthopedics    CHONDROPLASTY Right 08/22/2022    Procedure: CHONDROPLASTY;  Surgeon: Ravi Gillis M.D.;  Location: [facility];  Service: Orthopedics    LUMBAR LAMINECTOMY DISKECTOMY  05/16/2022    Procedure: L4-5 and L5-S1 decompressive laminectomy, bilateral foraminotomies and left L5-S1;  Surgeon: Evon Díaz M.D.;  Location: [facility];  Service: Orthopedics    LUMBAR DECOMPRESSION  05/16/2022    Procedure: DECOMPRESSION, SPINE, LUMBAR;  Surgeon: Evon Díaz M.D.;  Location: [facility];  Service: Orthopedics    FORAMINOTOMY  05/16/2022    Procedure: FORAMINOTOMY, SPINE;  Surgeon: Evon Díaz M.D.;  Location: [facility];  Service: Orthopedics    CAROTID ENDARTERECTOMY Left 12/05/2019    Procedure: ENDARTERECTOMY, CAROTID- WITH NEUROMONITORIING;  Surgeon: Surya Garcia M.D.;  Location: [facility];  Service: Vascular    ORTHOPEDIC OSTEOTOMY Left 07/17/2017    Procedure: ORTHOPEDIC OSTEOTOMY CALCANEAL, KIDNER, EXCISION NAVICULAR, GASTROC SOLEUS RECESSION;  Surgeon: Adebayo Layton M.D.;  Location:  "SURGERY [facility];  Service:     LIGAMENT REPAIR  07/17/2017    Procedure: LIGAMENT REPAIR SPRING;  Surgeon: Adebayo Layton M.D.;  Location: SURGERY [facility];  Service:     TOE FUSION  07/17/2017    Procedure: TOE FUSION 1ST METATARSAL JOINT, 2ND TOE RECONSTRUCTION ;  Surgeon: Adebayo Layton M.D.;  Location: SURGERY [facility];  Service:     REPAIR, TENDON, LOWER EXTREMITY, USING TENDON GRAFT  07/17/2017    Procedure: FLEXOR TENDON REPAIR- RELEASE/POSSIBLE FLEXOR DIGITORIUM LONGUS;  Surgeon: Adebayo Layton M.D.;  Location: SURGERY [facility];  Service:     BUNIONECTOMY  01/01/2014    SHOULDER ARTHROSCOPY Left 01/01/1997    frozen shoulder    ABDOMINAL HYSTERECTOMY TOTAL  01/01/1987    KNEE ARTHROTOMY Right 01/01/1978    [facility]     CHOLECYSTECTOMY      FOOT SURGERY  2016 and 2018    [facility]    SHOULDER SURGERY      Frozen shoulder       Past Medical History:   Diagnosis Date    Acute nasopharyngitis 04/29/2022    \"I'm at the tailend of a cold\" .  Symptoms started 4/26/22 included exhaustion, and stuffy nose, cough.  Symptoms are better but pt. reports she still feels \"A little congested\"    Anesthesia     Post op N/V    Blocked artery Around 2019    Carotid artery- had surgery.  Pt. follows up with Dr. Surya Garcia every year.    Cataract 2023    no surgery yet    Diabetes type 1, controlled (HCC) 11/01/2010    Insulin pump in place.  Endocrinologist is Dr. Dewey.     Dyslipidemia 11/01/2010    High cholesterol     Hypertension     pt states controlled on meds    Hypothyroidism 11/01/2010    Indigestion     Insulin pump in place 05/18/2011    Left carotid artery stenosis - severe 2019     PONV (postoperative nausea and vomiting)     Routine health maintenance 05/18/2011    Urinary incontinence     \"If I sneeze, I leak\"       Current Outpatient Medications   Medication Sig Dispense Refill    pantoprazole (PROTONIX) 40 MG Tablet Delayed Response Take 1 Tablet " by mouth every day. FILL NOW 90 Tablet 3    telmisartan (MICARDIS) 40 MG Tab Take 1 Tablet by mouth every day. to lower blood pressure 100 Tablet 3    famotidine (PEPCID) 20 MG Tab Take 1 Tablet by mouth 2 times a day. 60 Tablet 0    docusate sodium (COLACE) 100 MG Cap Take 1 Capsule by mouth 2 times a day. To prevent constipation while taking narcotic pain medications. 60 Capsule 1    acetaminophen (TYLENOL) 500 MG Tab Take 2 Tablets by mouth every 6 hours as needed for Mild Pain or Moderate Pain.      ezetimibe (ZETIA) 10 MG Tab Take 1 Tablet by mouth every day. To lower cholesterol 100 Tablet 3    rosuvastatin (CRESTOR) 40 MG tablet Take 1 Tablet by mouth every day. To lower cholesterol 100 Tablet 3    sodium chloride (SALT) 1 GM Tab TAKE TWO TABLETS BY MOUTH EVERY DAY (Patient taking differently: Take 3 g by mouth every day. 2g in AM, and 1g in PM) 90 Tablet 1    Alirocumab (PRALUENT) 150 MG/ML Solution Auto-injector Inject 150 mg under the skin every 14 days. 6 mL 3    DULoxetine HCl 60 MG Capsule Delayed Release Sprinkle Take 60 mg by mouth every day.      pregabalin (LYRICA) 100 MG Cap Take 200 mg by mouth every evening.      clindamycin (CLEOCIN) 150 MG Cap Take 3 capsules PO prior to dental procedure 6 Capsule 0    Insulin Aspart, w/Niacinamide, (FIASP) 100 UNIT/ML Solution Inject 2.2 Units as directed one time.      insulin infusion pump Device Inject  under the skin continuous. Patient's own SQ insulin pump    Type of Insulin: Fiasp  Last change of tubing: 3/19/2023 - Change tubing and site every 72 hours    Dosing:  Basal rate:   0000 - 0329 = 0.5 units/hr   0330 - 1129 = 0.85 units/hr   1130 - 1359 = 0.5 units/hr              1400 - 1759 = 0.45 units/hr              1800 - 2359 = 0.5 units/hr    Bolus ratio:   1 unit : 12 g carbohydrate at  (breakfast, lunch, dinner, snacks)      Correction ratio:    1 units for every 50 over 150 mg/dL    Disconnect pump if patient becomes hypoglycemic and altered.       "LEVOXYL 100 MCG Tab Take  mcg by mouth every morning on an empty stomach. Takes 1/2 tab on Sundays      Cholecalciferol (VITAMIN D-3 PO) Take 5,000 mg by mouth every evening.       No current facility-administered medications for this visit.       Allergies   Allergen Reactions    Ceclor [Cefaclor] Hives and Swelling    Augmentin Hives, Diarrhea and Vomiting     Fever blisters   Sickness lasts forever    Naproxen      Face Swells   9/14/24: spoke to pt, states she can take ibuprofen with no issues.    Tape Rash     \"Certain band aids\"  PAPER TAPE OKAY/ Tegaderm ok    Amlodipine Swelling     5mg = swelling     Amoxicillin-Pot Clavulanate      Other reaction(s): Not available    Cefaclor Hives and Swelling    Clindamycin Unspecified    Penicillin G Benzathine Unspecified       Objective  There were no vitals taken for this visit.  Physical Exam  Constitutional:       Appearance: Normal appearance.   HENT:      Head: Normocephalic and atraumatic.   Eyes:      Pupils: Pupils are equal, round, and reactive to light.   Pulmonary:      Effort: No respiratory distress.   Skin:     General: Skin is warm and dry.   Neurological:      General: No focal deficit present.      Mental Status: She is alert.   Psychiatric:         Mood and Affect: Mood normal.         Behavior: Behavior normal.     PVR 87cc    Labs:   CBC   Lab Results   Component Value Date/Time    WBC 5.2 10/31/2024 0957    RBC 4.46 10/31/2024 0957    HEMOGLOBIN 13.7 10/31/2024 0957    HEMATOCRIT 42.9 10/31/2024 0957    MCV 96.2 10/31/2024 0957    MCH 30.7 10/31/2024 0957    MCHC 31.9 (L) 10/31/2024 0957    RDW 48.8 10/31/2024 0957    MPV 10.9 10/31/2024 0957    LYMPHOCYTES 40.90 10/31/2024 0957    LYMPHS 2.12 10/31/2024 0957    MONOCYTES 9.50 10/31/2024 0957    MONOS 0.49 10/31/2024 0957    EOSINOPHILS 2.30 10/31/2024 0957    EOS 0.12 10/31/2024 0957    BASOPHILS 0.60 10/31/2024 0957    BASO 0.03 10/31/2024 0957    NRBC 0.00 10/31/2024 0957       [facility]   Lab " Results   Component Value Date/Time    SODIUM 137 12/10/2024 1453    POTASSIUM 3.8 12/10/2024 1453    CHLORIDE 98 12/10/2024 1453    CO2 26 12/10/2024 1453    GLUCOSE 187 (H) 12/10/2024 1453    BUN 12 12/10/2024 1453    CREATININE 0.60 12/10/2024 1453    CALCIUM 9.4 12/10/2024 1453           Imaging:     none    Assessment    OAB/UI  -We discussed there are two classes of medications, given she is over the age of 67 I do not recommend anti-cholinergics. Instead we will try a beta 3 agonist. We discussed MOA, length of time to full effect, side effects  -discussed timed voiding Q3 hours to avoid episodes of incontinence  -if medication fails to improve her symptoms we could consider botox or SNM.       Plan    Problem List Items Addressed This Visit    None  Visit Diagnoses       Urge incontinence of urine        Relevant Orders    POCT Bladder Scan (Completed)          RTC 8 weeks symptom check, PVR

## 2024-12-20 ENCOUNTER — TELEPHONE (OUTPATIENT)
Dept: UROLOGY | Facility: MEDICAL CENTER | Age: 69
End: 2024-12-20
Payer: MEDICARE

## 2024-12-20 NOTE — TELEPHONE ENCOUNTER
VOICEMAIL  1. Caller Name: Ines Long                      Call Back Number: [phone]    2. Message: pt called and lm requesting for her medication to be sent to [facility]. She will  this one time script from local pharmacy but its was $90.     3. Patient approves office to leave a detailed voicemail/MyChart message: yes

## 2024-12-27 DIAGNOSIS — N39.41 URGE INCONTINENCE OF URINE: ICD-10-CM

## 2024-12-27 RX ORDER — MIRABEGRON 25 MG/1
25 TABLET, FILM COATED, EXTENDED RELEASE ORAL DAILY
Qty: 90 TABLET | Refills: 3 | Status: SHIPPED | OUTPATIENT
Start: 2024-12-27 | End: 2025-12-22

## 2025-01-03 ENCOUNTER — PATIENT OUTREACH (OUTPATIENT)
Dept: HEALTH INFORMATION MANAGEMENT | Facility: OTHER | Age: 70
End: 2025-01-03
Payer: MEDICARE

## 2025-01-03 DIAGNOSIS — N18.2 BENIGN HYPERTENSIVE HEART AND KIDNEY DISEASE WITH DIASTOLIC CHF, NYHA CLASS II AND CKD STAGE 2 (HCC): ICD-10-CM

## 2025-01-03 DIAGNOSIS — E78.49 OTHER HYPERLIPIDEMIA: ICD-10-CM

## 2025-01-03 DIAGNOSIS — Z79.4 DIABETES MELLITUS DUE TO UNDERLYING CONDITION WITH HYPEROSMOLARITY WITHOUT COMA, WITH LONG-TERM CURRENT USE OF INSULIN (HCC): ICD-10-CM

## 2025-01-03 DIAGNOSIS — I10 PRIMARY HYPERTENSION: ICD-10-CM

## 2025-01-03 DIAGNOSIS — I50.30 BENIGN HYPERTENSIVE HEART AND KIDNEY DISEASE WITH DIASTOLIC CHF, NYHA CLASS II AND CKD STAGE 2 (HCC): ICD-10-CM

## 2025-01-03 DIAGNOSIS — I13.0 BENIGN HYPERTENSIVE HEART AND KIDNEY DISEASE WITH DIASTOLIC CHF, NYHA CLASS II AND CKD STAGE 2 (HCC): ICD-10-CM

## 2025-01-03 DIAGNOSIS — E08.00 DIABETES MELLITUS DUE TO UNDERLYING CONDITION WITH HYPEROSMOLARITY WITHOUT COMA, WITH LONG-TERM CURRENT USE OF INSULIN (HCC): ICD-10-CM

## 2025-01-03 NOTE — PROGRESS NOTES
Assessment    I spoke to the patient this afternoon by phone when she called in to confirm the date and time of her endocrinology appointment and to ask for thoughts with regard to working with a  at [facility] [place]. The patient reports that her overall health status remains good. She denies any falls or injuries since our last conversation.  DM: Patient is on a continuous glucose meter and insulin pump. She is followed by endocrinology and is scheduled to see them next week. Patient reports blood glucose levels are saying stable. The patient should have her AIC taken next week at [facility].   HTN: Patient states that she takes her medications and blood pressures at night before bed. The patient reports that her blood pressures have been a little higher (140s-150s systolic) The patient was asked to take blood pressures in the morning as well for the next few days as she is taking her blood pressure right when the last dose is wearing off. She agrees to a check in next week.   Other: The patient feels like physical therapy on its own doesn't allow her to develop her strength and balance as much as she would like. She states she will be starting with a  at [facility] [place]. She was advised to be very honest with regard to current physical limitations and anything that causes discomfort.   The patient denies any further questions at this time. The patient demonstrates knowledge of how to get a hold of me if needed.     Education and Self-Management of Care    I have sent an article on diabetic foot exams by mail. The patient is aware of her future appointment dates and times. She has no outstanding referrals. She is coming in for her annual wellness visit in February. Patient has orders in the system for a mammogram. The patient communicates with her medical team appropriately.     Plan of Care and Goals    The patient is going to track her blood pressure for the next week  in the mornings and then we will discuss next steps in consultation with her PCP.     Barriers:    Patient is recovering from acute leg injury. The patient is managing multiple chronic health concerns.     Progress:    The patient is going to be beginning 1-1 training at [facility]. The patient's blood sugars are reported as stable on current regimen. The patient will provide further information on blood pressures.     Next outreach: On or Near 2/3/25

## 2025-01-03 NOTE — CARE PLAN
Problem: Knowledge of diabetes: Long Term  Goal: Patient educated about diabetes  Outcome: Progressing  Note: Patient is following with endocrinology appropriately. The patient is taking medications as prescribed. The patient is consistently keeping blood sugars in acceptable range.      Problem: Recognize current health habits that may contribute to hypertension: Long Term  Goal: Identify which modifiable health habits can be modifed  Outcome: Progressing  Note: Patient has verbalized willingness to begin personal training and develop her toolkit of exercises.      Problem: Patient barriers to managing high blood pressure : Long Term  Goal: Identify barriers to managing blood pressure  Outcome: Progressing  Note: Patient has vocalized commitment to AM and PM blood pressures this week to get a full picture of lability.     Problem: Patient Stated Problem: Need for assistance in staying on track and being proactive in her care.   Goal: Patient Stated Goal: Over the next year the patient will gain assistance from nursing in staying on track and accessing resources for being proactive in her care.   Outcome: Progressing

## 2025-01-07 ENCOUNTER — OFFICE VISIT (OUTPATIENT)
Dept: ENDOCRINOLOGY | Facility: MEDICAL CENTER | Age: 70
End: 2025-01-07
Attending: INTERNAL MEDICINE
Payer: MEDICARE

## 2025-01-07 VITALS
BODY MASS INDEX: 26.53 KG/M2 | SYSTOLIC BLOOD PRESSURE: 134 MMHG | HEART RATE: 87 BPM | OXYGEN SATURATION: 97 % | DIASTOLIC BLOOD PRESSURE: 62 MMHG | WEIGHT: 169 LBS | HEIGHT: 67 IN

## 2025-01-07 DIAGNOSIS — E03.9 PRIMARY HYPOTHYROIDISM: ICD-10-CM

## 2025-01-07 DIAGNOSIS — E78.5 DYSLIPIDEMIA: ICD-10-CM

## 2025-01-07 DIAGNOSIS — Z96.41 INSULIN PUMP IN PLACE: ICD-10-CM

## 2025-01-07 DIAGNOSIS — M85.89 OSTEOPENIA OF MULTIPLE SITES: ICD-10-CM

## 2025-01-07 DIAGNOSIS — Z79.4 LONG-TERM INSULIN USE (HCC): ICD-10-CM

## 2025-01-07 DIAGNOSIS — E55.9 VITAMIN D DEFICIENCY: ICD-10-CM

## 2025-01-07 DIAGNOSIS — E10.69 TYPE 1 DIABETES MELLITUS WITH OTHER SPECIFIED COMPLICATION (HCC): ICD-10-CM

## 2025-01-07 LAB
HBA1C MFR BLD: 7 % (ref ?–5.8)
POCT INT CON NEG: NEGATIVE
POCT INT CON POS: POSITIVE

## 2025-01-07 PROCEDURE — 3075F SYST BP GE 130 - 139MM HG: CPT | Performed by: INTERNAL MEDICINE

## 2025-01-07 PROCEDURE — 99205 OFFICE O/P NEW HI 60 MIN: CPT | Performed by: INTERNAL MEDICINE

## 2025-01-07 PROCEDURE — 83036 HEMOGLOBIN GLYCOSYLATED A1C: CPT | Performed by: INTERNAL MEDICINE

## 2025-01-07 PROCEDURE — 95250 CONT GLUC MNTR PHYS/QHP EQP: CPT | Performed by: INTERNAL MEDICINE

## 2025-01-07 PROCEDURE — 99213 OFFICE O/P EST LOW 20 MIN: CPT | Performed by: INTERNAL MEDICINE

## 2025-01-07 PROCEDURE — 3078F DIAST BP <80 MM HG: CPT | Performed by: INTERNAL MEDICINE

## 2025-01-07 PROCEDURE — 95251 CONT GLUC MNTR ANALYSIS I&R: CPT | Performed by: INTERNAL MEDICINE

## 2025-01-07 RX ORDER — INSULIN ASPART INJECTION 100 [IU]/ML
INJECTION, SOLUTION SUBCUTANEOUS
Qty: 30 ML | Refills: 11 | Status: SHIPPED | OUTPATIENT
Start: 2025-01-07

## 2025-01-07 RX ORDER — LEVOTHYROXINE SODIUM 100 UG/1
100 TABLET ORAL
Qty: 30 TABLET | Refills: 11 | Status: SHIPPED | OUTPATIENT
Start: 2025-01-07

## 2025-01-07 ASSESSMENT — FIBROSIS 4 INDEX: FIB4 SCORE: 1.17

## 2025-01-07 NOTE — PROGRESS NOTES
Chief Complaint:  Consult requested by SOBIA Candelario for initial evaluation of Type 1 Diabetes Mellitus    HPI:   Ines Long is a 69 y.o. female with Type 1 Diabetes Mellitus diagnosed in 1982.  She denies hospitalizations for DKA in the past.      She used to see Dr. Stockton and Dr. Shaw      She did shots for ten years then switched to Medtronic pump  She is now on the Medtronic 780g pump infusing Fiasp  ( 30ml per month)  She is using the extended wear infusion sets        Her A1c was 7.0% on 1/7/2025      Her pump settings are as follows:    Basal rate  MN 0.500  330  0.850  1130 0.500  1400 0.450  1800 0.500    CR   MN 11    ISF   50    Target     AIT 2          Download of her CGM shows an average blood sugar of 134 with time in range of 81%            She has chronic low back pain and has neurogenic claudication from spinal stenosis which limits her capability for exercise. She under went lumbar decompression which failed  She now has a spinal stimulator     She had a bilateral tibia/fibular fracture in 8/2024    She reports poor balance and is worried about her weight gain.  She also has a history of chronic hyponatremia and was previously taking salt tabs which she stopped due to nausea.  Cortisol levels were normal at 13.7 on 8/2023      She has primary hypothyroidism and is on branded Levoxyl 100mxcg 6.5 days a week  TSH was 1.0 on Jan 2024    She has hyperlipidemia and is on Praluent, Zetia and Rosuvastatin         Latest Reference Range & Units 10/02/24 10:45   Cholesterol,Tot 100 - 199 mg/dL 120   Triglycerides 0 - 149 mg/dL 69   HDL >=40 mg/dL 61   LDL <100 mg/dL 45      Latest Reference Range & Units 03/07/24 09:27   Creatinine, Random Urine mg/dL 136.37   Total Protein, Urine 0.0 - 15.0 mg/dL 12.0   Protein Creatinine Ratio 10 - 107 mg/g 88   Micro Alb Creat Ratio 0 - 30 mg/g see below   Creatinine, Urine mg/dL 138.50   Microalbumin, Urine Random mg/dL  "<1.2   Urobilinogen, Urine Negative  0.2      Latest Reference Range & Units 01/03/24 10:37   TSH 0.380 - 5.330 uIU/mL 1.000   Free T-4 0.93 - 1.70 ng/dL 1.45     She monitors blood glucose  6 times per day.         She reports hypoglycemic episodes occurring 2 times per week and are moderate  She  reports hypoglycemic unawareness. She reports episodes of severe hypoglycemia requiring third party assistance.  She  is not wearing a medical alert bracelet or necklace.  She does not a glucagon emergency kit.    She reports attending diabetes education classes.  Diet: \"healthy\" diet  in general.  Breakfast: iced coffee with cream  Lunch: cottage cheese and fruit  Dinner protein and vegetables    Diabetes Complications   She  denies history of retinopathy.  She denies laser eye surgery. Last eye exam: 10/2024 with Dr. Lane at [facility]   She denies history of peripheral sensory neuropathy.  She denies numbness, tingling in both foot.  She denies history of foot sores.   She denies history of kidney damage.  She is on ACE inhibitor or ARB.   She denies history of coronary artery disease.  She  denies history of stroke and denies TIA.  She denies history of PAD.  She reports history of hyperlipidemia.      ROS:     CONS:     No fever, no chills, no weight loss, no fatigue   EYES:      No diplopia, no blurry vision, no redness of eyes, no swelling of eyelids   ENT:    No hearing loss, No ear pain, No sore throat, no dysphagia, no neck swelling   CV:     No chest pain, no palpitations, no claudication, no orthopnea, no PND   PULM:    No SOB, no cough, no hemoptysis, no wheezing    GI:   No nausea, no vomiting, no diarrhea, no constipation, no bloody stools   :  Passing urine well, no dysuria, no hematuria   ENDO:   No polyuria, no polydipsia, no heat intolerance, no cold intolerance   NEURO: No headaches, no dizziness, no convulsions, no tremors   MUSC:  No joint swellings, no arthralgias, no myalgias, no weakness "   SKIN:   No rash, no ulcers, no dry skin   PSYCH:   No depression, no anxiety, no difficulty sleeping       Past Medical History:  Patient Active Problem List    Diagnosis Date Noted    Subclavian artery stenosis, right ([facility]) 11/19/2024    Thrombocytosis 10/24/2024    Transaminitis 09/15/2024    Pathological fracture of right tibia due to age-related osteoporosis 09/15/2024    Pathological fracture of right fibula due to age-related osteoporosis 09/15/2024    Closed traumatic displaced fracture of right tibial plafond 09/15/2024    Bilateral closed proximal tibial fracture 09/14/2024    Right fibular fracture 09/14/2024    Carotid artery disease, unspecified laterality, unspecified type ([facility]) 09/12/2024    Dyslipidemia 09/12/2024    Coronary artery calcification seen on CT scan 02/27/2024    Elevated alkaline phosphatase level 02/27/2024    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, 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 ([facility]) 06/20/2023    Complex regional pain syndrome type 2 of right lower extremity 06/13/2023    Elevated lipoprotein(a) 03/10/2023    Cervical stenosis of spine 01/20/2023    Cervical myelopathy ([facility]) 01/20/2023    History of left-sided carotid endarterectomy 01/13/2023    Other hyperlipidemia 01/13/2023    LAFB (left anterior fascicular block) 01/13/2023    Small vessel disease, cerebrovascular 01/13/2023    Long term (current) use of antithrombotics/antiplatelets 01/13/2023    Family history of stroke 01/13/2023    Agatston coronary artery calcium score between 100 and 199 01/13/2023    Edema 07/12/2022    Chronic hyponatremia 07/12/2022    Lumbar stenosis with neurogenic claudication 05/16/2022    Neurogenic claudication due to lumbar spinal stenosis 04/22/2022    Hypertension 04/14/2022    Low back pain 04/14/2022    Carotid artery stenosis 12/14/2021    Neck pain on left side 12/10/2020    Hormone replacement therapy (HRT) 10/23/2018    Hyponatremia 08/16/2011    Insulin pump in  place 05/18/2011    Diabetes mellitus type 1 (HCC) 11/01/2010    Hypothyroidism 11/01/2010    Disorder of lipid metabolism 11/01/2010       Past Surgical History:  Past Surgical History:   Procedure Laterality Date    TIBIA NAILING INTRAMEDULLARY Right 9/14/2024    Procedure: INSERTION, INTRAMEDULLARY SUE, TIBIA;  Surgeon: Richard Sol M.D.;  Location: [facility];  Service: Orthopedics    LUMBAR FUSION O-ARM  9/27/2023    Procedure: L4/5 and L5/S1 redo minimally invasive posterior decompression and instrumented fusion;  Surgeon: Evon Díaz M.D.;  Location: [facility];  Service: Neurosurgery    LUMBAR DECOMPRESSION  9/27/2023    Procedure: DECOMPRESSION, SPINE, LUMBAR;  Surgeon: Evon Díaz M.D.;  Location: [facility];  Service: Neurosurgery    POSTERIOR CERVICAL FUSION O-ARM  3/20/2023    Procedure: O4-3-6-6-7-T1 posterior decompressive laminectomy and K0-2-3-6-7-T1 posterior instrumented fusion with O-arm imaging guidance;  Surgeon: Evon Díaz M.D.;  Location: [facility];  Service: Orthopedics    MENISCECTOMY, KNEE, ARTHROSCOPIC Right 08/22/2022    Procedure: RIGHT KNEE ARTHROSCOPY, PARTIAL MEDIAL MENISCECTOMY;  Surgeon: Ravi Gillis M.D.;  Location: [facility];  Service: Orthopedics    SYNOVECTOMY Right 08/22/2022    Procedure: SYNOVECTOMY;  Surgeon: Ravi Gillis M.D.;  Location: [facility];  Service: Orthopedics    CHONDROPLASTY Right 08/22/2022    Procedure: CHONDROPLASTY;  Surgeon: Ravi Gillis M.D.;  Location: [facility];  Service: Orthopedics    LUMBAR LAMINECTOMY DISKECTOMY  05/16/2022    Procedure: L4-5 and L5-S1 decompressive laminectomy, bilateral foraminotomies and left L5-S1;  Surgeon: Evon Díaz M.D.;  Location: [facility];  Service: Orthopedics    LUMBAR DECOMPRESSION  05/16/2022    Procedure: DECOMPRESSION, SPINE, LUMBAR;  Surgeon: Evon Díaz M.D.;  Location: SUR[facility]ERY [facility]  [place];  Service: Orthopedics    FORAMINOTOMY  05/16/2022    Procedure: FORAMINOTOMY, SPINE;  Surgeon: Evon Díaz M.D.;  Location: SURGERY [facility];  Service: Orthopedics    CAROTID ENDARTERECTOMY Left 12/05/2019    Procedure: ENDARTERECTOMY, CAROTID- WITH NEUROMONITORIING;  Surgeon: Surya Garcia M.D.;  Location: SURGERY [facility];  Service: Vascular    ORTHOPEDIC OSTEOTOMY Left 07/17/2017    Procedure: ORTHOPEDIC OSTEOTOMY CALCANEAL, KIDNER, EXCISION NAVICULAR, GASTROC SOLEUS RECESSION;  Surgeon: Adebayo Layton M.D.;  Location: SURGERY [facility];  Service:     LIGAMENT REPAIR  07/17/2017    Procedure: LIGAMENT REPAIR SPRING;  Surgeon: Adebayo Layton M.D.;  Location: SURGERY [facility];  Service:     TOE FUSION  07/17/2017    Procedure: TOE FUSION 1ST METATARSAL JOINT, 2ND TOE RECONSTRUCTION ;  Surgeon: Adebayo Layton M.D.;  Location: SURGERY [facility];  Service:     REPAIR, TENDON, LOWER EXTREMITY, USING TENDON GRAFT  07/17/2017    Procedure: FLEXOR TENDON REPAIR- RELEASE/POSSIBLE FLEXOR DIGITORIUM LONGUS;  Surgeon: Adebayo Layton M.D.;  Location: SURGERY [facility];  Service:     BUNIONECTOMY  01/01/2014    SHOULDER ARTHROSCOPY Left 01/01/1997    frozen shoulder    ABDOMINAL HYSTERECTOMY TOTAL  01/01/1987    KNEE ARTHROTOMY Right 01/01/1978    [facility]     CHOLECYSTECTOMY      FOOT SURGERY  2016 and 2018    ORIF, KNEE      [facility] [place]    SHOULDER SURGERY      Frozen shoulder        Allergies:  Ceclor [cefaclor], Augmentin, Naproxen, Tape, Amlodipine, Amoxicillin-pot clavulanate, Cefaclor, Clindamycin, and Penicillin g benzathine     Current Medications:    Current Outpatient Medications:     mirabegron ER (MYRBETRIQ) 25 MG TABLET SR 24 HR, Take 1 Tablet by mouth every day., Disp: 30 Tablet, Rfl: 3    pantoprazole (PROTONIX) 40 MG Tablet Delayed Response, Take 1 Tablet by mouth every day. FILL NOW, Disp: 90 Tablet, Rfl: 3    telmisartan (MICARDIS) 40 MG Tab,  Take 1 Tablet by mouth every day. to lower blood pressure, Disp: 100 Tablet, Rfl: 3    famotidine (PEPCID) 20 MG Tab, Take 1 Tablet by mouth 2 times a day., Disp: 60 Tablet, Rfl: 0    acetaminophen (TYLENOL) 500 MG Tab, Take 2 Tablets by mouth every 6 hours as needed for Mild Pain or Moderate Pain., Disp: , Rfl:     ezetimibe (ZETIA) 10 MG Tab, Take 1 Tablet by mouth every day. To lower cholesterol, Disp: 100 Tablet, Rfl: 3    rosuvastatin (CRESTOR) 40 MG tablet, Take 1 Tablet by mouth every day. To lower cholesterol, Disp: 100 Tablet, Rfl: 3    sodium chloride (SALT) 1 GM Tab, TAKE TWO TABLETS BY MOUTH EVERY DAY (Patient taking differently: Take 3 g by mouth every day. 2g in AM, and 1g in PM), Disp: 90 Tablet, Rfl: 1    Alirocumab (PRALUENT) 150 MG/ML Solution Auto-injector, Inject 150 mg under the skin every 14 days., Disp: 6 mL, Rfl: 3    DULoxetine HCl 60 MG Capsule Delayed Release Sprinkle, Take 60 mg by mouth every day., Disp: , Rfl:     pregabalin (LYRICA) 100 MG Cap, Take 200 mg by mouth every evening., Disp: , Rfl:     clindamycin (CLEOCIN) 150 MG Cap, Take 3 capsules PO prior to dental procedure, Disp: 6 Capsule, Rfl: 0    Insulin Aspart, w/Niacinamide, (FIASP) 100 UNIT/ML Solution, Inject 2.2 Units as directed one time., Disp: , Rfl:     insulin infusion pump Device, Inject  under the skin continuous. Patient's own SQ insulin pump  Type of Insulin: Fiasp Last change of tubing: 3/19/2023 - Change tubing and site every 72 hours  Dosing: Basal rate:  0000 - 0329 = 0.5 units/hr  0330 - 1129 = 0.85 units/hr  1130 - 1359 = 0.5 units/hr             1400 - 1759 = 0.45 units/hr             1800 - 2359 = 0.5 units/hr  Bolus ratio:  1 unit : 12 g carbohydrate at  (breakfast, lunch, dinner, snacks)    Correction ratio:   1 units for every 50 over 150 mg/dL  Disconnect pump if patient becomes hypoglycemic and altered., Disp: , Rfl:     LEVOXYL 100 MCG Tab, Take  mcg by mouth every morning on an empty stomach.  Takes 1/2 tab on Sundays, Disp: , Rfl:     Cholecalciferol (VITAMIN D-3 PO), Take 5,000 mg by mouth every evening., Disp: , Rfl:     mirabegron ER (MYRBETRIQ) 25 MG TABLET SR 24 HR, Take 1 Tablet by mouth every day for 360 days., Disp: 90 Tablet, Rfl: 3    docusate sodium (COLACE) 100 MG Cap, Take 1 Capsule by mouth 2 times a day. To prevent constipation while taking narcotic pain medications., Disp: 60 Capsule, Rfl: 1    Social History:  Social History     Socioeconomic History    Marital status:      Spouse name: Not on file    Number of children: Not on file    Years of education: Not on file    Highest education level: Not on file   Occupational History    Not on file   Tobacco Use    Smoking status: Former     Current packs/day: 0.00     Types: Cigarettes     Quit date: 1975     Years since quittin.0    Smokeless tobacco: Never   Vaping Use    Vaping status: Never Used   Substance and Sexual Activity    Alcohol use: Yes     Alcohol/week: 1.2 oz     Types: 2 Glasses of wine per week     Comment: about 3-4 drinks per week.     Drug use: Not Currently     Comment: tried CBD gummies; no THC    Sexual activity: Yes     Partners: Male     Birth control/protection: Surgical     Comment: Pt states had a hysterectomy   Other Topics Concern    Not on file   Social History Narrative    Not on file     Social Drivers of Health     Financial Resource Strain: Low Risk  (2024)    Overall Financial Resource Strain (CARDIA)     Difficulty of Paying Living Expenses: Not hard at all   Food Insecurity: No Food Insecurity (2024)    Hunger Vital Sign     Worried About Running Out of Food in the Last Year: Never true     Ran Out of Food in the Last Year: Never true   Transportation Needs: No Transportation Needs (2024)    PRAPARE - Transportation     Lack of Transportation (Medical): No     Lack of Transportation (Non-Medical): No   Physical Activity: Insufficiently Active (2024)    Exercise Vital  "Sign     Days of Exercise per Week: 1 day     Minutes of Exercise per Session: 40 min   Stress: No Stress Concern Present (2024)    [employer] [employer] - Occupational Stress Questionnaire     Feeling of Stress : Not at all   Social Connections: Moderately Isolated (2024)    Social Connection and Isolation Panel [NHANES]     Frequency of Communication with Friends and Family: More than three times a week     Frequency of Social Gatherings with Friends and Family: Three times a week     Attends Congregation Services: Never     Active Member of Clubs or Organizations: No     Attends Club or Organization Meetings: Never     Marital Status:    Intimate Partner Violence: Not At Risk (2024)    Humiliation, Afraid, Rape, and Kick questionnaire     Fear of Current or Ex-Partner: No     Emotionally Abused: No     Physically Abused: No     Sexually Abused: No   Housing Stability: Low Risk  (2024)    Housing Stability Vital Sign     Unable to Pay for Housing in the Last Year: No     Number of Places Lived in the Last Year: 1     Unstable Housing in the Last Year: No        Family History:   Family History   Problem Relation Age of Onset    Cancer Mother         rectal cancer    Stroke Father          59    Heart Disease Father     Hypertension Father     Lung Disease Father         tb    Hyperlipidemia Father     Diabetes Sister     Psychiatric Illness Sister         bipolar    Heart Disease Maternal Grandfather     Stroke Paternal Grandmother     Heart Disease Paternal Grandfather          PHYSICAL EXAM:   Vital signs: /62 (BP Location: Left arm, Patient Position: Sitting, BP Cuff Size: Adult long)   Pulse 87   Ht 1.702 m (5' 7\")   Wt 76.7 kg (169 lb)   LMP 1983 (LMP Unknown)   SpO2 97%   BMI 26.47 kg/m²   GENERAL: Well-developed, well-nourished  in no apparent distress.   EYE: No ocular and eyelid asymmetry, Anicteric sclerae,  PERRL, No exophthalmos or " lidlag  HENT: Hearing grossly intact, Normocephalic, atraumatic. Pink, moist mucous membranes, No exudate  NECK: Supple. Trachea midline. thyroid is normal in size without nodules or tenderness  CARDIOVASCULAR: Regular rate and rhythm. No murmurs, rubs, or gallops.   LUNGS: Clear to auscultation bilaterally   ABDOMEN: Soft, nontender with positive bowel sounds.   EXTREMITIES: No clubbing, cyanosis, or edema.   NEUROLOGICAL: Cranial nerves II-XII are grossly intact   Symmetric reflexes at the patella no proximal muscle weakness, No visible tremor with both outstretched hands  LYMPH: No cervical, supraclavicular,  adenopathy palpated.   SKIN: No rashes, lesions. Turgor is normal.  FOOT: Normal sensation to monofilament testing, normal pulses, no ulcers.  Normal Vibration quantitative sensation test.    Labs:  Lab Results   Component Value Date/Time    HBA1C 7.0 (A) 01/07/2025 0807    AVGLUC 151 10/12/2023 1010       Lab Results   Component Value Date/Time    WBC 5.2 10/31/2024 09:57 AM    RBC 4.46 10/31/2024 09:57 AM    HEMOGLOBIN 13.7 10/31/2024 09:57 AM    MCV 96.2 10/31/2024 09:57 AM    MCH 30.7 10/31/2024 09:57 AM    MCHC 31.9 (L) 10/31/2024 09:57 AM    RDW 48.8 10/31/2024 09:57 AM    MPV 10.9 10/31/2024 09:57 AM       Lab Results   Component Value Date/Time    SODIUM 137 12/10/2024 02:53 PM    POTASSIUM 3.8 12/10/2024 02:53 PM    CHLORIDE 98 12/10/2024 02:53 PM    CO2 26 12/10/2024 02:53 PM    ANION 13.0 12/10/2024 02:53 PM    GLUCOSE 187 (H) 12/10/2024 02:53 PM    BUN 12 12/10/2024 02:53 PM    CREATININE 0.60 12/10/2024 02:53 PM    CALCIUM 9.4 12/10/2024 02:53 PM    ASTSGOT 38 11/21/2024 10:55 AM    ALTSGPT 37 11/21/2024 10:55 AM    TBILIRUBIN 0.4 11/21/2024 10:55 AM    ALBUMIN 4.6 11/21/2024 10:55 AM    ALBUMIN 3.87 03/13/2024 11:21 AM    TOTPROTEIN 7.4 11/21/2024 10:55 AM    TOTPROTEIN 6.6 03/13/2024 11:21 AM    GLOBULIN 2.8 11/21/2024 10:55 AM    AGRATIO 1.6 11/21/2024 10:55 AM       Lab Results   Component  "Value Date/Time    CHOLSTRLTOT 120 10/02/2024 1045    TRIGLYCERIDE 69 10/02/2024 1045    HDL 61 10/02/2024 1045    LDL 45 10/02/2024 1045       Lab Results   Component Value Date/Time    MALBCRT see below 03/07/2024 09:27 AM    MICROALBUR <1.2 03/07/2024 09:27 AM        Lab Results   Component Value Date/Time    TSHULTRASEN 1.000 01/03/2024 1037     No results found for: \"FREEDIR\"  No results found for: \"FREET3\"  No results found for: \"THYSTIMIG\"    IMAGING:          ASSESSMENT/PLAN:     1. Type 1 diabetes mellitus with other specified complication (HCC)  Controlled  Pump and CGM were downloaded and reviewed  Overall her glycemic control is excellent.  We discussed the importance of good glucose control and preventing long-term vascular complications.  I recommend no changes to her pump settings  She should contact [employer] so that we can update her pump supplies  I refilled her Fiasp  Recommend follow-up in 3 months with repeat A1c and with complete labs    2. Primary hypothyroidism  Controlled  Continue Levoxyl 100 mcg daily  Repeat thyroid labs in 3 months    3. Dyslipidemia  Controlled  Continue Praluent Zetia and rosuvastatin  Repeat fasting lipids in 3 months    4. Osteopenia of multiple sites  Unstable  She has osteopenia of the right femur with the lowest T-score of -2.3 in 2022  She had a fragility fracture and broke her tibia fibula from a ground-level fall in 2024   I recommend that she get an updated bone density  Will discuss the bone density when we see her again for follow-up    5. Vitamin D deficiency  Unstable will get an updated vitamin D with her upcoming labs    6. Insulin pump in place  Insulin pump is in place    7. Long-term insulin use (HCC)  Patient is on long-term insulin therapy for type 1 diabetes      Return in about 3 months (around 4/7/2025).      Total time spent on day of service was over 60 minutes which included obtaining a detailed history and physical exam, ordering labs, " coordinating care and scheduling future follow-up and excluding procedure time (CGM download and review)      Thank you kindly for allowing me to participate in the diabetes care plan for this patient.    George Evans MD, FACE, [facility]      CC:   MAHIN Candelario.

## 2025-01-08 ENCOUNTER — APPOINTMENT (OUTPATIENT)
Dept: RADIOLOGY | Facility: IMAGING CENTER | Age: 70
End: 2025-01-08
Attending: NURSE PRACTITIONER
Payer: MEDICARE

## 2025-01-08 ENCOUNTER — OFFICE VISIT (OUTPATIENT)
Dept: URGENT CARE | Facility: CLINIC | Age: 70
End: 2025-01-08
Payer: MEDICARE

## 2025-01-08 ENCOUNTER — PATIENT OUTREACH (OUTPATIENT)
Dept: HEALTH INFORMATION MANAGEMENT | Facility: OTHER | Age: 70
End: 2025-01-08
Payer: MEDICARE

## 2025-01-08 ENCOUNTER — TELEPHONE (OUTPATIENT)
Dept: HEALTH INFORMATION MANAGEMENT | Facility: OTHER | Age: 70
End: 2025-01-08
Payer: MEDICARE

## 2025-01-08 VITALS
RESPIRATION RATE: 14 BRPM | OXYGEN SATURATION: 98 % | HEIGHT: 67 IN | DIASTOLIC BLOOD PRESSURE: 62 MMHG | HEART RATE: 87 BPM | WEIGHT: 169 LBS | SYSTOLIC BLOOD PRESSURE: 122 MMHG | BODY MASS INDEX: 26.53 KG/M2 | TEMPERATURE: 96.6 F

## 2025-01-08 DIAGNOSIS — K13.0 CHEILITIS: ICD-10-CM

## 2025-01-08 DIAGNOSIS — R05.1 ACUTE COUGH: ICD-10-CM

## 2025-01-08 DIAGNOSIS — I10 PRIMARY HYPERTENSION: ICD-10-CM

## 2025-01-08 DIAGNOSIS — J01.90 ACUTE NON-RECURRENT SINUSITIS, UNSPECIFIED LOCATION: ICD-10-CM

## 2025-01-08 DIAGNOSIS — K13.79 ORAL PAIN: ICD-10-CM

## 2025-01-08 PROCEDURE — 99214 OFFICE O/P EST MOD 30 MIN: CPT | Performed by: NURSE PRACTITIONER

## 2025-01-08 PROCEDURE — 3078F DIAST BP <80 MM HG: CPT | Performed by: NURSE PRACTITIONER

## 2025-01-08 PROCEDURE — 3074F SYST BP LT 130 MM HG: CPT | Performed by: NURSE PRACTITIONER

## 2025-01-08 PROCEDURE — 71046 X-RAY EXAM CHEST 2 VIEWS: CPT | Mod: TC | Performed by: NURSE PRACTITIONER

## 2025-01-08 PROCEDURE — RXMED WILLOW AMBULATORY MEDICATION CHARGE: Performed by: NURSE PRACTITIONER

## 2025-01-08 RX ORDER — LIDOCAINE HYDROCHLORIDE 20 MG/ML
SOLUTION OROPHARYNGEAL
Qty: 100 ML | Refills: 0 | Status: SHIPPED | OUTPATIENT
Start: 2025-01-08

## 2025-01-08 RX ORDER — DOXYCYCLINE HYCLATE 100 MG
100 TABLET ORAL EVERY 12 HOURS
Qty: 14 TABLET | Refills: 0 | Status: SHIPPED | OUTPATIENT
Start: 2025-01-08 | End: 2025-01-15

## 2025-01-08 RX ORDER — METHYLPREDNISOLONE 4 MG/1
TABLET ORAL
Qty: 1 EACH | Refills: 0 | Status: SHIPPED | OUTPATIENT
Start: 2025-01-08 | End: 2025-01-30

## 2025-01-08 ASSESSMENT — ENCOUNTER SYMPTOMS
SORE THROAT: 1
COUGH: 1

## 2025-01-08 ASSESSMENT — FIBROSIS 4 INDEX: FIB4 SCORE: 1.17

## 2025-01-08 ASSESSMENT — VISUAL ACUITY: OU: 1

## 2025-01-08 NOTE — PROGRESS NOTES
Pt left a vm asking for a return call from her RN with Care Management. Pt's Rn is not in and pt states that she is having symptoms. Inside her mouth is painful with blisters, fever, left ear hurting, throat hurts, wants to speak to nurse if she should go to urgent care. CHW transferred call to Consulting MYNOR Sims for triage.

## 2025-01-08 NOTE — TELEPHONE ENCOUNTER
"Pt states for the past two weeks she has had sores in her mouth and on her whole lip. She treated it like her past cold sores, \"but the creams didn't work\". Pt now has a fever 100.4, sore throat, and left ear pain.   Pt states she is restless and cannot sleep.   Pt encouraged to go to urgent care to be evaluated. Pt states she will do that as soon as her ride is back home today.     Answered all questions. Pt verbalized understanding. No further needs at this time.   "

## 2025-01-09 NOTE — PROGRESS NOTES
Subjective:     Ines Long is a 69 y.o. female who presents for Sore (In mouth, fever, sore throat off & on, (L) ear pain x 2 weeks)       Cough  This is a new problem. Associated symptoms include ear pain and a sore throat.     Ambient listening software ([employer]) used during this encounter with the consent of the patient. The following text is AI generated:    History of Present Illness  The patient presents with ear pain, cough, sore throat, and cold sores.    Ear Pain, Cough, Sore Throat, and Cold Sores  She has had left ear pain, intermittent sore throat, and head heaviness for 2 weeks. Symptoms began with lower lip pain, developing into fever blisters. Fever blister cream worsens the burning sensation. Dental hygiene is painful due to lip contact. She notes voice alteration and occasional phlegm. Pain is localized to the left ear without pressure. Intermittent fevers of 100.4°F persist. No pharmacological relief sought. No history of asthma or respiratory issues. No home tests for viral illnesses. No abnormal leg swelling.  - Onset: Symptoms began 2 weeks ago.  - Location: Left ear, lower lip, throat, head.  - Duration: 2 weeks.  - Character: Intermittent sore throat, head heaviness, lower lip pain developing into fever blisters, voice alteration, occasional phlegm, intermittent fevers.  - Alleviating/Aggravating Factors: Fever blister cream worsens burning sensation; dental hygiene is painful due to lip contact.  - Severity: Intermittent fevers of 100.4°F; pain localized to the left ear without pressure; difficulty sleeping due to pain.    Type 1 Diabetes  She has type 1 diabetes with fluctuating blood sugars.  - Character: Fluctuating blood sugars.    Additional Information  She applies Dr. Dan's [place] for chapped lips. Previous cold sores resolved in 3 days with creams. Takes Tylenol 3000 mg/day for leg pain. Lip blisters are drying, but pain worsens.    History of gastroparesis. Broke tibia  and fibula in August 2024.    ALLERGIES  - CECLOR: Severe hives  - AUGMENTIN: Stomach upset for months    MEDICATIONS  - Current: Tylenol    Review of Systems   HENT:  Positive for ear pain and sore throat.    Respiratory:  Positive for cough.    All other systems reviewed and are negative.    Refer to HPI for additional details.    During this visit, appropriate PPE was worn, and hand hygiene was performed.    PMH:  has a past medical history of Acute nasopharyngitis (04/29/2022), Anesthesia, Blocked artery (Around 2019), Cataract (2023), Diabetes type 1, controlled ([facility]) (11/01/2010), Dyslipidemia (11/01/2010), High cholesterol, Hypertension, Hypothyroidism (11/01/2010), Indigestion, Insulin pump in place (05/18/2011), Left carotid artery stenosis - severe 2019, PONV (postoperative nausea and vomiting), Routine health maintenance (05/18/2011), and Urinary incontinence.    MEDS:   Current Outpatient Medications:     doxycycline (VIBRAMYCIN) 100 MG Tab, Take 1 Tablet by mouth every 12 hours for 7 days., Disp: 14 Tablet, Rfl: 0    methylPREDNISolone (MEDROL DOSEPAK) 4 MG Tablet Therapy Pack, Use as directed on package. May take all of Day 1 as a single dose (24 mg) when starting. Do not take with NSAIDs such as ibuprofen., Disp: 1 Each, Rfl: 0    lidocaine (XYLOCAINE) 2 % Solution, Apply 5 mL topically with cotton swab every 3 hours for lip/mouth pain., Disp: 100 mL, Rfl: 0    LEVOXYL 100 MCG Tab, Take 1 Tablet by mouth every morning on an empty stomach. Takes 1/2 tab on Sundays, Disp: 30 Tablet, Rfl: 11    Insulin Aspart, w/Niacinamide, (FIASP) 100 UNIT/ML Solution, Inject 60 units into pump daily, Disp: 30 mL, Rfl: 11    mirabegron ER (MYRBETRIQ) 25 MG TABLET SR 24 HR, Take 1 Tablet by mouth every day., Disp: 30 Tablet, Rfl: 3    pantoprazole (PROTONIX) 40 MG Tablet Delayed Response, Take 1 Tablet by mouth every day. FILL NOW, Disp: 90 Tablet, Rfl: 3    telmisartan (MICARDIS) 40 MG Tab, Take 1 Tablet by mouth every  day. to lower blood pressure, Disp: 100 Tablet, Rfl: 3    famotidine (PEPCID) 20 MG Tab, Take 1 Tablet by mouth 2 times a day., Disp: 60 Tablet, Rfl: 0    acetaminophen (TYLENOL) 500 MG Tab, Take 2 Tablets by mouth every 6 hours as needed for Mild Pain or Moderate Pain., Disp: , Rfl:     ezetimibe (ZETIA) 10 MG Tab, Take 1 Tablet by mouth every day. To lower cholesterol, Disp: 100 Tablet, Rfl: 3    rosuvastatin (CRESTOR) 40 MG tablet, Take 1 Tablet by mouth every day. To lower cholesterol, Disp: 100 Tablet, Rfl: 3    sodium chloride (SALT) 1 GM Tab, TAKE TWO TABLETS BY MOUTH EVERY DAY (Patient taking differently: Take 3 g by mouth every day. 2g in AM, and 1g in PM), Disp: 90 Tablet, Rfl: 1    Alirocumab (PRALUENT) 150 MG/ML Solution Auto-injector, Inject 150 mg under the skin every 14 days., Disp: 6 mL, Rfl: 3    DULoxetine HCl 60 MG Capsule Delayed Release Sprinkle, Take 60 mg by mouth every day., Disp: , Rfl:     pregabalin (LYRICA) 100 MG Cap, Take 200 mg by mouth every evening., Disp: , Rfl:     clindamycin (CLEOCIN) 150 MG Cap, Take 3 capsules PO prior to dental procedure, Disp: 6 Capsule, Rfl: 0    insulin infusion pump Device, Inject  under the skin continuous. Patient's own SQ insulin pump  Type of Insulin: Fiasp Last change of tubing: 3/19/2023 - Change tubing and site every 72 hours  Dosing: Basal rate:  0000 - 0329 = 0.5 units/hr  0330 - 1129 = 0.85 units/hr  1130 - 1359 = 0.5 units/hr             1400 - 1759 = 0.45 units/hr             1800 - 2359 = 0.5 units/hr  Bolus ratio:  1 unit : 12 g carbohydrate at  (breakfast, lunch, dinner, snacks)    Correction ratio:   1 units for every 50 over 150 mg/dL  Disconnect pump if patient becomes hypoglycemic and altered., Disp: , Rfl:     Cholecalciferol (VITAMIN D-3 PO), Take 5,000 mg by mouth every evening., Disp: , Rfl:     ALLERGIES:   Allergies   Allergen Reactions    Ceclor [Cefaclor] Hives and Swelling    Augmentin Hives, Diarrhea and Vomiting     Fever  "blisters   Sickness lasts forever    Naproxen      Face Swells   9/14/24: spoke to pt, states she can take ibuprofen with no issues.    Tape Rash     \"Certain band aids\"  PAPER TAPE OKAY/ Tegaderm ok    Amlodipine Swelling     5mg = swelling     Amoxicillin-Pot Clavulanate      Other reaction(s): Not available    Cefaclor Hives and Swelling    Clindamycin Unspecified    Penicillin G Benzathine Unspecified     SURGHX:   Past Surgical History:   Procedure Laterality Date    TIBIA NAILING INTRAMEDULLARY Right 9/14/2024    Procedure: INSERTION, INTRAMEDULLARY SUE, TIBIA;  Surgeon: Richard Sol M.D.;  Location: [facility];  Service: Orthopedics    LUMBAR FUSION O-ARM  9/27/2023    Procedure: L4/5 and L5/S1 redo minimally invasive posterior decompression and instrumented fusion;  Surgeon: Evon Díaz M.D.;  Location: [facility];  Service: Neurosurgery    LUMBAR DECOMPRESSION  9/27/2023    Procedure: DECOMPRESSION, SPINE, LUMBAR;  Surgeon: Evon Díaz M.D.;  Location: [facility];  Service: Neurosurgery    POSTERIOR CERVICAL FUSION O-ARM  3/20/2023    Procedure: D9-3-6-6-7-T1 posterior decompressive laminectomy and G6-2-3-6-7-T1 posterior instrumented fusion with O-arm imaging guidance;  Surgeon: Evon Díaz M.D.;  Location: [facility];  Service: Orthopedics    MENISCECTOMY, KNEE, ARTHROSCOPIC Right 08/22/2022    Procedure: RIGHT KNEE ARTHROSCOPY, PARTIAL MEDIAL MENISCECTOMY;  Surgeon: Ravi Gillis M.D.;  Location: [facility];  Service: Orthopedics    SYNOVECTOMY Right 08/22/2022    Procedure: SYNOVECTOMY;  Surgeon: Ravi Gillis M.D.;  Location: [facility];  Service: Orthopedics    CHONDROPLASTY Right 08/22/2022    Procedure: CHONDROPLASTY;  Surgeon: Ravi Gillis M.D.;  Location: [facility];  Service: Orthopedics    LUMBAR LAMINECTOMY DISKECTOMY  05/16/2022    Procedure: L4-5 and L5-S1 decompressive laminectomy, bilateral " foraminotomies and left L5-S1;  Surgeon: Evon Díaz M.D.;  Location: [facility];  Service: Orthopedics    LUMBAR DECOMPRESSION  05/16/2022    Procedure: DECOMPRESSION, SPINE, LUMBAR;  Surgeon: Evon Díaz M.D.;  Location: [facility];  Service: Orthopedics    FORAMINOTOMY  05/16/2022    Procedure: FORAMINOTOMY, SPINE;  Surgeon: Evon Díaz M.D.;  Location: [facility];  Service: Orthopedics    CAROTID ENDARTERECTOMY Left 12/05/2019    Procedure: ENDARTERECTOMY, CAROTID- WITH NEUROMONITORIING;  Surgeon: Surya Garcia M.D.;  Location: [facility];  Service: Vascular    ORTHOPEDIC OSTEOTOMY Left 07/17/2017    Procedure: ORTHOPEDIC OSTEOTOMY CALCANEAL, KIDNER, EXCISION NAVICULAR, GASTROC SOLEUS RECESSION;  Surgeon: Adebayo Layton M.D.;  Location: [facility];  Service:     LIGAMENT REPAIR  07/17/2017    Procedure: LIGAMENT REPAIR SPRING;  Surgeon: Adebayo Layton M.D.;  Location: [facility];  Service:     TOE FUSION  07/17/2017    Procedure: TOE FUSION 1ST METATARSAL JOINT, 2ND TOE RECONSTRUCTION ;  Surgeon: Adebayo Layton M.D.;  Location: [facility];  Service:     REPAIR, TENDON, LOWER EXTREMITY, USING TENDON GRAFT  07/17/2017    Procedure: FLEXOR TENDON REPAIR- RELEASE/POSSIBLE FLEXOR DIGITORIUM LONGUS;  Surgeon: Adebayo Layton M.D.;  Location: [facility];  Service:     BUNIONECTOMY  01/01/2014    SHOULDER ARTHROSCOPY Left 01/01/1997    frozen shoulder    ABDOMINAL HYSTERECTOMY TOTAL  01/01/1987    KNEE ARTHROTOMY Right 01/01/1978    [facility]     CHOLECYSTECTOMY      FOOT SURGERY  2016 and 2018    ORIF, KNEE      [facility]    SHOULDER SURGERY      Frozen shoulder     SOCHX:  reports that she quit smoking about 50 years ago. Her smoking use included cigarettes. She has never used smokeless tobacco. She reports current alcohol use of about 1.2 oz of alcohol per week. She reports that she does not  "currently use drugs.    FH: Per HPI as applicable/pertinent.      Objective:     /62 (BP Location: Left arm, Patient Position: Sitting, BP Cuff Size: Adult)   Pulse 87   Temp 35.9 °C (96.6 °F) (Temporal)   Resp 14   Ht 1.702 m (5' 7\")   Wt 76.7 kg (169 lb)   LMP 01/01/1983 (LMP Unknown)   SpO2 98%   BMI 26.47 kg/m²     Physical Exam  Nursing note reviewed.   Constitutional:       General: She is not in acute distress.     Appearance: She is well-developed. She is not ill-appearing or toxic-appearing.   HENT:      Right Ear: Tympanic membrane is not perforated, erythematous or bulging.      Left Ear: Tympanic membrane is not perforated, erythematous or bulging (Dull).      Nose: Nose normal.      Mouth/Throat:      Mouth: Mucous membranes are moist.      Pharynx: Postnasal drip present. No pharyngeal swelling or posterior oropharyngeal erythema.      Comments: Chapping of lower lip  Eyes:      General: Vision grossly intact.      Extraocular Movements: Extraocular movements intact.      Conjunctiva/sclera: Conjunctivae normal.   Cardiovascular:      Rate and Rhythm: Normal rate and regular rhythm.      Heart sounds: Normal heart sounds.   Pulmonary:      Effort: Pulmonary effort is normal. No respiratory distress.      Breath sounds: Rhonchi present. No decreased breath sounds.   Musculoskeletal:         General: No deformity. Normal range of motion.      Cervical back: Normal range of motion.   Skin:     General: Skin is warm and dry.      Coloration: Skin is not pale.   Neurological:      Mental Status: She is alert and oriented to person, place, and time.      Motor: No weakness.   Psychiatric:         Behavior: Behavior normal. Behavior is cooperative.     Chest x-ray:    Details    Reading Physician Reading Date Result Priority   Abram Hook M.D.  [phone] 1/8/2025      Narrative & Impression     1/8/2025 6:38 PM     HISTORY/REASON FOR EXAM:  Cough  Fever.     TECHNIQUE/EXAM DESCRIPTION AND " NUMBER OF VIEWS:  Two views of the chest.     COMPARISON:  9/14/2024     FINDINGS:  Cardiomediastinal contour is within normal limits.  No focal pulmonary consolidation.  No pleural fluid collection or pneumothorax.  Thoracic spine stimulator leads again present.  Prior cervical fusion.     IMPRESSION:     No acute cardiopulmonary disease.        Exam Ended: 01/08/25  6:50 PM Last Resulted: 01/08/25  6:52 PM       Radiology report and images reviewed by myself. Concur with findings.      Assessment/Plan:     1. Acute cough  - DX-CHEST-2 VIEWS; Future    2. Acute non-recurrent sinusitis, unspecified location  - doxycycline (VIBRAMYCIN) 100 MG Tab; Take 1 Tablet by mouth every 12 hours for 7 days.  Dispense: 14 Tablet; Refill: 0  - methylPREDNISolone (MEDROL DOSEPAK) 4 MG Tablet Therapy Pack; Use as directed on package. May take all of Day 1 as a single dose (24 mg) when starting. Do not take with NSAIDs such as ibuprofen.  Dispense: 1 Each; Refill: 0    3. Oral pain  - lidocaine (XYLOCAINE) 2 % Solution; Apply 5 mL topically with cotton swab every 3 hours for lip/mouth pain.  Dispense: 100 mL; Refill: 0    4. Cheilitis    Rx as above sent electronically. Continue Tylenol PRN. Continue lip moisturizer.    Monitor. Warning signs reviewed. Return precautions advised.     Differential diagnosis, natural history, supportive care, over-the-counter symptom management per 's instructions, close monitoring, and indications for immediate follow-up discussed.     All questions answered. Patient agrees with the plan of care.    Discharge summary provided via [employer].    Billing note: moderate complexity (independent interpretation) and moderate risk (Rx management). Established patient. [id number]. Please refer to LOS tool for details.

## 2025-01-13 ENCOUNTER — PHARMACY VISIT (OUTPATIENT)
Dept: PHARMACY | Facility: MEDICAL CENTER | Age: 70
End: 2025-01-13
Payer: COMMERCIAL

## 2025-01-13 ENCOUNTER — TELEPHONE (OUTPATIENT)
Dept: HEALTH INFORMATION MANAGEMENT | Facility: OTHER | Age: 70
End: 2025-01-13
Payer: MEDICARE

## 2025-01-13 ENCOUNTER — TELEPHONE (OUTPATIENT)
Dept: ENDOCRINOLOGY | Facility: MEDICAL CENTER | Age: 70
End: 2025-01-13
Payer: MEDICARE

## 2025-01-13 NOTE — TELEPHONE ENCOUNTER
Called patient in response to notification from McKenzie Reyes Vargas JONI that the patient had called in with a question regarding her insulin. The patient states that she is being treated for an infection with antibiotics and steroids. Her blood sugars have since been high and she has bone through about 300 units of insulin in less than 48 hours. The patient was advised to call Dr. Evans's office to secure specialist instructions with regard to managing this medication regimen. Chart forwarded to Dr. Evans and Roel Rizo for review and continuity of care.

## 2025-01-13 NOTE — TELEPHONE ENCOUNTER
Patient was recently seen in [facility] due to lip pain, she was prescribed methylPREDNISolone - she has been taking the medication since Thursday evening. She is supposed to take 3 tablets today, however she states it is causing her sugar level to be high. She requested advice from PCP however they referred her to contact Dr Evans. She would like to know if she should stop taking the medication. She states her lip pain has decreased,     Please advise.     Thank you,

## 2025-01-28 ENCOUNTER — HOSPITAL ENCOUNTER (OUTPATIENT)
Dept: RADIOLOGY | Facility: MEDICAL CENTER | Age: 70
End: 2025-01-28
Attending: NURSE PRACTITIONER
Payer: MEDICARE

## 2025-01-28 DIAGNOSIS — R13.12 OROPHARYNGEAL DYSPHAGIA: ICD-10-CM

## 2025-01-28 PROCEDURE — 92611 MOTION FLUOROSCOPY/SWALLOW: CPT | Performed by: SPEECH-LANGUAGE PATHOLOGIST

## 2025-01-28 PROCEDURE — 74230 X-RAY XM SWLNG FUNCJ C+: CPT

## 2025-01-29 SDOH — ECONOMIC STABILITY: FOOD INSECURITY: WITHIN THE PAST 12 MONTHS, THE FOOD YOU BOUGHT JUST DIDN'T LAST AND YOU DIDN'T HAVE MONEY TO GET MORE.: NEVER TRUE

## 2025-01-29 SDOH — ECONOMIC STABILITY: FOOD INSECURITY: WITHIN THE PAST 12 MONTHS, YOU WORRIED THAT YOUR FOOD WOULD RUN OUT BEFORE YOU GOT MONEY TO BUY MORE.: NEVER TRUE

## 2025-01-29 SDOH — ECONOMIC STABILITY: INCOME INSECURITY: IN THE LAST 12 MONTHS, WAS THERE A TIME WHEN YOU WERE NOT ABLE TO PAY THE MORTGAGE OR RENT ON TIME?: NO

## 2025-01-29 SDOH — HEALTH STABILITY: PHYSICAL HEALTH: ON AVERAGE, HOW MANY DAYS PER WEEK DO YOU ENGAGE IN MODERATE TO STRENUOUS EXERCISE (LIKE A BRISK WALK)?: 1 DAY

## 2025-01-29 SDOH — HEALTH STABILITY: MENTAL HEALTH
STRESS IS WHEN SOMEONE FEELS TENSE, NERVOUS, ANXIOUS, OR CAN'T SLEEP AT NIGHT BECAUSE THEIR MIND IS TROUBLED. HOW STRESSED ARE YOU?: ONLY A LITTLE

## 2025-01-29 SDOH — ECONOMIC STABILITY: INCOME INSECURITY: HOW HARD IS IT FOR YOU TO PAY FOR THE VERY BASICS LIKE FOOD, HOUSING, MEDICAL CARE, AND HEATING?: NOT HARD AT ALL

## 2025-01-29 SDOH — HEALTH STABILITY: PHYSICAL HEALTH: ON AVERAGE, HOW MANY MINUTES DO YOU ENGAGE IN EXERCISE AT THIS LEVEL?: 10 MIN

## 2025-01-29 SDOH — ECONOMIC STABILITY: TRANSPORTATION INSECURITY
IN THE PAST 12 MONTHS, HAS LACK OF RELIABLE TRANSPORTATION KEPT YOU FROM MEDICAL APPOINTMENTS, MEETINGS, WORK OR FROM GETTING THINGS NEEDED FOR DAILY LIVING?: NO

## 2025-01-29 ASSESSMENT — LIFESTYLE VARIABLES
AUDIT-C TOTAL SCORE: 2
HOW OFTEN DO YOU HAVE A DRINK CONTAINING ALCOHOL: 2-4 TIMES A MONTH
HOW OFTEN DO YOU HAVE SIX OR MORE DRINKS ON ONE OCCASION: NEVER
HOW MANY STANDARD DRINKS CONTAINING ALCOHOL DO YOU HAVE ON A TYPICAL DAY: 1 OR 2
SKIP TO QUESTIONS 9-10: 1

## 2025-01-29 ASSESSMENT — SOCIAL DETERMINANTS OF HEALTH (SDOH)
HOW OFTEN DO YOU ATTEND CHURCH OR RELIGIOUS SERVICES?: NEVER
HOW OFTEN DO YOU HAVE A DRINK CONTAINING ALCOHOL: 2-4 TIMES A MONTH
HOW OFTEN DO YOU ATTEND CHURCH OR RELIGIOUS SERVICES?: NEVER
IN A TYPICAL WEEK, HOW MANY TIMES DO YOU TALK ON THE PHONE WITH FAMILY, FRIENDS, OR NEIGHBORS?: MORE THAN THREE TIMES A WEEK
DO YOU BELONG TO ANY CLUBS OR ORGANIZATIONS SUCH AS CHURCH GROUPS UNIONS, FRATERNAL OR ATHLETIC GROUPS, OR SCHOOL GROUPS?: NO
IN THE PAST 12 MONTHS, HAS THE ELECTRIC, GAS, OIL, OR WATER COMPANY THREATENED TO SHUT OFF SERVICE IN YOUR HOME?: NO
HOW OFTEN DO YOU ATTENT MEETINGS OF THE CLUB OR ORGANIZATION YOU BELONG TO?: NEVER
HOW HARD IS IT FOR YOU TO PAY FOR THE VERY BASICS LIKE FOOD, HOUSING, MEDICAL CARE, AND HEATING?: NOT HARD AT ALL
HOW OFTEN DO YOU ATTENT MEETINGS OF THE CLUB OR ORGANIZATION YOU BELONG TO?: NEVER
DO YOU BELONG TO ANY CLUBS OR ORGANIZATIONS SUCH AS CHURCH GROUPS UNIONS, FRATERNAL OR ATHLETIC GROUPS, OR SCHOOL GROUPS?: NO
WITHIN THE PAST 12 MONTHS, YOU WORRIED THAT YOUR FOOD WOULD RUN OUT BEFORE YOU GOT THE MONEY TO BUY MORE: NEVER TRUE
IN A TYPICAL WEEK, HOW MANY TIMES DO YOU TALK ON THE PHONE WITH FAMILY, FRIENDS, OR NEIGHBORS?: MORE THAN THREE TIMES A WEEK
HOW OFTEN DO YOU HAVE SIX OR MORE DRINKS ON ONE OCCASION: NEVER
HOW MANY DRINKS CONTAINING ALCOHOL DO YOU HAVE ON A TYPICAL DAY WHEN YOU ARE DRINKING: 1 OR 2

## 2025-01-29 NOTE — OP THERAPY EVALUATION
"[facility] Therapy Services  Outpatient Modified Barium Swallow Study           Patient Name: Ines Long  Date of Evaluation: 01/28/2025  Referring Provider: JOSE Hoang  Fax: [phone]        Patient History/Reason for Referral  This is a 70 y/o female who was referred for a Modified Barium Swallow Study (MBSS) due to her c/o changes in her swallow and speech function starting 3-4 months ago. Pt reported that for a long time she has had difficulty swallowing laying down in bed a night but in the last 3-4 months she has experienced a new onset difficulty initiating her swallow, sticking sensation in her pharynx, and feeling that she is \"running out of air\".     She expressed that she has been having multiple issues with her balance with a recent fall breaking several bones in her right leg. She was walking with an irregular gait pattern with a 4WW. She expressed concerns with her physical abilities deteriorating rapidly stating \"it just feels like my muscles give out\".     She reported that she sees a neurologist, Dr. Carson, but has never received a diagnosis and that her last MRI was negative for any kind of lesions. The only MRI note available in this EMR was from 2021. Pt noted to have possible pseudobulbar affect.     PMHx: tibia and fibula fx repair, lumbar spinal surgery, cervical spinal surgery, spinal stimulator, DMI, HTN, hypothyroidism, gastroparesis secondary to diabetes.     Oral Mechanism Evaluation  Facial Symmetry:  Masked expression at rest on the left, right facial tremor on the right at rest.   Labial Observations:  Effortful and reduced bilateral labial retraction and protrusion.   Lingual Observations:  Right lingual deviation in mouth at rest, midline with protrusion, WFL ROM bilaterally but effortful movement.   Palatal Observations: WFL palatal lift; due to noted hypernasality pt would benefit from a comprehensive velopharyngeal evaluation.   Dentition: Good, " Natural dentition  Comments: Pt with signs and symptoms of dysfunction of cranial nerves based on this simple sensorimotor cranial nerve evaluation; a full comprehensive evaluation is warranted at this time.       Voice  Quality: Breathy transient, Harsh  Resonance: Hypernasal  Intensity: Soft  Cough: Perceptually weak  Comments: Pt with a sustained phonation of 5.5 seconds and then 8.2 seconds that was noted to be quiet and with a potential dystonia/tremor component. Formal voice evaluation with endoscopy with stroboscopy is warranted at this time.     Motor speech:   AMRs: hypernasal, effortful production, discoordinated production.   SMRs: Discoordinated production, inconsistent, effortful, decreased articulatory precision.   Sentence repetition: same as SMRs.   Conversation: Pt was noted to have very effortful production with low respiratory support.   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 She would benefit from referral to a movement disorder neurologist and a specialized speech-language pathologist in voice, motor speech, and swallowing for continued differential diagnosis.     Current Method of Nutrition   Oral diet (Regular solids, thin liquids)      Pertinent Information  Affect/Behavior: Appropriate, Cooperative, Crying  Oxygen Requirements: Room Air  Secretion Management: WNL      Subjective  Pt was alert and cooperative for evaluation. She was unaccompanied today. She walked with a halting and off-balance bhavya for which she used a 4WW. She transferred to and from swallow chair SBA.       Discussed procedure with the risks, benefits, and alternatives of the MBSS procedure. Patient/family acknowledged and agreed to proceed.    Assessment  Videofluoroscopic Swallow Study was conducted in the lateral and AP projection(s) to evaluate oropharyngeal swallow function. A radiology tech was present to assist with the procedure.       Positioning: seated upright in  fluoroscopy chair, sitting (AP view)  Anatomic View:  WFL  Bolus Administration: Patient  PO barium contrast trials: Varibar thin liquid, Varibar nectar (mildly thick) liquid, Varibar pudding, solid coated in Varibar pudding      Consistency PAS Score Timing Comments   Thin Liquid 1 N/A    Mildly Thick Liquid 1 N/A    Pudding 1 N/A    Solid 1 N/A        Penetration-Aspiration Scale (PAS) (Rosenbek et al., 1996)  1     No contrast enters airway  2     Contrast enters the airway, remains above the vocal folds, and is ejected from the airway (not seen in the airway at the end of the swallow).  3     Contrast enters the airway, remains above the vocal folds, and is not ejected from the airway (is seen in the airway after the swallow).  4     Contrast enters the airway, contacts the vocal folds, and is ejected from the airway.  5     Contrast enters the airway, contacts the vocal folds, and is not ejected from the airway  6     Contrast enters the airway, crosses the plane of the vocal folds, and is ejected from the airway.  7     Contrast enters the airway, crosses the plane of the vocal folds, and is not ejected from the airway despite effort.  8     Contrast enters the airway, crosses the plane of the vocal folds, is not ejected from the airway and there is no response to aspiration.      The following qualitative information was analyzed via the MBSImp Protocol (Chaparro et al., 2008):   Oral phase:  Lip closure was characterized by complete labial seal.   Tongue control during bolus hold was characterized by posterior escape of less than half of bolus   Bolus preparation/mastication was timely and efficient.   Bolus transport/lingual motion was slowed.   Oral clearance was complete  Initiation of pharyngeal swallow was with the bolus head in pyriform sinuses at first hyoid excursion.      Pharyngeal phase:  Soft palate elevation was complete with no bolus between soft palate (SP)/pharyngeal wall  (PW).  Laryngeal elevation was complete superior movement of thyroid cartilage with complete approximation of arytenoids to epiglottic petiole.   Anterior hyoid excursion was partial for anterior movement.  Epiglottic inversion was complete  Laryngeal vestibule closure was complete with no air/contrast in the laryngeal vestibule.  Pharyngeal stripping wave was present and complete.  Pharyngeal contraction was complete.  Pharyngoesophageal Segment Opening was complete with complete distension and complete duration with no obstruction of flow.  Tongue base retraction was mildly decreased with trace column of contrast or air between TB and PW.  Pharyngeal residue was trace with trace residue on base of tongue and in the valleculae cleared with a spontaneous or cued cleanse swallow or liquid wash.       Esophageal phase:  Esophageal clearance was unable to be visualized due to positioning.       Standarized Timing Measures (milliseconds) (Hector et al., 2023):    Swallow Reaction Time (time from bolus passing mandible to hyoid burst):  Thin Mildly-thick Extremely thick/puree   330ms: 1 SD above average indicating delayed swallow reaction time 1188ms: 2 SD above average indicating delayed swallow reaction time 2772ms: 2 SD above average indicating delayed swallow reaction time     Laryngeal Vestibule Closure (LVC) duration (time from LVC to LVO):   Thin Mildly-thick Extremely thick/puree   594ms: 1 SD above average indicating longer closure duration 594ms: 1 SD above average indicating longer closure duration 495ms: WFL     Time-to-LVC (time from hyoid burst to LVC):   Thin Mildly-thick Extremely thick/puree   66ms: 1 SD below average indicating pt closed her LVC more rapidly 198ms: WFL 0ms: 2 SD below average indicating more rapid onset closure     UES opening duration (time from UES open to UES close):   Thin Mildly-thick Extremely thick/puree   561ms: 1 SD above average indicating longer opening time 660ms: 2 SD above  "average 396ms: WFL                       Sip volume by ml (Hector et al., 2023)  The patient was instructed to take a \"comfortable sip\" following the protocol of the study completed by Hector et al., 2023. Age was not found to be a factor for sip volume, there were only notable sex differences.    1st sip thin liquids: 20ml   2nd sip thin liquids: 15ml   3rd sip thin liquids: 15ml   Average: 16.66ml       1st sip mildly-thick liquids: 21ml  2nd sip mildly-thick liquids: 20ml  Average: 20.5ml           DIGEST Level (Chauncey et al., 2017):  Safety stGstrstastdstest:st1st Efficiency stGstrstastdstest:st st1st Total stGstrstastdstest:st st1st Compensatory Strategies:        Clinical Impressions  The pt presents with a DIGEST score of 0 indicating a \"safe\" and \"efficient\" oropharyngeal swallow. However, it should be noted that pt had variable timing measures across all measured kinematics which is consistent with the changes in her motor speech and voice. This could contribute to swallowing dysfunction outside of this \"snapshot in time\"'.        Recommendations  Referral to a movement disorder specialized neurologist is warranted at this time. Dr. Young or Dr. Dasilva are two movement disorder specialists in the [facility].     Referral to skilled OP speech-language pathologist for further voice, motor speech, and swallowing evaluation and treatment is warranted at this time. Please refer to either the [facility] or [facility] as they have the endoscopy equipment to complete an endoscopy with videostroboscopy. Pt would also benefit from comprehensive velopharyngeal evaluation.     Referral to ENT is warranted at this time to further support the SLP's in their evaluation.         Thank you for the referral. For further questions, please call [phone].  Kim Heard MS, CCC-SLP    References    FLORA Grossman., TARAN Younger., JANETTE Bolden., ELVA Toro., Sveta, H. Y., Jagjit, R. S., JOAQUIM Duffy., " GAURI Veras., JOAQUIM Adams., ELVA Limon, Lazarus, C. L., SCOTT Velazco., ELVA Landaverde, ELVA Diaz., Eldon, HJosh BAZAN., & Michele, J. S. (2017). Dynamic imaging grade of swallowing toxicity (DIGEST): Scale development and validation. Cancer, 123(1), 62-70. [email or web address]    CHRIS Mayfield, TARAN Echevarria, HERNÁN Wallace, JANETTE Malone., TARAN Araya, ELVA Kline, BRANDAN Quintero, & ELVA Camacho (2008). MBS measurement tool for swallow impairment--MBSImp: Establishing a standard. Dysphagia, 23(4), 392-405. [email or web address]    ELVA Hubbard., ELVA Gillis., CINDA yVas., Zohaib, J. L., & Ra, J. L. (1996). A penetration-aspiration scale. Dysphagia, 11(2), 93-98. [email or web address]    JOAQUIM Young., TARAN Donohue., TARAN Ramirez., BITA Dow, TARAN Graves, & BITA Ramirez (2023). Reference values for videofluoroscopic measures of swallowing: An update. Journal of Speech, Language, and Hearing Research, 66(10), 2899-3975. [email or web address]

## 2025-01-30 ENCOUNTER — DOCUMENTATION (OUTPATIENT)
Dept: HEALTH INFORMATION MANAGEMENT | Facility: OTHER | Age: 70
End: 2025-01-30

## 2025-01-30 ENCOUNTER — HOSPITAL ENCOUNTER (OUTPATIENT)
Dept: LAB | Facility: MEDICAL CENTER | Age: 70
End: 2025-01-30
Attending: STUDENT IN AN ORGANIZED HEALTH CARE EDUCATION/TRAINING PROGRAM
Payer: MEDICARE

## 2025-01-30 ENCOUNTER — HOSPITAL ENCOUNTER (OUTPATIENT)
Dept: LAB | Facility: MEDICAL CENTER | Age: 70
End: 2025-01-30
Attending: INTERNAL MEDICINE
Payer: MEDICARE

## 2025-01-30 ENCOUNTER — OFFICE VISIT (OUTPATIENT)
Dept: MEDICAL GROUP | Facility: PHYSICIAN GROUP | Age: 70
End: 2025-01-30
Payer: MEDICARE

## 2025-01-30 VITALS
HEIGHT: 67 IN | SYSTOLIC BLOOD PRESSURE: 124 MMHG | HEART RATE: 86 BPM | TEMPERATURE: 97.8 F | OXYGEN SATURATION: 100 % | BODY MASS INDEX: 26.53 KG/M2 | RESPIRATION RATE: 16 BRPM | DIASTOLIC BLOOD PRESSURE: 62 MMHG | WEIGHT: 169 LBS

## 2025-01-30 DIAGNOSIS — E10.69 TYPE 1 DIABETES MELLITUS WITH OTHER SPECIFIED COMPLICATION (HCC): ICD-10-CM

## 2025-01-30 DIAGNOSIS — R13.12 OROPHARYNGEAL DYSPHAGIA: ICD-10-CM

## 2025-01-30 DIAGNOSIS — E03.9 ACQUIRED HYPOTHYROIDISM: ICD-10-CM

## 2025-01-30 DIAGNOSIS — Z12.31 ENCOUNTER FOR SCREENING MAMMOGRAM FOR MALIGNANT NEOPLASM OF BREAST: ICD-10-CM

## 2025-01-30 DIAGNOSIS — M80.061S: ICD-10-CM

## 2025-01-30 DIAGNOSIS — G95.9 CERVICAL MYELOPATHY (HCC): ICD-10-CM

## 2025-01-30 DIAGNOSIS — E78.5 DYSLIPIDEMIA: ICD-10-CM

## 2025-01-30 DIAGNOSIS — Z79.4 LONG-TERM INSULIN USE (HCC): ICD-10-CM

## 2025-01-30 DIAGNOSIS — E78.49 OTHER HYPERLIPIDEMIA: ICD-10-CM

## 2025-01-30 DIAGNOSIS — Z96.41 INSULIN PUMP IN PLACE: ICD-10-CM

## 2025-01-30 DIAGNOSIS — I77.1 SUBCLAVIAN ARTERY STENOSIS, RIGHT (HCC): ICD-10-CM

## 2025-01-30 DIAGNOSIS — M54.50 CHRONIC LOW BACK PAIN, UNSPECIFIED BACK PAIN LATERALITY, UNSPECIFIED WHETHER SCIATICA PRESENT: ICD-10-CM

## 2025-01-30 DIAGNOSIS — R26.89 ABNORMALITY OF GAIT DUE TO IMPAIRMENT OF BALANCE: ICD-10-CM

## 2025-01-30 DIAGNOSIS — R74.8 ELEVATED ALKALINE PHOSPHATASE LEVEL: ICD-10-CM

## 2025-01-30 DIAGNOSIS — M85.89 OSTEOPENIA OF MULTIPLE SITES: ICD-10-CM

## 2025-01-30 DIAGNOSIS — G89.29 CHRONIC LOW BACK PAIN, UNSPECIFIED BACK PAIN LATERALITY, UNSPECIFIED WHETHER SCIATICA PRESENT: ICD-10-CM

## 2025-01-30 DIAGNOSIS — E55.9 VITAMIN D DEFICIENCY: ICD-10-CM

## 2025-01-30 DIAGNOSIS — Z91.81 RISK FOR FALLS: ICD-10-CM

## 2025-01-30 DIAGNOSIS — I67.9 SMALL VESSEL DISEASE, CEREBROVASCULAR: Chronic | ICD-10-CM

## 2025-01-30 DIAGNOSIS — E78.41 ELEVATED LIPOPROTEIN(A): Chronic | ICD-10-CM

## 2025-01-30 DIAGNOSIS — E03.9 PRIMARY HYPOTHYROIDISM: ICD-10-CM

## 2025-01-30 DIAGNOSIS — M54.2 NECK PAIN ON LEFT SIDE: ICD-10-CM

## 2025-01-30 DIAGNOSIS — I44.4 LAFB (LEFT ANTERIOR FASCICULAR BLOCK): ICD-10-CM

## 2025-01-30 DIAGNOSIS — G57.71 COMPLEX REGIONAL PAIN SYNDROME TYPE 2 OF RIGHT LOWER EXTREMITY: ICD-10-CM

## 2025-01-30 DIAGNOSIS — E87.1 HYPONATREMIA: ICD-10-CM

## 2025-01-30 DIAGNOSIS — Z00.00 MEDICARE ANNUAL WELLNESS VISIT, SUBSEQUENT: Primary | ICD-10-CM

## 2025-01-30 DIAGNOSIS — I10 PRIMARY HYPERTENSION: ICD-10-CM

## 2025-01-30 PROBLEM — S82.401A RIGHT FIBULAR FRACTURE: Status: RESOLVED | Noted: 2024-09-14 | Resolved: 2025-01-30

## 2025-01-30 PROBLEM — S82.102A BILATERAL CLOSED PROXIMAL TIBIAL FRACTURE: Status: RESOLVED | Noted: 2024-09-14 | Resolved: 2025-01-30

## 2025-01-30 PROBLEM — R60.9 EDEMA: Status: RESOLVED | Noted: 2022-07-12 | Resolved: 2025-01-30

## 2025-01-30 PROBLEM — N18.2: Status: RESOLVED | Noted: 2023-06-20 | Resolved: 2025-01-30

## 2025-01-30 PROBLEM — D75.839 THROMBOCYTOSIS: Status: RESOLVED | Noted: 2024-10-24 | Resolved: 2025-01-30

## 2025-01-30 PROBLEM — I65.29 CAROTID ARTERY STENOSIS: Status: RESOLVED | Noted: 2021-12-14 | Resolved: 2025-01-30

## 2025-01-30 PROBLEM — S82.101A BILATERAL CLOSED PROXIMAL TIBIAL FRACTURE: Status: RESOLVED | Noted: 2024-09-14 | Resolved: 2025-01-30

## 2025-01-30 PROBLEM — Z79.890 HORMONE REPLACEMENT THERAPY (HRT): Status: RESOLVED | Noted: 2018-10-23 | Resolved: 2025-01-30

## 2025-01-30 PROBLEM — I77.9 CAROTID ARTERY DISEASE, UNSPECIFIED LATERALITY, UNSPECIFIED TYPE (HCC): Status: RESOLVED | Noted: 2024-09-12 | Resolved: 2025-01-30

## 2025-01-30 PROBLEM — M48.062 NEUROGENIC CLAUDICATION DUE TO LUMBAR SPINAL STENOSIS: Status: RESOLVED | Noted: 2022-04-22 | Resolved: 2025-01-30

## 2025-01-30 PROBLEM — R74.01 TRANSAMINITIS: Status: RESOLVED | Noted: 2024-09-15 | Resolved: 2025-01-30

## 2025-01-30 PROBLEM — I13.0: Status: RESOLVED | Noted: 2023-06-20 | Resolved: 2025-01-30

## 2025-01-30 PROBLEM — I50.30: Status: RESOLVED | Noted: 2023-06-20 | Resolved: 2025-01-30

## 2025-01-30 LAB
25(OH)D3 SERPL-MCNC: 58 NG/ML (ref 30–100)
25(OH)D3 SERPL-MCNC: 59 NG/ML (ref 30–100)
ALBUMIN SERPL BCP-MCNC: 4.7 G/DL (ref 3.2–4.9)
ALBUMIN SERPL BCP-MCNC: 4.7 G/DL (ref 3.2–4.9)
ALBUMIN/GLOB SERPL: 1.7 G/DL
ALP SERPL-CCNC: 133 U/L (ref 30–99)
ALT SERPL-CCNC: 34 U/L (ref 2–50)
ANION GAP SERPL CALC-SCNC: 10 MMOL/L (ref 7–16)
APPEARANCE UR: CLEAR
AST SERPL-CCNC: 34 U/L (ref 12–45)
BASOPHILS # BLD AUTO: 0.5 % (ref 0–1.8)
BASOPHILS # BLD: 0.03 K/UL (ref 0–0.12)
BILIRUB SERPL-MCNC: 0.4 MG/DL (ref 0.1–1.5)
BILIRUB UR QL STRIP.AUTO: NEGATIVE
BUN SERPL-MCNC: 14 MG/DL (ref 8–22)
BUN SERPL-MCNC: 14 MG/DL (ref 8–22)
CALCIUM ALBUM COR SERPL-MCNC: 9 MG/DL (ref 8.5–10.5)
CALCIUM ALBUM COR SERPL-MCNC: 9.1 MG/DL (ref 8.5–10.5)
CALCIUM SERPL-MCNC: 9.6 MG/DL (ref 8.5–10.5)
CALCIUM SERPL-MCNC: 9.7 MG/DL (ref 8.5–10.5)
CHLORIDE SERPL-SCNC: 95 MMOL/L (ref 96–112)
CHLORIDE SERPL-SCNC: 96 MMOL/L (ref 96–112)
CHOLEST SERPL-MCNC: 130 MG/DL (ref 100–199)
CO2 SERPL-SCNC: 23 MMOL/L (ref 20–33)
CO2 SERPL-SCNC: 24 MMOL/L (ref 20–33)
COLLECT DURATION TIME SPEC: NORMAL HRS
COLOR UR: YELLOW
CREAT SERPL-MCNC: 0.81 MG/DL (ref 0.5–1.4)
CREAT SERPL-MCNC: 0.81 MG/DL (ref 0.5–1.4)
CREAT UR-MCNC: 40.5 MG/DL
CREAT UR-MCNC: 42.6 MG/DL
EOSINOPHIL # BLD AUTO: 0.12 K/UL (ref 0–0.51)
EOSINOPHIL NFR BLD: 2 % (ref 0–6.9)
ERYTHROCYTE [DISTWIDTH] IN BLOOD BY AUTOMATED COUNT: 47.4 FL (ref 35.9–50)
FASTING STATUS PATIENT QL REPORTED: NORMAL
GFR SERPLBLD CREATININE-BSD FMLA CKD-EPI: 79 ML/MIN/1.73 M 2
GFR SERPLBLD CREATININE-BSD FMLA CKD-EPI: 79 ML/MIN/1.73 M 2
GLOBULIN SER CALC-MCNC: 2.7 G/DL (ref 1.9–3.5)
GLUCOSE SERPL-MCNC: 128 MG/DL (ref 65–99)
GLUCOSE SERPL-MCNC: 128 MG/DL (ref 65–99)
GLUCOSE UR STRIP.AUTO-MCNC: NEGATIVE MG/DL
HCT VFR BLD AUTO: 40.4 % (ref 37–47)
HDLC SERPL-MCNC: 76 MG/DL
HGB BLD-MCNC: 13 G/DL (ref 12–16)
IMM GRANULOCYTES # BLD AUTO: 0.01 K/UL (ref 0–0.11)
IMM GRANULOCYTES NFR BLD AUTO: 0.2 % (ref 0–0.9)
KETONES UR STRIP.AUTO-MCNC: NEGATIVE MG/DL
LDLC SERPL CALC-MCNC: 39 MG/DL
LEUKOCYTE ESTERASE UR QL STRIP.AUTO: NEGATIVE
LYMPHOCYTES # BLD AUTO: 2.21 K/UL (ref 1–4.8)
LYMPHOCYTES NFR BLD: 37.5 % (ref 22–41)
MCH RBC QN AUTO: 29.5 PG (ref 27–33)
MCHC RBC AUTO-ENTMCNC: 32.2 G/DL (ref 32.2–35.5)
MCV RBC AUTO: 91.6 FL (ref 81.4–97.8)
MICRO URNS: NORMAL
MICROALBUMIN UR-MCNC: <1.2 MG/DL
MICROALBUMIN/CREAT UR: NORMAL MG/G (ref 0–30)
MONOCYTES # BLD AUTO: 0.36 K/UL (ref 0–0.85)
MONOCYTES NFR BLD AUTO: 6.1 % (ref 0–13.4)
NEUTROPHILS # BLD AUTO: 3.16 K/UL (ref 1.82–7.42)
NEUTROPHILS NFR BLD: 53.7 % (ref 44–72)
NITRITE UR QL STRIP.AUTO: NEGATIVE
NRBC # BLD AUTO: 0 K/UL
NRBC BLD-RTO: 0 /100 WBC (ref 0–0.2)
PH UR STRIP.AUTO: 6 [PH] (ref 5–8)
PHOSPHATE SERPL-MCNC: 3.3 MG/DL (ref 2.5–4.5)
PHOSPHATE SERPL-MCNC: 3.3 MG/DL (ref 2.5–4.5)
PLATELET # BLD AUTO: 296 K/UL (ref 164–446)
PMV BLD AUTO: 10.7 FL (ref 9–12.9)
POTASSIUM SERPL-SCNC: 4.4 MMOL/L (ref 3.6–5.5)
POTASSIUM SERPL-SCNC: 4.5 MMOL/L (ref 3.6–5.5)
PROT SERPL-MCNC: 7.4 G/DL (ref 6–8.2)
PROT UR QL STRIP: NEGATIVE MG/DL
PROT UR-MCNC: <6 MG/DL (ref 0–15)
PROT/CREAT UR: 141 MG/G (ref 10–107)
PTH-INTACT SERPL-MCNC: 47.8 PG/ML (ref 14–72)
PTH-INTACT SERPL-MCNC: 49.3 PG/ML (ref 14–72)
RBC # BLD AUTO: 4.41 M/UL (ref 4.2–5.4)
RBC UR QL AUTO: NEGATIVE
SODIUM SERPL-SCNC: 129 MMOL/L (ref 135–145)
SODIUM SERPL-SCNC: 129 MMOL/L (ref 135–145)
SP GR UR STRIP.AUTO: 1.01
SPECIMEN VOL ?TM UR: NORMAL ML
T4 FREE SERPL-MCNC: 1.22 NG/DL (ref 0.93–1.7)
TRIGL SERPL-MCNC: 76 MG/DL (ref 0–149)
TSH SERPL-ACNC: 3.54 UIU/ML (ref 0.35–5.5)
UROBILINOGEN UR STRIP.AUTO-MCNC: 0.2 EU/DL
WBC # BLD AUTO: 5.9 K/UL (ref 4.8–10.8)

## 2025-01-30 PROCEDURE — 83970 ASSAY OF PARATHORMONE: CPT

## 2025-01-30 PROCEDURE — 84165 PROTEIN E-PHORESIS SERUM: CPT

## 2025-01-30 PROCEDURE — 83970 ASSAY OF PARATHORMONE: CPT | Mod: 91

## 2025-01-30 PROCEDURE — 82043 UR ALBUMIN QUANTITATIVE: CPT

## 2025-01-30 PROCEDURE — 84443 ASSAY THYROID STIM HORMONE: CPT

## 2025-01-30 PROCEDURE — 82570 ASSAY OF URINE CREATININE: CPT

## 2025-01-30 PROCEDURE — 80053 COMPREHEN METABOLIC PANEL: CPT

## 2025-01-30 PROCEDURE — 84156 ASSAY OF PROTEIN URINE: CPT

## 2025-01-30 PROCEDURE — 81003 URINALYSIS AUTO W/O SCOPE: CPT

## 2025-01-30 PROCEDURE — 80069 RENAL FUNCTION PANEL: CPT

## 2025-01-30 PROCEDURE — 82306 VITAMIN D 25 HYDROXY: CPT

## 2025-01-30 PROCEDURE — 86341 ISLET CELL ANTIBODY: CPT

## 2025-01-30 PROCEDURE — 84155 ASSAY OF PROTEIN SERUM: CPT

## 2025-01-30 PROCEDURE — 80061 LIPID PANEL: CPT

## 2025-01-30 PROCEDURE — 86364 TISS TRNSGLTMNASE EA IG CLAS: CPT

## 2025-01-30 PROCEDURE — 84439 ASSAY OF FREE THYROXINE: CPT

## 2025-01-30 PROCEDURE — 82306 VITAMIN D 25 HYDROXY: CPT | Mod: GA,91

## 2025-01-30 PROCEDURE — 82570 ASSAY OF URINE CREATININE: CPT | Mod: 91

## 2025-01-30 PROCEDURE — 84100 ASSAY OF PHOSPHORUS: CPT

## 2025-01-30 PROCEDURE — 36415 COLL VENOUS BLD VENIPUNCTURE: CPT

## 2025-01-30 PROCEDURE — 84681 ASSAY OF C-PEPTIDE: CPT

## 2025-01-30 PROCEDURE — 85025 COMPLETE CBC W/AUTO DIFF WBC: CPT

## 2025-01-30 RX ORDER — CYCLOBENZAPRINE HCL 5 MG
TABLET ORAL
COMMUNITY

## 2025-01-30 ASSESSMENT — PATIENT HEALTH QUESTIONNAIRE - PHQ9: CLINICAL INTERPRETATION OF PHQ2 SCORE: 0

## 2025-01-30 ASSESSMENT — ACTIVITIES OF DAILY LIVING (ADL): BATHING_REQUIRES_ASSISTANCE: 0

## 2025-01-30 ASSESSMENT — FIBROSIS 4 INDEX: FIB4 SCORE: 1.17

## 2025-01-30 ASSESSMENT — ENCOUNTER SYMPTOMS: GENERAL WELL-BEING: GOOD

## 2025-01-30 NOTE — PROGRESS NOTES
I checked in with the patient today while she was in clinic for her PCP appointment. The patient reports that she will be seeing a neurologist that specializes in movement and that her swallow study resulted in a recommendation for a speech therapy referral which she will be following up on. Orders noted from PCP in system.

## 2025-01-30 NOTE — PROGRESS NOTES
Chief Complaint   Patient presents with    Annual Exam       HPI:  Ines Long is a 69 y.o. here for Medicare Annual Wellness Visit     History of Present Illness  The patient presents for evaluation of subclavian vein artery stenosis, small vessel cerebrovascular disease, pathological tibia and fibula fracture due to osteoporosis, lumbar stenosis and low back pain, long-term insulin use, type 1 diabetes, hypothyroidism, hyponatremia, hypertension, dyslipidemia, gastroparesis, balance issues, neck pain, and ear infection.    She has been under the care of Dr. Bach, who recommended a swallow test, the results of which are pending. Dr. Bach also suggested a consultation with a movement neurologist due to observed difficulties in word pronunciation, facial muscle weakness, and breathlessness. Additionally, she was advised to seek the expertise of a speech pathologist.    She has discontinued her sodium medication due to persistent illness and increased symptoms of gastroparesis, including frequent vomiting. She has an upcoming appointment with Perla in 04/2025. She has been adhering to a diet of cooked vegetables, liquids, white meat, and small meals, which has provided some relief.    She reports poor balance and plans to join a gym for water-based exercises. She has previously attempted aqua physical therapy but found the therapist's approach unhelpful.    She continues to experience pain in her knee and lower back, with occasional shin pain. She has noticed changes in skin color and hair loss on her leg. She reports no numbness or tingling in her legs but experiences pain that necessitates sitting down during activities such as cooking or vacuuming.    She reports soreness at the site of her neck incision, which radiates to both sides, and experiences cracking and popping sounds when turning her head. She applies Voltaren to her neck at night.    Her insulin pump is functioning well, and she  "reports no changes in thyroid symptoms. She continues to experience hot flashes, which have slightly improved. She is currently on Levoxyl 100 mcg daily, duloxetine 60 mg, and Lyrica 200 mg in the evening, which she finds beneficial.    She is also taking telmisartan 40 mg. Her blood pressure is normal at 124/62.    She is still seeing Dr. Gray for elevated liver protein. Dr. Bach did not say anything about the alkaline phosphatase level. She is still taking Zetia and rosuvastatin.    She had a lot of blood work done today, 8 tubes, and the results should be coming in tomorrow.    She went to the urologist and is taking medication, which has helped a little bit, but she was told to take it for a good 3 months.    She had lip pain and fever, so she went to the urgent clinic. She was given lidocaine for her lip, antibiotics, and steroids because there was drainage from her ear. The steroid increased her blood sugar levels, and she was taking 600 units of insulin. She has always been able to keep her blood sugar levels in the high 100s to low 200s, but she could not even touch it. Her meter was just saying \"HI,\" so she does not know what it will be like when they are back down, but that was like 3 days of really high levels.    MEDICATIONS  Current: Levoxyl, duloxetine, Lyrica, telmisartan, Zetia, rosuvastatin, lidocaine, antibiotics, steroids.    Problem   Subclavian Artery Stenosis, Right (Hcc)    Chronic in nature. Stable. Monitored by vascular medicine.     Lafb (Left Anterior Fascicular Block)    Chronic in nature. Continues follow-up with specialist.     Small Vessel Disease, Cerebrovascular    per MR brain 2021. Chronic in nature. Stable.     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  Stable.  Blood pressure today is 110/38.  He saw her nephrologist who increased her lisinopril to 20 mg.  Denies chest pain, palpitations, dizziness, blurry vision.     Other Hyperlipidemia (Resolved)    Chronic in nature. Awaiting lab results.     Edema (Resolved)   Chronic Hyponatremia (Resolved)   Neurogenic Claudication Due to Lumbar Spinal Stenosis (Resolved)    Added automatically from request for surgery [id number]     Carotid Artery Stenosis (Resolved)   Neck Pain On Left Side (Resolved)    Doing well.     Hormone Replacement Therapy (Hrt) (Resolved)    Hysterectomy in 1990, Dr. Almodovar     Disorder of Lipid Metabolism (Resolved)         Patient Active Problem List    Diagnosis Date Noted    Osteopenia of multiple sites 01/07/2025    Subclavian artery stenosis, right ([facility]) 11/19/2024    Pathological fracture of right tibia due to age-related osteoporosis 09/15/2024    Pathological fracture of right fibula due to age-related osteoporosis 09/15/2024    Closed traumatic displaced fracture of right tibial plafond 09/15/2024    Dyslipidemia 09/12/2024    Coronary artery calcification seen on CT scan 02/27/2024    Elevated alkaline phosphatase level 02/27/2024    Complex regional pain syndrome type 2 of right lower extremity 06/13/2023    Elevated lipoprotein(a) 03/10/2023    Cervical stenosis of spine 01/20/2023    Cervical myelopathy (HCC) 01/20/2023    History of left-sided carotid endarterectomy 01/13/2023    LAFB (left anterior fascicular block) 01/13/2023    Small vessel disease, cerebrovascular 01/13/2023    Long-term insulin use (HCC) 01/13/2023    Family history of stroke 01/13/2023    Agatston coronary artery calcium score between 100 and 199 01/13/2023    Lumbar stenosis with neurogenic claudication 05/16/2022    Hypertension 04/14/2022    Low back pain 04/14/2022    Hyponatremia 08/16/2011    Insulin pump in  place 05/18/2011    Diabetes mellitus type 1 (HCC) 11/01/2010    Hypothyroidism 11/01/2010       Current Outpatient Medications   Medication Sig Dispense Refill    cyclobenzaprine (FLEXERIL) 5 mg tablet 2 tablet at bedtime as needed Orally Once a day for 30 days  As needed      lidocaine (XYLOCAINE) 2 % Solution Apply 5 mL topically with cotton swab every 3 hours for lip/mouth pain. 100 mL 0    LEVOXYL 100 MCG Tab Take 1 Tablet by mouth every morning on an empty stomach. Takes 1/2 tab on Sundays 30 Tablet 11    Insulin Aspart, w/Niacinamide, (FIASP) 100 UNIT/ML Solution Inject 60 units into pump daily 30 mL 11    mirabegron ER (MYRBETRIQ) 25 MG TABLET SR 24 HR Take 1 Tablet by mouth every day. 30 Tablet 3    pantoprazole (PROTONIX) 40 MG Tablet Delayed Response Take 1 Tablet by mouth every day. FILL NOW 90 Tablet 3    telmisartan (MICARDIS) 40 MG Tab Take 1 Tablet by mouth every day. to lower blood pressure 100 Tablet 3    famotidine (PEPCID) 20 MG Tab Take 1 Tablet by mouth 2 times a day. 60 Tablet 0    acetaminophen (TYLENOL) 500 MG Tab Take 2 Tablets by mouth every 6 hours as needed for Mild Pain or Moderate Pain.      ezetimibe (ZETIA) 10 MG Tab Take 1 Tablet by mouth every day. To lower cholesterol 100 Tablet 3    rosuvastatin (CRESTOR) 40 MG tablet Take 1 Tablet by mouth every day. To lower cholesterol 100 Tablet 3    sodium chloride (SALT) 1 GM Tab TAKE TWO TABLETS BY MOUTH EVERY DAY (Patient taking differently: Take 3 g by mouth every day. 2g in AM, and 1g in PM) 90 Tablet 1    Alirocumab (PRALUENT) 150 MG/ML Solution Auto-injector Inject 150 mg under the skin every 14 days. 6 mL 3    DULoxetine HCl 60 MG Capsule Delayed Release Sprinkle Take 60 mg by mouth every day.      pregabalin (LYRICA) 100 MG Cap Take 200 mg by mouth every evening.      clindamycin (CLEOCIN) 150 MG Cap Take 3 capsules PO prior to dental procedure 6 Capsule 0    insulin infusion pump Device Inject  under the skin continuous. Patient's  own SQ insulin pump    Type of Insulin: Fiasp  Last change of tubing: 3/19/2023 - Change tubing and site every 72 hours    Dosing:  Basal rate:   0000 - 0329 = 0.5 units/hr   0330 - 1129 = 0.85 units/hr   1130 - 1359 = 0.5 units/hr              1400 - 1759 = 0.45 units/hr              1800 - 2359 = 0.5 units/hr    Bolus ratio:   1 unit : 12 g carbohydrate at  (breakfast, lunch, dinner, snacks)      Correction ratio:    1 units for every 50 over 150 mg/dL    Disconnect pump if patient becomes hypoglycemic and altered.      Cholecalciferol (VITAMIN D-3 PO) Take 5,000 mg by mouth every evening.       No current facility-administered medications for this visit.          Current supplements as per medication list.     Allergies: Ceclor [cefaclor], Augmentin, Naproxen, Tape, Amlodipine, Amoxicillin-pot clavulanate, Cefaclor, Clindamycin, and Penicillin g benzathine    Current social contact/activities: time with friends and family     She  reports that she quit smoking about 50 years ago. Her smoking use included cigarettes. She has never used smokeless tobacco. She reports current alcohol use of about 1.2 oz of alcohol per week. She reports that she does not currently use drugs.  Counseling given: Not Answered      ROS:    Gait: Uses a walker  Ostomy: No  Other tubes: No  Amputations: No  Chronic oxygen use: No  Last eye exam: up to date per patient  Wears hearing aids: No   : Reports urinary leakage during the last 6 months that has somewhat interfered with their daily activities or sleep. Is following with urology    Screening:    Depression Screening  Little interest or pleasure in doing things?  0 - not at all  Feeling down, depressed , or hopeless? 0 - not at all  Patient Health Questionnaire Score: 0     If depressive symptoms identified deferred to follow up visit unless specifically addressed in assessment and plan.    Interpretation of PHQ-9 Total Score   Score Severity   1-4 No Depression   5-9 Mild  Depression   10-14 Moderate Depression   15-19 Moderately Severe Depression   20-27 Severe Depression    Screening for Cognitive Impairment  Do you or any of your friends or family members have any concern about your memory? No  Three Minute Recall (Leader, Season, Table) 1/3    [place] clock face with all 12 numbers and set the hands to show 10 minutes after 11.  Yes    Cognitive concerns identified deferred for follow up unless specifically addressed in assessment and plan.    Fall Risk Assessment  Has the patient had two or more falls in the last year or any fall with injury in the last year?  Yes    Safety Assessment  Do you always wear your seatbelt?  Yes  Any changes to home needed to function safely? No  Difficulty hearing.  Yes  Patient counseled about all safety risks that were identified.    Functional Assessment ADLs  Are there any barriers preventing you from cooking for yourself or meeting nutritional needs?  No.    Are there any barriers preventing you from driving safely or obtaining transportation?  No.    Are there any barriers preventing you from using a telephone or calling for help?  No    Are there any barriers preventing you from shopping?  No.    Are there any barriers preventing you from taking care of your own finances?  No    Are there any barriers preventing you from managing your medications?  No    Are there any barriers preventing you from showering, bathing or dressing yourself? No    Are there any barriers preventing you from doing housework or laundry? No  Are there any barriers preventing you from using the toilet?No  Are you currently engaging in any exercise or physical activity?  No.      Self-Assessment of Health  What is your perception of your health? Good    Do you sleep more than six hours a night? Yes    In the past 7 days, how much did pain keep you from doing your normal work? Some    Do you spend quality time with family or friends (virtually or in person)? Yes    Do you  usually eat a heart healthy diet that constists of a variety of fruits, vegetables, whole grains and fiber? Yes    Do you eat foods high in fat and/or Fast Food more than three times per week? No    How concerned are you that your medical conditions are not being well managed? Not at all    Are you worried that in the next 2 months, you may not have stable housing that you own, rent, or stay in as part of a household? No      Advance Care Planning  Do you have an Advance Directive, Living Will, Durable Power of , or POLST? No                 Health Maintenance Summary            Overdue - Annual Wellness Visit (Yearly) Never done      No completion history exists for this topic.              Ordered - Mammogram (Yearly) Ordered on 1/30/2025 01/31/2023  MA-SCREENING MAMMO BILAT W/TOMOSYNTHESIS W/CAD    01/08/2021  [id number]    07/28/2018  [id number]    02/07/2007  MA-SCREENING DIGITAL MAMMO              Overdue - COVID-19 Vaccine (4 - 2024-25 season) Overdue since 9/1/2024 11/18/2021  Imm Admin: MODERNA SARS-COV-2 VACCINE (12+)    04/19/2021  Imm Admin: MODERNA SARS-COV-2 VACCINE (12+)    03/22/2021  Imm Admin: MODERNA SARS-COV-2 VACCINE (12+)              Ordered - Diabetes: Urine Protein Screening (Yearly) Ordered on 1/30/2025 03/07/2024  MICROALBUMIN CREAT RATIO URINE    03/07/2024  PROTEIN/CREAT RATIO URINE    02/21/2024  MICROALBUMIN CREAT RATIO URINE    02/06/2024  PROTEIN/CREAT RATIO URINE    02/06/2024  MICROALBUMIN CREAT RATIO URINE    Only the first 5 history entries have been loaded, but more history exists.              Diabetes: Retinopathy Screening (Yearly) Next due on 4/30/2025 04/30/2024  AMB EXTERNAL RETINAL SCREENING RESULTS    06/13/2023  POCT Retinal Eye Exam    02/03/2020  REFERRAL FOR RETINAL SCREENING EXAM    12/19/2018  REFERRAL FOR RETINAL SCREENING EXAM    10/07/2013  AMB REFERRAL FOR RETINAL SCREENING EXAM    Only the first 5 history  entries have been loaded, but more history exists.              A1c Screening (Every 6 Months) Tentatively due on 7/7/2025 01/07/2025  POCT Hemoglobin A1C    05/07/2024  POCT  A1C    10/12/2023  HEMOGLOBIN A1C    09/12/2023  A1c    06/13/2023  HEMOGLOBIN A1C    Only the first 5 history entries have been loaded, but more history exists.              Diabetes: Monofilament / LE Exam (Yearly) Next due on 7/18/2025 07/18/2024  Diabetic Monofilament Lower Extremity Exam    07/18/2024  SmartData: WORKFLOW - DIABETES - DIABETIC FOOT EXAM PERFORMED    06/13/2023  Diabetic Monofilament Lower Extremity Exam    06/13/2023  SmartData: WORKFLOW - DIABETES - DIABETIC FOOT EXAM PERFORMED    03/07/2022  Diabetic Monofilament Lower Extremity Exam    Only the first 5 history entries have been loaded, but more history exists.              Ordered - Fasting Lipid Profile (Yearly) Ordered on 1/30/2025      10/02/2024  Lipid Profile    06/26/2024  Lipid Profile    01/03/2024  Lipid Profile    08/01/2023  Lipid Profile    03/08/2023  Lipid Profile    Only the first 5 history entries have been loaded, but more history exists.              IMM DTaP/Tdap/Td Vaccine (3 - Td or Tdap) Next due on 10/27/2025      10/27/2015  Imm Admin: Tdap Vaccine    10/17/2011  Imm Admin: Tdap Vaccine              Ordered - SERUM CREATININE (Yearly) Ordered on 1/30/2025      12/10/2024  Basic Metabolic Panel    11/21/2024  Comp Metabolic Panel    10/31/2024  Renal Function Panel    10/02/2024  Renal Function Panel    10/02/2024  Comp Metabolic Panel    Only the first 5 history entries have been loaded, but more history exists.              Scheduled - Bone Density Scan (Every 5 Years) Scheduled for 2/14/2025 09/23/2022  DS-BONE DENSITY STUDY (DEXA)              Colorectal Cancer Screening (Colonoscopy - Every 5 Years) Tentatively due on 2/3/2028      02/03/2023  AMB EXTERNAL COLONOSCOPY RESULTS    02/03/2023  AMB EXTERNAL COLONOSCOPY RESULTS     02/01/2016  AMB REFERRAL TO GI FOR COLONOSCOPY              Zoster (Shingles) Vaccines (Series Information) Completed      07/07/2020  Imm Admin: Zoster Vaccine Recombinant (RZV) (SHINGRIX)    12/20/2019  Imm Admin: Zoster Vaccine Recombinant (RZV) (SHINGRIX)              Hepatitis B Vaccine (Hep B) (Series Information) Completed      09/12/2022  Imm Admin: Hepatitis B Vaccine (Adol/Adult)    12/10/2020  Imm Admin: Hepatitis B Vaccine (Adol/Adult)    10/24/2019  Imm Admin: Hepatitis B Vaccine (Adol/Adult)              Pneumococcal Vaccine: 65+ Years (Series Information) Completed      11/16/2023  Imm Admin: Pneumococcal Conjugate Vaccine (PCV20)    10/23/2018  Imm Admin: Pneumococcal polysaccharide vaccine (PPSV-23)    10/22/2017  Imm Admin: Pneumococcal Conjugate Vaccine (Prevnar/PCV-13)    09/15/2006  Imm Admin: Pneumococcal Conjugate, unspecified formulation              Hepatitis C Screening  Tentatively Complete      09/15/2024  Hepatitis C Antibody component of HEPATITIS PANEL ACUTE(4 COMPONENTS)    06/17/2021  Hepatitis C Antibody component of HEP C VIRUS ANTIBODY    11/23/2019  Hepatitis C Antibody component of HEP C VIRUS ANTIBODY              Influenza Vaccine (Series Information) Completed      10/24/2024  Imm Admin: Influenza high-dose trivalent (PF)    11/16/2023  Imm Admin: Influenza Vaccine Adult HD    10/20/2022  Imm Admin: Influenza Vaccine Adult HD    11/03/2020  Imm Admin: Influenza Vaccine Quad Inj (Pf)    09/22/2019  Imm Admin: Influenza Seasonal Injectable - Historical Data    Only the first 5 history entries have been loaded, but more history exists.              Hepatitis A Vaccine (Hep A) (Series Information) Aged Out      No completion history exists for this topic.              HPV Vaccines (Series Information) Aged Out      No completion history exists for this topic.              Polio Vaccine (Inactivated Polio) (Series Information) Aged Out      No completion history exists for this  topic.              Meningococcal Immunization (Series Information) Aged Out      No completion history exists for this topic.                    Patient Care Team:  SOBIA Candelario as PCP - General (Family Medicine)  Ghassan Gray M.D. (Family Medicine)  Shant Card M.D. (Cardiovascular Disease (Cardiology))  Vida Mendoza R.N.        Social History     Tobacco Use    Smoking status: Former     Current packs/day: 0.00     Types: Cigarettes     Quit date: 1975     Years since quittin.1    Smokeless tobacco: Never   Vaping Use    Vaping status: Never Used   Substance Use Topics    Alcohol use: Yes     Alcohol/week: 1.2 oz     Types: 2 Glasses of wine per week     Comment: about 3-4 drinks per week.     Drug use: Not Currently     Comment: tried CBD gummies; no THC     Family History   Problem Relation Age of Onset    Cancer Mother         rectal cancer    Stroke Father          59    Heart Disease Father     Hypertension Father     Lung Disease Father         tb    Hyperlipidemia Father     Diabetes Sister     Psychiatric Illness Sister         bipolar    Heart Disease Maternal Grandfather     Stroke Paternal Grandmother     Heart Disease Paternal Grandfather      She  has a past medical history of Acute nasopharyngitis (2022), Anesthesia, Blocked artery (Around ), Cataract (), Diabetes type 1, controlled (HCC) (2010), Dyslipidemia (2010), High cholesterol, Hypertension, Hypothyroidism (2010), Indigestion, Insulin pump in place (2011), Left carotid artery stenosis - severe , PONV (postoperative nausea and vomiting), Routine health maintenance (2011), and Urinary incontinence.   Past Surgical History:   Procedure Laterality Date    TIBIA NAILING INTRAMEDULLARY Right 2024    Procedure: INSERTION, INTRAMEDULLARY SUE, TIBIA;  Surgeon: Richard Sol M.D.;  [facility]: SURGERY [facility];  Service: Orthopedics     LUMBAR FUSION O-ARM  9/27/2023    Procedure: L4/5 and L5/S1 redo minimally invasive posterior decompression and instrumented fusion;  Surgeon: Evon Díaz M.D.;  Location: [facility];  Service: Neurosurgery    LUMBAR DECOMPRESSION  9/27/2023    Procedure: DECOMPRESSION, SPINE, LUMBAR;  Surgeon: Evon Díaz M.D.;  Location: [facility];  Service: Neurosurgery    POSTERIOR CERVICAL FUSION O-ARM  3/20/2023    Procedure: N8-8-6-6-7-T1 posterior decompressive laminectomy and R8-8-1-6-7-T1 posterior instrumented fusion with O-arm imaging guidance;  Surgeon: Evon Díaz M.D.;  Location: [facility];  Service: Orthopedics    MENISCECTOMY, KNEE, ARTHROSCOPIC Right 08/22/2022    Procedure: RIGHT KNEE ARTHROSCOPY, PARTIAL MEDIAL MENISCECTOMY;  Surgeon: Ravi Gillis M.D.;  Location: [facility];  Service: Orthopedics    SYNOVECTOMY Right 08/22/2022    Procedure: SYNOVECTOMY;  Surgeon: Ravi Gillis M.D.;  Location: [facility];  Service: Orthopedics    CHONDROPLASTY Right 08/22/2022    Procedure: CHONDROPLASTY;  Surgeon: Ravi Gillis M.D.;  Location: [facility];  Service: Orthopedics    LUMBAR LAMINECTOMY DISKECTOMY  05/16/2022    Procedure: L4-5 and L5-S1 decompressive laminectomy, bilateral foraminotomies and left L5-S1;  Surgeon: Evon Díaz M.D.;  Location: [facility];  Service: Orthopedics    LUMBAR DECOMPRESSION  05/16/2022    Procedure: DECOMPRESSION, SPINE, LUMBAR;  Surgeon: Evon Díaz M.D.;  Location: [facility];  Service: Orthopedics    FORAMINOTOMY  05/16/2022    Procedure: FORAMINOTOMY, SPINE;  Surgeon: Evon Díaz M.D.;  Location: [facility];  Service: Orthopedics    CAROTID ENDARTERECTOMY Left 12/05/2019    Procedure: ENDARTERECTOMY, CAROTID- WITH NEUROMONITORIING;  Surgeon: Surya Garcia M.D.;  Location: SURGERY [facility];  Service: Vascular    ORTHOPEDIC OSTEOTOMY Left  "07/17/2017    Procedure: ORTHOPEDIC OSTEOTOMY CALCANEAL, KIDNER, EXCISION NAVICULAR, GASTROC SOLEUS RECESSION;  Surgeon: Adebayo Layton M.D.;  Location: SURGERY [facility];  Service:     LIGAMENT REPAIR  07/17/2017    Procedure: LIGAMENT REPAIR SPRING;  Surgeon: Adebayo Layton M.D.;  Location: [facility];  Service:     TOE FUSION  07/17/2017    Procedure: TOE FUSION 1ST METATARSAL JOINT, 2ND TOE RECONSTRUCTION ;  Surgeon: Adebayo Layton M.D.;  Location: SURGERY [facility];  Service:     REPAIR, TENDON, LOWER EXTREMITY, USING TENDON GRAFT  07/17/2017    Procedure: FLEXOR TENDON REPAIR- RELEASE/POSSIBLE FLEXOR DIGITORIUM LONGUS;  Surgeon: Adebayo Layton M.D.;  Location: [facility];  Service:     BUNIONECTOMY  01/01/2014    SHOULDER ARTHROSCOPY Left 01/01/1997    frozen shoulder    ABDOMINAL HYSTERECTOMY TOTAL  01/01/1987    KNEE ARTHROTOMY Right 01/01/1978    [facility]     CHOLECYSTECTOMY      FOOT SURGERY  2016 and 2018    ORIF, KNEE      [facility]    SHOULDER SURGERY      Frozen shoulder       Exam:   /62   Pulse 86   Temp 36.6 °C (97.8 °F)   Resp 16   Ht 1.702 m (5' 7\")   Wt 76.7 kg (169 lb)   SpO2 100%  Body mass index is 26.47 kg/m².    Hearing good.    Dentition good  Alert, oriented in no acute distress.  Eye contact is good, speech goal directed, affect calm    Assessment and Plan. The following treatment and monitoring plan is recommended:      Assessment & Plan  1. Subclavian vein artery stenosis.  Continue follow-up with vascular medicine.    2. Small vessel disease, cerebrovascular.  Continue follow-up with vascular medicine.    3. Pathological tibia and fibula fracture due to osteoporosis.  Continue follow-up with pain specialist.    4. Lumbar stenosis and low back pain.  Continue follow-up with pain specialist.    5. Long-term insulin use.  This is chronic in nature and stable. Insulin pump is in place.    6. Type 1 diabetes.  This is well " managed with endocrinology and last A1c is 7.0.    7. Hypothyroidism.  This is chronic in nature and stable and managed with Levoxyl 100 mcg by mouth daily.    8. Hyponatremia.  This was resolved on last labs. Updated labs will be reviewed to determine if it remains improved without salt tabs.    9. Hypertension.  This is chronic in nature and stable. Blood pressure is 124/62 today. She continues the telmisartan 40 mg by mouth daily.    10. Dyslipidemia.  She is also taking Zetia 10 mg and rosuvastatin 40 mg nightly with regard to dyslipidemia, which is monitored and managed by vascular medicine.    11. Gastroparesis.  She has been experiencing increased symptoms, including frequent vomiting. She has an appointment with Perla in April 2025. She is advised to continue with a diet of cooked vegetables, more liquids, white meat, and very small meals.    12. Balance issues.  She is advised to join the park gym and work in the water for strengthening exercises. [facility] is also recommended as an alternative.    13. Neck pain.  She reports soreness and cracking sounds when turning her head. She uses Voltaren gel at night for pain relief.    14. Ear infection.  She was treated with lidocaine for her lip, antibiotics, and steroids due to drainage from her ear. The steroids caused a significant increase in her blood sugar levels.    Follow-up  The patient will follow up in 6 months.    PROCEDURE  The patient underwent an endarterectomy for carotid artery stenosis.         Services suggested: No services needed at this time  Health Care Screening: Age-appropriate preventive services recommended by USPTF and ACIP covered by Medicare were discussed today. Services ordered if indicated and agreed upon by the patient.  Referrals offered: Community-based lifestyle interventions to reduce health risks and promote self-management and wellness, fall prevention, nutrition, physical activity, tobacco-use cessation, weight loss, and  mental health services as per orders if indicated.    Discussion today about general wellness and lifestyle habits:    Prevent falls and reduce trip hazards; Cautioned about securing or removing rugs.  Have a working fire alarm and carbon monoxide detector;   Engage in regular physical activity and social activities     Follow-up: No follow-ups on file.

## 2025-02-01 LAB
C PEPTIDE SERPL-MCNC: 0.2 NG/ML (ref 0.5–3.3)
COLLECT DURATION TIME SPEC: NORMAL HR
CREAT 24H UR-MCNC: 42 MG/DL
GLIADIN IGA SER IA-ACNC: <0.72 FLU (ref 0–4.99)
GLIADIN IGG SER IA-ACNC: <0.56 FLU (ref 0–4.99)
MICROALBUMIN 24H UR-MCNC: <0.3 MG/DL
MICROALBUMIN/CREAT 24H UR: <7 MG/G (ref 0–30)
SPECIMEN VOL ?TM UR: NORMAL ML
TTG IGA SER IA-ACNC: <1.02 FLU (ref 0–4.99)
TTG IGG SER IA-ACNC: <0.82 FLU (ref 0–4.99)

## 2025-02-03 DIAGNOSIS — R13.10 DYSPHAGIA, UNSPECIFIED TYPE: ICD-10-CM

## 2025-02-03 LAB
ALBUMIN SERPL ELPH-MCNC: 4.19 G/DL (ref 3.75–5.01)
ALPHA1 GLOB SERPL ELPH-MCNC: 0.34 G/DL (ref 0.19–0.46)
ALPHA2 GLOB SERPL ELPH-MCNC: 0.88 G/DL (ref 0.48–1.05)
B-GLOBULIN SERPL ELPH-MCNC: 0.84 G/DL (ref 0.48–1.1)
GAD65 AB SER IA-ACNC: 5.9 IU/ML (ref 0–5)
GAMMA GLOB SERPL ELPH-MCNC: 0.75 G/DL (ref 0.62–1.51)
INTERPRETATION SERPL IFE-IMP: NORMAL
MONOCLON BAND OBS SERPL: NORMAL
MONOCLONAL PROTEIN NL11656: NORMAL G/DL
PATHOLOGY STUDY: NORMAL
PROT SERPL-MCNC: 7 G/DL (ref 6.3–8.2)

## 2025-02-11 ENCOUNTER — TELEPHONE (OUTPATIENT)
Dept: HEALTH INFORMATION MANAGEMENT | Facility: OTHER | Age: 70
End: 2025-02-11
Payer: MEDICARE

## 2025-02-12 ENCOUNTER — APPOINTMENT (OUTPATIENT)
Dept: RADIOLOGY | Facility: MEDICAL CENTER | Age: 70
End: 2025-02-12
Attending: NURSE PRACTITIONER
Payer: MEDICARE

## 2025-02-13 ENCOUNTER — PATIENT OUTREACH (OUTPATIENT)
Dept: HEALTH INFORMATION MANAGEMENT | Facility: OTHER | Age: 70
End: 2025-02-13

## 2025-02-13 ENCOUNTER — APPOINTMENT (OUTPATIENT)
Dept: UROLOGY | Facility: MEDICAL CENTER | Age: 70
End: 2025-02-13
Payer: MEDICARE

## 2025-02-13 DIAGNOSIS — I10 PRIMARY HYPERTENSION: ICD-10-CM

## 2025-02-13 DIAGNOSIS — E08.00 DIABETES MELLITUS DUE TO UNDERLYING CONDITION WITH HYPEROSMOLARITY WITHOUT COMA, WITH LONG-TERM CURRENT USE OF INSULIN (HCC): ICD-10-CM

## 2025-02-13 DIAGNOSIS — E78.49 OTHER HYPERLIPIDEMIA: ICD-10-CM

## 2025-02-13 DIAGNOSIS — Z79.4 DIABETES MELLITUS DUE TO UNDERLYING CONDITION WITH HYPEROSMOLARITY WITHOUT COMA, WITH LONG-TERM CURRENT USE OF INSULIN (HCC): ICD-10-CM

## 2025-02-13 NOTE — CARE PLAN
Problem: Knowledge of self-monitoring blood sugars : Long Term  Goal: Ensure patient has had proper education on self glucose testing  Outcome: Progressing  Note: The patient has a blood glucose monitoring and insulin monitoring system. The patient tests additionally as necessary.   Intervention: Educate on importance of self-monitoring blood glucose  Intervention: Educate on importance of keeping a home blood glucose log (Provide home blood glucose log if needed)  Intervention: Educate on bringing log to medical provider appointments     Problem: Knowledge of diabetes: Long Term  Goal: Patient educated about diabetes  Outcome: Met  Note: The patient is well versed in Diabetes and her care measures. The patient is compliant with medications. The patient follows with endocrinology.   Intervention: Educate patient on the importance of medication adherence  Intervention: Patient will verbalize understanding of what diabetes is  Intervention: Patient will, In conjunction with their medical provider, identify goal A1c  Intervention: Patient will verbalize understanding of harmful effects of diabetes  Intervention: Patient will verbalize understanding of importance of medication adherence

## 2025-02-13 NOTE — PROGRESS NOTES
Reason for Follow-Up:    I spoke with the patient today by phone in order to complete her monthly and quarterly PCP follow ups.     Current Health Status:    The patient is followed by personal care management based on diagnoses of DM 1, CKD 2, HTN, hyperlipidemia, and aortic stenosis. The patient's DM is managed by endocrinology. She states that with the rare exception her blood glucose levels are within the range set by endocrinology 80-85 % of the time. The patient denies any symptoms at this time that would indicate a worsening in kidney function. She is up to date on labs.  She states her kidney doctor is having some labs re taken in order to confirm findings. The patient denies any cardiac symptoms or palpitations at this time.     Medical Updates:  Since our last conversation the patient has seen her PCP and Kidney doctor (Dehydration).     Medication Updates:  Patient denies any updates to her medication list at this time. Medications reviewed. The patient reports medications being taken as indicated in her chart.     Symptoms:  Acidic stomach at night.     Plan of Care Goals and Progress:    Goal 1. Over the next month the patient would like to be in range 80-85% of the time on her blood glucose monitor     Progress:  Patient stays in parameters for the most part with a few exceptions.       Barriers:  The patient has had to recently switch to a new blood glucose monitoring and insulin pump system as the other one wasn't working correctly     Interventions:  The patient will continue to be mindful of nutrition and hydration. The patient will continue to follow up with endocrinology and her PCP as indicated     Education:  Balanced nutrtion options such as soups discussed.     Goal 2. Over the next month the patient will take concrete and consistent steps in order to maintain cholesterol in a healthy range.      Progress:  The patient is up to date with recommended screenings. The patient's cholesterol is  within the healthy range. The patient reports being mindful of cholesterol, trans fats, and saturated fats in her foods.       Barriers:  The patient is under chronic statin therapy.      Interventions:  The patient will continue to be mindful of nutrition and hydration recommendations. The patient will raise activity. She is considering enrolling in the gym at [facility] for this purpose.      Education:  Balanced nutrition options such as soups discussed.       Patient's Concerns and Feedback (Self Management of Care):     The patient states that she has had her chronic conditions for so long that managing them is pretty much second nature at this point. The patient communicates and follows up appropriately with her medical team.The patient recently saw her PCP for an acute concern. Her chart will be forwarded to her PCP to see if the appointment on 2/20/25 is still needed. The patient is aware of the mammogram order and will reschedule.     Next Steps:     Follow-Up Plan: The patient's next PCM follow up will be in approximately 4 weeks.    Appointments:   2/20/25(11:20, Isaac), 2/20/25(1:45 arrival, Roel), 3/12/25 (2 pm, Urology), 3/24/25 (8:15 am, Radiology)        Contact Information:  Call [phone] with any questions or concerns.

## 2025-02-14 ENCOUNTER — APPOINTMENT (OUTPATIENT)
Dept: RADIOLOGY | Facility: MEDICAL CENTER | Age: 70
End: 2025-02-14
Attending: INTERNAL MEDICINE
Payer: MEDICARE

## 2025-02-18 ENCOUNTER — HOSPITAL ENCOUNTER (OUTPATIENT)
Dept: LAB | Facility: MEDICAL CENTER | Age: 70
End: 2025-02-18
Attending: STUDENT IN AN ORGANIZED HEALTH CARE EDUCATION/TRAINING PROGRAM
Payer: MEDICARE

## 2025-02-18 LAB
APPEARANCE UR: CLEAR
BILIRUB UR QL STRIP.AUTO: NEGATIVE
COLOR UR: YELLOW
GLUCOSE UR STRIP.AUTO-MCNC: NEGATIVE MG/DL
KETONES UR STRIP.AUTO-MCNC: NEGATIVE MG/DL
LEUKOCYTE ESTERASE UR QL STRIP.AUTO: NEGATIVE
MICRO URNS: NORMAL
NITRITE UR QL STRIP.AUTO: NEGATIVE
PH UR STRIP.AUTO: 7.5 [PH] (ref 5–8)
PROT UR QL STRIP: NEGATIVE MG/DL
RBC UR QL AUTO: NEGATIVE
SP GR UR STRIP.AUTO: 1.01
UROBILINOGEN UR STRIP.AUTO-MCNC: 0.2 EU/DL

## 2025-02-18 PROCEDURE — 83935 ASSAY OF URINE OSMOLALITY: CPT

## 2025-02-18 PROCEDURE — 82043 UR ALBUMIN QUANTITATIVE: CPT

## 2025-02-18 PROCEDURE — 84156 ASSAY OF PROTEIN URINE: CPT

## 2025-02-18 PROCEDURE — 83735 ASSAY OF MAGNESIUM: CPT

## 2025-02-18 PROCEDURE — 84300 ASSAY OF URINE SODIUM: CPT

## 2025-02-18 PROCEDURE — 83930 ASSAY OF BLOOD OSMOLALITY: CPT

## 2025-02-18 PROCEDURE — 36415 COLL VENOUS BLD VENIPUNCTURE: CPT

## 2025-02-18 PROCEDURE — 82570 ASSAY OF URINE CREATININE: CPT

## 2025-02-18 PROCEDURE — 81003 URINALYSIS AUTO W/O SCOPE: CPT

## 2025-02-18 PROCEDURE — 80069 RENAL FUNCTION PANEL: CPT

## 2025-02-19 LAB
ALBUMIN SERPL BCP-MCNC: 4.1 G/DL (ref 3.2–4.9)
BUN SERPL-MCNC: 10 MG/DL (ref 8–22)
CALCIUM ALBUM COR SERPL-MCNC: 9.4 MG/DL (ref 8.5–10.5)
CALCIUM SERPL-MCNC: 9.5 MG/DL (ref 8.5–10.5)
CHLORIDE SERPL-SCNC: 102 MMOL/L (ref 96–112)
CO2 SERPL-SCNC: 25 MMOL/L (ref 20–33)
CREAT SERPL-MCNC: 0.85 MG/DL (ref 0.5–1.4)
CREAT UR-MCNC: 53.4 MG/DL
GFR SERPLBLD CREATININE-BSD FMLA CKD-EPI: 75 ML/MIN/1.73 M 2
GLUCOSE SERPL-MCNC: 146 MG/DL (ref 65–99)
MAGNESIUM SERPL-MCNC: 2.1 MG/DL (ref 1.5–2.5)
OSMOLALITY SERPL: 293 MOSM/KG H2O (ref 278–298)
OSMOLALITY UR: 343 MOSM/KG H2O (ref 300–900)
PHOSPHATE SERPL-MCNC: 3.6 MG/DL (ref 2.5–4.5)
POTASSIUM SERPL-SCNC: 4.8 MMOL/L (ref 3.6–5.5)
PROT UR-MCNC: <6 MG/DL (ref 0–15)
PROT/CREAT UR: 112 MG/G (ref 10–107)
SODIUM SERPL-SCNC: 138 MMOL/L (ref 135–145)
SODIUM UR-SCNC: 99 MMOL/L

## 2025-02-20 ENCOUNTER — OFFICE VISIT (OUTPATIENT)
Dept: VASCULAR LAB | Facility: MEDICAL CENTER | Age: 70
End: 2025-02-20
Attending: FAMILY MEDICINE
Payer: MEDICARE

## 2025-02-20 VITALS
WEIGHT: 175 LBS | HEIGHT: 67 IN | SYSTOLIC BLOOD PRESSURE: 166 MMHG | BODY MASS INDEX: 27.47 KG/M2 | HEART RATE: 75 BPM | DIASTOLIC BLOOD PRESSURE: 85 MMHG

## 2025-02-20 DIAGNOSIS — Z98.890 HISTORY OF LEFT-SIDED CAROTID ENDARTERECTOMY: Chronic | ICD-10-CM

## 2025-02-20 DIAGNOSIS — E78.41 ELEVATED LIPOPROTEIN(A): ICD-10-CM

## 2025-02-20 DIAGNOSIS — I67.9 SMALL VESSEL DISEASE, CEREBROVASCULAR: ICD-10-CM

## 2025-02-20 DIAGNOSIS — E78.5 DYSLIPIDEMIA: ICD-10-CM

## 2025-02-20 DIAGNOSIS — I10 PRIMARY HYPERTENSION: ICD-10-CM

## 2025-02-20 DIAGNOSIS — I25.10 CORONARY ARTERY CALCIFICATION SEEN ON CT SCAN: ICD-10-CM

## 2025-02-20 DIAGNOSIS — I77.9 CAROTID ARTERY DISEASE, UNSPECIFIED LATERALITY, UNSPECIFIED TYPE (HCC): Chronic | ICD-10-CM

## 2025-02-20 DIAGNOSIS — E10.69 TYPE 1 DIABETES MELLITUS WITH OTHER SPECIFIED COMPLICATION (HCC): ICD-10-CM

## 2025-02-20 DIAGNOSIS — I77.1 SUBCLAVIAN ARTERY STENOSIS, RIGHT (HCC): Chronic | ICD-10-CM

## 2025-02-20 DIAGNOSIS — R93.1 AGATSTON CORONARY ARTERY CALCIUM SCORE BETWEEN 100 AND 199: ICD-10-CM

## 2025-02-20 DIAGNOSIS — Z79.02 LONG TERM (CURRENT) USE OF ANTITHROMBOTICS/ANTIPLATELETS: ICD-10-CM

## 2025-02-20 LAB
COLLECT DURATION TIME SPEC: NORMAL HR
CREAT 24H UR-MCNC: 54 MG/DL
MICROALBUMIN 24H UR-MCNC: <0.3 MG/DL
MICROALBUMIN/CREAT 24H UR: <6 MG/G (ref 0–30)
SPECIMEN VOL ?TM UR: NORMAL ML

## 2025-02-20 PROCEDURE — 3077F SYST BP >= 140 MM HG: CPT | Performed by: FAMILY MEDICINE

## 2025-02-20 PROCEDURE — 99214 OFFICE O/P EST MOD 30 MIN: CPT | Performed by: FAMILY MEDICINE

## 2025-02-20 PROCEDURE — 99212 OFFICE O/P EST SF 10 MIN: CPT

## 2025-02-20 PROCEDURE — G2211 COMPLEX E/M VISIT ADD ON: HCPCS | Performed by: FAMILY MEDICINE

## 2025-02-20 PROCEDURE — 3078F DIAST BP <80 MM HG: CPT | Performed by: FAMILY MEDICINE

## 2025-02-20 ASSESSMENT — ENCOUNTER SYMPTOMS
TINGLING: 0
DIZZINESS: 0
COUGH: 0
HEMOPTYSIS: 0
FEVER: 0
ORTHOPNEA: 0
PALPITATIONS: 0
HEADACHES: 0
PND: 0
SHORTNESS OF BREATH: 0
TREMORS: 0
WHEEZING: 0
SPUTUM PRODUCTION: 0
CLAUDICATION: 0
CHILLS: 0
FOCAL WEAKNESS: 1

## 2025-02-20 ASSESSMENT — FIBROSIS 4 INDEX: FIB4 SCORE: 1.32

## 2025-02-20 NOTE — PROGRESS NOTES
FOLLOW-UP [facility]   25     Ines Long referred for eval/mgmt of dyslipidemia, est 2023  Referral Source: Dr. Card (cardiology)     Subjective   Interval hx/concerns: last seen 2024,    Taking 4 tabs NaCl per nephrology - reports some weight gain but BP has been stable.   Na stable in upper 130s.  Prior low 120s.   No other new concerns.     LIFESTYLE MGMT  Change in weight: mild increase   Exercise habits:  limited   Dietary patterns: low fat  Etoh: No  Reported barriers to care: limited walking due to imbalance and ongoing eval with neurology     MED MGMT  Current rx:   Statin: rosuva 40mg  Non-Statin: zetia 10mg, praluent 150mg   Supplements: omega 3 FAs   Side Effects? No  Adherence: Complete    PERTINENT HLD PMHX  Age at Initial Diagnosis of Dyslipidemia: early 60s  History of ASCVD: yes    # carotid stenosis, s/p L CEA  (Dr. Garcia) - No new sx, no prior CVA/TIA.     # Cerebral small vessel dz - no prior sx, noted per MR, no prior CVA/TIA.      # elevated CACS = 180.  No hx of ascvd, no sx.     Secondary causes of dyslipidemia: none identified  Any Previous History of Statin Intolerance? No  Baseline Lipids (no off-tx lipid panel available in our system):   Latest Reference Range & Units 02/23/15 07:48   Cholesterol,Tot 100 - 199 mg/dL 245 (H)   Triglycerides 0 - 149 mg/dL 78   HDL >=40 mg/dL 99   LDL <100 mg/dL 130 (H)     HTN: Home BP los/70s, good adherence/tolerance  T1D: Stable, on insuling pump, seeing endocrine    ANTITHROMBOTIC TX: Yes, Details: ASA 81mg, no bleeding noted     Current Outpatient Medications on File Prior to Visit   Medication Sig Dispense Refill    cyclobenzaprine (FLEXERIL) 5 mg tablet 2 tablet at bedtime as needed Orally Once a day for 30 days  As needed      lidocaine (XYLOCAINE) 2 % Solution Apply 5 mL topically with cotton swab every 3 hours for lip/mouth pain. 100 mL 0    LEVOXYL 100 MCG Tab Take 1 Tablet by mouth every morning on an  empty stomach. Takes 1/2 tab on Sundays 30 Tablet 11    Insulin Aspart, w/Niacinamide, (FIASP) 100 UNIT/ML Solution Inject 60 units into pump daily 30 mL 11    mirabegron ER (MYRBETRIQ) 25 MG TABLET SR 24 HR Take 1 Tablet by mouth every day. 30 Tablet 3    pantoprazole (PROTONIX) 40 MG Tablet Delayed Response Take 1 Tablet by mouth every day. FILL NOW 90 Tablet 3    telmisartan (MICARDIS) 40 MG Tab Take 1 Tablet by mouth every day. to lower blood pressure 100 Tablet 3    famotidine (PEPCID) 20 MG Tab Take 1 Tablet by mouth 2 times a day. 60 Tablet 0    acetaminophen (TYLENOL) 500 MG Tab Take 2 Tablets by mouth every 6 hours as needed for Mild Pain or Moderate Pain. (Patient taking differently: Take 1,300 mg by mouth 2 times a day.)      ezetimibe (ZETIA) 10 MG Tab Take 1 Tablet by mouth every day. To lower cholesterol 100 Tablet 3    rosuvastatin (CRESTOR) 40 MG tablet Take 1 Tablet by mouth every day. To lower cholesterol 100 Tablet 3    sodium chloride (SALT) 1 GM Tab TAKE TWO TABLETS BY MOUTH EVERY DAY 90 Tablet 1    Alirocumab (PRALUENT) 150 MG/ML Solution Auto-injector Inject 150 mg under the skin every 14 days. 6 mL 3    DULoxetine HCl 60 MG Capsule Delayed Release Sprinkle Take 60 mg by mouth every day.      pregabalin (LYRICA) 100 MG Cap Take 200 mg by mouth every evening.      clindamycin (CLEOCIN) 150 MG Cap Take 3 capsules PO prior to dental procedure 6 Capsule 0    insulin infusion pump Device Inject  under the skin continuous. Patient's own SQ insulin pump    Type of Insulin: Fiasp  Last change of tubing: 3/19/2023 - Change tubing and site every 72 hours    Dosing:  Basal rate:   0000 - 0329 = 0.5 units/hr   0330 - 1129 = 0.85 units/hr   1130 - 1359 = 0.5 units/hr              1400 - 1759 = 0.45 units/hr              1800 - 2359 = 0.5 units/hr    Bolus ratio:   1 unit : 12 g carbohydrate at  (breakfast, lunch, dinner, snacks)      Correction ratio:    1 units for every 50 over 150 mg/dL    Disconnect  "pump if patient becomes hypoglycemic and altered.      Cholecalciferol (VITAMIN D-3 PO) Take 5,000 mg by mouth every evening.       No current facility-administered medications on file prior to visit.      Allergies   Allergen Reactions    Ceclor [Cefaclor] Hives and Swelling    Augmentin Hives, Diarrhea and Vomiting     Fever blisters   Sickness lasts forever    Naproxen      Face Swells   24: spoke to pt, states she can take ibuprofen with no issues.    Tape Rash     \"Certain band aids\"  PAPER TAPE OKAY/ Tegaderm ok    Amlodipine Swelling     5mg = swelling     Amoxicillin-Pot Clavulanate      Other reaction(s): Not available    Cefaclor Hives and Swelling    Clindamycin Unspecified    Penicillin G Benzathine Unspecified      Social History     Tobacco Use    Smoking status: Former     Current packs/day: 0.00     Types: Cigarettes     Quit date: 1975     Years since quittin.1    Smokeless tobacco: Never   Vaping Use    Vaping status: Never Used   Substance Use Topics    Alcohol use: Yes     Alcohol/week: 1.2 oz     Types: 2 Glasses of wine per week     Comment: about 3-4 drinks per week.     Drug use: Not Currently     Comment: tried CBD gummies; no THC     Review of Systems   Constitutional:  Negative for chills, fever and malaise/fatigue.   Respiratory:  Negative for cough, hemoptysis, sputum production, shortness of breath and wheezing.    Cardiovascular:  Negative for chest pain, palpitations, orthopnea, claudication, leg swelling and PND.   Neurological:  Positive for focal weakness (at baseline, gait slow but stable). Negative for dizziness, tingling, tremors and headaches.       Objective    Vitals:    25 1134 25 1139   BP: (!) 161/76 (!) 166/85   BP Location: Left arm Left arm   Patient Position: Sitting Sitting   BP Cuff Size: Large adult Large adult   Pulse: 76 75   Weight: 79.4 kg (175 lb)    Height: 1.702 m (5' 7\")      BP Readings from Last 5 Encounters:   25 (!) 166/85 " "  01/30/25 124/62   01/08/25 122/62   01/07/25 134/62   11/21/24 124/70     BMI Readings from Last 1 Encounters:   02/20/25 27.41 kg/m²      Wt Readings from Last 3 Encounters:   02/20/25 79.4 kg (175 lb)   01/30/25 76.7 kg (169 lb)   01/08/25 76.7 kg (169 lb)      Physical Exam  Constitutional:       General: She is not in acute distress.     Appearance: Normal appearance. She is well-developed. She is not diaphoretic.   HENT:      Head: Normocephalic and atraumatic.   Eyes:      Extraocular Movements: Extraocular movements intact.      Conjunctiva/sclera: Conjunctivae normal.   Cardiovascular:      Rate and Rhythm: Normal rate and regular rhythm.      Pulses: Normal pulses.      Heart sounds: Normal heart sounds. No murmur heard.     No friction rub. No gallop.   Pulmonary:      Effort: Pulmonary effort is normal. No respiratory distress.      Breath sounds: Normal breath sounds.   Musculoskeletal:         General: No tenderness. Normal range of motion.      Cervical back: Normal range of motion and neck supple.   Skin:     General: Skin is warm and dry.      Coloration: Skin is not pale.      Findings: No rash.   Neurological:      Mental Status: She is alert and oriented to person, place, and time.      Cranial Nerves: No cranial nerve deficit.   Psychiatric:         Mood and Affect: Mood and affect normal.         Speech: Speech normal.       DATA REVIEW:  Most Recent Lipid Panel:   Lab Results   Component Value Date    CHOLSTRLTOT 130 01/30/2025    TRIGLYCERIDE 76 01/30/2025    HDL 76 01/30/2025    LDL 39 01/30/2025     Lab Results   Component Value Date/Time    LDL 39 01/30/2025 08:56 AM    LDL 45 10/02/2024 10:45 AM    LDL 71 06/26/2024 10:48 AM    LDL 29 01/03/2024 10:37 AM    LDL 31 08/01/2023 09:36 AM       Lab Results   Component Value Date/Time    LIPOPROTA 297 (H) 10/02/2024 10:45 AM    LIPOPROTA 149 (H) 08/01/2023 09:36 AM    LIPOPROTA 286 (H) 03/08/2023 09:10 AM      No results found for: \"APOB\" "   Lab Results   Component Value Date/Time    CRPHIGHSEN 0.5 10/02/2024 10:45 AM     Other Pertinent Blood Work:   Lab Results   Component Value Date    SODIUM 138 02/18/2025    POTASSIUM 4.8 02/18/2025    CHLORIDE 102 02/18/2025    CO2 25 02/18/2025    ANION 10.0 01/30/2025    GLUCOSE 146 (H) 02/18/2025    BUN 10 02/18/2025    CREATININE 0.85 02/18/2025    CALCIUM 9.5 02/18/2025    ASTSGOT 34 01/30/2025    ALTSGPT 34 01/30/2025    ALKPHOSPHAT 133 (H) 01/30/2025    TBILIRUBIN 0.4 01/30/2025    ALBUMIN 4.1 02/18/2025    ALBUMIN 4.19 01/30/2025    AGRATIO 1.7 01/30/2025    CRPHIGHSEN 0.5 10/02/2024    LIPOPROTA 297 (H) 10/02/2024    TSHULTRASEN 3.540 01/30/2025     IMAGING    MR brain 2021       CACS 11/2022   Coronary calcification:  LMA - 0.0  LCX - 0.0  LAD - 180.1  RCA - 0.0  PDA - 0.0   Total Calcium Score: 180.1   Percentile: Calcium score is above the 75th percentile for the patient's age and sex.   Other findings:  Heart: Normal size.  Lungs: Clear.  Mediastinum: Normal.  Upper abdomen: Normal.    Carotid 12/2022   Normal right carotid exam.    Post left internal carotid endarterectomy.    Mild stenosis of the left internal carotid artery (<50%).     Echo 3/2023  No prior study is available for comparison.   Normal left ventricular systolic function.   No evidence of valvular abnormality based on Doppler evaluation.     Cardiac PET 6/30/23   NUCLEAR IMAGING INTERPRETATION   Normal myocardial perfusion with no ischemia.   Normal left ventricular wall motion.  LV ejection fraction = 77%.   ECG INTERPRETATION   No ischemic changes with Dipyridamole     11/2024 Carotid    Prior LEFT carotid endarterectomy.   No significant carotid stenosis.   Mild proximal RIGHT subclavian stenosis.   Right.    Flow velocities and Doppler waveforms are normal throughout the carotid arteries without evidence of plaque.    Elevated velocities in the proximal subclavian artery consistent with >50% stenosis.     Left.    Surgical  changes of the carotid bifurcation consistent with post carotid    endarterectomy status.          ASSESSMENT AND PLAN  1. Dyslipidemia        2. Carotid artery disease, unspecified laterality, unspecified type (HCC)        3. Subclavian artery stenosis, right (HCC)        4. History of left-sided carotid endarterectomy        5. Coronary artery calcification seen on CT scan        6. Agatston coronary artery calcium score between 100 and 199        7. Small vessel disease, cerebrovascular        8. Elevated lipoprotein(a)        9. Type 1 diabetes mellitus with other specified complication (HCC)        10. Primary hypertension        11. Long term (current) use of antithrombotics/antiplatelets            Patient Type: Secondary Prevention, T1D, polyvascular, very high risk      MAJOR ASCVD:     1) L carotid stenosis, s/p L CEA (2019, Dr. Garcia)- stable, asymptomatic - current sx would not correlated to carotid dz and I have reassured pt   No prior CVA/TIA   12/2022 duplex = stable, has been stable >2yrs   11/2024 duplex = stable, no stenoses  Plan  - continue secondary prevention treatment goals, intensive med mgmt and lifestyle interventions   - lifelong surveillance:  duplex q2yr (next 11/2026), sooner if new sx and CTA if progressive dz     Established Vascular Disease:     1) Asymptomatic, subclinical CAD (evidenced by CACS = 180 (88%ile for age/gender) in 2022  - no prior dx ASCVD events  - no LMA plaque noted   - no indications for functional testing w/o symptoms   - as reviewed with pt, ACC/AHA guidelines for chronic CAD mgmt (2023) support GDMT alone as initial mgmt citing multiple RCTs (ISCHEMIA, COURAGE, JAQUI 2D) demonstrating early revasc strategy plus GDMT did not improve MACE outcomes compared to GDMT alone  6/2023 cPET = normal   Plan:  - continue treatment plan as noted below  - ongoing with cardiology   - report any new sx and consider functional testing if new symptoms over time      2) small  vessel dz, cerebral per MR - stable, no sx.  No dementia or CVA/TIA  - continue strict BP and lipid mgmt     3) R subclavian artery stenosis, new dx  - no UE claudication or steal dynamics, asymptomatic  2024 duplex;  >50% stenosis   Plan:  monitor for new UE or post-circ sx, use LUE for BP monitoring, surveillance with carotid duplex     Evidence of genetic dyslipidemia: yes    - low prob FH due to baseline LDL-C <190  Though father  age 59 from CVA and apparently had major HLD disorder     Elevated lp(a) = 286mg/dl, >3xULN, primary LP risk , higher risk for AV stenosis development. Has prothrombotic properties  - echo normal  - no AV stenosis  Plan:  - had 40+% reduction on praluent  in past but now back to bsaeline   - recommend for family screening   - has influence on treatment decisions relating to initiate or intensity of lipid lowering as well as aspirin therapy      FH genotyping:  not recommended    ACC/AHA Indication for Statin Therapy:  Secondary Prevention - Very high risk ASCVD - 2 major events or 1 event with other high-risk conditions    Calculated Risk for ASCVD n/a     Other Significant Risk Markers  elevated coronary calcium score  Severe high lp(a) - as reviewed above     Goal LDL-C and nonHDL-C / apoB  LDL-C <55  -- due to polyvascular, severe lp(a) elevation   apoB <60   At goal?  Yes, 2025    - as reviewed with pt, multiple safety analyses (SAMI, Lancet. 2017 Oct 28;390(12354):9363-7199;  GHULAM, N Engl J Med 2017; 377:633-643) completed on very low LDL w/o indications of risk or complications.    - reviewed that she continues to have linear reduction in ASCVD risk with lower LDL-C w/o a floor limit, so we have opted to continue current tx     LIFESTYLE INTERVENTIONS  TOBACCO: Stages of Change: Maintenance (sustained change >6mo)    reports that she quit smoking about 50 years ago. Her smoking use included cigarettes. She has never used smokeless tobacco.   -  continued complete avoidance of all tobacco products   PHYSICAL ACTIVITY: >150min/week of mod-intensity activity or as much as tolerated  NUTRITION: Mediterranean   ETOH: limit to 1 or less standard drinks/day  WT MGMT: maintain healthy weight     LIPID LOWERING MEDICATION MANAGEMENT:     Statin Therapy  - continue rosuva 40mg daily     Non-Statin Medications  - continue zetia 10mg daily  - continue praluent 150mg q2wks (preferred agent)  - excellent response on LDL     Indication for PCSK9 Inhibitor  PSCK9i indicated per 2018 ACC/AHA guidelines in this patient with established ASCVD whose LDL-C remains above threshold of 70 mg/dl despite maximally tolerated statin therapy    - FOURIER trial yielded evidence that lp(a) reduction with Repatha led to further risk reduction for ASCVD independent of LDL-C lowering, thus very high risk patients with high lp(a) would benefit disproportionately from lipid-lowering strategy that includes pcsk9i (ESTEFANIA'Coty, et al, Circulation 2019).    - ODYSSEY Outcomes trial of patients with an acute coronary syndrome, alirocumab reduced Lp(a) by 5 mg/dL and reduced the risk of major adverse cardiovascular evens (hazard ratio 0.85, 95% CI 0.78-0.93). The beneficial impact of Lp(a) lowering by alirocumab was independent of its lowering of LDL-C (Denis et al, JACC, 2020).     BLOOD PRESSURE MANAGEMENT:  Due to R subclavian stenosis, using LUE only for BP measures   ACC/AHA goal <130/80  Home BP at goal:  yes   Office BP at goal:  no - outlier readings today   24h ABPM: not ordered to date  Plan:   - continue home BP monitoring  Medications:  - continue telmisartan to 40mg daily  - avoid thiazide, MRA due to chronic hypokalemia   - stopped amlodipine 5mg due to edema     Glycemic Status: Diabetic, T1 with vasc dz, HLD, HTN  Goal A1c < 7.5 reasonable but defer to endocrine MD   Lab Results   Component Value Date    HBA1C 7.0 (A) 01/07/2025      Lab Results   Component Value Date/Time     MALBCRT <7 01/30/2025 08:57 AM    MICROALBUR <0.3 01/30/2025 08:57 AM      Latest Reference Range & Units 01/30/25 08:57 02/18/25 10:31   Protein Creatinine Ratio 10 - 107 mg/g 141 (H) 112 (H)     Plan:  - continue current medication plan, defer to [facility]ON   - recommmend for routine care with PCP (or endocrine) to include regular A1c monitoring, annual albumin/creatinine ratio (ACR), annual diabetic retinopathy screening, foot exams, annual flu vaccine, and updates to pneumonia vaccines as appropriate      ANTITHROMBOTIC THERAPY continue ASA 81mg daily     OTHER    # gait instability - ongoing care with PM&R and neurology for further assessment     # hypothyroidism, stable - continue current treatment plan, defer mgmt to PCP      # hyperkalemia = resolved, unclear etiology  Plan:  - prior on veltassa per nephrology - defer surveillance and mgmt to nephro  - did not tolerate furosemide  - monitor lytes with ARB titration     # chronic, mild hyponatremia, resolved 2/2025 - no symptoms - prior worst 122, currently mild 133  Plan:  - defer surveillance and mgmt to nephrology   - continue fluid restrictions, apparently on NaCl 4 tabs per nephrology - may cause increase BP over time, so continue close monitoring   - best to avoid thiazides, MRAs    STUDIES:   11/2026 - carotid duplex - RN to track   LABS: as noted above  FOLLOW-UP:  12 months     Ghassan Gray M.D.  RONDA, Board-certified clinical lipidologist   [facility]   [facility]   [phone]

## 2025-03-04 ENCOUNTER — TELEPHONE (OUTPATIENT)
Dept: ENDOCRINOLOGY | Facility: MEDICAL CENTER | Age: 70
End: 2025-03-04
Payer: MEDICARE

## 2025-03-04 ENCOUNTER — PATIENT OUTREACH (OUTPATIENT)
Dept: HEALTH INFORMATION MANAGEMENT | Facility: OTHER | Age: 70
End: 2025-03-04
Payer: MEDICARE

## 2025-03-04 DIAGNOSIS — E08.00 DIABETES MELLITUS DUE TO UNDERLYING CONDITION WITH HYPEROSMOLARITY WITHOUT COMA, WITH LONG-TERM CURRENT USE OF INSULIN (HCC): ICD-10-CM

## 2025-03-04 DIAGNOSIS — E78.49 OTHER HYPERLIPIDEMIA: ICD-10-CM

## 2025-03-04 DIAGNOSIS — I13.0 BENIGN HYPERTENSIVE HEART AND KIDNEY DISEASE WITH DIASTOLIC CHF, NYHA CLASS II AND CKD STAGE 2 (HCC): ICD-10-CM

## 2025-03-04 DIAGNOSIS — I50.30 BENIGN HYPERTENSIVE HEART AND KIDNEY DISEASE WITH DIASTOLIC CHF, NYHA CLASS II AND CKD STAGE 2 (HCC): ICD-10-CM

## 2025-03-04 DIAGNOSIS — I10 PRIMARY HYPERTENSION: ICD-10-CM

## 2025-03-04 DIAGNOSIS — Z79.4 DIABETES MELLITUS DUE TO UNDERLYING CONDITION WITH HYPEROSMOLARITY WITHOUT COMA, WITH LONG-TERM CURRENT USE OF INSULIN (HCC): ICD-10-CM

## 2025-03-04 DIAGNOSIS — N18.2 BENIGN HYPERTENSIVE HEART AND KIDNEY DISEASE WITH DIASTOLIC CHF, NYHA CLASS II AND CKD STAGE 2 (HCC): ICD-10-CM

## 2025-03-04 NOTE — TELEPHONE ENCOUNTER
Pt called and left a voicemail saying they recently did labs for her kidneys and wanted to get Dr. ALEX opinion on the labs since she is concerned and has not been able to get a hold of her Nephrologist.      Pt stated they are ok with [employer] messages.

## 2025-03-04 NOTE — PROGRESS NOTES
"Reason for Follow-Up:    I spoke to the patient this morning when she called in with some concerns about labwork. We also completed her PCM monthly follow up.     Current Health Status:    The patient is followed by PCM based on diagnoses of DM 1, CKD 2, HTN, hyperlipidemia, and aortic stenosis. The patient is up to date on all labs and screenings related to these conditions. The patient denies any emergent symptoms at this time. The patient states that her blood sugars have been well controlled and that she has been diligent in her nutrition. The patient reports following her plan of care as presented to her by her providers. She denies any significant lifestyle changes.     Medical Updates:  Since our last conversation the patient has followed up with vascular medicine and had labs taken. The patient's vascular provider states they need to see her in a year. The patient has concerns with regard to her kidney labs. They are still in the 70s but have gone down from the 90s. She was encouraged to request an appointment with nephrology to discuss this. Her chart has also been forwarded to endocrinology for their take and her PCP by her request.     Medication Updates:  The patient denies any updates to her medication list today.     Symptoms:  fatigue, \"Sweet and sour\" smelling urine with no other symptoms.     Plan of Care Goals and Progress:    Goal 1. Over the next month the patient will be proactive in her care.      Progress:  The patient is pinpointing matters of concern and trying to take action on them      Barriers:  The patient has had trouble getting through to her nephrology provider.      Interventions:  The patient will continue to call and will consult other providers that might have input.      Education:  getting an opinion from endocrinology discussed.     Goal 2. Over the next month the patient will follow up with scheduled appointments to get answers to current concerns.      Progress:  The patient " is aware of her appointments and care measures and acts accordingly      Barriers:  The patient does have some anxiety related to her health and what is to be expected and what is not.      Interventions:  The patient will have feedback from nursing on symptomology and brainstorming what questions to ask.      Education:  Care plan and current symptoms discussed and options for getting answers to questions brainstormed.       Patient's Concerns and Feedback (Self Management of Care):     The patient is concerned about the drop in her GFR and protein/creatinine ratio. The patient will follow up with nephrology and her chart has been forwarded to endocrinology for review. The patient's PCP has also been informed of the patient's concerns.    Next Steps:     Follow-Up Plan:        Appointments:  3/12/25( 2 pm, Urology), 3/18/25 (9:25 am arrival, [facility]), 3/24/25 (8:15 am, radiology [facility]), 4/8/25(11:20 am, Endocrinology)     Contact Information:  Call [phone] with any questions or concerns.

## 2025-03-05 ENCOUNTER — TELEPHONE (OUTPATIENT)
Dept: HEALTH INFORMATION MANAGEMENT | Facility: OTHER | Age: 70
End: 2025-03-05
Payer: MEDICARE

## 2025-03-05 NOTE — TELEPHONE ENCOUNTER
I called this morning to update the patient after receiving a response to my routed message to endocrinology. She is aware that her labs are not considered emergent and that nephrology will be the primary decision maker in this aspect of her plan of care. The patient states that she contacted nephrology and that they were able to move her appointment up a few days and that she is having some more labs taken tomorrow.

## 2025-03-10 ENCOUNTER — TELEPHONE (OUTPATIENT)
Dept: MEDICAL GROUP | Facility: PHYSICIAN GROUP | Age: 70
End: 2025-03-10

## 2025-03-10 NOTE — TELEPHONE ENCOUNTER
[facility] called and needs a medical clearance for the patient to have her annual cleaning and exrays please call [phone]  fax [phone]

## 2025-03-11 ENCOUNTER — HOSPITAL ENCOUNTER (OUTPATIENT)
Dept: LAB | Facility: MEDICAL CENTER | Age: 70
End: 2025-03-11
Attending: STUDENT IN AN ORGANIZED HEALTH CARE EDUCATION/TRAINING PROGRAM
Payer: MEDICARE

## 2025-03-11 LAB
ANION GAP SERPL CALC-SCNC: 10 MMOL/L (ref 7–16)
BUN SERPL-MCNC: 21 MG/DL (ref 8–22)
CALCIUM SERPL-MCNC: 9.4 MG/DL (ref 8.5–10.5)
CHLORIDE SERPL-SCNC: 106 MMOL/L (ref 96–112)
CO2 SERPL-SCNC: 22 MMOL/L (ref 20–33)
CREAT SERPL-MCNC: 0.77 MG/DL (ref 0.5–1.4)
GFR SERPLBLD CREATININE-BSD FMLA CKD-EPI: 84 ML/MIN/1.73 M 2
GLUCOSE SERPL-MCNC: 106 MG/DL (ref 65–99)
POTASSIUM SERPL-SCNC: 4.6 MMOL/L (ref 3.6–5.5)
SODIUM SERPL-SCNC: 138 MMOL/L (ref 135–145)

## 2025-03-11 PROCEDURE — 80048 BASIC METABOLIC PNL TOTAL CA: CPT

## 2025-03-11 PROCEDURE — 36415 COLL VENOUS BLD VENIPUNCTURE: CPT

## 2025-03-12 ENCOUNTER — OFFICE VISIT (OUTPATIENT)
Dept: UROLOGY | Facility: MEDICAL CENTER | Age: 70
End: 2025-03-12
Payer: MEDICARE

## 2025-03-12 DIAGNOSIS — N39.41 URGE INCONTINENCE OF URINE: ICD-10-CM

## 2025-03-12 DIAGNOSIS — N32.81 OAB (OVERACTIVE BLADDER): ICD-10-CM

## 2025-03-12 DIAGNOSIS — E87.1 HYPONATREMIA: ICD-10-CM

## 2025-03-12 LAB
POC POST-VOID: 10 ML
POC PRE-VOID: NORMAL

## 2025-03-12 PROCEDURE — 99214 OFFICE O/P EST MOD 30 MIN: CPT | Performed by: STUDENT IN AN ORGANIZED HEALTH CARE EDUCATION/TRAINING PROGRAM

## 2025-03-12 PROCEDURE — 51798 US URINE CAPACITY MEASURE: CPT | Performed by: STUDENT IN AN ORGANIZED HEALTH CARE EDUCATION/TRAINING PROGRAM

## 2025-03-13 NOTE — PROGRESS NOTES
Subjective  Ines Long is a 69 y.o. female who presents today for follow up of OAB, urge incontinence.    The patient states that she has not had much improvement on the myrbetriq after taking it for 2 months. She would like to consider other options.    Family History   Problem Relation Age of Onset    Cancer Mother         rectal cancer    Stroke Father          59    Heart Disease Father     Hypertension Father     Lung Disease Father         tb    Hyperlipidemia Father     Diabetes Sister     Psychiatric Illness Sister         bipolar    Heart Disease Maternal Grandfather     Stroke Paternal Grandmother     Heart Disease Paternal Grandfather        Social History     Socioeconomic History    Marital status:      Spouse name: Not on file    Number of children: Not on file    Years of education: Not on file    Highest education level: Associate degree: academic program   Occupational History    Not on file   Tobacco Use    Smoking status: Former     Current packs/day: 0.00     Types: Cigarettes     Quit date: 1975     Years since quittin.2    Smokeless tobacco: Never   Vaping Use    Vaping status: Never Used   Substance and Sexual Activity    Alcohol use: Yes     Alcohol/week: 1.2 oz     Types: 2 Glasses of wine per week     Comment: about 3-4 drinks per week.     Drug use: Not Currently     Comment: tried CBD gummies; no THC    Sexual activity: Yes     Partners: Male     Birth control/protection: Surgical     Comment: Pt states had a hysterectomy   Other Topics Concern    Not on file   Social History Narrative    Not on file     Social Drivers of Health     Financial Resource Strain: Low Risk  (2025)    Overall Financial Resource Strain (CARDIA)     Difficulty of Paying Living Expenses: Not hard at all   Food Insecurity: No Food Insecurity (2025)    Hunger Vital Sign     Worried About Running Out of Food in the Last Year: Never true     Ran Out of Food in the Last Year:  Never true   Transportation Needs: No Transportation Needs (1/29/2025)    PRAPARE - Transportation     Lack of Transportation (Medical): No     Lack of Transportation (Non-Medical): No   Physical Activity: Insufficiently Active (1/29/2025)    Exercise Vital Sign     Days of Exercise per Week: 1 day     Minutes of Exercise per Session: 10 min   Stress: No Stress Concern Present (1/29/2025)    [employer] - Occupational Stress Questionnaire     Feeling of Stress : Only a little   Social Connections: Moderately Isolated (1/29/2025)    Social Connection and Isolation Panel [NHANES]     Frequency of Communication with Friends and Family: More than three times a week     Frequency of Social Gatherings with Friends and Family: Not on file     Attends [employer] Services: Never     Active Member of Clubs or Organizations: No     Attends Club or Organization Meetings: Never     Marital Status:    Intimate Partner Violence: Not At Risk (2/29/2024)    Humiliation, Afraid, Rape, and Kick questionnaire     Fear of Current or Ex-Partner: No     Emotionally Abused: No     Physically Abused: No     Sexually Abused: No   Housing Stability: Low Risk  (1/29/2025)    Housing Stability Vital Sign     Unable to Pay for Housing in the Last Year: No     Number of Times Moved in the Last Year: 0     Homeless in the Last Year: No       Past Surgical History:   Procedure Laterality Date    TIBIA NAILING INTRAMEDULLARY Right 9/14/2024    Procedure: INSERTION, INTRAMEDULLARY SUE, TIBIA;  Surgeon: Richard Sol M.D.;  Location: SUR[facility];  Service: Orthopedics    LUMBAR FUSION O-ARM  9/27/2023    Procedure: L4/5 and L5/S1 redo minimally invasive posterior decompression and instrumented fusion;  Surgeon: Evon Díaz M.D.;  Location: SURGERY [facility];  Service: Neurosurgery    LUMBAR DECOMPRESSION  9/27/2023    Procedure: DECOMPRESSION, SPINE, LUMBAR;  Surgeon: Evon Díaz M.D.;   Location: [facility];  Service: Neurosurgery    POSTERIOR CERVICAL FUSION O-ARM  3/20/2023    Procedure: F1-4-0-6-7-T1 posterior decompressive laminectomy and L0-4-2-6-7-T1 posterior instrumented fusion with O-arm imaging guidance;  Surgeon: Evon Daíz M.D.;  Location: [facility];  Service: Orthopedics    MENISCECTOMY, KNEE, ARTHROSCOPIC Right 08/22/2022    Procedure: RIGHT KNEE ARTHROSCOPY, PARTIAL MEDIAL MENISCECTOMY;  Surgeon: Ravi Gillis M.D.;  Location: [facility];  Service: Orthopedics    SYNOVECTOMY Right 08/22/2022    Procedure: SYNOVECTOMY;  Surgeon: Ravi Gillis M.D.;  Location: [facility];  Service: Orthopedics    CHONDROPLASTY Right 08/22/2022    Procedure: CHONDROPLASTY;  Surgeon: Ravi Gillis M.D.;  Location: [facility];  Service: Orthopedics    LUMBAR LAMINECTOMY DISKECTOMY  05/16/2022    Procedure: L4-5 and L5-S1 decompressive laminectomy, bilateral foraminotomies and left L5-S1;  Surgeon: Evon Díaz M.D.;  Location: [facility];  Service: Orthopedics    LUMBAR DECOMPRESSION  05/16/2022    Procedure: DECOMPRESSION, SPINE, LUMBAR;  Surgeon: Evon Díaz M.D.;  Location: [facility];  Service: Orthopedics    FORAMINOTOMY  05/16/2022    Procedure: FORAMINOTOMY, SPINE;  Surgeon: Evon Díaz M.D.;  Location: [facility];  Service: Orthopedics    CAROTID ENDARTERECTOMY Left 12/05/2019    Procedure: ENDARTERECTOMY, CAROTID- WITH NEUROMONITORIING;  Surgeon: Surya Garcia M.D.;  Location: [facility];  Service: Vascular    ORTHOPEDIC OSTEOTOMY Left 07/17/2017    Procedure: ORTHOPEDIC OSTEOTOMY CALCANEAL, KIDNER, EXCISION NAVICULAR, GASTROC SOLEUS RECESSION;  Surgeon: Adebayo Layton M.D.;  Location: [facility];  Service:     LIGAMENT REPAIR  07/17/2017    Procedure: LIGAMENT REPAIR SPRING;  Surgeon: Adebayo Layton M.D.;  Location: SUR[facility]ERY [facility];  Service:      "TOE FUSION  07/17/2017    Procedure: TOE FUSION 1ST METATARSAL JOINT, 2ND TOE RECONSTRUCTION ;  Surgeon: Adebayo Layton M.D.;  Location: SURGERY [facility];  Service:     REPAIR, TENDON, LOWER EXTREMITY, USING TENDON GRAFT  07/17/2017    Procedure: FLEXOR TENDON REPAIR- RELEASE/POSSIBLE FLEXOR DIGITORIUM LONGUS;  Surgeon: Adebayo Layton M.D.;  Location: SURGERY [facility];  Service:     BUNIONECTOMY  01/01/2014    SHOULDER ARTHROSCOPY Left 01/01/1997    frozen shoulder    ABDOMINAL HYSTERECTOMY TOTAL  01/01/1987    KNEE ARTHROTOMY Right 01/01/1978    [facility]     CHOLECYSTECTOMY      FOOT SURGERY  2016 and 2018    ORIF, KNEE      Torn [facility]    SHOULDER SURGERY      Frozen shoulder       Past Medical History:   Diagnosis Date    Acute nasopharyngitis 04/29/2022    \"I'm at the tailend of a cold\" .  Symptoms started 4/26/22 included exhaustion, and stuffy nose, cough.  Symptoms are better but pt. reports she still feels \"A little congested\"    Anesthesia     Post op N/V    Blocked artery Around 2019    Carotid artery- had surgery.  Pt. follows up with Dr. Surya Garcia every year.    Cataract 2023    no surgery yet    Diabetes type 1, controlled (HCC) 11/01/2010    Insulin pump in place.  Endocrinologist is Dr. Dewey.     Dyslipidemia 11/01/2010    High cholesterol     Hypertension     pt states controlled on meds    Hypothyroidism 11/01/2010    Indigestion     Insulin pump in place 05/18/2011    Left carotid artery stenosis - severe 2019     PONV (postoperative nausea and vomiting)     Routine health maintenance 05/18/2011    Urinary incontinence     \"If I sneeze, I leak\"       Current Outpatient Medications   Medication Sig Dispense Refill    cyclobenzaprine (FLEXERIL) 5 mg tablet 2 tablet at bedtime as needed Orally Once a day for 30 days  As needed      lidocaine (XYLOCAINE) 2 % Solution Apply 5 mL topically with cotton swab every 3 hours for lip/mouth pain. 100 mL 0    LEVOXYL 100 MCG Tab " Take 1 Tablet by mouth every morning on an empty stomach. Takes 1/2 tab on Sundays 30 Tablet 11    Insulin Aspart, w/Niacinamide, (FIASP) 100 UNIT/ML Solution Inject 60 units into pump daily 30 mL 11    mirabegron ER (MYRBETRIQ) 25 MG TABLET SR 24 HR Take 1 Tablet by mouth every day. 30 Tablet 3    pantoprazole (PROTONIX) 40 MG Tablet Delayed Response Take 1 Tablet by mouth every day. FILL NOW 90 Tablet 3    telmisartan (MICARDIS) 40 MG Tab Take 1 Tablet by mouth every day. to lower blood pressure 100 Tablet 3    famotidine (PEPCID) 20 MG Tab Take 1 Tablet by mouth 2 times a day. 60 Tablet 0    acetaminophen (TYLENOL) 500 MG Tab Take 2 Tablets by mouth every 6 hours as needed for Mild Pain or Moderate Pain. (Patient taking differently: Take 1,300 mg by mouth 2 times a day.)      ezetimibe (ZETIA) 10 MG Tab Take 1 Tablet by mouth every day. To lower cholesterol 100 Tablet 3    rosuvastatin (CRESTOR) 40 MG tablet Take 1 Tablet by mouth every day. To lower cholesterol 100 Tablet 3    sodium chloride (SALT) 1 GM Tab TAKE TWO TABLETS BY MOUTH EVERY DAY 90 Tablet 1    Alirocumab (PRALUENT) 150 MG/ML Solution Auto-injector Inject 150 mg under the skin every 14 days. 6 mL 3    DULoxetine HCl 60 MG Capsule Delayed Release Sprinkle Take 60 mg by mouth every day.      pregabalin (LYRICA) 100 MG Cap Take 200 mg by mouth every evening.      clindamycin (CLEOCIN) 150 MG Cap Take 3 capsules PO prior to dental procedure 6 Capsule 0    insulin infusion pump Device Inject  under the skin continuous. Patient's own SQ insulin pump    Type of Insulin: Fiasp  Last change of tubing: 3/19/2023 - Change tubing and site every 72 hours    Dosing:  Basal rate:   0000 - 0329 = 0.5 units/hr   0330 - 1129 = 0.85 units/hr   1130 - 1359 = 0.5 units/hr              1400 - 1759 = 0.45 units/hr              1800 - 2359 = 0.5 units/hr    Bolus ratio:   1 unit : 12 g carbohydrate at  (breakfast, lunch, dinner, snacks)      Correction ratio:    1 units  "for every 50 over 150 mg/dL    Disconnect pump if patient becomes hypoglycemic and altered.      Cholecalciferol (VITAMIN D-3 PO) Take 5,000 mg by mouth every evening.       No current facility-administered medications for this visit.       Allergies   Allergen Reactions    Ceclor [Cefaclor] Hives and Swelling    Augmentin Hives, Diarrhea and Vomiting     Fever blisters   Sickness lasts forever    Naproxen      Face Swells   9/14/24: spoke to pt, states she can take ibuprofen with no issues.    Tape Rash     \"Certain band aids\"  PAPER TAPE OKAY/ Tegaderm ok    Amlodipine Swelling     5mg = swelling     Amoxicillin-Pot Clavulanate      Other reaction(s): Not available    Cefaclor Hives and Swelling    Clindamycin Unspecified    Penicillin G Benzathine Unspecified       Objective  There were no vitals taken for this visit.  Physical Exam  Constitutional:       Appearance: Normal appearance.   HENT:      Head: Normocephalic and atraumatic.   Eyes:      Pupils: Pupils are equal, round, and reactive to light.   Pulmonary:      Effort: No respiratory distress.   Skin:     General: Skin is warm and dry.   Neurological:      General: No focal deficit present.      Mental Status: She is alert.   Psychiatric:         Mood and Affect: Mood normal.         Behavior: Behavior normal.         Labs:   CBC   Lab Results   Component Value Date/Time    WBC 5.9 01/30/2025 0857    RBC 4.41 01/30/2025 0857    HEMOGLOBIN 13.0 01/30/2025 0857    HEMATOCRIT 40.4 01/30/2025 0857    MCV 91.6 01/30/2025 0857    MCH 29.5 01/30/2025 0857    MCHC 32.2 01/30/2025 0857    RDW 47.4 01/30/2025 0857    MPV 10.7 01/30/2025 0857    LYMPHOCYTES 37.50 01/30/2025 0857    LYMPHS 2.21 01/30/2025 0857    MONOCYTES 6.10 01/30/2025 0857    MONOS 0.36 01/30/2025 0857    EOSINOPHILS 2.00 01/30/2025 0857    EOS 0.12 01/30/2025 0857    BASOPHILS 0.50 01/30/2025 0857    BASO 0.03 01/30/2025 0857    NRBC 0.00 01/30/2025 0857       BMP   Lab Results   Component Value " Date/Time    SODIUM 138 03/11/2025 1013    POTASSIUM 4.6 03/11/2025 1013    CHLORIDE 106 03/11/2025 1013    CO2 22 03/11/2025 1013    GLUCOSE 106 (H) 03/11/2025 1013    BUN 21 03/11/2025 1013    CREATININE 0.77 03/11/2025 1013    CALCIUM 9.4 03/11/2025 1013           Imaging:     none    Assessment    OAB, UI  We reviewed the treatment options for overactive bladder given her lack of response to myrbetriq including cystoscopy with bladder botox injections, and Interstim placement.  I recommend sacral neuromodulation and chemodenervation of bladder. The patient would like to try treatment with sacral neuromodulation. We discussed the risks/benefits of the procedure, what to expect during the trial.       Plan    Problem List Items Addressed This Visit       OAB (overactive bladder)    Urge incontinence of urine    Relevant Orders    POCT Bladder Scan (Completed)   OR for stage 1 sacral neuromodulation, given bladder diary

## 2025-03-17 ENCOUNTER — HOSPITAL ENCOUNTER (OUTPATIENT)
Facility: MEDICAL CENTER | Age: 70
End: 2025-03-17
Attending: STUDENT IN AN ORGANIZED HEALTH CARE EDUCATION/TRAINING PROGRAM | Admitting: STUDENT IN AN ORGANIZED HEALTH CARE EDUCATION/TRAINING PROGRAM
Payer: MEDICARE

## 2025-03-17 NOTE — Clinical Note
REFERRAL APPROVAL NOTICE         Sent on March 17, 2025                   Ines Long  [address]  [place] [address]                   Dear MsJosh Long,    After a careful review of the medical information and benefit coverage, [facility] has processed your referral. See below for additional details.    If applicable, you must be actively enrolled with your insurance for coverage of the authorized service. If you have any questions regarding your coverage, please contact your insurance directly.    REFERRAL INFORMATION   Referral #:  [id number]  Referred-To Provider    Referred-By Provider:  Nephrology    Tiara Leon M.D.   [facility]      [address]  [place] [address]  [phone] [address]  [place] [address]  [phone]    Referral Start Date:  03/12/2025  Referral End Date:   03/12/2026             SCHEDULING  If you do not already have an appointment, please call [phone] to make an appointment.     MORE INFORMATION  If you do not already have a [facility] account, sign up at: [email or web address]  You can access your medical information, make appointments, see lab results, billing information, and more.  If you have questions regarding this referral, please contact  the [facility] Referrals department at:             [phone]. Monday - Friday 8:00AM - 5:00PM.     Sincerely,    [facility]

## 2025-03-18 ENCOUNTER — DOCUMENTATION (OUTPATIENT)
Dept: HEALTH INFORMATION MANAGEMENT | Facility: OTHER | Age: 70
End: 2025-03-18

## 2025-03-18 ENCOUNTER — APPOINTMENT (OUTPATIENT)
Dept: MEDICAL GROUP | Facility: PHYSICIAN GROUP | Age: 70
End: 2025-03-18
Payer: MEDICARE

## 2025-03-18 VITALS
TEMPERATURE: 97.9 F | DIASTOLIC BLOOD PRESSURE: 68 MMHG | HEART RATE: 86 BPM | SYSTOLIC BLOOD PRESSURE: 125 MMHG | OXYGEN SATURATION: 95 % | BODY MASS INDEX: 27 KG/M2 | HEIGHT: 67 IN | WEIGHT: 172 LBS

## 2025-03-18 DIAGNOSIS — M48.062 LUMBAR STENOSIS WITH NEUROGENIC CLAUDICATION: ICD-10-CM

## 2025-03-18 DIAGNOSIS — I77.1 SUBCLAVIAN ARTERY STENOSIS, RIGHT (HCC): ICD-10-CM

## 2025-03-18 DIAGNOSIS — E10.69 TYPE 1 DIABETES MELLITUS WITH OTHER SPECIFIED COMPLICATION (HCC): ICD-10-CM

## 2025-03-18 DIAGNOSIS — I10 PRIMARY HYPERTENSION: ICD-10-CM

## 2025-03-18 DIAGNOSIS — E03.9 ACQUIRED HYPOTHYROIDISM: ICD-10-CM

## 2025-03-18 DIAGNOSIS — I67.9 SMALL VESSEL DISEASE, CEREBROVASCULAR: Chronic | ICD-10-CM

## 2025-03-18 DIAGNOSIS — Z12.31 ENCOUNTER FOR SCREENING MAMMOGRAM FOR MALIGNANT NEOPLASM OF BREAST: ICD-10-CM

## 2025-03-18 DIAGNOSIS — I77.9 CAROTID ARTERY DISEASE, UNSPECIFIED LATERALITY, UNSPECIFIED TYPE (HCC): ICD-10-CM

## 2025-03-18 DIAGNOSIS — N39.41 URGE INCONTINENCE OF URINE: ICD-10-CM

## 2025-03-18 DIAGNOSIS — N32.81 OAB (OVERACTIVE BLADDER): ICD-10-CM

## 2025-03-18 PROCEDURE — 3074F SYST BP LT 130 MM HG: CPT | Performed by: NURSE PRACTITIONER

## 2025-03-18 PROCEDURE — 3078F DIAST BP <80 MM HG: CPT | Performed by: NURSE PRACTITIONER

## 2025-03-18 PROCEDURE — 99214 OFFICE O/P EST MOD 30 MIN: CPT | Performed by: NURSE PRACTITIONER

## 2025-03-18 RX ORDER — CYCLOSPORINE 0.5 MG/ML
1 EMULSION OPHTHALMIC 2 TIMES DAILY
COMMUNITY
Start: 2024-12-31

## 2025-03-18 RX ORDER — PRUCALOPRIDE 1 MG/1
1 TABLET, FILM COATED ORAL DAILY
COMMUNITY
Start: 2025-03-13

## 2025-03-18 RX ORDER — OMEPRAZOLE 40 MG/1
40 CAPSULE, DELAYED RELEASE ORAL DAILY
COMMUNITY
Start: 2025-03-05

## 2025-03-18 RX ORDER — SUCRALFATE 1 G/1
1 TABLET ORAL
COMMUNITY
Start: 2025-03-05

## 2025-03-18 ASSESSMENT — FIBROSIS 4 INDEX: FIB4 SCORE: 1.32

## 2025-03-18 NOTE — PROGRESS NOTES
REASON FOR VISIT: Pre-Op Consultation  Consultation Requested by: Dr. Leon  Procedure date and type: sacral neuromodulator    History of condition for which surgery is planned:chronic OAB and urinary dysfunction/urge incontinence. States this has been continuous since her leg fracture. Failure of other treatments including botox and Myrbetriq.    History of Present Illness  The patient presents for evaluation of urge incontinence, gastroparesis, diabetes mellitus, hypothyroidism, and hot flashes.    Urge Incontinence  She has been experiencing urge incontinence and urinary symptoms since her leg fracture. She was informed that her bladder could only withstand a certain number of assaults and that her numerous surgeries and catheterizations may have contributed to her current condition. She was prescribed medication, but due to its high cost and low efficacy rate, she discontinued its use. She is scheduled for a trial of a bladder stimulator in early May 2025. She was provided with a form for clearance, which she inadvertently left at home. She also received a form to document her urinary frequency and urgency.  - Onset: Since her leg fracture.  - Character: Urge incontinence and urinary symptoms.  - Alleviating/Aggravating Factors: Numerous surgeries and catheterizations may have contributed; discontinued medication due to high cost and low efficacy.  - Timing: Scheduled for a trial of a bladder stimulator in early May 2025.    Gastroparesis  She has been diagnosed with gastroparesis and has been advised to undergo a motility study, which she has declined. She is currently on omeprazole 40 mg and sucralfate, with Motegrity pending preauthorization. She is considering initiating a gym regimen to aid in weight management.  - Character: Gastroparesis.  - Alleviating/Aggravating Factors: Declined motility study; on omeprazole 40 mg and sucralfate; Motegrity pending preauthorization.    Pain  She is in the process of  discontinuing pregabalin and duloxetine, with only two days remaining on the latter. She reports experiencing pain, which she attributes to weather changes. She has found relief from copper zinc gloves.  - Character: Pain attributed to weather changes.  - Alleviating Factors: Copper zinc gloves.    Diabetes Mellitus and Hypothyroidism  Her blood glucose levels have been well-controlled. She continues to experience sudden hot flashes. Her GFR has increased from 75 to 84, and her B12 levels have also risen. She has an upcoming appointment with her physician next Tuesday. She has expressed interest in consulting with a nephrologist. She is not on any regular aspirin or ibuprofen regimen. She reports no chest pain, shortness of breath, leg swelling, excessive thirst, chills, cold sweats, or black or bloody stools. She is uncertain about the date of her last A1c test. She is awaiting a new transmitter from [employer].  - Character: Well-controlled blood glucose levels; sudden hot flashes.  - Timing: Upcoming appointment with her physician next Tuesday.  - Severity: GFR increased from 75 to 84; B12 levels have risen.    Celiac Testing and Dietary Modifications  She has undergone extensive celiac testing and has been advised that a low result may necessitate an intestinal biopsy. She has made dietary modifications to reduce her gluten intake.  - Character: Extensive celiac testing; dietary modifications to reduce gluten intake.    Supplemental Information  She is still using Restasis eyedrops and has started taking Blink vitamin because she can not remember to use her eyedrops four times a day. It seems to be helping.    ALLERGIES  The patient has no known allergies.    MEDICATIONS  - Current: Restasis  - Current: Blink (vitamin)  - Current: Omeprazole  - Current: Sucralfate  - Current: Fish oil  - Current: Vitamin D  - Current: Alpha-lipoic acid  - Discontinued: Pregabalin  - Discontinued: Duloxetine       Current chronic  conditions:   Past medical history:  has a past medical history of Acute nasopharyngitis (2022), Anesthesia, Blocked artery (Around ), Cataract (), Diabetes type 1, controlled (HCC) (2010), Dyslipidemia (2010), High cholesterol, Hypertension, Hypothyroidism (2010), Indigestion, Insulin pump in place (2011), Left carotid artery stenosis - severe 2019, PONV (postoperative nausea and vomiting), Routine health maintenance (2011), and Urinary incontinence.. Negative for: CAD, SBE, CVA, TIA, DVT, PE, bleeding requiring transfusion, intubation.  Surgical and anesthetic history:  has a past surgical history that includes cholecystectomy; abdominal hysterectomy total (1987); bunionectomy (2014); knee arthrotomy (Right, 1978); shoulder arthroscopy (Left, 1997); orthopedic osteotomy (Left, 2017); ligament repair (2017); toe fusion (2017); repair, tendon, lower extremity, using tendon graft (2017); carotid endarterectomy (Left, 2019); lumbar laminectomy diskectomy (2022); lumbar decompression (2022); foraminotomy (2022); meniscectomy, knee, arthroscopic (Right, 2022); synovectomy (Right, 2022); chondroplasty (Right, 2022); shoulder surgery; orif, knee; foot surgery ( and ); posterior cervical fusion o-arm (2023); lumbar fusion o-arm (2023); lumbar decompression (2023); tibia nailing intramedullary (Right, 2024); and fusion (2023). Prior surgery without complication, bleeding, reaction to anesthetic, prolonged recovery  Habits:   Social History     Tobacco Use    Smoking status: Former     Current packs/day: 0.00     Types: Cigarettes     Quit date: 1975     Years since quittin.2    Smokeless tobacco: Never   Vaping Use    Vaping status: Never Used   Substance Use Topics    Alcohol use: Yes     Alcohol/week: 1.2 oz     Types: 2 Glasses of wine per  week     Comment: about 3-4 drinks per week.     Drug use: Not Currently     Comment: tried CBD gummies; no THC     Allergies: Ceclor [cefaclor], Augmentin, Naproxen, Tape, Amlodipine, Amoxicillin-pot clavulanate, Cefaclor, Clindamycin, and Penicillin g benzathine No known allergy to Anesthetic, or Latex.     Current medicines:   Current Outpatient Medications   Medication Sig Dispense Refill    cycloSPORINE (RESTASIS) 0.05 % ophthalmic emulsion Administer 1 Drop into both eyes 2 times a day.      omeprazole (PRILOSEC) 40 MG delayed-release capsule Take 40 mg by mouth every day.      MOTEGRITY 1 MG Tab Take 1 mg by mouth every day.      sucralfate (CARAFATE) 1 GM Tab Take 1 g by mouth 4 Times a Day,Before Meals and at Bedtime.      cyclobenzaprine (FLEXERIL) 5 mg tablet 2 tablet at bedtime as needed Orally Once a day for 30 days  As needed      LEVOXYL 100 MCG Tab Take 1 Tablet by mouth every morning on an empty stomach. Takes 1/2 tab on Sundays 30 Tablet 11    Insulin Aspart, w/Niacinamide, (FIASP) 100 UNIT/ML Solution Inject 60 units into pump daily 30 mL 11    telmisartan (MICARDIS) 40 MG Tab Take 1 Tablet by mouth every day. to lower blood pressure 100 Tablet 3    ezetimibe (ZETIA) 10 MG Tab Take 1 Tablet by mouth every day. To lower cholesterol 100 Tablet 3    rosuvastatin (CRESTOR) 40 MG tablet Take 1 Tablet by mouth every day. To lower cholesterol 100 Tablet 3    Alirocumab (PRALUENT) 150 MG/ML Solution Auto-injector Inject 150 mg under the skin every 14 days. 6 mL 3    clindamycin (CLEOCIN) 150 MG Cap Take 3 capsules PO prior to dental procedure 6 Capsule 0    insulin infusion pump Device Inject  under the skin continuous. Patient's own SQ insulin pump    Type of Insulin: Fiasp  Last change of tubing: 3/19/2023 - Change tubing and site every 72 hours    Dosing:  Basal rate:   0000 - 0329 = 0.5 units/hr   0330 - 1129 = 0.85 units/hr   1130 - 1359 = 0.5 units/hr              1400 - 1759 = 0.45 units/hr          "     1800 - 2359 = 0.5 units/hr    Bolus ratio:   1 unit : 12 g carbohydrate at  (breakfast, lunch, dinner, snacks)      Correction ratio:    1 units for every 50 over 150 mg/dL    Disconnect pump if patient becomes hypoglycemic and altered.      Cholecalciferol (VITAMIN D-3 PO) Take 5,000 mg by mouth every evening.       No current facility-administered medications for this visit.     Anticoagulant: ASA, NSAIDs-- stop 2 weeks prior   Herbals: Black Cohash, Echinacea, Garlic, Yessica, Ginkgo Biloba, Ginseng, Kava, Mari’s Wort,  Saw Palmetto, Valerian (not on these)              ROS: negative for: CP, SOB, JONES, Orthopnea, wheezing, leg edema, polydipsia, polyuria, fevers, chills, sweats, cough, cold, congestion, abd pain, reflux, black or bloody stools, weight loss/gain.  Functional Status: uses assistive device    PHYSICAL EXAMINATION:  VITAL SIGNS: /68 (BP Location: Left arm, Patient Position: Sitting, BP Cuff Size: Adult)   Pulse 86   Temp 36.6 °C (97.9 °F) (Temporal)   Ht 1.702 m (5' 7\")   Wt 78 kg (172 lb)   SpO2 95%  Body mass index is 26.94 kg/m².  HEENT: EOMI, PERRL. Oropharynx pink, moist. Normal airway. Neck supple, no cervical lymphadenopathy.  LUNGS: CTAB good excursion.   HEART: RRR no murmur.  ABDOMEN: soft, nondistended, nontender normal BS. No HSM.  LOWER EXTREMITIES: warm and well perfused with no edema.      IMPRESSION:  Diagnoses of Urge incontinence of urine, Subclavian artery stenosis, right (HCC), Small vessel disease, cerebrovascular, OAB (overactive bladder), Lumbar stenosis with neurogenic claudication, Acquired hypothyroidism, Primary hypertension, Encounter for screening mammogram for malignant neoplasm of breast, Type 1 diabetes mellitus with other specified complication (HCC), and Carotid artery disease, unspecified laterality, unspecified type (HCC) were pertinent to this visit.  Planned surgery:sacral neuromodulator   High risk medical conditions: negative for Cardiac, " Pulmonary, Bleeding, Poor healing, Thrombosis, Debility    PLAN:  Chronic medical conditions: Stable and controlled. A1c Is 7.0. Continue current medicines.   Avoid drugs that potentiate bleeding as advised by surgeon  Discontinue all herbal supplements 2 weeks prior to surgery.  Need for SBE prophylaxis: no  This patient is considered Very Low/Low/Moderate/High risk for cardiopulmonary complications for this planned surgery.    Assessment & Plan  1. Urge incontinence/OAB: She has been experiencing urge incontinence and urinary symptoms since her leg fracture. She was informed that her bladder could only withstand a certain number of assaults and that her numerous surgeries and catheterizations may have contributed to her current condition. She was prescribed medication, but due to its high cost and low efficacy rate, she discontinued its use. She is scheduled for a trial of a bladder stimulator in early May 2025. She was provided with a form for clearance, which she inadvertently left at home. She also received a form to document her urinary frequency and urgency.   - Discontinue aspirin and ibuprofen 1 to 2 weeks prior to the procedure with Dr. Leon due to their potential to increase bleeding risk.  - Use Tylenol as an alternative.    2. Gastroparesis: She has been diagnosed with gastroparesis and has been advised to undergo a motility study, which she has declined. She is currently on omeprazole 40 mg and sucralfate, with [employer] pending preauthorization. She is considering initiating a gym regimen to aid in weight management. She has been informed that Motegrity is a colon stimulant designed to enhance intestinal motility, which is beneficial given her slow intestinal motility due to gastroparesis.  - Continue omeprazole 40 mg and sucralfate.  - Await preauthorization for Motegrity.  - Initiate a gym regimen to aid in weight management.    3. Diabetes mellitus: Her blood glucose levels have been  well-controlled. Her GFR has increased from 75 to 84, and her B12 levels have also risen. She has an upcoming appointment with her physician next Tuesday. She has expressed interest in consulting with a nephrologist. She is not on any regular aspirin or ibuprofen regimen. She reports no chest pain, shortness of breath, leg swelling, excessive thirst, chills, cold sweats, or black or bloody stools. She is uncertain about the date of her last A1c test. She is awaiting a new transmitter from [employer].  - Consult with a nephrologist.  - Await new transmitter from [employer].    4. Hypothyroidism: Her thyroid levels are slightly elevated, indicating a potential need for an increase in her thyroid medication dosage. This adjustment may also alleviate her hot flashes.  - Consider increasing thyroid medication dosage.    5. Hot flashes: She continues to experience sudden hot flashes. This symptom may be related to her thyroid levels, which are slightly elevated. An increase in her thyroid medication dosage may help alleviate this symptom.  - Consider increasing thyroid medication dosage.    6. Medication management: She is in the process of discontinuing pregabalin and duloxetine, with only two days remaining on the latter. She reports experiencing pain, which she attributes to weather changes. She has found relief from copper zinc gloves.  - Continue discontinuation of pregabalin and duloxetine.  - Use copper zinc gloves for pain relief.    7. Celiac disease: She has undergone extensive celiac testing and has been advised that a low result may necessitate an intestinal biopsy. She has made dietary modifications to reduce her gluten intake.  - Continue dietary modifications to reduce gluten intake.    Follow-up  - Scheduled for a follow-up visit in 3 months.        Duke Index for assessing perioperative cardiovascular risk [Circulation 1999;100:1047]:   (one point each for * high-risk surgery [ intrathoracic, suprainguinal  vascular, intraperitoneal ],* Hx ischemic heart dz, * Hx CHF, *Hx TIA or CVA, *IDDM, *Serum Cr >2.0)   Interpretation of risk: (Complication = MI, Pulm edema, v-fib or cardiac arrest, complete heart block)  Very Low: 0 points = 0.4 % complication. Low: 1 point = 0.9 %, Moderate: 2 points = 6.6 %, High: 3+ points = 11%.

## 2025-03-18 NOTE — PROGRESS NOTES
I checked in with the patient today while she was in clinic. The patient reports that she is in the process of getting a neural stimulation device for her bladder. She is planning on making an appointment at [facility] [place] [facility] tomorrow in order to begin her exercise journey. The patient reports that her blood sugars have been stable. She denies any further medical or community health needs at this time.

## 2025-03-20 ENCOUNTER — TELEPHONE (OUTPATIENT)
Dept: PHARMACY | Facility: MEDICAL CENTER | Age: 70
End: 2025-03-20
Payer: MEDICARE

## 2025-03-20 NOTE — TELEPHONE ENCOUNTER
Prior Authorization has been submitted via Cover My Meds: Key ([id number])    Will follow up in 24-48 business hours.      Received Renewal PA request via  Right[employer]ax   for FIASP 100 UNIT/ML Solution. (Quantity:30, Day Supply:50)     Insurance: [employer]  Member ID:  [id number]  BIN: [id number]   PCN: [id number]  Group: [id number]     Ran Test claim via [place] & medication Rejects stating prior authorization is required.    Becky Blake, Pharmacy Liaison/ RX Coordinator

## 2025-03-21 NOTE — TELEPHONE ENCOUNTER
Prior Authorization for FIASP 100 UNIT/ML Solution  has been DENIED  Prior authorization was denied per the following: Not covered under part D (Medicare Part B covers external pumps and the drug used in the pump).  Prior Authorization denial reference number: [id number]  Insurance: [employer]  Next Steps: Advised provider and RX Coordinator of denial.     Thank you,  Purnima Saravia [facility]  Endocrinology Pharmacy Coordinator

## 2025-03-24 ENCOUNTER — APPOINTMENT (OUTPATIENT)
Dept: RADIOLOGY | Facility: MEDICAL CENTER | Age: 70
End: 2025-03-24
Attending: INTERNAL MEDICINE
Payer: MEDICARE

## 2025-03-24 DIAGNOSIS — E10.69 TYPE 1 DIABETES MELLITUS WITH OTHER SPECIFIED COMPLICATION (HCC): ICD-10-CM

## 2025-03-26 DIAGNOSIS — E78.49 OTHER HYPERLIPIDEMIA: ICD-10-CM

## 2025-03-26 DIAGNOSIS — Z98.890 HISTORY OF LEFT-SIDED CAROTID ENDARTERECTOMY: ICD-10-CM

## 2025-03-26 DIAGNOSIS — E10.69 TYPE 1 DIABETES MELLITUS WITH OTHER SPECIFIED COMPLICATION (HCC): ICD-10-CM

## 2025-03-26 PROCEDURE — RXMED WILLOW AMBULATORY MEDICATION CHARGE: Performed by: INTERNAL MEDICINE

## 2025-03-26 RX ORDER — INSULIN ASPART 100 [IU]/ML
INJECTION, SOLUTION INTRAVENOUS; SUBCUTANEOUS
Qty: 60 ML | Refills: 2 | Status: SHIPPED | OUTPATIENT
Start: 2025-03-26

## 2025-03-26 RX ORDER — ALIROCUMAB 150 MG/ML
150 INJECTION, SOLUTION SUBCUTANEOUS
Qty: 6 EACH | Refills: 3 | Status: SHIPPED | OUTPATIENT
Start: 2025-03-26

## 2025-03-27 ENCOUNTER — PHARMACY VISIT (OUTPATIENT)
Dept: PHARMACY | Facility: MEDICAL CENTER | Age: 70
End: 2025-03-27
Payer: COMMERCIAL

## 2025-03-28 ENCOUNTER — OFFICE VISIT (OUTPATIENT)
Dept: URGENT CARE | Facility: CLINIC | Age: 70
End: 2025-03-28
Payer: MEDICARE

## 2025-03-28 ENCOUNTER — APPOINTMENT (OUTPATIENT)
Dept: RADIOLOGY | Facility: IMAGING CENTER | Age: 70
End: 2025-03-28
Attending: STUDENT IN AN ORGANIZED HEALTH CARE EDUCATION/TRAINING PROGRAM
Payer: MEDICARE

## 2025-03-28 VITALS
TEMPERATURE: 97.8 F | HEART RATE: 96 BPM | HEIGHT: 67 IN | SYSTOLIC BLOOD PRESSURE: 150 MMHG | RESPIRATION RATE: 20 BRPM | OXYGEN SATURATION: 96 % | DIASTOLIC BLOOD PRESSURE: 82 MMHG | BODY MASS INDEX: 26.53 KG/M2 | WEIGHT: 169 LBS

## 2025-03-28 DIAGNOSIS — R05.1 ACUTE COUGH: ICD-10-CM

## 2025-03-28 PROCEDURE — 71046 X-RAY EXAM CHEST 2 VIEWS: CPT | Mod: TC | Performed by: RADIOLOGY

## 2025-03-28 PROCEDURE — 3077F SYST BP >= 140 MM HG: CPT | Performed by: STUDENT IN AN ORGANIZED HEALTH CARE EDUCATION/TRAINING PROGRAM

## 2025-03-28 PROCEDURE — 3079F DIAST BP 80-89 MM HG: CPT | Performed by: STUDENT IN AN ORGANIZED HEALTH CARE EDUCATION/TRAINING PROGRAM

## 2025-03-28 PROCEDURE — 99213 OFFICE O/P EST LOW 20 MIN: CPT | Performed by: STUDENT IN AN ORGANIZED HEALTH CARE EDUCATION/TRAINING PROGRAM

## 2025-03-28 RX ORDER — DOXYCYCLINE 100 MG/1
100 CAPSULE ORAL 2 TIMES DAILY
Qty: 10 CAPSULE | Refills: 0 | Status: SHIPPED | OUTPATIENT
Start: 2025-03-28 | End: 2025-04-02

## 2025-03-28 ASSESSMENT — ENCOUNTER SYMPTOMS
CHILLS: 1
SORE THROAT: 1
COUGH: 1
FEVER: 1
SINUS PAIN: 1
SPUTUM PRODUCTION: 1

## 2025-03-28 ASSESSMENT — FIBROSIS 4 INDEX: FIB4 SCORE: 1.32

## 2025-03-29 NOTE — PROGRESS NOTES
Subjective:   Ines Long is a 69 y.o. female who presents for Cough (X6days, cough, rattling, chest tightness, sob, buzzing in ears, sore throat)      HPI:  69-year-old female with 6 days of cough rattling in her chest feeling of chest tightness and shortness of breath as well as pressure in her ears sinuses and a sore throat.  - She reports on and off fevers and chills a fever 102 at home.  - She reports a history of gastroparesis as well as GERD.  She reports sometimes she has burps/GERD so bad she can taste in the mouth and has to cough out gunk.  - She reports significant rattling in her chest specifically when she lies down.  - Generalized body aches and chills.  - She has tried Tylenol and Mucinex with no significant improvement.      Review of Systems   Constitutional:  Positive for chills and fever.   HENT:  Positive for congestion, sinus pain and sore throat.    Respiratory:  Positive for cough and sputum production.        Medications:    clindamycin Caps  cyclobenzaprine  cycloSPORINE  ezetimibe Tabs  insulin aspart Soln  insulin infusion pump Rosibel  Levoxyl Tabs  Motegrity Tabs  omeprazole  Praluent Soaj  rosuvastatin  sucralfate Tabs  telmisartan Tabs  VITAMIN D-3 PO    Allergies: Ceclor [cefaclor], Augmentin, Naproxen, Tape, Amlodipine, Amoxicillin-pot clavulanate, Cefaclor, Clindamycin, and Penicillin g benzathine    Problem List: Ines Long does not have any pertinent problems on file.    Surgical History:  Past Surgical History:   Procedure Laterality Date    TIBIA NAILING INTRAMEDULLARY Right 09/14/2024    Procedure: INSERTION, INTRAMEDULLARY SUE, TIBIA;  Surgeon: Richard Sol M.D.;  Location: SURGERY [facility];  Service: Orthopedics    LUMBAR FUSION O-ARM  09/27/2023    Procedure: L4/5 and L5/S1 redo minimally invasive posterior decompression and instrumented fusion;  Surgeon: Evon Díaz M.D.;  Location: SURGERY [facility];  Service: Neurosurgery    LUMBAR  DECOMPRESSION  09/27/2023    Procedure: DECOMPRESSION, SPINE, LUMBAR;  Surgeon: Evon Díaz M.D.;  Location: [facility];  Service: Neurosurgery    POSTERIOR CERVICAL FUSION O-ARM  03/20/2023    Procedure: A5-1-0-6-7-T1 posterior decompressive laminectomy and G8-0-1-6-7-T1 posterior instrumented fusion with O-arm imaging guidance;  Surgeon: Evon Díaz M.D.;  Location: [facility];  Service: Orthopedics    MENISCECTOMY, KNEE, ARTHROSCOPIC Right 08/22/2022    Procedure: RIGHT KNEE ARTHROSCOPY, PARTIAL MEDIAL MENISCECTOMY;  Surgeon: Ravi Gillis M.D.;  Location: [facility];  Service: Orthopedics    SYNOVECTOMY Right 08/22/2022    Procedure: SYNOVECTOMY;  Surgeon: Ravi Gillis M.D.;  Location: [facility];  Service: Orthopedics    CHONDROPLASTY Right 08/22/2022    Procedure: CHONDROPLASTY;  Surgeon: Ravi Gillis M.D.;  Location: [facility];  Service: Orthopedics    LUMBAR LAMINECTOMY DISKECTOMY  05/16/2022    Procedure: L4-5 and L5-S1 decompressive laminectomy, bilateral foraminotomies and left L5-S1;  Surgeon: Evon Díaz M.D.;  Location: [facility];  Service: Orthopedics    LUMBAR DECOMPRESSION  05/16/2022    Procedure: DECOMPRESSION, SPINE, LUMBAR;  Surgeon: Evon Díaz M.D.;  Location: [facility];  Service: Orthopedics    FORAMINOTOMY  05/16/2022    Procedure: FORAMINOTOMY, SPINE;  Surgeon: Evon Díaz M.D.;  Location: [facility];  Service: Orthopedics    CAROTID ENDARTERECTOMY Left 12/05/2019    Procedure: ENDARTERECTOMY, CAROTID- WITH NEUROMONITORIING;  Surgeon: Surya Garcia M.D.;  Location: [facility];  Service: Vascular    ORTHOPEDIC OSTEOTOMY Left 07/17/2017    Procedure: ORTHOPEDIC OSTEOTOMY CALCANEAL, KIDNER, EXCISION NAVICULAR, GASTROC SOLEUS RECESSION;  Surgeon: Adebayo Layton M.D.;  Location: SURGERY [facility];  Service:     LIGAMENT REPAIR  07/17/2017    Procedure:  "LIGAMENT REPAIR SPRING;  Surgeon: Adebayo Layton M.D.;  Location: SURGERY [facility];  Service:     TOE FUSION  07/17/2017    Procedure: TOE FUSION 1ST METATARSAL JOINT, 2ND TOE RECONSTRUCTION ;  Surgeon: Adebayo Layton M.D.;  Location: SURGERY [facility];  Service:     REPAIR, TENDON, LOWER EXTREMITY, USING TENDON GRAFT  07/17/2017    Procedure: FLEXOR TENDON REPAIR- RELEASE/POSSIBLE FLEXOR DIGITORIUM LONGUS;  Surgeon: Adebayo Layton M.D.;  Location: SURGERY [facility];  Service:     BUNIONECTOMY  01/01/2014    SHOULDER ARTHROSCOPY Left 01/01/1997    frozen shoulder    ABDOMINAL HYSTERECTOMY TOTAL  01/01/1987    KNEE ARTHROTOMY Right 01/01/1978    [place]     CHOLECYSTECTOMY      FOOT SURGERY  2016 and 2018    FUSION  March 20, 2023    Neck    ORIF, KNEE      [facility] [place]    SHOULDER SURGERY      Frozen shoulder       Past Social Hx: Ines Long  reports that she quit smoking about 50 years ago. Her smoking use included cigarettes. She has never used smokeless tobacco. She reports current alcohol use of about 1.2 oz of alcohol per week. She reports that she does not currently use drugs.     Past Family Hx:  Ines Long family history includes Cancer in her mother; Diabetes in her sister; Heart Disease in her father, maternal grandfather, and paternal grandfather; Hyperlipidemia in her father; Hypertension in her father; Lung Disease in her father; Psychiatric Illness in her sister; Stroke in her father and paternal grandmother.     Problem list, medications, and allergies reviewed by myself today in Epic.     Objective:     BP (!) 150/82 (BP Location: Left arm, Patient Position: Sitting, BP Cuff Size: Adult)   Pulse 96   Temp 36.6 °C (97.8 °F) (Temporal)   Resp 20   Ht 1.702 m (5' 7\")   Wt 76.7 kg (169 lb)   LMP 01/01/1983 (LMP Unknown)   SpO2 96%   BMI 26.47 kg/m²     Physical Exam  Constitutional:       Appearance: Normal appearance.   HENT:      Head: " Normocephalic and atraumatic.      Right Ear: Tympanic membrane normal.      Left Ear: Tympanic membrane normal.      Nose: Congestion and rhinorrhea present.      Mouth/Throat:      Mouth: Mucous membranes are moist.      Pharynx: Oropharynx is clear. No oropharyngeal exudate or posterior oropharyngeal erythema.   Eyes:      Extraocular Movements: Extraocular movements intact.      Conjunctiva/sclera: Conjunctivae normal.   Cardiovascular:      Rate and Rhythm: Normal rate and regular rhythm.      Pulses: Normal pulses.      Heart sounds: Normal heart sounds.   Pulmonary:      Effort: Pulmonary effort is normal.      Breath sounds: Normal breath sounds.      Comments: Bilateral coarse breath sound versus transmitted upper airway  Neurological:      Mental Status: She is alert.         Assessment/Plan:     Diagnosis and associated orders:     1. Acute cough  DX-CHEST-2 VIEWS    doxycycline (MONODOX) 100 MG capsule         Comments/MDM:     1. Acute cough  Physical examination concerning for coarse breath sounds and transmitted upper airway sounds.  Significant sinus congestion and pressure.  - Discussed with patient symptomatic management at this time including management for possible viral sinusitis/the congestion and nighttime cough/postnasal drip this includes use of Flonase, antihistamine, decongestant such as Sudafed.  -We discussed additional symptomatic management Tylenol ibuprofen for fevers chills and bodyaches as well as a mucolytic/cough suppressant medication this could be something like Mucinex DM or sugar-free Robitussin DM.  - If no significant improvement of the patient's current symptoms or any development of worsening symptoms/prolonged symptoms I did discuss continue antibiotics at this time.  Nausea will send in Vibramycin 100 mg twice daily for 5 days.  Only to be filled if patient has any significant worsening of her symptoms.  If she develops any shortness of breath worsening fever or any  major worsening I would recommend eval in the emergency department.  - DX-CHEST-2 VIEWS; Future  - doxycycline (MONODOX) 100 MG capsule; Take 1 Capsule by mouth 2 times a day for 5 days.  Dispense: 10 Capsule; Refill: 0           Differential diagnosis, natural history, supportive care, and indications for immediate follow-up discussed.    Advised the patient to follow-up with the primary care physician for recheck, reevaluation, and consideration of further management.    Please note that this dictation was created using voice recognition software. I have made a reasonable attempt to correct obvious errors, but I expect that there are errors of grammar and possibly content that I did not discover before finalizing the note.    Thiago Worley M.D.

## 2025-04-03 ENCOUNTER — PATIENT OUTREACH (OUTPATIENT)
Dept: HEALTH INFORMATION MANAGEMENT | Facility: OTHER | Age: 70
End: 2025-04-03
Payer: MEDICARE

## 2025-04-03 DIAGNOSIS — E78.49 OTHER HYPERLIPIDEMIA: ICD-10-CM

## 2025-04-03 DIAGNOSIS — I50.30 BENIGN HYPERTENSIVE HEART AND KIDNEY DISEASE WITH DIASTOLIC CHF, NYHA CLASS II AND CKD STAGE 2 (HCC): ICD-10-CM

## 2025-04-03 DIAGNOSIS — Z79.4 DIABETES MELLITUS DUE TO UNDERLYING CONDITION WITH HYPEROSMOLARITY WITHOUT COMA, WITH LONG-TERM CURRENT USE OF INSULIN (HCC): ICD-10-CM

## 2025-04-03 DIAGNOSIS — N18.2 BENIGN HYPERTENSIVE HEART AND KIDNEY DISEASE WITH DIASTOLIC CHF, NYHA CLASS II AND CKD STAGE 2 (HCC): ICD-10-CM

## 2025-04-03 DIAGNOSIS — E08.00 DIABETES MELLITUS DUE TO UNDERLYING CONDITION WITH HYPEROSMOLARITY WITHOUT COMA, WITH LONG-TERM CURRENT USE OF INSULIN (HCC): ICD-10-CM

## 2025-04-03 DIAGNOSIS — I13.0 BENIGN HYPERTENSIVE HEART AND KIDNEY DISEASE WITH DIASTOLIC CHF, NYHA CLASS II AND CKD STAGE 2 (HCC): ICD-10-CM

## 2025-04-03 DIAGNOSIS — I10 PRIMARY HYPERTENSION: ICD-10-CM

## 2025-04-04 PROCEDURE — RXMED WILLOW AMBULATORY MEDICATION CHARGE: Performed by: NURSE PRACTITIONER

## 2025-04-08 ENCOUNTER — TELEPHONE (OUTPATIENT)
Dept: ENDOCRINOLOGY | Facility: MEDICAL CENTER | Age: 70
End: 2025-04-08
Payer: MEDICARE

## 2025-04-08 ENCOUNTER — OFFICE VISIT (OUTPATIENT)
Dept: ENDOCRINOLOGY | Facility: MEDICAL CENTER | Age: 70
End: 2025-04-08
Payer: MEDICARE

## 2025-04-08 VITALS
BODY MASS INDEX: 27.23 KG/M2 | SYSTOLIC BLOOD PRESSURE: 130 MMHG | DIASTOLIC BLOOD PRESSURE: 74 MMHG | HEIGHT: 67 IN | WEIGHT: 173.5 LBS

## 2025-04-08 DIAGNOSIS — M85.89 OSTEOPENIA OF MULTIPLE SITES: ICD-10-CM

## 2025-04-08 DIAGNOSIS — E03.9 PRIMARY HYPOTHYROIDISM: ICD-10-CM

## 2025-04-08 DIAGNOSIS — E55.9 VITAMIN D DEFICIENCY: ICD-10-CM

## 2025-04-08 DIAGNOSIS — Z96.41 INSULIN PUMP IN PLACE: ICD-10-CM

## 2025-04-08 DIAGNOSIS — R23.2 HOT FLASHES: ICD-10-CM

## 2025-04-08 DIAGNOSIS — E10.69 TYPE 1 DIABETES MELLITUS WITH OTHER SPECIFIED COMPLICATION (HCC): ICD-10-CM

## 2025-04-08 DIAGNOSIS — Z79.4 LONG-TERM INSULIN USE (HCC): ICD-10-CM

## 2025-04-08 DIAGNOSIS — E78.5 DYSLIPIDEMIA: ICD-10-CM

## 2025-04-08 LAB
HBA1C MFR BLD: 6.4 % (ref ?–5.8)
POCT INT CON NEG: NEGATIVE
POCT INT CON POS: POSITIVE

## 2025-04-08 PROCEDURE — 99204 OFFICE O/P NEW MOD 45 MIN: CPT

## 2025-04-08 PROCEDURE — 95249 CONT GLUC MNTR PT PROV EQP: CPT

## 2025-04-08 PROCEDURE — 83036 HEMOGLOBIN GLYCOSYLATED A1C: CPT

## 2025-04-08 PROCEDURE — 3078F DIAST BP <80 MM HG: CPT

## 2025-04-08 PROCEDURE — 95251 CONT GLUC MNTR ANALYSIS I&R: CPT

## 2025-04-08 PROCEDURE — 3075F SYST BP GE 130 - 139MM HG: CPT

## 2025-04-08 PROCEDURE — 99212 OFFICE O/P EST SF 10 MIN: CPT | Mod: 25

## 2025-04-08 RX ORDER — INSULIN ASPART 100 [IU]/ML
INJECTION, SOLUTION INTRAVENOUS; SUBCUTANEOUS
Qty: 90 ML | Refills: 2 | Status: SHIPPED | OUTPATIENT
Start: 2025-04-08

## 2025-04-08 RX ORDER — ESTRADIOL 0.03 MG/D
1 FILM, EXTENDED RELEASE TRANSDERMAL
Qty: 8 PATCH | Refills: 11 | Status: SHIPPED | OUTPATIENT
Start: 2025-04-08

## 2025-04-08 ASSESSMENT — FIBROSIS 4 INDEX: FIB4 SCORE: 1.32

## 2025-04-08 NOTE — PROGRESS NOTES
"Chief Complaint:  Consult requested by SOBIA Candelario for initial evaluation of Type 1 Diabetes Mellitus    HPI:   Ines Long is a 69 y.o. female with Type 1 Diabetes Mellitus diagnosed in 1982.  She denies hospitalizations for DKA in the past.      She used to see Dr. Stockton and Dr. Shaw    Diabetes Type 1  A1c on 4/8/25 is 6.4  Her A1c was 7.0% on 1/7/2025    She did shots for ten years then switched to Medtronic pump  She is now on the Medtronic 780g pump infusing Fiasp  ( 30ml per month)  She is using the extended wear infusion sets            Download of her CGM shows an average blood sugar of 134 with time in range of 81%            She has chronic low back pain and has neurogenic claudication from spinal stenosis which limits her capability for exercise. She under went lumbar decompression which failed  She now has a spinal stimulator     She reports poor balance and is worried about her weight gain.  She also has a history of chronic hyponatremia and was previously taking salt tabs which she stopped due to nausea.  Cortisol levels were normal at 13.7 on 8/2023      She reports hypoglycemic episodes occurring 2 times per week and are moderate     She reports attending diabetes education classes.  Diet: \"healthy\" diet  in general.  Breakfast: iced coffee with cream  Lunch: cottage cheese and fruit  Dinner protein and vegetables    Diabetes Complications   She  denies history of retinopathy.  She denies laser eye surgery.   Last eye exam: 10/2024 with Dr. Lane at [facility]     She denies history of peripheral sensory neuropathy.    She denies numbness, tingling in both foot.    She denies history of foot sores.       She has hyperlipidemia and is on Praluent, Zetia and Rosuvastatin   Latest Reference Range & Units 01/30/25 08:56   Cholesterol,Tot 100 - 199 mg/dL 130   Triglycerides 0 - 149 mg/dL 76   HDL >=40 mg/dL 76   LDL <100 mg/dL 39        Latest Reference Range " & Units 02/18/25 10:31   Sodium, Urine -per volume mmol/L 99   Creatinine, Random Urine mg/dL 53.40   Total Protein, Urine 0.0 - 15.0 mg/dL <6.0   Protein Creatinine Ratio 10 - 107 mg/g 112 (H)   Micro Alb Creat Ratio 0 - 30 mg/g <6   Osmolality Urine 300 - 900 mOsm/kg H2O 343   Collection Length hr Random (C)   Total Volume mL Random   Microalbumin, Urine Random mg/dL <0.3   Urobilinogen, Urine <=1.0 EU/dL 0.2   Creatinine Urine mg/dL 54        Latest Reference Range & Units 03/11/25 10:13   Bun 8 - 22 mg/dL 21   Creatinine 0.50 - 1.40 mg/dL 0.77   GFR (CKD-EPI) >60 mL/min/1.73 m 2 84     She has primary hypothyroidism and is on branded Levoxyl 100mxcg 6.5 days a week  She reports itching, hot flashes, hair loss   Latest Reference Range & Units 01/30/25 08:56   TSH 0.350 - 5.500 uIU/mL 3.540   Free T-4 0.93 - 1.70 ng/dL 1.22       She had a bilateral tibia/fibular fracture in 8/2024        ROS:     CONS:     No fever, no chills, no weight loss, no fatigue   EYES:      No diplopia, no blurry vision, no redness of eyes, no swelling of eyelids   ENT:    No hearing loss, No ear pain, No sore throat, no dysphagia, no neck swelling   CV:     No chest pain, no palpitations, no claudication, no orthopnea, no PND   PULM:    No SOB, no cough, no hemoptysis, no wheezing    GI:   No nausea, no vomiting, no diarrhea, no constipation, no bloody stools   :  Passing urine well, no dysuria, no hematuria   ENDO:   No polyuria, no polydipsia, no heat intolerance, no cold intolerance   NEURO: No headaches, no dizziness, no convulsions, no tremors   MUSC:  No joint swellings, no arthralgias, no myalgias, no weakness   SKIN:   No rash, no ulcers, no dry skin   PSYCH:   No depression, no anxiety, no difficulty sleeping       Past Medical History:  Patient Active Problem List    Diagnosis Date Noted    OAB (overactive bladder) 03/12/2025    Urge incontinence of urine 03/12/2025    Osteopenia of multiple sites 01/07/2025    Subclavian  artery stenosis, right (HCC) 11/19/2024    Pathological fracture of right tibia due to age-related osteoporosis 09/15/2024    Pathological fracture of right fibula due to age-related osteoporosis 09/15/2024    Closed traumatic displaced fracture of right tibial plafond 09/15/2024    Carotid artery disease, unspecified laterality, unspecified type (HCC) 09/12/2024    Dyslipidemia 09/12/2024    Coronary artery calcification seen on CT scan 02/27/2024    Elevated alkaline phosphatase level 02/27/2024    Complex regional pain syndrome type 2 of right lower extremity 06/13/2023    Elevated lipoprotein(a) 03/10/2023    Cervical stenosis of spine 01/20/2023    Cervical myelopathy ([facility]) 01/20/2023    History of left-sided carotid endarterectomy 01/13/2023    LAFB (left anterior fascicular block) 01/13/2023    Small vessel disease, cerebrovascular 01/13/2023    Long-term insulin use ([facility]) 01/13/2023    Family history of stroke 01/13/2023    Agatston coronary artery calcium score between 100 and 199 01/13/2023    Lumbar stenosis with neurogenic claudication 05/16/2022    Hypertension 04/14/2022    Low back pain 04/14/2022    Insulin pump in place 05/18/2011    Diabetes mellitus type 1 ([facility]) 11/01/2010    Hypothyroidism 11/01/2010       Past Surgical History:  Past Surgical History:   Procedure Laterality Date    TIBIA NAILING INTRAMEDULLARY Right 09/14/2024    Procedure: INSERTION, INTRAMEDULLARY SUE, TIBIA;  Surgeon: Richard Sol M.D.;  Location: SURGERY [facility];  Service: Orthopedics    LUMBAR FUSION O-ARM  09/27/2023    Procedure: L4/5 and L5/S1 redo minimally invasive posterior decompression and instrumented fusion;  Surgeon: Evon Díaz M.D.;  Location: SURGERY [facility];  Service: Neurosurgery    LUMBAR DECOMPRESSION  09/27/2023    Procedure: DECOMPRESSION, SPINE, LUMBAR;  Surgeon: Evon Díaz M.D.;  Location: [facility];  Service: Neurosurgery    POSTERIOR CERVICAL FUSION O-ARM   03/20/2023    Procedure: M3-7-5-6-7-T1 posterior decompressive laminectomy and K9-4-1-6-7-T1 posterior instrumented fusion with O-arm imaging guidance;  Surgeon: Evon Díaz M.D.;  Location: [facility];  Service: Orthopedics    MENISCECTOMY, KNEE, ARTHROSCOPIC Right 08/22/2022    Procedure: RIGHT KNEE ARTHROSCOPY, PARTIAL MEDIAL MENISCECTOMY;  Surgeon: Ravi Gillis M.D.;  Location: [facility];  Service: Orthopedics    SYNOVECTOMY Right 08/22/2022    Procedure: SYNOVECTOMY;  Surgeon: Ravi Gillis M.D.;  Location: [facility];  Service: Orthopedics    CHONDROPLASTY Right 08/22/2022    Procedure: CHONDROPLASTY;  Surgeon: Ravi Gillis M.D.;  Location: [facility];  Service: Orthopedics    LUMBAR LAMINECTOMY DISKECTOMY  05/16/2022    Procedure: L4-5 and L5-S1 decompressive laminectomy, bilateral foraminotomies and left L5-S1;  Surgeon: Evon Díaz M.D.;  Location: [facility];  Service: Orthopedics    LUMBAR DECOMPRESSION  05/16/2022    Procedure: DECOMPRESSION, SPINE, LUMBAR;  Surgeon: Evon Díaz M.D.;  Location: [facility];  Service: Orthopedics    FORAMINOTOMY  05/16/2022    Procedure: FORAMINOTOMY, SPINE;  Surgeon: Evon Díaz M.D.;  Location: [facility];  Service: Orthopedics    CAROTID ENDARTERECTOMY Left 12/05/2019    Procedure: ENDARTERECTOMY, CAROTID- WITH NEUROMONITORIING;  Surgeon: Surya Garcia M.D.;  Location: [facility];  Service: Vascular    ORTHOPEDIC OSTEOTOMY Left 07/17/2017    Procedure: ORTHOPEDIC OSTEOTOMY CALCANEAL, KIDNER, EXCISION NAVICULAR, GASTROC SOLEUS RECESSION;  Surgeon: Adebayo Layton M.D.;  Location: [facility];  Service:     LIGAMENT REPAIR  07/17/2017    Procedure: LIGAMENT REPAIR SPRING;  Surgeon: Adebayo Layton M.D.;  Location: SURGERY [facility];  Service:     TOE FUSION  07/17/2017    Procedure: TOE FUSION 1ST METATARSAL JOINT, 2ND TOE  RECONSTRUCTION ;  Surgeon: Adebayo Layton M.D.;  Location: SURGERY [facility];  Service:     REPAIR, TENDON, LOWER EXTREMITY, USING TENDON GRAFT  07/17/2017    Procedure: FLEXOR TENDON REPAIR- RELEASE/POSSIBLE FLEXOR DIGITORIUM LONGUS;  Surgeon: Adebayo Layton M.D.;  Location: SURGERY [facility];  Service:     BUNIONECTOMY  01/01/2014    SHOULDER ARTHROSCOPY Left 01/01/1997    frozen shoulder    ABDOMINAL HYSTERECTOMY TOTAL  01/01/1987    KNEE ARTHROTOMY Right 01/01/1978    [place]     CHOLECYSTECTOMY      FOOT SURGERY  2016 and 2018    FUSION  March 20, 2023    Neck    ORIF, KNEE      [facility] [place]    SHOULDER SURGERY      Frozen shoulder        Allergies:  Ceclor [cefaclor], Augmentin, Naproxen, Tape, Amlodipine, Amoxicillin-pot clavulanate, Cefaclor, Clindamycin, and Penicillin g benzathine     Current Medications:    Current Outpatient Medications:     insulin aspart (NOVOLOG) 100 UNIT/ML Solution, Using up to 100 units per day as directed in insulin pump, Disp: 90 mL, Rfl: 2    Estradiol 0.025 MG/24HR PATCH BIWEEKLY, Place 1 Patch on the skin two times a week., Disp: 8 Patch, Rfl: 11    Alirocumab (PRALUENT) 150 MG/ML Solution Auto-injector, Inject 150 mg under the skin every 14 days., Disp: 6 mL, Rfl: 3    cycloSPORINE (RESTASIS) 0.05 % ophthalmic emulsion, Administer 1 Drop into both eyes 2 times a day., Disp: , Rfl:     omeprazole (PRILOSEC) 40 MG delayed-release capsule, Take 40 mg by mouth every day., Disp: , Rfl:     MOTEGRITY 1 MG Tab, Take 1 mg by mouth every day., Disp: , Rfl:     sucralfate (CARAFATE) 1 GM Tab, Take 1 g by mouth 4 Times a Day,Before Meals and at Bedtime., Disp: , Rfl:     cyclobenzaprine (FLEXERIL) 5 mg tablet, 2 tablet at bedtime as needed Orally Once a day for 30 days As needed, Disp: , Rfl:     LEVOXYL 100 MCG Tab, Take 1 Tablet by mouth every morning on an empty stomach. Takes 1/2 tab on Sundays, Disp: 30 Tablet, Rfl: 11    telmisartan (MICARDIS) 40 MG Tab, Take 1  Tablet by mouth every day. to lower blood pressure, Disp: 100 Tablet, Rfl: 3    ezetimibe (ZETIA) 10 MG Tab, Take 1 Tablet by mouth every day. To lower cholesterol, Disp: 100 Tablet, Rfl: 3    rosuvastatin (CRESTOR) 40 MG tablet, Take 1 Tablet by mouth every day. To lower cholesterol, Disp: 100 Tablet, Rfl: 3    clindamycin (CLEOCIN) 150 MG Cap, Take 3 capsules PO prior to dental procedure (Patient not taking: Reported on 3/28/2025), Disp: 6 Capsule, Rfl: 0    insulin infusion pump Device, Inject  under the skin continuous. Patient's own SQ insulin pump  Type of Insulin: Fiasp Last change of tubing: 3/19/2023 - Change tubing and site every 72 hours  Dosing: Basal rate:  0000 - 0329 = 0.5 units/hr  0330 - 1129 = 0.85 units/hr  1130 - 1359 = 0.5 units/hr             1400 - 1759 = 0.45 units/hr             1800 - 2359 = 0.5 units/hr  Bolus ratio:  1 unit : 12 g carbohydrate at  (breakfast, lunch, dinner, snacks)    Correction ratio:   1 units for every 50 over 150 mg/dL  Disconnect pump if patient becomes hypoglycemic and altered., Disp: , Rfl:     Cholecalciferol (VITAMIN D-3 PO), Take 5,000 mg by mouth every evening., Disp: , Rfl:     Social History:  Social History     Socioeconomic History    Marital status:      Spouse name: Not on file    Number of children: Not on file    Years of education: Not on file    Highest education level: Associate degree: academic program   Occupational History    Not on file   Tobacco Use    Smoking status: Former     Current packs/day: 0.00     Types: Cigarettes     Quit date: 1975     Years since quittin.3    Smokeless tobacco: Never   Vaping Use    Vaping status: Never Used   Substance and Sexual Activity    Alcohol use: Yes     Alcohol/week: 1.2 oz     Types: 2 Glasses of wine per week     Comment: about 3-4 drinks per week.     Drug use: Not Currently     Comment: tried CBD gummies; no THC    Sexual activity: Yes     Partners: Male     Birth control/protection:  Surgical     Comment: Pt states had a hysterectomy   Other Topics Concern    Not on file   Social History Narrative    Not on file     Social Drivers of Health     Financial Resource Strain: Low Risk  (1/29/2025)    Overall Financial Resource Strain (CARDIA)     Difficulty of Paying Living Expenses: Not hard at all   Food Insecurity: No Food Insecurity (1/29/2025)    Hunger Vital Sign     Worried About Running Out of Food in the Last Year: Never true     Ran Out of Food in the Last Year: Never true   Transportation Needs: No Transportation Needs (1/29/2025)    PRAPARE - Transportation     Lack of Transportation (Medical): No     Lack of Transportation (Non-Medical): No   Physical Activity: Insufficiently Active (1/29/2025)    Exercise Vital Sign     Days of Exercise per Week: 1 day     Minutes of Exercise per Session: 10 min   Stress: No Stress Concern Present (1/29/2025)    [employer] [place] [employer] - Occupational Stress Questionnaire     Feeling of Stress : Only a little   Social Connections: Moderately Isolated (1/29/2025)    Social Connection and Isolation Panel [NHANES]     Frequency of Communication with Friends and Family: More than three times a week     Frequency of Social Gatherings with Friends and Family: Not on file     Attends [employer] Services: Never     Active Member of Clubs or Organizations: No     Attends Club or Organization Meetings: Never     Marital Status:    Intimate Partner Violence: Not At Risk (2/29/2024)    Humiliation, Afraid, Rape, and Kick questionnaire     Fear of Current or Ex-Partner: No     Emotionally Abused: No     Physically Abused: No     Sexually Abused: No   Housing Stability: Low Risk  (1/29/2025)    Housing Stability Vital Sign     Unable to Pay for Housing in the Last Year: No     Number of Times Moved in the Last Year: 0     Homeless in the Last Year: No        Family History:   Family History   Problem Relation Age of Onset    Cancer Mother         " rectal cancer    Stroke Father          59    Heart Disease Father     Hypertension Father     Lung Disease Father         tb    Hyperlipidemia Father     Diabetes Sister     Psychiatric Illness Sister         bipolar    Heart Disease Maternal Grandfather     Stroke Paternal Grandmother     Heart Disease Paternal Grandfather          PHYSICAL EXAM:   Vital signs: /74   Ht 1.702 m (5' 7\")   Wt 78.7 kg (173 lb 8 oz)   LMP 1983 (LMP Unknown)   BMI 27.17 kg/m²   GENERAL: Well-developed, well-nourished  in no apparent distress.   EYE: No ocular and eyelid asymmetry, Anicteric sclerae,  PERRL, No exophthalmos or lidlag  HENT: Hearing grossly intact, Normocephalic, atraumatic. Pink, moist mucous membranes, No exudate  NECK: Supple. Trachea midline. thyroid is normal in size without nodules or tenderness  CARDIOVASCULAR: Regular rate and rhythm. No murmurs, rubs, or gallops.   LUNGS: Clear to auscultation bilaterally   ABDOMEN: Soft, nontender with positive bowel sounds.   EXTREMITIES: No clubbing, cyanosis, or edema.   NEUROLOGICAL: Cranial nerves II-XII are grossly intact   Symmetric reflexes at the patella no proximal muscle weakness, No visible tremor with both outstretched hands  LYMPH: No cervical, supraclavicular,  adenopathy palpated.   SKIN: No rashes, lesions. Turgor is normal.  FOOT: Normal sensation to monofilament testing, normal pulses, no ulcers.  Normal Vibration quantitative sensation test.    Labs:  Lab Results   Component Value Date/Time    HBA1C 7.0 (A) 2025 0807    AVGLUC 151 10/12/2023 1010       Lab Results   Component Value Date/Time    WBC 5.9 2025 08:57 AM    RBC 4.41 2025 08:57 AM    HEMOGLOBIN 13.0 2025 08:57 AM    MCV 91.6 2025 08:57 AM    MCH 29.5 2025 08:57 AM    MCHC 32.2 2025 08:57 AM    RDW 47.4 2025 08:57 AM    MPV 10.7 2025 08:57 AM       Lab Results   Component Value Date/Time    SODIUM 138 2025 10:13 AM    " "POTASSIUM 4.6 03/11/2025 10:13 AM    CHLORIDE 106 03/11/2025 10:13 AM    CO2 22 03/11/2025 10:13 AM    ANION 10.0 03/11/2025 10:13 AM    GLUCOSE 106 (H) 03/11/2025 10:13 AM    BUN 21 03/11/2025 10:13 AM    CREATININE 0.77 03/11/2025 10:13 AM    CALCIUM 9.4 03/11/2025 10:13 AM    ASTSGOT 34 01/30/2025 08:56 AM    ALTSGPT 34 01/30/2025 08:56 AM    TBILIRUBIN 0.4 01/30/2025 08:56 AM    ALBUMIN 4.1 02/18/2025 10:31 AM    ALBUMIN 4.19 01/30/2025 08:56 AM    TOTPROTEIN 7.4 01/30/2025 08:56 AM    TOTPROTEIN 7.0 01/30/2025 08:56 AM    GLOBULIN 2.7 01/30/2025 08:56 AM    AGRATIO 1.7 01/30/2025 08:56 AM       Lab Results   Component Value Date/Time    CHOLSTRLTOT 120 10/02/2024 1045    TRIGLYCERIDE 69 10/02/2024 1045    HDL 61 10/02/2024 1045    LDL 45 10/02/2024 1045       Lab Results   Component Value Date/Time    MALBCRT <6 02/18/2025 10:31 AM    MICROALBUR <0.3 02/18/2025 10:31 AM        Lab Results   Component Value Date/Time    TSHULTRASEN 1.000 01/03/2024 1037     No results found for: \"FREEDIR\"  No results found for: \"FREET3\"  No results found for: \"THYSTIMIG\"    IMAGING:          ASSESSMENT/PLAN:   1. Type 1 diabetes mellitus with other specified complication (HCC)  Controlled  Pump and CGM were downloaded and reviewed  Overall her glycemic control is excellent.  We discussed the importance of good glucose control and preventing long-term vascular complications.  I recommend no changes to her pump settings  She should contact [employer] so that we can update her pump supplies  Recommend follow-up in 3 months with repeat A1c and with complete labs  - POCT Hemoglobin A1C  - insulin aspart (NOVOLOG) 100 UNIT/ML Solution; Using up to 100 units per day as directed in insulin pump  Dispense: 90 mL; Refill: 2  - Comp Metabolic Panel; Future    2. Hot flashes  Unstable  Medication:   Estradiol 0.025 mg patch biweekly - start  - Estradiol 0.025 MG/24HR PATCH BIWEEKLY; Place 1 Patch on the skin two times a week.  Dispense: 8 " Patch; Refill: 11    3. Primary hypothyroidism  Controlled  Continue Levoxyl 100 mcg daily  Repeat thyroid labs in 3 months  - TSH; Future  - FREE THYROXINE; Future    4. Dyslipidemia  Controlled  Continue Praluent Zetia and rosuvastatin    5. Osteopenia of multiple sites  Unstable  She has osteopenia of the right femur with the lowest T-score of -2.3 in 2022  She had a fragility fracture and broke her tibia fibula from a ground-level fall in 2024   I recommend that she get an updated bone density  Will discuss the bone density when we see her again for follow-up    6. Vitamin D deficiency  Unstable will get an updated vitamin D with her upcoming labs  - VITAMIN D,25 HYDROXY (DEFICIENCY); Future    7. Insulin pump in place  Insulin pump is in place    8. Long-term insulin use (HCC)  Patient is on long-term insulin therapy for type 1 diabetes      Return in about 6 months (around 10/8/2025). Patient will have blood work done prior to follow up in 6 months.      Thank you kindly for allowing me to participate in the diabetes care plan for this patient.    Dennis Hernández, APRN   4/8/25      CC:   SOBIA Candelario

## 2025-04-08 NOTE — PROGRESS NOTES
"Endocrinology Clinic Progress Note  PCP: SOBIA Candelario    HPI:  Ines Long is a 69 y.o. old patient who is seen today by the Diabetes Nurse Specialist for review of their diabetes.   She has type 1 diabetes on insulin pump therapy.   Recent changes in health: complaining of increased hot flashes (1-2 severe ones per day), states her skin feels itchy and her voice is hoarse.   DM:   Last A1c:   Lab Results   Component Value Date/Time    HBA1C 6.4 (A) 04/08/2025 11:48 AM      Previous A1c was 7 on 1/7/2025  A1C GOAL: < 7    Diabetes Medications:   Aspart via Minimed Pump      Exercise: no regular exercise, sedentary  Diet: \"healthy\" diet  in general  Patient's body mass index is 27.17 kg/m². Exercise and nutrition counseling were performed at this visit.    Glucose monitoring frequency: using Guardian CGM      Hypoglycemic episodes: no  Last Retinal Exam: on file and up-to-date    Foot Exam:  Monofilament: current.       Plan:     Discussed and educated on:   - All medications, side effects and compliance (discussed carefully)    - HbA1C:   - Home glucose monitoring emphasized  - Weight control and daily exercise    Recommended medication changes: none    "

## 2025-04-15 ENCOUNTER — PHARMACY VISIT (OUTPATIENT)
Dept: PHARMACY | Facility: MEDICAL CENTER | Age: 70
End: 2025-04-15
Payer: COMMERCIAL

## 2025-04-16 ENCOUNTER — TELEPHONE (OUTPATIENT)
Dept: ENDOCRINOLOGY | Facility: MEDICAL CENTER | Age: 70
End: 2025-04-16

## 2025-04-16 ENCOUNTER — APPOINTMENT (OUTPATIENT)
Dept: ADMISSIONS | Facility: MEDICAL CENTER | Age: 70
End: 2025-04-16
Attending: STUDENT IN AN ORGANIZED HEALTH CARE EDUCATION/TRAINING PROGRAM
Payer: MEDICARE

## 2025-04-16 ENCOUNTER — RESULTS FOLLOW-UP (OUTPATIENT)
Dept: ENDOCRINOLOGY | Facility: MEDICAL CENTER | Age: 70
End: 2025-04-16

## 2025-04-16 ENCOUNTER — HOSPITAL ENCOUNTER (OUTPATIENT)
Dept: RADIOLOGY | Facility: MEDICAL CENTER | Age: 70
End: 2025-04-16
Attending: INTERNAL MEDICINE
Payer: MEDICARE

## 2025-04-16 DIAGNOSIS — M85.89 OSTEOPENIA OF MULTIPLE SITES: ICD-10-CM

## 2025-04-16 PROCEDURE — 77080 DXA BONE DENSITY AXIAL: CPT

## 2025-04-16 NOTE — TELEPHONE ENCOUNTER
Pt called saying that she has completed her DEXA scan and would like the provider to review the results. Please advise.

## 2025-04-22 ENCOUNTER — PRE-ADMISSION TESTING (OUTPATIENT)
Dept: ADMISSIONS | Facility: MEDICAL CENTER | Age: 70
End: 2025-04-22
Payer: MEDICARE

## 2025-04-22 NOTE — PREADMIT AVS NOTE
Current Medications   Medication Instructions    telmisartan (MICARDIS) 40 MG Tab Stop 24 hours before surgery    ezetimibe (ZETIA) 10 MG Tab Stop 24 hours before surgery    Cholecalciferol (VITAMIN D-3 PO) Stop 7 days before surgery    insulin aspart (NOVOLOG) 100 UNIT/ML Solution Follow instructions from surgeon or specialist.    Estradiol 0.025 MG/24HR PATCH BIWEEKLY Stop 24 hours before surgery    Alirocumab (PRALUENT) 150 MG/ML Solution Auto-injector Follow instructions from surgeon or specialist.    cycloSPORINE (RESTASIS) 0.05 % ophthalmic emulsion Continue taking medication as prescribed, including morning of procedure     omeprazole (PRILOSEC) 40 MG delayed-release capsule Continue taking medication as prescribed, including morning of procedure     MOTEGRITY 1 MG Tab Pt not taking    sucralfate (CARAFATE) 1 GM Tab Continue taking medication as prescribed, including morning of procedure     cyclobenzaprine (FLEXERIL) 5 mg tablet Continue taking medication as prescribed, including morning of procedure     LEVOXYL 100 MCG Tab Continue taking medication as prescribed, including morning of procedure     rosuvastatin (CRESTOR) 40 MG tablet Continue until night before surgery          insulin infusion pump Device Be sure to bring remote

## 2025-04-22 NOTE — PREADMIT AVS NOTE
Current Medications   Medication Instructions    telmisartan (MICARDIS) 40 MG Tab Stop 24 hours before surgery    ezetimibe (ZETIA) 10 MG Tab Stop 24 hours before surgery    Cholecalciferol (VITAMIN D-3 PO) Stop 7 days before surgery    insulin aspart (NOVOLOG) 100 UNIT/ML Solution     Estradiol 0.025 MG/24HR PATCH BIWEEKLY Stop 24 hours before surgery    Alirocumab (PRALUENT) 150 MG/ML Solution Auto-injector Follow instructions from surgeon or specialist.    cycloSPORINE (RESTASIS) 0.05 % ophthalmic emulsion Continue taking medication as prescribed, including morning of procedure     omeprazole (PRILOSEC) 40 MG delayed-release capsule Continue taking medication as prescribed, including morning of procedure     MOTEGRITY 1 MG Tab Pt not taking    sucralfate (CARAFATE) 1 GM Tab Continue taking medication as prescribed, including morning of procedure     cyclobenzaprine (FLEXERIL) 5 mg tablet Continue taking medication as prescribed, including morning of procedure     LEVOXYL 100 MCG Tab Continue taking medication as prescribed, including morning of procedure     rosuvastatin (CRESTOR) 40 MG tablet Continue until night before surgery          insulin infusion pump Device

## 2025-04-23 ENCOUNTER — HOSPITAL ENCOUNTER (OUTPATIENT)
Dept: LAB | Facility: MEDICAL CENTER | Age: 70
End: 2025-04-23
Attending: STUDENT IN AN ORGANIZED HEALTH CARE EDUCATION/TRAINING PROGRAM
Payer: MEDICARE

## 2025-04-23 ENCOUNTER — APPOINTMENT (OUTPATIENT)
Dept: ADMISSIONS | Facility: MEDICAL CENTER | Age: 70
End: 2025-04-23
Attending: INTERNAL MEDICINE
Payer: MEDICARE

## 2025-04-23 DIAGNOSIS — Z01.810 PRE-OPERATIVE CARDIOVASCULAR EXAMINATION: ICD-10-CM

## 2025-04-23 LAB
ALBUMIN SERPL BCP-MCNC: 4.4 G/DL (ref 3.2–4.9)
APPEARANCE UR: CLEAR
BILIRUB UR QL STRIP.AUTO: NEGATIVE
BUN SERPL-MCNC: 15 MG/DL (ref 8–22)
CALCIUM ALBUM COR SERPL-MCNC: 9.4 MG/DL (ref 8.5–10.5)
CALCIUM SERPL-MCNC: 9.7 MG/DL (ref 8.5–10.5)
CHLORIDE SERPL-SCNC: 102 MMOL/L (ref 96–112)
CO2 SERPL-SCNC: 25 MMOL/L (ref 20–33)
COLOR UR: YELLOW
CREAT SERPL-MCNC: 0.75 MG/DL (ref 0.5–1.4)
CREAT UR-MCNC: 33.6 MG/DL
CREAT UR-MCNC: 34 MG/DL
EKG IMPRESSION: NORMAL
GFR SERPLBLD CREATININE-BSD FMLA CKD-EPI: 87 ML/MIN/1.73 M 2
GLUCOSE SERPL-MCNC: 155 MG/DL (ref 65–99)
GLUCOSE UR STRIP.AUTO-MCNC: NEGATIVE MG/DL
KETONES UR STRIP.AUTO-MCNC: NEGATIVE MG/DL
LEUKOCYTE ESTERASE UR QL STRIP.AUTO: NEGATIVE
MAGNESIUM SERPL-MCNC: 2 MG/DL (ref 1.5–2.5)
MICRO URNS: NORMAL
MICROALBUMIN UR-MCNC: <1.2 MG/DL
MICROALBUMIN/CREAT UR: NORMAL MG/G (ref 0–30)
NITRITE UR QL STRIP.AUTO: NEGATIVE
PH UR STRIP.AUTO: 7.5 [PH] (ref 5–8)
PHOSPHATE SERPL-MCNC: 3.9 MG/DL (ref 2.5–4.5)
POTASSIUM SERPL-SCNC: 4.9 MMOL/L (ref 3.6–5.5)
PROT UR QL STRIP: NEGATIVE MG/DL
PROT UR-MCNC: <6 MG/DL (ref 0–15)
PROT/CREAT UR: 179 MG/G (ref 10–107)
RBC UR QL AUTO: NEGATIVE
SODIUM SERPL-SCNC: 139 MMOL/L (ref 135–145)
SP GR UR STRIP.AUTO: 1.01
UROBILINOGEN UR STRIP.AUTO-MCNC: 0.2 EU/DL

## 2025-04-23 PROCEDURE — 80069 RENAL FUNCTION PANEL: CPT

## 2025-04-23 PROCEDURE — 82043 UR ALBUMIN QUANTITATIVE: CPT

## 2025-04-23 PROCEDURE — 82570 ASSAY OF URINE CREATININE: CPT | Mod: 91

## 2025-04-23 PROCEDURE — 84156 ASSAY OF PROTEIN URINE: CPT

## 2025-04-23 PROCEDURE — 81003 URINALYSIS AUTO W/O SCOPE: CPT

## 2025-04-23 PROCEDURE — 93010 ELECTROCARDIOGRAM REPORT: CPT | Performed by: INTERNAL MEDICINE

## 2025-04-23 PROCEDURE — 83735 ASSAY OF MAGNESIUM: CPT

## 2025-04-23 PROCEDURE — 93005 ELECTROCARDIOGRAM TRACING: CPT | Mod: TC

## 2025-04-23 PROCEDURE — 36415 COLL VENOUS BLD VENIPUNCTURE: CPT

## 2025-04-23 NOTE — PROGRESS NOTES
"Reason for Follow-Up:    I spoke to the patient this afternoon when she called in response to a message left earlier this month. We completed her PCM monthly follow up.    Current Health Status:    The patient is followed by PCM based on diagnoses of DM 1, CKD 2, HTN, hyperlipidemia, and aortic stenosis. The patient states that she hs been switched to a generic brand of novolog and that it doesn't seem to be working quite as well. The patient was advised to communicate with endocrinology on next steps. The patient reports having one instance of pressure that also went down her right arm but resolved quickly. Patient advised to seek emergency care if symptoms return. The patient is being trialed for a neurostimulator for urinary incontinence at the beginning of may but reports no other symptoms that would be indicative of an acute decline in kidney function. The patient reports taking all medications as prescribed. She reports no further questions or concern.     Medical Updates:  Since our last conversation the patient has followed up with endocrinology, had a bone density scan, seen pain management, and had labs taken.     Medication Updates:  The patient states she has been switched to generic novolog. The patient denies any further updates to her medication list at this time.     Symptoms:  The patient denies any notable symptoms at this time.     Plan of Care Goals and Progress:    Goal 1. Over the next month the patient wishes to engage in appropriate activity.      Progress:  The patient has started going to the gym.       Barriers:  The patient reports \"over doing it\" and needing to go to her pain management clinic.      Interventions:  The patient will engage in walking and swimming only on the advice of her pain management specialist.      Education:  Article on the benefits of walking sent by mail.     Goal 2. Over the next month the patient wants to take concrete and consistent measures to lose a few " pounds.      Progress:  The patient voices understanding of nutrition and activity being two components to weight loss. The patient voices understanding of diabetic nutrition.       Barriers:  The patient has mobility concerns that impact activity duration and intensity. The patient has chronic pain.      Interventions:  The patient will engage in walking and swimming only on the advice of her pain management specialist.     Education:  Article on the benefits of walking sent by mail.      Patient's Concerns and Feedback (Self Management of Care):     The patient's primary concern this conversation is preparing for her upcoming neuro stimulator procedure. She is going to follow up with endocrinology regarding what she is noticing with her blood glucose on the generic novolog. She is interested in whether she needs any further workup with cardiology given today's EKG. Her chart has been forwarded accordingly.     Next Steps:     Follow-Up Plan:  The patient's next PCM follow up will be in approximately 4 weeks.       Appointments:  6/30/25 (10:05 arrival, Roel), 7/31/25 (10:05 arrival, Roel), 8/7/25(11:20, Nathan), 10/14/25(11:20, Endocrinology)     Contact Information:  Call [phone] with any questions or concerns.

## 2025-04-23 NOTE — CARE PLAN
Problem: Knowledge of self-monitoring blood sugars : Long Term  Goal: Ensure patient has had proper education on self glucose testing  Outcome: Progressing  Note: The patient is advised to contact endocrinology regarding discrepancies she is seeing since switching to generic novolog.   Intervention: Encourage discussion with medical provider about referral to diabetes educator     Problem: Knowledge of factors contributing to hypertension: Long Term  Goal: Demonstrate factors that can contribute to high blood pressure  Outcome: Progressing  Note: Patient has verbalized understanding of factors contributing to high blood pressure   Intervention: Educate patient on role of physical activity  Description: Refer to community wellness programs  Note: Patient has begun going to the Gym. Walking and swimming recommended by pain management.

## 2025-04-24 ENCOUNTER — TELEPHONE (OUTPATIENT)
Dept: UROLOGY | Facility: MEDICAL CENTER | Age: 70
End: 2025-04-24
Payer: MEDICARE

## 2025-04-24 ENCOUNTER — TELEPHONE (OUTPATIENT)
Dept: CARDIOLOGY | Facility: MEDICAL CENTER | Age: 70
End: 2025-04-24
Payer: MEDICARE

## 2025-04-28 ENCOUNTER — TELEPHONE (OUTPATIENT)
Dept: CARDIOLOGY | Facility: MEDICAL CENTER | Age: 70
End: 2025-04-28
Payer: MEDICARE

## 2025-04-28 NOTE — TELEPHONE ENCOUNTER
Faxed Clearance to [facility] [phone]. Patient last seen 11/22. Patient needs to be seen in office.     Fax confirmation has been received and sent to scan through [employer].    
no

## 2025-04-28 NOTE — TELEPHONE ENCOUNTER
"Last OV: 11/17/22  Proposed Surgery: Stage 1 and Sacral Neuromodulation  Surgery Date: 05/09/25 & 09/15/25  Requesting Office Name: [facility]  Fax Number: [phone]  Preference of Location (default is surgery center unless specified by Cardiologist or JOSE E)  Prior Clearance Addressed: No    Is this a general clearance? YES   Anticoags/Antiplatelets: Other None  Anticoags/Antiplatelet managed by Cardiology? YES    Outstanding Cardiac Imaging : No  Ablation, Cardioversion, Stent, Cardiac Devices, Catheterization, Watchman: No  TAVR/Valve, Mitral Clip, Watchman (including open heart),: N/A   Recent Cardiac Hospitalization: No            When: N/A  History (cardiac history):   Past Medical History:   Diagnosis Date    Acute nasopharyngitis 04/29/2022    \"I'm at the tailend of a cold\" .  Symptoms started 4/26/22 included exhaustion, and stuffy nose, cough.  Symptoms are better but pt. reports she still feels \"A little congested\"    Anesthesia 04/22/2025    Post op N/V, pt with history of motion sickness    Blocked artery Around 2019    Carotid artery- had surgery.  Pt. follows up with Dr. Surya Garcia every year.    Cataract 04/22/2025    IOLOU    Dental disorder 04/22/2025    caps upper front teeth times 2    Diabetes type 1, controlled (HCC) 11/01/2010    Insulin pump in place.  Endocrinologist is Dr. Dewey.     Heart burn 04/22/2025    medicated    High cholesterol 04/22/2025    medicated    Hypertension 04/22/2025    pt states controlled on meds    Hypothyroidism 11/01/2010    medicated    Indigestion 04/22/2025    medicated    Insulin pump in place 05/18/2011    Left carotid artery stenosis - severe 2019     PONV (postoperative nausea and vomiting) 04/22/2025    pt with history of motion sickness    Routine health maintenance 05/18/2011    Urinary bladder disorder 04/22/2025    urinary incontinence           Is this a dental clearance? NO  Ablation, Cardioversion, Watchman, Stents, Cath, Devices within " the last 3 months? No   If yes- Send dental wait letter, do not forward to provider for review.     TAVR / Valve, Mitral clip within the last 6 months? No  If yes- Send dental wait letter, do not forward to provider for review.     If completing a general clearance, continue per protocol.           Surgical Clearance Letter Sent: YES   **Scan clearance request letter into [facility].**

## 2025-04-29 ENCOUNTER — TELEPHONE (OUTPATIENT)
Dept: ENDOCRINOLOGY | Facility: MEDICAL CENTER | Age: 70
End: 2025-04-29
Payer: MEDICARE

## 2025-05-06 ENCOUNTER — NON-PROVIDER VISIT (OUTPATIENT)
Dept: ENDOCRINOLOGY | Facility: MEDICAL CENTER | Age: 70
End: 2025-05-06
Attending: INTERNAL MEDICINE
Payer: MEDICARE

## 2025-05-06 DIAGNOSIS — E10.69 TYPE 1 DIABETES MELLITUS WITH OTHER SPECIFIED COMPLICATION (HCC): ICD-10-CM

## 2025-05-06 DIAGNOSIS — E03.9 HYPOTHYROIDISM, UNSPECIFIED TYPE: ICD-10-CM

## 2025-05-06 PROCEDURE — 99213 OFFICE O/P EST LOW 20 MIN: CPT | Performed by: INTERNAL MEDICINE

## 2025-05-06 PROCEDURE — 95249 CONT GLUC MNTR PT PROV EQP: CPT | Performed by: INTERNAL MEDICINE

## 2025-05-06 NOTE — PROGRESS NOTES
RN-CDE Note    Subjective:   Endocrinology Clinic Progress Note  PCP: SOBIA Candelario    HPI:  Ines Long is a 70 y.o. old patient who is seen today by the Diabetes Nurse Specialist for review of Type 1 Diabetes and Hypothyroidism.  Recent changes in health: Not doing as well on the Novolog insulin as FIASP.  Having more highs and lows.  DM:   Last A1c:   Lab Results   Component Value Date/Time    HBA1C 6.4 (A) 04/08/2025 11:48 AM      Previous A1c was 7.0 on 1/7/25  A1C GOAL: < 7    Diabetes Medications:   Novolog in Medtronic 780G pump    Basal rate  MN 0.500  330  0.850  1130 0.500  1400 0.450  1800 0.500     CR   MN 11     ISF   50  changed to 70     Target            Exercise: Having back pain but has joined a gym and walking with   Diet: Carbohydrate counting     Exercise and nutrition counseling were performed at this visit.    Glucose monitoring frequency: See pump download    Hypoglycemic episodes: yes - with over correction from pump     Last Retinal Exam: on file and up-to-date  Daily Foot Exam: Yes   Foot Exam:  Monofilament: done  Monofilament testing with a 10 gram force: sensation intact: intact bilaterally  Visual Inspection: Feet with maceration, ulcers, fissures.  Small covered blister from shoe rubbing.  Pedal pulses: decreased bilaterally   Lab Results   Component Value Date/Time    MALBCRT see below 04/23/2025 09:01 AM    MICROALBUR <1.2 04/23/2025 09:01 AM        ACR Albumin/Creatinine Ratio goal <30     HTN:   Blood pressure goal <130/<80   Currently Rx ACE/ARB:  yes    Dyslipidemia:    Lab Results   Component Value Date/Time    CHOLSTRLTOT 130 01/30/2025 08:56 AM    LDL 39 01/30/2025 08:56 AM    HDL 76 01/30/2025 08:56 AM    TRIGLYCERIDE 76 01/30/2025 08:56 AM         Currently Rx Statin:  yes    She  reports that she quit smoking about 50 years ago. Her smoking use included cigarettes. She has never used smokeless tobacco.    Plan:     Discussed  and educated on:   - All medications, side effects and compliance (discussed carefully)  - Annual eye examinations at Ophthalmology  - Home glucose monitoring emphasized  - Weight control and daily exercise    Recommended medication changes: She is having to get up frequently at night to treat low blood sugars due to too much of a correction.  We will change her pump sensitivity from 50 to 70.  She will let us know if she continues to have low blood sugars and we can change the basal rates.  She states she did better on the FIASP insulin but her insurance stopped covering it.  She will follow up with Dr. Evans on 8/7/25 and we will recheck her thyroid labs before that appointment.

## 2025-05-12 ENCOUNTER — PATIENT OUTREACH (OUTPATIENT)
Dept: HEALTH INFORMATION MANAGEMENT | Facility: OTHER | Age: 70
End: 2025-05-12
Payer: MEDICARE

## 2025-05-12 DIAGNOSIS — I50.30 BENIGN HYPERTENSIVE HEART AND KIDNEY DISEASE WITH DIASTOLIC CHF, NYHA CLASS II AND CKD STAGE 2 (HCC): ICD-10-CM

## 2025-05-12 DIAGNOSIS — I13.0 BENIGN HYPERTENSIVE HEART AND KIDNEY DISEASE WITH DIASTOLIC CHF, NYHA CLASS II AND CKD STAGE 2 (HCC): ICD-10-CM

## 2025-05-12 DIAGNOSIS — N18.2 BENIGN HYPERTENSIVE HEART AND KIDNEY DISEASE WITH DIASTOLIC CHF, NYHA CLASS II AND CKD STAGE 2 (HCC): ICD-10-CM

## 2025-05-12 DIAGNOSIS — Z79.4 DIABETES MELLITUS DUE TO UNDERLYING CONDITION WITH HYPEROSMOLARITY WITHOUT COMA, WITH LONG-TERM CURRENT USE OF INSULIN (HCC): ICD-10-CM

## 2025-05-12 DIAGNOSIS — E08.00 DIABETES MELLITUS DUE TO UNDERLYING CONDITION WITH HYPEROSMOLARITY WITHOUT COMA, WITH LONG-TERM CURRENT USE OF INSULIN (HCC): ICD-10-CM

## 2025-05-12 DIAGNOSIS — I10 PRIMARY HYPERTENSION: ICD-10-CM

## 2025-05-12 DIAGNOSIS — E78.49 OTHER HYPERLIPIDEMIA: ICD-10-CM

## 2025-05-12 NOTE — PROGRESS NOTES
Reason for Follow-Up:    I spoke to the patient this afternoon by phone in order to complete her monthly and quarterly PCM follow up.    Current Health Status:    The patient is followed by PCM based on diagnoses of DM 1, CKD 2, HTN, hyperlipidemia, and aortic stenosis. The patient reports struggling with keeping her blood glucose levels stable since starting generic Novolog. The patient states that the nurse she met with at endocrinology only made some minor adjustments to her CGM. Her chart will be routed to pharmacy assistance and her endocrinologist for further input. The patient is up to date on kidney related labs and denies any symptoms that would indicate an acute decline in function. GFR was 87 at the end of April. The patient is up to date on cholesterol related labs and states understanding of nutrition recommendation.     Medical Updates:  Since our last conversation the patient has followed up with Endocrinology, Pain Management, and had labs and imaging taken. She has also been communicating with her medical team over [employer] and Telephone    Medication Updates:  The patient denies any updates to her medication list since our last conversation.     Symptoms:  Fatigue, back pain    Plan of Care Goals and Progress:    Goal 1. Over the next month the patient will take concrete and consistent steps in order to regain control of blood glucose levels.      Progress:  The patient wears a CGM and is responsive to it's readings and alerts.       Barriers:  The patient has been switched to another insulin that has proven less effective at controlling the patient's blood glucose levels.      Interventions:  The patient will be extra mindful of nutrition and hydration. The patient's chart has been routed to endocrinology and pharmacy assistance in order to have a full understanding of what next steps might be.      Education: Patient advised to be extra mindful of nutrition and contact endocrinology in a timely  manner for further instruction. The patient was advised to be ready for a call from pharmacy assistance. Nutrition and hydration recommendations reinforced.     Goal 2. Over the next month the patient will take concrete and consistent measures to decrease pain and increase mobility.      Progress:  The patient is consulting with pain management specialists and reports taking medication as prescribed.       Barriers:  The patient has chronic pain that will require ongoing monitoring and treatment.      Interventions:  The patient will consider incorporating breathing exercises for stress relief during the management of chronic pain.      Education:  Stress management discussed and article on breathing exercises for stress sent by mail.       Patient's Concerns and Feedback (Self Management of Care):     The patient's primary issues today have to do with poor blood glucose control since being switched to generic Novolog and uncontrolled pain. She is pretty discouraged but will be speaking with her pain management provider and Dr. Evans' office to discuss her concerns. The patient's chart is also being routed to pharmacy assistance to see if anything can be done to get the patient back on to her prior medication at a sustainable price.     Next Steps:     Follow-Up Plan:  The patient's next PCM follow up will be in approximately 4 weeks.       Appointments:  6/30/25 (10:05 am arrival, Roel), 7/31/25 (10:05 arrival, Roel), 8/7/25 (10:20 am, Nathan), 10/14/25 (11:20 am, Endocrinology)     Contact Information:  Call [phone] with any questions or concerns.

## 2025-05-13 ENCOUNTER — TELEPHONE (OUTPATIENT)
Dept: ENDOCRINOLOGY | Facility: MEDICAL CENTER | Age: 70
End: 2025-05-13
Payer: MEDICARE

## 2025-05-13 NOTE — PROGRESS NOTES
I do not show that this patient has [facility] nor [facility] for insurance. To find out formulary options member would need to contact their insurance.

## 2025-05-13 NOTE — CARE PLAN
Problem: Recognizing current health habits that may contribute to diabetes: Long Term  Goal: In conjunction with patient, identify which health habits can be modified  Note: The patient states understanding that she needs to stand up for herself and keep communicating if her blood glucose levels are unacceptable.   Intervention: Patient to verbalize willingness to start changing health habits     Problem: Knowledge of factors contributing to hypertension: Long Term  Goal: Demonstrate factors that can contribute to high blood pressure  Outcome: Progressing  Note: Patient has verbalized understanding of factors contributing to high blood pressure including sodium and stress.   Intervention: Educate patient on role of sodium in diet  Intervention: Educate patient on role of stress/anxiety

## 2025-05-13 NOTE — CARE PLAN
Problem: Recognizing current health habits that may contribute to diabetes: Long Term  Goal: In conjunction with patient, identify which health habits can be modified  Note: The patient states understanding that she needs to stand up for herself and keep communicating if her blood glucose levels are unacceptable.   Intervention: Patient to verbalize willingness to start changing health habits     Problem: Knowledge of self-monitoring blood sugars : Long Term  Goal: Ensure patient has had proper education on self glucose testing  Outcome: Progressing  Note: The patient has a CGM and keeps a log  Intervention: Patient will verbalize commitment to self-monitoring blood sugar based on medical provider recommendations  Intervention: Patient will bring self-monitoring blood glucose log to medical provider appointments     Problem: Knowledge of factors contributing to hypertension: Long Term  Goal: Demonstrate factors that can contribute to high blood pressure  Outcome: Progressing  Note: Patient has verbalized understanding of factors contributing to high blood pressure including sodium and stress.   Intervention: Educate patient on role of sodium in diet  Intervention: Educate patient on role of stress/anxiety

## 2025-05-13 NOTE — TELEPHONE ENCOUNTER
Pt called saying she has not been able to control her sugars and she will either be very low or very high. I advised pt I would get the diabetic nurse to access her pump and made any adjustments accordingly.

## 2025-05-15 DIAGNOSIS — E10.69 TYPE 1 DIABETES MELLITUS WITH OTHER SPECIFIED COMPLICATION (HCC): Primary | ICD-10-CM

## 2025-05-20 ENCOUNTER — NON-PROVIDER VISIT (OUTPATIENT)
Dept: ENDOCRINOLOGY | Facility: MEDICAL CENTER | Age: 70
End: 2025-05-20
Attending: INTERNAL MEDICINE
Payer: MEDICARE

## 2025-05-20 VITALS
HEIGHT: 67 IN | HEART RATE: 80 BPM | OXYGEN SATURATION: 98 % | WEIGHT: 180 LBS | DIASTOLIC BLOOD PRESSURE: 80 MMHG | SYSTOLIC BLOOD PRESSURE: 124 MMHG | BODY MASS INDEX: 28.25 KG/M2

## 2025-05-20 PROCEDURE — 95250 CONT GLUC MNTR PHYS/QHP EQP: CPT | Performed by: INTERNAL MEDICINE

## 2025-05-20 PROCEDURE — 99212 OFFICE O/P EST SF 10 MIN: CPT | Performed by: INTERNAL MEDICINE

## 2025-05-20 ASSESSMENT — FIBROSIS 4 INDEX: FIB4 SCORE: 1.34

## 2025-05-20 NOTE — PROGRESS NOTES
RN-CDE Note    Subjective:   Endocrinology Clinic Progress Note  PCP: SOBIA Candelario    HPI:  Ines Long is a 70 y.o. old patient who is seen today by the Diabetes Nurse Specialist for review of Type 1 Diabetes.  Recent changes in health:  Blood sugar more stable since changing the correction factor.  She is learning to just let the pump adjust if needed.  DM:   Last A1c:   Lab Results   Component Value Date/Time    HBA1C 6.4 (A) 04/08/2025 11:48 AM        A1C GOAL: < 7    Diabetes Medications:     Novolog in Medtronic 780G pump     Basal rate  MN 0.500  330  0.850  1130 0.500  1400 0.450  1800 0.500     CR   MN 11     ISF    70     Target        Exercise: Walking and going to walk in the pool starting in June.  Diet: Coffee with protein and diet sweeter.  Lunch is apple with protein.  Dinner is protein, vegetables, and 1 scoop of carbohydrate.  Snacks on peanuts if hungry.    Exercise and nutrition counseling were performed at this visit.    Glucose monitoring frequency: see pump download    Hypoglycemic episodes: yes - if over corrects.     Last Retinal Exam: Schedule next month at [facility].  Daily Foot Exam: Yes   Foot Exam:  Monofilament: not done  Lab Results   Component Value Date/Time    MALBCRT see below 04/23/2025 09:01 AM    MICROALBUR <1.2 04/23/2025 09:01 AM        ACR Albumin/Creatinine Ratio goal <30     HTN:   Blood pressure goal <130/<80   Currently Rx ACE/ARB:  yes    Dyslipidemia:    Lab Results   Component Value Date/Time    CHOLSTRLTOT 130 01/30/2025 08:56 AM    LDL 39 01/30/2025 08:56 AM    HDL 76 01/30/2025 08:56 AM    TRIGLYCERIDE 76 01/30/2025 08:56 AM         Currently Rx Statin:  yes    She  reports that she quit smoking about 50 years ago. Her smoking use included cigarettes. She has never used smokeless tobacco.    Plan:     Discussed and educated on:   - All medications, side effects and compliance (discussed carefully)  - Annual eye  examinations at Ophthalmology  - Home glucose monitoring emphasized  - Weight control and daily exercise    Recommended medication changes: No changes at this time.  She waits to give her bolus for her food to make sure she doesn't drop.  She has an appointment with Dr. Evans 8/7/25 and she will do her labs before that appointment.

## 2025-06-05 ENCOUNTER — PATIENT OUTREACH (OUTPATIENT)
Dept: HEALTH INFORMATION MANAGEMENT | Facility: OTHER | Age: 70
End: 2025-06-05
Payer: MEDICARE

## 2025-06-05 DIAGNOSIS — I13.0 BENIGN HYPERTENSIVE HEART AND KIDNEY DISEASE WITH DIASTOLIC CHF, NYHA CLASS II AND CKD STAGE 2 (HCC): ICD-10-CM

## 2025-06-05 DIAGNOSIS — I10 PRIMARY HYPERTENSION: Primary | ICD-10-CM

## 2025-06-05 DIAGNOSIS — N18.2 BENIGN HYPERTENSIVE HEART AND KIDNEY DISEASE WITH DIASTOLIC CHF, NYHA CLASS II AND CKD STAGE 2 (HCC): ICD-10-CM

## 2025-06-05 DIAGNOSIS — I50.30 BENIGN HYPERTENSIVE HEART AND KIDNEY DISEASE WITH DIASTOLIC CHF, NYHA CLASS II AND CKD STAGE 2 (HCC): ICD-10-CM

## 2025-06-05 DIAGNOSIS — E78.49 OTHER HYPERLIPIDEMIA: ICD-10-CM

## 2025-06-05 DIAGNOSIS — E08.00 DIABETES MELLITUS DUE TO UNDERLYING CONDITION WITH HYPEROSMOLARITY WITHOUT COMA, WITH LONG-TERM CURRENT USE OF INSULIN (HCC): ICD-10-CM

## 2025-06-05 DIAGNOSIS — Z79.4 DIABETES MELLITUS DUE TO UNDERLYING CONDITION WITH HYPEROSMOLARITY WITHOUT COMA, WITH LONG-TERM CURRENT USE OF INSULIN (HCC): ICD-10-CM

## 2025-06-05 NOTE — PROGRESS NOTES
Reason for Follow-Up:    I spoke to the patient today by phone when she called in with questions regarding labwork. We completed her PCM monthly follow up.     Current Health Status:    The patient is followed by PCM based on diagnoses of DM 1, CKD 2, HTN, hyperlipidemia, and aortic stenosis. The patient indicates that her blood pressures and blood glucose levels have been under good control at home. She is due for a diabetic retinopathy screening. The patient denies any symptoms indicative of an acute loss in renal function. She is up to date on labs related to kidneys and cholesterol and states understanding of attendant nutrition and hydration concerns.     Medical Updates:  Since our last conversation the patient has followed up with endocrinology and pain management. She also mentions seeing a chiropractor who mentions the possiblity of Stiff Person Syndrome.     Medication Updates:  The patient denies any updates to her medication list today.     Symptoms:  The patient reports joint and muscle stiffness    Plan of Care Goals and Progress:    Goal 1. Over the next 4 weeks the patient will take concrete and consistent measures to maintain mobility and avoid falls.      Progress:  The patient intends to follow up with neurology with regard to stiffness concerns. The patient continues to follow up with pain management.       Barriers:  The patient continues to experience significant discomfort and stiffness.      Interventions:  The patient will consider adding balance and stretching exercises to her routine.      Education:  Education provided on balance and stretching exercises, article sent by mail.     Goal 2. Over the next four weeks the patient will take concrete and consistent measures to optimize kidney health.      Progress:  The patient states understanding of the need to keep well hydrated.       Barriers:  The patient can be inconsistent with hydration.      Interventions:  The patient is advised to  track fluid intake during the day.      Education:  Education provided on hydration.       Patient's Concerns and Feedback (Self Management of Care):     The patient's primary concern during today's conversation is being told that she has a marker for stiff person syndrome in her blood work. She was advised to have neurology look into this further. The patient is aware of her future appointments and care measures. The patient denies any [facility] health needs at this time.     Next Steps:     Follow-Up Plan:  The patient and I will touch base regarding her diabetic, mobility, and cardiovascular goals as well as any information from neurology in approximately 4 weeks.       Appointments:  6/30/25(10:05 am arrival, Roel), 7/31/25(10:05 am, Roel), 8/7/25 (11:20 am, Nathan), 10/14/25 (11:20 am, Endocrinology)     Contact Information:  Call [phone] with any questions or concerns.

## 2025-06-05 NOTE — CARE PLAN
Problem: Knowledge of self-monitoring blood sugars : Long Term  Goal: Ensure patient has had proper education on self glucose testing  Outcome: Progressing  Note: The patient has a CGM and reports that her blood sugars have gotten back into a good,steady range once more.      Problem: Patient Stated Problem: Need for assistance in staying on track and being proactive in her care.   Goal: Patient Stated Goal: Over the next year the patient will gain assistance from nursing in staying on track and accessing resources for being proactive in her care.   Outcome: Progressing  Note: The patient utililizes her medical team appropriately.   Intervention: First Patient Stated Intervention: *Over the next 12 months the patient will have monthly check ins to discuss medical information and care measures.

## 2025-06-11 ENCOUNTER — TELEPHONE (OUTPATIENT)
Dept: HEALTH INFORMATION MANAGEMENT | Facility: OTHER | Age: 70
End: 2025-06-11
Payer: MEDICARE

## 2025-06-11 DIAGNOSIS — Z79.4 DIABETES MELLITUS DUE TO UNDERLYING CONDITION WITH HYPEROSMOLARITY WITHOUT COMA, WITH LONG-TERM CURRENT USE OF INSULIN (HCC): ICD-10-CM

## 2025-06-11 DIAGNOSIS — N18.2 BENIGN HYPERTENSIVE HEART AND KIDNEY DISEASE WITH DIASTOLIC CHF, NYHA CLASS II AND CKD STAGE 2 (HCC): ICD-10-CM

## 2025-06-11 DIAGNOSIS — I13.0 BENIGN HYPERTENSIVE HEART AND KIDNEY DISEASE WITH DIASTOLIC CHF, NYHA CLASS II AND CKD STAGE 2 (HCC): ICD-10-CM

## 2025-06-11 DIAGNOSIS — E78.49 OTHER HYPERLIPIDEMIA: ICD-10-CM

## 2025-06-11 DIAGNOSIS — I10 PRIMARY HYPERTENSION: Primary | ICD-10-CM

## 2025-06-11 DIAGNOSIS — E08.00 DIABETES MELLITUS DUE TO UNDERLYING CONDITION WITH HYPEROSMOLARITY WITHOUT COMA, WITH LONG-TERM CURRENT USE OF INSULIN (HCC): ICD-10-CM

## 2025-06-11 DIAGNOSIS — I50.30 BENIGN HYPERTENSIVE HEART AND KIDNEY DISEASE WITH DIASTOLIC CHF, NYHA CLASS II AND CKD STAGE 2 (HCC): ICD-10-CM

## 2025-06-11 PROCEDURE — RXMED WILLOW AMBULATORY MEDICATION CHARGE

## 2025-06-11 NOTE — TELEPHONE ENCOUNTER
The patient called today requesting a referral to [facility] for Dr. Sarah Smith. The patient states the fax number is . Her chart is forwarded to her PCP for review. The patient is aware that her PCP may want to discuss this further at her in person appointment May 30th.   
No

## 2025-06-12 ENCOUNTER — PHARMACY VISIT (OUTPATIENT)
Dept: PHARMACY | Facility: MEDICAL CENTER | Age: 70
End: 2025-06-12
Payer: COMMERCIAL

## 2025-06-12 ENCOUNTER — TELEPHONE (OUTPATIENT)
Dept: MEDICAL GROUP | Facility: PHYSICIAN GROUP | Age: 70
End: 2025-06-12
Payer: MEDICARE

## 2025-06-12 ENCOUNTER — TELEPHONE (OUTPATIENT)
Dept: HEALTH INFORMATION MANAGEMENT | Facility: OTHER | Age: 70
End: 2025-06-12
Payer: MEDICARE

## 2025-06-12 NOTE — TELEPHONE ENCOUNTER
The patient is updated that her PCP has the information she gave me yesterday but would like to speak with her at her next appointment to determine a good plan of care.

## 2025-06-24 ENCOUNTER — HOSPITAL ENCOUNTER (OUTPATIENT)
Dept: RADIOLOGY | Facility: MEDICAL CENTER | Age: 70
End: 2025-06-24
Attending: INTERNAL MEDICINE
Payer: MEDICARE

## 2025-06-24 DIAGNOSIS — I65.21 OCCLUSION OF RIGHT CAROTID ARTERY: ICD-10-CM

## 2025-06-24 PROCEDURE — 93018 CV STRESS TEST I&R ONLY: CPT | Performed by: INTERNAL MEDICINE

## 2025-06-24 PROCEDURE — 78452 HT MUSCLE IMAGE SPECT MULT: CPT | Mod: 26 | Performed by: INTERNAL MEDICINE

## 2025-06-24 PROCEDURE — A9502 TC99M TETROFOSMIN: HCPCS

## 2025-06-24 PROCEDURE — 700111 HCHG RX REV CODE 636 W/ 250 OVERRIDE (IP): Performed by: INTERNAL MEDICINE

## 2025-06-24 RX ORDER — REGADENOSON 0.08 MG/ML
0.4 INJECTION, SOLUTION INTRAVENOUS ONCE
Status: COMPLETED | OUTPATIENT
Start: 2025-06-24 | End: 2025-06-24

## 2025-06-24 RX ORDER — AMINOPHYLLINE 25 MG/ML
100 INJECTION, SOLUTION INTRAVENOUS
Status: DISCONTINUED | OUTPATIENT
Start: 2025-06-24 | End: 2025-06-25 | Stop reason: HOSPADM

## 2025-06-24 RX ADMIN — REGADENOSON 0.4 MG: 0.08 INJECTION, SOLUTION INTRAVENOUS at 10:58

## 2025-06-25 ENCOUNTER — DOCUMENTATION (OUTPATIENT)
Dept: PHARMACY | Facility: MEDICAL CENTER | Age: 70
End: 2025-06-25
Payer: MEDICARE

## 2025-06-25 NOTE — PROGRESS NOTES
Clinical Reassessment / Follow-Up  Praluent 150MG/ML auto-injectors - Hyperlipidemia - Clinical reassessment    Encounter Information: Telephonic assessment, 2025, Patient    SUBJECTIVE  Signs and symptoms of condition   -Hyperlipidemia: Reviewed, none     Changes to allergies, diagnoses, or health conditions: Reviewed - no changes    Functional Limitations: None    High risk features: None    Communicable infections: No communicable infections    OBJECTIVE  Clinically relevant medication and/or disease state labs   -Hyperlipidemia: Reviewed, New cardio events since the last assessment: none   Clinically Relevant, Abnormal Labs: 25    Lipids:     ? TC: 130    ? LDL: 39    ? HDL: 76    ? T    BP/HR: WNL    CBC: WNL    CMP: ALP 133H      ASSESSMENT  Drug therapy, administration, and proper use   -Praluent 150MG/ML auto-injectors: Reviewed,  Praluent 150mg/mL pen injecting 1mL (150mg) subcutaneously every 2 weeks + [Rosuvastatin 40mg by mouth daily + Ezetimibe 10mg by mouth daily]  Injecting into stomach rotating sites every other      Reported missed doses in the past month   -Praluent 150MG/ML auto-injectors: Following treatment plan as prescribed    Missed dose management   -Praluent 150MG/ML auto-injectors: No missed doses reported    Adherence strategies   -Praluent 150MG/ML auto-injectors: Reviewed, not clinically relevant     Status of current side effects   -Praluent 150MG/ML auto-injectors: No side effects reported    Non-pharmacologic drug and disease state counseling   -Hyperlipidemia: Reviewed, Reviewed importance of hydration- pt continuing to follow recommended fluid restrictions per cardio and nephrology - currently at 50oz max/day     Relevant immunization recommendations: Reviewed, up to date - reminded to get flu vax in fall     In the last 4 weeks has the patient missed any days of work, school, or planned activities due to their condition?   -Hyperlipidemia: No    Since the  last assessment has the patient had any unplanned medical visits due to their condition?   -Hyperlipidemia: No    Medication list reconciliation: Reviewed-EMR inaccurate, (-) restasis  (+) Blink vitamin, Vit K2 (in vit d)  LEvoxyl is now 1 whole tab daily, including sundays    Clinically significant drug interactions: No drug interactions identified    PLAN  Patient's overall status:    -Hyperlipidemia: Stable    Medication appropriateness   -Praluent 150MG/ML auto-injectors: Appropriate    Medication status   -Praluent 150MG/ML auto-injectors: Continue Therapy    Anticipated or confirmed medication start date   -Praluent 150MG/ML auto-injectors: 06/11/2023    Goals of therapy   -Hyperlipidemia: Promote or maintain optimal therapy administration and adherence, Mitigation of side effects, Achieve goal LDL levels (< mg/dL) to reduce risk of cardiovascular events    Progression towards meeting goals of therapy:   -Hyperlipidemia: No SE, adherence concerns, and pt meeting LDL goal w/ no recent CV events    Understanding/agreement of the goals of therapy   -Hyperlipidemia: Agrees/understands goals of therapy    Additional summary notes: Ines was very pleasant to speak with today regarding continuation of praluent. No concerns regarding side effects or adherence noted during call. Patient doing very well on medication, meeting LDL goal with last LDL equal to 39. No cardiovascular events noted since the last assessment. Patient did note ongoing acid reflux issues, and it's meeting with provider in the next week to address. No other concerns noted at this time, patient appreciated call, and has clinical pharmacist phone number for any questions or concerns in the meantime.

## 2025-06-30 ENCOUNTER — OFFICE VISIT (OUTPATIENT)
Dept: MEDICAL GROUP | Facility: PHYSICIAN GROUP | Age: 70
End: 2025-06-30
Payer: MEDICARE

## 2025-06-30 ENCOUNTER — APPOINTMENT (OUTPATIENT)
Dept: MEDICAL GROUP | Facility: PHYSICIAN GROUP | Age: 70
End: 2025-06-30
Payer: MEDICARE

## 2025-06-30 VITALS
SYSTOLIC BLOOD PRESSURE: 102 MMHG | HEART RATE: 83 BPM | BODY MASS INDEX: 27.94 KG/M2 | OXYGEN SATURATION: 95 % | DIASTOLIC BLOOD PRESSURE: 62 MMHG | TEMPERATURE: 97.8 F | WEIGHT: 178 LBS | HEIGHT: 67 IN

## 2025-06-30 DIAGNOSIS — E10.69 TYPE 1 DIABETES MELLITUS WITH OTHER SPECIFIED COMPLICATION (HCC): ICD-10-CM

## 2025-06-30 DIAGNOSIS — G57.71 COMPLEX REGIONAL PAIN SYNDROME TYPE 2 OF RIGHT LOWER EXTREMITY: Primary | ICD-10-CM

## 2025-06-30 DIAGNOSIS — I67.9 SMALL VESSEL DISEASE, CEREBROVASCULAR: ICD-10-CM

## 2025-06-30 DIAGNOSIS — Z12.31 ENCOUNTER FOR SCREENING MAMMOGRAM FOR MALIGNANT NEOPLASM OF BREAST: ICD-10-CM

## 2025-06-30 DIAGNOSIS — R26.89 ABNORMALITY OF GAIT DUE TO IMPAIRMENT OF BALANCE: ICD-10-CM

## 2025-06-30 RX ORDER — PRUCALOPRIDE 2 MG/1
2 TABLET, FILM COATED ORAL DAILY
COMMUNITY
Start: 2025-06-26

## 2025-06-30 RX ORDER — PREGABALIN 100 MG/1
100 CAPSULE ORAL 2 TIMES DAILY
COMMUNITY
Start: 2025-06-25 | End: 2025-07-25

## 2025-06-30 RX ORDER — FAMOTIDINE 20 MG/1
20 TABLET, FILM COATED ORAL 2 TIMES DAILY
COMMUNITY
Start: 2025-04-14

## 2025-06-30 RX ORDER — DULOXETIN HYDROCHLORIDE 60 MG/1
1 CAPSULE, DELAYED RELEASE ORAL DAILY
COMMUNITY

## 2025-06-30 RX ORDER — PANTOPRAZOLE SODIUM 40 MG/1
40 TABLET, DELAYED RELEASE ORAL DAILY
COMMUNITY
Start: 2025-05-18

## 2025-06-30 ASSESSMENT — FIBROSIS 4 INDEX: FIB4 SCORE: 1.34

## 2025-07-01 ENCOUNTER — RESULTS FOLLOW-UP (OUTPATIENT)
Dept: MEDICAL GROUP | Facility: PHYSICIAN GROUP | Age: 70
End: 2025-07-01
Payer: MEDICARE

## 2025-07-01 LAB — RETINAL SCREEN: NEGATIVE

## 2025-07-02 ENCOUNTER — HOSPITAL ENCOUNTER (OUTPATIENT)
Dept: LAB | Facility: MEDICAL CENTER | Age: 70
End: 2025-07-02
Attending: STUDENT IN AN ORGANIZED HEALTH CARE EDUCATION/TRAINING PROGRAM
Payer: MEDICARE

## 2025-07-02 LAB
ALBUMIN SERPL BCP-MCNC: 4.5 G/DL (ref 3.2–4.9)
ANION GAP SERPL CALC-SCNC: 12 MMOL/L (ref 7–16)
APPEARANCE UR: CLEAR
BACTERIA #/AREA URNS HPF: NORMAL /HPF
BILIRUB UR QL STRIP.AUTO: NEGATIVE
BUN SERPL-MCNC: 18 MG/DL (ref 8–22)
CALCIUM ALBUM COR SERPL-MCNC: 9.1 MG/DL (ref 8.5–10.5)
CALCIUM SERPL-MCNC: 9.5 MG/DL (ref 8.5–10.5)
CASTS URNS QL MICRO: NORMAL /LPF (ref 0–2)
CHLORIDE SERPL-SCNC: 100 MMOL/L (ref 96–112)
CO2 SERPL-SCNC: 22 MMOL/L (ref 20–33)
COLOR UR: YELLOW
CREAT SERPL-MCNC: 0.73 MG/DL (ref 0.5–1.4)
CREAT UR-MCNC: 39.8 MG/DL
EPITHELIAL CELLS 1715: NORMAL /HPF (ref 0–5)
GFR SERPLBLD CREATININE-BSD FMLA CKD-EPI: 89 ML/MIN/1.73 M 2
GLUCOSE SERPL-MCNC: 134 MG/DL (ref 65–99)
GLUCOSE UR STRIP.AUTO-MCNC: NEGATIVE MG/DL
KETONES UR STRIP.AUTO-MCNC: NEGATIVE MG/DL
LEUKOCYTE ESTERASE UR QL STRIP.AUTO: ABNORMAL
MAGNESIUM SERPL-MCNC: 2.1 MG/DL (ref 1.5–2.5)
MICRO URNS: ABNORMAL
NITRITE UR QL STRIP.AUTO: NEGATIVE
PH UR STRIP.AUTO: 7 [PH] (ref 5–8)
PHOSPHATE SERPL-MCNC: 3.2 MG/DL (ref 2.5–4.5)
POTASSIUM SERPL-SCNC: 5.7 MMOL/L (ref 3.6–5.5)
PROT UR QL STRIP: NEGATIVE MG/DL
PROT UR-MCNC: 7.4 MG/DL (ref 0–15)
PROT/CREAT UR: 186 MG/G (ref 10–107)
RBC # URNS HPF: NORMAL /HPF (ref 0–2)
RBC UR QL AUTO: NEGATIVE
SODIUM SERPL-SCNC: 134 MMOL/L (ref 135–145)
SP GR UR STRIP.AUTO: 1.01
UROBILINOGEN UR STRIP.AUTO-MCNC: 0.2 EU/DL
WBC #/AREA URNS HPF: NORMAL /HPF

## 2025-07-02 PROCEDURE — 83735 ASSAY OF MAGNESIUM: CPT

## 2025-07-02 PROCEDURE — 82570 ASSAY OF URINE CREATININE: CPT

## 2025-07-02 PROCEDURE — 82043 UR ALBUMIN QUANTITATIVE: CPT

## 2025-07-02 PROCEDURE — 81001 URINALYSIS AUTO W/SCOPE: CPT

## 2025-07-02 PROCEDURE — 80069 RENAL FUNCTION PANEL: CPT

## 2025-07-02 PROCEDURE — 36415 COLL VENOUS BLD VENIPUNCTURE: CPT

## 2025-07-02 PROCEDURE — 84156 ASSAY OF PROTEIN URINE: CPT

## 2025-07-03 ENCOUNTER — PATIENT OUTREACH (OUTPATIENT)
Dept: HEALTH INFORMATION MANAGEMENT | Facility: OTHER | Age: 70
End: 2025-07-03
Payer: MEDICARE

## 2025-07-03 DIAGNOSIS — I13.0 BENIGN HYPERTENSIVE HEART AND KIDNEY DISEASE WITH DIASTOLIC CHF, NYHA CLASS II AND CKD STAGE 2 (HCC): ICD-10-CM

## 2025-07-03 DIAGNOSIS — I10 PRIMARY HYPERTENSION: Primary | ICD-10-CM

## 2025-07-03 DIAGNOSIS — I50.30 BENIGN HYPERTENSIVE HEART AND KIDNEY DISEASE WITH DIASTOLIC CHF, NYHA CLASS II AND CKD STAGE 2 (HCC): ICD-10-CM

## 2025-07-03 DIAGNOSIS — Z79.4 DIABETES MELLITUS DUE TO UNDERLYING CONDITION WITH HYPEROSMOLARITY WITHOUT COMA, WITH LONG-TERM CURRENT USE OF INSULIN (HCC): ICD-10-CM

## 2025-07-03 DIAGNOSIS — E78.49 OTHER HYPERLIPIDEMIA: ICD-10-CM

## 2025-07-03 DIAGNOSIS — N18.2 BENIGN HYPERTENSIVE HEART AND KIDNEY DISEASE WITH DIASTOLIC CHF, NYHA CLASS II AND CKD STAGE 2 (HCC): ICD-10-CM

## 2025-07-03 DIAGNOSIS — E08.00 DIABETES MELLITUS DUE TO UNDERLYING CONDITION WITH HYPEROSMOLARITY WITHOUT COMA, WITH LONG-TERM CURRENT USE OF INSULIN (HCC): ICD-10-CM

## 2025-07-03 NOTE — PROGRESS NOTES
Reason for Follow-Up:    I spoke with the patient today by phone in order to complete her monthly PCM follow up.     Current Health Status:    The patient is followed by PCM based on diagnoses of DM 1, CKD 2, HTN, hyperlipidemia, and aortic stenosis. The patient reports no acute chest pain, pressure, or palpitations at this time. Her blood pressures are within a range acceptable to her providers over her last clinic visits. 120s-130s systolic also correlates with what she sees at home. The patient states blood glucose is in range 90% of the time most days with some lows at night which respond to orange juice. The patient is up to date on testing related to cholesterol and it is well controlled on current care measures. The patient states understanding and confidence in implementing her plan of care.     Medical Updates:  Since our last conversation the patient has had a cardiac stress test, been seen at pain management, and followed up with her PCP regarding possible stiff person syndrome.     Medication Updates:  The patient denies any updates to her medication list today and reports medications being taken as indicated in her chart.     Symptoms:  Ongoing back/shoulder discomfort.     Plan of Care Goals and Progress:    Goal 1. Over the next month the patient will take concrete and consistent steps to optimize mobility and avoid falls.      Progress:  The patient states that she is fall free for the past month and is very careful to avoid injuries. The patient states she has started doing some strengthening exercises which she states help.      Barriers:  The patient has ongoing mobility concerns that will require ongoing management      Interventions:  The patient will expand her repertoire of strengthening and balance exercises     Education:  Education on strength and balance exercises provided, article sent by mail.     Goal 2. Over the next month the patient will take concrete and consistent steps to optimize  Diabetes control.     Progress:  The patient states that her continuous glucose monitor shows her within acceptable limits a vast majority of the time.       Barriers:  The patient is getting nighttime lows that cause the monitor to wake her.      Interventions: Feedback requested from Endocrinology. Meal log for dinners in particular advised to see if there is a trend on the nights that these happen.      Education:  Emphasis on lean proteins, veggies, and complex carbs over processed carbs reinforced.       Patient's Concerns and Feedback (Self Management of Care):     The patient's primary concern today is further evaluation for Stiff Person Syndrome. She is going to follow up on her referral to [facility] if she hasn't heard anything by Monday. The patient is aware of her future appointment dates and times. The patient is planning to attend as scheduled. The patient is aware of how to get a hold of me if needed.     Next Steps:     Follow-Up Plan:  The patient and I will follow up on her diabetic, mobility, and cardiovascular goals in August 2025      Appointments:  8/7/25 (11:20 am, Endocrinology), 10/2/25 (10:25 arrival, [facility]), 10/14/25 (11:20 am, Endocrinology), 2/18/26 (11:00 am, Vascular Medicine)     Contact Information:  Call [phone] with any questions or concerns.

## 2025-07-03 NOTE — Clinical Note
REFERRAL APPROVAL NOTICE         Sent on July 3, 2025                   Ines Long  [address]  [place] [address]                   Dear Ms. Long,    After a careful review of the medical information and benefit coverage, [facility] has processed your referral. See below for additional details.    If applicable, you must be actively enrolled with your insurance for coverage of the authorized service. If you have any questions regarding your coverage, please contact your insurance directly.    REFERRAL INFORMATION   Referral #:  [id number]  Referred-To Provider    Referred-By Provider:  Neurology    SOBIA Candelario   [facility]      [address]  [address]  [place] [address] [phone] [id number]  [place] [address]  [phone]    Referral Start Date:  06/30/2025  Referral End Date:   06/30/2026             SCHEDULING  If you do not already have an appointment, please call [phone] to make an appointment.     MORE INFORMATION  If you do not already have a [employer] account, sign up at: [email or web address]  You can access your medical information, make appointments, see lab results, billing information, and more.  If you have questions regarding this referral, please contact  the [facility] Referrals department at:             [phone]. Monday - Friday 8:00AM - 5:00PM.     Sincerely,    [facility]

## 2025-07-03 NOTE — PROGRESS NOTES
I spoke with the patient this afternoon to update her that her endocrinologist would like her to call and schedule an appointment with one of the diabetic nurses to evaluate the low blood sugars and that she should contact medicare to see if there is a way to purchase a needed medical device that is not carried by local DME companies.

## 2025-07-03 NOTE — CARE PLAN
"  Problem: Knowledge of self-monitoring blood sugars : Long Term  Goal: Ensure patient has had proper education on self glucose testing  Note: The patient uses a continuous glucose monitor and is getting on track after \"fudging\" slightly the last 2 weeks. She reports excellent percentages of blood glucose levels within range with some nighttime lows. Feedback requested from endocrinology.      "

## 2025-07-04 LAB
COLLECT DURATION TIME SPEC: NORMAL HR
CREAT 24H UR-MCNC: 44 MG/DL
MICROALBUMIN 24H UR-MCNC: <0.3 MG/DL
MICROALBUMIN/CREAT 24H UR: <7 MG/G (ref 0–30)
SPECIMEN VOL ?TM UR: NORMAL ML

## 2025-07-09 ENCOUNTER — NON-PROVIDER VISIT (OUTPATIENT)
Dept: ENDOCRINOLOGY | Facility: MEDICAL CENTER | Age: 70
End: 2025-07-09
Attending: INTERNAL MEDICINE
Payer: MEDICARE

## 2025-07-09 VITALS
OXYGEN SATURATION: 94 % | BODY MASS INDEX: 27.88 KG/M2 | HEIGHT: 67 IN | DIASTOLIC BLOOD PRESSURE: 68 MMHG | HEART RATE: 87 BPM | SYSTOLIC BLOOD PRESSURE: 128 MMHG | WEIGHT: 177.6 LBS

## 2025-07-09 DIAGNOSIS — E10.69 TYPE 1 DIABETES MELLITUS WITH OTHER SPECIFIED COMPLICATION (HCC): Primary | ICD-10-CM

## 2025-07-09 LAB
HBA1C MFR BLD: 6.8 % (ref ?–5.8)
POCT INT CON NEG: NEGATIVE
POCT INT CON POS: POSITIVE

## 2025-07-09 PROCEDURE — 99213 OFFICE O/P EST LOW 20 MIN: CPT | Performed by: INTERNAL MEDICINE

## 2025-07-09 PROCEDURE — 83036 HEMOGLOBIN GLYCOSYLATED A1C: CPT

## 2025-07-09 RX ORDER — INSULIN LISPRO-AABC 100 [IU]/ML
INJECTION, SOLUTION INTRAVENOUS; SUBCUTANEOUS
Qty: 90 ML | Refills: 1 | Status: SHIPPED | OUTPATIENT
Start: 2025-07-09 | End: 2025-07-09 | Stop reason: CLARIF

## 2025-07-09 ASSESSMENT — FIBROSIS 4 INDEX: FIB4 SCORE: 1.34

## 2025-07-09 NOTE — PROGRESS NOTES
RN-CDE Note    Subjective:   Endocrinology Clinic Progress Note  PCP: SOBIA Candelario    HPI:  Ines Long is a 70 y.o. old patient who is seen today by the Diabetes Nurse Specialist for review of her controlled type 1 diabetes on insulin pump therapy.    Recent changes in health:  states she has been having a difficult time with blood sugars since switching from Fiasp to Novolog.   DM:   Last A1c:   Lab Results   Component Value Date/Time    HBA1C 6.8 (A) 07/09/2025 02:01 PM      Previous A1c was 6.4 on 4/8/2025  A1C GOAL: < 7    Diabetes Medications:   Novolog via Medtronic Pump           Patient's body mass index is unknown because there is no height or weight on file. Exercise and nutrition counseling were performed at this visit.    Glucose monitoring frequency: using Guardian CGM   UNABLE TO DOWNLOAD PUMP AND CGM AS [employer] SERVERS ARE DOWN.   Hypoglycemic episodes: states on occasion     Last Retinal Exam: on file and up-to-date    Foot Exam:  Monofilament: current.   Lab Results   Component Value Date/Time    MALBCRT <7 07/02/2025 09:23 AM    MICROALBUR <0.3 07/02/2025 09:23 AM        ACR Albumin/Creatinine Ratio goal <30     HTN:   Blood pressure goal <130/<80   Currently Rx ACE/ARB:  Telmisartan    Dyslipidemia:    Lab Results   Component Value Date/Time    CHOLSTRLTOT 130 01/30/2025 08:56 AM    LDL 39 01/30/2025 08:56 AM    HDL 76 01/30/2025 08:56 AM    TRIGLYCERIDE 76 01/30/2025 08:56 AM         Currently Rx Statin:  Rosuvastatin    She  reports that she quit smoking about 50 years ago. Her smoking use included cigarettes. She has never used smokeless tobacco.    Plan:     Discussed and educated on:       Recommended medication changes: switch to Lyumjev insulin

## 2025-07-11 PROCEDURE — RXMED WILLOW AMBULATORY MEDICATION CHARGE: Performed by: NURSE PRACTITIONER

## 2025-07-14 ENCOUNTER — PHARMACY VISIT (OUTPATIENT)
Dept: PHARMACY | Facility: MEDICAL CENTER | Age: 70
End: 2025-07-14
Payer: COMMERCIAL

## 2025-07-21 ENCOUNTER — HOSPITAL ENCOUNTER (OUTPATIENT)
Dept: RADIOLOGY | Facility: MEDICAL CENTER | Age: 70
End: 2025-07-21
Attending: NURSE PRACTITIONER
Payer: MEDICARE

## 2025-07-21 DIAGNOSIS — Z12.31 ENCOUNTER FOR SCREENING MAMMOGRAM FOR MALIGNANT NEOPLASM OF BREAST: ICD-10-CM

## 2025-07-21 PROCEDURE — 77063 BREAST TOMOSYNTHESIS BI: CPT

## 2025-07-22 ENCOUNTER — HOSPITAL ENCOUNTER (OUTPATIENT)
Dept: RADIOLOGY | Facility: MEDICAL CENTER | Age: 70
End: 2025-07-22
Payer: MEDICARE

## 2025-07-30 ENCOUNTER — TELEPHONE (OUTPATIENT)
Dept: ENDOCRINOLOGY | Facility: MEDICAL CENTER | Age: 70
End: 2025-07-30
Payer: MEDICARE

## 2025-07-30 NOTE — TELEPHONE ENCOUNTER
Pt called she has been having a hard time using NOVOLOG insulin and prefers to use FIASP but unfortunately her insurance wont cover it. Pt is requesting a written prescription for FIASP so she can get it at a [facility] that is affordable for her to receive her insulin. I advised pt I would speak with provider and see what they advised.

## 2025-07-31 ENCOUNTER — HOSPITAL ENCOUNTER (OUTPATIENT)
Dept: LAB | Facility: MEDICAL CENTER | Age: 70
End: 2025-07-31
Payer: MEDICARE

## 2025-07-31 ENCOUNTER — HOSPITAL ENCOUNTER (OUTPATIENT)
Dept: LAB | Facility: MEDICAL CENTER | Age: 70
End: 2025-07-31
Attending: STUDENT IN AN ORGANIZED HEALTH CARE EDUCATION/TRAINING PROGRAM
Payer: MEDICARE

## 2025-07-31 DIAGNOSIS — E55.9 VITAMIN D DEFICIENCY: ICD-10-CM

## 2025-07-31 DIAGNOSIS — E10.69 TYPE 1 DIABETES MELLITUS WITH OTHER SPECIFIED COMPLICATION (HCC): ICD-10-CM

## 2025-07-31 DIAGNOSIS — E03.9 PRIMARY HYPOTHYROIDISM: ICD-10-CM

## 2025-07-31 LAB
25(OH)D3 SERPL-MCNC: 65 NG/ML (ref 30–100)
ALBUMIN SERPL BCP-MCNC: 4.5 G/DL (ref 3.2–4.9)
ALBUMIN SERPL BCP-MCNC: 4.7 G/DL (ref 3.2–4.9)
ALBUMIN/GLOB SERPL: 1.7 G/DL
ALP SERPL-CCNC: 101 U/L (ref 30–99)
ALT SERPL-CCNC: 35 U/L (ref 2–50)
ANION GAP SERPL CALC-SCNC: 10 MMOL/L (ref 7–16)
ANION GAP SERPL CALC-SCNC: 13 MMOL/L (ref 7–16)
AST SERPL-CCNC: 33 U/L (ref 12–45)
BILIRUB SERPL-MCNC: 0.4 MG/DL (ref 0.1–1.5)
BUN SERPL-MCNC: 12 MG/DL (ref 8–22)
BUN SERPL-MCNC: 12 MG/DL (ref 8–22)
CALCIUM ALBUM COR SERPL-MCNC: 9.3 MG/DL (ref 8.5–10.5)
CALCIUM ALBUM COR SERPL-MCNC: 9.6 MG/DL (ref 8.5–10.5)
CALCIUM SERPL-MCNC: 10 MG/DL (ref 8.5–10.5)
CALCIUM SERPL-MCNC: 9.9 MG/DL (ref 8.5–10.5)
CHLORIDE SERPL-SCNC: 101 MMOL/L (ref 96–112)
CHLORIDE SERPL-SCNC: 101 MMOL/L (ref 96–112)
CO2 SERPL-SCNC: 22 MMOL/L (ref 20–33)
CO2 SERPL-SCNC: 23 MMOL/L (ref 20–33)
CREAT SERPL-MCNC: 0.77 MG/DL (ref 0.5–1.4)
CREAT SERPL-MCNC: 0.8 MG/DL (ref 0.5–1.4)
GFR SERPLBLD CREATININE-BSD FMLA CKD-EPI: 80 ML/MIN/1.73 M 2
GFR SERPLBLD CREATININE-BSD FMLA CKD-EPI: 84 ML/MIN/1.73 M 2
GLOBULIN SER CALC-MCNC: 2.7 G/DL (ref 1.9–3.5)
GLUCOSE SERPL-MCNC: 104 MG/DL (ref 65–99)
GLUCOSE SERPL-MCNC: 104 MG/DL (ref 65–99)
MAGNESIUM SERPL-MCNC: 2 MG/DL (ref 1.5–2.5)
PHOSPHATE SERPL-MCNC: 3.5 MG/DL (ref 2.5–4.5)
POTASSIUM SERPL-SCNC: 5.1 MMOL/L (ref 3.6–5.5)
POTASSIUM SERPL-SCNC: 5.3 MMOL/L (ref 3.6–5.5)
PROT SERPL-MCNC: 7.2 G/DL (ref 6–8.2)
SODIUM SERPL-SCNC: 134 MMOL/L (ref 135–145)
SODIUM SERPL-SCNC: 136 MMOL/L (ref 135–145)
T4 FREE SERPL-MCNC: 1.24 NG/DL (ref 0.93–1.7)
TSH SERPL-ACNC: 1.82 UIU/ML (ref 0.38–5.33)

## 2025-07-31 PROCEDURE — 82306 VITAMIN D 25 HYDROXY: CPT

## 2025-07-31 PROCEDURE — 80069 RENAL FUNCTION PANEL: CPT

## 2025-07-31 PROCEDURE — 84439 ASSAY OF FREE THYROXINE: CPT

## 2025-07-31 PROCEDURE — 84443 ASSAY THYROID STIM HORMONE: CPT

## 2025-07-31 PROCEDURE — 36415 COLL VENOUS BLD VENIPUNCTURE: CPT

## 2025-07-31 PROCEDURE — 83735 ASSAY OF MAGNESIUM: CPT

## 2025-07-31 PROCEDURE — 80053 COMPREHEN METABOLIC PANEL: CPT

## 2025-08-01 ENCOUNTER — TELEPHONE (OUTPATIENT)
Dept: ENDOCRINOLOGY | Facility: MEDICAL CENTER | Age: 70
End: 2025-08-01
Payer: MEDICARE

## 2025-08-04 ENCOUNTER — PATIENT OUTREACH (OUTPATIENT)
Dept: HEALTH INFORMATION MANAGEMENT | Facility: OTHER | Age: 70
End: 2025-08-04
Payer: MEDICARE

## 2025-08-04 DIAGNOSIS — Z79.4 DIABETES MELLITUS DUE TO UNDERLYING CONDITION WITH HYPEROSMOLARITY WITHOUT COMA, WITH LONG-TERM CURRENT USE OF INSULIN (HCC): ICD-10-CM

## 2025-08-04 DIAGNOSIS — E08.00 DIABETES MELLITUS DUE TO UNDERLYING CONDITION WITH HYPEROSMOLARITY WITHOUT COMA, WITH LONG-TERM CURRENT USE OF INSULIN (HCC): ICD-10-CM

## 2025-08-04 DIAGNOSIS — E78.49 OTHER HYPERLIPIDEMIA: ICD-10-CM

## 2025-08-04 DIAGNOSIS — I10 PRIMARY HYPERTENSION: Primary | ICD-10-CM

## 2025-08-05 ENCOUNTER — TELEPHONE (OUTPATIENT)
Dept: SURGERY | Facility: MEDICAL CENTER | Age: 70
End: 2025-08-05
Payer: MEDICARE

## 2025-08-07 ENCOUNTER — TELEPHONE (OUTPATIENT)
Dept: ENDOCRINOLOGY | Facility: MEDICAL CENTER | Age: 70
End: 2025-08-07
Payer: MEDICARE

## 2025-08-08 ENCOUNTER — TELEPHONE (OUTPATIENT)
Dept: HEALTH INFORMATION MANAGEMENT | Facility: OTHER | Age: 70
End: 2025-08-08
Payer: MEDICARE

## 2025-08-11 ENCOUNTER — OFFICE VISIT (OUTPATIENT)
Dept: MEDICAL GROUP | Facility: PHYSICIAN GROUP | Age: 70
End: 2025-08-11
Payer: MEDICARE

## 2025-08-11 VITALS
HEIGHT: 67 IN | TEMPERATURE: 97.7 F | WEIGHT: 181.2 LBS | BODY MASS INDEX: 28.44 KG/M2 | OXYGEN SATURATION: 98 % | DIASTOLIC BLOOD PRESSURE: 64 MMHG | SYSTOLIC BLOOD PRESSURE: 112 MMHG | HEART RATE: 86 BPM

## 2025-08-11 DIAGNOSIS — R92.30 DENSE BREASTS: ICD-10-CM

## 2025-08-11 DIAGNOSIS — E03.9 HYPOTHYROIDISM, UNSPECIFIED TYPE: ICD-10-CM

## 2025-08-11 DIAGNOSIS — I10 PRIMARY HYPERTENSION: ICD-10-CM

## 2025-08-11 DIAGNOSIS — E10.69 TYPE 1 DIABETES MELLITUS WITH OTHER SPECIFIED COMPLICATION (HCC): ICD-10-CM

## 2025-08-11 DIAGNOSIS — N32.81 OAB (OVERACTIVE BLADDER): Primary | ICD-10-CM

## 2025-08-11 PROCEDURE — 99214 OFFICE O/P EST MOD 30 MIN: CPT | Performed by: NURSE PRACTITIONER

## 2025-08-11 PROCEDURE — 3074F SYST BP LT 130 MM HG: CPT | Performed by: NURSE PRACTITIONER

## 2025-08-11 PROCEDURE — 3078F DIAST BP <80 MM HG: CPT | Performed by: NURSE PRACTITIONER

## 2025-08-11 ASSESSMENT — FIBROSIS 4 INDEX: FIB4 SCORE: 1.28

## 2025-08-20 ENCOUNTER — TELEPHONE (OUTPATIENT)
Dept: ENDOCRINOLOGY | Facility: MEDICAL CENTER | Age: 70
End: 2025-08-20
Payer: MEDICARE

## 2025-08-21 ENCOUNTER — OFFICE VISIT (OUTPATIENT)
Dept: ENDOCRINOLOGY | Facility: MEDICAL CENTER | Age: 70
End: 2025-08-21
Attending: INTERNAL MEDICINE
Payer: MEDICARE

## 2025-08-21 VITALS
HEIGHT: 67 IN | BODY MASS INDEX: 27.61 KG/M2 | DIASTOLIC BLOOD PRESSURE: 60 MMHG | SYSTOLIC BLOOD PRESSURE: 124 MMHG | WEIGHT: 175.9 LBS | HEART RATE: 91 BPM | OXYGEN SATURATION: 97 %

## 2025-08-21 DIAGNOSIS — E10.69 TYPE 1 DIABETES MELLITUS WITH OTHER SPECIFIED COMPLICATION (HCC): Primary | ICD-10-CM

## 2025-08-21 DIAGNOSIS — Z96.41 INSULIN PUMP IN PLACE: ICD-10-CM

## 2025-08-21 DIAGNOSIS — M81.0 AGE-RELATED OSTEOPOROSIS WITHOUT CURRENT PATHOLOGICAL FRACTURE: ICD-10-CM

## 2025-08-21 DIAGNOSIS — E55.9 VITAMIN D DEFICIENCY: ICD-10-CM

## 2025-08-21 DIAGNOSIS — E78.5 DYSLIPIDEMIA: ICD-10-CM

## 2025-08-21 DIAGNOSIS — E03.9 PRIMARY HYPOTHYROIDISM: ICD-10-CM

## 2025-08-21 DIAGNOSIS — Z79.4 LONG-TERM INSULIN USE (HCC): ICD-10-CM

## 2025-08-21 PROCEDURE — 3078F DIAST BP <80 MM HG: CPT | Performed by: INTERNAL MEDICINE

## 2025-08-21 PROCEDURE — 3074F SYST BP LT 130 MM HG: CPT | Performed by: INTERNAL MEDICINE

## 2025-08-21 PROCEDURE — 99214 OFFICE O/P EST MOD 30 MIN: CPT | Performed by: INTERNAL MEDICINE

## 2025-08-21 RX ORDER — LORAZEPAM 0.5 MG/1
0.5 TABLET ORAL PRN
OUTPATIENT
Start: 2025-08-21

## 2025-08-21 RX ORDER — METHYLPREDNISOLONE SODIUM SUCCINATE 125 MG/2ML
125 INJECTION, POWDER, LYOPHILIZED, FOR SOLUTION INTRAMUSCULAR; INTRAVENOUS PRN
OUTPATIENT
Start: 2025-08-21

## 2025-08-21 RX ORDER — INSULIN ASPART INJECTION 100 [IU]/ML
INJECTION, SOLUTION SUBCUTANEOUS
COMMUNITY

## 2025-08-21 RX ORDER — EPINEPHRINE 1 MG/ML(1)
0.5 AMPUL (ML) INJECTION PRN
OUTPATIENT
Start: 2025-08-21

## 2025-08-21 RX ORDER — ONDANSETRON 8 MG/1
8 TABLET, ORALLY DISINTEGRATING ORAL PRN
OUTPATIENT
Start: 2025-08-21

## 2025-08-21 RX ORDER — 0.9 % SODIUM CHLORIDE 0.9 %
VIAL (ML) INJECTION PRN
OUTPATIENT
Start: 2025-08-21

## 2025-08-21 RX ORDER — ACETAMINOPHEN 325 MG/1
650 TABLET ORAL ONCE
OUTPATIENT
Start: 2025-08-21 | End: 2025-08-21

## 2025-08-21 RX ORDER — SODIUM CHLORIDE 9 MG/ML
INJECTION, SOLUTION INTRAVENOUS CONTINUOUS
OUTPATIENT
Start: 2025-08-21

## 2025-08-21 RX ORDER — SODIUM CHLORIDE 9 MG/ML
1000 INJECTION, SOLUTION INTRAVENOUS PRN
OUTPATIENT
Start: 2025-08-21

## 2025-08-21 RX ORDER — 0.9 % SODIUM CHLORIDE 0.9 %
3 VIAL (ML) INJECTION PRN
OUTPATIENT
Start: 2025-08-21

## 2025-08-21 RX ORDER — LORAZEPAM 2 MG/ML
0.5 INJECTION INTRAMUSCULAR PRN
OUTPATIENT
Start: 2025-08-21

## 2025-08-21 RX ORDER — DIPHENHYDRAMINE HYDROCHLORIDE 50 MG/ML
25 INJECTION, SOLUTION INTRAMUSCULAR; INTRAVENOUS PRN
OUTPATIENT
Start: 2025-08-21

## 2025-08-21 RX ORDER — 0.9 % SODIUM CHLORIDE 0.9 %
10 VIAL (ML) INJECTION PRN
OUTPATIENT
Start: 2025-08-21

## 2025-08-21 RX ORDER — PREGABALIN 50 MG/1
50 CAPSULE ORAL
COMMUNITY
Start: 2025-08-14 | End: 2025-09-13

## 2025-08-21 RX ORDER — MEPERIDINE HYDROCHLORIDE 25 MG/ML
25 INJECTION INTRAMUSCULAR; INTRAVENOUS; SUBCUTANEOUS PRN
OUTPATIENT
Start: 2025-08-21

## 2025-08-21 RX ORDER — ZOLEDRONIC ACID 0.05 MG/ML
5 INJECTION, SOLUTION INTRAVENOUS ONCE
OUTPATIENT
Start: 2025-08-21 | End: 2025-08-21

## 2025-08-21 RX ORDER — ACETAMINOPHEN 325 MG/1
650 TABLET ORAL PRN
OUTPATIENT
Start: 2025-08-21

## 2025-08-21 RX ORDER — CARBIDOPA AND LEVODOPA 25; 250 MG/1; MG/1
1 TABLET ORAL 3 TIMES DAILY
COMMUNITY
Start: 2025-08-19

## 2025-08-21 RX ORDER — ONDANSETRON 2 MG/ML
8 INJECTION INTRAMUSCULAR; INTRAVENOUS PRN
OUTPATIENT
Start: 2025-08-21

## 2025-08-21 RX ORDER — ALBUTEROL SULFATE 5 MG/ML
2.5 SOLUTION RESPIRATORY (INHALATION) PRN
OUTPATIENT
Start: 2025-08-21

## 2025-08-21 ASSESSMENT — FIBROSIS 4 INDEX: FIB4 SCORE: 1.28

## (undated) DEVICE — DRAPE U ORTHOPEDIC - (10/BX)

## (undated) DEVICE — SLEEVE, VASO, THIGH, MED

## (undated) DEVICE — TUBING CLEARLINK DUO-VENT - C-FLO (48EA/CA)

## (undated) DEVICE — PACK NEURO - (2EA/CA)

## (undated) DEVICE — KIT SURGIFLO W/OUT THROMBIN - (6EA/CA)

## (undated) DEVICE — SET EXTENSION WITH 2 PORTS (48EA/CA) ***PART #2C8610 IS A SUBSTITUTE*****

## (undated) DEVICE — CHLORAPREP 26 ML APPLICATOR - ORANGE TINT(25/CA)

## (undated) DEVICE — ELECTRODE DUAL RETURN W/ CORD - (50/PK)

## (undated) DEVICE — DRAPE C-ARM LARGE 41IN X 74 IN - (10/BX 2BX/CA)

## (undated) DEVICE — DRAPE SURG STERI-DRAPE 7X11OD - (40EA/CA)

## (undated) DEVICE — SYSTEM PEEL & PLACE 13CM INCISIONS

## (undated) DEVICE — CELLSAVER STAT

## (undated) DEVICE — GLOVE BIOGEL INDICATOR SZ 8 SURGICAL PF LTX - (50/BX 4BX/CA)

## (undated) DEVICE — DRAPE LAPAROTOMY T SHEET - (12EA/CA)

## (undated) DEVICE — GLOVE SURGICAL PROTEXIS 8 1/2 - (50PR/BX)

## (undated) DEVICE — DRAPE LARGE 3 QUARTER - (20/CA)

## (undated) DEVICE — GOWN WARMING STANDARD FLEX - (30/CA)

## (undated) DEVICE — LACTATED RINGERS INJ 1000 ML - (14EA/CA 60CA/PF)

## (undated) DEVICE — COVER LIGHT HANDLE ALC PLUS DISP (18EA/BX)

## (undated) DEVICE — SEALER BIPOLAR 2.3 AQUAMANTYS

## (undated) DEVICE — DRESSING PETROLEUM GAUZE 5 X 9" (50EA/BX 4BX/CA)"

## (undated) DEVICE — BANDAGE ROLL STERILE BULKEE 4.5 IN X 4 YD (100EA/CA)

## (undated) DEVICE — SUTURE 4-0 30CM STRATAFIX SPIRAL PS-2 (12EA/BX)

## (undated) DEVICE — SODIUM CHL IRRIGATION 0.9% 1000ML (12EA/CA)

## (undated) DEVICE — MIDAS LUBRICATOR DIFFUSER PACK (4EA/CA)

## (undated) DEVICE — KIT ROOM DECONTAMINATION

## (undated) DEVICE — VESSELOOP MINI BLUE STERILE - SURG-I-LOOP (10EA/BX)

## (undated) DEVICE — SPHERE NAVIGATION STEALTH (5EA/TY 12TY/PK)

## (undated) DEVICE — PACK KNEE ARTHROSCOPY SM OR - (2EA/CA)

## (undated) DEVICE — SPLINT PLASTER 5 IN X 30 IN - (50EA/BX 6BX/CA)

## (undated) DEVICE — GLOVE SIZE 8.0 SURGEON ACCELERATOR FREE GREEN (50PR/BX)

## (undated) DEVICE — TOWELS CLOTH SURGICAL - (4/PK 20PK/CA)

## (undated) DEVICE — CANISTER SUCTION 3000ML MECHANICAL FILTER AUTO SHUTOFF MEDI-VAC NONSTERILE LF DISP (40EA/CA)

## (undated) DEVICE — TUBING C&T SET FLYING LEADS DRAIN TUBING (10EA/BX)

## (undated) DEVICE — SENSOR OXIMETER ADULT SPO2 RD SET (20EA/BX)

## (undated) DEVICE — GUIDEPIN FOR 7.3MM CANNULATED SCREWS 2.8MM X 300MM (3TX6=18)(6EA/PK)

## (undated) DEVICE — GOWN SURGEONS X-LARGE - DISP. (30/CA)

## (undated) DEVICE — GLOVE PROTEXIS PI MICRO SZ 8.5 (200PR/CA)

## (undated) DEVICE — SUTURE 0 COATED VICRYL PLUS - CTX CR(12/BX)18 IN

## (undated) DEVICE — GOWN SURGEONS LARGE - (32/CA)

## (undated) DEVICE — WIPE INSTRUMENT MICROWIPE (20EA/SP)

## (undated) DEVICE — SUTURE GENERAL

## (undated) DEVICE — INTRAOP NEURO IN OR 1:1 PER 15 MIN

## (undated) DEVICE — DRESSING XEROFORM 1X8 - (50/BX 4BX/CA)

## (undated) DEVICE — ARMREST CRADLE FOAM - (2PR/PK 12PR/CA)

## (undated) DEVICE — SYRINGE 20 ML LL (50EA/BX 4BX/CA)

## (undated) DEVICE — GRAFT ACTIFUSE ABX PUTTY 5ML: Type: IMPLANTABLE DEVICE | Site: SPINE CERVICAL | Status: NON-FUNCTIONAL

## (undated) DEVICE — PADDING CAST 6 IN STERILE - 6 X 4 YDS (24/CA)

## (undated) DEVICE — TOWEL STOP TIMEOUT SAFETY FLAG (40EA/CA)

## (undated) DEVICE — NEPTUNE 4 PORT MANIFOLD - (20/PK)

## (undated) DEVICE — SUTURE 3-0 ETHILON FS-1 - (36/BX) 30 INCH

## (undated) DEVICE — GLOVE BIOGEL SZ 8 SURGICAL PF LTX - (50PR/BX 4BX/CA)

## (undated) DEVICE — SUTURE 2-0 VICRYL PLUS CT-1 - 8 X 18 INCH(12/BX)

## (undated) DEVICE — SPONGE GAUZESTER 4 X 4 4PLY - (128PK/CA)

## (undated) DEVICE — DRAPE LOWER EXTREMETY - (6/CA)

## (undated) DEVICE — DRAPE 36X28IN RAD CARM BND BG - (25/CA) O

## (undated) DEVICE — SUCTION INSTRUMENT YANKAUER BULBOUS TIP W/O VENT (50EA/CA)

## (undated) DEVICE — KIT ANESTHESIA W/CIRCUIT & 3/LT BAG W/FILTER (20EA/CA)

## (undated) DEVICE — TUBE CONNECTING SUCTION - CLEAR PLASTIC STERILE 72 IN (50EA/CA)

## (undated) DEVICE — Device

## (undated) DEVICE — PIN HEAD MAYFIELD DISP. (3EA/PK 12PK/BX)

## (undated) DEVICE — CANISTER SUCTION RIGID RED 1500CC (40EA/CA)

## (undated) DEVICE — SPHERE NAVIGATION STEALTH (4EA/PK12PK/BX)

## (undated) DEVICE — BANDAGE ELASTIC STERILE MATRIX 6 X 10 (20EA/CA)

## (undated) DEVICE — VESSELOOP MAXI BLUE STERILE- SURG-I-LOOP (10EA/BX)

## (undated) DEVICE — KIT EVACUATER 3 SPRING PVC LF 1/8 DRAIN SIZE (10EA/CA)"

## (undated) DEVICE — CLOSURE SKIN STRIP 1/2 X 4 IN - (STERI STRIP) (50/BX 4BX/CA)

## (undated) DEVICE — COVER MAYO STAND X-LG - (22EA/CA)

## (undated) DEVICE — SODIUM CHL. INJ. 0.9% 500ML (24EA/CA 50CA/PF)

## (undated) DEVICE — MASK ANESTHESIA ADULT  - (100/CA)

## (undated) DEVICE — BANDAGE ELASTIC 4 HONEYCOMB - 4"X5YD LF (20/CA)"

## (undated) DEVICE — CATHETER ON-Q SILVER SOAKER 5IN  (5EA/CA)  - SUB ORDER #4428

## (undated) DEVICE — PROTECTOR ULNA NERVE - (36PR/CA)

## (undated) DEVICE — SYRINGE 30 ML LL (56/BX)

## (undated) DEVICE — GLOVE, LITE (PAIR)

## (undated) DEVICE — ELECTRODE 850 FOAM ADHESIVE - HYDROGEL RADIOTRNSPRNT (50/PK)

## (undated) DEVICE — PACK LOWER EXTREMITY - (2/CA)

## (undated) DEVICE — HUMID-VENT HEAT AND MOISTURE EXCHANGE- (50/BX)

## (undated) DEVICE — GLOVE BIOGEL PI INDICATOR SZ 8.0 SURGICAL PF LF -(50/BX 4BX/CA)

## (undated) DEVICE — SUTURE 2-0 VICRYL PLUS CT-1 36 (36PK/BX)"

## (undated) DEVICE — BLADE SURGICAL CLIPPER - (50EA/CA)

## (undated) DEVICE — DRAPE MAGNETIC (INSTRA-MAG) - (30/CA)

## (undated) DEVICE — DRESSING ABDOMINAL PAD STERILE 8 X 10" (360EA/CA)"

## (undated) DEVICE — DEVICE MONOPOLAR RF PEAK PLASMABLADE 3.0S

## (undated) DEVICE — SUTURE 1 VICRYL PLUS CTX - 8 X 18 INCH (12/BX)

## (undated) DEVICE — SET LEADWIRE 5 LEAD BEDSIDE DISPOSABLE ECG (1SET OF 5/EA)

## (undated) DEVICE — CLIP SM INTNL HRZN TI ESCP LGT - (24EA/PK 25PK/BX)

## (undated) DEVICE — NEEDLE DAVIS TONSIL 1/2 CIR - (2EA/PK20PK/BX)

## (undated) DEVICE — BLADE CLIPPER FITS 2501 CLIPPER (BLUE)  (20EA/CA)

## (undated) DEVICE — PACK MINOR BASIN - (2EA/CA)

## (undated) DEVICE — STAPLER SKIN DISP - (6/BX 10BX/CA) VISISTAT

## (undated) DEVICE — DERMABOND ADVANCED - (12EA/BX)

## (undated) DEVICE — GLOVE BIOGEL ECLIPSE PF LATEX SIZE 8.0  (50PR/BX)

## (undated) DEVICE — BLADE SURGICAL #15 - (50/BX 3BX/CA)

## (undated) DEVICE — DRAPE MAYO STAND - (30/CA)

## (undated) DEVICE — DRESSING 3X8 ADAPTIC GAUZE - NON-ADHERING (36/PK 6PK/BX)

## (undated) DEVICE — HEADREST PRONEVIEW LARGE - (10/CA)

## (undated) DEVICE — CANISTER SUCTION 3000ML MECHANICAL FILTER AUTO SHUTOFF MEDI-VAC NONSTERILE LF DISP  (40EA/CA)

## (undated) DEVICE — LOCKING DRILL 4.2X360MM

## (undated) DEVICE — LACTATED RINGERS INJ. 500 ML - (24EA/CA)

## (undated) DEVICE — WATER IRRIGATION STERILE 1000ML (12EA/CA)

## (undated) DEVICE — TUBING CASSETTE CROSSFLOW INTEGRATED (10EA/CA)

## (undated) DEVICE — STOCKINET BIAS 6 IN STERILE - (20/CA)

## (undated) DEVICE — GLOVE BIOGEL ECLIPSE  PF LATEX SIZE 6.5 (50PR/BX)

## (undated) DEVICE — HEAD HOLDER JUNIOR/ADULT

## (undated) DEVICE — DRESSING TRANSPARENT FILM TEGADERM 4 X 4.75" (50EA/BX)"

## (undated) DEVICE — REAMER SHAFT MODIFIED TRINKLE 8X510MM

## (undated) DEVICE — PIN PERCUTANEOUS STERILE 150MM

## (undated) DEVICE — TRAY CATHETER FOLEY URINE METER W/STATLOCK 350ML (10EA/CA)

## (undated) DEVICE — CATHETER ON-Q SILVER SOAKER 5IN (5EA/CA) - SUB ORDER #4428

## (undated) DEVICE — GRAFT ACTIFUSE ABX PUTTY 2.5ML: Type: IMPLANTABLE DEVICE | Site: SPINE CERVICAL | Status: NON-FUNCTIONAL

## (undated) DEVICE — SUTURE 3-0 VICRYL PLUS SH - 8X 18 INCH (12/BX)

## (undated) DEVICE — PADDING CAST 4 IN STERILE - 4 X 4 YDS (24/CA)

## (undated) DEVICE — MASK, LARYNGEAL AIRWAY #4

## (undated) DEVICE — FREEHAND DRILL 4.2X130MM

## (undated) DEVICE — BLADE SURGICAL #10 - (50/BX)

## (undated) DEVICE — POLY UMBILICAL TAPE 1/8X30 - (36/BX)

## (undated) DEVICE — GLOVE SZ 7.5 BIOGEL PI MICRO - PF LF (50PR/BX)

## (undated) DEVICE — BLOCK

## (undated) DEVICE — SHAVER4.0 AGGRESSIVE + FORMLA (5EA/BX)

## (undated) DEVICE — PUMP ON-Q 270 DUAL 2ML FIXED RATE (5EA/CA)

## (undated) DEVICE — GLOVE BIOGEL PI INDICATOR SZ 7.0 SURGICAL PF LF - (50/BX 4BX/CA)

## (undated) DEVICE — BANDAGE ELASTIC STERILE VELCRO 6 X 5 YDS (25EA/CA)

## (undated) DEVICE — SUTURE 3-0 ETHILON PS-1 (36PK/BX)

## (undated) DEVICE — BLADE SAGITTAL SAW 9.4MM X 25.5MM X .4MM FINE TOOTH (1/EA)

## (undated) DEVICE — SUTURE 0 VICRYL PLUS CT-2 - 8 X 18 INCH (12/BX)

## (undated) DEVICE — PATTIES SURG X-RAYCOTTONOID - 1/2 X 3 IN (200/CA)

## (undated) DEVICE — SUTURE ETHILON 2-0 FSLX 30 (36PK/BX)"

## (undated) DEVICE — DECANTER FLD BLS - (50/CA)

## (undated) DEVICE — SHEET THYROID - (10EA/CA)

## (undated) DEVICE — DETERGENT RENUZYME PLUS 10 OZ PACKET (50/BX)

## (undated) DEVICE — GLOVE SIZE 7.0 SURGEON ACCELERATOR FREE GREEN (50PR/BX 4BX/CA)

## (undated) DEVICE — KNIFE

## (undated) DEVICE — ADHESIVE MASTISOL - (48/BX)

## (undated) DEVICE — BAG SPONGE COUNT 10.25 X 32 - BLUE (250/CA)

## (undated) DEVICE — SENSOR SPO2 NEO LNCS ADHESIVE (20/BX) SEE USER NOTES

## (undated) DEVICE — TOOL MR8 14CM MATCH HD SYM-TRI 3MM DIAMETER (1/EA)

## (undated) DEVICE — GUIDEWIRE WITH TROCAR TIP .062 (6EA/BX)(1TX4+1TX3=7)"

## (undated) DEVICE — GLOVE BIOGEL INDICATOR SZ 7.5 SURGICAL PF LTX - (50PR/BX 4BX/CA)

## (undated) DEVICE — LEAD SET 6 DISP. EKG NIHON KOHDEN

## (undated) DEVICE — SUTURE O FIBERWIRE - (12/BX)

## (undated) DEVICE — STERI STRIP COMPOUND BENZOIN - TINCTURE 0.6ML WITH APPLICATOR (40EA/BX)

## (undated) DEVICE — BIT DRILL 2.4MM INFINITY

## (undated) DEVICE — WRAP CO-FLEX 4IN X 5YD STERIL - SELF-ADHERENT (18/CA)

## (undated) DEVICE — SUTURE 4-0 ETHILON FS-2 18 (36PK/BX)"

## (undated) DEVICE — BONE PRESS SPINAL EDITION HENSLER (10EA/CA)

## (undated) DEVICE — SUTURE 6-0 PROLENE BV-1 D/A 30 (36PK/BX)"

## (undated) DEVICE — WATER IRRIG. STER. 1500 ML - (9/CA)

## (undated) DEVICE — SUCTION TUBE FRAZIER 12FR STERILE (40EA/CA)

## (undated) DEVICE — CLIP MED INTNL HRZN TI ESCP - (25/BX)

## (undated) DEVICE — BONE MILL BM210

## (undated) DEVICE — BOVIE BLADE COATED - (50/PK)

## (undated) DEVICE — SUTURE 3-0 SILK 12 X 18 IN - (36/BX)

## (undated) DEVICE — STOCKINET TUBULAR 6IN STERILE - 6 X 48YDS (25/CA)

## (undated) DEVICE — GLOVE BIOGEL SZ 6.5 SURGICAL PF LTX (50PR/BX 4BX/CA)

## (undated) DEVICE — TOOL DISSECTING BIT MR8 OD3MM L14CM (1EA)

## (undated) DEVICE — BLADE CLIPPER FITS 2501 CLIPPER (BLUE) (20EA/CA)

## (undated) DEVICE — PAD LAP STERILE 18 X 18 - (5/PK 40PK/CA)

## (undated) DEVICE — SODIUM CHL. IRRIGATION 0.9% 3000ML (4EA/CA 65CA/PF)

## (undated) DEVICE — GLOVE PROTEXIS LATEX MICRO SIZE 9 (50PR/BX)

## (undated) DEVICE — BLADE SURGICAL #11 - (50/BX)

## (undated) DEVICE — GLOVE BIOGEL SZ 6 PF LATEX - (50EA/BX 4BX/CA)

## (undated) DEVICE — SUTURE 0 VICRYL PLUS CT-1 - 8 X 18 INCH (12/BX)

## (undated) DEVICE — GLOVE BIOGEL SZ 7 SURGICAL PF LTX - (50PR/BX 4BX/CA)

## (undated) DEVICE — SOD. CHL. INJ. 0.9% 250 ML - (36/CA 50CA/PF)

## (undated) DEVICE — SUTURE CV

## (undated) DEVICE — TOOL DISSECT MATCH HEAD

## (undated) DEVICE — SOD. CHL. INJ. 0.9% 1000 ML - (14EA/CA 60CA/PF)

## (undated) DEVICE — SUTURE 0 VICRYL PLUS CTX - 36 INCH (36/BX)

## (undated) DEVICE — CELLSAVER PACK